# Patient Record
Sex: MALE | Race: WHITE | Employment: OTHER | URBAN - METROPOLITAN AREA
[De-identification: names, ages, dates, MRNs, and addresses within clinical notes are randomized per-mention and may not be internally consistent; named-entity substitution may affect disease eponyms.]

---

## 2021-07-07 ENCOUNTER — HOSPITAL ENCOUNTER (OUTPATIENT)
Dept: PHYSICAL THERAPY | Age: 86
Setting detail: THERAPIES SERIES
Discharge: HOME | End: 2021-07-07
Payer: MEDICARE

## 2021-07-07 PROCEDURE — 97162 PT EVAL MOD COMPLEX 30 MIN: CPT

## 2021-07-07 PROCEDURE — 97110 THERAPEUTIC EXERCISES: CPT

## 2021-07-07 NOTE — PLAN OF CARE
[address] [address]  Phone: [phone]   Fax: [phone]     Physical Therapy Certification    Dear Referring Practitioner: Abigail Botello MD,    We had the pleasure of evaluating the following patient for physical therapy services at [address] [place] [address]. A summary of our findings can be found in the initial assessment below. This includes our plan of care. If you have any questions or concerns regarding these findings, please do not hesitate to contact me at the office phone number checked above. Thank you for the referral.       Physician Signature:_______________________________Date:__________________  By signing above (or electronic signature), therapists plan is approved by physician      Patient: Lilliana Hinojosa   : 1935   MRN: [id number]  Referring Physician: Referring Practitioner: Abigail Botello MD      Evaluation Date: 2021      Medical Diagnosis Information:  Diagnosis: Other intervertebral disc degeneration, lumbar region   Treatment Diagnosis: imbalance, abnormal gait, B hip  & core muscle weakness , decrease B hip flexibility. Insurance information: PT Insurance Information: [employer]     Precautions/ Contra-indications:  Neuropathy  Latex Allergy:  [x]NO      []YES  Preferred Language for Healthcare:   [x]English       []Other:    C-SSRS Triggered by Intake questionnaire (Past 2 wk assessment ):   [x] No, Questionnaire did not trigger screening.   [] Yes, Patient intake triggered C-SSRS Screening     [] Completed, no further action required. [] Completed, PCP notified via Epic    SUBJECTIVE: Patient stated complaint:  Patient has a 2 year history of chronic back pain from DDD. He has had 2 ablation procedures in the last two years.  The patient reports that he has had multiple falls over the past two years as well as  increase back pain. He had BPPV several months ago which caused falls and later hospitalized for several days . Says he would like to improve his mobility , strength in R knee, and reduce the back pain overall. Fear avoidance: I should not do physical activities that (might) make my pain worse   [] True   [x] False     Relevant Medical History: Neuropathy , Myalgia,   Functional Outcome: Oswestry: raw score = 9; dysfunction = 60%    Pain Scale: 7-9/10  Easing factors: Meds  Provocative factors: Sleeping, walking, standing, & all functional positioning     Type: [x]Constant   []Intermittent  []Radiating []Localized []other:     Numbness/Tingling:  B feet     Occupation/School:Retired     Living Status/Prior Level of Function: Prior to this injury / incident, pt was independent with ADLs and IADLs, lives in a one story house with spouse . Has a basement . Prior to 2 years ago patient was getting around without limitations . Patient owns a cane, rollator , tub bench, and recliner which he uses for sit to stand transitions. OBJECTIVE:   Palpation: diminished sensation R lower leg > left LE    Functional Mobility/Transfers:  Mod I     Posture: forward trunk     Inspection: increase hip flexon and BLE stiffness guarding    Gait: (include devices/WB status) forward trunk , decrease mario, decrease step length & mild shuffling with straight cane,    Bandages/Dressings/Incisions: NA    Dermatomes Normal Abnormal Comments   inguinal area (L1)       anterior mid-thigh (L2)      distal ant thigh/med knee (L3)  x right   medial lower leg and foot (L4)      lateral lower leg and foot (L5)  x right   posterior calf (S1)      medial calcaneus (S2)  x right       Reflexes Normal Abnormal Comments   S1-2 Seated achilles x     S1-2 Prone knee bend      L3-4 Patellar tendon x     Clonus x     Babinski          ROM  Comments   Lumbar Flex Limited by 50%    Lumbar Ext Limited by 75%      ROM LEFT RIGHT Comments   Lumbar Side Bend Limited by 50% Limited by 50%    Lumbar Rotation      Hip Flexion   WFL all below   Hip Abd      Hip ER      Hip IR      Hip Extension      Knee Ext      Knee Flex      Hamstring Flex      Piriformis                      Joint mobility:    []Normal    [x]Hypo   []Hyper      Strength / Myotomes LEFT RIGHT Comments   Multifidus      Transverse Ab      Hip Flexors (L1-2) 4 4    Hip Abductors      Hip Extensors      Hip Internal Rotators 4 4+    Knee Ext 5 4    Knee Flex 4+ 4    Hip External Rotators 4 4+    Quads (L2-4)      Hamstrings       Ankle Plantarflexion (S1-2) 3+ 4+    Ankle Dorsiflexion (L4-5) 3+ 4+    Ankle Inversion 3+ 4+    Ankle Eversion (S1-2) 3+ 4+    Great Toe Extension (L5)            Neural dynamic tension testing Normal Abnormal Comments   Slump Test  - Degree of knee flexion:  x     SLR  x     0-30      30-70      Femoral nerve (L2-4)          Orthopedic Special Tests:    Normal Abnormal N/A Comments   Toe walk         Heel Walk       Fwd Bend-aberrant or innominate mvmt)  x     Trendelenburg       Kemps/Quadrant       Stork       JEREL/Chadd       Hip scour       SLR x      Crossed SLR x      Supine to sit       Hip thrust       SI distraction/compression       PA/Spring       Prone Instability test       Prone knee bend       Sacral Spring/thrust                  [x] Patient history, allergies, meds reviewed. Medical chart reviewed. See intake form. Review Of Systems (ROS):  [x]Performed Review of systems (Integumentary, CardioPulmonary, Neurological) by intake and observation. Intake form has been scanned into medical record. Patient has been instructed to contact their primary care physician regarding ROS issues if not already being addressed at this time.       Co-morbidities/Complexities (which will affect course of rehabilitation):   []None        []Hx of COVID   Arthritic conditions   []Rheumatoid arthritis (M05.9)  [x]Osteoarthritis (M19.91)  []Gout   Cardiovascular conditions []Hypertension (I10)  []Hyperlipidemia (E78.5)  []Angina pectoris (I20)  []Atherosclerosis (I70)  []Pacemaker  []Hx of CABG/stent/  cardiac surgeries   Musculoskeletal conditions   []Disc pathology   []Congenital spine pathologies   []Osteoporosis (M81.8)  []Osteopenia (M85.8)  []Scoliosis       Endocrine conditions   []Hypothyroid (E03.9)  []Hyperthyroid Gastrointestinal conditions   []Constipation (L05.94)   Metabolic conditions   []Morbid obesity (E66.01)  []Diabetes type 1(E10.65) or 2 (E11.65)   [x]Neuropathy (G60.9)     Cardio/Pulmonary conditions   []Asthma (J45)  []Coughing   []COPD (J44.9)  []CHF  []A-fib   Psychological Disorders  []Anxiety (F41.9)  []Depression (F32.9)   []Other:   Developmental Disorders  []Autism (F84.0)  []CP (G80)  []Down Syndrome (Q90.9)  []Developmental delay     Neurological conditions  []Prior Stroke (I69.30)  []Parkinson's (G20)  []Encephalopathy (G93.40)  []MS (G35)  []Post-polio (G14)  []SCI  []TBI  []ALS Other conditions  []Fibromyalgia (M79.7)  []Vertigo  []Syncope  []Kidney Failure  []Cancer      []currently undergoing                treatment  []Pregnancy  []Incontinence   Prior surgeries  []involved limb  []previous spinal surgery  [] section birth  []hysterectomy  []bowel / bladder surgery  []other relevant surgeries   [x]Other:   Neuropathy, Ablation sx,            Barriers to/and or personal factors that will affect rehab potential:              []Age  []Sex    []Smoker              []Motivation/Lack of Motivation                        [x]Co-Morbidities              []Cognitive Function, education/learning barriers              []Environmental, home barriers              []profession/work barriers  []past PT/medical experience  []other:  Justification: Neuropathy, OA, Hx of vertigo     Falls Risk Assessment (30 days): To be determined next session   [x] Falls Risk assessed and no intervention required.   [] Falls Risk assessed and Patient requires intervention due to being higher risk   TUG score (>12s at risk):     [] Falls education provided, including         ASSESSMENT: The patient is 80 yo male who presents with hip and core muscle weakness , decrease B hip flexibility, poor posture,  Impaired RLE sensation  ( diminished light tough)and imbalance with transitional mobility . Functional Impairments:     [x]Noted lumbar/proximal hip hypomobility   []Noted lumbosacral and/or generalized hypermobility   [x]Decreased Lumbosacral/hip/LE functional ROM   [x]Decreased core/proximal hip strength and neuromuscular control    [x]Decreased LE functional strength    []Abnormal reflexes/sensation/myotomal/dermatomal deficits  [x]Reduced balance/proprioceptive control    []other:      Functional Activity Limitations (from functional questionnaire and intake)   [x]Reduced ability to tolerate prolonged functional positions   [x]Reduced ability or difficulty with changes of positions or transfers between positions   [x]Reduced ability to maintain good posture and demonstrate good body mechanics with sitting, bending, and lifting   [x]Reduced ability to sleep   [x] Reduced ability or tolerance with driving and/or computer work   [x]Reduced ability to perform lifting, reaching, carrying tasks   [x]Reduced ability to squat   [x]Reduced ability to forward bend   [x]Reduced ability to ambulate prolonged functional periods/distances/surfaces   [x]Reduced ability to ascend/descend stairs   []other:       Participation Restrictions   [x]Reduced participation in self care activities   [x]Reduced participation in home management activities   []Reduced participation in work activities   [x]Reduced participation in social activities. []Reduced participation in sport/recreational activities. Classification:   []Signs/symptoms consistent with Lumbar instability/stabilization subgroup.       []Signs/symptoms consistent with Lumbar mobilization/manipulation subgroup, myotomes and dermatomes intact. Meets manipulation criteria. []Signs/symptoms consistent with Lumbar direction specific/centralization subgroup   []Signs/symptoms consistent with Lumbar traction subgroup     []Signs/symptoms consistent with lumbar facet dysfunction   [x]Signs/symptoms consistent with lumbar stenosis type dysfunction   []Signs/symptoms consistent with nerve root involvement including myotome & dermatome dysfunction   []Signs/symptoms consistent with post-surgical status including: decreased ROM, strength and function. []signs/symptoms consistent with pathology which may benefit from Dry needling     []other:      Prognosis/Rehab Potential:      []Excellent   [x]Good    []Fair   []Poor    Tolerance of evaluation/treatment:    []Excellent   [x]Good    []Fair   []Poor     Physical Therapy Evaluation Complexity Justification  [x] A history of present problem with:  [] no personal factors and/or comorbidities that impact the plan of care;  [x]1-2 personal factors and/or comorbidities that impact the plan of care  []3 personal factors and/or comorbidities that impact the plan of care  [x] An examination of body systems using standardized tests and measures addressing any of the following: body structures and functions (impairments), activity limitations, and/or participation restrictions;:  [x] a total of 1-2 or more elements   [] a total of 3 or more elements   [] a total of 4 or more elements   [x] A clinical presentation with:  [x] stable and/or uncomplicated characteristics   [] evolving clinical presentation with changing characteristics  [] unstable and unpredictable characteristics;   [x] Clinical decision making of [] low, [x] moderate, [] high complexity using standardized patient assessment instrument and/or measurable assessment of functional outcome.     [] EVAL (LOW) 96416 (typically 15 minutes face-to-face)  [x] EVAL (MOD) 41787 (typically 30 minutes face-to-face)  [] EVAL (HIGH) 22299 (typically 45 minutes face-to-face)  [] RE-EVAL     PLAN: Begin PT focusing on: proximal hip mobilizations, LB mobs, LB core activation, proximal hip activation, and HEP    Frequency/Duration: 2 days per week for 6 Weeks:  Interventions:  [x]  Therapeutic exercise including: strength training, ROM, for LE, Glutes and core   [x]  NMR activation and proprioception for glutes , LE and Core   [x]  Manual therapy as indicated for Hip complex, LE and spine to include: Dry Needling/IASTM, STM, PROM, Gr I-IV mobilizations, manipulation. [x]  Modalities as needed that may include: thermal agents, E-stim, Biofeedback, US, iontophoresis as indicated  [x]  Patient education on joint protection, postural re-education, activity modification, progression of HEP. HEP instruction: Written HEP instructions provided and reviewed   Access Code: [email or web address]  URL: [email or web address]  Date: 07/07/2021  Prepared by: Mamie Swan    Exercises  Supine Lower Trunk Rotation - 1 x daily - 7 x weekly - 1 sets - 10 reps - 2-3 hold  Seated Hamstring Stretch - 1 x daily - 7 x weekly - 1 sets - 3 reps - 15-20 hold      GOALS:  Patient stated goal: To improve mobility   [] Progressing: [] Met: [] Not Met: [] Adjusted    Therapist goals for Patient:   Short Term Goals: To be achieved in: 2 weeks  1. Independent in HEP and progression per patient tolerance, in order to prevent re-injury. [] Progressing: [] Met: [] Not Met: [] Adjusted  2. Patient will have a decrease in pain to facilitate improvement in movement, function, and ADLs as indicated by Functional Deficits. [] Progressing: [] Met: [] Not Met: [] Adjusted    Long Term Goals: To be achieved in: 6 weeks/ DC   1. Disability index score of 35% or less for the JAIRO to assist with reaching prior level of function. [] Progressing: [] Met: [] Not Met: [] Adjusted    2.  Patient will demonstrate an increase in Strength to+4/5 proximal hip and core activation to allow for proper functional mobility as indicated by patients Functional Deficits. [] Progressing: [] Met: [] Not Met: [] Adjusted  3. Patient will return to functional activities including sit to stand without increased symptoms or LOB. [] Progressing: [] Met: [] Not Met: [] Adjusted  4. Patient to tolerate standing > 10 min without reduce symptoms of back pain and RLE giving way. [] Progressing: [] Met: [] Not Met: [] Adjusted   5. Patient will ambulate with/without AD on level and unlevel surfaces without LOB and normalized gait pattern by 50%.     [] Progressing: [] Met: [] Not Met: [] Adjusted   Electronically signed by:  Junaid Fitzgerald, PT

## 2021-07-07 NOTE — FLOWSHEET NOTE
[facility]  Phone: [phone]   Fax: [phone]    Physical Therapy Daily Treatment Note  Date:  2021    Patient Name:  Adeline Brewer    :  1935  MRN: [id number]  Medical/Treatment Diagnosis Information:  · Diagnosis: Other intervertebral disc degeneration, lumbar region  · Treatment Diagnosis: imbalance, abnormal gait, B hip  & core muscle weakness , decrease B hip flexibility. Insurance/Certification information:  PT Insurance Information: [facility] Medicare  Physician Information:  Referring Practitioner: Natacha Meyer MD  Plan of care signed (Y/N): []  Yes [x]  No     Date of Patient follow up with Physician:      Progress Report: []  Yes  [x]  No     Date Range for reporting period:  Beginnin2021  Ending:     Progress report due (10 Rx/or 30 days whichever is less): visit #02    Recertification due (POC duration/ or 90 days whichever is less): visit # 2021    Visit # Insurance Allowable Auth required?  Date Range     []  Yes  [x]  No        Latex Allergy:  [x]NO      []YES  Preferred Language for Healthcare:   [x]English       []other:    Functional Scale:        Date assessed:  JAIRO: raw score = 9; dysfunction = 60%  21    Pain level:  6-8/10     SUBJECTIVE:  See eval    OBJECTIVE: See eval      RESTRICTIONS/PRECAUTIONS:  Neuropathy     Exercises/Interventions:     Therapeutic Exercises (07557) Resistance / level Sets/sec Reps Notes   Nustep        Supine  LTR  DKTC                                                        Therapeutic Activities (95239)       Perform  TUG  Or Sit to stand  NPV                                   Neuromuscular Re-ed (09832)       Balance:  Tandem  Airex                                           Manual Intervention (46975)                                                     Modalities:     Pt. Education:  -patient educated on diagnosis, prognosis and expectations for rehab  -all patient questions were answered    Home Exercise Program:  Access Code: [email or web address]  URL: [email or web address]  Date: 07/07/2021  Prepared by: Ana Maria Denise    Exercises  Supine Lower Trunk Rotation - 1 x daily - 7 x weekly - 1 sets - 10 reps - 2-3 hold  Seated Hamstring Stretch - 1 x daily - 7 x weekly - 1 sets - 3 reps - 15-20 hold          Therapeutic Exercise and NMR EXR  [x] (38997) Provided verbal/tactile cueing for activities related to strengthening, flexibility, endurance, ROM for improvements in  [] LE / Lumbar: LE, proximal hip, and core control with self care, mobility, lifting, ambulation. [] UE / Cervical: cervical, postural, scapular, scapulothoracic and UE control with self care, reaching, carrying, lifting, house/yardwork, driving, computer work.  [] (38169) Provided verbal/tactile cueing for activities related to improving balance, coordination, kinesthetic sense, posture, motor skill, proprioception to assist with   [] LE / lumbar: LE, proximal hip, and core control in self care, mobility, lifting, ambulation and eccentric single leg control. [] UE / cervical: cervical, scapular, scapulothoracic and UE control with self care, reaching, carrying, lifting, house/yardwork, driving, computer work.   [] (34063) Therapist is in constant attendance of 2 or more patients providing skilled therapy interventions, but not providing any significant amount of measurable one-on-one time to either patient, for improvements in  [] LE / lumbar: LE, proximal hip, and core control in self care, mobility, lifting, ambulation and eccentric single leg control. [] UE / cervical: cervical, scapular, scapulothoracic and UE control with self care, reaching, carrying, lifting, house/yardwork, driving, computer work.      NMR and Therapeutic Activities:    [] (50045 or 47591) Provided verbal/tactile cueing for activities related to improving balance, coordination, kinesthetic sense, posture, motor skill, proprioception and motor activation to allow for proper function of   [] LE: / Lumbar core, proximal hip and LE with self care and ADLs  [] UE / Cervical: cervical, postural, scapular, scapulothoracic and UE control with self care, carrying, lifting, driving, computer work.   [] (21414) Gait Re-education- Provided training and instruction to the patient for proper LE, core and proximal hip recruitment and positioning and eccentric body weight control with ambulation re-education including up and down stairs     Home Management Training / Self Care:  [] (47243) Provided self-care/home management training related to activities of daily living and compensatory training, and/or use of adaptive equipment for improvement with: ADLs and compensatory training, meal preparation, safety procedures and instruction in use of adaptive equipment, including bathing, grooming, dressing, personal hygiene, basic household cleaning and chores.      Home Exercise Program:    [x] (44141) Reviewed/Progressed HEP activities related to strengthening, flexibility, endurance, ROM of   [] LE / Lumbar: core, proximal hip and LE for functional self-care, mobility, lifting and ambulation/stair navigation   [] UE / Cervical: cervical, postural, scapular, scapulothoracic and UE control with self care, reaching, carrying, lifting, house/yardwork, driving, computer work  [] (79233)Reviewed/Progressed HEP activities related to improving balance, coordination, kinesthetic sense, posture, motor skill, proprioception of   [] LE: core, proximal hip and LE for self care, mobility, lifting, and ambulation/stair navigation    [] UE / Cervical: cervical, postural,  scapular, scapulothoracic and UE control with self care, reaching, carrying, lifting, house/yardwork, driving, computer work    Manual Treatments:  PROM / STM / Oscillations-Mobs:  G-I, II, III, IV (PA's, Inf., Post.)  [] (48375) Provided manual therapy to mobilize LE, proximal hip and/or LS spine soft tissue/joints for the purpose of modulating pain, promoting relaxation,  increasing ROM, reducing/eliminating soft tissue swelling/inflammation/restriction, improving soft tissue extensibility and allowing for proper ROM for normal function with   [] LE / lumbar: self care, mobility, lifting and ambulation. [] UE / Cervical: self care, reaching, carrying, lifting, house/yardwork, driving, computer work. Modalities:  [] (65587) Vasopneumatic compression: Utilized vasopneumatic compression to decrease edema / swelling for the purpose of improving mobility and quad tone / recruitment which will allow for increased overall function including but not limited to self-care, transfers, ambulation, and ascending / descending stairs. Charges:  Timed Code Treatment Minutes: 24   Total Treatment Minutes: 39     [x] EVAL - LOW (76568)   [] EVAL - MOD (11640)  [] EVAL - HIGH (43260)  [] RE-EVAL (90232)  [x] RQ(72666) x  2     [] Ionto  [] NMR (27378) x       [] Vaso  [] Manual (03164) x       [] Ultrasound  [] TA x        [] Mech Traction (49213)  [] Aquatic Therapy x     [] ES (un) (15154):   [] Home Management Training x  [] ES(attended) (27999)   [] Dry Needling 1-2 muscles (99030):  [] Dry Needling 3+ muscles (845112)  [] Group:      [] Other:     GOALS:  Patient stated goal: To improve mobility   []? Progressing: []? Met: []? Not Met: []? Adjusted     Therapist goals for Patient:   Short Term Goals: To be achieved in: 2 weeks  1. Independent in HEP and progression per patient tolerance, in order to prevent re-injury. []? Progressing: []? Met: []? Not Met: []? Adjusted  2. Patient will have a decrease in pain to facilitate improvement in movement, function, and ADLs as indicated by Functional Deficits. []? Progressing: []? Met: []? Not Met: []? Adjusted     Long Term Goals: To be achieved in: 6 weeks/ DC   1. Disability index score of 35% or less for the JAIRO to assist with reaching prior level of function. []?  Progressing: []? Met: []? Not Met: []? Adjusted     2. Patient will demonstrate an increase in Strength to+4/5 proximal hip and core activation to allow for proper functional mobility as indicated by patients Functional Deficits. []? Progressing: []? Met: []? Not Met: []? Adjusted  3. Patient will return to functional activities including sit to stand without increased symptoms or LOB. []? Progressing: []? Met: []? Not Met: []? Adjusted  4. Patient to tolerate standing > 10 min without reduce symptoms of back pain and RLE giving way. []? Progressing: []? Met: []? Not Met: []? Adjusted       5. Patient will ambulate with/without AD on level and unlevel surfaces without LOB and normalized gait pattern by 50%. []? Progressing: []? Met: []? Not Met: []? Adjusted  Overall Progression Towards Functional goals/ Treatment Progress Update:  [] Patient is progressing as expected towards functional goals listed. [] Progression is slowed due to complexities/Impairments listed. [] Progression has been slowed due to co-morbidities.   [x] Plan just implemented, too soon to assess goals progression <30days   [] Goals require adjustment due to lack of progress  [] Patient is not progressing as expected and requires additional follow up with physician  [] Other    Persisting Functional Limitations/Impairments:  [x]Sleeping []Sitting               [x]Standing [x]Transfers        [x]Walking []Kneeling               [x]Stairs [x]Squatting / bending   [x]ADLs [x]Reaching  [x]Lifting  []Housework  []Driving []Job related tasks  []Sports/Recreation []Other:        ASSESSMENT:  See eval  Treatment/Activity Tolerance:  [] Patient able to complete tx [] Patient limited by fatigue  [] Patient limited by pain  [] Patient limited by other medical complications  [] Other:     Prognosis: [] Good [] Fair  [] Poor    Patient Requires Follow-up: [x] Yes  [] No    Plan for next treatment session:  Begin flexion preference core exercises and balance activities     PLAN: See eval. PT 2x / week for 6 weeks. [] Continue per plan of care [] Alter current plan (see comments)  [x] Plan of care initiated [] Hold pending MD visit [] Discharge    Electronically signed by: Leslie Barbosa, PT PT, DPT    Note: If patient does not return for scheduled/ recommended follow up visits, this note will serve as a discharge from care along with most recent update on progress.

## 2021-07-14 ENCOUNTER — HOSPITAL ENCOUNTER (OUTPATIENT)
Dept: PHYSICAL THERAPY | Age: 86
Setting detail: THERAPIES SERIES
Discharge: HOME | End: 2021-07-14
Payer: MEDICARE

## 2021-07-14 PROCEDURE — 97112 NEUROMUSCULAR REEDUCATION: CPT

## 2021-07-14 PROCEDURE — 97110 THERAPEUTIC EXERCISES: CPT

## 2021-07-14 NOTE — FLOWSHEET NOTE
[address] [facility]  Phone: [phone]   Fax: [phone]    Physical Therapy Daily Treatment Note  Date:  2021    Patient Name:  Latasha Sutton    :  1935  MRN: [id number]  Medical/Treatment Diagnosis Information:  · Diagnosis: Other intervertebral disc degeneration, lumbar region  · Treatment Diagnosis: imbalance, abnormal gait, B hip  & core muscle weakness , decrease B hip flexibility. Insurance/Certification information:  PT Insurance Information: [facility] Medicare  Physician Information:  Referring Practitioner: Patrick Castro MD  Plan of care signed (Y/N): []  Yes [x]  No     Date of Patient follow up with Physician:      Progress Report: []  Yes  [x]  No     Date Range for reporting period:  Beginnin2021  Ending:     Progress report due (10 Rx/or 30 days whichever is less): visit #79    Recertification due (POC duration/ or 90 days whichever is less): visit # 2021    Visit # Insurance Allowable Auth required?  Date Range     []  Yes  [x]  No        Latex Allergy:  [x]NO      []YES  Preferred Language for Healthcare:   [x]English       []other:    Functional Scale:        Date assessed:  JAIRO: raw score = 9; dysfunction = 60%  21  TU sec        21     Pain level:  6-8/10     SUBJECTIVE:  Patient and son mentioned that they are considering an epidural. The family is unsure about getting the pt an epidural .     OBJECTIVE: : ambulates with straight cane forward flexed posture , shuffling gait pattern , decrease step length       RESTRICTIONS/PRECAUTIONS:  Neuropathy , (Parkinsons 2 years ago) revealed 22    Exercises/Interventions:     Therapeutic Exercises (39334) Resistance / level Sets/sec Reps Notes   Nustep  1.0 4 min                                                              Therapeutic Activities (20594)         TUG 22sec   1 10                         Neuromuscular Re-ed (65381)       Balance:  Tandem            Toe taps  6 in' step  1 10     Stepping with weight shift over single adrienne   1 10    AIREX:   NBOS with CGA     30\"   2   7/14: difficulty finding center, leaning backwards   Trunk rotation no UE support   1 5    Side stepping, Fwd/back stepping in the parallel bars    1  Lap each    Manual Intervention (11497)                                                     Modalities:     Pt. Education:  -patient educated on diagnosis, prognosis and expectations for rehab  -all patient questions were answered    Home Exercise Program:  Access Code: [email or web address]  URL: [email or web address]  Date: 07/07/2021  Prepared by: Akilah Hals    Exercises  Supine Lower Trunk Rotation - 1 x daily - 7 x weekly - 1 sets - 10 reps - 2-3 hold  Seated Hamstring Stretch - 1 x daily - 7 x weekly - 1 sets - 3 reps - 15-20 hold          Therapeutic Exercise and NMR EXR  [x] (37660) Provided verbal/tactile cueing for activities related to strengthening, flexibility, endurance, ROM for improvements in  [] LE / Lumbar: LE, proximal hip, and core control with self care, mobility, lifting, ambulation. [] UE / Cervical: cervical, postural, scapular, scapulothoracic and UE control with self care, reaching, carrying, lifting, house/yardwork, driving, computer work.  [] (16302) Provided verbal/tactile cueing for activities related to improving balance, coordination, kinesthetic sense, posture, motor skill, proprioception to assist with   [] LE / lumbar: LE, proximal hip, and core control in self care, mobility, lifting, ambulation and eccentric single leg control.    [] UE / cervical: cervical, scapular, scapulothoracic and UE control with self care, reaching, carrying, lifting, house/yardwork, driving, computer work.   [] (57809) Therapist is in constant attendance of 2 or more patients providing skilled therapy interventions, but not providing any significant amount of measurable one-on-one time to either patient, for improvements in  [] LE / lumbar: LE, proximal hip, and core control in self care, mobility, lifting, ambulation and eccentric single leg control. [] UE / cervical: cervical, scapular, scapulothoracic and UE control with self care, reaching, carrying, lifting, house/yardwork, driving, computer work. NMR and Therapeutic Activities:    [] (48940 or 65453) Provided verbal/tactile cueing for activities related to improving balance, coordination, kinesthetic sense, posture, motor skill, proprioception and motor activation to allow for proper function of   [] LE: / Lumbar core, proximal hip and LE with self care and ADLs  [] UE / Cervical: cervical, postural, scapular, scapulothoracic and UE control with self care, carrying, lifting, driving, computer work.   [] (11303) Gait Re-education- Provided training and instruction to the patient for proper LE, core and proximal hip recruitment and positioning and eccentric body weight control with ambulation re-education including up and down stairs     Home Management Training / Self Care:  [] (24961) Provided self-care/home management training related to activities of daily living and compensatory training, and/or use of adaptive equipment for improvement with: ADLs and compensatory training, meal preparation, safety procedures and instruction in use of adaptive equipment, including bathing, grooming, dressing, personal hygiene, basic household cleaning and chores.      Home Exercise Program:    [x] (96869) Reviewed/Progressed HEP activities related to strengthening, flexibility, endurance, ROM of   [] LE / Lumbar: core, proximal hip and LE for functional self-care, mobility, lifting and ambulation/stair navigation   [] UE / Cervical: cervical, postural, scapular, scapulothoracic and UE control with self care, reaching, carrying, lifting, house/yardwork, driving, computer work  [] (46553)Reviewed/Progressed HEP activities related to improving balance, coordination, kinesthetic sense, posture, motor skill, proprioception of   [] LE: core, proximal hip and LE for self care, mobility, lifting, and ambulation/stair navigation    [] UE / Cervical: cervical, postural,  scapular, scapulothoracic and UE control with self care, reaching, carrying, lifting, house/yardwork, driving, computer work    Manual Treatments:  PROM / STM / Oscillations-Mobs:  G-I, II, III, IV (PA's, Inf., Post.)  [] (84071) Provided manual therapy to mobilize LE, proximal hip and/or LS spine soft tissue/joints for the purpose of modulating pain, promoting relaxation,  increasing ROM, reducing/eliminating soft tissue swelling/inflammation/restriction, improving soft tissue extensibility and allowing for proper ROM for normal function with   [] LE / lumbar: self care, mobility, lifting and ambulation. [] UE / Cervical: self care, reaching, carrying, lifting, house/yardwork, driving, computer work. Modalities:  [] (33462) Vasopneumatic compression: Utilized vasopneumatic compression to decrease edema / swelling for the purpose of improving mobility and quad tone / recruitment which will allow for increased overall function including but not limited to self-care, transfers, ambulation, and ascending / descending stairs. Charges:  Timed Code Treatment Minutes: Total Treatment Minutes:      [] EVAL - LOW (32667)   [] EVAL - MOD (25301)  [] EVAL - HIGH (06743)  [] RE-EVAL (18499)  [x] OM(04549) x  1     [] Ionto  [x] NMR (75396) x  2     [] Vaso  [] Manual (68463) x       [] Ultrasound  [] TA x        [] Mech Traction (98960)  [] Aquatic Therapy x     [] ES (un) (88073):   [] Home Management Training x  [] ES(attended) (21563)   [] Dry Needling 1-2 muscles (82412):  [] Dry Needling 3+ muscles (022307)  [] Group:      [] Other:     GOALS:  Patient stated goal: To improve mobility   []? Progressing: []? Met: []? Not Met: []? Adjusted     Therapist goals for Patient:   Short Term Goals:  To be achieved in: 2 weeks  1. Independent in HEP and progression per patient tolerance, in order to prevent re-injury. []? Progressing: []? Met: []? Not Met: []? Adjusted  2. Patient will have a decrease in pain to facilitate improvement in movement, function, and ADLs as indicated by Functional Deficits. []? Progressing: []? Met: []? Not Met: []? Adjusted     Long Term Goals: To be achieved in: 6 weeks/ DC   1. Disability index score of 35% or less for the JAIRO to assist with reaching prior level of function. []? Progressing: []? Met: []? Not Met: []? Adjusted     2. Patient will demonstrate an increase in Strength to+4/5 proximal hip and core activation to allow for proper functional mobility as indicated by patients Functional Deficits. []? Progressing: []? Met: []? Not Met: []? Adjusted  3. Patient will return to functional activities including sit to stand without increased symptoms or LOB. []? Progressing: []? Met: []? Not Met: []? Adjusted  4. Patient to tolerate standing > 10 min without reduce symptoms of back pain and RLE giving way. []? Progressing: []? Met: []? Not Met: []? Adjusted       5. Patient will ambulate with/without AD on level and unlevel surfaces without LOB and normalized gait pattern by 50%. []? Progressing: []? Met: []? Not Met: []? Adjusted  Overall Progression Towards Functional goals/ Treatment Progress Update:  [] Patient is progressing as expected towards functional goals listed. [] Progression is slowed due to complexities/Impairments listed. [] Progression has been slowed due to co-morbidities.   [x] Plan just implemented, too soon to assess goals progression <30days   [] Goals require adjustment due to lack of progress  [] Patient is not progressing as expected and requires additional follow up with physician  [] Other    Persisting Functional Limitations/Impairments:  [x]Sleeping []Sitting               [x]Standing [x]Transfers        [x]Walking []Kneeling               [x]Stairs [x]Squatting / bending   [x]ADLs [x]Reaching  [x]Lifting  []Housework  []Driving []Job related tasks  []Sports/Recreation []Other:        ASSESSMENT:  Patient began balance exercises this date with UE support in // bars. The patient demonstrated increase forward trunk posture , decrease step length, and LE fatigue. The patient presents with Parkinons-like symptoms with shuffling gait pattern. He has limited trunk mobility and decrease stability with narrow base activities. The patient will benefit from LSVT Big movements and balance training to further improve functional mobility as tolerated. Treatment/Activity Tolerance:  [] Patient able to complete tx [] Patient limited by fatigue  [] Patient limited by pain  [] Patient limited by other medical complications  [] Other:     Prognosis: [] Good [] Fair  [] Poor    Patient Requires Follow-up: [x] Yes  [] No    Plan for next treatment session:  Begin flexion preference core exercises and balance activities     PLAN: See frances. PT 2x / week for 6 weeks. [] Continue per plan of care [] Alter current plan (see comments)  [x] Plan of care initiated [] Hold pending MD visit [] Discharge    Electronically signed by: Epifanio Rodriges, PT PT, DPT    Note: If patient does not return for scheduled/ recommended follow up visits, this note will serve as a discharge from care along with most recent update on progress.

## 2021-07-16 ENCOUNTER — HOSPITAL ENCOUNTER (OUTPATIENT)
Dept: PHYSICAL THERAPY | Age: 86
Setting detail: THERAPIES SERIES
Discharge: HOME | End: 2021-07-16
Payer: MEDICARE

## 2021-07-16 PROCEDURE — 97110 THERAPEUTIC EXERCISES: CPT

## 2021-07-16 PROCEDURE — 97112 NEUROMUSCULAR REEDUCATION: CPT

## 2021-07-16 NOTE — FLOWSHEET NOTE
[address] [facility] Physical Therapy  Phone: [phone]   Fax: [phone]    Physical Therapy Daily Treatment Note  Date:  2021    Patient Name:  Marquis Daley    :  1935  MRN: [id number]  Medical/Treatment Diagnosis Information:  · Diagnosis: Other intervertebral disc degeneration, lumbar region  · Treatment Diagnosis: imbalance, abnormal gait, B hip  & core muscle weakness , decrease B hip flexibility. Insurance/Certification information:  PT Insurance Information: [facility]  Physician Information:  Referring Practitioner: Juanpablo Orr MD  Plan of care signed (Y/N): []  Yes [x]  No     Date of Patient follow up with Physician:      Progress Report: []  Yes  [x]  No     Date Range for reporting period:  Beginnin2021  Ending:     Progress report due (10 Rx/or 30 days whichever is less): visit #54    Recertification due (POC duration/ or 90 days whichever is less): visit # 2021    Visit # Insurance Allowable Auth required? Date Range   3/12  []  Yes  [x]  No        Latex Allergy:  [x]NO      []YES  Preferred Language for Healthcare:   [x]English       []other:    Functional Scale:        Date assessed:  JAIRO: raw score = 9; dysfunction = 60%  21  TU sec        21     Pain level:  0/10     SUBJECTIVE:  Doing pretty good today, felt okay after last session but did have some soreness in R thigh yesterday, better now.         OBJECTIVE: : ambulates with straight cane forward flexed posture , shuffling gait pattern , decrease step length       RESTRICTIONS/PRECAUTIONS:  Neuropathy , (Parkinsons 2 years ago) revealed 21    Exercises/Interventions:     Therapeutic Exercises (06176) Resistance / level Sets/sec Reps Notes   Nustep   Bike  1.0  1.0   5 mins            IB / HR  2x30\" / 2x10                                               Therapeutic Activities (36235)         TUG 22sec        Marching     Sit to stand w/LSVT Big technique    12                  Neuromuscular Re-ed (14217)       Gait training:  LSVT Big technique       LSVT Big steps -fwd     215', 125'      10 R, 3 L   -frequent cues to improve step length, posture and arm swing.    -required multiple attempts to perform correctly on R, too fatigue to continue w/LLE   Toe taps  6 in' step     Stepping with weight shift over single adrienne      AIREX:   NBOS with CGA     7/14: difficulty finding center, leaning backwards   Trunk rotation no UE support      Side stepping, Fwd/back stepping in the parallel bars                          TM -bwd 0.3 mph 10\" x2 -pt unsafe so stopped   Manual Intervention (57325)                                                     Modalities:     Pt. Education:  -patient educated on diagnosis, prognosis and expectations for rehab  -all patient questions were answered    Home Exercise Program:  Access Code: [email or web address]  URL: [email or web address]  Date: 07/07/2021  Prepared by: Jeanne Sat    Exercises  Supine Lower Trunk Rotation - 1 x daily - 7 x weekly - 1 sets - 10 reps - 2-3 hold  Seated Hamstring Stretch - 1 x daily - 7 x weekly - 1 sets - 3 reps - 15-20 hold          Therapeutic Exercise and NMR EXR  [x] (94694) Provided verbal/tactile cueing for activities related to strengthening, flexibility, endurance, ROM for improvements in  [] LE / Lumbar: LE, proximal hip, and core control with self care, mobility, lifting, ambulation. [] UE / Cervical: cervical, postural, scapular, scapulothoracic and UE control with self care, reaching, carrying, lifting, house/yardwork, driving, computer work.  [] (77174) Provided verbal/tactile cueing for activities related to improving balance, coordination, kinesthetic sense, posture, motor skill, proprioception to assist with   [] LE / lumbar: LE, proximal hip, and core control in self care, mobility, lifting, ambulation and eccentric single leg control.    [] UE / cervical: cervical, scapular, scapulothoracic and UE control with self care, reaching, carrying, lifting, house/yardwork, driving, computer work.   [] (29290) Therapist is in constant attendance of 2 or more patients providing skilled therapy interventions, but not providing any significant amount of measurable one-on-one time to either patient, for improvements in  [] LE / lumbar: LE, proximal hip, and core control in self care, mobility, lifting, ambulation and eccentric single leg control. [] UE / cervical: cervical, scapular, scapulothoracic and UE control with self care, reaching, carrying, lifting, house/yardwork, driving, computer work. NMR and Therapeutic Activities:    [] (28508 or 75144) Provided verbal/tactile cueing for activities related to improving balance, coordination, kinesthetic sense, posture, motor skill, proprioception and motor activation to allow for proper function of   [] LE: / Lumbar core, proximal hip and LE with self care and ADLs  [] UE / Cervical: cervical, postural, scapular, scapulothoracic and UE control with self care, carrying, lifting, driving, computer work.   [] (60203) Gait Re-education- Provided training and instruction to the patient for proper LE, core and proximal hip recruitment and positioning and eccentric body weight control with ambulation re-education including up and down stairs     Home Management Training / Self Care:  [] (58924) Provided self-care/home management training related to activities of daily living and compensatory training, and/or use of adaptive equipment for improvement with: ADLs and compensatory training, meal preparation, safety procedures and instruction in use of adaptive equipment, including bathing, grooming, dressing, personal hygiene, basic household cleaning and chores.      Home Exercise Program:    [x] (21171) Reviewed/Progressed HEP activities related to strengthening, flexibility, endurance, ROM of   [] LE / Lumbar: core, proximal hip and LE for functional self-care, mobility, lifting and ambulation/stair navigation   [] UE / Cervical: cervical, postural, scapular, scapulothoracic and UE control with self care, reaching, carrying, lifting, house/yardwork, driving, computer work  [] (50749)Reviewed/Progressed HEP activities related to improving balance, coordination, kinesthetic sense, posture, motor skill, proprioception of   [] LE: core, proximal hip and LE for self care, mobility, lifting, and ambulation/stair navigation    [] UE / Cervical: cervical, postural,  scapular, scapulothoracic and UE control with self care, reaching, carrying, lifting, house/yardwork, driving, computer work    Manual Treatments:  PROM / STM / Oscillations-Mobs:  G-I, II, III, IV (PA's, Inf., Post.)  [] (37891) Provided manual therapy to mobilize LE, proximal hip and/or LS spine soft tissue/joints for the purpose of modulating pain, promoting relaxation,  increasing ROM, reducing/eliminating soft tissue swelling/inflammation/restriction, improving soft tissue extensibility and allowing for proper ROM for normal function with   [] LE / lumbar: self care, mobility, lifting and ambulation. [] UE / Cervical: self care, reaching, carrying, lifting, house/yardwork, driving, computer work. Modalities:  [] (69453) Vasopneumatic compression: Utilized vasopneumatic compression to decrease edema / swelling for the purpose of improving mobility and quad tone / recruitment which will allow for increased overall function including but not limited to self-care, transfers, ambulation, and ascending / descending stairs.      Charges:  Timed Code Treatment Minutes: 44   Total Treatment Minutes: 44     [] EVAL - LOW (36708)   [] EVAL - MOD (42822)  [] EVAL - HIGH (86702)  [] RE-EVAL (06062)  [x] ND(09415) x  1     [] Ionto  [x] NMR (42121) x  2     [] Vaso  [] Manual (07262) x       [] Ultrasound  [] TA x        [] Mech Traction (87333)  [] Aquatic Therapy x     [] ES (un) (56266):   [] Home Management Training x  [] ES(attended) (09586)   [] Dry Needling 1-2 muscles (81138):  [] Dry Needling 3+ muscles (881038)  [] Group:      [] Other:     GOALS:  Patient stated goal: To improve mobility   []? Progressing: []? Met: []? Not Met: []? Adjusted     Therapist goals for Patient:   Short Term Goals: To be achieved in: 2 weeks  1. Independent in HEP and progression per patient tolerance, in order to prevent re-injury. []? Progressing: []? Met: []? Not Met: []? Adjusted  2. Patient will have a decrease in pain to facilitate improvement in movement, function, and ADLs as indicated by Functional Deficits. []? Progressing: []? Met: []? Not Met: []? Adjusted     Long Term Goals: To be achieved in: 6 weeks/ DC   1. Disability index score of 35% or less for the JAIRO to assist with reaching prior level of function. []? Progressing: []? Met: []? Not Met: []? Adjusted     2. Patient will demonstrate an increase in Strength to+4/5 proximal hip and core activation to allow for proper functional mobility as indicated by patients Functional Deficits. []? Progressing: []? Met: []? Not Met: []? Adjusted  3. Patient will return to functional activities including sit to stand without increased symptoms or LOB. []? Progressing: []? Met: []? Not Met: []? Adjusted  4. Patient to tolerate standing > 10 min without reduce symptoms of back pain and RLE giving way. []? Progressing: []? Met: []? Not Met: []? Adjusted       5. Patient will ambulate with/without AD on level and unlevel surfaces without LOB and normalized gait pattern by 50%. []? Progressing: []? Met: []? Not Met: []? Adjusted    Overall Progression Towards Functional goals/ Treatment Progress Update:  [] Patient is progressing as expected towards functional goals listed. [] Progression is slowed due to complexities/Impairments listed. [] Progression has been slowed due to co-morbidities.   [x] Plan just implemented, too soon to assess goals progression <30days [] Goals require adjustment due to lack of progress  [] Patient is not progressing as expected and requires additional follow up with physician  [] Other    Persisting Functional Limitations/Impairments:  [x]Sleeping []Sitting               [x]Standing [x]Transfers        [x]Walking []Kneeling               [x]Stairs [x]Squatting / bending   [x]ADLs [x]Reaching  [x]Lifting  []Housework  []Driving []Job related tasks  []Sports/Recreation []Other:        ASSESSMENT:      Patient responded well to 'Big' cues to improve posture and steps, had difficulty improving B arm swing with cues and maintaining Big techniques without constant cues. Pt did have significant difficulty w/Big fwd steps, unable to correctly coordinate movements. Patient leaning backwards while on TM instead of taking steps backwards. Continue to facilitate Big movement patterns to improve functionality and allow for improved spinal movements to reduce pain levels. Treatment/Activity Tolerance:  [x] Patient able to complete tx  [x] Patient limited by fatigue  [] Patient limited by pain  [] Patient limited by other medical complications  [] Other:     Prognosis: [] Good [] Fair  [] Poor    Patient Requires Follow-up: [x] Yes  [] No    Plan for next treatment session:  Begin flexion preference core exercises and balance activities     PLAN: See frances. PT 2x / week for 6 weeks. [x] Continue per plan of care [] Alter current plan (see comments)  [] Plan of care initiated [] Hold pending MD visit [] Discharge    Electronically signed by: Luis Altamirano, PT PT, DPT    Note: If patient does not return for scheduled/ recommended follow up visits, this note will serve as a discharge from care along with most recent update on progress.

## 2021-07-19 ENCOUNTER — HOSPITAL ENCOUNTER (OUTPATIENT)
Dept: PHYSICAL THERAPY | Age: 86
Setting detail: THERAPIES SERIES
Discharge: HOME | End: 2021-07-19
Payer: MEDICARE

## 2021-07-19 PROCEDURE — 97112 NEUROMUSCULAR REEDUCATION: CPT

## 2021-07-19 PROCEDURE — 97116 GAIT TRAINING THERAPY: CPT

## 2021-07-19 PROCEDURE — 97530 THERAPEUTIC ACTIVITIES: CPT

## 2021-07-19 NOTE — FLOWSHEET NOTE
[address] [facility] Physical Therapy  Phone: [phone]   Fax: [phone]    Physical Therapy Daily Treatment Note  Date:  2021    Patient Name:  Cornelius Kurtz    :  1935  MRN: [id number]  Medical/Treatment Diagnosis Information:  · Diagnosis: Other intervertebral disc degeneration, lumbar region  · Treatment Diagnosis: imbalance, abnormal gait, B hip  & core muscle weakness , decrease B hip flexibility. Insurance/Certification information:  PT Insurance Information: [facility] Medicare  Physician Information:  Referring Practitioner: Ofe Baca MD  Plan of care signed (Y/N): []  Yes [x]  No     Date of Patient follow up with Physician:      Progress Report: []  Yes  [x]  No     Date Range for reporting period:  Beginnin2021  Ending:     Progress report due (10 Rx/or 30 days whichever is less): visit #16    Recertification due (POC duration/ or 90 days whichever is less): visit # 2021    Visit # Insurance Allowable Auth required? Date Range     []  Yes  [x]  No        Latex Allergy:  [x]NO      []YES  Preferred Language for Healthcare:   [x]English       []other:    Functional Scale:        Date assessed:  JAIRO: raw score = 9; dysfunction = 60%  21  TU sec        21     Pain level:  0/10     SUBJECTIVE:  Said that he had some soreness and pain in his right leg after his last visit the next day.            OBJECTIVE: : ambulates with straight cane forward flexed posture , shuffling gait pattern , decrease step length       RESTRICTIONS/PRECAUTIONS:  Neuropathy , (Parkinsons 2 years ago) revealed 21    Exercises/Interventions:     Therapeutic Exercises (93804) Resistance / level Sets/sec Reps Notes   Nustep   Bike  1.0  1.0 4 min               IB / HR  2x30\" / 2x10                                               Therapeutic Activities (82742)         TUG 22sec        Marching     Sit to stand w/LSVT Big technique, Airex on chair    12                  Neuromuscular Re-ed (27623)       Gait training:  LSVT Big technique       LSVT Big steps -fwd     240', '      10 R, 7 L   -frequent cues to improve step length, posture and arm swing.    -required multiple attempts to perform correctly on R, too fatigue to continue w/LLE   Toe taps  6 in' step  1 10     Stepping with weight shift over single adrienne      AIREX:   NBOS with CGA  With reciprocal arm swings, CGA     30\"   2  20   7/14: difficulty finding center, leaning backwards   Trunk rotation no UE support      Side stepping, Fwd/back stepping in the parallel bars                          TM -bwd 0.3 mph 10\" x2 -pt unsafe so stopped   Manual Intervention (23385)                                                     Modalities:     Pt. Education:  -patient educated on diagnosis, prognosis and expectations for rehab  -all patient questions were answered    Home Exercise Program:  Access Code: [email or web address]  URL: [email or web address]  Date: 07/07/2021  Prepared by: Ariel Silva    Exercises  Supine Lower Trunk Rotation - 1 x daily - 7 x weekly - 1 sets - 10 reps - 2-3 hold  Seated Hamstring Stretch - 1 x daily - 7 x weekly - 1 sets - 3 reps - 15-20 hold          Therapeutic Exercise and NMR EXR  [x] (97023) Provided verbal/tactile cueing for activities related to strengthening, flexibility, endurance, ROM for improvements in  [] LE / Lumbar: LE, proximal hip, and core control with self care, mobility, lifting, ambulation. [] UE / Cervical: cervical, postural, scapular, scapulothoracic and UE control with self care, reaching, carrying, lifting, house/yardwork, driving, computer work.  [] (20031) Provided verbal/tactile cueing for activities related to improving balance, coordination, kinesthetic sense, posture, motor skill, proprioception to assist with   [] LE / lumbar: LE, proximal hip, and core control in self care, mobility, lifting, ambulation and eccentric single leg control. [] UE / cervical: cervical, scapular, scapulothoracic and UE control with self care, reaching, carrying, lifting, house/yardwork, driving, computer work.   [] (38151) Therapist is in constant attendance of 2 or more patients providing skilled therapy interventions, but not providing any significant amount of measurable one-on-one time to either patient, for improvements in  [] LE / lumbar: LE, proximal hip, and core control in self care, mobility, lifting, ambulation and eccentric single leg control. [] UE / cervical: cervical, scapular, scapulothoracic and UE control with self care, reaching, carrying, lifting, house/yardwork, driving, computer work. NMR and Therapeutic Activities:    [] (72473 or 10704) Provided verbal/tactile cueing for activities related to improving balance, coordination, kinesthetic sense, posture, motor skill, proprioception and motor activation to allow for proper function of   [] LE: / Lumbar core, proximal hip and LE with self care and ADLs  [] UE / Cervical: cervical, postural, scapular, scapulothoracic and UE control with self care, carrying, lifting, driving, computer work.   [] (80927) Gait Re-education- Provided training and instruction to the patient for proper LE, core and proximal hip recruitment and positioning and eccentric body weight control with ambulation re-education including up and down stairs     Home Management Training / Self Care:  [] (68984) Provided self-care/home management training related to activities of daily living and compensatory training, and/or use of adaptive equipment for improvement with: ADLs and compensatory training, meal preparation, safety procedures and instruction in use of adaptive equipment, including bathing, grooming, dressing, personal hygiene, basic household cleaning and chores.      Home Exercise Program:    [x] (90906) Reviewed/Progressed HEP activities related to strengthening, flexibility, endurance, ROM of   [] LE / Lumbar: core, proximal hip and LE for functional self-care, mobility, lifting and ambulation/stair navigation   [] UE / Cervical: cervical, postural, scapular, scapulothoracic and UE control with self care, reaching, carrying, lifting, house/yardwork, driving, computer work  [] (30743)Reviewed/Progressed HEP activities related to improving balance, coordination, kinesthetic sense, posture, motor skill, proprioception of   [] LE: core, proximal hip and LE for self care, mobility, lifting, and ambulation/stair navigation    [] UE / Cervical: cervical, postural,  scapular, scapulothoracic and UE control with self care, reaching, carrying, lifting, house/yardwork, driving, computer work    Manual Treatments:  PROM / STM / Oscillations-Mobs:  G-I, II, III, IV (PA's, Inf., Post.)  [] (94672) Provided manual therapy to mobilize LE, proximal hip and/or LS spine soft tissue/joints for the purpose of modulating pain, promoting relaxation,  increasing ROM, reducing/eliminating soft tissue swelling/inflammation/restriction, improving soft tissue extensibility and allowing for proper ROM for normal function with   [] LE / lumbar: self care, mobility, lifting and ambulation. [] UE / Cervical: self care, reaching, carrying, lifting, house/yardwork, driving, computer work. Modalities:  [] (11769) Vasopneumatic compression: Utilized vasopneumatic compression to decrease edema / swelling for the purpose of improving mobility and quad tone / recruitment which will allow for increased overall function including but not limited to self-care, transfers, ambulation, and ascending / descending stairs.      Charges:  Timed Code Treatment Minutes: 44   Total Treatment Minutes: 44     [] EVAL - LOW (91336)   [] EVAL - MOD (29029)  [] EVAL - HIGH (93603)  [] RE-EVAL (08281)  [] LY(07277) x  1     [] Ionto  [x] NMR (58320) x  1     [] Vaso  [] Manual (46331) x       [] Ultrasound  [x] TA x 1        [] Mech Traction (41737)  [] Aquatic Therapy x [] ES (un) (41108):   [] Home Management Training x  [] ES(attended) (55378)   [] Dry Needling 1-2 muscles (00795):  [] Dry Needling 3+ muscles (404047)  [] Group:      [x] Other:  Gait     GOALS:  Patient stated goal: To improve mobility   []? Progressing: []? Met: []? Not Met: []? Adjusted     Therapist goals for Patient:   Short Term Goals: To be achieved in: 2 weeks  1. Independent in HEP and progression per patient tolerance, in order to prevent re-injury. []? Progressing: []? Met: []? Not Met: []? Adjusted  2. Patient will have a decrease in pain to facilitate improvement in movement, function, and ADLs as indicated by Functional Deficits. []? Progressing: []? Met: []? Not Met: []? Adjusted     Long Term Goals: To be achieved in: 6 weeks/ DC   1. Disability index score of 35% or less for the JAIRO to assist with reaching prior level of function. []? Progressing: []? Met: []? Not Met: []? Adjusted     2. Patient will demonstrate an increase in Strength to+4/5 proximal hip and core activation to allow for proper functional mobility as indicated by patients Functional Deficits. []? Progressing: []? Met: []? Not Met: []? Adjusted  3. Patient will return to functional activities including sit to stand without increased symptoms or LOB. []? Progressing: []? Met: []? Not Met: []? Adjusted  4. Patient to tolerate standing > 10 min without reduce symptoms of back pain and RLE giving way. []? Progressing: []? Met: []? Not Met: []? Adjusted       5. Patient will ambulate with/without AD on level and unlevel surfaces without LOB and normalized gait pattern by 50%. []? Progressing: []? Met: []? Not Met: []? Adjusted    Overall Progression Towards Functional goals/ Treatment Progress Update:  [] Patient is progressing as expected towards functional goals listed. [] Progression is slowed due to complexities/Impairments listed. [] Progression has been slowed due to co-morbidities.   [x] Plan just implemented, too soon to assess goals progression <30days   [] Goals require adjustment due to lack of progress  [] Patient is not progressing as expected and requires additional follow up with physician  [] Other    Persisting Functional Limitations/Impairments:  [x]Sleeping []Sitting               [x]Standing [x]Transfers        [x]Walking []Kneeling               [x]Stairs [x]Squatting / bending   [x]ADLs [x]Reaching  [x]Lifting  []Housework  []Driving []Job related tasks  []Sports/Recreation []Other:        ASSESSMENT:      Patient responded well to 'Big' cues to improve posture and steps, had difficulty improving B arm swing with cues and maintaining Big techniques without constant cues. Pt did have significant difficulty w/Big fwd steps, unable to correctly coordinate movements. Pt has deficits with multi step commands and trouble with coordinating movements and facilitating forward weight shifting. Continue to facilitate Big movement patterns to improve functionality and allow for improved spinal movements to reduce pain levels. Treatment/Activity Tolerance:  [x] Patient able to complete tx  [x] Patient limited by fatigue  [] Patient limited by pain  [] Patient limited by other medical complications  [] Other:     Prognosis: [] Good [] Fair  [] Poor    Patient Requires Follow-up: [x] Yes  [] No    Plan for next treatment session:  Begin flexion preference core exercises and balance activities     PLAN: See frances. PT 2x / week for 6 weeks. [x] Continue per plan of care [] Alter current plan (see comments)  [] Plan of care initiated [] Hold pending MD visit [] Discharge    Electronically signed by: Dana Gar PT     Note: If patient does not return for scheduled/ recommended follow up visits, this note will serve as a discharge from care along with most recent update on progress.

## 2021-07-21 ENCOUNTER — HOSPITAL ENCOUNTER (OUTPATIENT)
Dept: PHYSICAL THERAPY | Age: 86
Setting detail: THERAPIES SERIES
Discharge: HOME | End: 2021-07-21
Payer: MEDICARE

## 2021-07-21 PROCEDURE — 97112 NEUROMUSCULAR REEDUCATION: CPT

## 2021-07-21 PROCEDURE — 97110 THERAPEUTIC EXERCISES: CPT

## 2021-07-21 NOTE — FLOWSHEET NOTE
[address] [facility] Physical Therapy  Phone: [phone]   Fax: [phone]    Physical Therapy Daily Treatment Note  Date:  2021    Patient Name:  Nancy Mckeon    :  1935  MRN: [id number]  Medical/Treatment Diagnosis Information:  · Diagnosis: Other intervertebral disc degeneration, lumbar region  · Treatment Diagnosis: imbalance, abnormal gait, B hip  & core muscle weakness , decrease B hip flexibility. Insurance/Certification information:  PT Insurance Information: [employer]  Physician Information:  Referring Practitioner: Cookie Emmanuel MD  Plan of care signed (Y/N): []  Yes [x]  No     Date of Patient follow up with Physician:      Progress Report: []  Yes  [x]  No     Date Range for reporting period:  Beginnin2021  Ending:     Progress report due (10 Rx/or 30 days whichever is less): visit #85    Recertification due (POC duration/ or 90 days whichever is less): visit # 2021    Visit # Insurance Allowable Auth required? Date Range     []  Yes  [x]  No        Latex Allergy:  [x]NO      []YES  Preferred Language for Healthcare:   [x]English       []other:    Functional Scale:        Date assessed:  JAIRO: raw score = 9; dysfunction = 60%  21  TU sec        21     Pain level:  0/10     SUBJECTIVE:     Had some soreness following last session, pain is doing better today. States that he's unable to continue walking with bigger steps and good posture and that family frequently lets him know to walk taller.         OBJECTIVE: : ambulates with straight cane forward flexed posture , shuffling gait pattern , decrease step length       RESTRICTIONS/PRECAUTIONS:  Neuropathy , (Parkinsons 2 years ago) revealed 21    Exercises/Interventions:     Therapeutic Exercises (68411) Resistance / level Sets/sec Reps Notes   Nustep   Bike  1.0  2.0   5 mins            IB / HR  2x30\" / 2x10 Therapeutic Activities (49830)         TUG 22sec 7/14       Marching     Sit to stand w/LSVT Big technique,   Mat table  10 -Difficulty w/eccentric control                 Neuromuscular Re-ed (34837)       Gait training:  LSVT Big technique       LSVT Big steps -sit to stand to fwd  -7/21: no stomps, instructed pt to take 2 steps across room          2   160', 140'      5   -frequent cues to improve step length, posture and arm swing. -VC for Big step, posture & continuous pattern   Toe taps  6 in' step     Stepping with weight shift over single adrienne      AIREX:   NBOS with CGA  With reciprocal arm swings, CGA     7/14: difficulty finding center, leaning backwards   Trunk rotation no UE support      Side stepping, Fwd/back stepping in the parallel bars                          TM -bwd 0.3 mph -pt unsafe so stopped   Manual Intervention (79581)       Prone quad stretch  3x30\" B                                            Modalities:     Pt. Education:  -patient educated on diagnosis, prognosis and expectations for rehab  -all patient questions were answered    Home Exercise Program:  Access Code: [email or web address]  URL: [email or web address]  Date: 07/07/2021  Prepared by: Flavio Briggs    Exercises  Supine Lower Trunk Rotation - 1 x daily - 7 x weekly - 1 sets - 10 reps - 2-3 hold  Seated Hamstring Stretch - 1 x daily - 7 x weekly - 1 sets - 3 reps - 15-20 hold          Therapeutic Exercise and NMR EXR  [x] (60135) Provided verbal/tactile cueing for activities related to strengthening, flexibility, endurance, ROM for improvements in  [] LE / Lumbar: LE, proximal hip, and core control with self care, mobility, lifting, ambulation.   [] UE / Cervical: cervical, postural, scapular, scapulothoracic and UE control with self care, reaching, carrying, lifting, house/yardwork, driving, computer work.  [] (22181) Provided verbal/tactile cueing for activities related to improving balance, coordination, kinesthetic sense, posture, motor skill, proprioception to assist with   [] LE / lumbar: LE, proximal hip, and core control in self care, mobility, lifting, ambulation and eccentric single leg control. [] UE / cervical: cervical, scapular, scapulothoracic and UE control with self care, reaching, carrying, lifting, house/yardwork, driving, computer work.   [] (18753) Therapist is in constant attendance of 2 or more patients providing skilled therapy interventions, but not providing any significant amount of measurable one-on-one time to either patient, for improvements in  [] LE / lumbar: LE, proximal hip, and core control in self care, mobility, lifting, ambulation and eccentric single leg control. [] UE / cervical: cervical, scapular, scapulothoracic and UE control with self care, reaching, carrying, lifting, house/yardwork, driving, computer work.      NMR and Therapeutic Activities:    [] (28856 or 31045) Provided verbal/tactile cueing for activities related to improving balance, coordination, kinesthetic sense, posture, motor skill, proprioception and motor activation to allow for proper function of   [] LE: / Lumbar core, proximal hip and LE with self care and ADLs  [] UE / Cervical: cervical, postural, scapular, scapulothoracic and UE control with self care, carrying, lifting, driving, computer work.   [] (57440) Gait Re-education- Provided training and instruction to the patient for proper LE, core and proximal hip recruitment and positioning and eccentric body weight control with ambulation re-education including up and down stairs     Home Management Training / Self Care:  [] (84734) Provided self-care/home management training related to activities of daily living and compensatory training, and/or use of adaptive equipment for improvement with: ADLs and compensatory training, meal preparation, safety procedures and instruction in use of adaptive equipment, including bathing, grooming, dressing, personal hygiene, basic household cleaning and chores. Home Exercise Program:    [x] (89840) Reviewed/Progressed HEP activities related to strengthening, flexibility, endurance, ROM of   [] LE / Lumbar: core, proximal hip and LE for functional self-care, mobility, lifting and ambulation/stair navigation   [] UE / Cervical: cervical, postural, scapular, scapulothoracic and UE control with self care, reaching, carrying, lifting, house/yardwork, driving, computer work  [] (91366)Reviewed/Progressed HEP activities related to improving balance, coordination, kinesthetic sense, posture, motor skill, proprioception of   [] LE: core, proximal hip and LE for self care, mobility, lifting, and ambulation/stair navigation    [] UE / Cervical: cervical, postural,  scapular, scapulothoracic and UE control with self care, reaching, carrying, lifting, house/yardwork, driving, computer work    Manual Treatments:  PROM / STM / Oscillations-Mobs:  G-I, II, III, IV (PA's, Inf., Post.)  [] (25128) Provided manual therapy to mobilize LE, proximal hip and/or LS spine soft tissue/joints for the purpose of modulating pain, promoting relaxation,  increasing ROM, reducing/eliminating soft tissue swelling/inflammation/restriction, improving soft tissue extensibility and allowing for proper ROM for normal function with   [] LE / lumbar: self care, mobility, lifting and ambulation. [] UE / Cervical: self care, reaching, carrying, lifting, house/yardwork, driving, computer work. Modalities:  [] (23471) Vasopneumatic compression: Utilized vasopneumatic compression to decrease edema / swelling for the purpose of improving mobility and quad tone / recruitment which will allow for increased overall function including but not limited to self-care, transfers, ambulation, and ascending / descending stairs.      Charges:  Timed Code Treatment Minutes: 45   Total Treatment Minutes: 45     [] EVAL - LOW (45681)   [] EVAL - MOD (80311)  [] EVAL - HIGH (33959)  [] RE-EVAL (37025)  [x] OZ(44155) x  1     [] Ionto  [x] NMR (53319) x  2     [] Vaso  [] Manual (46130) x       [] Ultrasound  [] TA x         [] Mech Traction (20382)  [] Aquatic Therapy x      [] ES (un) (76594):   [] Home Management Training x  [] ES(attended) (86311)   [] Dry Needling 1-2 muscles (57869):  [] Dry Needling 3+ muscles (301440)  [] Group:      [x] Other:  Gait     GOALS:  Patient stated goal: To improve mobility   []? Progressing: []? Met: []? Not Met: []? Adjusted     Therapist goals for Patient:   Short Term Goals: To be achieved in: 2 weeks  1. Independent in HEP and progression per patient tolerance, in order to prevent re-injury. []? Progressing: []? Met: []? Not Met: []? Adjusted  2. Patient will have a decrease in pain to facilitate improvement in movement, function, and ADLs as indicated by Functional Deficits. []? Progressing: []? Met: []? Not Met: []? Adjusted     Long Term Goals: To be achieved in: 6 weeks/ DC   1. Disability index score of 35% or less for the JAIRO to assist with reaching prior level of function. []? Progressing: []? Met: []? Not Met: []? Adjusted     2. Patient will demonstrate an increase in Strength to+4/5 proximal hip and core activation to allow for proper functional mobility as indicated by patients Functional Deficits. []? Progressing: []? Met: []? Not Met: []? Adjusted  3. Patient will return to functional activities including sit to stand without increased symptoms or LOB. []? Progressing: []? Met: []? Not Met: []? Adjusted  4. Patient to tolerate standing > 10 min without reduce symptoms of back pain and RLE giving way. []? Progressing: []? Met: []? Not Met: []? Adjusted       5. Patient will ambulate with/without AD on level and unlevel surfaces without LOB and normalized gait pattern by 50%. []? Progressing: []? Met: []? Not Met: []?  Adjusted    Overall Progression Towards Functional goals/ Treatment Progress Update:  [] Patient is progressing as expected towards functional goals listed. [] Progression is slowed due to complexities/Impairments listed. [] Progression has been slowed due to co-morbidities. [x] Plan just implemented, too soon to assess goals progression <30days   [] Goals require adjustment due to lack of progress  [] Patient is not progressing as expected and requires additional follow up with physician  [] Other    Persisting Functional Limitations/Impairments:  [x]Sleeping []Sitting               [x]Standing [x]Transfers        [x]Walking []Kneeling               [x]Stairs [x]Squatting / bending   [x]ADLs [x]Reaching  [x]Lifting  []Housework  []Driving []Job related tasks  []Sports/Recreation []Other:        ASSESSMENT:      Patient initially demo'd increased 'C' shaped spinal curvature during gait & standing. With cues for Big walking, gait pattern and energy levels did improve, pt continued to have difficulty increasing stride length, however. Pt was able to increase step length immediately following sit to stand transition, taking about 3 steps from mat table 4 to mat table 5. Balance w/bwds walking improved as well, however pt continued to demo increased apprehension towards activity. Patient has decreased length of B hip flexors and quads, responded well to prone position and knee flex stretch. Continue to progress core strengthening and functional mobility. Treatment/Activity Tolerance:  [x] Patient able to complete tx  [x] Patient limited by fatigue  [] Patient limited by pain  [] Patient limited by other medical complications  [] Other:     Prognosis: [] Good [] Fair  [] Poor    Patient Requires Follow-up: [x] Yes  [] No    Plan for next treatment session:  Begin flexion preference core exercises and balance activities     PLAN: See frances. PT 2x / week for 6 weeks.    [x] Continue per plan of care [] Alter current plan (see comments)  [] Plan of care initiated [] Hold pending MD visit [] Discharge    Electronically signed by: Elise Hall, PT     Note: If patient does not return for scheduled/ recommended follow up visits, this note will serve as a discharge from care along with most recent update on progress.

## 2021-07-26 ENCOUNTER — HOSPITAL ENCOUNTER (OUTPATIENT)
Dept: PHYSICAL THERAPY | Age: 86
Setting detail: THERAPIES SERIES
Discharge: HOME | End: 2021-07-26
Payer: MEDICARE

## 2021-07-26 PROCEDURE — 97112 NEUROMUSCULAR REEDUCATION: CPT

## 2021-07-26 PROCEDURE — 97110 THERAPEUTIC EXERCISES: CPT

## 2021-07-26 NOTE — FLOWSHEET NOTE
[address] [facility] Physical Therapy  Phone: [phone]   Fax: [phone]    Physical Therapy Daily Treatment Note  Date:  2021    Patient Name:  Patricio Dubois    :  1935  MRN: [id number]  Medical/Treatment Diagnosis Information:  · Diagnosis: Other intervertebral disc degeneration, lumbar region  · Treatment Diagnosis: imbalance, abnormal gait, B hip  & core muscle weakness , decrease B hip flexibility. Insurance/Certification information:  PT Insurance Information: [facility] Medicare  Physician Information:  Referring Practitioner: Norma Martinez MD  Plan of care signed (Y/N): []  Yes [x]  No     Date of Patient follow up with Physician:      Progress Report: []  Yes  [x]  No     Date Range for reporting period:  Beginnin2021  Ending:     Progress report due (10 Rx/or 30 days whichever is less): visit #57    Recertification due (POC duration/ or 90 days whichever is less): visit # 2021    Visit # Insurance Allowable Auth required? Date Range     []  Yes  [x]  No        Latex Allergy:  [x]NO      []YES  Preferred Language for Healthcare:   [x]English       []other:    Functional Scale:        Date assessed:  JAIRO: raw score = 9; dysfunction = 60%  21  TU sec        21     Pain level:  0/10     SUBJECTIVE:   Patient reports that B hips were sore yesterday ,but this morning was fine.               OBJECTIVE: : ambulates with straight cane forward flexed posture , shuffling gait pattern , decrease step length       RESTRICTIONS/PRECAUTIONS:  Neuropathy , (Parkinsons 2 years ago) revealed 21    Exercises/Interventions:     Therapeutic Exercises (37317) Resistance / level Sets/sec Reps Notes   Nustep   Bike  1.0  2.0 5 min               IB / HR  2x30\" / 2x10                                               Therapeutic Activities (33532)         TUG 22sec        Marching     Sit to stand w/LSVT Big technique,   Mat table  10 -Difficulty w/eccentric control   LSVT Big Reaching technique   5x 7/26 Added           Neuromuscular Re-ed (67102)       Gait training:  LSVT Big technique       LSVT Big steps -sit to stand to fwd  -7/21: no stomps, instructed pt to take 2 steps across room          2   160', 140'      5   -frequent cues to improve step length, posture and arm swing. -VC for Big step, posture & continuous pattern   Toe taps  6 in' step     Stepping with weight shift over single adrienne      AIREX:   NBOS with CGA  With reciprocal arm swings, CGA     7/14: difficulty finding center, leaning backwards   Trunk rotation no UE support      Side stepping, Fwd/back stepping in the parallel bars                          TM -bwd 0.3 mph -pt unsafe so stopped   Manual Intervention (02203)       Prone quad stretch                                              Modalities:     Pt. Education:  -patient educated on diagnosis, prognosis and expectations for rehab  -all patient questions were answered    Home Exercise Program:  Access Code: [email or web address]  URL: [email or web address]  Date: 07/07/2021  Prepared by: Harpreet Salazar    Exercises  Supine Lower Trunk Rotation - 1 x daily - 7 x weekly - 1 sets - 10 reps - 2-3 hold  Seated Hamstring Stretch - 1 x daily - 7 x weekly - 1 sets - 3 reps - 15-20 hold          Therapeutic Exercise and NMR EXR  [x] (97580) Provided verbal/tactile cueing for activities related to strengthening, flexibility, endurance, ROM for improvements in  [] LE / Lumbar: LE, proximal hip, and core control with self care, mobility, lifting, ambulation.   [] UE / Cervical: cervical, postural, scapular, scapulothoracic and UE control with self care, reaching, carrying, lifting, house/yardwork, driving, computer work.  [] (77191) Provided verbal/tactile cueing for activities related to improving balance, coordination, kinesthetic sense, posture, motor skill, proprioception to assist with   [] LE / lumbar: LE, proximal hip, and core control in self care, mobility, lifting, ambulation and eccentric single leg control. [] UE / cervical: cervical, scapular, scapulothoracic and UE control with self care, reaching, carrying, lifting, house/yardwork, driving, computer work.   [] (46216) Therapist is in constant attendance of 2 or more patients providing skilled therapy interventions, but not providing any significant amount of measurable one-on-one time to either patient, for improvements in  [] LE / lumbar: LE, proximal hip, and core control in self care, mobility, lifting, ambulation and eccentric single leg control. [] UE / cervical: cervical, scapular, scapulothoracic and UE control with self care, reaching, carrying, lifting, house/yardwork, driving, computer work. NMR and Therapeutic Activities:    [] (90921 or 26031) Provided verbal/tactile cueing for activities related to improving balance, coordination, kinesthetic sense, posture, motor skill, proprioception and motor activation to allow for proper function of   [] LE: / Lumbar core, proximal hip and LE with self care and ADLs  [] UE / Cervical: cervical, postural, scapular, scapulothoracic and UE control with self care, carrying, lifting, driving, computer work.   [] (62314) Gait Re-education- Provided training and instruction to the patient for proper LE, core and proximal hip recruitment and positioning and eccentric body weight control with ambulation re-education including up and down stairs     Home Management Training / Self Care:  [] (21597) Provided self-care/home management training related to activities of daily living and compensatory training, and/or use of adaptive equipment for improvement with: ADLs and compensatory training, meal preparation, safety procedures and instruction in use of adaptive equipment, including bathing, grooming, dressing, personal hygiene, basic household cleaning and chores.      Home Exercise Program:    [x] (77686) Reviewed/Progressed HEP activities related to strengthening, flexibility, endurance, ROM of   [] LE / Lumbar: core, proximal hip and LE for functional self-care, mobility, lifting and ambulation/stair navigation   [] UE / Cervical: cervical, postural, scapular, scapulothoracic and UE control with self care, reaching, carrying, lifting, house/yardwork, driving, computer work  [] (41090)Reviewed/Progressed HEP activities related to improving balance, coordination, kinesthetic sense, posture, motor skill, proprioception of   [] LE: core, proximal hip and LE for self care, mobility, lifting, and ambulation/stair navigation    [] UE / Cervical: cervical, postural,  scapular, scapulothoracic and UE control with self care, reaching, carrying, lifting, house/yardwork, driving, computer work    Manual Treatments:  PROM / STM / Oscillations-Mobs:  G-I, II, III, IV (PA's, Inf., Post.)  [] (21531) Provided manual therapy to mobilize LE, proximal hip and/or LS spine soft tissue/joints for the purpose of modulating pain, promoting relaxation,  increasing ROM, reducing/eliminating soft tissue swelling/inflammation/restriction, improving soft tissue extensibility and allowing for proper ROM for normal function with   [] LE / lumbar: self care, mobility, lifting and ambulation. [] UE / Cervical: self care, reaching, carrying, lifting, house/yardwork, driving, computer work. Modalities:  [] (47513) Vasopneumatic compression: Utilized vasopneumatic compression to decrease edema / swelling for the purpose of improving mobility and quad tone / recruitment which will allow for increased overall function including but not limited to self-care, transfers, ambulation, and ascending / descending stairs.      Charges:  Timed Code Treatment Minutes: 39   Total Treatment Minutes: 39     [] EVAL - LOW (82068)   [] EVAL - MOD (45622)  [] EVAL - HIGH (09963)  [] RE-EVAL (69951)  [x] BL(42009) x  1     [] Ionto  [x] NMR (49779) x  2     [] Vaso  [] Manual (80061) x [] Ultrasound  [] TA x         [] Mech Traction (46128)  [] Aquatic Therapy x      [] ES (un) (98308):   [] Home Management Training x  [] ES(attended) (62785)   [] Dry Needling 1-2 muscles (66858):  [] Dry Needling 3+ muscles (728859)  [] Group:      [x] Other:  Gait     GOALS:  Patient stated goal: To improve mobility   []? Progressing: []? Met: []? Not Met: []? Adjusted     Therapist goals for Patient:   Short Term Goals: To be achieved in: 2 weeks  1. Independent in HEP and progression per patient tolerance, in order to prevent re-injury. []? Progressing: []? Met: []? Not Met: []? Adjusted  2. Patient will have a decrease in pain to facilitate improvement in movement, function, and ADLs as indicated by Functional Deficits. []? Progressing: []? Met: []? Not Met: []? Adjusted     Long Term Goals: To be achieved in: 6 weeks/ DC   1. Disability index score of 35% or less for the JAIRO to assist with reaching prior level of function. []? Progressing: []? Met: []? Not Met: []? Adjusted     2. Patient will demonstrate an increase in Strength to+4/5 proximal hip and core activation to allow for proper functional mobility as indicated by patients Functional Deficits. []? Progressing: []? Met: []? Not Met: []? Adjusted  3. Patient will return to functional activities including sit to stand without increased symptoms or LOB. []? Progressing: []? Met: []? Not Met: []? Adjusted  4. Patient to tolerate standing > 10 min without reduce symptoms of back pain and RLE giving way. []? Progressing: []? Met: []? Not Met: []? Adjusted       5. Patient will ambulate with/without AD on level and unlevel surfaces without LOB and normalized gait pattern by 50%. []? Progressing: []? Met: []? Not Met: []? Adjusted    Overall Progression Towards Functional goals/ Treatment Progress Update:  [] Patient is progressing as expected towards functional goals listed.     [] Progression is slowed due to complexities/Impairments listed. [] Progression has been slowed due to co-morbidities. [x] Plan just implemented, too soon to assess goals progression <30days   [] Goals require adjustment due to lack of progress  [] Patient is not progressing as expected and requires additional follow up with physician  [] Other    Persisting Functional Limitations/Impairments:  [x]Sleeping []Sitting               [x]Standing [x]Transfers        [x]Walking []Kneeling               [x]Stairs [x]Squatting / bending   [x]ADLs [x]Reaching  [x]Lifting  []Housework  []Driving []Job related tasks  []Sports/Recreation []Other:        ASSESSMENT:   7/26: The patient performed  BIG LSVT seated stretching  Slight improve motion touching the floor. Patient to today during gait training cued for increase step length and head alignment to reduce forward posture. Patient will further benefit from balance and core training to improve  Functional mobility to reduce falls. Patient initially demo'd increased 'C' shaped spinal curvature during gait & standing. With cues for Big walking, gait pattern and energy levels did improve, pt continued to have difficulty increasing stride length, however. Pt was able to increase step length immediately following sit to stand transition, taking about 3 steps from mat table 4 to mat table 5. Balance w/bwds walking improved as well, however pt continued to demo increased apprehension towards activity. Patient has decreased length of B hip flexors and quads, responded well to prone position and knee flex stretch. Continue to progress core strengthening and functional mobility.       Treatment/Activity Tolerance:  [x] Patient able to complete tx  [x] Patient limited by fatigue  [] Patient limited by pain  [] Patient limited by other medical complications  [] Other:     Prognosis: [] Good [] Fair  [] Poor    Patient Requires Follow-up: [x] Yes  [] No    Plan for next treatment session:  Begin flexion preference core exercises and balance activities     PLAN: See eval. PT 2x / week for 6 weeks. [x] Continue per plan of care [] Alter current plan (see comments)  [] Plan of care initiated [] Hold pending MD visit [] Discharge    Electronically signed by: Harry Jj PT     Note: If patient does not return for scheduled/ recommended follow up visits, this note will serve as a discharge from care along with most recent update on progress.

## 2021-07-28 ENCOUNTER — HOSPITAL ENCOUNTER (OUTPATIENT)
Dept: PHYSICAL THERAPY | Age: 86
Setting detail: THERAPIES SERIES
Discharge: HOME | End: 2021-07-28
Payer: MEDICARE

## 2021-07-28 PROCEDURE — 97110 THERAPEUTIC EXERCISES: CPT

## 2021-07-28 NOTE — FLOWSHEET NOTE
[address] [facility] Physical Therapy  Phone: [phone]   Fax: [phone]    Physical Therapy Daily Treatment Note  Date:  2021    Patient Name:  Abby Benton    :  1935  MRN: [id number]  Medical/Treatment Diagnosis Information:  · Diagnosis: Other intervertebral disc degeneration, lumbar region  · Treatment Diagnosis: imbalance, abnormal gait, B hip  & core muscle weakness , decrease B hip flexibility. Insurance/Certification information:  PT Insurance Information: [employer]  Physician Information:  Referring Practitioner: Dominic Sun MD  Plan of care signed (Y/N): []  Yes [x]  No     Date of Patient follow up with Physician:      Progress Report: []  Yes  [x]  No     Date Range for reporting period:  Beginnin2021  Ending:     Progress report due (10 Rx/or 30 days whichever is less): visit #90    Recertification due (POC duration/ or 90 days whichever is less): visit # 2021    Visit # Insurance Allowable Auth required? Date Range     []  Yes  [x]  No        Latex Allergy:  [x]NO      []YES  Preferred Language for Healthcare:   [x]English       []other:    Functional Scale:        Date assessed:  JAIRO: raw score = 9; dysfunction = 60%  21  TU sec        21     Pain level:  0/10     SUBJECTIVE:   Patient reports that he has been very dizzy this morning. When he woke up he was fine however walking into the clinic he could barely walk.   Stroke signs are negative, after questioning, pt had not hydrated or ate breakfast.           OBJECTIVE: : ambulates with straight cane forward flexed posture , shuffling gait pattern , decrease step length       RESTRICTIONS/PRECAUTIONS:  Neuropathy , (Parkinsons 2 years ago) revealed 21    Exercises/Interventions:     Therapeutic Exercises (96223) Resistance / level Sets/sec Reps Notes   Nustep   Bike  1.0  2.0 5 min               IB / HR  2x30\" / 2x10                   mat strengthening:  SLR  HSS with belt  LTR  BKFO with iso    2  2 x 30\" B  5 x 10\"   10 B      10 B                         Therapeutic Activities (18063)         TUG 22sec 7/14       Marching     Sit to stand w/LSVT Big technique,   Mat table  10 -Difficulty w/eccentric control   LSVT Big Reaching technique   5x 7/26 Added           Neuromuscular Re-ed (03985)       Gait training:  LSVT Big technique       LSVT Big steps -sit to stand to fwd  -7/21: no stomps, instructed pt to take 2 steps across room    -frequent cues to improve step length, posture and arm swing. -VC for Big step, posture & continuous pattern   Toe taps  6 in' step     Stepping with weight shift over single adrienne      AIREX:   NBOS with CGA  With reciprocal arm swings, CGA     7/14: difficulty finding center, leaning backwards   Trunk rotation no UE support      Side stepping, Fwd/back stepping in the parallel bars                          TM -bwd 0.3 mph -pt unsafe so stopped   Manual Intervention (70369)       Prone quad stretch                                              Modalities:     Pt. Education:  -patient educated on diagnosis, prognosis and expectations for rehab  -all patient questions were answered    Home Exercise Program:  Access Code: [email or web address]  URL: [email or web address]  Date: 07/07/2021  Prepared by: Amna Sneed    Exercises  Supine Lower Trunk Rotation - 1 x daily - 7 x weekly - 1 sets - 10 reps - 2-3 hold  Seated Hamstring Stretch - 1 x daily - 7 x weekly - 1 sets - 3 reps - 15-20 hold          Therapeutic Exercise and NMR EXR  [x] (27615) Provided verbal/tactile cueing for activities related to strengthening, flexibility, endurance, ROM for improvements in  [] LE / Lumbar: LE, proximal hip, and core control with self care, mobility, lifting, ambulation.   [] UE / Cervical: cervical, postural, scapular, scapulothoracic and UE control with self care, reaching, carrying, lifting, house/yardwork, driving, computer work.  [] (25849) Provided verbal/tactile cueing for activities related to improving balance, coordination, kinesthetic sense, posture, motor skill, proprioception to assist with   [] LE / lumbar: LE, proximal hip, and core control in self care, mobility, lifting, ambulation and eccentric single leg control. [] UE / cervical: cervical, scapular, scapulothoracic and UE control with self care, reaching, carrying, lifting, house/yardwork, driving, computer work.   [] (52871) Therapist is in constant attendance of 2 or more patients providing skilled therapy interventions, but not providing any significant amount of measurable one-on-one time to either patient, for improvements in  [] LE / lumbar: LE, proximal hip, and core control in self care, mobility, lifting, ambulation and eccentric single leg control. [] UE / cervical: cervical, scapular, scapulothoracic and UE control with self care, reaching, carrying, lifting, house/yardwork, driving, computer work.      NMR and Therapeutic Activities:    [] (07954 or 58764) Provided verbal/tactile cueing for activities related to improving balance, coordination, kinesthetic sense, posture, motor skill, proprioception and motor activation to allow for proper function of   [] LE: / Lumbar core, proximal hip and LE with self care and ADLs  [] UE / Cervical: cervical, postural, scapular, scapulothoracic and UE control with self care, carrying, lifting, driving, computer work.   [] (66282) Gait Re-education- Provided training and instruction to the patient for proper LE, core and proximal hip recruitment and positioning and eccentric body weight control with ambulation re-education including up and down stairs     Home Management Training / Self Care:  [] (13154) Provided self-care/home management training related to activities of daily living and compensatory training, and/or use of adaptive equipment for improvement with: ADLs and compensatory training, meal preparation, safety procedures and instruction in use of adaptive equipment, including bathing, grooming, dressing, personal hygiene, basic household cleaning and chores. Home Exercise Program:    [x] (42469) Reviewed/Progressed HEP activities related to strengthening, flexibility, endurance, ROM of   [] LE / Lumbar: core, proximal hip and LE for functional self-care, mobility, lifting and ambulation/stair navigation   [] UE / Cervical: cervical, postural, scapular, scapulothoracic and UE control with self care, reaching, carrying, lifting, house/yardwork, driving, computer work  [] (26140)Reviewed/Progressed HEP activities related to improving balance, coordination, kinesthetic sense, posture, motor skill, proprioception of   [] LE: core, proximal hip and LE for self care, mobility, lifting, and ambulation/stair navigation    [] UE / Cervical: cervical, postural,  scapular, scapulothoracic and UE control with self care, reaching, carrying, lifting, house/yardwork, driving, computer work    Manual Treatments:  PROM / STM / Oscillations-Mobs:  G-I, II, III, IV (PA's, Inf., Post.)  [] (17483) Provided manual therapy to mobilize LE, proximal hip and/or LS spine soft tissue/joints for the purpose of modulating pain, promoting relaxation,  increasing ROM, reducing/eliminating soft tissue swelling/inflammation/restriction, improving soft tissue extensibility and allowing for proper ROM for normal function with   [] LE / lumbar: self care, mobility, lifting and ambulation. [] UE / Cervical: self care, reaching, carrying, lifting, house/yardwork, driving, computer work. Modalities:  [] (36299) Vasopneumatic compression: Utilized vasopneumatic compression to decrease edema / swelling for the purpose of improving mobility and quad tone / recruitment which will allow for increased overall function including but not limited to self-care, transfers, ambulation, and ascending / descending stairs.      Charges:  Timed Code Treatment Minutes: 40   Total Treatment Minutes: 40     [] EVAL - LOW (88455)   [] EVAL - MOD (49381)  [] EVAL - HIGH (07776)  [] RE-EVAL (06011)  [x] AA(34180) x  3     [] Ionto  [] NMR (81629) x  2     [] Vaso  [] Manual (96959) x       [] Ultrasound  [] TA x         [] Mech Traction (78284)  [] Aquatic Therapy x      [] ES (un) (02507):   [] Home Management Training x  [] ES(attended) (46246)   [] Dry Needling 1-2 muscles (31918):  [] Dry Needling 3+ muscles (024541)  [] Group:      [x] Other:  Gait     GOALS:  Patient stated goal: To improve mobility   []? Progressing: []? Met: []? Not Met: []? Adjusted     Therapist goals for Patient:   Short Term Goals: To be achieved in: 2 weeks  1. Independent in HEP and progression per patient tolerance, in order to prevent re-injury. []? Progressing: []? Met: []? Not Met: []? Adjusted  2. Patient will have a decrease in pain to facilitate improvement in movement, function, and ADLs as indicated by Functional Deficits. []? Progressing: []? Met: []? Not Met: []? Adjusted     Long Term Goals: To be achieved in: 6 weeks/ DC   1. Disability index score of 35% or less for the JAIRO to assist with reaching prior level of function. []? Progressing: []? Met: []? Not Met: []? Adjusted     2. Patient will demonstrate an increase in Strength to+4/5 proximal hip and core activation to allow for proper functional mobility as indicated by patients Functional Deficits. []? Progressing: []? Met: []? Not Met: []? Adjusted  3. Patient will return to functional activities including sit to stand without increased symptoms or LOB. []? Progressing: []? Met: []? Not Met: []? Adjusted  4. Patient to tolerate standing > 10 min without reduce symptoms of back pain and RLE giving way. []? Progressing: []? Met: []? Not Met: []? Adjusted       5. Patient will ambulate with/without AD on level and unlevel surfaces without LOB and normalized gait pattern by 50%. []? Progressing: []? Met: []?  Not Met: []? Adjusted    Overall Progression Towards Functional goals/ Treatment Progress Update:  [] Patient is progressing as expected towards functional goals listed. [] Progression is slowed due to complexities/Impairments listed. [] Progression has been slowed due to co-morbidities. [x] Plan just implemented, too soon to assess goals progression <30days   [] Goals require adjustment due to lack of progress  [] Patient is not progressing as expected and requires additional follow up with physician  [] Other    Persisting Functional Limitations/Impairments:  [x]Sleeping []Sitting               [x]Standing [x]Transfers        [x]Walking []Kneeling               [x]Stairs [x]Squatting / bending   [x]ADLs [x]Reaching  [x]Lifting  []Housework  []Driving []Job related tasks  []Sports/Recreation []Other:        ASSESSMENT:   7/28: Pt with reports of significant dizziness this date. Pt questioned and most likely cause is poor hydration and nutrition as it is hot outside and he has not been drinking regularly and did not eat breakfast yet this morning. Performed strengthening and stretching exercises on the mat this date to accommodate for dizziness, orange juice provided. Pt assisted pt to the car to decrease fall risk. Pt educated on eating and staying hydrated especially on days with high heat. Will follow up NV and if dizziness remains will further assess vestibular function. 7/26: The patient performed  BIG LSVT seated stretching  Slight improve motion touching the floor. Patient to today during gait training cued for increase step length and head alignment to reduce forward posture. Patient will further benefit from balance and core training to improve  Functional mobility to reduce falls. Patient initially demo'd increased 'C' shaped spinal curvature during gait & standing.   With cues for Big walking, gait pattern and energy levels did improve, pt continued to have difficulty increasing stride length, however. Pt was able to increase step length immediately following sit to stand transition, taking about 3 steps from mat table 4 to mat table 5. Balance w/bwds walking improved as well, however pt continued to demo increased apprehension towards activity. Patient has decreased length of B hip flexors and quads, responded well to prone position and knee flex stretch. Continue to progress core strengthening and functional mobility. Treatment/Activity Tolerance:  [x] Patient able to complete tx  [x] Patient limited by fatigue  [] Patient limited by pain  [] Patient limited by other medical complications  [x] Other:  vertigo    Prognosis: [] Good [] Fair  [] Poor    Patient Requires Follow-up: [x] Yes  [] No    Plan for next treatment session:  Begin flexion preference core exercises and balance activities     PLAN: See frances. PT 2x / week for 6 weeks. [x] Continue per plan of care [] Alter current plan (see comments)  [] Plan of care initiated [] Hold pending MD visit [] Discharge    Electronically signed by: Senthil Trivedi PT     Note: If patient does not return for scheduled/ recommended follow up visits, this note will serve as a discharge from care along with most recent update on progress.

## 2021-08-02 ENCOUNTER — HOSPITAL ENCOUNTER (OUTPATIENT)
Dept: PHYSICAL THERAPY | Age: 86
Setting detail: THERAPIES SERIES
Discharge: HOME | End: 2021-08-02
Payer: MEDICARE

## 2021-08-02 PROCEDURE — 97110 THERAPEUTIC EXERCISES: CPT

## 2021-08-02 PROCEDURE — 97112 NEUROMUSCULAR REEDUCATION: CPT

## 2021-08-02 NOTE — FLOWSHEET NOTE
[address] [facility] Physical Therapy  Phone: [phone]   Fax: [phone]    Physical Therapy Daily Treatment Note  Date:  2021    Patient Name:  Adeline Brewer    :  1935  MRN: [id number]  Medical/Treatment Diagnosis Information:  · Diagnosis: Other intervertebral disc degeneration, lumbar region  · Treatment Diagnosis: imbalance, abnormal gait, B hip  & core muscle weakness , decrease B hip flexibility. Insurance/Certification information:  PT Insurance Information: [facility] Medicare  Physician Information:  Referring Practitioner: Natacha Meyer MD  Plan of care signed (Y/N): []  Yes [x]  No     Date of Patient follow up with Physician:      Progress Report: []  Yes  [x]  No     Date Range for reporting period:  Beginnin2021  Ending:     Progress report due (10 Rx/or 30 days whichever is less): visit #53    Recertification due (POC duration/ or 90 days whichever is less): visit # 2021    Visit # Insurance Allowable Auth required? Date Range     []  Yes  [x]  No        Latex Allergy:  [x]NO      []YES  Preferred Language for Healthcare:   [x]English       []other:    Functional Scale:        Date assessed:  JAIRO: raw score = 9; dysfunction = 60%  21  TU sec        21     Pain level:  0/10     SUBJECTIVE:  Patient reports that he has some dizziness, but at times its worse than other days. Patient reports that he has been very dizzy this morning. When he woke up he was fine however walking into the clinic he could barely walk.   Stroke signs are negative, after questioning, pt had not hydrated or ate breakfast.           OBJECTIVE: : ambulates with straight cane forward flexed posture , shuffling gait pattern , decrease step length       RESTRICTIONS/PRECAUTIONS:  Neuropathy , (Parkinsons 2 years ago) revealed 21    Exercises/Interventions:     Therapeutic Exercises (19307) Resistance / level Sets/sec Reps Notes   Nustep Bike  1.0  2.0 5 min               IB / HR  2x30\" / 2x10                   mat strengthening:  SLR  HSS with belt  LTR  BKFO with iso    2  2 x 30\" B  5 x 10\"   10 B      10 B                         Therapeutic Activities (83930)         TUG 22sec 7/14       Marching     Sit to stand w/LSVT Big technique,   Mat table  10 -Difficulty w/eccentric control   LSVT Big Reaching technique   5x 7/26 Added           Neuromuscular Re-ed (64585)       Gait training:  LSVT Big technique       LSVT Big steps -sit to stand to fwd w/ stomps  -7/21: no stomps, instructed pt to take 2 steps across room  25  -frequent cues to improve step length, posture and arm swing. -VC for Big step, posture & continuous pattern   Toe taps  6 in' step     Stepping with weight shift over single adrienne      AIREX:   NBOS with CGA  With reciprocal arm swings, CGA     7/14: difficulty finding center, leaning backwards   Trunk rotation no UE support      Side stepping, Fwd/back stepping in the parallel bars                          TM -bwd 0.3 mph -pt unsafe so stopped   Manual Intervention (84213)       Prone quad stretch                                              Modalities:     Pt. Education:  -patient educated on diagnosis, prognosis and expectations for rehab  -all patient questions were answered    Home Exercise Program:  Access Code: [email or web address]  URL: [email or web address]  Date: 07/07/2021  Prepared by: Aliyah Palma    Exercises  Supine Lower Trunk Rotation - 1 x daily - 7 x weekly - 1 sets - 10 reps - 2-3 hold  Seated Hamstring Stretch - 1 x daily - 7 x weekly - 1 sets - 3 reps - 15-20 hold          Therapeutic Exercise and NMR EXR  [x] (33553) Provided verbal/tactile cueing for activities related to strengthening, flexibility, endurance, ROM for improvements in  [] LE / Lumbar: LE, proximal hip, and core control with self care, mobility, lifting, ambulation.   [] UE / Cervical: cervical, postural, scapular, scapulothoracic and UE control with self care, reaching, carrying, lifting, house/yardwork, driving, computer work.  [] (24067) Provided verbal/tactile cueing for activities related to improving balance, coordination, kinesthetic sense, posture, motor skill, proprioception to assist with   [] LE / lumbar: LE, proximal hip, and core control in self care, mobility, lifting, ambulation and eccentric single leg control. [] UE / cervical: cervical, scapular, scapulothoracic and UE control with self care, reaching, carrying, lifting, house/yardwork, driving, computer work.   [] (94337) Therapist is in constant attendance of 2 or more patients providing skilled therapy interventions, but not providing any significant amount of measurable one-on-one time to either patient, for improvements in  [] LE / lumbar: LE, proximal hip, and core control in self care, mobility, lifting, ambulation and eccentric single leg control. [] UE / cervical: cervical, scapular, scapulothoracic and UE control with self care, reaching, carrying, lifting, house/yardwork, driving, computer work.      NMR and Therapeutic Activities:    [] (24492 or 45273) Provided verbal/tactile cueing for activities related to improving balance, coordination, kinesthetic sense, posture, motor skill, proprioception and motor activation to allow for proper function of   [] LE: / Lumbar core, proximal hip and LE with self care and ADLs  [] UE / Cervical: cervical, postural, scapular, scapulothoracic and UE control with self care, carrying, lifting, driving, computer work.   [] (82856) Gait Re-education- Provided training and instruction to the patient for proper LE, core and proximal hip recruitment and positioning and eccentric body weight control with ambulation re-education including up and down stairs     Home Management Training / Self Care:  [] (52037) Provided self-care/home management training related to activities of daily living and compensatory training, and/or use of adaptive ambulation, and ascending / descending stairs. Charges:  Timed Code Treatment Minutes: 41   Total Treatment Minutes: 41     [] EVAL - LOW (10793)   [] EVAL - MOD (22813)  [] EVAL - HIGH (87767)  [] RE-EVAL (86036)  [x] BY(99563) x  1    [] Ionto  [x] NMR (20308) x  2     [] Vaso  [] Manual (54378) x       [] Ultrasound  [] TA x         [] Mech Traction (56135)  [] Aquatic Therapy x      [] ES (un) (98276):   [] Home Management Training x  [] ES(attended) (74779)   [] Dry Needling 1-2 muscles (90272):  [] Dry Needling 3+ muscles (828622)  [] Group:      [x] Other:  Gait     GOALS:  Patient stated goal: To improve mobility   []? Progressing: []? Met: []? Not Met: []? Adjusted     Therapist goals for Patient:   Short Term Goals: To be achieved in: 2 weeks  1. Independent in HEP and progression per patient tolerance, in order to prevent re-injury. []? Progressing: []? Met: []? Not Met: []? Adjusted  2. Patient will have a decrease in pain to facilitate improvement in movement, function, and ADLs as indicated by Functional Deficits. []? Progressing: []? Met: []? Not Met: []? Adjusted     Long Term Goals: To be achieved in: 6 weeks/ DC   1. Disability index score of 35% or less for the JAIRO to assist with reaching prior level of function. []? Progressing: []? Met: []? Not Met: []? Adjusted     2. Patient will demonstrate an increase in Strength to+4/5 proximal hip and core activation to allow for proper functional mobility as indicated by patients Functional Deficits. []? Progressing: []? Met: []? Not Met: []? Adjusted  3. Patient will return to functional activities including sit to stand without increased symptoms or LOB. []? Progressing: []? Met: []? Not Met: []? Adjusted  4. Patient to tolerate standing > 10 min without reduce symptoms of back pain and RLE giving way. []? Progressing: []? Met: []? Not Met: []? Adjusted       5.  Patient will ambulate with/without AD on level and unlevel surfaces without LOB and normalized gait pattern by 50%. []? Progressing: []? Met: []? Not Met: []? Adjusted    Overall Progression Towards Functional goals/ Treatment Progress Update:  [] Patient is progressing as expected towards functional goals listed. [] Progression is slowed due to complexities/Impairments listed. [] Progression has been slowed due to co-morbidities. [x] Plan just implemented, too soon to assess goals progression <30days   [] Goals require adjustment due to lack of progress  [] Patient is not progressing as expected and requires additional follow up with physician  [] Other    Persisting Functional Limitations/Impairments:  [x]Sleeping []Sitting               [x]Standing [x]Transfers        [x]Walking []Kneeling               [x]Stairs [x]Squatting / bending   [x]ADLs [x]Reaching  [x]Lifting  []Housework  []Driving []Job related tasks  []Sports/Recreation []Other:        ASSESSMENT:  8/2: The patient today ambulated in the clinical without cane with forward trunk posture . He resumed BIG LSVT activities with reaching and fwd steps . Patient required min A and  Tactile cues for stomps and arms out. The patient also continues to required cues for step length advance and arm swing with gait mechanics. He will further benefit from BIG movements and dynamic activities to reduce fall risk as tolerated. Treatment/Activity Tolerance:  [x] Patient able to complete tx  [x] Patient limited by fatigue  [] Patient limited by pain  [] Patient limited by other medical complications  [x] Other:  vertigo    Prognosis: [] Good [] Fair  [] Poor    Patient Requires Follow-up: [x] Yes  [] No    Plan for next treatment session:  Begin flexion preference core exercises and balance activities     PLAN: See yareli PT 2x / week for 6 weeks.    [x] Continue per plan of care [] Alter current plan (see comments)  [] Plan of care initiated [] Hold pending MD visit [] Discharge    Electronically signed by: Nita Mcnair PT Note: If patient does not return for scheduled/ recommended follow up visits, this note will serve as a discharge from care along with most recent update on progress.

## 2021-08-04 ENCOUNTER — HOSPITAL ENCOUNTER (OUTPATIENT)
Dept: PHYSICAL THERAPY | Age: 86
Setting detail: THERAPIES SERIES
Discharge: HOME | End: 2021-08-04
Payer: MEDICARE

## 2021-08-04 PROCEDURE — 97110 THERAPEUTIC EXERCISES: CPT

## 2021-08-04 PROCEDURE — 97530 THERAPEUTIC ACTIVITIES: CPT

## 2021-08-04 NOTE — FLOWSHEET NOTE
[address] [facility] Physical Therapy  Phone: [phone]   Fax: [phone]    Physical Therapy Daily Treatment Note  Date:  2021    Patient Name:  Ana Chapman    :  1935  MRN: [id number]  Medical/Treatment Diagnosis Information:  · Diagnosis: Other intervertebral disc degeneration, lumbar region  · Treatment Diagnosis: imbalance, abnormal gait, B hip  & core muscle weakness , decrease B hip flexibility. Insurance/Certification information:  PT Insurance Information: [employer] Medicare  Physician Information:  Referring Practitioner: Rio Torres MD  Plan of care signed (Y/N): []  Yes [x]  No     Date of Patient follow up with Physician:      Progress Report: []  Yes  [x]  No     Date Range for reporting period:  Beginnin2021  Ending:     Progress report due (10 Rx/or 30 days whichever is less): visit #07    Recertification due (POC duration/ or 90 days whichever is less): visit # 2021    Visit # Insurance Allowable Auth required? Date Range    [place] []  Yes  [x]  No        Latex Allergy:  [x]NO      []YES  Preferred Language for Healthcare:   [x]English       []other:    Functional Scale:        Date assessed:  JAIRO: raw score = 9; dysfunction = 60%  21  TU sec        21     Pain level:  0/10     SUBJECTIVE:  Fatemeh Tomyjoe he has his garage straightened out for some exercises, including some weights and bicycles, and if it works out, may want to continue at home and then come back here later on. Feels pretty good overall, said the vertigo is what gets him the most.  Fatemeh  he will want some more exercises at home and will try to figure out if he wants to continue with more therapy, his son will be busy coming up so wants to make sure he can make more appts.             OBJECTIVE: : ambulates with straight cane forward flexed posture , shuffling gait pattern , decrease step length       RESTRICTIONS/PRECAUTIONS:  Neuropathy , (Parkinsons 2 years ago) revealed 7/14/21    Exercises/Interventions:     Therapeutic Exercises (33187) Resistance / level Sets/sec Reps Notes   Nustep   Bike  1.0  2.0 5 min               IB / HR  2x30\" / 2x10                   mat strengthening:  SLR  HSS with belt  LTR  BKFO with iso    2  2 x 30\" B  5 x 10\"   10 B      10 B    Flexion core exercises                      Therapeutic Activities (95006)         TUG 22sec 7/14       Marching     Sit to stand w/LSVT Big technique,   Mat table  10 -Difficulty w/eccentric control   LSVT Big Reaching technique   5x 7/26 Added    LSVT Floor to Ceiling    5x    LSVT side to side    5x                  Neuromuscular Re-ed (98923)       Gait training:  LSVT Big technique       LSVT Big steps -sit to stand to fwd w/ stomps  -7/21: no stomps, instructed pt to take 2 steps across room  2175'5  -frequent cues to improve step length, posture and arm swing. -VC for Big step, posture & continuous pattern   Toe taps  6 in' step     Stepping with weight shift over single adrienne      AIREX:   NBOS with CGA  With reciprocal arm swings, CGA     7/14: difficulty finding center, leaning backwards   Trunk rotation no UE support      Side stepping, Fwd/back stepping in the parallel bars        Update HEP next few visits   Balance // Shanique Ferrell next visits? TM -bwd 0.3 mph -pt unsafe so stopped   Manual Intervention (58875)       Prone quad stretch                                              Modalities:     Pt. Education:  -patient educated on diagnosis, prognosis and expectations for rehab  -all patient questions were answered    Home Exercise Program:  Access Code: [email or web address]  URL: [email or web address]  Date: 07/07/2021  Prepared by: Jenn Amado    Exercises  Supine Lower Trunk Rotation - 1 x daily - 7 x weekly - 1 sets - 10 reps - 2-3 hold  Seated Hamstring Stretch - 1 x daily - 7 x weekly - 1 sets - 3 reps - 15-20 hold          Therapeutic Exercise and NMR EXR  [x] (10482) Provided verbal/tactile cueing for activities related to strengthening, flexibility, endurance, ROM for improvements in  [] LE / Lumbar: LE, proximal hip, and core control with self care, mobility, lifting, ambulation. [] UE / Cervical: cervical, postural, scapular, scapulothoracic and UE control with self care, reaching, carrying, lifting, house/yardwork, driving, computer work.  [] (59648) Provided verbal/tactile cueing for activities related to improving balance, coordination, kinesthetic sense, posture, motor skill, proprioception to assist with   [] LE / lumbar: LE, proximal hip, and core control in self care, mobility, lifting, ambulation and eccentric single leg control. [] UE / cervical: cervical, scapular, scapulothoracic and UE control with self care, reaching, carrying, lifting, house/yardwork, driving, computer work.   [] (70768) Therapist is in constant attendance of 2 or more patients providing skilled therapy interventions, but not providing any significant amount of measurable one-on-one time to either patient, for improvements in  [] LE / lumbar: LE, proximal hip, and core control in self care, mobility, lifting, ambulation and eccentric single leg control. [] UE / cervical: cervical, scapular, scapulothoracic and UE control with self care, reaching, carrying, lifting, house/yardwork, driving, computer work.      NMR and Therapeutic Activities:    [] (34484 or 27612) Provided verbal/tactile cueing for activities related to improving balance, coordination, kinesthetic sense, posture, motor skill, proprioception and motor activation to allow for proper function of   [] LE: / Lumbar core, proximal hip and LE with self care and ADLs  [] UE / Cervical: cervical, postural, scapular, scapulothoracic and UE control with self care, carrying, lifting, driving, computer work.   [] (53115) Gait Re-education- Provided training and instruction to the patient for proper LE, core and proximal hip recruitment and positioning and eccentric body weight control with ambulation re-education including up and down stairs     Home Management Training / Self Care:  [] (31336) Provided self-care/home management training related to activities of daily living and compensatory training, and/or use of adaptive equipment for improvement with: ADLs and compensatory training, meal preparation, safety procedures and instruction in use of adaptive equipment, including bathing, grooming, dressing, personal hygiene, basic household cleaning and chores. Home Exercise Program:    [x] (92265) Reviewed/Progressed HEP activities related to strengthening, flexibility, endurance, ROM of   [] LE / Lumbar: core, proximal hip and LE for functional self-care, mobility, lifting and ambulation/stair navigation   [] UE / Cervical: cervical, postural, scapular, scapulothoracic and UE control with self care, reaching, carrying, lifting, house/yardwork, driving, computer work  [] (70461)Reviewed/Progressed HEP activities related to improving balance, coordination, kinesthetic sense, posture, motor skill, proprioception of   [] LE: core, proximal hip and LE for self care, mobility, lifting, and ambulation/stair navigation    [] UE / Cervical: cervical, postural,  scapular, scapulothoracic and UE control with self care, reaching, carrying, lifting, house/yardwork, driving, computer work    Manual Treatments:  PROM / STM / Oscillations-Mobs:  G-I, II, III, IV (PA's, Inf., Post.)  [] (53121) Provided manual therapy to mobilize LE, proximal hip and/or LS spine soft tissue/joints for the purpose of modulating pain, promoting relaxation,  increasing ROM, reducing/eliminating soft tissue swelling/inflammation/restriction, improving soft tissue extensibility and allowing for proper ROM for normal function with   [] LE / lumbar: self care, mobility, lifting and ambulation.     [] UE / Cervical: self care, reaching, carrying, lifting, house/yardwork, driving, computer work. Modalities:  [] (20415) Vasopneumatic compression: Utilized vasopneumatic compression to decrease edema / swelling for the purpose of improving mobility and quad tone / recruitment which will allow for increased overall function including but not limited to self-care, transfers, ambulation, and ascending / descending stairs. Charges:  Timed Code Treatment Minutes: 44   Total Treatment Minutes: 44     [] EVAL - LOW (34209)   [] EVAL - MOD (16264)  [] EVAL - HIGH (64273)  [] RE-EVAL (11698)  [x] TV(80154) x  1    [] Ionto  [] NMR (55397) x  2     [] Vaso  [] Manual (91945) x       [] Ultrasound  [x] TA x 2        [] Mech Traction (08456)  [] Aquatic Therapy x      [] ES (un) (11405):   [] Home Management Training x  [] ES(attended) (98506)   [] Dry Needling 1-2 muscles (71471):  [] Dry Needling 3+ muscles (528510)  [] Group:      [x] Other:  Gait     GOALS:  Patient stated goal: To improve mobility   []? Progressing: []? Met: []? Not Met: []? Adjusted     Therapist goals for Patient:   Short Term Goals: To be achieved in: 2 weeks  1. Independent in HEP and progression per patient tolerance, in order to prevent re-injury. []? Progressing: []? Met: []? Not Met: []? Adjusted  2. Patient will have a decrease in pain to facilitate improvement in movement, function, and ADLs as indicated by Functional Deficits. []? Progressing: []? Met: []? Not Met: []? Adjusted     Long Term Goals: To be achieved in: 6 weeks/ DC   1. Disability index score of 35% or less for the JAIRO to assist with reaching prior level of function. []? Progressing: []? Met: []? Not Met: []? Adjusted     2. Patient will demonstrate an increase in Strength to+4/5 proximal hip and core activation to allow for proper functional mobility as indicated by patients Functional Deficits. []? Progressing: []? Met: []? Not Met: []? Adjusted  3.  Patient will return to functional activities including sit to stand without increased symptoms or LOB. []? Progressing: []? Met: []? Not Met: []? Adjusted  4. Patient to tolerate standing > 10 min without reduce symptoms of back pain and RLE giving way. []? Progressing: []? Met: []? Not Met: []? Adjusted       5. Patient will ambulate with/without AD on level and unlevel surfaces without LOB and normalized gait pattern by 50%. []? Progressing: []? Met: []? Not Met: []? Adjusted    Overall Progression Towards Functional goals/ Treatment Progress Update:  [] Patient is progressing as expected towards functional goals listed. [] Progression is slowed due to complexities/Impairments listed. [] Progression has been slowed due to co-morbidities. [x] Plan just implemented, too soon to assess goals progression <30days   [] Goals require adjustment due to lack of progress  [] Patient is not progressing as expected and requires additional follow up with physician  [] Other    Persisting Functional Limitations/Impairments:  [x]Sleeping []Sitting               [x]Standing [x]Transfers        [x]Walking []Kneeling               [x]Stairs [x]Squatting / bending   [x]ADLs [x]Reaching  [x]Lifting  []Housework  []Driving []Job related tasks  []Sports/Recreation []Other:        ASSESSMENT:  8/5, 8/2: The patient today ambulated in the clinical without cane with forward trunk posture . He resumed BIG LSVT activities with reaching and fwd steps. Added LSVT techniques. Continued to require max verbal and tactile cues and had LOB with attempting BIG technique with steps. Improved gait mechanics and arm swing with better swing-through with cues. Patient required min A and  Tactile cues for stomps and arms out. The patient also continues to required cues for step length advance and arm swing with gait mechanics. He will further benefit from BIG movements and dynamic activities to reduce fall risk as tolerated.      Treatment/Activity Tolerance:  [x] Patient able to complete tx  [x] Patient limited by fatigue  [] Patient limited by pain  [] Patient limited by other medical complications  [x] Other:  vertigo    Prognosis: [] Good [] Fair  [] Poor    Patient Requires Follow-up: [x] Yes  [] No    Plan for next treatment session:  Begin flexion preference core exercises and balance activities     PLAN: See eval. PT 2x / week for 6 weeks. [x] Continue per plan of care [] Alter current plan (see comments)  [] Plan of care initiated [] Hold pending MD visit [] Discharge    Electronically signed by: Vipul Smyth PT     Note: If patient does not return for scheduled/ recommended follow up visits, this note will serve as a discharge from care along with most recent update on progress.

## 2021-08-09 ENCOUNTER — HOSPITAL ENCOUNTER (OUTPATIENT)
Dept: PHYSICAL THERAPY | Age: 86
Setting detail: THERAPIES SERIES
Discharge: HOME | End: 2021-08-09
Payer: MEDICARE

## 2021-08-09 PROCEDURE — 97110 THERAPEUTIC EXERCISES: CPT

## 2021-08-09 PROCEDURE — 97530 THERAPEUTIC ACTIVITIES: CPT

## 2021-08-09 NOTE — FLOWSHEET NOTE
[address] [facility] Physical Therapy  Phone: [phone]   Fax: [phone]    Physical Therapy Daily Treatment Note  Date:  2021    Patient Name:  Marycarmen Lockhart    :  1935  MRN: [id number]  Medical/Treatment Diagnosis Information:  · Diagnosis: Other intervertebral disc degeneration, lumbar region  · Treatment Diagnosis: imbalance, abnormal gait, B hip  & core muscle weakness , decrease B hip flexibility. Insurance/Certification information:  PT Insurance Information: t Medicare  Physician Information:  Referring Practitioner: Karina Gonzalez MD  Plan of care signed (Y/N): []  Yes [x]  No     Date of Patient follow up with Physician:      Progress Report: []  Yes  [x]  No     Date Range for reporting period:  Beginnin2021  Ending:     Progress report due (10 Rx/or 30 days whichever is less): visit #95    Recertification due (POC duration/ or 90 days whichever is less): visit # 2021    Visit # Insurance Allowable Auth required? Date Range   10/12 [place] []  Yes  [x]  No        Latex Allergy:  [x]NO      []YES  Preferred Language for Healthcare:   [x]English       []other:    Functional Scale:        Date assessed:  JAIRO: raw score = 9; dysfunction = 60%  21  TU sec        21     Pain level:  0/10     SUBJECTIVE:   Went to a birthday party for great granddaughter and thinks he was on his feet too much, his legs are sore today.                 OBJECTIVE:       TU.12 secs with [facility]       Ambulated with [facility] today     : ambulates with straight cane forward flexed posture , shuffling gait pattern , decrease step length       RESTRICTIONS/PRECAUTIONS:  Neuropathy , (Parkinsons 2 years ago) revealed 21    Exercises/Interventions:     Therapeutic Exercises (12191) Resistance / level Sets/sec Reps Notes   Nustep   Bike  1.0  2.0 5 min               IB / HR  2x30\" / 2x10                   mat strengthening:  SLR  HSS with belt  LTR  BKFO with iso   Bridging with hip abd band          Lime    2  2 x 30\" B  5 x 10\"  2  2   10 B      10 B  10    Flexion core exercises                      Therapeutic Activities (82124)        TUG 22sec 7/14       Marching     Sit to stand w/LSVT Big technique,   Mat table  10 -Difficulty w/eccentric control   LSVT Big Reaching technique   5x 7/26 Added    LSVT Floor to Ceiling    5x    LSVT side to side    5x                  Neuromuscular Re-ed (51965)       Gait training:  LSVT Big technique        LSVT Big steps -sit to stand to fwd w/ stomps  -7/21: no stomps, instructed pt to take 2 steps across room  182'  -frequent cues to improve step length, posture and arm swing    -VC for Big step, posture & continuous pattern   Toe taps  6 in' step     Stepping with weight shift over single adrienne      AIREX:   NBOS with CGA  With reciprocal arm swings, CGA     7/14: difficulty finding center, leaning backwards   Trunk rotation no UE support      Side stepping, Fwd/back stepping in the parallel bars        Update HEP next few visits   Balance // Jerral Bevels next visits? TM -bwd 0.3 mph -pt unsafe so stopped   Manual Intervention (62228)       Prone quad stretch                                              Modalities:     Pt. Education:  -patient educated on diagnosis, prognosis and expectations for rehab  -all patient questions were answered    Home Exercise Program:  Access Code: [email or web address]  URL: [email or web address]  Date: 07/07/2021  Prepared by: Mamie Swan    Exercises  Supine Lower Trunk Rotation - 1 x daily - 7 x weekly - 1 sets - 10 reps - 2-3 hold  Seated Hamstring Stretch - 1 x daily - 7 x weekly - 1 sets - 3 reps - 15-20 hold          Therapeutic Exercise and NMR EXR  [x] (35722) Provided verbal/tactile cueing for activities related to strengthening, flexibility, endurance, ROM for improvements in  [] LE / Lumbar: LE, proximal hip, and core control with self care, mobility, training related to activities of daily living and compensatory training, and/or use of adaptive equipment for improvement with: ADLs and compensatory training, meal preparation, safety procedures and instruction in use of adaptive equipment, including bathing, grooming, dressing, personal hygiene, basic household cleaning and chores. Home Exercise Program:    [x] (90630) Reviewed/Progressed HEP activities related to strengthening, flexibility, endurance, ROM of   [] LE / Lumbar: core, proximal hip and LE for functional self-care, mobility, lifting and ambulation/stair navigation   [] UE / Cervical: cervical, postural, scapular, scapulothoracic and UE control with self care, reaching, carrying, lifting, house/yardwork, driving, computer work  [] (87760)Reviewed/Progressed HEP activities related to improving balance, coordination, kinesthetic sense, posture, motor skill, proprioception of   [] LE: core, proximal hip and LE for self care, mobility, lifting, and ambulation/stair navigation    [] UE / Cervical: cervical, postural,  scapular, scapulothoracic and UE control with self care, reaching, carrying, lifting, house/yardwork, driving, computer work    Manual Treatments:  PROM / STM / Oscillations-Mobs:  G-I, II, III, IV (PA's, Inf., Post.)  [] (89956) Provided manual therapy to mobilize LE, proximal hip and/or LS spine soft tissue/joints for the purpose of modulating pain, promoting relaxation,  increasing ROM, reducing/eliminating soft tissue swelling/inflammation/restriction, improving soft tissue extensibility and allowing for proper ROM for normal function with   [] LE / lumbar: self care, mobility, lifting and ambulation. [] UE / Cervical: self care, reaching, carrying, lifting, house/yardwork, driving, computer work.      Modalities:  [] (53773) Vasopneumatic compression: Utilized vasopneumatic compression to decrease edema / swelling for the purpose of improving mobility and quad tone / recruitment which will allow for increased overall function including but not limited to self-care, transfers, ambulation, and ascending / descending stairs. Charges:  Timed Code Treatment Minutes: 41   Total Treatment Minutes: 41     [] EVAL - LOW (53589)   [] EVAL - MOD (09339)  [] EVAL - HIGH (62639)  [] RE-EVAL (33747)  [x] BE(71047) x  2    [] Ionto  [] NMR (49213) x  2     [] Vaso  [] Manual (25948) x       [] Ultrasound  [x] TA x 1        [] Mech Traction (37347)  [] Aquatic Therapy x      [] ES (un) (55973):   [] Home Management Training x  [] ES(attended) (88674)   [] Dry Needling 1-2 muscles (98473):  [] Dry Needling 3+ muscles (917434)  [] Group:      [x] Other:  Gait     GOALS:  Patient stated goal: To improve mobility   []? Progressing: []? Met: []? Not Met: []? Adjusted     Therapist goals for Patient:   Short Term Goals: To be achieved in: 2 weeks  1. Independent in HEP and progression per patient tolerance, in order to prevent re-injury. []? Progressing: []? Met: []? Not Met: []? Adjusted  2. Patient will have a decrease in pain to facilitate improvement in movement, function, and ADLs as indicated by Functional Deficits. []? Progressing: []? Met: []? Not Met: []? Adjusted     Long Term Goals: To be achieved in: 6 weeks/ DC   1. Disability index score of 35% or less for the JAIRO to assist with reaching prior level of function. []? Progressing: []? Met: []? Not Met: []? Adjusted     2. Patient will demonstrate an increase in Strength to+4/5 proximal hip and core activation to allow for proper functional mobility as indicated by patients Functional Deficits. []? Progressing: []? Met: []? Not Met: []? Adjusted  3. Patient will return to functional activities including sit to stand without increased symptoms or LOB. []? Progressing: []? Met: []? Not Met: []? Adjusted  4. Patient to tolerate standing > 10 min without reduce symptoms of back pain and RLE giving way. []? Progressing: []? Met: []?  Not Met: []? Adjusted       5. Patient will ambulate with/without AD on level and unlevel surfaces without LOB and normalized gait pattern by 50%. []? Progressing: []? Met: []? Not Met: []? Adjusted    Overall Progression Towards Functional goals/ Treatment Progress Update:  [] Patient is progressing as expected towards functional goals listed. [] Progression is slowed due to complexities/Impairments listed. [] Progression has been slowed due to co-morbidities. [x] Plan just implemented, too soon to assess goals progression <30days   [] Goals require adjustment due to lack of progress  [] Patient is not progressing as expected and requires additional follow up with physician  [] Other    Persisting Functional Limitations/Impairments:  [x]Sleeping []Sitting               [x]Standing [x]Transfers        [x]Walking []Kneeling               [x]Stairs [x]Squatting / bending   [x]ADLs [x]Reaching  [x]Lifting  []Housework  []Driving []Job related tasks  []Sports/Recreation []Other:        ASSESSMENT:  Altered activities today based on pt's increased soreness and instability, so did less CKC and focused on core and NWB activities per patient request. Improvements noted by end of session, but needed frequent cues for safety for appropriate gait speed with SPC by end of session. He will further benefit from BIG movements and dynamic activities to reduce fall risk. Will consider adding more static and dynamic challenges at future visits. Treatment/Activity Tolerance:  [x] Patient able to complete tx  [x] Patient limited by fatigue  [] Patient limited by pain  [] Patient limited by other medical complications  [x] Other:  vertigo    Prognosis: [] Good [] Fair  [] Poor    Patient Requires Follow-up: [x] Yes  [] No    Plan for next treatment session:  Begin flexion preference core exercises and balance activities     PLAN: See frances. PT 2x / week for 6 weeks.    [x] Continue per plan of care [] Alter current plan (see comments)  [] Plan of care initiated [] Hold pending MD visit [] Discharge    Electronically signed by: Cornell Barrios PT     Note: If patient does not return for scheduled/ recommended follow up visits, this note will serve as a discharge from care along with most recent update on progress.

## 2021-08-11 ENCOUNTER — HOSPITAL ENCOUNTER (OUTPATIENT)
Dept: PHYSICAL THERAPY | Age: 86
Setting detail: THERAPIES SERIES
Discharge: HOME | End: 2021-08-11
Payer: MEDICARE

## 2021-08-11 PROCEDURE — 97530 THERAPEUTIC ACTIVITIES: CPT

## 2021-08-11 PROCEDURE — 97112 NEUROMUSCULAR REEDUCATION: CPT

## 2021-08-11 PROCEDURE — 97110 THERAPEUTIC EXERCISES: CPT

## 2021-08-11 NOTE — FLOWSHEET NOTE
[address] [facility] Physical Therapy  Phone: [phone]   Fax: [phone]    Physical Therapy Daily Treatment Note  Date:  2021    Patient Name:  Devyn Lino    :  1935  MRN: [id number]  Medical/Treatment Diagnosis Information:  · Diagnosis: Other intervertebral disc degeneration, lumbar region  · Treatment Diagnosis: imbalance, abnormal gait, B hip  & core muscle weakness , decrease B hip flexibility. Insurance/Certification information:  PT Insurance Information: [facility] [employer]  Physician Information:  Referring Practitioner: Sailaja Marie MD  Plan of care signed (Y/N): []  Yes [x]  No     Date of Patient follow up with Physician:      Progress Report: []  Yes  [x]  No     Date Range for reporting period:  Beginnin2021  Ending:     Progress report due (10 Rx/or 30 days whichever is less): visit #46    Recertification due (POC duration/ or 90 days whichever is less): visit # 2021    Visit # [place] Allowable Auth required? Date [place]    [place] []  [place]  [x]  No        Latex Allergy:  [x]NO      []YES  Preferred Language for Healthcare:   [x]English       []other:    Functional Scale:        Date assessed:  JAIRO: raw score = 9; dysfunction = 60%  21  TU sec        21     Pain level:  0/10     SUBJECTIVE:    Doing OK today, no new c/o, feeling better than last visit. He isn't sure if he wants to continue with therapy or just stop and go towards a maintenance program.  States he has his garage fixed up where he can do his exercises in there.                        OBJECTIVE:       TU.12 secs with [facility]       Ambulated with [facility] today     : ambulates with straight cane forward flexed posture , shuffling gait pattern , decrease step length       RESTRICTIONS/PRECAUTIONS:  Neuropathy , (Parkinsons 2 years ago) revealed 21    Exercises/Interventions:     Therapeutic Exercises (40070) Resistance / level Sets/sec Reps Notes   Nustep   Bike  1.0  2.0 5 min               IB / HR  2x30\" / 2x10                   mat strengthening:  SLR  HSS with belt  LTR  BKFO with iso   Bridging with hip abd band          Lime        5 x 10\"  2  2         10 B  10    Flexion core exercises                      Therapeutic Activities (09002)              Marching     Sit to stand w/LSVT Big technique,   Mat table  10 -Difficulty w/eccentric control   LSVT Big Reaching technique   5x 7/26 Added    LSVT Floor to Ceiling    5x    LSVT side to side    5x                  Neuromuscular Re-ed (23984)       Gait training:  LSVT Big technique with SPC       LSVT Big steps -sit to stand to fwd w/ stomps  -7/21: no stomps, instructed pt to take 2 steps across room  196'5  -frequent cues to improve step length, posture and arm swing    -VC for Big step, posture & continuous pattern   Toe taps  6 in' step     Stepping with weight shift over single adrienne      AIREX:   NBOS with CGA  With reciprocal arm swings, CGA     7/14: difficulty finding center, leaning backwards   Trunk rotation no UE support      Side stepping, Fwd/back stepping in the parallel bars        Update HEP next visit? Toe Taps   X 12 B   Semi-tandem   20\" x 2 BLight UE support, CGA   Narrow RACHEL, EO, EC  20\" x 2 ea CGA                    TM -bwd 0.3 mph -pt unsafe so stopped   Manual Intervention (03915)       Prone quad stretch       Manual HS Stretch  30\"x 3 B                                     Modalities:     Pt. Education:  -patient educated on diagnosis, prognosis and expectations for rehab  -all patient questions were answered    Home Exercise Program:  Access Code: [email or web address]  URL: [email or web address]  Date: 07/07/2021  Prepared by: Michaela Tee    Exercises  Supine Lower Trunk Rotation - 1 x daily - 7 x weekly - 1 sets - 10 reps - 2-3 hold  Seated Hamstring Stretch - 1 x daily - 7 x weekly - 1 sets - 3 reps - 15-20 hold          Therapeutic Exercise and NMR EXR  [x] (32839) Provided verbal/tactile cueing for activities related to strengthening, flexibility, endurance, ROM for improvements in  [] LE / Lumbar: LE, proximal hip, and core control with self care, mobility, lifting, ambulation. [] UE / Cervical: cervical, postural, scapular, scapulothoracic and UE control with self care, reaching, carrying, lifting, house/yardwork, driving, computer work.  [] (90600) Provided verbal/tactile cueing for activities related to improving balance, coordination, kinesthetic sense, posture, motor skill, proprioception to assist with   [] LE / lumbar: LE, proximal hip, and core control in self care, mobility, lifting, ambulation and eccentric single leg control. [] UE / cervical: cervical, scapular, scapulothoracic and UE control with self care, reaching, carrying, lifting, house/yardwork, driving, computer work.   [] (28005) Therapist is in constant attendance of 2 or more patients providing skilled therapy interventions, but not providing any significant amount of measurable one-on-one time to either patient, for improvements in  [] LE / lumbar: LE, proximal hip, and core control in self care, mobility, lifting, ambulation and eccentric single leg control. [] UE / cervical: cervical, scapular, scapulothoracic and UE control with self care, reaching, carrying, lifting, house/yardwork, driving, computer work.      NMR and Therapeutic Activities:    [] (89527 or 09712) Provided verbal/tactile cueing for activities related to improving balance, coordination, kinesthetic sense, posture, motor skill, proprioception and motor activation to allow for proper function of   [] LE: / Lumbar core, proximal hip and LE with self care and ADLs  [] UE / Cervical: cervical, postural, scapular, scapulothoracic and UE control with self care, carrying, lifting, driving, computer work.   [] (54623) Gait Re-education- Provided training and instruction to the patient for proper LE, core and proximal hip recruitment and positioning and eccentric body weight control with ambulation re-education including up and down stairs     Home Management Training / Self Care:  [] (07575) Provided self-care/home management training related to activities of daily living and compensatory training, and/or use of adaptive equipment for improvement with: ADLs and compensatory training, meal preparation, safety procedures and instruction in use of adaptive equipment, including bathing, grooming, dressing, personal hygiene, basic household cleaning and chores. Home Exercise Program:    [x] (06193) Reviewed/Progressed HEP activities related to strengthening, flexibility, endurance, ROM of   [] LE / Lumbar: core, proximal hip and LE for functional self-care, mobility, lifting and ambulation/stair navigation   [] UE / Cervical: cervical, postural, scapular, scapulothoracic and UE control with self care, reaching, carrying, lifting, house/yardwork, driving, computer work  [] (22672)Reviewed/Progressed HEP activities related to improving balance, coordination, kinesthetic sense, posture, motor skill, proprioception of   [] LE: core, proximal hip and LE for self care, mobility, lifting, and ambulation/stair navigation    [] UE / Cervical: cervical, postural,  scapular, scapulothoracic and UE control with self care, reaching, carrying, lifting, house/yardwork, driving, computer work    Manual Treatments:  PROM / STM / Oscillations-Mobs:  G-I, II, III, IV (PA's, Inf., Post.)  [] (32867) Provided manual therapy to mobilize LE, proximal hip and/or LS spine soft tissue/joints for the purpose of modulating pain, promoting relaxation,  increasing ROM, reducing/eliminating soft tissue swelling/inflammation/restriction, improving soft tissue extensibility and allowing for proper ROM for normal function with   [] LE / lumbar: self care, mobility, lifting and ambulation.     [] UE / Cervical: self care, reaching, carrying, lifting, house/yardwork, driving, computer work. Modalities:  [] (56737) Vasopneumatic compression: Utilized vasopneumatic compression to decrease edema / swelling for the purpose of improving mobility and quad tone / recruitment which will allow for increased overall function including but not limited to self-care, transfers, ambulation, and ascending / descending stairs. Charges:  Timed Code Treatment Minutes: 43   Total Treatment Minutes: 43     [] EVAL - LOW (27429)   [] EVAL - MOD (89014)  [] EVAL - HIGH (39723)  [] RE-EVAL (47075)  [x] UD(79441) x  1    [] Ionto  [x] NMR (23894) x  1     [] Vaso  [] Manual (88796) x       [] Ultrasound  [x] TA x 1        [] Mech Traction (90539)  [] Aquatic Therapy x      [] ES (un) (05161):   [] Home Management Training x  [] ES(attended) (24653)   [] Dry Needling 1-2 muscles (99576):  [] Dry Needling 3+ muscles (227764)  [] Group:      [x] Other:  Gait     GOALS:  Patient stated goal: To improve mobility   []? Progressing: []? Met: []? Not Met: []? Adjusted     Therapist goals for Patient:   Short Term Goals: To be achieved in: 2 weeks  1. Independent in HEP and progression per patient tolerance, in order to prevent re-injury. []? Progressing: []? Met: []? Not Met: []? Adjusted  2. Patient will have a decrease in pain to facilitate improvement in movement, function, and ADLs as indicated by Functional Deficits. []? Progressing: []? Met: []? Not Met: []? Adjusted     Long Term Goals: To be achieved in: 6 weeks/ DC   1. Disability index score of 35% or less for the JAIRO to assist with reaching prior level of function. []? Progressing: []? Met: []? Not Met: []? Adjusted     2. Patient will demonstrate an increase in Strength to+4/5 proximal hip and core activation to allow for proper functional mobility as indicated by patients Functional Deficits. []? Progressing: []? Met: []? Not Met: []? Adjusted  3.  Patient will return to functional activities including sit to stand without increased symptoms or LOB. []? Progressing: []? Met: []? Not Met: []? Adjusted  4. Patient to tolerate standing > 10 min without reduce symptoms of back pain and RLE giving way. []? Progressing: []? Met: []? Not Met: []? Adjusted       5. Patient will ambulate with/without AD on level and unlevel surfaces without LOB and normalized gait pattern by 50%. []? Progressing: []? Met: []? Not Met: []? Adjusted    Overall Progression Towards Functional goals/ Treatment Progress Update:  [] Patient is progressing as expected towards functional goals listed. [] Progression is slowed due to complexities/Impairments listed. [] Progression has been slowed due to co-morbidities. [x] Plan just implemented, too soon to assess goals progression <30days   [] Goals require adjustment due to lack of progress  [] Patient is not progressing as expected and requires additional follow up with physician  [] Other    Persisting Functional Limitations/Impairments:  [x]Sleeping []Sitting               [x]Standing [x]Transfers        [x]Walking []Kneeling               [x]Stairs [x]Squatting / bending   [x]ADLs [x]Reaching  [x]Lifting  []Housework  []Driving []Job related tasks  []Sports/Recreation []Other:        ASSESSMENT:   Improvements noted by end of session, but needed frequent cues for safety for appropriate gait speed with SPC. Difficulty with multidirectional sustained movements. Pt slow to progress as of late. He will further benefit from BIG movements and dynamic activities to reduce fall risk. Consider updating HEP and will discuss with patient at next visit his desire and need for continued PT services.          Treatment/Activity Tolerance:  [x] Patient able to complete tx  [x] Patient limited by fatigue  [] Patient limited by pain  [] Patient limited by other medical complications  [x] Other:  vertigo    Prognosis: [x] Good [] Fair  [] Poor    Patient Requires Follow-up: [x] Yes  [] No    Plan for next treatment session:

## 2021-08-16 ENCOUNTER — HOSPITAL ENCOUNTER (OUTPATIENT)
Dept: PHYSICAL THERAPY | Age: 86
Setting detail: THERAPIES SERIES
Discharge: HOME | End: 2021-08-16
Payer: MEDICARE

## 2021-08-16 PROCEDURE — 97530 THERAPEUTIC ACTIVITIES: CPT

## 2021-08-16 PROCEDURE — 97110 THERAPEUTIC EXERCISES: CPT

## 2021-08-16 NOTE — FLOWSHEET NOTE
[address] [facility] Physical Therapy  Phone: [phone]   Fax: [phone]  Physical Therapy Re-Certification Plan of Care    Dear Robi Edwards MD    We had the pleasure of treating the following patient for physical therapy services at [facility]. A summary of our findings can be found in the updated assessment below. This includes our plan of care. If you have any questions or concerns regarding these findings, please do not hesitate to contact me at the office phone number checked above. Thank you for the referral.     Physician Signature:________________________________Date:__________________  By signing above (or electronic signature), therapist's plan is approved by physician      Functional Outcome: JAIRO 38%  TUG 13.7       Overall Response to Treatment:   []Patient is responding well to treatment and improvement is noted with regards  to goals   []Patient should continue to improve in reasonable time if they continue HEP   []Patient has plateaued and is no longer responding to skilled PT intervention    []Patient is getting worse and would benefit from return to referring MD   []Patient unable to adhere to initial POC   [x]Other: Patient has made good improvement toward functional rehab goals. , being able to perform sit to stand transition without LOB beginning to take longer steps 50% of the time. However, pt could benefit from advance balance and gait training to further reduce fall risk with community and house hold gait and functional activities. Date range of Visits:   Total Visits:12    Recommendation:    [x]Continue PT 1x / wk for 5 weeks.                []Hold PT, pending MD visit      Physical Therapy Daily Treatment Note  Date:  2021    Patient Name:  Mary Paez    :  1935  MRN: [id number]  Medical/Treatment Diagnosis Information:  · Diagnosis: Other intervertebral disc degeneration, lumbar region  · Treatment Diagnosis: imbalance, abnormal gait, B hip  & core muscle weakness , decrease B hip flexibility. Insurance/Certification information:  PT Insurance Information: [employer]  Physician Information:  Referring Practitioner: Cortez Hodge MD  Plan of care signed (Y/N): []  Yes [x]  No     Date of Patient follow up with Physician:      Progress Report: []  Yes  [x]  No     Date Range for reporting period:  Beginnin2021  Endin21     Progress report due (10 Rx/or 30 days whichever is less): visit #50    Recertification due (POC duration/ or 90 days whichever is less): visit # 2021    Visit # Insurance Allowable Auth required? Date [place]    [place] []  [place]  [x]  No        Latex Allergy:  [x]NO      []YES  Preferred Language for Healthcare:   [x]English       []other:    Functional Scale:   JAIRO : raw score =19; dysfunction=38%            21  TUG 13.7       Date assessed:  JAIRO: raw score = 30; dysfunction = 60%  21  TU sec        21     Pain level:  0/10     SUBJECTIVE:   Patient reports that he doing ok overall . Says he noticed improvement with getting around .                      OBJECTIVE:     See above        RESTRICTIONS/PRECAUTIONS:  Neuropathy , (Parkinsons 2 years ago) revealed 21    Exercises/Interventions:     Therapeutic Exercises (15377) Resistance / level Sets/sec Reps Notes   Nustep   Bike  1.0  2.0 5 min               IB / HR  2x30\" / 2x10                   mat strengthening:  SLR  HSS with belt  LTR  BKFO with iso   Bridging with hip abd band          Lime        5 x 10\"  2  2         10 B  10    Flexion core exercises                      Therapeutic Activities (61622)              Marching     Sit to stand w/LSVT Big technique,   Mat table  10 -Difficulty w/eccentric control   LSVT Big Reaching technique   5x 7/26 Added    LSVT Floor to Ceiling    5x    LSVT side to side    5x                  Neuromuscular Re-ed (07338)       Gait training:  LSVT Big technique with SPC       LSVT Big steps -sit to stand to fwd w/ stomps  -7/21: no stomps, instructed pt to take 2 steps across room  196'5  -frequent cues to improve step length, posture and arm swing    -VC for Big step, posture & continuous pattern   Toe taps  6 in' step     Stepping with weight shift over single adrienne      AIREX:   NBOS with CGA  With reciprocal arm swings, CGA     7/14: difficulty finding center, leaning backwards   Trunk rotation no UE support      Side stepping, Fwd/back stepping in the parallel bars        Update HEP next visit? Toe Taps      Semi-tandem   Light UE support, CGA   Narrow RACHEL, EO, EC  CGA                    TM -bwd 0.3 mph -pt unsafe so stopped   Manual Intervention (64988)       Prone quad stretch       Manual HS Stretch  30\"x 3 B                                     Modalities:     Pt. Education:  -patient educated on diagnosis, prognosis and expectations for rehab  -all patient questions were answered    Home Exercise Program:  Access Code: [email or web address]  URL: [email or web address]  Date: 07/07/2021  Prepared by: Andrew Graham    Exercises  Supine Lower Trunk Rotation - 1 x daily - 7 x weekly - 1 sets - 10 reps - 2-3 hold  Seated Hamstring Stretch - 1 x daily - 7 x weekly - 1 sets - 3 reps - 15-20 hold          Therapeutic Exercise and NMR EXR  [x] (10045) Provided verbal/tactile cueing for activities related to strengthening, flexibility, endurance, ROM for improvements in  [] LE / Lumbar: LE, proximal hip, and core control with self care, mobility, lifting, ambulation.   [] UE / Cervical: cervical, postural, scapular, scapulothoracic and UE control with self care, reaching, carrying, lifting, house/yardwork, driving, computer work.  [] (85051) Provided verbal/tactile cueing for activities related to improving balance, coordination, kinesthetic sense, posture, motor skill, proprioception to assist with   [] LE / lumbar: LE, proximal hip, and core control in self care, mobility, lifting, ambulation and eccentric single leg control. [] UE / cervical: cervical, scapular, scapulothoracic and UE control with self care, reaching, carrying, lifting, house/yardwork, driving, computer work.   [] (89912) Therapist is in constant attendance of 2 or more patients providing skilled therapy interventions, but not providing any significant amount of measurable one-on-one time to either patient, for improvements in  [] LE / lumbar: LE, proximal hip, and core control in self care, mobility, lifting, ambulation and eccentric single leg control. [] UE / cervical: cervical, scapular, scapulothoracic and UE control with self care, reaching, carrying, lifting, house/yardwork, driving, computer work. NMR and Therapeutic Activities:    [] (46869 or 72306) Provided verbal/tactile cueing for activities related to improving balance, coordination, kinesthetic sense, posture, motor skill, proprioception and motor activation to allow for proper function of   [] LE: / Lumbar core, proximal hip and LE with self care and ADLs  [] UE / Cervical: cervical, postural, scapular, scapulothoracic and UE control with self care, carrying, lifting, driving, computer work.   [] (71764) Gait Re-education- Provided training and instruction to the patient for proper LE, core and proximal hip recruitment and positioning and eccentric body weight control with ambulation re-education including up and down stairs     Home Management Training / Self Care:  [] (48806) Provided self-care/home management training related to activities of daily living and compensatory training, and/or use of adaptive equipment for improvement with: ADLs and compensatory training, meal preparation, safety procedures and instruction in use of adaptive equipment, including bathing, grooming, dressing, personal hygiene, basic household cleaning and chores.      Home Exercise Program:    [x] (78830) Vaso  [] Manual (02961) x       [] Ultrasound  [x] TA x 2        [] Mech Traction (21172)  [] Aquatic Therapy x      [] ES (un) (41440):   [] Home Management Training x  [] ES(attended) (17328)   [] Dry Needling 1-2 muscles (01304):  [] Dry Needling 3+ muscles (158094)  [] Group:      [x] Other:  Gait     GOALS:  Patient stated goal: To improve mobility   []? Progressing: []? Met: []? Not Met: []? Adjusted     Therapist goals for Patient:   Short Term Goals: To be achieved in: 2 weeks  1. Independent in HEP and progression per patient tolerance, in order to prevent re-injury. [x]? Progressing: []? Met: []? Not Met: []? Adjusted  2. Patient will have a decrease in pain to facilitate improvement in movement, function, and ADLs as indicated by Functional Deficits. [x]? Progressing: []? Met: []? Not Met: []? Adjusted     Long Term Goals: To be achieved in: 6 weeks/ DC   1. Disability index score of 35% or less for the JAIRO to assist with reaching prior level of function. [x]? Progressing: []? Met: []? Not Met: []? Adjusted     2. Patient will demonstrate an increase in Strength to+4/5 proximal hip and core activation to allow for proper functional mobility as indicated by patients Functional Deficits. [x]? Progressing: []? Met: []? Not Met: []? Adjusted  3. Patient will return to functional activities including sit to stand without increased symptoms or LOB. [x]? Progressing: []? Met: []? Not Met: []? Adjusted  4. Patient to tolerate standing > 10 min without reduce symptoms of back pain and RLE giving way. [x]? Progressing: [x]? Met: []? Not Met: []? Adjusted       5. Patient will ambulate with/without AD on level and unlevel surfaces without LOB and normalized gait pattern by 50%. []? Progressing: [x]? Met: []? Not Met: []? Adjusted    Overall Progression Towards Functional goals/ Treatment Progress Update:  [] Patient is progressing as expected towards functional goals listed.     [] Progression is slowed due to complexities/Impairments listed. [] Progression has been slowed due to co-morbidities. [x] Plan just implemented, too soon to assess goals progression <30days   [] Goals require adjustment due to lack of progress  [] Patient is not progressing as expected and requires additional follow up with physician  [] Other    Persisting Functional Limitations/Impairments:  [x]Sleeping []Sitting               [x]Standing [x]Transfers        [x]Walking []Kneeling               [x]Stairs [x]Squatting / bending   [x]ADLs [x]Reaching  [x]Lifting  []Housework  []Driving []Job related tasks  []Sports/Recreation []Other:        ASSESSMENT:   SEE SUMMARY ABOVE  Treatment/Activity Tolerance:  [x] Patient able to complete tx  [x] Patient limited by fatigue  [] Patient limited by pain  [] Patient limited by other medical complications  [x] Other:  vertigo    Prognosis: [x] Good [] Fair  [] Poor    Patient Requires Follow-up: [x] Yes  [] No    Plan for next treatment session:  Begin flexion preference core exercises and balance activities     PLAN: See frances. PT 2x / week for 6 weeks. [x] Continue per plan of care [] Alter current plan (see comments)  [] Plan of care initiated [] Hold pending MD visit [] Discharge    Electronically signed by: Junaid Fitzgerald PT     Note: If patient does not return for scheduled/ recommended follow up visits, this note will serve as a discharge from care along with most recent update on progress.

## 2021-08-18 ENCOUNTER — HOSPITAL ENCOUNTER (OUTPATIENT)
Dept: PHYSICAL THERAPY | Age: 86
Setting detail: THERAPIES SERIES
Discharge: HOME | End: 2021-08-18
Payer: MEDICARE

## 2021-08-18 PROCEDURE — 97112 NEUROMUSCULAR REEDUCATION: CPT

## 2021-08-18 PROCEDURE — 97530 THERAPEUTIC ACTIVITIES: CPT

## 2021-08-18 NOTE — FLOWSHEET NOTE
[facility] Therapy  Phone: [phone]   Fax: [phone]    Recommendation:    [x]Continue PT 1x / wk for 5 weeks. []Hold PT, pending MD visit      Physical Therapy Daily Treatment Note  Date:  2021    Patient Name:  Ana Chapman    :  1935  MRN: [id number]  Medical/Treatment Diagnosis Information:  · Diagnosis: Other intervertebral disc degeneration, lumbar region  · Treatment Diagnosis: imbalance, abnormal gait, B hip  & core muscle weakness , decrease B hip flexibility. Insurance/Certification information:  PT Insurance Information: Aetna Medicare  Physician Information:  Referring Practitioner: Constance Randle MD  Plan of care signed (Y/N): []  Yes [x]  No     Date of Patient follow up with Physician:      Progress Report: []  Yes  [x]  No     Date Range for reporting period:  Beginnin2021  Endin21     Progress report due (10 Rx/or 30 days whichever is less): visit #42    Recertification due (POC duration/ or 90 days whichever is less): visit # 2021    Visit # Insurance Allowable Auth required? Date Range    [place] []  Yes  [x]  No        Latex Allergy:  [x]NO      []YES  Preferred Language for Healthcare:   [x]English       []other:    Functional Scale:   JAIRO : raw score =19; dysfunction=38%            21  TUG 13.7       Date assessed:  JAIRO: raw score = 30; dysfunction = 60%  21  TU sec        21     Pain level:  0/10     SUBJECTIVE:   Patient reports that no changes  .         OBJECTIVE:     See above        RESTRICTIONS/PRECAUTIONS:  Neuropathy , (Parkinsons 2 years ago) revealed 21    Exercises/Interventions:     Therapeutic Exercises (65301) Resistance / level Sets/sec Reps Notes   Nustep   Bike  1.0  2.0 5 min               IB / HR  2x30\" / 2x10                   mat strengthening:  SLR  HSS with belt  LTR  BKFO with iso   Bridging with hip abd band          Lime 5 x 10\"  2  2         10 B  10    Flexion core exercises                      Therapeutic Activities (70034)              Marching     Sit to stand w/LSVT Big technique,   Mat table  10 -Difficulty w/eccentric control   LSVT Big Reaching technique   5x 7/26 Added    LSVT Floor to Ceiling    5x    LSVT side to side                      Neuromuscular Re-ed (38568)       Gait training:  LSVT Big technique with SPC       LSVT Big steps -sit to stand to fwd w/ stomps  -7/21: no stomps, instructed pt to take 2 steps across room  175'  -frequent cues to improve step length, posture and arm swing    -VC for Big step, posture & continuous pattern   Toe taps  6 in' step     Stepping with weight shift over single adrienne      AIREX:   NBOS with CGA  With reciprocal arm swings, CGA     7/14: difficulty finding center, leaning backwards   Trunk rotation no UE support      Side stepping, Fwd/back stepping in the parallel bars  over cones  108/18 Added      Update HEP next visit? Toe Taps   X 12 B   Semi-tandem   Light UE support, CGA   Narrow RACHEL, EO, EC  CGA                    TM -bwd 0.3 mph -pt unsafe so stopped   Manual Intervention (79395)       Prone quad stretch       Manual HS Stretch  30\"x 3 B                                     Modalities:     Pt. Education:  -patient educated on diagnosis, prognosis and expectations for rehab  -all patient questions were answered    Home Exercise Program:  Access Code: [email or web address]  URL: [email or web address]  Date: 07/07/2021  Prepared by: Harpreet Salazar    Exercises  Supine Lower Trunk Rotation - 1 x daily - 7 x weekly - 1 sets - 10 reps - 2-3 hold  Seated Hamstring Stretch - 1 x daily - 7 x weekly - 1 sets - 3 reps - 15-20 hold          Therapeutic Exercise and NMR EXR  [x] (47520) Provided verbal/tactile cueing for activities related to strengthening, flexibility, endurance, ROM for improvements in  [] LE / Lumbar: LE, proximal hip, and core control with self care, mobility, lifting, ambulation. [] UE / Cervical: cervical, postural, scapular, scapulothoracic and UE control with self care, reaching, carrying, lifting, house/yardwork, driving, computer work.  [] (23102) Provided verbal/tactile cueing for activities related to improving balance, coordination, kinesthetic sense, posture, motor skill, proprioception to assist with   [] LE / lumbar: LE, proximal hip, and core control in self care, mobility, lifting, ambulation and eccentric single leg control. [] UE / cervical: cervical, scapular, scapulothoracic and UE control with self care, reaching, carrying, lifting, house/yardwork, driving, computer work.   [] (65074) Therapist is in constant attendance of 2 or more patients providing skilled therapy interventions, but not providing any significant amount of measurable one-on-one time to either patient, for improvements in  [] LE / lumbar: LE, proximal hip, and core control in self care, mobility, lifting, ambulation and eccentric single leg control. [] UE / cervical: cervical, scapular, scapulothoracic and UE control with self care, reaching, carrying, lifting, house/yardwork, driving, computer work.      NMR and Therapeutic Activities:    [] (49199 or 17323) Provided verbal/tactile cueing for activities related to improving balance, coordination, kinesthetic sense, posture, motor skill, proprioception and motor activation to allow for proper function of   [] LE: / Lumbar core, proximal hip and LE with self care and ADLs  [] UE / Cervical: cervical, postural, scapular, scapulothoracic and UE control with self care, carrying, lifting, driving, computer work.   [] (85406) Gait Re-education- Provided training and instruction to the patient for proper LE, core and proximal hip recruitment and positioning and eccentric body weight control with ambulation re-education including up and down stairs     Home Management Training / Self Care:  [] (34296) Provided self-care/home management training related to activities of daily living and compensatory training, and/or use of adaptive equipment for improvement with: ADLs and compensatory training, meal preparation, safety procedures and instruction in use of adaptive equipment, including bathing, grooming, dressing, personal hygiene, basic household cleaning and chores. Home Exercise Program:    [x] (99156) Reviewed/Progressed HEP activities related to strengthening, flexibility, endurance, ROM of   [] LE / Lumbar: core, proximal hip and LE for functional self-care, mobility, lifting and ambulation/stair navigation   [] UE / Cervical: cervical, postural, scapular, scapulothoracic and UE control with self care, reaching, carrying, lifting, house/yardwork, driving, computer work  [] (77247)Reviewed/Progressed HEP activities related to improving balance, coordination, kinesthetic sense, posture, motor skill, proprioception of   [] LE: core, proximal hip and LE for self care, mobility, lifting, and ambulation/stair navigation    [] UE / Cervical: cervical, postural,  scapular, scapulothoracic and UE control with self care, reaching, carrying, lifting, house/yardwork, driving, computer work    Manual Treatments:  PROM / STM / Oscillations-Mobs:  G-I, II, III, IV (PA's, Inf., Post.)  [] (03521) Provided manual therapy to mobilize LE, proximal hip and/or LS spine soft tissue/joints for the purpose of modulating pain, promoting relaxation,  increasing ROM, reducing/eliminating soft tissue swelling/inflammation/restriction, improving soft tissue extensibility and allowing for proper ROM for normal function with   [] LE / lumbar: self care, mobility, lifting and ambulation. [] UE / Cervical: self care, reaching, carrying, lifting, house/yardwork, driving, computer work.      Modalities:  [] (93770) Vasopneumatic compression: Utilized vasopneumatic compression to decrease edema / swelling for the purpose of improving mobility and quad tone / recruitment which will allow for increased overall function including but not limited to self-care, transfers, ambulation, and ascending / descending stairs. Charges:  Timed Code Treatment Minutes: 39   Total Treatment Minutes: 39     [] EVAL - LOW (61716)   [] EVAL - MOD (84697)  [] EVAL - HIGH (48133)  [] RE-EVAL (56109)  [] QM(77246) x     [] Ionto  [x] NMR (95013) x2       [] Vaso  [] Manual (41617) x       [] Ultrasound  [x] TA x 1        [] Mech Traction (91258)  [] Aquatic Therapy x      [] ES (un) (03088):   [] Home Management Training x  [] ES(attended) (27627)   [] Dry Needling 1-2 muscles (84455):  [] Dry Needling 3+ muscles (811337)  [] Group:      [x] Other:  Gait     GOALS:  Patient stated goal: To improve mobility   []? Progressing: []? Met: []? Not Met: []? Adjusted     Therapist goals for Patient:   Short Term Goals: To be achieved in: 2 weeks  1. Independent in HEP and progression per patient tolerance, in order to prevent re-injury. [x]? Progressing: []? Met: []? Not Met: []? Adjusted  2. Patient will have a decrease in pain to facilitate improvement in movement, function, and ADLs as indicated by Functional Deficits. [x]? Progressing: []? Met: []? Not Met: []? Adjusted     Long Term Goals: To be achieved in: 6 weeks/ DC   1. Disability index score of 35% or less for the JAIRO to assist with reaching prior level of function. [x]? Progressing: []? Met: []? Not Met: []? Adjusted     2. Patient will demonstrate an increase in Strength to+4/5 proximal hip and core activation to allow for proper functional mobility as indicated by patients Functional Deficits. [x]? Progressing: []? Met: []? Not Met: []? Adjusted  3. Patient will return to functional activities including sit to stand without increased symptoms or LOB. [x]? Progressing: []? Met: []? Not Met: []? Adjusted  4. Patient to tolerate standing > 10 min without reduce symptoms of back pain and RLE giving way. [x]? Progressing: [x]? Met: []? Not Met: []? Adjusted       5. Patient will ambulate with/without AD on level and unlevel surfaces without LOB and normalized gait pattern by 50%. []? Progressing: [x]? Met: []? Not Met: []? Adjusted    Overall Progression Towards Functional goals/ Treatment Progress Update:  [] Patient is progressing as expected towards functional goals listed. [] Progression is slowed due to complexities/Impairments listed. [] Progression has been slowed due to co-morbidities. [x] Plan just implemented, too soon to assess goals progression <30days   [] Goals require adjustment due to lack of progress  [] Patient is not progressing as expected and requires additional follow up with physician  [] Other    Persisting Functional Limitations/Impairments:  [x]Sleeping []Sitting               [x]Standing [x]Transfers        [x]Walking []Kneeling               [x]Stairs [x]Squatting / bending   [x]ADLs [x]Reaching  [x]Lifting  []Housework  []Driving []Job related tasks  []Sports/Recreation []Other:        ASSESSMENT:   Patient able to resume BIG activities and stepping and gait activities without difficulty. He was able to demonstrate increase in step  Length with cues. The patient to further benefit from BIG and balance training as necessary to increase stability. Treatment/Activity Tolerance:  [x] Patient able to complete tx  [x] Patient limited by fatigue  [] Patient limited by pain  [] Patient limited by other medical complications  [x] Other:  vertigo    Prognosis: [x] Good [] Fair  [] Poor    Patient Requires Follow-up: [x] Yes  [] No    Plan for next treatment session:  Begin flexion preference core exercises and balance activities     PLAN: See yareli PT 2x / week for 6 weeks.    [x] Continue per plan of care [] Alter current plan (see comments)  [] Plan of care initiated [] Hold pending MD visit [] Discharge    Electronically signed by: Harry Jj PT     Note: If patient does not return for scheduled/ recommended follow up visits, this note will serve as a discharge from care along with most recent update on progress.

## 2021-08-23 ENCOUNTER — HOSPITAL ENCOUNTER (OUTPATIENT)
Dept: PHYSICAL THERAPY | Age: 86
Setting detail: THERAPIES SERIES
Discharge: HOME | End: 2021-08-23
Payer: MEDICARE

## 2021-08-23 PROCEDURE — 97112 NEUROMUSCULAR REEDUCATION: CPT

## 2021-08-23 PROCEDURE — 97530 THERAPEUTIC ACTIVITIES: CPT

## 2021-08-23 NOTE — FLOWSHEET NOTE
[facility] Physical Therapy  Phone: [phone]   Fax: [phone]    Recommendation:    [x]Continue PT 1x / wk for 5 weeks. []Hold PT, pending MD visit      Physical Therapy Daily Treatment Note  Date:  2021    Patient Name:  Rachel Olson    :  1935  MRN: [id number]  Medical/Treatment Diagnosis Information:  · Diagnosis: Other intervertebral disc degeneration, lumbar region  · Treatment Diagnosis: imbalance, abnormal gait, B hip  & core muscle weakness , decrease B hip flexibility. Insurance/Certification information:  PT Insurance Information: Aetna Medicare  Physician Information:  Referring Practitioner: Kavon Tyler MD  Plan of care signed (Y/N): []  Yes [x]  No     Date of Patient follow up with Physician:      Progress Report: []  Yes  [x]  No     Date Range for reporting period:  Beginnin2021  Endin21     Progress report due (10 Rx/or 30 days whichever is less): visit #95    Recertification due (POC duration/ or 90 days whichever is less): visit # 2021    Visit # Insurance Allowable Auth required?  Date Range     2/5 [place] []  Yes  [x]  No        Latex Allergy:  [x]NO      []YES  Preferred Language for Healthcare:   [x]English       []other:    Functional Scale:   JAIRO : raw score =19; dysfunction=38%            21  TUG 13.7       Date assessed:  JAIRO: raw score = 30; dysfunction = 60%  21  TU sec        21     Pain level:  0/10     SUBJECTIVE:   Patient reports that he        OBJECTIVE:     See above        RESTRICTIONS/PRECAUTIONS:  Neuropathy , (Parkinsons 2 years ago) revealed 21    Exercises/Interventions:     Therapeutic Exercises (39118) Resistance / level Sets/sec Reps Notes   Nustep   Bike  5.0   5 min               IB / HR  2x30\" / 2x10                   mat strengthening:  SLR  HSS with belt  LTR  BKFO with iso   Bridging with hip abd band          Lime        5 x 10\"  2  2         10 B  10    Flexion core exercises                      Therapeutic Activities (37385)              Marching     Sit to stand w/LSVT Big technique,   Mat table  10 -Difficulty w/eccentric control   LSVT Big Reaching technique   5x 7/26 Added    LSVT Floor to Ceiling        LSVT side to side                      Neuromuscular Re-ed (28279)       Gait training:  LSVT Big technique with SPC       LSVT Big steps -sit to stand to fwd w/ stomps  -7/21: no stomps, instructed pt to take 2 steps across room  175'  -frequent cues to improve step length, posture and arm swing    -VC for Big step, posture & continuous pattern   Toe taps  6 in' step     Stepping with weight shift over single adrienne      AIREX:   NBOS with CGA  With reciprocal arm swings, CGA     7/14: difficulty finding center, leaning backwards   Trunk rotation no UE support      Side stepping, Fwd/back stepping in the parallel bars  over cones  108/18 Added      Update HEP next visit? Toe Taps   X 12 B   Semi-tandem   20\" x 2 BLight UE support, CGA   Narrow RACHEL, EO, EC   20\" x 2 ea CGA                    TM -bwd 0.3 mph -pt unsafe so stopped   Manual Intervention (41359)       Prone quad stretch       Manual HS Stretch                                       Modalities:     Pt. Education:  -patient educated on diagnosis, prognosis and expectations for rehab  -all patient questions were answered    Home Exercise Program:  Access Code: [email or web address]  URL: [email or web address]  Date: 07/07/2021  Prepared by: Nita Mcnair    Exercises  Supine Lower Trunk Rotation - 1 x daily - 7 x weekly - 1 sets - 10 reps - 2-3 hold  Seated Hamstring Stretch - 1 x daily - 7 x weekly - 1 sets - 3 reps - 15-20 hold          Therapeutic Exercise and NMR EXR  [x] (68665) Provided verbal/tactile cueing for activities related to strengthening, flexibility, endurance, ROM for improvements in  [] LE / Lumbar: LE, proximal hip, and core control with self care, mobility, lifting, ambulation. [] UE / Cervical: cervical, postural, scapular, scapulothoracic and UE control with self care, reaching, carrying, lifting, house/yardwork, driving, computer work.  [] (74160) Provided verbal/tactile cueing for activities related to improving balance, coordination, kinesthetic sense, posture, motor skill, proprioception to assist with   [] LE / lumbar: LE, proximal hip, and core control in self care, mobility, lifting, ambulation and eccentric single leg control. [] UE / cervical: cervical, scapular, scapulothoracic and UE control with self care, reaching, carrying, lifting, house/yardwork, driving, computer work.   [] (05836) Therapist is in constant attendance of 2 or more patients providing skilled therapy interventions, but not providing any significant amount of measurable one-on-one time to either patient, for improvements in  [] LE / lumbar: LE, proximal hip, and core control in self care, mobility, lifting, ambulation and eccentric single leg control. [] UE / cervical: cervical, scapular, scapulothoracic and UE control with self care, reaching, carrying, lifting, house/yardwork, driving, computer work.      NMR and Therapeutic Activities:    [] (92047 or 49564) Provided verbal/tactile cueing for activities related to improving balance, coordination, kinesthetic sense, posture, motor skill, proprioception and motor activation to allow for proper function of   [] LE: / Lumbar core, proximal hip and LE with self care and ADLs  [] UE / Cervical: cervical, postural, scapular, scapulothoracic and UE control with self care, carrying, lifting, driving, computer work.   [] (16127) Gait Re-education- Provided training and instruction to the patient for proper LE, core and proximal hip recruitment and positioning and eccentric body weight control with ambulation re-education including up and down stairs     Home Management Training / Self Care:  [] (85740) Provided self-care/home management training related to activities of daily living and compensatory training, and/or use of adaptive equipment for improvement with: ADLs and compensatory training, meal preparation, safety procedures and instruction in use of adaptive equipment, including bathing, grooming, dressing, personal hygiene, basic household cleaning and chores. Home Exercise Program:    [x] (05749) Reviewed/Progressed HEP activities related to strengthening, flexibility, endurance, ROM of   [] LE / Lumbar: core, proximal hip and LE for functional self-care, mobility, lifting and ambulation/stair navigation   [] UE / Cervical: cervical, postural, scapular, scapulothoracic and UE control with self care, reaching, carrying, lifting, house/yardwork, driving, computer work  [] (10170)Reviewed/Progressed HEP activities related to improving balance, coordination, kinesthetic sense, posture, motor skill, proprioception of   [] LE: core, proximal hip and LE for self care, mobility, lifting, and ambulation/stair navigation    [] UE / Cervical: cervical, postural,  scapular, scapulothoracic and UE control with self care, reaching, carrying, lifting, house/yardwork, driving, computer work    Manual Treatments:  PROM / STM / Oscillations-Mobs:  G-I, II, III, IV (PA's, Inf., Post.)  [] (53145) Provided manual therapy to mobilize LE, proximal hip and/or LS spine soft tissue/joints for the purpose of modulating pain, promoting relaxation,  increasing ROM, reducing/eliminating soft tissue swelling/inflammation/restriction, improving soft tissue extensibility and allowing for proper ROM for normal function with   [] LE / lumbar: self care, mobility, lifting and ambulation. [] UE / Cervical: self care, reaching, carrying, lifting, house/yardwork, driving, computer work.      Modalities:  [] (00994) Vasopneumatic compression: Utilized vasopneumatic compression to decrease edema / swelling for the purpose of improving mobility and quad tone / recruitment which will allow for increased overall function including but not limited to self-care, transfers, ambulation, and ascending / descending stairs. Charges:  Timed Code Treatment Minutes: 38   Total Treatment Minutes: 38     [] EVAL - LOW (59818)   [] EVAL - MOD (48502)  [] EVAL - HIGH (66404)  [] RE-EVAL (44065)  [] XH(21222) x     [] Ionto  [x] NMR (09597) x2       [] Vaso  [] Manual (78416) x       [] Ultrasound  [x] TA x 1        [] Mech Traction (61709)  [] Aquatic Therapy x      [] ES (un) (25134):   [] Home Management Training x  [] ES(attended) (89233)   [] Dry Needling 1-2 muscles (85055):  [] Dry Needling 3+ muscles (147824)  [] Group:      [x] Other:  Gait     GOALS:  Patient stated goal: To improve mobility   []? Progressing: []? Met: []? Not Met: []? Adjusted     Therapist goals for Patient:   Short Term Goals: To be achieved in: 2 weeks  1. Independent in HEP and progression per patient tolerance, in order to prevent re-injury. [x]? Progressing: []? Met: []? Not Met: []? Adjusted  2. Patient will have a decrease in pain to facilitate improvement in movement, function, and ADLs as indicated by Functional Deficits. [x]? Progressing: []? Met: []? Not Met: []? Adjusted     Long Term Goals: To be achieved in: 6 weeks/ DC   1. Disability index score of 35% or less for the JAIRO to assist with reaching prior level of function. [x]? Progressing: []? Met: []? Not Met: []? Adjusted     2. Patient will demonstrate an increase in Strength to+4/5 proximal hip and core activation to allow for proper functional mobility as indicated by patients Functional Deficits. [x]? Progressing: []? Met: []? Not Met: []? Adjusted  3. Patient will return to functional activities including sit to stand without increased symptoms or LOB. [x]? Progressing: []? Met: []? Not Met: []? Adjusted  4. Patient to tolerate standing > 10 min without reduce symptoms of back pain and RLE giving way. [x]? Progressing: [x]? Met: []? Not Met: []? Adjusted       5. Patient will ambulate with/without AD on level and unlevel surfaces without LOB and normalized gait pattern by 50%. []? Progressing: [x]? Met: []? Not Met: []? Adjusted    Overall Progression Towards Functional goals/ Treatment Progress Update:  [] Patient is progressing as expected towards functional goals listed. [] Progression is slowed due to complexities/Impairments listed. [] Progression has been slowed due to co-morbidities. [x] Plan just implemented, too soon to assess goals progression <30days   [] Goals require adjustment due to lack of progress  [] Patient is not progressing as expected and requires additional follow up with physician  [] Other    Persisting Functional Limitations/Impairments:  [x]Sleeping []Sitting               [x]Standing [x]Transfers        [x]Walking []Kneeling               [x]Stairs [x]Squatting / bending   [x]ADLs [x]Reaching  [x]Lifting  []Housework  []Driving []Job related tasks  []Sports/Recreation []Other:        ASSESSMENT:  8/23 The patient today performed balance activities involving narrowed base stance Eyes open/ Eyes closed. He demonstrated increase step length and increase arm swing 50% of the time. The patient did require one rest break due to having had walked a lot the previous day. Will continue to focus on BIG movements and dynamic balance to increase quality of movement . Patient able to resume BIG activities and stepping and gait activities without difficulty. He was able to demonstrate increase in step  Length with cues. The patient to further benefit from BIG and balance training as necessary to increase stability.        Treatment/Activity Tolerance:  [x] Patient able to complete tx  [x] Patient limited by fatigue  [] Patient limited by pain  [] Patient limited by other medical complications  [x] Other:  vertigo    Prognosis: [x] Good [] Fair  [] Poor    Patient Requires Follow-up: [x] Yes  [] No    Plan for next treatment session:  Begin flexion preference core exercises and balance activities     PLAN: See eval. PT 2x / week for 6 weeks. [x] Continue per plan of care [] Alter current plan (see comments)  [] Plan of care initiated [] Hold pending MD visit [] Discharge    Electronically signed by: Aliyah Palma PT     Note: If patient does not return for scheduled/ recommended follow up visits, this note will serve as a discharge from care along with most recent update on progress.

## 2021-08-30 ENCOUNTER — HOSPITAL ENCOUNTER (OUTPATIENT)
Dept: PHYSICAL THERAPY | Age: 86
Setting detail: THERAPIES SERIES
Discharge: HOME | End: 2021-08-30
Payer: MEDICARE

## 2021-08-30 PROCEDURE — 97116 GAIT TRAINING THERAPY: CPT

## 2021-08-30 PROCEDURE — 97112 NEUROMUSCULAR REEDUCATION: CPT

## 2021-08-30 NOTE — FLOWSHEET NOTE
[facility]  Phone: [phone]   Fax: [phone]        Physical Therapy Daily Treatment Note  Date:  2021    Patient Name:  Valentino Zarate    :  1935  MRN: [id number]  Medical/Treatment Diagnosis Information:  · Diagnosis: Other intervertebral disc degeneration, lumbar region  · Treatment Diagnosis: imbalance, abnormal gait, B hip  & core muscle weakness , decrease B hip flexibility. Insurance/Certification information:  PT Insurance Information: [facility] Medicare  Physician Information:  Referring Practitioner: Gloria Jacobson MD  Plan of care signed (Y/N): []  Yes [x]  No     Date of Patient follow up with Physician:      Progress Report: []  Yes  [x]  No     Date Range for reporting period:  Beginnin21   Ending:     Progress report due (10 Rx/or 30 days whichever is less): visit #40    Recertification due (POC duration/ or 90 days whichever is less): visit # 2021    Visit # Insurance Allowable Auth required? Date Range   12/12  3/5 [place] []  Yes  [x]  No        Latex Allergy:  [x]NO      []YES  Preferred Language for Healthcare:   [x]English       []other:    Functional Scale:   JAIRO : raw score =19; dysfunction=38%            21  TUG 13.7       Date assessed:  JAIRO: raw score = 30; dysfunction = 60%  21  TU sec        21     Pain level:  0/10     SUBJECTIVE:    Doing okay today, son feels this is the best he's seen pt in awhile. Every once in awhile, his lower back pain increases, but can sit down and gets better right away.          OBJECTIVE:         RESTRICTIONS/PRECAUTIONS:  Neuropathy , (Parkinsons 2 years ago) revealed 21    Exercises/Interventions:   Therapeutic Exercises (61087) Resistance / level Sets/sec Reps Notes   Nustep   Bike  5.0  3.0   5 mins            IB / HR  2x30\" / 2x15                   mat strengthening:  SLR  HSS with belt  LTR  BKFO with iso   Bridging with hip abd band Lime     Flexion core exercises                      Therapeutic Activities (09377)              Marching     Sit to stand w/LSVT Big technique from bariatric chair No arms  10 8/30: frequent cues for nose over toes   LSVT Big Reaching technique    7/26 Added    LSVT Floor to Ceiling        LSVT side to side                      Neuromuscular Re-ed (50154)       Gait training:  LSVT Big technique with SPC       LSVT Big steps -sit to stand to fwd w/ stomps  -7/21: no stomps, instructed pt to take 2 steps across room    Amb w/cues for Big technique            15', 40'x2, 75'  -frequent cues to improve step length, posture and arm swing    -VC for Big step, posture & continuous pattern   Toe taps  6 in' step     Stepping with weight shift over single adrienne   1 20 B 8/30: at ballet barre   AIREX:   NBOS with CGA  With reciprocal arm swings, CGA     7/14: difficulty finding center, leaning backwards   Trunk rotation no UE support      Side stepping, Fwd/back stepping in the parallel bars  over cones    Side stepping in //bars        3 laps8/18 Added       8/30: pt unable to perform without UE assist             Toe Taps      Semi-tandem   Light UE support, CGA   Narrow RACHEL, EO, EC   CGA                    TM -bwd 0.3 mph -pt unsafe so stopped   Manual Intervention (01633)       Prone quad stretch       Manual HS Stretch                                       Modalities:     Pt. Education:  -patient educated on diagnosis, prognosis and expectations for rehab  -all patient questions were answered    Home Exercise Program:  Access Code: [email or web address]  URL: [email or web address]  Date: 07/07/2021  Prepared by: Brionna Plaza    Exercises  Supine Lower Trunk Rotation - 1 x daily - 7 x weekly - 1 sets - 10 reps - 2-3 hold  Seated Hamstring Stretch - 1 x daily - 7 x weekly - 1 sets - 3 reps - 15-20 hold        Therapeutic Exercise and NMR EXR  [x] (65146) Provided verbal/tactile cueing for activities related to strengthening, flexibility, endurance, ROM for improvements in  [] LE / Lumbar: LE, proximal hip, and core control with self care, mobility, lifting, ambulation. [] UE / Cervical: cervical, postural, scapular, scapulothoracic and UE control with self care, reaching, carrying, lifting, house/yardwork, driving, computer work.  [] (20437) Provided verbal/tactile cueing for activities related to improving balance, coordination, kinesthetic sense, posture, motor skill, proprioception to assist with   [] LE / lumbar: LE, proximal hip, and core control in self care, mobility, lifting, ambulation and eccentric single leg control. [] UE / cervical: cervical, scapular, scapulothoracic and UE control with self care, reaching, carrying, lifting, house/yardwork, driving, computer work.   [] (39625) Therapist is in constant attendance of 2 or more patients providing skilled therapy interventions, but not providing any significant amount of measurable one-on-one time to either patient, for improvements in  [] LE / lumbar: LE, proximal hip, and core control in self care, mobility, lifting, ambulation and eccentric single leg control. [] UE / cervical: cervical, scapular, scapulothoracic and UE control with self care, reaching, carrying, lifting, house/yardwork, driving, computer work.      NMR and Therapeutic Activities:    [] (97674 or 94361) Provided verbal/tactile cueing for activities related to improving balance, coordination, kinesthetic sense, posture, motor skill, proprioception and motor activation to allow for proper function of   [] LE: / Lumbar core, proximal hip and LE with self care and ADLs  [] UE / Cervical: cervical, postural, scapular, scapulothoracic and UE control with self care, carrying, lifting, driving, computer work.   [] (48651) Gait Re-education- Provided training and instruction to the patient for proper LE, core and proximal hip recruitment and positioning and eccentric body weight control with ambulation re-education including up and down stairs     Home Management Training / Self Care:  [] (20195) Provided self-care/home management training related to activities of daily living and compensatory training, and/or use of adaptive equipment for improvement with: ADLs and compensatory training, meal preparation, safety procedures and instruction in use of adaptive equipment, including bathing, grooming, dressing, personal hygiene, basic household cleaning and chores. Home Exercise Program:    [x] (05991) Reviewed/Progressed HEP activities related to strengthening, flexibility, endurance, ROM of   [] LE / Lumbar: core, proximal hip and LE for functional self-care, mobility, lifting and ambulation/stair navigation   [] UE / Cervical: cervical, postural, scapular, scapulothoracic and UE control with self care, reaching, carrying, lifting, house/yardwork, driving, computer work  [] (76610)Reviewed/Progressed HEP activities related to improving balance, coordination, kinesthetic sense, posture, motor skill, proprioception of   [] LE: core, proximal hip and LE for self care, mobility, lifting, and ambulation/stair navigation    [] UE / Cervical: cervical, postural,  scapular, scapulothoracic and UE control with self care, reaching, carrying, lifting, house/yardwork, driving, computer work    Manual Treatments:  PROM / STM / Oscillations-Mobs:  G-I, II, III, IV (PA's, Inf., Post.)  [] (13785) Provided manual therapy to mobilize LE, proximal hip and/or LS spine soft tissue/joints for the purpose of modulating pain, promoting relaxation,  increasing ROM, reducing/eliminating soft tissue swelling/inflammation/restriction, improving soft tissue extensibility and allowing for proper ROM for normal function with   [] LE / lumbar: self care, mobility, lifting and ambulation. [] UE / Cervical: self care, reaching, carrying, lifting, house/yardwork, driving, computer work.      Modalities:  [] (71446) Vasopneumatic compression: Utilized vasopneumatic compression to decrease edema / swelling for the purpose of improving mobility and quad tone / recruitment which will allow for increased overall function including but not limited to self-care, transfers, ambulation, and ascending / descending stairs. Charges:  Timed Code Treatment Minutes: 40   Total Treatment Minutes: 40     [] EVAL - LOW (46186)   [] EVAL - MOD (60122)  [] EVAL - HIGH (75639)  [] RE-EVAL (08141)  [] MT(77221) x     [] Ionto  [x] NMR (87093) x2       [] Vaso  [] Manual (59999) x       [] Ultrasound  [] TA x         [] Mech Traction (51307)  [] Aquatic Therapy x      [] ES (un) (25453):   [] Home Management Training x  [] ES(attended) (18637)   [] Dry Needling 1-2 muscles (45548):  [] Dry Needling 3+ muscles (483535)  [] Group:      [x] Other:  Gait 1    GOALS:  Patient stated goal: To improve mobility   []? Progressing: []? Met: []? Not Met: []? Adjusted     Therapist goals for Patient:   Short Term Goals: To be achieved in: 2 weeks  1. Independent in HEP and progression per patient tolerance, in order to prevent re-injury. [x]? Progressing: []? Met: []? Not Met: []? Adjusted  2. Patient will have a decrease in pain to facilitate improvement in movement, function, and ADLs as indicated by Functional Deficits. [x]? Progressing: []? Met: []? Not Met: []? Adjusted     Long Term Goals: To be achieved in: 6 weeks/ DC   1. Disability index score of 35% or less for the JAIRO to assist with reaching prior level of function. [x]? Progressing: []? Met: []? Not Met: []? Adjusted     2. Patient will demonstrate an increase in Strength to+4/5 proximal hip and core activation to allow for proper functional mobility as indicated by patients Functional Deficits. [x]? Progressing: []? Met: []? Not Met: []? Adjusted  3. Patient will return to functional activities including sit to stand without increased symptoms or LOB. [x]? Progressing: []? Met: []?  Not Met: []? Adjusted  4. Patient to tolerate standing > 10 min without reduce symptoms of back pain and RLE giving way. [x]? Progressing: [x]? Met: []? Not Met: []? Adjusted       5. Patient will ambulate with/without AD on level and unlevel surfaces without LOB and normalized gait pattern by 50%. []? Progressing: [x]? Met: []? Not Met: []? Adjusted    Overall Progression Towards Functional goals/ Treatment Progress Update:  [] Patient is progressing as expected towards functional goals listed. [] Progression is slowed due to complexities/Impairments listed. [] Progression has been slowed due to co-morbidities. [x] Plan just implemented, too soon to assess goals progression <30days   [] Goals require adjustment due to lack of progress  [] Patient is not progressing as expected and requires additional follow up with physician  [] Other    Persisting Functional Limitations/Impairments:  [x]Sleeping []Sitting               [x]Standing [x]Transfers        [x]Walking []Kneeling               [x]Stairs [x]Squatting / bending   [x]ADLs [x]Reaching  [x]Lifting  []Housework  []Driving []Job related tasks  []Sports/Recreation []Other:        ASSESSMENT:   As session progressed, pt became slowed in all movements and was apparent that pt had increased difficulty w/motor planning from cues from PT, demo'd by pt performing first 8 reps of sit to stand well, however had difficulty with remaining 2, unable to shift weight forward to complete transition. Pt also became more dependent on hand placement and unable to dissociate mobility without UE's. Continue to progress mobility, core & hip stability. 8/23 The patient today performed balance activities involving narrowed base stance Eyes open/ Eyes closed. He demonstrated increase step length and increase arm swing 50% of the time. The patient did require one rest break due to having had walked a lot the previous day.  Will continue to focus on BIG movements and dynamic balance to increase quality of movement . Patient able to resume BIG activities and stepping and gait activities without difficulty. He was able to demonstrate increase in step  Length with cues. The patient to further benefit from BIG and balance training as necessary to increase stability. Treatment/Activity Tolerance:  [x] Patient able to complete tx  [x] Patient limited by fatigue  [] Patient limited by pain  [] Patient limited by other medical complications  [x] Other:  vertigo    Prognosis: [x] Good [] Fair  [] Poor    Patient Requires Follow-up: [x] Yes  [] No    Plan for next treatment session:  Begin flexion preference core exercises and balance activities     PLAN: See eval. PT 2x / week for 6 weeks. [x] Continue per plan of care [] Alter current plan (see comments)  [] Plan of care initiated [] Hold pending MD visit [] Discharge    Electronically signed by: Radha Haines PT     Note: If patient does not return for scheduled/ recommended follow up visits, this note will serve as a discharge from care along with most recent update on progress.

## 2021-09-08 ENCOUNTER — HOSPITAL ENCOUNTER (OUTPATIENT)
Dept: PHYSICAL THERAPY | Age: 86
Setting detail: THERAPIES SERIES
Discharge: HOME | End: 2021-09-08
Payer: MEDICARE

## 2021-09-08 PROCEDURE — 97116 GAIT TRAINING THERAPY: CPT

## 2021-09-08 PROCEDURE — 97112 NEUROMUSCULAR REEDUCATION: CPT

## 2021-09-08 NOTE — FLOWSHEET NOTE
[facility]  Phone: [phone]   Fax: [phone]        Physical Therapy Daily Treatment Note  Date:  2021    Patient Name:  Nancy Mckeon    :  1935  MRN: [id number]  Medical/Treatment Diagnosis Information:  · Diagnosis: Other intervertebral disc degeneration, lumbar region  · Treatment Diagnosis: imbalance, abnormal gait, B hip  & core muscle weakness , decrease B hip flexibility. Insurance/Certification information:  PT Insurance Information: [facility] Medicare  Physician Information:  Referring Practitioner: Judie Arreguin MD  Plan of care signed (Y/N): []  Yes [x]  No     Date of Patient follow up with Physician:      Progress Report: []  Yes  [x]  No     Date Range for reporting period:  Beginnin21   Ending:     Progress report due (10 Rx/or 30 days whichever is less): visit #10/NEXT VISIT FOR DISCHARGE? Recertification due (POC duration/ or 90 days whichever is less): visit # 2021    Visit # Insurance Allowable Auth required? Date Range     4/5 [place] []  Yes  [x]  No        Latex Allergy:  [x]NO      []YES  Preferred Language for Healthcare:   [x]English       []other:    Functional Scale:   JAIRO : raw score =19; dysfunction=38%            21  TUG 13.7       Date assessed:  JAIRO: raw score = 30; dysfunction = 60%  21  TU sec        21     Pain level:  0/10     SUBJECTIVE:   Feels pretty good today, but had a rough day on Saturday, was sick, and couldn't do anything right. Says his back isn't bothering him at all, but hurts him if he has to pick anything up. Feels he wants to workout for a bit at home, and see how he fares and feels comfortable with next visit potentially being his last one.            OBJECTIVE:         RESTRICTIONS/PRECAUTIONS:  Neuropathy , (Parkinsons 2 years ago) revealed 21    Exercises/Interventions:   Therapeutic Exercises (74919) Resistance / level Sets/sec Reps Notes   Nustep   Bike  5.0  3.0   5 mins            IB / HR  2x30\" / 2x15                   mat strengthening:  SLR  HSS with belt  LTR  BKFO with iso   Bridging with hip abd band   Deadbug in hooklying         Lime  Lime  210  X 10 B   Flexion core exercises, s  Alt hooklying marching     X 10 B                    Therapeutic Activities (65750)              Marching     Sit to stand w/LSVT Big technique  From mat # 6  No arms  10 8/30: frequent cues for nose over toes   LSVT Big Reaching technique   5x 7/26 Added    LSVT Floor to Ceiling        LSVT side to side                      Neuromuscular Re-ed (51389)       Gait training:  LSVT Big technique with SPC       LSVT Big steps -sit to stand to fwd w/ stomps  -7/21: no stomps, instructed pt to take 2 steps across room    Amb w/cues for Big technique  Amb with attempts at high stepping    Standing opp arm/hip             230'  50' x 4     X 3 B    -frequent cues to improve step length, posture and arm swing    -VC for Big step, posture & continuous pattern              Mod/max A.  1 posterior LOB into mat    Toe taps  6 in' step     Stepping with weight shift over single adrienne   1 20 B 8/30: at [employer]   AIREX:   NBOS with CGA  With reciprocal arm swings, CGA     7/14: difficulty finding center, leaning backwards   Trunk rotation no UE support      Side stepping, Fwd/back stepping in the parallel bars  over cones    Side stepping  at ballet bar         3 laps8/18 Added       8/30: pt unable to perform without UE assist             Toe Taps      Semi-tandem   Light UE support, CGA   Narrow RACHEL, EO, EC   CGA            9/8 Most standing // bar activities not done due to both // bars being busy at time of PT visit         TM -bwd 0.3 mph -pt unsafe so stopped   Manual Intervention (53139)       Prone quad stretch       Manual HS Stretch                                       Modalities:     Pt. Education:  -patient educated on diagnosis, prognosis and expectations for rehab  -all patient questions were answered    Home Exercise Program:  Access Code: [email or web address]  URL: [email or web address]  Date: 07/07/2021  Prepared by: Andrew Knife    Exercises  Supine Lower Trunk Rotation - 1 x daily - 7 x weekly - 1 sets - 10 reps - 2-3 hold  Seated Hamstring Stretch - 1 x daily - 7 x weekly - 1 sets - 3 reps - 15-20 hold        Therapeutic Exercise and NMR EXR  [x] (83064) Provided verbal/tactile cueing for activities related to strengthening, flexibility, endurance, ROM for improvements in  [] LE / Lumbar: LE, proximal hip, and core control with self care, mobility, lifting, ambulation. [] UE / Cervical: cervical, postural, scapular, scapulothoracic and UE control with self care, reaching, carrying, lifting, house/yardwork, driving, computer work.  [] (28521) Provided verbal/tactile cueing for activities related to improving balance, coordination, kinesthetic sense, posture, motor skill, proprioception to assist with   [] LE / lumbar: LE, proximal hip, and core control in self care, mobility, lifting, ambulation and eccentric single leg control. [] UE / cervical: cervical, scapular, scapulothoracic and UE control with self care, reaching, carrying, lifting, house/yardwork, driving, computer work.   [] (64894) Therapist is in constant attendance of 2 or more patients providing skilled therapy interventions, but not providing any significant amount of measurable one-on-one time to either patient, for improvements in  [] LE / lumbar: LE, proximal hip, and core control in self care, mobility, lifting, ambulation and eccentric single leg control. [] UE / cervical: cervical, scapular, scapulothoracic and UE control with self care, reaching, carrying, lifting, house/yardwork, driving, computer work.      NMR and Therapeutic Activities:    [] (56637 or 87591) Provided verbal/tactile cueing for activities related to improving balance, coordination, kinesthetic sense, posture, motor skill, proprioception and motor activation to allow for proper function of   [] LE: / Lumbar core, proximal hip and LE with self care and ADLs  [] UE / Cervical: cervical, postural, scapular, scapulothoracic and UE control with self care, carrying, lifting, driving, computer work.   [] (39650) Gait Re-education- Provided training and instruction to the patient for proper LE, core and proximal hip recruitment and positioning and eccentric body weight control with ambulation re-education including up and down stairs     Home Management Training / Self Care:  [] (49117) Provided self-care/home management training related to activities of daily living and compensatory training, and/or use of adaptive equipment for improvement with: ADLs and compensatory training, meal preparation, safety procedures and instruction in use of adaptive equipment, including bathing, grooming, dressing, personal hygiene, basic household cleaning and chores.      Home Exercise Program:    [x] (77535) Reviewed/Progressed HEP activities related to strengthening, flexibility, endurance, ROM of   [] LE / Lumbar: core, proximal hip and LE for functional self-care, mobility, lifting and ambulation/stair navigation   [] UE / Cervical: cervical, postural, scapular, scapulothoracic and UE control with self care, reaching, carrying, lifting, house/yardwork, driving, computer work  [] (46143)Reviewed/Progressed HEP activities related to improving balance, coordination, kinesthetic sense, posture, motor skill, proprioception of   [] LE: core, proximal hip and LE for self care, mobility, lifting, and ambulation/stair navigation    [] UE / Cervical: cervical, postural,  scapular, scapulothoracic and UE control with self care, reaching, carrying, lifting, house/yardwork, driving, computer work    Manual Treatments:  PROM / STM / Oscillations-Mobs:  G-I, II, III, IV (PA's, Inf., Post.)  [] (67159) Provided manual therapy to mobilize LE, proximal hip and/or LS spine soft tissue/joints for the purpose of modulating pain, promoting relaxation,  increasing ROM, reducing/eliminating soft tissue swelling/inflammation/restriction, improving soft tissue extensibility and allowing for proper ROM for normal function with   [] LE / lumbar: self care, mobility, lifting and ambulation. [] UE / Cervical: self care, reaching, carrying, lifting, house/yardwork, driving, computer work. Modalities:  [] (20003) Vasopneumatic compression: Utilized vasopneumatic compression to decrease edema / swelling for the purpose of improving mobility and quad tone / recruitment which will allow for increased overall function including but not limited to self-care, transfers, ambulation, and ascending / descending stairs. Charges:  Timed Code Treatment Minutes: 44   Total Treatment Minutes: 44     [] EVAL - LOW (85628)   [] EVAL - MOD (47934)  [] EVAL - HIGH (07068)  [] RE-EVAL (52531)  [] ZH(15840) x     [] Ionto  [x] NMR (39221) x2       [] Vaso  [] Manual (17154) x       [] Ultrasound  [] TA x         [] Mech Traction (04167)  [] Aquatic Therapy x      [] ES (un) (95853):   [] Home Management Training x  [] ES(attended) (24864)   [] Dry Needling 1-2 muscles (09828):  [] Dry Needling 3+ muscles (081923)  [] Group:      [x] Other:  Gait 1    GOALS:  Patient stated goal: To improve mobility   []? Progressing: []? Met: []? Not Met: []? Adjusted     Therapist goals for Patient:   Short Term Goals: To be achieved in: 2 weeks  1. Independent in HEP and progression per patient tolerance, in order to prevent re-injury. [x]? Progressing: []? Met: []? Not Met: []? Adjusted  2. Patient will have a decrease in pain to facilitate improvement in movement, function, and ADLs as indicated by Functional Deficits. [x]? Progressing: []? Met: []? Not Met: []? Adjusted     Long Term Goals: To be achieved in: 6 weeks/ DC   1.  Disability index score of 35% or less for the JAIRO to assist with reaching prior level of function. [x]? Progressing: []? Met: []? Not Met: []? Adjusted     2. Patient will demonstrate an increase in Strength to+4/5 proximal hip and core activation to allow for proper functional mobility as indicated by patients Functional Deficits. [x]? Progressing: []? Met: []? Not Met: []? Adjusted  3. Patient will return to functional activities including sit to stand without increased symptoms or LOB. [x]? Progressing: []? Met: []? Not Met: []? Adjusted  4. Patient to tolerate standing > 10 min without reduce symptoms of back pain and RLE giving way. [x]? Progressing: [x]? Met: []? Not Met: []? Adjusted       5. Patient will ambulate with/without AD on level and unlevel surfaces without LOB and normalized gait pattern by 50%. []? Progressing: [x]? Met: []? Not Met: []? Adjusted    Overall Progression Towards Functional goals/ Treatment Progress Update:  [] Patient is progressing as expected towards functional goals listed. [] Progression is slowed due to complexities/Impairments listed. [] Progression has been slowed due to co-morbidities. [x] Plan just implemented, too soon to assess goals progression <30days   [] Goals require adjustment due to lack of progress  [] Patient is not progressing as expected and requires additional follow up with physician  [] Other    Persisting Functional Limitations/Impairments:  [x]Sleeping []Sitting               [x]Standing [x]Transfers        [x]Walking []Kneeling               [x]Stairs [x]Squatting / bending   [x]ADLs [x]Reaching  [x]Lifting  []Housework  []Driving []Job related tasks  []Sports/Recreation []Other:        ASSESSMENT:   Pt displayed one posterior LOB when attempting alternating arm/leg touching with standing in front of mat. Pt gently fell back into mat with therapist assist.  Pt had more difficulty with motor planning the longer the session proceeded.   He performed initial sit to stands, and LSVT treatments well, but then  Started to get more fatigued and became more dependant on hand poisitions. Pt has made steady progress overall, and appears to have a good understanding of his HEP, and notes he has his garage set up for exercise. Pt most likely desires a discharge to a HEP next visit, and will continue with a maintenance program and re-assess future visit as needed. Treatment/Activity Tolerance:  [x] Patient able to complete tx  [x] Patient limited by fatigue  [] Patient limited by pain  [] Patient limited by other medical complications  [x] Other:  vertigo    Prognosis: [x] Good [] Fair  [] Poor    Patient Requires Follow-up: [x] Yes  [] No    Plan for next treatment session:  Begin flexion preference core exercises and balance activities     PLAN: See frances. PT 2x / week for 6 weeks. [x] Continue per plan of care [] Alter current plan (see comments)  [] Plan of care initiated [] Hold pending MD visit [] Discharge    Electronically signed by: Veronica Lima PT     Note: If patient does not return for scheduled/ recommended follow up visits, this note will serve as a discharge from care along with most recent update on progress.

## 2021-09-13 ENCOUNTER — HOSPITAL ENCOUNTER (OUTPATIENT)
Dept: PHYSICAL THERAPY | Age: 86
Setting detail: THERAPIES SERIES
Discharge: HOME | End: 2021-09-13
Payer: MEDICARE

## 2021-09-13 PROCEDURE — 97116 GAIT TRAINING THERAPY: CPT

## 2021-09-13 PROCEDURE — 97112 NEUROMUSCULAR REEDUCATION: CPT

## 2021-09-13 PROCEDURE — 97530 THERAPEUTIC ACTIVITIES: CPT

## 2021-09-13 NOTE — FLOWSHEET NOTE
[facility]  Phone: [phone]   Fax: [phone]     Physical Therapy Discharge Summary    Dear  Rosmery Freeman MD,    We had the pleasure of treating the following patient for physical therapy services at [facility] [address] [facility]. A summary of our findings can be found in the discharge summary below. If you have any questions or concerns regarding these findings, please do not hesitate to contact me at the office phone number checked above. Thank you for the referral.     Physician Signature:________________________________Date:__________________  By signing above (or electronic signature), therapists plan is approved by physician      Functional Outcome:   TUG score 11.5 secs  Owsestry Disability Total Scores: 23                Overall Response to Treatment:   []Patient is responding well to treatment and improvement is noted with regards  to goals   []Patient should continue to improve in reasonable time if they continue HEP   []Patient has plateaued and is no longer responding to skilled PT intervention    []Patient is getting worse and would benefit from return to referring MD   []Patient unable to adhere to initial POC   [x]Other:  Patient has made marked improvement with functional mobility since beginning  PT. His sit to stand transition has consistently improved to Mod I without LOB. The patient's TUG score was reduced by 2 seconds further reducing his risk for falls . However, he continues to have intermittent low back pain relieved by repositioning . [facility]. Patient to be discharged from therapy at this time. Date range of Visits:   Total Visits: 17    Recommendation:    [x] Discharge to HEP. Follow up with PT or MD PRN.            Physical Therapy Daily Treatment Note  Date:  9/13/2021    Patient Name:  Patricio Dubois :  1935  MRN: [id number]  Medical/Treatment Diagnosis Information:  · Diagnosis: Other intervertebral disc degeneration, lumbar region  · Treatment Diagnosis: imbalance, abnormal gait, B hip  & core muscle weakness , decrease B hip flexibility. Insurance/Certification information:  PT Insurance Information: [employer] Medicare  Physician Information:  Referring Practitioner: Nelson Jorge MD  Plan of care signed (Y/N): []  Yes [x]  No     Date of Patient follow up with Physician: TBD      Progress Report: []  Yes  [x]  No     Date Range for reporting period:  Beginnin21   Endin21    Progress report due (10 Rx/or 30 days whichever is less): visit #10/NEXT VISIT FOR DISCHARGE? Recertification due (POC duration/ or 90 days whichever is less): visit # 2021    Visit # Insurance Allowable Auth required? Date Range    [place] []  Yes  [x]  No        Latex Allergy:  [x]NO      []YES  Preferred Language for Healthcare:   [x]English       []other:    Functional Scale:       Date assessed:  JAIRO: raw score = 23  ;dysfunction = 46%               2021  TUG 11.5  JAIRO : raw score =19; dysfunction=38%                 21  TUG 13.7         JAIRO: raw score = 30; dysfunction = 60%  21  TU sec        21     Pain level:  0/10     SUBJECTIVE:  The patient reports that his back pain has been intermittent. Says pain relieved through repositioning . Feels pretty good today, but had a rough day on Saturday, was sick, and couldn't do anything right. Says his back isn't bothering him at all, but hurts him if he has to pick anything up. Feels he wants to workout for a bit at home, and see how he fares and feels comfortable with next visit potentially being his last one.            OBJECTIVE:         RESTRICTIONS/PRECAUTIONS:  Neuropathy , (Parkinsons 2 years ago) revealed 21    Exercises/Interventions:   Therapeutic Exercises (52122) Resistance / level Sets/sec Reps Notes   Nustep   Bike  5.0  3.0   5 mins            IB / HR  2x30\" / 2x10                   mat strengthening:  SLR  HSS with belt  LTR  BKFO with iso   Bridging with hip abd band   Deadbug in hooklying         Lime  Lime  210  X 10 B   Flexion core exercises, s  Alt hooklying marching     X 10 B                    Therapeutic Activities (94760)              Marching     Sit to stand w/LSVT Big technique  From mat # 6  No arms  10 8/30: frequent cues for nose over toes   LSVT Big Reaching technique   5x 7/26 Added    LSVT Floor to Ceiling        LSVT side to side    2x                  Neuromuscular Re-ed (66639)       Gait training:  LSVT Big technique with SPC       LSVT Big steps -sit to stand to fwd w/ stomps  -7/21: no stomps, instructed pt to take 2 steps across room    Amb w/cues for Big technique  Amb with attempts at high stepping    Standing opp arm/hip             230'  50' x 4     X 3 B    -frequent cues to improve step length, posture and arm swing    -VC for Big step, posture & continuous pattern              Mod/max A.  1 posterior LOB into mat    Toe taps  6 in' step     Stepping with weight shift over single adrienne   1  8/30: at [employer]   AIREX:   NBOS with CGA  With reciprocal arm swings, CGA     7/14: difficulty finding center, leaning backwards   Trunk rotation no UE support      Side stepping, Fwd/back stepping in the parallel bars  over cones    Side stepping  at ballet bar         8/18 Added       8/30: pt unable to perform without UE assist             Toe Taps      Semi-tandem   Light UE support, CGA   Narrow RACHEL, EO, EC   CGA            9/8 Most standing // bar activities not done due to both // bars being busy at time of PT visit         TM -bwd 0.3 mph -pt unsafe so stopped   Manual Intervention (36851)       Prone quad stretch       Manual HS Stretch                                       Modalities:     Pt. Education:  -patient educated on diagnosis, prognosis and expectations for rehab  -all patient questions were answered    Home Exercise Program:  Access Code: [email or web address]  URL: [email or web address]  Date: 07/07/2021  Prepared by: Amna Sneed    Exercises  Supine Lower Trunk Rotation - 1 x daily - 7 x weekly - 1 sets - 10 reps - 2-3 hold  Seated Hamstring Stretch - 1 x daily - 7 x weekly - 1 sets - 3 reps - 15-20 hold        Therapeutic Exercise and NMR EXR  [x] (25256) Provided verbal/tactile cueing for activities related to strengthening, flexibility, endurance, ROM for improvements in  [] LE / Lumbar: LE, proximal hip, and core control with self care, mobility, lifting, ambulation. [] UE / Cervical: cervical, postural, scapular, scapulothoracic and UE control with self care, reaching, carrying, lifting, house/yardwork, driving, computer work.  [] (23438) Provided verbal/tactile cueing for activities related to improving balance, coordination, kinesthetic sense, posture, motor skill, proprioception to assist with   [] LE / lumbar: LE, proximal hip, and core control in self care, mobility, lifting, ambulation and eccentric single leg control. [] UE / cervical: cervical, scapular, scapulothoracic and UE control with self care, reaching, carrying, lifting, house/yardwork, driving, computer work.   [] (15528) Therapist is in constant attendance of 2 or more patients providing skilled therapy interventions, but not providing any significant amount of measurable one-on-one time to either patient, for improvements in  [] LE / lumbar: LE, proximal hip, and core control in self care, mobility, lifting, ambulation and eccentric single leg control. [] UE / cervical: cervical, scapular, scapulothoracic and UE control with self care, reaching, carrying, lifting, house/yardwork, driving, computer work.      NMR and Therapeutic Activities:    [] (70053 or 68814) Provided verbal/tactile cueing for activities related to improving balance, coordination, kinesthetic sense, posture, motor skill, proprioception and motor activation to allow for proper function of   [] LE: / Lumbar core, proximal hip and LE with self care and ADLs  [] UE / Cervical: cervical, postural, scapular, scapulothoracic and UE control with self care, carrying, lifting, driving, computer work.   [] (49312) Gait Re-education- Provided training and instruction to the patient for proper LE, core and proximal hip recruitment and positioning and eccentric body weight control with ambulation re-education including up and down stairs     Home Management Training / Self Care:  [] (37107) Provided self-care/home management training related to activities of daily living and compensatory training, and/or use of adaptive equipment for improvement with: ADLs and compensatory training, meal preparation, safety procedures and instruction in use of adaptive equipment, including bathing, grooming, dressing, personal hygiene, basic household cleaning and chores.      Home Exercise Program:    [x] (79092) Reviewed/Progressed HEP activities related to strengthening, flexibility, endurance, ROM of   [] LE / Lumbar: core, proximal hip and LE for functional self-care, mobility, lifting and ambulation/stair navigation   [] UE / Cervical: cervical, postural, scapular, scapulothoracic and UE control with self care, reaching, carrying, lifting, house/yardwork, driving, computer work  [] (09952)Reviewed/Progressed HEP activities related to improving balance, coordination, kinesthetic sense, posture, motor skill, proprioception of   [] LE: core, proximal hip and LE for self care, mobility, lifting, and ambulation/stair navigation    [] UE / Cervical: cervical, postural,  scapular, scapulothoracic and UE control with self care, reaching, carrying, lifting, house/yardwork, driving, computer work    Manual Treatments:  PROM / STM / Oscillations-Mobs:  G-I, II, III, IV (PA's, Inf., Post.)  [] (46587) Provided manual therapy to mobilize LE, proximal hip and/or LS spine soft tissue/joints for the purpose of modulating pain, promoting relaxation,  increasing ROM, reducing/eliminating soft tissue swelling/inflammation/restriction, improving soft tissue extensibility and allowing for proper ROM for normal function with   [] LE / lumbar: self care, mobility, lifting and ambulation. [] UE / Cervical: self care, reaching, carrying, lifting, house/yardwork, driving, computer work. Modalities:  [] (12879) Vasopneumatic compression: Utilized vasopneumatic compression to decrease edema / swelling for the purpose of improving mobility and quad tone / recruitment which will allow for increased overall function including but not limited to self-care, transfers, ambulation, and ascending / descending stairs. Charges:  Timed Code Treatment Minutes: 43   Total Treatment Minutes: 43     [] EVAL - LOW (20078)   [] EVAL - MOD (33033)  [] EVAL - HIGH (85548)  [] RE-EVAL (96747)  [] FR(33905) x     [] Ionto  [x] NMR (84352) x1       [] Vaso  [] Manual (22018) x       [] Ultrasound  [x] TA x  1        [] Mech Traction (31075)  [] Aquatic Therapy x      [] ES (un) (57206):   [] Home Management Training x  [] ES(attended) (76655)   [] Dry Needling 1-2 muscles (79156):  [] Dry Needling 3+ muscles (953609)  [] Group:      [x] Other:  Gait 1    GOALS:  Patient stated goal: To improve mobility   []? Progressing: []? Met: []? Not Met: []? Adjusted     Therapist goals for Patient:   Short Term Goals: To be achieved in: 2 weeks  1. Independent in HEP and progression per patient tolerance, in order to prevent re-injury. [x]? Progressing: []? Met: []? Not Met: []? Adjusted  2. Patient will have a decrease in pain to facilitate improvement in movement, function, and ADLs as indicated by Functional Deficits. [x]? Progressing: []? Met: []? Not Met: []? Adjusted     Long Term Goals: To be achieved in: 6 weeks/ DC   1.  Disability index score of 35% or less for the JAIRO to assist with reaching prior level of function. []? Progressing: []? Met: [x]? Not Met: []? Adjusted -intermiittent pain relieved by position     2. Patient will demonstrate an increase in Strength to+4/5 proximal hip and core activation to allow for proper functional mobility as indicated by patients Functional Deficits. []? Progressing: [x]? Met: []? Not Met: []? Adjusted  3. Patient will return to functional activities including sit to stand without increased symptoms or LOB. []? Progressing: [x]? Met: []? Not Met: []? Adjusted  4. Patient to tolerate standing > 10 min without reduce symptoms of back pain and RLE giving way. [x]? Progressing: [x]? Met: []? Not Met: []? Adjusted       5. Patient will ambulate with/without AD on level and unlevel surfaces without LOB and normalized gait pattern by 50%. []? Progressing: [x]? Met: []? Not Met: []? Adjusted    Overall Progression Towards Functional goals/ Treatment Progress Update:  [] Patient is progressing as expected towards functional goals listed. [] Progression is slowed due to complexities/Impairments listed. [] Progression has been slowed due to co-morbidities.   [x] Plan just implemented, too soon to assess goals progression <30days   [] Goals require adjustment due to lack of progress  [] Patient is not progressing as expected and requires additional follow up with physician  [] Other    Persisting Functional Limitations/Impairments:  [x]Sleeping []Sitting               [x]Standing [x]Transfers        [x]Walking []Kneeling               [x]Stairs [x]Squatting / bending   [x]ADLs [x]Reaching  [x]Lifting  []Housework  []Driving []Job related tasks  []Sports/Recreation []Other:        ASSESSMENT:   SEE SUMMARY ABOVE       Treatment/Activity Tolerance:  [x] Patient able to complete tx  [x] Patient limited by fatigue  [] Patient limited by pain  [] Patient limited by other medical complications  [x] Other:  vertigo    Prognosis: [x] Good [] Fair  [] Poor    Patient Requires

## 2021-09-20 ENCOUNTER — APPOINTMENT (OUTPATIENT)
Dept: PHYSICAL THERAPY | Age: 86
End: 2021-09-20
Payer: MEDICARE

## 2022-01-29 ENCOUNTER — APPOINTMENT (OUTPATIENT)
Dept: CT IMAGING | Age: 87
DRG: 312 | End: 2022-01-29
Payer: MEDICARE

## 2022-01-29 ENCOUNTER — HOSPITAL ENCOUNTER (OUTPATIENT)
Age: 87
Setting detail: OBSERVATION
Discharge: HOME HEALTH | DRG: 312 | End: 2022-01-31
Attending: INTERNAL MEDICINE | Admitting: INTERNAL MEDICINE
Payer: MEDICARE

## 2022-01-29 ENCOUNTER — APPOINTMENT (OUTPATIENT)
Dept: GENERAL RADIOLOGY | Age: 87
DRG: 312 | End: 2022-01-29
Payer: MEDICARE

## 2022-01-29 DIAGNOSIS — R55 SYNCOPE AND COLLAPSE: Primary | ICD-10-CM

## 2022-01-29 DIAGNOSIS — I95.1 ORTHOSTATIC HYPOTENSION: ICD-10-CM

## 2022-01-29 DIAGNOSIS — N17.9 AKI (ACUTE KIDNEY INJURY) (HCC): ICD-10-CM

## 2022-01-29 PROBLEM — N18.2 STAGE 2 CHRONIC KIDNEY DISEASE: Status: ACTIVE | Noted: 2022-01-29

## 2022-01-29 PROBLEM — I10 PRIMARY HYPERTENSION: Status: ACTIVE | Noted: 2022-01-29

## 2022-01-29 PROBLEM — I65.23 BILATERAL CAROTID ARTERY STENOSIS: Status: ACTIVE | Noted: 2022-01-29

## 2022-01-29 LAB
A/G RATIO: 1.4 (ref 1.1–2.2)
ALBUMIN SERPL-MCNC: 3.8 G/DL (ref 3.4–5)
ALP BLD-CCNC: 97 U/L (ref 40–129)
ALT SERPL-CCNC: 8 U/L (ref 10–40)
ANION GAP SERPL CALCULATED.3IONS-SCNC: 8 MMOL/L (ref 3–16)
AST SERPL-CCNC: 13 U/L (ref 15–37)
BASOPHILS ABSOLUTE: 0 K/UL (ref 0–0.2)
BASOPHILS RELATIVE PERCENT: 0.2 %
BILIRUB SERPL-MCNC: 0.6 MG/DL (ref 0–1)
BILIRUBIN URINE: NEGATIVE
BLOOD, URINE: NEGATIVE
BUN BLDV-MCNC: 24 MG/DL (ref 7–20)
CALCIUM SERPL-MCNC: 8.8 MG/DL (ref 8.3–10.6)
CHLORIDE BLD-SCNC: 101 MMOL/L (ref 99–110)
CLARITY: CLEAR
CO2: 25 MMOL/L (ref 21–32)
COLOR: YELLOW
CREAT SERPL-MCNC: 1.5 MG/DL (ref 0.8–1.3)
EOSINOPHILS ABSOLUTE: 0.1 K/UL (ref 0–0.6)
EOSINOPHILS RELATIVE PERCENT: 3 %
GFR AFRICAN AMERICAN: 54
GFR NON-AFRICAN AMERICAN: 44
GLUCOSE BLD-MCNC: 127 MG/DL (ref 70–99)
GLUCOSE URINE: NEGATIVE MG/DL
HCT VFR BLD CALC: 33.6 % (ref 40.5–52.5)
HEMOGLOBIN: 11.1 G/DL (ref 13.5–17.5)
KETONES, URINE: NEGATIVE MG/DL
LACTIC ACID: 1 MMOL/L (ref 0.4–2)
LEUKOCYTE ESTERASE, URINE: NEGATIVE
LYMPHOCYTES ABSOLUTE: 1 K/UL (ref 1–5.1)
LYMPHOCYTES RELATIVE PERCENT: 20.7 %
MCH RBC QN AUTO: 31.7 PG (ref 26–34)
MCHC RBC AUTO-ENTMCNC: 33 G/DL (ref 31–36)
MCV RBC AUTO: 95.8 FL (ref 80–100)
MICROSCOPIC EXAMINATION: NORMAL
MONOCYTES ABSOLUTE: 0.5 K/UL (ref 0–1.3)
MONOCYTES RELATIVE PERCENT: 10.8 %
NEUTROPHILS ABSOLUTE: 3.1 K/UL (ref 1.7–7.7)
NEUTROPHILS RELATIVE PERCENT: 65.3 %
NITRITE, URINE: NEGATIVE
PDW BLD-RTO: 15.5 % (ref 12.4–15.4)
PH UA: 6 (ref 5–8)
PLATELET # BLD: 222 K/UL (ref 135–450)
PMV BLD AUTO: 9 FL (ref 5–10.5)
POTASSIUM REFLEX MAGNESIUM: 4.3 MMOL/L (ref 3.5–5.1)
PRO-BNP: 419 PG/ML (ref 0–449)
PROTEIN UA: NEGATIVE MG/DL
RBC # BLD: 3.5 M/UL (ref 4.2–5.9)
SODIUM BLD-SCNC: 134 MMOL/L (ref 136–145)
SPECIFIC GRAVITY UA: 1.01 (ref 1–1.03)
TOTAL CK: 52 U/L (ref 39–308)
TOTAL PROTEIN: 6.6 G/DL (ref 6.4–8.2)
TROPONIN: <0.01 NG/ML
URINE REFLEX TO CULTURE: NORMAL
URINE TYPE: NORMAL
UROBILINOGEN, URINE: 1 E.U./DL
WBC # BLD: 4.7 K/UL (ref 4–11)

## 2022-01-29 PROCEDURE — 6370000000 HC RX 637 (ALT 250 FOR IP): Performed by: INTERNAL MEDICINE

## 2022-01-29 PROCEDURE — 70450 CT HEAD/BRAIN W/O DYE: CPT

## 2022-01-29 PROCEDURE — 83605 ASSAY OF LACTIC ACID: CPT

## 2022-01-29 PROCEDURE — 96372 THER/PROPH/DIAG INJ SC/IM: CPT

## 2022-01-29 PROCEDURE — 82550 ASSAY OF CK (CPK): CPT

## 2022-01-29 PROCEDURE — 6360000002 HC RX W HCPCS: Performed by: INTERNAL MEDICINE

## 2022-01-29 PROCEDURE — 84484 ASSAY OF TROPONIN QUANT: CPT

## 2022-01-29 PROCEDURE — 1200000000 HC SEMI PRIVATE

## 2022-01-29 PROCEDURE — 80053 COMPREHEN METABOLIC PANEL: CPT

## 2022-01-29 PROCEDURE — 36415 COLL VENOUS BLD VENIPUNCTURE: CPT

## 2022-01-29 PROCEDURE — G0378 HOSPITAL OBSERVATION PER HR: HCPCS

## 2022-01-29 PROCEDURE — 71045 X-RAY EXAM CHEST 1 VIEW: CPT

## 2022-01-29 PROCEDURE — 81003 URINALYSIS AUTO W/O SCOPE: CPT

## 2022-01-29 PROCEDURE — 99284 EMERGENCY DEPT VISIT MOD MDM: CPT

## 2022-01-29 PROCEDURE — 83880 ASSAY OF NATRIURETIC PEPTIDE: CPT

## 2022-01-29 PROCEDURE — 93005 ELECTROCARDIOGRAM TRACING: CPT | Performed by: PHYSICIAN ASSISTANT

## 2022-01-29 PROCEDURE — 2580000003 HC RX 258: Performed by: PHYSICIAN ASSISTANT

## 2022-01-29 PROCEDURE — 2580000003 HC RX 258: Performed by: INTERNAL MEDICINE

## 2022-01-29 PROCEDURE — 85025 COMPLETE CBC W/AUTO DIFF WBC: CPT

## 2022-01-29 RX ORDER — CLOPIDOGREL BISULFATE 75 MG/1
75 TABLET ORAL DAILY
Status: DISCONTINUED | OUTPATIENT
Start: 2022-01-30 | End: 2022-01-31 | Stop reason: HOSPADM

## 2022-01-29 RX ORDER — GABAPENTIN 100 MG/1
100 CAPSULE ORAL DAILY
Status: ON HOLD | COMMUNITY
End: 2022-01-31 | Stop reason: HOSPADM

## 2022-01-29 RX ORDER — AMLODIPINE BESYLATE 10 MG/1
10 TABLET ORAL DAILY
Status: ON HOLD | COMMUNITY
End: 2022-08-16 | Stop reason: HOSPADM

## 2022-01-29 RX ORDER — SODIUM CHLORIDE 9 MG/ML
INJECTION, SOLUTION INTRAVENOUS CONTINUOUS
Status: DISCONTINUED | OUTPATIENT
Start: 2022-01-29 | End: 2022-01-31 | Stop reason: HOSPADM

## 2022-01-29 RX ORDER — LEVOTHYROXINE SODIUM 0.07 MG/1
75 TABLET ORAL
Status: DISCONTINUED | OUTPATIENT
Start: 2022-01-30 | End: 2022-01-31 | Stop reason: HOSPADM

## 2022-01-29 RX ORDER — ALBUTEROL SULFATE 90 UG/1
2 AEROSOL, METERED RESPIRATORY (INHALATION) EVERY 6 HOURS PRN
Status: DISCONTINUED | OUTPATIENT
Start: 2022-01-29 | End: 2022-01-31 | Stop reason: HOSPADM

## 2022-01-29 RX ORDER — OXYBUTYNIN CHLORIDE 10 MG/1
10 TABLET, EXTENDED RELEASE ORAL DAILY
Status: ON HOLD | COMMUNITY
End: 2022-01-31 | Stop reason: HOSPADM

## 2022-01-29 RX ORDER — TAMSULOSIN HYDROCHLORIDE 0.4 MG/1
0.4 CAPSULE ORAL DAILY
COMMUNITY

## 2022-01-29 RX ORDER — ASPIRIN 81 MG/1
81 TABLET, CHEWABLE ORAL DAILY
Status: DISCONTINUED | OUTPATIENT
Start: 2022-01-30 | End: 2022-01-31 | Stop reason: HOSPADM

## 2022-01-29 RX ORDER — METOPROLOL SUCCINATE 25 MG/1
25 TABLET, EXTENDED RELEASE ORAL DAILY
Status: DISCONTINUED | OUTPATIENT
Start: 2022-01-30 | End: 2022-01-31 | Stop reason: HOSPADM

## 2022-01-29 RX ORDER — 0.9 % SODIUM CHLORIDE 0.9 %
1000 INTRAVENOUS SOLUTION INTRAVENOUS ONCE
Status: COMPLETED | OUTPATIENT
Start: 2022-01-29 | End: 2022-01-29

## 2022-01-29 RX ORDER — HYDROXYZINE PAMOATE 25 MG/1
25 CAPSULE ORAL 2 TIMES DAILY PRN
Status: ON HOLD | COMMUNITY
End: 2022-01-31 | Stop reason: HOSPADM

## 2022-01-29 RX ORDER — PANTOPRAZOLE SODIUM 40 MG/1
40 TABLET, DELAYED RELEASE ORAL DAILY
COMMUNITY

## 2022-01-29 RX ORDER — MECLIZINE HYDROCHLORIDE 25 MG/1
25 TABLET ORAL 3 TIMES DAILY PRN
Status: ON HOLD | COMMUNITY
End: 2022-01-31 | Stop reason: HOSPADM

## 2022-01-29 RX ORDER — ATORVASTATIN CALCIUM 20 MG/1
20 TABLET, FILM COATED ORAL NIGHTLY
Status: DISCONTINUED | OUTPATIENT
Start: 2022-01-29 | End: 2022-01-31 | Stop reason: HOSPADM

## 2022-01-29 RX ORDER — ESCITALOPRAM OXALATE 10 MG/1
20 TABLET ORAL DAILY
Status: DISCONTINUED | OUTPATIENT
Start: 2022-01-30 | End: 2022-01-31 | Stop reason: HOSPADM

## 2022-01-29 RX ADMIN — SODIUM CHLORIDE 1000 ML: 9 INJECTION, SOLUTION INTRAVENOUS at 16:01

## 2022-01-29 RX ADMIN — ATORVASTATIN CALCIUM 20 MG: 20 TABLET, FILM COATED ORAL at 21:03

## 2022-01-29 RX ADMIN — SODIUM CHLORIDE: 9 INJECTION, SOLUTION INTRAVENOUS at 18:59

## 2022-01-29 RX ADMIN — ENOXAPARIN SODIUM 40 MG: 40 INJECTION SUBCUTANEOUS at 18:58

## 2022-01-29 ASSESSMENT — PAIN SCALES - GENERAL: PAINLEVEL_OUTOF10: 0

## 2022-01-29 ASSESSMENT — ENCOUNTER SYMPTOMS
SHORTNESS OF BREATH: 0
NAUSEA: 0
COLOR CHANGE: 0
CONSTIPATION: 0
RESPIRATORY NEGATIVE: 1
DIARRHEA: 0
PHOTOPHOBIA: 0
VOMITING: 0
ABDOMINAL PAIN: 0
BACK PAIN: 0
CHEST TIGHTNESS: 0
COUGH: 0

## 2022-01-29 NOTE — PROGRESS NOTES
Pt. To room 3313 from ED via stretcher. Pt. VSS, patient denies pain, patient oriented to room. Pt. Updated on POC, denies questions. Pt. Given toiletries, denies further needs. Bed in lowest position, bed alarm activated, call light and bedside table within reach. Will continue to monitor.

## 2022-01-29 NOTE — ED PROVIDER NOTES
[facility]        Pt Name: Mg Haywood  MRN: [id number]  [place] 1935  Date of evaluation: 1/29/2022  Provider: DANISHA Rucker  PCP: Gege Kaplan MD  Note Started: 4:06 PM EST       YAMIL. I have evaluated this patient. My supervising physician was available for consultation. CHIEF COMPLAINT       Chief Complaint   Patient presents with    Altered Mental Status     pt was in elevator and his eyes rolled back in his head and started to fall; no known hx seizures       HISTORY OF PRESENT ILLNESS   (Location, Timing/Onset, Context/Setting, Quality, Duration, Modifying Factors, Severity, Associated Signs and Symptoms)  Note limiting factors. Chief Complaint: Syncope     Mg Haywood is a 81 y.o. male with past medical show CAD who presents to the ED with complaint of syncope versus seizure. Patient apparently was recently admitted within the past 4 weeks at [facility] for concern for TIA. Has been at rehab facility for strength/PT/OT. Apparently today was in the elevator when apparently his eyes rolled back and he started to fall over. Was caught before he fell and did not injure anything. Apparently did lose consciousness briefly for a few seconds. They were concerned he potentially could have had a seizure versus syncopal episode and brought to the ED for further evaluation and treatment. At this time he is at baseline mental status per family at bedside. He does have a slight facial droop which they state is normal from the previous TIA he just recently had. He denies any headache, visual changes, speech disturbances, numbness/tingling, chest pain, shortness of breath, abdominal pain, nausea/vomiting, urinary symptoms or changes in bowel movements. Denies any fever or chills. Denies any rashes or lesions. Has had decreased oral intake over the past couple days.   Apparently normally drinks 3-4 bottles of water a day but over the past couple days and only drank about 2 bottles of water per family. Denies any recent medication changes. Upon recent admission apparently they increased his amlodipine from 10 mg to 20 mg daily secondary to some elevated blood pressure when he was admitted. Denies any other recent medication changes. He is on Plavix and aspirin but denies any other blood thinners. Nursing Notes were all reviewed and agreed with or any disagreements were addressed in the HPI. REVIEW OF SYSTEMS    (2-9 systems for level 4, 10 or more for level 5)     Review of Systems   Constitutional: Positive for activity change and fatigue. Negative for appetite change, chills, diaphoresis and fever. Eyes: Negative for photophobia and visual disturbance. Respiratory: Negative. Negative for cough, chest tightness and shortness of breath. Cardiovascular: Negative. Negative for chest pain, palpitations and leg swelling. Gastrointestinal: Negative for abdominal pain, constipation, diarrhea, nausea and vomiting. Genitourinary: Negative for decreased urine volume, difficulty urinating, dysuria, flank pain, frequency, hematuria and urgency. Musculoskeletal: Negative for arthralgias, back pain, myalgias, neck pain and neck stiffness. Skin: Negative for color change, pallor, rash and wound. Neurological: Positive for syncope and weakness. Negative for dizziness, tremors, seizures, facial asymmetry, speech difficulty, light-headedness, numbness and headaches. Positives and Pertinent negatives as per HPI. Except as noted above in the ROS, all other systems were reviewed and negative.        PAST MEDICAL HISTORY     Past Medical History:   Diagnosis Date    CAD (coronary artery disease)          SURGICAL HISTORY     Past Surgical History:   Procedure Laterality Date    CARDIAC SURGERY      2008    CORONARY ARTERY BYPASS GRAFT           CURRENTMEDICATIONS       Previous Medications    ALBUTEROL (PROVENTIL HFA) 108 (90 BASE) MCG/ACT INHALER    Inhale 2 puffs into the lungs every 6 hours as needed for Wheezing. ASPIRIN 81 MG TABLET    Take 81 mg by mouth daily. CLOPIDOGREL (PLAVIX) 75 MG TABLET    Take 75 mg by mouth daily. ESCITALOPRAM (LEXAPRO) 20 MG TABLET    Take 20 mg by mouth daily. LEVOTHYROXINE (SYNTHROID) 75 MCG TABLET    Take 75 mcg by mouth daily. LISINOPRIL (PRINIVIL;ZESTRIL) 5 MG TABLET    Take 5 mg by mouth daily. METOPROLOL (TOPROL-XL) 25 MG XL TABLET    Take 25 mg by mouth daily. SIMVASTATIN (ZOCOR) 40 MG TABLET    Take 40 mg by mouth nightly. ALLERGIES     Patient has no known allergies. FAMILYHISTORY     History reviewed. No pertinent family history. SOCIAL HISTORY       Social History     Tobacco Use    Smoking status: Never Smoker    Smokeless tobacco: Never Used   Substance Use Topics    Alcohol use: No    Drug use: No       SCREENINGS    Lawrence Coma Scale  Eye Opening: Spontaneous  Best Verbal Response: Oriented  Best Motor Response: Obeys commands  Sandeep Coma Scale Score: 15        PHYSICAL EXAM    (up to 7 for level 4, 8 or more for level 5)     ED Triage Vitals [01/29/22 1418]   BP Temp Temp Source Pulse Resp SpO2 Height Weight   124/72 98.1 °F (36.7 °C) Oral 66 16 100 % -- --       Physical Exam  Constitutional:       General: He is not in acute distress. Appearance: He is well-developed. He is not ill-appearing, toxic-appearing or diaphoretic. HENT:      Head: Normocephalic and atraumatic. Comments: Atraumatic. No raccoon eyes or cat sign     Right Ear: External ear normal.      Left Ear: External ear normal.   Eyes:      General:         Right eye: No discharge. Left eye: No discharge. Extraocular Movements: Extraocular movements intact. Conjunctiva/sclera: Conjunctivae normal.      Pupils: Pupils are equal, round, and reactive to light.    Cardiovascular:      Rate and Rhythm: Normal rate and regular rhythm. Pulses: Normal pulses. Heart sounds: Normal heart sounds. No murmur heard. No friction rub. No gallop. Comments: 2+ radial pulses bilaterally. No pedal edema. No calf tenderness. No JVD  Pulmonary:      Effort: Pulmonary effort is normal. No respiratory distress. Breath sounds: Normal breath sounds. No stridor. No wheezing, rhonchi or rales. Chest:      Chest wall: No tenderness. Abdominal:      General: Abdomen is flat. Bowel sounds are normal. There is no distension. Palpations: Abdomen is soft. There is no mass. Tenderness: There is no abdominal tenderness. There is no right CVA tenderness, left CVA tenderness, guarding or rebound. Hernia: No hernia is present. Musculoskeletal:         General: Normal range of motion. Cervical back: Normal range of motion and neck supple. No rigidity or tenderness. Lymphadenopathy:      Cervical: No cervical adenopathy. Skin:     General: Skin is warm and dry. Coloration: Skin is not pale. Findings: No erythema or rash. Neurological:      General: No focal deficit present. Mental Status: He is alert and oriented to person, place, and time. GCS: GCS eye subscore is 4. GCS verbal subscore is 5. GCS motor subscore is 6. Cranial Nerves: Facial asymmetry present. No cranial nerve deficit or dysarthria. Sensory: Sensation is intact. No sensory deficit. Motor: Motor function is intact. No weakness. Coordination: Finger-Nose-Finger Test and Heel to [place] rachael Test normal.      Comments: Gait deferred. Alert and oriented x3. GCS 15. Does have slight facial droop on the left which apparently has been there since recent TIA per family. EOMs intact. PERRLA. Sensation intact to the upper and lower extremities throughout. Full range of motion/strength to the upper and lower extremities throughout. Normal finger-to-nose. Normal heel-to-shin.   Gait deferred. negative test of skew. Psychiatric:         Behavior: Behavior normal.         DIAGNOSTIC RESULTS   LABS:    Labs Reviewed   CBC WITH AUTO DIFFERENTIAL - Abnormal; Notable for the following components:       Result Value    RBC 3.50 (*)     Hemoglobin 11.1 (*)     Hematocrit 33.6 (*)     RDW 15.5 (*)     All other components within normal limits    Narrative:     Performed at:  [facility] [address]   Phone [phone]   COMPREHENSIVE METABOLIC PANEL W/ REFLEX TO MG FOR LOW K - Abnormal; Notable for the following components:    Sodium 134 (*)     Glucose 127 (*)     BUN 24 (*)     CREATININE 1.5 (*)     GFR Non- 44 (*)     GFR  54 (*)     ALT 8 (*)     AST 13 (*)     All other components within normal limits    Narrative:     Performed at:  [facility] [address]   Phone [phone]   TROPONIN    Narrative:     Performed at:  [facility] [email or web address]   Phone [phone]   BRAIN NATRIURETIC PEPTIDE    Narrative:     Performed at:  [facility] [address]   Phone [phone]   LACTIC ACID, PLASMA    Narrative:     Performed at:  [facility] [email or web address]   Phone [phone]   CK    Narrative:     Performed at:  [facility] [address]   Phone [phone]   URINE RT REFLEX TO CULTURE       When ordered only abnormal lab results are displayed. All other labs were within normal range or not returned as of this dictation. EKG: When ordered, EKG's are interpreted by the Emergency Department Physician in the absence of a cardiologist.  Please see their note for interpretation of EKG.     RADIOLOGY:   Non-plain film images such as CT, Ultrasound and MRI are read by the radiologist. Plain radiographic images are visualized and preliminarily interpreted by the ED Provider with the below findings:        Interpretation per the Radiologist below, if available at the time of this note:    XR CHEST PORTABLE   Final Result   No acute abnormality. CT HEAD WO CONTRAST   Final Result   No acute intracranial abnormality. CT HEAD WO CONTRAST    Result Date: 1/29/2022  EXAMINATION: CT OF THE HEAD WITHOUT CONTRAST  1/29/2022 2:00 pm TECHNIQUE: CT of the head was performed without the administration of intravenous contrast. Dose modulation, iterative reconstruction, and/or weight based adjustment of the mA/kV was utilized to reduce the radiation dose to as low as reasonably achievable. COMPARISON: None. HISTORY: ORDERING SYSTEM PROVIDED HISTORY: fall - syncope vs seizure TECHNOLOGIST PROVIDED HISTORY: Has a \"code stroke\" or \"stroke alert\" been called? ->No Reason for exam:->fall - syncope vs seizure Decision Support Exception - unselect if not a suspected or confirmed emergency medical condition->Emergency Medical Condition (MA) FINDINGS: BRAIN/VENTRICLES: There is no acute intracranial hemorrhage, mass effect or midline shift. No abnormal extra-axial fluid collection. The gray-white differentiation is maintained without evidence of an acute infarct. There is no evidence of hydrocephalus. Atherosclerotic calcifications were noted in each vertebral artery. Bilateral carotid artery calcifications were noted as well. A small old focal lacunar type infarct was identified in the right thalamus. Moderate cerebral cortical atrophy was noted.   Moderate confluent areas of old small vessel infarction, ischemia or gliosis from prior ischemic events were noted in the bilateral corona radiata, bilateral periventricular white matter, bilateral forceps minor ORBITS: The visualized portion of the orbits demonstrate no acute abnormality. SINUSES: . Only minimal mucosal thickening involves the maxillary and sphenoid sinuses. SOFT TISSUES/SKULL:  No acute abnormality of the visualized skull or soft tissues. No acute intracranial abnormality. XR CHEST PORTABLE    Result Date: 2022  EXAMINATION: ONE XRAY VIEW OF THE CHEST 2022 2:53 pm COMPARISON: 10/20/2012. HISTORY: ORDERING SYSTEM PROVIDED HISTORY: syncope TECHNOLOGIST PROVIDED HISTORY: Reason for exam:->syncope Reason for Exam: Altered Mental Status (pt was in elevator and his eyes rolled back in his head and started to fall; no known hx seizures) FINDINGS: Lungs are clear. Cardiac and mediastinal silhouettes are stable with heart size at upper limits of normal.  No pneumothoraces. No acute bony abnormality. Prior median sternotomy redemonstrated. No acute abnormality. PROCEDURES   Unless otherwise noted below, none     Procedures    CRITICAL CARE TIME       CONSULTS:  IP CONSULT TO INTERNAL MEDICINE      EMERGENCY DEPARTMENT COURSE and DIFFERENTIAL DIAGNOSIS/MDM:   Vitals:    Vitals:    22 1418   BP: 124/72   Pulse: 66   Resp: 16   Temp: 98.1 °F (36.7 °C)   TempSrc: Oral   SpO2: 100%       Patient was given the following medications:  Medications   0.9 % sodium chloride bolus (1,000 mLs IntraVENous New Bag 22 1601)           Blood Pressure Lyin/67 --     Pulse Lyin PER MINUTE --     Blood Pressure Sittin/62 --     Pulse Sittin PER MINUTE --     Blood Pressure Standin/51 --     Pulse Standin PER MINUTE            Patient is an 61-year-old male who presents to the ED with complaint of what appears to be syncopal episode based on history. He has reassuring neurologic examination here in the ED with faint left-sided facial droop which apparently is chronic from previous TIA which he was recently admitted for per family. Here in the ED patient is afebrile with stable vital signs. Nontoxic appearance. Reassuring neurologic examination. IV established and blood work obtained. CBC showed normal white count and platelets. Hemoglobin 11.1. CMP showed creatinine of 1.5 with GFR of 44. There is faint DARIAN at this time patient was ordered fluids here in the emergency department. Troponin BMP unremarkable. Lactic acid normal.  CK normal.  EKG interpreted by attending. CT of the head unremarkable. Chest x-ray showed no acute abnormality. The static vitals obtained here in the emergency department and blood pressure went from 142/67 with a pulse of 97 lying to 83/51 with pulse of 63 when standing. He appears to be orthostatic. He was already ordered fluids here in the emergency department. Patient most likely had syncopal episode which I believe secondary to decreased oral intake with DARIAN and orthostatic hypotension. Believe patient would benefit from admission for rehydration and further evaluation/treatment. Case we discussed with Dr. Calixto Ball who admits for PCP for admission. FINAL IMPRESSION      1. Syncope and collapse    2. DARIAN (acute kidney injury) ([facility] [address]    3. Orthostatic hypotension          DISPOSITION/PLAN   DISPOSITION Decision To Admit 01/29/2022 04:01:32 PM      PATIENT REFERRED TO:  No follow-up provider specified.     DISCHARGE MEDICATIONS:  New Prescriptions    No medications on file       DISCONTINUED MEDICATIONS:  Discontinued Medications    No medications on file              (Please note that portions of this note were completed with a voice recognition program.  Efforts were made to edit the dictations but occasionally words are mis-transcribed.)    DANISHA Qureshi (electronically signed)          DANISHA Jordan  01/29/22 6433

## 2022-01-29 NOTE — ED PROVIDER NOTES
EKG is reviewed myself. Dated today at 80. Rate 63 sinus rhythm normal EKG.   No change compared to 2012     Vamsi Barnes MD  01/29/22 9105

## 2022-01-29 NOTE — ED NOTES
Bed: 09  Expected date:   Expected time:   Means of arrival:   Comments:  Pooja Canseco RN  01/29/22 9125

## 2022-01-30 LAB
EKG ATRIAL RATE: 63 BPM
EKG DIAGNOSIS: NORMAL
EKG P AXIS: 16 DEGREES
EKG P-R INTERVAL: 164 MS
EKG Q-T INTERVAL: 428 MS
EKG QRS DURATION: 96 MS
EKG QTC CALCULATION (BAZETT): 437 MS
EKG R AXIS: -26 DEGREES
EKG T AXIS: -18 DEGREES
EKG VENTRICULAR RATE: 63 BPM

## 2022-01-30 PROCEDURE — G0378 HOSPITAL OBSERVATION PER HR: HCPCS

## 2022-01-30 PROCEDURE — 97166 OT EVAL MOD COMPLEX 45 MIN: CPT

## 2022-01-30 PROCEDURE — 92526 ORAL FUNCTION THERAPY: CPT

## 2022-01-30 PROCEDURE — 96372 THER/PROPH/DIAG INJ SC/IM: CPT

## 2022-01-30 PROCEDURE — 6370000000 HC RX 637 (ALT 250 FOR IP): Performed by: INTERNAL MEDICINE

## 2022-01-30 PROCEDURE — 97535 SELF CARE MNGMENT TRAINING: CPT

## 2022-01-30 PROCEDURE — 2580000003 HC RX 258: Performed by: INTERNAL MEDICINE

## 2022-01-30 PROCEDURE — 92610 EVALUATE SWALLOWING FUNCTION: CPT

## 2022-01-30 PROCEDURE — 97530 THERAPEUTIC ACTIVITIES: CPT

## 2022-01-30 PROCEDURE — 97162 PT EVAL MOD COMPLEX 30 MIN: CPT

## 2022-01-30 PROCEDURE — 1200000000 HC SEMI PRIVATE

## 2022-01-30 PROCEDURE — 6360000002 HC RX W HCPCS: Performed by: INTERNAL MEDICINE

## 2022-01-30 PROCEDURE — 93010 ELECTROCARDIOGRAM REPORT: CPT | Performed by: INTERNAL MEDICINE

## 2022-01-30 RX ORDER — SENNA AND DOCUSATE SODIUM 50; 8.6 MG/1; MG/1
2 TABLET, FILM COATED ORAL DAILY
Status: DISCONTINUED | OUTPATIENT
Start: 2022-01-30 | End: 2022-01-31 | Stop reason: HOSPADM

## 2022-01-30 RX ADMIN — ATORVASTATIN CALCIUM 20 MG: 20 TABLET, FILM COATED ORAL at 19:37

## 2022-01-30 RX ADMIN — ENOXAPARIN SODIUM 40 MG: 40 INJECTION SUBCUTANEOUS at 08:52

## 2022-01-30 RX ADMIN — SENNOSIDES AND DOCUSATE SODIUM 2 TABLET: 50; 8.6 TABLET ORAL at 19:37

## 2022-01-30 RX ADMIN — METOPROLOL SUCCINATE 25 MG: 25 TABLET, EXTENDED RELEASE ORAL at 08:52

## 2022-01-30 RX ADMIN — ASPIRIN 81 MG 81 MG: 81 TABLET ORAL at 08:52

## 2022-01-30 RX ADMIN — SODIUM CHLORIDE: 9 INJECTION, SOLUTION INTRAVENOUS at 19:42

## 2022-01-30 RX ADMIN — ESCITALOPRAM OXALATE 20 MG: 10 TABLET ORAL at 08:52

## 2022-01-30 RX ADMIN — CLOPIDOGREL BISULFATE 75 MG: 75 TABLET ORAL at 08:52

## 2022-01-30 RX ADMIN — SODIUM CHLORIDE: 9 INJECTION, SOLUTION INTRAVENOUS at 06:39

## 2022-01-30 RX ADMIN — LEVOTHYROXINE SODIUM 75 MCG: 0.07 TABLET ORAL at 06:37

## 2022-01-30 ASSESSMENT — PAIN SCALES - GENERAL
PAINLEVEL_OUTOF10: 0

## 2022-01-30 NOTE — H&P
[place] [address]                     [facility], [address]                              HISTORY AND PHYSICAL    PATIENT NAME: Terrence Medina                  :        1935  MED REC NO:   [id number]                          ROOM:       2529  ACCOUNT NO:   [de-identified]                           ADMIT DATE: 2022  PROVIDER:     Charly Pinon MD    HISTORY OF PRESENT ILLNESS:  The patient is an 42-year-old white  gentleman who came to the emergency room. The patient states that his  eyes rolled back in his head and then he started to fall. There was no  convulsions. No incontinence. No head and neck trauma. The patient  denied any chest pain or trouble breathing. He fairly promptly  recovered, but he is a known cardiac case. It was a syncope versus  seizure to the best of the description available in the squad notes. He  was recently admitted within last 4 weeks at [facility] for  concern for TIA. Has been at [address] [place] [address] for strength, PT, OT. Apparently today was in elevator when apparently his eyes rolled back  and he started to fall over, was caught before he fell, did not injure  anything. He apparently transiently lost consciousness. Does have a  slight facial droop which they state is normal for him from previous TIA  recently. There was no fever, no chills, no rash. He does have  decreased oral intake for last couple of days. He usually drinks three  to four bottles of water a day, but he only drinks two bottles per  family. No recent medication change. His blood pressure medication was  recently increased from 10 to 20 mg. The patient is on Plavix and  aspirin, but denies any other blood thinners. PAST MEDICAL HISTORY:  Pertinent for TIA, atherosclerotic heart disease. PAST SURGICAL HISTORY:  Pertinent for coronary artery bypass graft.     FAMILY HISTORY:  Both the parents are  because of natural  causes. MEDICATIONS:  The patient is on aspirin, Plavix, Lipitor, Lexapro,  levothyroxine, and Toprol. ALLERGIES:  No known allergies. SOCIAL HISTORY:  The patient is . He has five children. He  lives with his wife. He is a nonsmoker. He would have an occasional  alcoholic beverage. He used to work for [employer]. He has  never done any substance abuse. REVIEW OF SYSTEMS:  Pertinent for loss of consciousness which was  transient. There was no generalized tonic-clonic seizure but the eyes  did rollback. There was no myoclonus, no dysphagia. No speech  disturbance. No incontinence. No tongue biting. No angina pectoris. Does have exertional shortness of breath. No abdominal pain. No  hematemesis. No melena. No genitourinary complaint. The patient does  have chronic musculoskeletal pain. PHYSICAL EXAMINATION:  GENERAL:  Alert, awake, oriented x1 81year-old fairly competent white  man looking in no acute distress. VITAL SIGNS:  His temperature is 98.1, blood pressure 124/72,  respirations 16, heart rate is 66. O2 sat 100% on room air. HEENT:  Oral mucosa dry. SKIN:  Warm and dry. NECK:  Supple. Faint carotid bruit. No jugular venous distention. No  lymphadenopathy. No thyromegaly. LUNGS:  Vesicular breath sounds. Fairly clear to auscultation. HEART:  Regular rate and rhythm. S1 and S2 without any S3 or S4 gallop. ABDOMEN:  Soft, nontender. Bowel sounds present. EXTREMITIES:  Show trace edema. NEUROLOGIC:  The patient appears grossly intact. LABORATORY DATA:  Lab evaluation shows sodium 134, potassium 4.3,  chloride 101, CO2 of 25, BUN 24, creatinine 1.5. Anion gap is 8, GFR is  44, lactic acid level is 1.0, blood glucose is 127, total protein 6.6,  total CK 52. ProBNP level 419. Troponin was less than 0.01, alkaline  phosphatase 97, blood glucose is 127.   White blood cell count is 4.7,  hemoglobin/hematocrit is 11.1 and 33.6, MCV is 95.8, platelet count 283. Urinalysis is negative for urinary tract infection. Head CT without  contrast shows no evidence of acute intracranial abnormality. EKG is  normal sinus rhythm. The patient just a month ago had comprehensive chemistry and creatinine  was 1.4, today it is 1.5. So, there is no acute kidney injury here. The patient also had quite a bit of neurologic workup at [facility] less than a month ago. The patient had a CT angiogram that  shows left internal carotid artery bulbar _____ that causing 50%  diameter stenosis, right internal carotid had 30% diameter stenosis. No  intracranial proximal large vessel occlusion. Mild to moderate stenosis  of the bilateral internal carotid artery cavernous segment. No  posterior circulation, left proximal vessel stenosis. The patient also  had an MRI of the head. The patient had age-related atrophy without any  evidence of acute hemorrhage or extra-axial fluid collection. No  evidence of acute infarction with his periventricular white matter  sagittal changes, seen along chronic small vessel ischemia. No abnormal  masses are identified. Midline structure appeared normal.  Vascular  structure appeared grossly unremarkable. Extensive bilateral ethmoid  sinus opacification with mucosal thickening in the bilateral maxillary  sinuses. No evidence of acute abnormality. Sinus disease as above. ASSESSMENT:  Syncope, chronic kidney disease, hypertension,  atherosclerotic heart disease. PLAN:  Get him admitted, undergo neuro checks. Maintain hydration. PT,  OT, and speech eval.  It does not look like the patient is any far from  his baseline at this time, and he has had fairly extensive neurologic  workup done recently. So, I probably will just do an observation stay  and hopefully discharge him very soon.   A transthoracic echocardiogram  was also done and that shows LV ejection fraction of 55 to 60%, aortic  calcification, mild regurgitation. Left atrium was moderately dilated. Cavity size normal.  Atrial septum, no pericardial effusion. No  intracardiac thrombus.       Eliceo Dumont MD    D: 01/29/2022 23:31:09       T: 01/29/2022 23:35:44     [email or web address]  Job#: [id number]     Doc#: [id number]  CC:

## 2022-01-30 NOTE — PROGRESS NOTES
Patient Active Problem List   Diagnosis    CAD (coronary artery disease)    Syncope and collapse    Bilateral carotid artery stenosis    Stage 2 chronic kidney disease    Primary hypertension   H&P dictated

## 2022-01-30 NOTE — CARE COORDINATION
Discharge Planning Assessment    RN discharge planner met with patient/ (and family member) to discuss reason for admission, current living situation, and potential needs at the time of discharge    Demographics/Insurance verified Yes    Current type of dwelling: single level with a basement, does not navigate stairs -no entry stairs    Patient from ECF/SW confirmed with:n/a    Living arrangements: with wife    Level of function/Support: reports independent, had a fall in the past 2 weeks which he attributes to loss of balance    PCP: Rohith Villalba      Last Visit to PCP: in past month    DME: uses walker, also has rollator, motorized scooter, grab bars in shower, emergency fall button    Active with any community resources/agencies/skilled home care: states has home health not able to state agency    Medication compliance issues: [address] [place] [address] issues that could impact healthcare: not noted        Tentative discharge plan home with home health  :  Discussed and provided facilities of choice if transition to a skilled nursing facility is required at the time of discharge      Discussed with patient and/or family that on the day of discharge home tentative time of discharge will be between 10 AM and noon.     Transportation at the time of discharge: DIDIER RobertN, CCM, RN  [facility]  [phone]

## 2022-01-30 NOTE — PROGRESS NOTES
Occupational Therapy   Occupational Therapy Initial Assessment  Date: 2022   Patient Name: Adrianna Herndon  MRN: [id number]     : 1935    Date of Service: 2022    Discharge Recommendations:    Adrianna Herndon scored a 15/24 on the AM-PAC ADL Inpatient form. Current research shows that an AM-PAC score of 17 or less is typically not associated with a discharge to the patient's home setting. Based on the patient's AM-PAC score and their current ADL deficits, it is recommended that the patient have 5-7 sessions per week of Occupational Therapy at d/c to increase the patient's independence. At this time, this patient demonstrates the endurance, and/or tolerance for 3 hours of therapy each day, with a treatment frequency of 5-7x/wk. Please see assessment section for further patient specific details. If patient discharges prior to next session this note will serve as a discharge summary. Please see below for the latest assessment towards goals. OT Equipment Recommendations  Equipment Needed: No  Other: defer to next level of care    Assessment   Performance deficits / Impairments: Decreased functional mobility ; Decreased ADL status; Decreased strength;Decreased safe awareness;Decreased cognition;Decreased endurance;Decreased balance  Assessment: Pt is currently functioning below occupational baseline and demo the deficits listed above, pt would benefit from continued skilled OT services to address these deficits and increase IND, safety, and ease with all occupational pursuits  Treatment Diagnosis: Decreased ADL status, functional mobility, and functional transfers d/t Syncope and collapse  Prognosis: Good  Decision Making: Medium Complexity  OT Education: OT Role;Plan of Care;Transfer Training  Patient Education: frances d/c recommendations- pt verbalized understanding but would benefit from reinforcement  Barriers to Learning: cognition  REQUIRES OT FOLLOW UP: Yes  Activity Tolerance  Activity Tolerance: Patient Tolerated treatment well  Activity Tolerance: BP after ambulation to bathroom 129/65 HR 53, BP after sitting for x3 minutes 157/79, BP after standing for ADLs 122/64 HR 61, BP after seated for x4 minutes 157/75 HR 58  Safety Devices  Safety Devices in place: Yes  Type of devices: All fall risk precautions in place; Patient at risk for falls; Chair alarm in place; Left in chair;Call light within reach;Nurse notified           Patient Diagnosis(es): The primary encounter diagnosis was Syncope and collapse. Diagnoses of DARIAN (acute kidney injury) ([facility] [address] and Orthostatic hypotension were also pertinent to this visit. has a past medical history of CAD (coronary artery disease). has a past surgical history that includes Coronary artery bypass graft and Cardiac surgery. Treatment Diagnosis: Decreased ADL status, functional mobility, and functional transfers d/t Syncope and collapse      Restrictions  Restrictions/Precautions  Restrictions/Precautions: Fall Risk (HIGH FALL RISK)  Required Braces or Orthoses?: No  Position Activity Restriction  Other position/activity restrictions: per H&P \"81 y.o. male with past medical show CAD who presents to the ED with complaint of syncope versus seizure. Patient apparently was recently admitted within the past 4 weeks at [facility] for concern for TIA. Has been at rehab facility for strength/PT/OT. Apparently today was in the elevator when apparently his eyes rolled back and he started to fall over. Was caught before he fell and did not injure anything. Apparently did lose consciousness briefly for a few seconds. They were concerned he potentially could have had a seizure versus syncopal episode and brought to the ED for further evaluation and treatment. At this time he is at baseline mental status per family at bedside. He does have a slight facial droop which they state is normal from the previous TIA he just recently had.   He denies any headache, visual changes, speech disturbances, numbness/tingling, chest pain, shortness of breath, abdominal pain, nausea/vomiting, urinary symptoms or changes in bowel movements. Denies any fever or chills. Denies any rashes or lesions. Has had decreased oral intake over the past couple days. Apparently normally drinks 3-4 bottles of water a day but over the past couple days and only drank about 2 bottles of water per family. Denies any recent medication changes. Upon recent admission apparently they increased his amlodipine from 10 mg to 20 mg daily secondary to some elevated blood pressure when he was admitted. Denies any other recent medication changes. He is on Plavix and aspirin but denies any other blood thinners. \"    Subjective   General  Chart Reviewed: Yes  Patient assessed for rehabilitation services?: Yes  Additional Pertinent Hx: PMH: CAD (coronary artery disease).   Family / Caregiver Present: No  Referring Practitioner: Pola Serrano MD  Diagnosis: Syncope and collapse  Subjective  Subjective: Pt supine in bed upon arrival, pleasant and agreeable to OT evaluation and treat and requesting to complete ADLs  Patient Currently in Pain: Denies  Vital Signs  Pulse: 53  BP: 129/65  MAP (mmHg): 86  Patient Currently in Pain: Denies    Social/Functional History  Social/Functional History  Lives With: Spouse  Type of Home: House  Home Layout: One level  Home Access: Stairs to enter with rails  Entrance Stairs - Number of Steps: 1 STEP  Bathroom Shower/Tub: Walk-in shower  Bathroom Toilet: Handicap height  Bathroom Equipment: Grab bars in shower  Home Equipment: Rolling walker,Electric scooter  ADL Assistance: Independent  Homemaking Responsibilities: No (spouse completes, children assist)  Ambulation Assistance: Independent (use of RW)  Transfer Assistance: Independent  Active : No  Patient's  Info: kids drive pt to appointments  Occupation: Retired  Additional Comments: Pt reports 2 falls repetition; Follows one step commands with increased time  Attention Span: Difficulty dividing attention  Memory: Decreased recall of recent events;Decreased short term memory  Safety Judgement: Decreased awareness of need for assistance;Decreased awareness of need for safety  Problem Solving: Decreased awareness of errors;Assistance required to identify errors made;Assistance required to generate solutions  Insights: Decreased awareness of deficits  Initiation: Requires cues for some  Sequencing: Requires cues for some  Cognition Comment: Pt slow to process, word finding difficulties, slow and slightly slurred speech.   Perception  Overall Perceptual Status: Impaired  Unilateral Attention: Cues to maintain midline in standing (increased posterior lean; 3 posterior LOB)  Sensation  Overall Sensation Status: WNL  LUE AROM (degrees)  LUE AROM : WFL  Left Hand AROM (degrees)  Left Hand AROM: WFL  RUE AROM (degrees)  RUE AROM : WFL  Right Hand AROM (degrees)  Right Hand AROM: WFL           Plan   Plan  Times per week: 3-5x/wk  Times per day: Daily  Current Treatment Recommendations: Strengthening,Balance Training,Functional Mobility Training,Endurance Training,Equipment Evaluation, Education, & procurement,Patient/Caregiver Education & Training,Safety Education & Training,Self-Care / ADL    G-Code     OutComes Score                                                  AM-PAC Score        AM-[facility] Inpatient Daily Activity Raw Score: 15 (01/30/22 1401)  -PAC Inpatient ADL T-Scale Score : 34.69 (01/30/22 1401)  ADL Inpatient CMS 0-100% Score: 56.46 (01/30/22 1401)  ADL Inpatient CMS G-Code Modifier : CK (01/30/22 1401)    Goals  Short term goals  Time Frame for Short term goals: d/c  Short term goal 1: Pt will complete functional transfer to ADL surface with CGA  Short term goal 2: Pt will complete UB ADLs with setup  Short term goal 3: Pt will complete LB ADLs with min(A)  Short term goal 4: Pt will complete toileting with min(A)  Short term goal 5: Pt will complete functional mobility to<>from bathroom with use of RW and CGA  Long term goals  Time Frame for Long term goals : LTG=STG  Patient Goals   Patient goals : pt did not state       Therapy Time   Individual Concurrent Group Co-treatment   Time In 1231         Time Out 1309         Minutes 38         Timed Code Treatment Minutes: 12 [facility], [address] [place] [id number]

## 2022-01-30 NOTE — PROGRESS NOTES
01/29/22 1943   RT Protocol   History Pulmonary Disease 0   Respiratory pattern 0   Breath sounds 2   Cough 0   Bronchodilator Assessment Score 2

## 2022-01-30 NOTE — PROGRESS NOTES
Physical Therapy    Facility/Department: [address] NURSING  Initial Assessment    NAME: Marietta Rodriguez  : 1935  MRN: [id number]    Date of Service: 2022    Discharge Recommendations: Marietta Rodriguez scored a 14/24 on the AM-PAC short mobility form. Current research shows that an AM-PAC score of 17 or less is typically not associated with a discharge to the patient's home setting. Based on the patient's AM-PAC score and their current functional mobility deficits, it is recommended that the patient have 5-7 sessions per week of Physical Therapy at d/c to increase the patient's independence. At this time, this patient demonstrates the endurance, and/or tolerance for 3 hours of therapy each day, with a treatment frequency of 5-7x/wk. Please see assessment section for further patient specific details. If patient discharges prior to next session this note will serve as a discharge summary. Please see below for the latest assessment towards goals. PT Equipment Recommendations  Equipment Needed: No (to be determined at next level of care)    Assessment   Body structures, Functions, Activity limitations: Decreased functional mobility ; Decreased balance;Decreased posture;Decreased strength;Decreased safe awareness;Decreased cognition  Assessment: 82 yo male admitted 2022 with fall and (+) orthostatic hypotension. Pt presents with festinating gait pattern, multiple posterior LOB, and poor body awareness in space. Pt will benefit from further PT to facilitate increase in balance, gait, strength, and functional mobility. Treatment Diagnosis: decreased functional mobility  Specific instructions for Next Treatment: gait training, balance training  Prognosis: Good  Decision Making: Medium Complexity  History: as above  Exam: as above  Clinical Presentation: evolving  PT Education: Goals; General Safety;Gait Training;PT Role;Plan of Care; Functional Mobility Training;Transfer Training  Patient Education: pt verbalized understanding, however, did not appear to have a thorough understanding of deficits and discharge planning; pt will require further reinforcement of educational material  Barriers to Learning: cognition, poor safety awareness  REQUIRES PT FOLLOW UP: Yes  Activity Tolerance  Activity Tolerance: Patient Tolerated treatment well  Activity Tolerance: Pt was able to tolerate session and is motivated to increase activity and return home with family       Patient Diagnosis(es): The primary encounter diagnosis was Syncope and collapse. Diagnoses of DARIAN (acute kidney injury) ([facility] [address] and Orthostatic hypotension were also pertinent to this visit. has a past medical history of CAD (coronary artery disease). has a past surgical history that includes Coronary artery bypass graft and Cardiac surgery. Restrictions  Restrictions/Precautions  Restrictions/Precautions: Fall Risk (HIGH FALL RISK)  Required Braces or Orthoses?: No  Position Activity Restriction  Other position/activity restrictions: per H&P \"81 y.o. male with past medical show CAD who presents to the ED with complaint of syncope versus seizure. Patient apparently was recently admitted within the past 4 weeks at [facility] for concern for TIA. Has been at rehab facility for strength/PT/OT. Apparently today was in the elevator when apparently his eyes rolled back and he started to fall over. Was caught before he fell and did not injure anything. Apparently did lose consciousness briefly for a few seconds. They were concerned he potentially could have had a seizure versus syncopal episode and brought to the ED for further evaluation and treatment. At this time he is at baseline mental status per family at bedside. He does have a slight facial droop which they state is normal from the previous TIA he just recently had.   He denies any headache, visual changes, speech disturbances, numbness/tingling, chest pain, shortness of breath, abdominal pain, nausea/vomiting, urinary symptoms or changes in bowel movements. Denies any fever or chills. Denies any rashes or lesions. Has had decreased oral intake over the past couple days. Apparently normally drinks 3-4 bottles of water a day but over the past couple days and only drank about 2 bottles of water per family. Denies any recent medication changes. Upon recent admission apparently they increased his amlodipine from 10 mg to 20 mg daily secondary to some elevated blood pressure when he was admitted. Denies any other recent medication changes. He is on Plavix and aspirin but denies any other blood thinners. \"     Vision/Hearing  Vision: Within Functional Limits  Hearing: Exceptions to [facility]  Hearing Exceptions: Hard of hearing/hearing concerns       Subjective  General  Chart Reviewed: Yes  Patient assessed for rehabilitation services?: Yes  Response To Previous Treatment: Not applicable  Family / Caregiver Present: No  Follows Commands: Within Functional Limits  General Comment  Comments: Pt is supine in bed, states immediately that he has to use the bathroom  Subjective  Subjective: \"I was walking around my daughter's house and then I passed out and am here. \" Pt understands that his BP is dropping.   Pain Screening  Patient Currently in Pain: Denies  Vital Signs  Patient Currently in Pain: Denies       Orientation  Orientation  Overall Orientation Status: Within Functional Limits (pt was A & O x 4, but had multiple instances of confusion and difficulty maintaining conversation following this line of questioning)     Social/Functional History  Social/Functional History  Lives With: Spouse  Type of Home: House  Home Layout: One level  Home Access: Stairs to enter with rails  Entrance Stairs - Number of Steps: 1 STEP  Bathroom Shower/Tub: Walk-in shower  Bathroom Toilet: Handicap height  Bathroom Equipment: Grab bars in shower  Home Equipment: Rolling walker,Electric scooter  ADL Assistance: Independent  Homemaking Responsibilities: No (spouse completes, children assist)  Ambulation Assistance: Independent (use of RW)  Transfer Assistance: Independent  Active : No  Patient's  Info: kids drive pt to appointments  Occupation: Retired  Additional Comments: Pt reports 2 falls in the last 6 months; pt has been staying at [facility] since prior hospital admission at [facility]. [place] and receiving home health services     Cognition   Cognition  Overall Cognitive Status: Exceptions  Arousal/Alertness: Delayed responses to stimuli  Following Commands: Follows one step commands with repetition; Follows one step commands with increased time  Attention Span: Difficulty dividing attention  Memory: Decreased recall of recent events;Decreased short term memory  Safety Judgement: Decreased awareness of need for assistance;Decreased awareness of need for safety  Problem Solving: Decreased awareness of errors;Assistance required to identify errors made;Assistance required to generate solutions  Insights: Decreased awareness of deficits  Initiation: Requires cues for some  Sequencing: Requires cues for some  Cognition Comment: Pt slow to process, word finding difficulties, slow and slightly slurred speech.     Objective     Observation/Palpation  Posture: Fair (rounded shoulders, forward head posture)  Observation: BP after ambulation to bathroom 129/65 HR 53, BP after sitting for x3 minutes 157/79, BP after standing for ADLs 122/64 HR 61, BP after seated for x4 minutes 157/75 HR 58    AROM RLE (degrees)  RLE AROM: WFL  AROM LLE (degrees)  LLE AROM : WFL  Strength RLE  Comment: hip 4/5, knee 4/5, ankle DF 4/5  Strength LLE  Comment: hip 4/5, knee 4/5, ankle DF 4/5  Tone RLE  RLE Tone: Normotonic  Tone LLE  LLE Tone: Normotonic  Motor Control  Gross Motor?: WFL  Coordination  Rapid Alternating Movements: Normal  Heel to Shin: Abnormal (mildly delayed)  Sensation  Overall Sensation Status: WNL  Bed mobility  Supine to Sit: Contact guard assistance (slow movements)  Scooting: Contact guard assistance   Transfers  Sit to Stand: 2 Person Assistance (min A x 2 for transfer from EOB and recliner; mod A x 2 from commode with heavy use of UEs on rail in bathroom)  Stand to sit: 2 Person Assistance (max A for stand < sit - had 1 major LOB back into chair and unable to control posterior lean; verbal/tactile cueing for UE positioning)  Comment: pt had poor awareness of body in space and of posterior lean; had multiple LOB posteriorly in standing  Ambulation  Ambulation?: Yes  Ambulation 1  Surface: level tile  Device: Rolling Walker  Assistance: 2 Person assistance (mod A x 2 due to multiple LOB, difficulty navigating with RW)  Quality of Gait: festinating gait, poor B foot clearance, slow and shuffling; characteristics of Parkinsonian gait pattern; significant forward trunk lean with multiple posterior LOB, narrow RACHEL, rigidity in posture  Gait Deviations: Shuffles;Decreased step length;Decreased step height;Decreased head and trunk rotation  Distance: 10' x 2 to and from bathroom  Stairs/Curb  Stairs?: No     Balance  Posture: Fair (rounded shoulders, forward head)  Sitting - Static: Fair (CGA seated on commode and EOB - pt reaches for bedrail and grab bar next to commode)  Sitting - Dynamic: Fair;- (poor awareness of body in space)  Standing - Static: Poor (varied from min-max A due to multiple LOBs in standing)  Standing - Dynamic: Poor;- (varied from min-max A due to multiple LOBs in standing)        Plan   Plan  Times per week: 3-5  Times per day: Daily  Plan weeks: may benefit from increase in frequency depending on course of treatment while hospitalized  Specific instructions for Next Treatment: gait training, balance training  Current Treatment Recommendations: Strengthening,Neuromuscular Re-education,Balance Training,Patient/Caregiver Education & Training,Functional Mobility Training,Transfer Training,Gait Training  Safety Devices  Type of devices: All fall risk precautions in place,Call light within reach,Chair alarm in place,Gait belt,Patient at risk for falls,Left in chair,Nurse notified  Restraints  Initially in place: No      AM-PAC Score  AM-PAC Inpatient Mobility Raw Score : 14 (01/30/22 1406)  AM-PAC Inpatient T-Scale Score : 38.1 (01/30/22 1406)  Mobility Inpatient CMS 0-100% Score: 61.29 (01/30/22 1406)  Mobility Inpatient CMS G-Code Modifier : CL (01/30/22 1406)          Goals  Short term goals  Time Frame for Short term goals: Discharge  Short term goal 1: Pt will perform bed mobility with SBA  Short term goal 2: Pt will perform transfers with min A and RW  Short term goal 3: Pt will perform ambulation x 150' with min A and RW  Patient Goals   Patient goals :  To return home       Therapy Time   Individual Concurrent Group Co-treatment   Time In 1231         Time Out 1309         Minutes 38         Timed Code Treatment Minutes: [address] [place], [place] [id number]

## 2022-01-30 NOTE — PROGRESS NOTES
Facility/Department: [facility] 3A NURSING  Initial Assessment  DYSPHAGIA BEDSIDE SWALLOW EVALUATION     Patient: Swati Wang   : 1935   MRN: [id number]      Evaluation Date: 2022   Admitting Diagnosis: Orthostatic hypotension [I95.1]  Syncope and collapse [R55]  DARIAN (acute kidney injury) ([facility] [address] [N17.9]  Pain: Pt denied                        H&P:   The patient is an 27-year-old white  gentleman who came to the emergency room. The patient states that his  eyes rolled back in his head and then he started to fall. There was no  convulsions. No incontinence. No head and neck trauma. The patient  denied any chest pain or trouble breathing. He fairly promptly  recovered, but he is a known cardiac case. It was a syncope versus  seizure to the best of the description available in the squad notes. He  was recently admitted within last 4 weeks at [facility] for  concern for TIA. Has been at [address] [place] [address] for strength, PT, OT. Apparently today was in elevator when apparently his eyes rolled back  and he started to fall over, was caught before he fell, did not injure  anything. He apparently transiently lost consciousness. Does have a  slight facial droop which they state is normal for him from previous TIA  recently. There was no fever, no chills, no rash. He does have  decreased oral intake for last couple of days. He usually drinks three  to four bottles of water a day, but he only drinks two bottles per  family. No recent medication change. His blood pressure medication was  recently increased from 10 to 20 mg. The patient is on Plavix and  aspirin, but denies any other blood thinners. Chest xray: (2022)  Impression   No acute abnormality. Head CT: (2022)  Impression   No acute intracranial abnormality. Modified Barium Swallow Study: N/A    History/Prior Level of Function:   Living Status: Pt currently temporarily lives with daughter following recent TIA. liquids Recommended form of Meds: Meds in puree     Compensatory Swallowing Strategies:  Upright as possible with all PO intake , Small bites/sips , Eat/feed slowly, Remain upright 30-45 min     SHORT TERM DYSPHAGIA GOALS/PLAN OF CARE: Speech therapy for dysphagia tx 1-2 times to ensure diet tolerance.   Pt will functionally tolerate recommended diet with no overt clinical s/s of aspiration  Pt will demonstrate understanding of aspiration risk and precautions via education/demonstration with occasional prompting    Dysphagia Therapeutic Intervention:  Diet Tolerance Monitoring , Patient/Family Education     Discharge Recommendations: Discharge recommendations to be determined pending ongoing follow-up during acute care stay    Patient Positioning: Upright in bed    Current Diet Level (prior to evaluation): Regular texture diet  Thin liquids      Respiratory Status:   [x]Room Air   []O2 via nasal cannula   []Other:    Dentition:  []Adequate  [x]Dentures - upper/lower  []Missing Many Teeth  []Edentulous  []Other:    Baseline Vocal Quality:  [x]Normal  []Dysphonic   []Aphonic   []Hoarse  []Wet  []Weak  []Other:    Volitional Cough:  [x]Strong  []Weak  []Wet  []Absent  []Congested  []Other:    Volitional Swallow:   []Absent   []Delayed     [x]Adequate     []Required use of drink     Oral Mechanism Exam:  []WFL [x]Mild   [x] Moderate  []Severe  []To be assessed  Impaired:   [x]Left side      [x]Right side    [x]Labial ROM/Coordination- moderately decreased labial protrusion and retraction    [x]Labial Symmetry-slight L facial droop   [x]Lingual ROM/Coordination  []Lingual Symmetry  []Gag  []Other:     Oral Phase: []WFL [x]Mild   [] Moderate  []Severe  []To be assessed   [x]Impaired/Prolonged Mastication: regular textures   []Spillage Left:   []Spillage Right:  []Pocketing Left:   []Pocketing Right:   []Decreased Anterior to Posterior Transit:   []Suspected Premature Bolus Loss:   []Lingual/Palatal Residue:   []Other: Pharyngeal Phase: []WFL [x]Mild   [] Moderate  []Severe  []To be assessed   []Delayed Swallow:   []Suspected Pharyngeal Pooling:   []Decreased Laryngeal Elevation:   []Absent Swallow:  []Wet Vocal Quality:   []Throat Clearing-Immediate:   []Throat Clearing-Delayed:   []Cough-Immediate:   []Cough-Delayed:  []Change in Vital Signs:  [x]Suspected Delayed Pharyngeal Clearing: use of second dry swallow with textures >thin liquids  []Other:       Eating Assistance:  [x]Independent  []Setup or clean-up assistance   [] Supervision or touching assistance   [] Partial or moderate assistance   [] Substantial or maximal assistance  [] Dependent       EDUCATION:   Provided education regarding role of SLP, results of assessment, recommendations and general speech pathology plan of care. [x] Pt verbalized understanding and agreement   [x] Pt requires ongoing learning   [] No evidence of comprehension     If patient discharges prior to next visit, this note will serve as discharge. Timed Code Minutes: 0 minutes  Total Treatment Minutes: 23 minutes    Electronically signed by:      Domi KC CCC-SLP [id number] [id number]  Speech-Language Pathologist

## 2022-01-30 NOTE — RT PROTOCOL NOTE
RT Inhaler-Nebulizer Bronchodilator Protocol Note    There is a bronchodilator order in the chart from a provider indicating to follow the RT Bronchodilator Protocol and there is an Initiate RT Inhaler-Nebulizer Bronchodilator Protocol order as well (see protocol at bottom of note). CXR Findings:  XR CHEST PORTABLE    Result Date: 1/29/2022  No acute abnormality. The findings from the last RT Protocol Assessment were as follows:   History Pulmonary Disease: (P) None or smoker <15 pack years  Respiratory Pattern: (P) Regular pattern and RR 12-20 bpm  Breath Sounds: (P) Slightly diminished and/or crackles  Cough: (P) Strong, spontaneous, non-productive  Indication for Bronchodilator Therapy:    Bronchodilator Assessment Score: (P) 2    Aerosolized bronchodilator medication orders have been revised according to the RT Inhaler-Nebulizer Bronchodilator Protocol below. Respiratory Therapist to perform RT Therapy Protocol Assessment initially then follow the protocol. Repeat RT Therapy Protocol Assessment PRN for score 0-3 or on second treatment, BID, and PRN for scores above 3. No Indications - adjust the frequency to every 6 hours PRN wheezing or bronchospasm, if no treatments needed after 48 hours then discontinue using Per Protocol order mode. If indication present, adjust the RT bronchodilator orders based on the Bronchodilator Assessment Score as indicated below. Use Inhaler orders unless patient has one or more of the following: on home nebulizer, not able to hold breath for 10 seconds, is not alert and oriented, cannot activate and use MDI correctly, or respiratory rate 25 breaths per minute or more, then use the equivalent nebulizer order(s) with same Frequency and PRN reasons based on the score. If a patient is on this medication at home then do not decrease Frequency below that used at home.     0-3 - enter or revise RT bronchodilator order(s) to equivalent RT Bronchodilator order with Frequency of every 4 hours PRN for wheezing or increased work of breathing using Per Protocol order mode. 4-6 - enter or revise RT Bronchodilator order(s) to two equivalent RT bronchodilator orders with one order with BID Frequency and one order with Frequency of every 4 hours PRN wheezing or increased work of breathing using Per Protocol order mode. 7-10 - enter or revise RT Bronchodilator order(s) to two equivalent RT bronchodilator orders with one order with TID Frequency and one order with Frequency of every 4 hours PRN wheezing or increased work of breathing using Per Protocol order mode. 11-13 - enter or revise RT Bronchodilator order(s) to one equivalent RT bronchodilator order with QID Frequency and an Albuterol order with Frequency of every 4 hours PRN wheezing or increased work of breathing using Per Protocol order mode. Greater than 13 - enter or revise RT Bronchodilator order(s) to one equivalent RT bronchodilator order with every 4 hours Frequency and an Albuterol order with Frequency of every 2 hours PRN wheezing or increased work of breathing using Per Protocol order mode. RT to enter RT Home Evaluation for COPD & MDI Assessment order using Per Protocol order mode.     Electronically signed by Christal Thompson RCP on 1/29/2022 at 7:43 PM

## 2022-01-31 VITALS
HEART RATE: 54 BPM | WEIGHT: 200 LBS | SYSTOLIC BLOOD PRESSURE: 173 MMHG | BODY MASS INDEX: 27.09 KG/M2 | OXYGEN SATURATION: 97 % | DIASTOLIC BLOOD PRESSURE: 71 MMHG | HEIGHT: 72 IN | RESPIRATION RATE: 16 BRPM | TEMPERATURE: 97.5 F

## 2022-01-31 LAB
ANION GAP SERPL CALCULATED.3IONS-SCNC: 10 MMOL/L (ref 3–16)
BUN BLDV-MCNC: 20 MG/DL (ref 7–20)
CALCIUM SERPL-MCNC: 8.7 MG/DL (ref 8.3–10.6)
CHLORIDE BLD-SCNC: 105 MMOL/L (ref 99–110)
CO2: 24 MMOL/L (ref 21–32)
CREAT SERPL-MCNC: 1.2 MG/DL (ref 0.8–1.3)
GFR AFRICAN AMERICAN: >60
GFR NON-AFRICAN AMERICAN: 57
GLUCOSE BLD-MCNC: 102 MG/DL (ref 70–99)
POTASSIUM SERPL-SCNC: 4.5 MMOL/L (ref 3.5–5.1)
SODIUM BLD-SCNC: 139 MMOL/L (ref 136–145)

## 2022-01-31 PROCEDURE — 80048 BASIC METABOLIC PNL TOTAL CA: CPT

## 2022-01-31 PROCEDURE — 92526 ORAL FUNCTION THERAPY: CPT

## 2022-01-31 PROCEDURE — 6360000002 HC RX W HCPCS: Performed by: INTERNAL MEDICINE

## 2022-01-31 PROCEDURE — 6370000000 HC RX 637 (ALT 250 FOR IP): Performed by: INTERNAL MEDICINE

## 2022-01-31 PROCEDURE — 97116 GAIT TRAINING THERAPY: CPT

## 2022-01-31 PROCEDURE — G0378 HOSPITAL OBSERVATION PER HR: HCPCS

## 2022-01-31 PROCEDURE — 97535 SELF CARE MNGMENT TRAINING: CPT

## 2022-01-31 PROCEDURE — 96372 THER/PROPH/DIAG INJ SC/IM: CPT

## 2022-01-31 RX ORDER — SENNA AND DOCUSATE SODIUM 50; 8.6 MG/1; MG/1
2 TABLET, FILM COATED ORAL DAILY
Status: ON HOLD | COMMUNITY
Start: 2022-01-31 | End: 2022-08-16

## 2022-01-31 RX ADMIN — CLOPIDOGREL BISULFATE 75 MG: 75 TABLET ORAL at 09:24

## 2022-01-31 RX ADMIN — ENOXAPARIN SODIUM 40 MG: 40 INJECTION SUBCUTANEOUS at 09:24

## 2022-01-31 RX ADMIN — METOPROLOL SUCCINATE 25 MG: 25 TABLET, EXTENDED RELEASE ORAL at 09:24

## 2022-01-31 RX ADMIN — LEVOTHYROXINE SODIUM 75 MCG: 0.07 TABLET ORAL at 04:50

## 2022-01-31 RX ADMIN — ASPIRIN 81 MG 81 MG: 81 TABLET ORAL at 09:24

## 2022-01-31 RX ADMIN — ESCITALOPRAM OXALATE 20 MG: 10 TABLET ORAL at 09:24

## 2022-01-31 ASSESSMENT — PAIN SCALES - GENERAL: PAINLEVEL_OUTOF10: 0

## 2022-01-31 NOTE — DISCHARGE INSTR - COC
Continuity of Care Form    Patient Name: Alexandre Tavera   :  1935  MRN:  [id number]    Admit date:  2022  Discharge date:  2022    Code Status Order: Full Code   Advance Directives:      Admitting Physician:  Abby Dee MD  PCP: Roberta Duque MD    Discharging Nurse: [facility] Unit/Room#: 3AN-3313/3313-01  Discharging Unit Phone Number: [phone]    Emergency Contact:   Extended Emergency Contact Information  Primary Emergency Contact: Sajan Potts [facility] [address] JUDITH QUIROGA)  Home Phone: [phone]  Relation: Child  Secondary Emergency Contact: [facility] Phone: [phone]  Relation: Child    Past Surgical History:  Past Surgical History:   Procedure Laterality Date    CARDIAC SURGERY          CORONARY ARTERY BYPASS GRAFT         Immunization History:   Immunization History   Administered Date(s) Administered    COVID-19, Pfizer Purple top, DILUTE for use, 12+ yrs, 30mcg/0.3mL dose 2021, 2021, 2021, 2022    Influenza Virus Vaccine 10/18/2012    Pneumococcal Conjugate 7-valent (Fariha Hails) 2002       Active Problems:  Patient Active Problem List   Diagnosis Code    CAD (coronary artery disease) I25.10    Syncope and collapse R55    Bilateral carotid artery stenosis I65.23    Stage 2 chronic kidney disease N18.2    Primary hypertension I10       Isolation/Infection:   Isolation            No Isolation          Patient Infection Status       None to display            Nurse Assessment:  Last Vital Signs: BP (!) 173/71   Pulse 54   Temp 97.5 °F (36.4 °C) (Oral)   Resp 16   Ht 6' (1.829 m)   Wt 200 lb (90.7 kg)   SpO2 97%   BMI 27.12 kg/m²     Last documented pain score (0-10 scale): Pain Level: 0  Last Weight:   Wt Readings from Last 1 Encounters:   22 200 lb (90.7 kg)     Mental Status:  oriented, alert, coherent, logical, thought processes intact, and able to concentrate and follow conversation    IV Access:  - None    Nursing Mobility/ADLs:  Walking   Assisted  Transfer  Assisted  Bathing  Assisted  Dressing  Assisted  Toileting  Assisted  Feeding  Independent  Med Admin  Assisted  Med Delivery   whole in [facility]    Wound Care Documentation and Therapy:        Elimination:  Continence: Bowel: Yes  Bladder: Yes  Urinary Catheter: None   Colostomy/Ileostomy/Ileal Conduit: No       Date of Last BM: 1/30/2022    Intake/Output Summary (Last 24 hours) at 1/31/2022 0854  Last data filed at 1/31/2022 0736  Gross per 24 hour   Intake 1769.16 ml   Output 2775 ml   Net -1005.84 ml     I/O last 3 completed shifts: In: 2643.8 [P.O.:120; I.V.:2523.8]  Out: 4100 [Urine:4100]    Safety Concerns: At Risk for Falls    Impairments/Disabilities:      None    Nutrition Therapy:  Current Nutrition Therapy:   - Oral Diet:  General    Routes of Feeding: Oral  Liquids: Thin Liquids  Daily Fluid Restriction: no  Last Modified Barium Swallow with Video (Video Swallowing Test): not done    Treatments at the Time of Hospital Discharge:   Respiratory Treatments: N/A  Oxygen Therapy:  is not on home oxygen therapy.   Ventilator:    - No ventilator support    Rehab Therapies: Physical Therapy and Occupational Therapy  Weight Bearing Status/Restrictions: No weight bearing restirctions  Other Medical Equipment (for information only, NOT a DME order):  walker  Other Treatments: N/A    Patient's personal belongings (please select all that are sent with patient):  None    RN SIGNATURE:  Electronically signed by Jabari Llamas RN on 1/31/22 at 10:07 AM EST    CASE MANAGEMENT/SOCIAL WORK SECTION    Inpatient Status Date: 1/29/2022 - 1/31/2022    Readmission Risk Assessment Score:  Readmission Risk              Risk of Unplanned Readmission:  13           Discharging to Facility/ Agency   Name:   [facility]  Phone: [phone]  Fax: [phone]   Address:  Phone:  Fax:    Dialysis Facility (if applicable)   Name:  Address:  Dialysis Schedule:  Phone:  Fax:    / signature: Electronically signed by Vito Rios RN on 1/31/22 at 11:56 AM EST    PHYSICIAN SECTION    Prognosis: Guarded    Condition at Discharge: Stable    Rehab Potential (if transferring to Rehab): Fair    Recommended Labs or Other Treatments After Discharge: Home with home care       Physician Certification: I certify the above information and transfer of Swati Wang  is necessary for the continuing treatment of the diagnosis listed and that he requires Acute Rehab for less 30 days.      Update Admission H&P: No change in H&P    PHYSICIAN SIGNATURE:  Electronically signed by Mp Gray MD on 1/31/22 at 8:55 AM EST

## 2022-01-31 NOTE — PROGRESS NOTES
Yes  Activity Tolerance  Activity Tolerance: Patient Tolerated treatment well     Patient Diagnosis(es): The primary encounter diagnosis was Syncope and collapse. Diagnoses of DARIAN (acute kidney injury) ([address] and Orthostatic hypotension were also pertinent to this visit. has a past medical history of CAD (coronary artery disease). has a past surgical history that includes Coronary artery bypass graft and Cardiac surgery. Restrictions  Restrictions/Precautions  Restrictions/Precautions: Fall Risk (High fall risk  Simultaneous filing. User may not have seen previous data.)  Required Braces or Orthoses?: No (Simultaneous filing. User may not have seen previous data.)  Position Activity Restriction  Other position/activity restrictions: per H&P \"81 y.o. male with past medical show CAD who presents to the ED with complaint of syncope versus seizure. Patient apparently was recently admitted within the past 4 weeks at [facility] for concern for TIA. Has been at rehab facility for strength/PT/OT. Apparently today was in the elevator when apparently his eyes rolled back and he started to fall over. Was caught before he fell and did not injure anything. Apparently did lose consciousness briefly for a few seconds. They were concerned he potentially could have had a seizure versus syncopal episode and brought to the ED for further evaluation and treatment. At this time he is at baseline mental status per family at bedside. He does have a slight facial droop which they state is normal from the previous TIA he just recently had. He denies any headache, visual changes, speech disturbances, numbness/tingling, chest pain, shortness of breath, abdominal pain, nausea/vomiting, urinary symptoms or changes in bowel movements. Denies any fever or chills. Denies any rashes or lesions. Has had decreased oral intake over the past couple days.   Apparently normally drinks 3-4 bottles of water a day but over the past couple days and only drank about 2 bottles of water per family. Denies any recent medication changes. Upon recent admission apparently they increased his amlodipine from 10 mg to 20 mg daily secondary to some elevated blood pressure when he was admitted. Denies any other recent medication changes. He is on Plavix and aspirin but denies any other blood thinners. \" (Simultaneous filing. User may not have seen previous data.)     Subjective   General  Chart Reviewed: Yes  Response To Previous Treatment: Patient with no complaints from previous session. Family / Caregiver Present: Yes (son present at start of session; not present during session)  Subjective  Subjective: Patient states that he is excited to go home. He is agreeable to therapy. General Comment  Comments: Patient in semi-fowlers in bed. Pain Screening  Patient Currently in Pain: Denies  Vital Signs  Patient Currently in Pain: Denies       Orientation  Orientation  Overall Orientation Status: Within Functional Limits     Cognition   Cognition  Overall Cognitive Status: Exceptions  Arousal/Alertness: Delayed responses to stimuli  Following Commands: Follows one step commands with repetition; Follows one step commands with increased time  Attention Span: Difficulty dividing attention  Memory: Decreased recall of recent events;Decreased short term memory  Safety Judgement: Decreased awareness of need for assistance;Decreased awareness of need for safety  Problem Solving: Decreased awareness of errors;Assistance required to identify errors made;Assistance required to generate solutions  Insights: Decreased awareness of deficits  Initiation: Requires cues for some  Sequencing: Requires cues for some  Cognition Comment: Patient slow to process, difficulty understanding words, mask like facial appearance/flat affect.      Objective   Bed mobility  Supine to Sit: Contact guard assistance  Scooting: Contact guard assistance  Comment: Extra time required for bed mobility     Transfers (with RW)  Sit to Stand: Minimal Assistance (from EOB and commode; assist need to push pelvis anteriorly; heavy UE use to stand)  Stand to sit: Minimal Assistance    Ambulation  Ambulation?: Yes  Ambulation 1  Surface: level tile  Device: Rolling Walker  Assistance: Moderate assistance;Minimal assistance (Richard when walking in straight path; modA required when turning)  Quality of Gait: festinating gait, poor B foot clearance, slow and shuffling; characteristics of Parkinsonian gait pattern; significant forward trunk lean with multiple posterior LOB, narrow RACHEL, anterior lean over RW  Gait Deviations: Shuffles;Decreased step length;Decreased step height;Decreased head and trunk rotation  Distance: 120'  Comments: Patient required multiple cues for RW placement. When turning, patient was unable to correctly center RW without cues and his foward lean result in the RW being too far anterior, able to correct with cues. Stairs/Curb  Stairs?: No     Balance  Posture: Fair  Sitting - Static: Fair  Sitting - Dynamic: Fair;-  Standing - Static: Poor  Standing - Dynamic: Poor  Comments: Patient with one major posterior LOB while attempting to sit on commode that required maxA to avoid fall. 3 other, less sever LOB occured when he attempted to turn, needing modA to assist.    AM-PAC Score  AM-PAC Inpatient Mobility Raw Score : 14 (01/31/22 1110)  AM-PAC Inpatient T-Scale Score : 38.1 (01/31/22 1110)  Mobility Inpatient CMS 0-100% Score: 61.29 (01/31/22 1110)  Mobility Inpatient CMS G-Code Modifier : CL (01/31/22 1110)    Goals  Short term goals  Time Frame for Short term goals: Discharge  Short term goal 1: Pt will perform bed mobility with SBA  Short term goal 2: Pt will perform transfers with min A and RW  Short term goal 3: Pt will perform ambulation x 150' with min A and RW  Patient Goals   Patient goals : To return home   No goals met during session due to inconsistent presentation.     Plan Plan  Times per week: 3-5  Times per day: Daily  Plan weeks: may benefit from increase in frequency depending on course of treatment while hospitalized  Specific instructions for Next Treatment: gait training, balance training  Current Treatment Recommendations: Strengthening,Neuromuscular Re-education,Balance Training,Patient/Caregiver Education & Training,Functional Mobility Training,Transfer Training,Gait Training  Safety Devices  Type of devices:  All fall risk precautions in place,Call light within reach,Chair alarm in place,Gait belt,Patient at risk for falls,Left in chair,Nurse notified  Restraints  Initially in place: No     Therapy Time   Individual Concurrent Group Co-treatment   Time In       1038   Time Out       1107   Minutes       29   Timed Code Treatment Minutes: 14 Minutes   Minutes shared with OT due to observation status     Karlie Carballo, SPT    David Orr, PT    This evaluation/treatment was observed and supervised by David Orr, [address] [place] [address]

## 2022-01-31 NOTE — PROGRESS NOTES
Department of Internal Medicine  General Internal Medicine   Progress Note    Denies dizziness or LOC   SUBJECTIVE: no chest pain     History obtained from chart review and the patient  General ROS: positive for  - fatigue, malaise and weight loss  negative for - chills, fever or night sweats  Psychological ROS: negative  Ophthalmic ROS: negative  Respiratory ROS: no cough, shortness of breath, or wheezing  Cardiovascular ROS: no chest pain or dyspnea on exertion  Gastrointestinal ROS: no abdominal pain, change in bowel habits, or black or bloody stools  Genito-Urinary ROS: no dysuria, trouble voiding, or hematuria  Musculoskeletal ROS: chronic pain   Neurological ROS: no TIA or stroke symptoms    OBJECTIVE      Medications      Current Facility-Administered Medications: sennosides-docusate sodium (SENOKOT-S) 8.6-50 MG tablet 2 tablet, 2 tablet, Oral, Daily  albuterol sulfate  (90 Base) MCG/ACT inhaler 2 puff, 2 puff, Inhalation, Q6H PRN  aspirin chewable tablet 81 mg, 81 mg, Oral, Daily  clopidogrel (PLAVIX) tablet 75 mg, 75 mg, Oral, Daily  escitalopram (LEXAPRO) tablet 20 mg, 20 mg, Oral, Daily  levothyroxine (SYNTHROID) tablet 75 mcg, 75 mcg, Oral, QAM AC  metoprolol succinate (TOPROL XL) extended release tablet 25 mg, 25 mg, Oral, Daily  atorvastatin (LIPITOR) tablet 20 mg, 20 mg, Oral, Nightly  0.9 % sodium chloride infusion, , IntraVENous, Continuous  enoxaparin (LOVENOX) injection 40 mg, 40 mg, SubCUTAneous, Daily    Physical      Vitals: BP (!) 166/79   Pulse 57   Temp 97.4 °F (36.3 °C) (Oral)   Resp 20   Ht 6' (1.829 m)   Wt 209 lb 8 oz (95 kg)   SpO2 97%   BMI 28.41 kg/m²   Temp: Temp: 97.4 °F (36.3 °C)  Max: Temp  Av.4 °F (36.3 °C)  Min: 97.1 °F (36.2 °C)  Max: 98.2 °F (36.8 °C)  Respiration range:  Resp  Av.5  Min: 17  Max: 20  Pulse Range:  Pulse  Av  Min: 51  Max: 69  Blood pressure range:  Systolic (42HNB), NKM:134 , Min:129 , XFJ:915   , Diastolic (61JHW), MZZ:53, Min:64, Max:79    SpO2  Av.7 %  Min: 95 %  Max: 97 %    Intake/Output Summary (Last 24 hours) at 2022 0003  Last data filed at 2022 2357  Gross per 24 hour   Intake 1971.7 ml   Output 2200 ml   Net -228.3 ml       Vent settings:  Pulse  Av.8  Min: 51  Max: 69  Resp  Av.1  Min: 11  Max: 20  SpO2  Av.7 %  Min: 95 %  Max: 100 %    CONSTITUTIONAL:  awake, alert, cooperative, no apparent distress, and appears stated age  EYES:  Unremarkable   NECK:  Mild JVD  and supple, symmetrical, trachea midline  BACK:  symmetric  LUNGS:  No increased work of breathing, good air exchange, clear to auscultation bilaterally, no crackles or wheezing  CARDIOVASCULAR:  normal apical pulses, regular rate and rhythm, normal S1 and S2 and no S3  ABDOMEN:  Soft BS + non tender   MUSCULOSKELETAL:  No distal edema   NEUROLOGIC:  groslly intact   SKIN:  Warm and dry  and no bruising or bleeding    Data      Recent Results (from the past 96 hour(s))   CBC Auto Differential    Collection Time: 22  2:45 PM   Result Value Ref Range    WBC 4.7 4.0 - 11.0 K/uL    RBC 3.50 (L) 4.20 - 5.90 M/uL    Hemoglobin 11.1 (L) 13.5 - 17.5 g/dL    Hematocrit 33.6 (L) 40.5 - 52.5 %    MCV 95.8 80.0 - 100.0 fL    MCH 31.7 26.0 - 34.0 pg    MCHC 33.0 31.0 - 36.0 g/dL    RDW 15.5 (H) 12.4 - 15.4 %    Platelets 675 871 - 756 K/uL    MPV 9.0 5.0 - 10.5 fL    Neutrophils % 65.3 %    Lymphocytes % 20.7 %    Monocytes % 10.8 %    Eosinophils % 3.0 %    Basophils % 0.2 %    Neutrophils Absolute 3.1 1.7 - 7.7 K/uL    Lymphocytes Absolute 1.0 1.0 - 5.1 K/uL    Monocytes Absolute 0.5 0.0 - 1.3 K/uL    Eosinophils Absolute 0.1 0.0 - 0.6 K/uL    Basophils Absolute 0.0 0.0 - 0.2 K/uL   Comprehensive Metabolic Panel w/ Reflex to MG    Collection Time: 22  2:45 PM   Result Value Ref Range    Sodium 134 (L) 136 - 145 mmol/L    Potassium reflex Magnesium 4.3 3.5 - 5.1 mmol/L    Chloride 101 99 - 110 mmol/L    CO2 25 21 - 32 mmol/L    Anion Gap 8 3 - 16    Glucose 127 (H) 70 - 99 mg/dL    BUN 24 (H) 7 - 20 mg/dL    CREATININE 1.5 (H) 0.8 - 1.3 mg/dL    GFR Non- 44 (A) >60    GFR  54 (A) >60    Calcium 8.8 8.3 - 10.6 mg/dL    Total Protein 6.6 6.4 - 8.2 g/dL    Albumin 3.8 3.4 - 5.0 g/dL    Albumin/Globulin Ratio 1.4 1.1 - 2.2    Total Bilirubin 0.6 0.0 - 1.0 mg/dL    Alkaline Phosphatase 97 40 - 129 U/L    ALT 8 (L) 10 - 40 U/L    AST 13 (L) 15 - 37 U/L   Troponin    Collection Time: 01/29/22  2:45 PM   Result Value Ref Range    Troponin <0.01 <0.01 ng/mL   Brain Natriuretic Peptide    Collection Time: 01/29/22  2:45 PM   Result Value Ref Range    Pro- 0 - 449 pg/mL   Lactic acid, plasma    Collection Time: 01/29/22  2:45 PM   Result Value Ref Range    Lactic Acid 1.0 0.4 - 2.0 mmol/L   CK    Collection Time: 01/29/22  2:45 PM   Result Value Ref Range    Total CK 52 39 - 308 U/L   EKG 12 Lead    Collection Time: 01/29/22  2:46 PM   Result Value Ref Range    Ventricular Rate 63 BPM    Atrial Rate 63 BPM    P-R Interval 164 ms    QRS Duration 96 ms    Q-T Interval 428 ms    QTc Calculation (Bazett) 437 ms    P Axis 16 degrees    R Axis -26 degrees    T Axis -18 degrees    Diagnosis       Normal sinus rhythmModerate voltage criteria for LVH, may be normal variantBorderline ECGConfirmed by GONZALO Ayers MD ([id number]) on 1/30/2022 9:12:58 AM   Urinalysis Reflex to Culture    Collection Time: 01/29/22  5:30 PM    Specimen: Urine, clean catch   Result Value Ref Range    Color, UA YELLOW Straw/Yellow    Clarity, UA Clear Clear    Glucose, Ur Negative Negative mg/dL    Bilirubin Urine Negative Negative    Ketones, Urine Negative Negative mg/dL    Specific Gravity, UA 1.015 1.005 - 1.030    Blood, Urine Negative Negative    pH, UA 6.0 5.0 - 8.0    Protein, UA Negative Negative mg/dL    Urobilinogen, Urine 1.0 <2.0 E.U./dL    Nitrite, Urine Negative Negative    Leukocyte Esterase, Urine Negative Negative    Microscopic Examination Not Indicated     Urine Type NotGiven     Urine Reflex to Culture Not Indicated        ASSESSMENT AND PLAN   Patient Active Problem List   Diagnosis    CAD (coronary artery disease)    Syncope and collapse    Bilateral carotid artery stenosis    Stage 2 chronic kidney disease    Primary hypertension   ct hydration check renal function   PT  OT acute rehab placement

## 2022-01-31 NOTE — PLAN OF CARE
Problem: Falls - Risk of:  Goal: Will remain free from falls  Description: Will remain free from falls  Outcome: Met This Shift   No evidence of falls so far this shift.

## 2022-01-31 NOTE — CARE COORDINATION
Therapy recommending ARU, but patient and son do not want patient  to go to a facility but  wants him  to return to his daughter's house  to continue with skilled  [place] [id number] services. He is active  via [employer] .   CM called the agency to verify, left a VM with a call back number.

## 2022-01-31 NOTE — CARE COORDINATION
[facility] called back stating that patient was active with the agency and are able to resume the service. Will start care in 1-2 days. She will pull up the information in Epic.

## 2022-01-31 NOTE — PROGRESS NOTES
Occupational Therapy  Facility/Department: [facility] 3A NURSING  Daily Treatment Note  NAME: Miguel A Riley  : 1935  MRN: [id number]    Date of Service: 2022    Discharge Recommendations: Miguel A Riley scored a 15/24 on the AM-PAC ADL Inpatient form. Current research shows that an AM-PAC score of 17 or less is typically not associated with a discharge to the patient's home setting. Based on the patient's AM-PAC score and their current ADL deficits, it is recommended that the patient have 5-7 sessions per week of Occupational Therapy at d/c to increase the patient's independence. At this time, this patient demonstrates the endurance, and/or tolerance for 3 hours of therapy each day, with a treatment frequency of 5-7x/wk. Please see assessment section for further patient specific details. Chart reports patient and family declining ARU and plans to go home. If patient goes home then recommend 24/7 care and assist and assistance with every transfer and ambulation. Recommend gait belt and shower chair. Home OT recommended. Patient is at a very high risk for falls and needs assist with all transfers and ambulation. HOME HEALTH CARE: LEVEL 3 SAFETY     - Initial home health evaluation to occur within 24-48 hours, in patient home   - Therapy evaluations in home within 24-48 hours of discharge; including DME and home safety   - Frontload therapy 5 days, then 3x a week   - Therapy to evaluate if patient has [address] needs for personal care   -  evaluation within 24-48 hours, includes evaluation of resources and insurance to determine [place], [place], LTC, and Medicaid options       If patient discharges prior to next session this note will serve as a discharge summary. Please see below for the latest assessment towards goals. OT Equipment Recommendations  Equipment Needed: Yes  Mobility Devices: ADL Assistive Devices  ADL Assistive Devices: Gait Belt; Shower Chair with back    Assessment   Performance deficits / Impairments: Decreased functional mobility ; Decreased ADL status; Decreased strength;Decreased safe awareness;Decreased cognition;Decreased endurance;Decreased balance  Assessment: Pt is currently functioning below occupational baseline and demo the deficits listed above, pt would benefit from continued skilled OT services to address these deficits and increase independence, safety, and ease with all occupational pursuits  Treatment Diagnosis: Decreased ADL status, functional mobility, and functional transfers d/t Syncope and collapse  OT Education: OT Role;Plan of Care;Transfer Training  Patient Education: frances, d/c recommendations- pt verbalized understanding but would benefit from reinforcement  Barriers to Learning: cognition  REQUIRES OT FOLLOW UP: Yes  Activity Tolerance  Activity Tolerance: Patient Tolerated treatment well         Patient Diagnosis(es): The primary encounter diagnosis was Syncope and collapse. Diagnoses of DARIAN (acute kidney injury) ([facility] [address] and Orthostatic hypotension were also pertinent to this visit. has a past medical history of CAD (coronary artery disease). has a past surgical history that includes Coronary artery bypass graft and Cardiac surgery. Restrictions  Restrictions/Precautions  Restrictions/Precautions: Fall Risk (High fall risk  Simultaneous filing. User may not have seen previous data.)  Required Braces or Orthoses?: No (Simultaneous filing. User may not have seen previous data.)  Position Activity Restriction  Other position/activity restrictions: per H&P \"81 y.o. male with past medical show CAD who presents to the ED with complaint of syncope versus seizure. Patient apparently was recently admitted within the past 4 weeks at [facility] for concern for TIA. Has been at rehab facility for strength/PT/OT. Apparently today was in the elevator when apparently his eyes rolled back and he started to fall over.   Was caught before he fell and did not injure anything. Apparently did lose consciousness briefly for a few seconds. They were concerned he potentially could have had a seizure versus syncopal episode and brought to the ED for further evaluation and treatment. At this time he is at baseline mental status per family at bedside. He does have a slight facial droop which they state is normal from the previous TIA he just recently had. He denies any headache, visual changes, speech disturbances, numbness/tingling, chest pain, shortness of breath, abdominal pain, nausea/vomiting, urinary symptoms or changes in bowel movements. Denies any fever or chills. Denies any rashes or lesions. Has had decreased oral intake over the past couple days. Apparently normally drinks 3-4 bottles of water a day but over the past couple days and only drank about 2 bottles of water per family. Denies any recent medication changes. Upon recent admission apparently they increased his amlodipine from 10 mg to 20 mg daily secondary to some elevated blood pressure when he was admitted. Denies any other recent medication changes. He is on Plavix and aspirin but denies any other blood thinners. \" (Simultaneous filing. User may not have seen previous data.)  Subjective   General  Chart Reviewed: Yes  Patient assessed for rehabilitation services?: Yes  Additional Pertinent Hx: PMH: CAD (coronary artery disease). Family / Caregiver Present: Yes (Son left at beginning of session)  Referring Practitioner: Shanel Tripp MD  Diagnosis: Syncope and collapse  Subjective  Subjective: Patient supine in bed upon arrival. Pleasant and cooperative. Son left at beginning of session  Vital Signs  Patient Currently in Pain: Denies   Orientation  Orientation  Overall Orientation Status: Within Functional Limits  Objective    ADL  Feeding: Setup  Grooming: Setup  LE Dressing:  Moderate assistance;Maximum assistance (Max assist to don socks, mod assist slip on shoes, max assist pants and pullup attends.)  Toileting: Maximum assistance  Additional Comments: Ambulated with RW and min assist into bathroom. Performed sponge bathing sitting on BSC over commode. Patient stated his daughter helps with LE ADLs. Balance  Sitting Balance: Contact guard assistance  Standing Balance: Minimal assistance (MIn assist but had 1 large posterior loss of balance and increased assist with turns)  Standing Balance  Time: 5-7 minutes total  Activity: 120 feet ambulation. Min assist straight hallway, mod assist during turns, multiple cues to keep RW close to body (tends to move too far in front)  Comment: Max assist posterior loss of balance when sitting onto BSC over commode in bathroom. Functional Mobility  Functional - Mobility Device: Rolling Walker  Activity: To/from bathroom  Assist Level: Maximum assistance  Functional Mobility Comments: Decreased step length, narrow RACHEL, festinating gait, verbal cues for RW placement. Toilet Transfers  Toilet - Technique: Ambulating  Equipment Used: Standard bedside commode  Toilet Transfer: Maximum assistance  Toilet Transfers Comments: Max assist to sit on BSC over commode. Patient had posterior loss of balance with no reaction. Bed mobility  Supine to Sit: Contact guard assistance  Scooting: Contact guard assistance  Comment: Extra time required for bed mobility  Transfers  Sit to stand: Minimal assistance  Stand to sit: Moderate assistance  Transfer Comments: Mod assist stand to sit to chair in room at end of session                       Cognition  Arousal/Alertness: Delayed responses to stimuli  Following Commands: Follows one step commands with repetition; Follows one step commands with increased time  Attention Span: Difficulty dividing attention  Memory: Decreased recall of recent events;Decreased short term memory  Safety Judgement: Decreased awareness of need for assistance;Decreased awareness of need for safety  Problem Solving: Decreased awareness of errors;Assistance required to identify errors made;Assistance required to generate solutions  Insights: Decreased awareness of deficits  Initiation: Requires cues for some  Sequencing: Requires cues for some  Cognition Comment: Patient slow to process, difficulty understanding words, mask like facial appearance/flat affect.      Perception  Overall Perceptual Status: Impaired  Unilateral Attention: Cues to maintain midline in standing  Initiation: Cues to initiate tasks                 LUE AROM (degrees)  LUE AROM : WFL  Left Hand AROM (degrees)  Left Hand AROM: WFL  RUE AROM (degrees)  RUE AROM : WFL  Right Hand AROM (degrees)  Right Hand AROM: WFL                 Plan   Plan  Times per week: 3-5x/wk  Times per day: Daily  Current Treatment Recommendations: Strengthening,Balance Training,Functional Mobility Training,Endurance Training,Equipment Evaluation, Education, & procurement,Patient/Caregiver Education & Training,Safety Education & Training,Self-Care / ADL  G-Code     OutComes Score                                                  AM-PAC Score        AM-[facility] Inpatient Daily Activity Raw Score: 15 (01/31/22 1110)  AM-PAC Inpatient ADL T-Scale Score : 34.69 (01/31/22 1110)  ADL Inpatient CMS 0-100% Score: 56.46 (01/31/22 1110)  ADL Inpatient CMS G-Code Modifier : CK (01/31/22 1110)    Goals  Short term goals  Time Frame for Short term goals: d/c  Short term goal 1: Pt will complete functional transfer to ADL surface with CGA-- not met 1/31/22  Short term goal 2: Pt will complete UB ADLs with setup-- not met 1/31/22  Short term goal 3: Pt will complete LB ADLs with min(A)-- not met 1/31/22  Short term goal 4: Pt will complete toileting with min(A)-- not met 1/31/22  Short term goal 5: Pt will complete functional mobility to<>from bathroom with use of RW and CGA-- not met 1/31/22  Long term goals  Time Frame for Long term goals : LTG=STG  Patient Goals   Patient goals : Go home with family Therapy Time   Individual Concurrent Group Co-treatment   Time In       1038   Time Out       1107   Minutes       29        Timed Code Treatment Minutes:  29 Minutes    Total Treatment Minutes:  29  Patient in observation status.  Charge split with [address] [facility], OTR/L [phone]

## 2022-01-31 NOTE — CARE COORDINATION
Discharge Plan:   Patient discharged on 1/31/22 to home with skilled hhc services via  [address]  Phone: [phone]  Fax: [phone]    Therapy recommended  ARU  but patient declined .     All discharge needs met per case management

## 2022-01-31 NOTE — PROGRESS NOTES
Shift assessment complete. VSS. Pt is resting comfortably in bed. Pt has no major complaints this am. POC discussed with patient and patient states understanding and is in agreement with plan. D/C order written. Pt excited to get discharged. PT/OT worked with pt this admission and recommending ARU. Pt wants to return home with family at this time. Son at bedside to drive pt home. Call light and bedside table within reach. No further needs expressed at this time.

## 2022-01-31 NOTE — PROGRESS NOTES
Data- discharge order received, pt verbalized agreement to discharge, needs for [address] [employer] [facility] with [address] for PT/OT and/or new Durable Medical Equipment, PAUL reviewed and signed by MD, to be completed by RN. Action- AVS prepared, discharge instructions prepared and given to pt, medication information packet given r/t NEW or CHANGED prescriptions, pt verbalized understanding further self-review. D/C instruction summary: Diet- Regular, Activity- up as tolerated with assistance, follow up with Primary Care Physician Chemo Roberto [phone] appointment to be scheduled by pt, immunizations reviewed, medications prescriptions to be filled at pharmacy of choice. Inpatient surgical procedural reviewed: N/A. Contact information provided to above agencies used. Response- Case Management/ reported faxing completed PAUL and AVS to needed HHC/DME services stated above. Pt belongings gathered, IV removed, pt dressed. Disposition is home with HHC/DME as stated above, transported with son, taken to lobby via w/c with belongings, no complications. 1. WEIGHT: Admit Weight: 204 lb (92.5 kg) (01/29/22 1824)        Today  Weight: 200 lb (90.7 kg) (01/31/22 0113)       2.  O2 SAT.: SpO2: 97 % (01/31/22 0806)

## 2022-01-31 NOTE — PROGRESS NOTES
Speech Language Pathology  Dysphagia Treatment Note    Name:  Mallika Cantor  :   1935  Medical Diagnosis:  Orthostatic hypotension [I95.1]  Syncope and collapse [R55]  DARIAN (acute kidney injury) ([facility] [address] [N17.9]  Treatment Diagnosis: Oropharyngeal Dysphagia  Pain level: None per pt    Diet level prior to treatment: Regular texture with Thin liquids, meds in puree  Tolerance of Current Diet Level: No reported concerns per RN or chart review    Assessment of Texture Tolerance:  -Impressions: Pt alert and receptive, positioned in bed with HOB elevated. On RA with RR <20. Pt agreeable to PO presentations of thin liquids via cup and straw, puree, and solids, fed independently. Oral phase characterized by adequate oral acceptance and labial seal, mildly prolonged oral manipulation. Prolonged mastication with solids and minimal oral residue present across consistencies, swallow initiation appeared mildly delayed. Two swallows noted across consistencies. Pt with no immediate or delayed overt clinical s/s aspiration across consistencies. Reinforced safe swallowing strategies, including upright with all PO intake, small bites/sips, alternate solids/liquids, remain upright after eating. Given good tolerance of regular textured consistencies this date, recommend continuation of regular textured diet with thin liquids and aspiration precautions, will continue to follow for diet tolerance and safe swallowing education. Diet and Treatment Recommendations 2022:  Continue Regular texture with Thin liquids, meds in puree    Compensatory strategies: Upright as possible with all PO intake , Small bites/sips , Eat/feed slowly, Remain upright 30-45 min     Dysphagia Goals: Speech therapy for dysphagia tx 1-2 times to ensure diet tolerance. 1. Pt will functionally tolerate recommended diet with no overt clinical s/s of aspiration. (ongoing 2022)  2.  Pt will demonstrate understanding of aspiration risk and precautions via education/demonstration with occasional prompting. (ongoing 1/31/2022)    Plan: Continued daily Dysphagia treatment with goals per plan of care. Patient/Family Education: Education given to the Pt and nurse, who verbalized understanding    Discharge Recommendations: Pt will benefit from continued skilled Speech Therapy for Dysphagia services, prior to returning home. Timed Code Treatment: 0    Total Treatment Time: 11    If patient discharges prior to next session this note will serve as a discharge summary. Signature:     Nikko Whittington [facility]  Speech-Language Pathology Graduate Clinician    The speech-language pathologist was present, directed the patient's care, made skilled judgment and was responsible for assessment and treatment.     Helga Lopez, [address] [facility]  Speech-Language Pathologist  [phone]

## 2022-01-31 NOTE — DISCHARGE INSTR - DIET

## 2022-03-09 NOTE — DISCHARGE SUMMARY
[place] [address]                     [facility], [address]                               DISCHARGE SUMMARY    PATIENT NAME: Ashley Walker                  :        1935  MED REC NO:   [id number]                          ROOM:       5386  ACCOUNT NO:   [de-identified]                           ADMIT DATE: 2022  PROVIDER:     Renu Machado MD                  DISCHARGE DATE:  2022    FINAL DIAGNOSES:  1. Syncope. 2.  Chronic kidney disease. 3.  Hypertension. 4.  Atherosclerotic disease. DISCHARGE MEDICATIONS:  1. Albuterol two puffs every four hours p.r.n.  2.  Senokot-S two tablets daily. 3.  Amlodipine 10 mg once a day. 4.  Protonix 40 mg once a day. 5.  Flomax 0.4 mg nightly. 6.  Zocor 40 mg once a day. 7.  Lexapro 20 mg once a day. 8.  Plavix 75 mg once a day. 9.  Toprol-XL 25 mg once a day. 10.  Levothyroxine 75 mcg once a day. 11.  Aspirin 81 mg once a day. HOSPITAL COURSE:  This elderly 71-year-old white gentleman came to the  emergency room with history of his eyes being rolled back in the head  and had a fall. There was no generalized tonic-clonic convulsions. The  patient has no incontinence. The patient is  with five children,  lives with his wife. Vital signs are stable. The patient underwent  neuro checks, treated with IV hydration, observational stay. A  transthoracic echocardiogram was done that shows LVEF of 55% to 60%,  aortic calcification and regurgitation. The left atrium was moderately  dilated, cavity size was normal, atrial septum, no pericardial effusion,  no intracardiac thrombus. The patient was kept on Telemetry. Blood  cultures were obtained. There were normal urinalysis that was negative  for urinary tract infection. Chest x-ray shows no evidence of acute  intrathoracic disease. The patient was given PT, OT, and speech  evaluation.   The patient was finally discharged to a skilled nursing  facility in stable condition.         Hermann Simon MD    D: 03/08/2022 22:05:56       T: 03/09/2022 6:42:31     [email or web address]  Job#: [id number]     Doc#: [id number]    CC:

## 2022-03-09 NOTE — PROGRESS NOTES
Patient seen , discharge dictated scripts given , arrangements made , PAUL completed .  Discussed with nursing staff  And   If applicable ,  Discussed with  Patient's family , all questions answered and concerns addressed  When applicable

## 2022-04-22 ENCOUNTER — APPOINTMENT (OUTPATIENT)
Dept: CT IMAGING | Age: 87
DRG: 064 | End: 2022-04-22
Payer: MEDICARE

## 2022-04-22 ENCOUNTER — APPOINTMENT (OUTPATIENT)
Dept: GENERAL RADIOLOGY | Age: 87
DRG: 064 | End: 2022-04-22
Payer: MEDICARE

## 2022-04-22 ENCOUNTER — HOSPITAL ENCOUNTER (INPATIENT)
Age: 87
LOS: 2 days | Discharge: HOME | DRG: 064 | End: 2022-04-24
Attending: STUDENT IN AN ORGANIZED HEALTH CARE EDUCATION/TRAINING PROGRAM | Admitting: INTERNAL MEDICINE
Payer: MEDICARE

## 2022-04-22 DIAGNOSIS — I63.9 FACIAL DROOP DUE TO ACUTE STROKE (HCC): Primary | ICD-10-CM

## 2022-04-22 DIAGNOSIS — F02.80 DEMENTIA DUE TO ALZHEIMER'S DISEASE (HCC): ICD-10-CM

## 2022-04-22 DIAGNOSIS — R29.810 FACIAL DROOP DUE TO ACUTE STROKE (HCC): Primary | ICD-10-CM

## 2022-04-22 DIAGNOSIS — G30.9 DEMENTIA DUE TO ALZHEIMER'S DISEASE (HCC): ICD-10-CM

## 2022-04-22 PROBLEM — R47.1 DYSARTHRIA: Status: ACTIVE | Noted: 2022-04-22

## 2022-04-22 PROBLEM — D64.9 ANEMIA: Status: ACTIVE | Noted: 2022-04-22

## 2022-04-22 LAB
A/G RATIO: 1.1 (ref 1.1–2.2)
ALBUMIN SERPL-MCNC: 2.8 G/DL (ref 3.4–5)
ALP BLD-CCNC: 100 U/L (ref 40–129)
ALT SERPL-CCNC: 8 U/L (ref 10–40)
AMMONIA: 15 UMOL/L (ref 16–60)
ANION GAP SERPL CALCULATED.3IONS-SCNC: 11 MMOL/L (ref 3–16)
AST SERPL-CCNC: 12 U/L (ref 15–37)
BASOPHILS ABSOLUTE: 0 K/UL (ref 0–0.2)
BASOPHILS RELATIVE PERCENT: 0.2 %
BILIRUB SERPL-MCNC: 0.6 MG/DL (ref 0–1)
BILIRUBIN URINE: NEGATIVE
BLOOD, URINE: NEGATIVE
BUN BLDV-MCNC: 19 MG/DL (ref 7–20)
CALCIUM SERPL-MCNC: 8.5 MG/DL (ref 8.3–10.6)
CHLORIDE BLD-SCNC: 98 MMOL/L (ref 99–110)
CLARITY: CLEAR
CO2: 23 MMOL/L (ref 21–32)
COLOR: ABNORMAL
CREAT SERPL-MCNC: 1.1 MG/DL (ref 0.8–1.3)
EKG ATRIAL RATE: 68 BPM
EKG DIAGNOSIS: NORMAL
EKG P AXIS: -2 DEGREES
EKG P-R INTERVAL: 174 MS
EKG Q-T INTERVAL: 426 MS
EKG QRS DURATION: 98 MS
EKG QTC CALCULATION (BAZETT): 452 MS
EKG R AXIS: -23 DEGREES
EKG T AXIS: -16 DEGREES
EKG VENTRICULAR RATE: 68 BPM
EOSINOPHILS ABSOLUTE: 0 K/UL (ref 0–0.6)
EOSINOPHILS RELATIVE PERCENT: 0.1 %
EPITHELIAL CELLS, UA: 2 /HPF (ref 0–5)
FOLATE: 7.04 NG/ML (ref 4.78–24.2)
GFR AFRICAN AMERICAN: >60
GFR NON-AFRICAN AMERICAN: >60
GLUCOSE BLD-MCNC: 131 MG/DL (ref 70–99)
GLUCOSE URINE: NEGATIVE MG/DL
HCT VFR BLD CALC: 27.8 % (ref 40.5–52.5)
HEMOGLOBIN: 9.2 G/DL (ref 13.5–17.5)
HYALINE CASTS: 3 /LPF (ref 0–8)
INR BLD: 1.26 (ref 0.88–1.12)
KETONES, URINE: NEGATIVE MG/DL
LEUKOCYTE ESTERASE, URINE: NEGATIVE
LV EF: 58 %
LVEF MODALITY: NORMAL
LYMPHOCYTES ABSOLUTE: 0.7 K/UL (ref 1–5.1)
LYMPHOCYTES RELATIVE PERCENT: 8.2 %
MCH RBC QN AUTO: 30.9 PG (ref 26–34)
MCHC RBC AUTO-ENTMCNC: 33 G/DL (ref 31–36)
MCV RBC AUTO: 93.7 FL (ref 80–100)
MICROSCOPIC EXAMINATION: YES
MONOCYTES ABSOLUTE: 0.8 K/UL (ref 0–1.3)
MONOCYTES RELATIVE PERCENT: 9.8 %
NEUTROPHILS ABSOLUTE: 6.8 K/UL (ref 1.7–7.7)
NEUTROPHILS RELATIVE PERCENT: 81.7 %
NITRITE, URINE: NEGATIVE
PDW BLD-RTO: 18.1 % (ref 12.4–15.4)
PH UA: 7.5 (ref 5–8)
PLATELET # BLD: 185 K/UL (ref 135–450)
PMV BLD AUTO: 8.8 FL (ref 5–10.5)
POTASSIUM REFLEX MAGNESIUM: 4.4 MMOL/L (ref 3.5–5.1)
PROTEIN UA: 30 MG/DL
PROTHROMBIN TIME: 14.4 SEC (ref 9.9–12.7)
RBC # BLD: 2.96 M/UL (ref 4.2–5.9)
RBC UA: 3 /HPF (ref 0–4)
SODIUM BLD-SCNC: 132 MMOL/L (ref 136–145)
SPECIFIC GRAVITY UA: 1.01 (ref 1–1.03)
TOTAL PROTEIN: 5.3 G/DL (ref 6.4–8.2)
TROPONIN: <0.01 NG/ML
TSH REFLEX: 0.71 UIU/ML (ref 0.27–4.2)
URINE REFLEX TO CULTURE: ABNORMAL
URINE TYPE: ABNORMAL
UROBILINOGEN, URINE: 1 E.U./DL
VITAMIN B-12: 1559 PG/ML (ref 211–911)
WBC # BLD: 8.3 K/UL (ref 4–11)
WBC UA: 2 /HPF (ref 0–5)

## 2022-04-22 PROCEDURE — 94760 N-INVAS EAR/PLS OXIMETRY 1: CPT

## 2022-04-22 PROCEDURE — 93306 TTE W/DOPPLER COMPLETE: CPT

## 2022-04-22 PROCEDURE — 80053 COMPREHEN METABOLIC PANEL: CPT

## 2022-04-22 PROCEDURE — 94640 AIRWAY INHALATION TREATMENT: CPT

## 2022-04-22 PROCEDURE — 82746 ASSAY OF FOLIC ACID SERUM: CPT

## 2022-04-22 PROCEDURE — 74018 RADEX ABDOMEN 1 VIEW: CPT

## 2022-04-22 PROCEDURE — 6360000002 HC RX W HCPCS: Performed by: INTERNAL MEDICINE

## 2022-04-22 PROCEDURE — 93010 ELECTROCARDIOGRAM REPORT: CPT | Performed by: INTERNAL MEDICINE

## 2022-04-22 PROCEDURE — 2580000003 HC RX 258: Performed by: INTERNAL MEDICINE

## 2022-04-22 PROCEDURE — 36415 COLL VENOUS BLD VENIPUNCTURE: CPT

## 2022-04-22 PROCEDURE — 6370000000 HC RX 637 (ALT 250 FOR IP): Performed by: INTERNAL MEDICINE

## 2022-04-22 PROCEDURE — 82607 VITAMIN B-12: CPT

## 2022-04-22 PROCEDURE — 6360000004 HC RX CONTRAST MEDICATION: Performed by: STUDENT IN AN ORGANIZED HEALTH CARE EDUCATION/TRAINING PROGRAM

## 2022-04-22 PROCEDURE — 84484 ASSAY OF TROPONIN QUANT: CPT

## 2022-04-22 PROCEDURE — 99223 1ST HOSP IP/OBS HIGH 75: CPT | Performed by: PSYCHIATRY & NEUROLOGY

## 2022-04-22 PROCEDURE — 85610 PROTHROMBIN TIME: CPT

## 2022-04-22 PROCEDURE — 84443 ASSAY THYROID STIM HORMONE: CPT

## 2022-04-22 PROCEDURE — 99285 EMERGENCY DEPT VISIT HI MDM: CPT

## 2022-04-22 PROCEDURE — 81001 URINALYSIS AUTO W/SCOPE: CPT

## 2022-04-22 PROCEDURE — 2060000000 HC ICU INTERMEDIATE R&B

## 2022-04-22 PROCEDURE — 70450 CT HEAD/BRAIN W/O DYE: CPT

## 2022-04-22 PROCEDURE — 85025 COMPLETE CBC W/AUTO DIFF WBC: CPT

## 2022-04-22 PROCEDURE — 71045 X-RAY EXAM CHEST 1 VIEW: CPT

## 2022-04-22 PROCEDURE — 70496 CT ANGIOGRAPHY HEAD: CPT

## 2022-04-22 PROCEDURE — 82140 ASSAY OF AMMONIA: CPT

## 2022-04-22 PROCEDURE — 93005 ELECTROCARDIOGRAM TRACING: CPT | Performed by: STUDENT IN AN ORGANIZED HEALTH CARE EDUCATION/TRAINING PROGRAM

## 2022-04-22 RX ORDER — PANTOPRAZOLE SODIUM 40 MG/1
40 TABLET, DELAYED RELEASE ORAL DAILY
Status: DISCONTINUED | OUTPATIENT
Start: 2022-04-22 | End: 2022-04-24 | Stop reason: HOSPADM

## 2022-04-22 RX ORDER — SODIUM CHLORIDE 0.9 % (FLUSH) 0.9 %
5-40 SYRINGE (ML) INJECTION PRN
Status: DISCONTINUED | OUTPATIENT
Start: 2022-04-22 | End: 2022-04-24 | Stop reason: HOSPADM

## 2022-04-22 RX ORDER — ALBUTEROL SULFATE 90 UG/1
2 AEROSOL, METERED RESPIRATORY (INHALATION) EVERY 6 HOURS PRN
Status: DISCONTINUED | OUTPATIENT
Start: 2022-04-22 | End: 2022-04-23

## 2022-04-22 RX ORDER — SODIUM CHLORIDE 9 MG/ML
INJECTION, SOLUTION INTRAVENOUS PRN
Status: DISCONTINUED | OUTPATIENT
Start: 2022-04-22 | End: 2022-04-24 | Stop reason: HOSPADM

## 2022-04-22 RX ORDER — MONTELUKAST SODIUM 10 MG/1
10 TABLET ORAL NIGHTLY
COMMUNITY

## 2022-04-22 RX ORDER — ONDANSETRON 2 MG/ML
4 INJECTION INTRAMUSCULAR; INTRAVENOUS EVERY 6 HOURS PRN
Status: DISCONTINUED | OUTPATIENT
Start: 2022-04-22 | End: 2022-04-22 | Stop reason: SDUPTHER

## 2022-04-22 RX ORDER — POLYETHYLENE GLYCOL 3350 17 G/17G
17 POWDER, FOR SOLUTION ORAL DAILY PRN
Status: DISCONTINUED | OUTPATIENT
Start: 2022-04-22 | End: 2022-04-24 | Stop reason: HOSPADM

## 2022-04-22 RX ORDER — CLOPIDOGREL BISULFATE 75 MG/1
75 TABLET ORAL DAILY
Status: DISCONTINUED | OUTPATIENT
Start: 2022-04-22 | End: 2022-04-24 | Stop reason: HOSPADM

## 2022-04-22 RX ORDER — ACETAMINOPHEN 650 MG/1
650 SUPPOSITORY RECTAL EVERY 6 HOURS PRN
Status: DISCONTINUED | OUTPATIENT
Start: 2022-04-22 | End: 2022-04-24 | Stop reason: HOSPADM

## 2022-04-22 RX ORDER — ONDANSETRON 4 MG/1
4 TABLET, ORALLY DISINTEGRATING ORAL EVERY 8 HOURS PRN
Status: DISCONTINUED | OUTPATIENT
Start: 2022-04-22 | End: 2022-04-24 | Stop reason: HOSPADM

## 2022-04-22 RX ORDER — ACETAMINOPHEN 325 MG/1
650 TABLET ORAL EVERY 6 HOURS PRN
Status: DISCONTINUED | OUTPATIENT
Start: 2022-04-22 | End: 2022-04-24 | Stop reason: HOSPADM

## 2022-04-22 RX ORDER — TAMSULOSIN HYDROCHLORIDE 0.4 MG/1
0.4 CAPSULE ORAL DAILY
Status: DISCONTINUED | OUTPATIENT
Start: 2022-04-22 | End: 2022-04-24 | Stop reason: HOSPADM

## 2022-04-22 RX ORDER — SENNA AND DOCUSATE SODIUM 50; 8.6 MG/1; MG/1
2 TABLET, FILM COATED ORAL DAILY
Status: DISCONTINUED | OUTPATIENT
Start: 2022-04-22 | End: 2022-04-24 | Stop reason: HOSPADM

## 2022-04-22 RX ORDER — AMLODIPINE BESYLATE 5 MG/1
10 TABLET ORAL DAILY
Status: DISCONTINUED | OUTPATIENT
Start: 2022-04-22 | End: 2022-04-23

## 2022-04-22 RX ORDER — ASPIRIN 300 MG/1
300 SUPPOSITORY RECTAL DAILY
Status: DISCONTINUED | OUTPATIENT
Start: 2022-04-22 | End: 2022-04-24 | Stop reason: HOSPADM

## 2022-04-22 RX ORDER — ONDANSETRON 2 MG/ML
4 INJECTION INTRAMUSCULAR; INTRAVENOUS EVERY 6 HOURS PRN
Status: DISCONTINUED | OUTPATIENT
Start: 2022-04-22 | End: 2022-04-24 | Stop reason: HOSPADM

## 2022-04-22 RX ORDER — SODIUM CHLORIDE 0.9 % (FLUSH) 0.9 %
5-40 SYRINGE (ML) INJECTION EVERY 12 HOURS SCHEDULED
Status: DISCONTINUED | OUTPATIENT
Start: 2022-04-22 | End: 2022-04-24 | Stop reason: HOSPADM

## 2022-04-22 RX ORDER — LEVOTHYROXINE SODIUM 0.07 MG/1
75 TABLET ORAL DAILY
Status: DISCONTINUED | OUTPATIENT
Start: 2022-04-22 | End: 2022-04-24 | Stop reason: HOSPADM

## 2022-04-22 RX ORDER — ATORVASTATIN CALCIUM 40 MG/1
40 TABLET, FILM COATED ORAL NIGHTLY
Status: DISCONTINUED | OUTPATIENT
Start: 2022-04-22 | End: 2022-04-24 | Stop reason: HOSPADM

## 2022-04-22 RX ORDER — ONDANSETRON 4 MG/1
4 TABLET, ORALLY DISINTEGRATING ORAL EVERY 8 HOURS PRN
Status: DISCONTINUED | OUTPATIENT
Start: 2022-04-22 | End: 2022-04-22 | Stop reason: SDUPTHER

## 2022-04-22 RX ORDER — ASPIRIN 81 MG/1
81 TABLET ORAL DAILY
Status: DISCONTINUED | OUTPATIENT
Start: 2022-04-22 | End: 2022-04-24 | Stop reason: HOSPADM

## 2022-04-22 RX ORDER — ESCITALOPRAM OXALATE 10 MG/1
20 TABLET ORAL DAILY
Status: DISCONTINUED | OUTPATIENT
Start: 2022-04-22 | End: 2022-04-24 | Stop reason: HOSPADM

## 2022-04-22 RX ORDER — METOPROLOL SUCCINATE 25 MG/1
25 TABLET, EXTENDED RELEASE ORAL DAILY
Status: DISCONTINUED | OUTPATIENT
Start: 2022-04-22 | End: 2022-04-24 | Stop reason: HOSPADM

## 2022-04-22 RX ORDER — ENOXAPARIN SODIUM 100 MG/ML
40 INJECTION SUBCUTANEOUS EVERY 24 HOURS
Status: DISCONTINUED | OUTPATIENT
Start: 2022-04-22 | End: 2022-04-24 | Stop reason: HOSPADM

## 2022-04-22 RX ORDER — IPRATROPIUM BROMIDE AND ALBUTEROL SULFATE 2.5; .5 MG/3ML; MG/3ML
1 SOLUTION RESPIRATORY (INHALATION) EVERY 4 HOURS PRN
Status: DISCONTINUED | OUTPATIENT
Start: 2022-04-22 | End: 2022-04-23

## 2022-04-22 RX ADMIN — SENNOSIDES AND DOCUSATE SODIUM 2 TABLET: 50; 8.6 TABLET ORAL at 17:28

## 2022-04-22 RX ADMIN — TAMSULOSIN HYDROCHLORIDE 0.4 MG: 0.4 CAPSULE ORAL at 17:28

## 2022-04-22 RX ADMIN — ASPIRIN 81 MG: 81 TABLET, COATED ORAL at 17:28

## 2022-04-22 RX ADMIN — AMLODIPINE BESYLATE 10 MG: 5 TABLET ORAL at 17:27

## 2022-04-22 RX ADMIN — ENOXAPARIN SODIUM 40 MG: 100 INJECTION SUBCUTANEOUS at 17:29

## 2022-04-22 RX ADMIN — ATORVASTATIN CALCIUM 40 MG: 40 TABLET, FILM COATED ORAL at 20:41

## 2022-04-22 RX ADMIN — Medication 240 ML: at 16:30

## 2022-04-22 RX ADMIN — CLOPIDOGREL BISULFATE 75 MG: 75 TABLET ORAL at 17:28

## 2022-04-22 RX ADMIN — ESCITALOPRAM OXALATE 20 MG: 10 TABLET ORAL at 17:28

## 2022-04-22 RX ADMIN — Medication 2 PUFF: at 22:22

## 2022-04-22 RX ADMIN — Medication 10 ML: at 20:41

## 2022-04-22 RX ADMIN — IOPAMIDOL 75 ML: 755 INJECTION, SOLUTION INTRAVENOUS at 09:44

## 2022-04-22 RX ADMIN — PANTOPRAZOLE SODIUM 40 MG: 40 TABLET, DELAYED RELEASE ORAL at 17:28

## 2022-04-22 RX ADMIN — LEVOTHYROXINE SODIUM 75 MCG: 0.07 TABLET ORAL at 17:28

## 2022-04-22 RX ADMIN — METOPROLOL SUCCINATE 25 MG: 25 TABLET, EXTENDED RELEASE ORAL at 17:28

## 2022-04-22 ASSESSMENT — PAIN SCALES - GENERAL
PAINLEVEL_OUTOF10: 0

## 2022-04-22 NOTE — PROGRESS NOTES
Speech Language Pathology  Attempt    Marycarmen Lockhart  1935    SLP eval and treat orders received. Attempted to see pt for dysphagia evaluation this date. Pt is currently being transported to [place] at this time. Will attempt to follow-up as schedule allows.      Thank you,     Cem Kam M.A., [address] [employer]  Speech-Language Pathologist  4/22/2022 2:18 PM

## 2022-04-22 NOTE — CONSULTS
In patient Neurology consult        [facility] Neurology      Evon Harrison MD      Miguel A Love  1935    Date of Service: 4/22/2022    Referring Physician: Keiry Beard MD    Most of the history was obtained from detailed chart reviewing and discussion with the patient's family and his nurse. The patient is not a good historian  and unable to provide me with accurate history. Reason for the consult and CC: Acute confusion left facial droop. HPI:   The patient is a 81y.o.  years old male with history of hypertension, dementia and other medical issues who was admitted this morning from the ED with acute confusion and facial droop. He went yesterday to [facility] emergency room with hypertensive urgency with a blood pressure above 200. He was discharged home after adjusting his blood pressure medications. He came back this time to [facility] this morning with acute confusion and left facial droop with drooling. Onset hours prior to admission. Degree was severe. Duration was persistent. At baseline, he lives in a senior apartment with his wife. He does have baseline dementia and walks with assistant. He fell few months ago and sustained some rib fracture. He has occasional sleep deprivation and hallucination according to his daughter. Family said to bring him to the emergency room where he was admitted. Initial blood pressure was on the low side. CT head showed no acute findings. Today he denies any other new symptoms. No other relieving or aggravating factors. Other review of system was unremarkable. Family history: Noncontributory    History reviewed. No pertinent family history.     Past medical history  Hypertension  Dementia  CAD  Hyperlipidemia      Past Medical History:   Diagnosis Date    CAD (coronary artery disease)      Past Surgical History:   Procedure Laterality Date    CARDIAC SURGERY      2008    CORONARY ARTERY BYPASS GRAFT       Social History     Tobacco Use    Smoking status: Never Smoker    Smokeless tobacco: Never Used   Substance Use Topics    Alcohol use: No    Drug use: No     No Known Allergies  Current Facility-Administered Medications   Medication Dose Route Frequency Provider Last Rate Last Admin    escitalopram (LEXAPRO) tablet 20 mg  20 mg Oral Daily Neelam Blanco MD        clopidogrel (PLAVIX) tablet 75 mg  75 mg Oral Daily Neelam Blanco MD        metoprolol succinate (TOPROL XL) extended release tablet 25 mg  25 mg Oral Daily Neelam Blanco MD        levothyroxine (SYNTHROID) tablet 75 mcg  75 mcg Oral Daily Neelam Blanco MD        albuterol sulfate  (90 Base) MCG/ACT inhaler 2 puff  2 puff Inhalation Q6H PRN Neelam Blanco MD        amLODIPine (NORVASC) tablet 10 mg  10 mg Oral Daily Neelam Blanco MD        pantoprazole (PROTONIX) tablet 40 mg  40 mg Oral Daily Neelam Blanco MD        tamsulosin (FLOMAX) capsule 0.4 mg  0.4 mg Oral Daily Neelam Blanco MD        sennosides-docusate sodium (SENOKOT-S) 8.6-50 MG tablet 2 tablet  2 tablet Oral Daily Neelam Blanco MD        atorvastatin (LIPITOR) tablet 40 mg  40 mg Oral Daily Neelam Blanco MD        sodium chloride flush 0.9 % injection 5-40 mL  5-40 mL IntraVENous 2 times per day Neelam Blanco MD        sodium chloride flush 0.9 % injection 5-40 mL  5-40 mL IntraVENous PRN Neelam Blanco MD        0.9 % sodium chloride infusion   IntraVENous PRN Neelam Blanco MD        enoxaparin (LOVENOX) injection 40 mg  40 mg SubCUTAneous Q24H Neelam Blanco MD        polyethylene glycol (GLYCOLAX) packet 17 g  17 g Oral Daily PRN Neelam Blanco MD        acetaminophen (TYLENOL) tablet 650 mg  650 mg Oral Q6H PRN Neelam Blanco MD        Or   [facility] acetaminophen (TYLENOL) suppository 650 mg  650 mg Rectal Q6H PRN Neelam Blanco MD        ondansetron (ZOFRAN-ODT) disintegrating tablet 4 mg  4 mg Oral Q8H PRN Neelam Blanco MD        Or    ondansetron (ZOFRAN) injection 4 mg  4 mg IntraVENous Q6H PRN Neelam Blanco MD  aspirin EC tablet 81 mg  81 mg Oral Daily Eriberto Causey MD        Or   [facility] aspirin suppository 300 mg  300 mg Rectal Daily Eriberto Causey MD        perflutren lipid microspheres (DEFINITY) injection 1.65 mg  1.5 mL IntraVENous ONCE PRN Eriberto Causey MD        milk and molasses enema 240 mL  240 mL Rectal Once Eriberto Causey MD           ROS: 10-14 system review was limited due to MS changes or as per HPI. Constitutional:   Vitals:    04/22/22 1200 04/22/22 1215 04/22/22 1230 04/22/22 1315   BP: (!) 112/59 114/61 108/60 (!) 136/58   Pulse: 63 66 62 63   Resp: 22 18 21 18   Temp:    97.8 °F (36.6 °C)   TempSrc:    Oral   SpO2: 96% 95% 95% 97%   Weight:       Height:           General appearance: NAD   Eye: Fundus of the eye: Optic disc is difficult to obtain due to poor cooperation from the patient  Neck: supple  Cardiovascular: No lower leg edema with good pulsation. Mental Status:   AAO times one, himself but not to place or problem  Poor attention and concentration. Waxing and waning. Poor recent or remote memory   Language: Fluent but with poor comprehension and not following directions  Poor  fund of knowledge . Cranial Nerves:   II:  Pupils: equal, round, reactive to light  III,IV,VI: Extra Ocular Movements are intact. No nystagmus  V: Facial sensation : not tested due to confusion  VII: Facial strength and movements: intact and symmetric  VIII: Hearing: can track my voice  IX: Palate elevation: Symmetric  XI: Shoulder shrug: Symmetric  XII: Tongue movements: midline  Musculoskeletal:  The patient can move all 4 extremities. No apparent focal weakness. Increased paratonia and mild rigidity. No resting tremors  Tone: Paratonia  Reflexes: Symmetric 2+ in both arms and diminished legs. Planters: flexor bilaterally. Coordination: No abnormal movement  Sensation: No sensory deficit. Poor REM  Gait/Posture: Cannot be tested due to poor cooperation from the patient.     Data:  LABS:   Lab Results Component Value Date     04/22/2022    K 4.4 04/22/2022    CL 98 04/22/2022    CO2 23 04/22/2022    BUN 19 04/22/2022    CREATININE 1.1 04/22/2022    GFRAA >60 04/22/2022    GFRAA >60 10/21/2012    LABGLOM >60 04/22/2022    GLUCOSE 131 04/22/2022    CALCIUM 8.5 04/22/2022     Lab Results   Component Value Date    WBC 8.3 04/22/2022    RBC 2.96 04/22/2022    HGB 9.2 04/22/2022    HCT 27.8 04/22/2022    MCV 93.7 04/22/2022    RDW 18.1 04/22/2022     04/22/2022     Lab Results   Component Value Date    INR 1.26 (H) 04/22/2022    PROTIME 14.4 (H) 04/22/2022       Neuroimaging were independently reviewed by me. Reviewed notes from different physicians  Reviewed lab and blood testing  Reviewed outside records from [facility] from yesterday. Impression:  Acute aphasia, facial droop and confusion in a patient with baseline dementia. Possible TIA/CVA. Hypertension, not controlled  Hyperlipidemia  Hyponatremia  Dementia with behavioral changes      Recommendation:  MRI brain  Speech  Carotid Doppler  PT and OT  Telemetry  Aspirin and Plavix  Statin  Blood pressure control  Continue current blood pressure medications  Avoid blood pressure below 120/80  ID work-up to exclude possibility of underlying infection  DVT and GI prophylaxis  Follow CMP  Lengthy discussion with patient's family and POA regarding risk of hospital-acquired delirium  DC planning after the above work-up    MDM: High        Thank you for referring such patient. If you have any questions regarding my consult note, please don't hesitate to call me. Glenn Dyson MD  [phone]    This dictation was generated by voice recognition computer software.  Although all attempts are made to edit the dictation for accuracy, there may be errors in the  transcription that are not intended

## 2022-04-22 NOTE — ED NOTES
Pt taken off bedpan. Annel care done/cleansed. Replaced chux and new depends placed on pt. Pt had small brown soft formed BM. NIHSS done by this nurse and receiving nurse, Tono Adams for handoff care. Pt NIHSS improved slightly. Pt taken to floor by Lashell JACOBS and Shelby mendes. Family present and aware. Belongings sent with patient. Pt stable at time of transfer.       Clara Motley RN  04/22/22 0219

## 2022-04-22 NOTE — ED NOTES
Pt remains stable. Family at bedside. Call light within reach. Bed in lowest position with appropriate side rails up.        Brandy Humphreys RN  04/22/22 8044

## 2022-04-22 NOTE — PROGRESS NOTES
04/22/22 1937   RT Protocol   History Pulmonary Disease 0   Respiratory pattern 0   Breath sounds 0   Cough 3   Bronchodilator Assessment Score 3

## 2022-04-22 NOTE — ED NOTES
Called 3T to give report and inquire on getting pt to room. Orquidea Marcano RN to come down to get pt and get bedside report.       Jessee Nielson, RN  04/22/22 5857

## 2022-04-22 NOTE — H&P
HOSPITALISTS HISTORY AND PHYSICAL    4/22/2022 11:52 AM    Patient Information:  Geetha Barrios is a 81 y.o. male [id number]  PCP:  Shazia Theodore MD (Tel: [phone] )    Chief complaint:    Chief Complaint   Patient presents with    Cerebrovascular Accident     patient arrived via  EMS from home, symptoms x30, hx of TIA, L sided facial droop and L sided weakness. immediately to CT       History of Present Illness:  Abby Benton is a 81 y.o. male with history of CAD, BL carotid artery stenosis who came to ER with L facial droop, drooling and slurred speech. Apparently had L sided weakness at home. Went to [facility] last night and was sent home. Came to ER for evaluation. Still with LUE weakness. Notices dysarthria. No CP, SOB, HA or abdominal pain. Has loose stool and feels constipated. Uses a wheelchair and walker at home. Otherwise complete ROS is negative unless listed above. REVIEW OF SYSTEMS:   Pertinent positives as noted in HPI. All other systems were reviewed and are negative. Past Medical History:   has a past medical history of CAD (coronary artery disease). Past Surgical History:   has a past surgical history that includes Coronary artery bypass graft and Cardiac surgery. Medications:  No current facility-administered medications on file prior to encounter.      Current Outpatient Medications on File Prior to Encounter   Medication Sig Dispense Refill    sennosides-docusate sodium (SENOKOT-S) 8.6-50 MG tablet Take 2 tablets by mouth daily      amLODIPine (NORVASC) 10 MG tablet Take 10 mg by mouth daily      pantoprazole (PROTONIX) 40 MG tablet Take 40 mg by mouth daily      tamsulosin (FLOMAX) 0.4 MG capsule Take 0.4 mg by mouth daily      albuterol (PROVENTIL HFA) 108 (90 BASE) MCG/ACT inhaler Inhale 2 puffs into the lungs every 6 hours as needed for Wheezing. 1 Inhaler 0    simvastatin (ZOCOR) 40 MG tablet Take 40 mg by mouth nightly.  escitalopram (LEXAPRO) 20 MG tablet Take 20 mg by mouth daily.  clopidogrel (PLAVIX) 75 MG tablet Take 75 mg by mouth daily.  metoprolol (TOPROL-XL) 25 MG XL tablet Take 25 mg by mouth daily.  levothyroxine (SYNTHROID) 75 MCG tablet Take 75 mcg by mouth daily.  aspirin 81 MG tablet Take 81 mg by mouth daily. Allergies:  No Known Allergies     Social History:  Patient Lives with family   reports that he has never smoked. He has never used smokeless tobacco. He reports that he does not drink alcohol and does not use drugs. Family History:  family history is not on file. Physical Exam:  /60   Pulse 64   Resp 21   Ht 6' (1.829 m)   Wt 195 lb (88.5 kg)   SpO2 96%   BMI 26.45 kg/m²     General appearance:  Appears comfortable. Well nourished  Eyes: Sclera clear, pupils equal  ENT: Moist mucus membranes, no thrush. Trachea midline. Cardiovascular: Regular rhythm, normal S1, S2. No murmur, gallop, rub. No edema in lower extremities  Respiratory: Clear to auscultation bilaterally, no wheeze, good inspiratory effort  Gastrointestinal: Abdomen soft, non-tender, not distended, normal bowel sounds  Musculoskeletal: No cyanosis in digits, neck supple  Neurology: Dysarthria. LUE 3/5, RUE/RLE/LLE 4/5. Psychiatry: Appropriate affect.  Not agitated  Skin: Warm, dry, normal turgor, no rash  Brisk capillary refill, peripheral pulses palpable   Labs:  CBC:   Lab Results   Component Value Date    WBC 8.3 04/22/2022    RBC 2.96 04/22/2022    HGB 9.2 04/22/2022    HCT 27.8 04/22/2022    MCV 93.7 04/22/2022    MCH 30.9 04/22/2022    MCHC 33.0 04/22/2022    RDW 18.1 04/22/2022     04/22/2022    MPV 8.8 04/22/2022     BMP:    Lab Results   Component Value Date     04/22/2022    K 4.4 04/22/2022    CL 98 04/22/2022    CO2 23 04/22/2022    BUN 19 04/22/2022    CREATININE 1.1 04/22/2022    CALCIUM 8.5 04/22/2022    GFRAA >60 04/22/2022    GFRAA >60 10/21/2012    LABGLOM >60 04/22/2022    GLUCOSE 131 04/22/2022     XR CHEST PORTABLE   Final Result   Left basilar atelectasis versus pneumonia with small parapneumonic effusion. CTA HEAD NECK W CONTRAST   Final Result   1. Atherosclerotic calcification noted throughout the bilateral carotid   bifurcations. No hemodynamic significant stenosis within the cervical ICAs   per NASCET criteria. 2. The cervical vertebral arteries are grossly unremarkable within   constraints of study given extensive paraspinal collateralization. The   latter of which may be related to the tortuous, tubular enhancing structures,   likely venous within region of the right subclavian vein. Correlate with   concerns for venous obstruction. 3. High-grade stenosis within the distal left V4 segment, as well as within   the mid to distal right P2 segment. 4. Incidental 7 mm ground-glass opacity within the right lung apex   superimposed on emphysema. See below regarding recommendations. RECOMMENDATIONS:   7 mm ground-glass opacity within the right lung apex superimposed on   emphysematous changes. Recommendation for noncontrast chest CT at 6-12   months, followed by additional noncontrast chest CTs every 2 years until 5   years. CT HEAD WO CONTRAST   Final Result   Atrophy and small-vessel ischemic change, similar to prior. No hemorrhage   noted. No obvious change. No obvious acute findings      Paranasal sinus disease, increased in the right maxillary sinus         XR ABDOMEN (KUB) (SINGLE AP VIEW)    (Results Pending)         Problem List  Principal Problem:    Acute CVA (cerebrovascular accident) ([address]  Active Problems:    Facial droop    Dysarthria    Anemia    CAD (coronary artery disease)    Bilateral carotid artery stenosis    Primary hypertension  Resolved Problems:    * No resolved hospital problems.  *        Assessment/Plan: 1. Continue ASA and Plavix   2. Continue Lipitor  3. Check MRI Brain  4. PT/OT/SLP eval  5. Neuro consult for acute CVA  6. Check KUB  7. Molasses enema X 1  8. Continue Norvasc and Toprol XL  9. Check B12, TSH, ammonia      DVT prophylaxis Lovenox  Code status Full code  Diet NPO  IV access Peripheral  Miller Catheter No    Admit as inpatient. I anticipate hospitalization spanning more than two midnights for investigation and treatment of the above medically necessary diagnoses. Discussed with patient and family. Home vs ARU when ok with all.       Neelam Blanco MD    4/22/2022 11:52 AM

## 2022-04-22 NOTE — ACP (ADVANCE CARE PLANNING)
Advanced Care Planning Note. Purpose of Encounter: Advanced care planning in light of acute CVA  Parties In Attendance: Patient, daughter, wife  Decisional Capacity: Yes  Subjective: Patient with dysarthria  Objective: Cr 1.1  Goals of Care Determination: Patient wants full support (CPR, vent, surgery, HD, trach, PEG)  Plan:  MRI Brain. Neuro consult. PT/OT/SLP. ASA/Plavix/Lipitor  Code Status: Full code   Time spent on Advanced care Plannin minutes  Advanced Care Planning Documents: Completed advanced directives on chart, son is the POA.     Darryl Dyson MD  2022 11:52 AM

## 2022-04-22 NOTE — ED NOTES
Pt daughter at bedside with pt. States pt was disoriented for a long time last night and \"went back to his childhood. \" This morning pt had urinated and defecated on self. Was not able to help assist in standing. Pt could talk clearly, but was unable to follow tasks such as he understood to lift his arm, but was unable to perform the action. Hx of TIA 4 months ago. BP was high yesterday and hands to elbows bilat were numb so family took pt to [facility] ER and was d/c home.      Chay Lora, RN  04/22/22 9845

## 2022-04-22 NOTE — ED PROVIDER NOTES
Primary Care Physician: Emery Zimmerman MD   Attending Physician: Shirin Aguilar MD     History   Chief Complaint   Patient presents with    Cerebrovascular Accident     patient arrived via  EMS from home, symptoms x30, hx of TIA, L sided facial droop and L sided weakness. immediately to CT        HPI   Crissy Cardenas  is a 81 y.o. male history of coronary artery disease, TIAs last episode 3 months ago. Patient was seen at [facility] yesterday in the emergency room for elevated blood pressures. He presents this morning brought by EMS complaining of left-sided rib which started 40 minutes before he presented. Patient is alert oriented following commands. Information was also obtained from family members who stated that this morning he was unable to perform any activities mostly on the left side. By the time patient got to the emergency room his symptoms have actually improved. He does have some facial droop and slurred speech which is chronic. Past Medical History:   Diagnosis Date    CAD (coronary artery disease)         Past Surgical History:   Procedure Laterality Date    CARDIAC SURGERY      2008    CORONARY ARTERY BYPASS GRAFT          History reviewed. No pertinent family history.      Social History     Socioeconomic History    Marital status:      Spouse name: None    Number of children: None    Years of education: None    Highest education level: None   Occupational History    None   Tobacco Use    Smoking status: Never Smoker    Smokeless tobacco: Never Used   Substance and Sexual Activity    Alcohol use: No    Drug use: No    Sexual activity: None   Other Topics Concern    None   Social History Narrative    None     Social Determinants of Health     Financial Resource Strain:     Difficulty of Paying Living Expenses: Not on file   Food Insecurity:     Worried About Running Out of Food in the Last Year: Not on file    Leticia of Food in the Last Year: Not on file Transportation Needs:     Lack of Transportation (Medical): Not on file    Lack of Transportation (Non-Medical): Not on file   Physical Activity:     Days of Exercise per Week: Not on file    Minutes of Exercise per Session: Not on file   Stress:     Feeling of Stress : Not on file   Social Connections:     Frequency of Communication with Friends and Family: Not on file    Frequency of Social Gatherings with Friends and Family: Not on file    Attends Cheondo[employer] Services: Not on file    Active Member of [address] [place] [address] or Organizations: Not on file    Attends Club or Organization Meetings: Not on file    Marital Status: Not on file   Intimate Partner Violence:     Fear of Current or Ex-Partner: Not on file    Emotionally Abused: Not on file    Physically Abused: Not on file    Sexually Abused: Not on file   Housing Stability:     Unable to Pay for Housing in the Last Year: Not on file    Number of [place] in the Last Year: Not on file    Unstable Housing in the Last Year: Not on file        Review of Systems   10 total systems reviewed and found to be negative unless otherwise noted in HPI     Physical Exam   BP (!) 115/50   Pulse 57   Temp 98.2 °F (36.8 °C) (Oral)   Resp 18   Ht 6' (1.829 m)   Wt 195 lb (88.5 kg)   SpO2 94%   BMI 26.45 kg/m²      CONSTITUTIONAL: Well appearing, in no acute distress   HEAD: atraumatic, normocephalic   EYES: PERRL, No injection, discharge or scleral icterus. ENT: Moist mucous membranes. NECK: Normal ROM, NO LAD   CARDIOVASCULAR: Regular rate and rhythm. No murmurs or gallop. PULMONARY/CHEST: Airway patent. No retractions. Breath sounds clear with good air entry bilaterally. ABDOMEN: Soft, Non-distended and non-tender, without guarding or rebound. SKIN: Acyanotic, warm, dry   MUSCULOSKELETAL: No swelling, tenderness or deformity   NEUROLOGICAL: Awake and oriented x 3. Pulses intact. Grossly nonfocal   Nursing note and vitals reviewed. NIH Stroke Scale     Time: 7:39 AM   Person Administering Scale: Nsehniitooh JAIDEN Johnson MD     Level of consciousness: [0]   0 = Alert;   1 = Not alert;   2 = Not alert;   3 = Responds only with reflex motor or autonomic effects or totally unresponsive, flaccid, and flexic. LOC questions: [0]   0 = Answers both questions correctly. 1 = Answers one question correctly. 2 = Answers neither question correctly. LOC commands: [0]   0 = Performs both tasks correctly. 1 = Performs one task correctly. 2 = Performs neither task correctly. Best Gaze: [ 0 ]   0 = Normal   1 = Partial gaze palsy   2 = Forced deviation     Visual: [0]   0 = No visual loss   1 = Partial hemianopia   2 = Complete hemianopia   3 = Bilateral hemianopia     Facial Palsy: [1]   0 = Normal   1 = Minor paralysis   2 = Partial paralysis   3 = Complete paralysis     Motor left arm: [0]   0 = No drift;   1 = Drift   2 = Some effort against gravity;   3 = No effort against gravity;   4 = No movement. UN = Amputation     Motor right arm: [0]   0 = No drift   1 = Drift   2 = Some effort against gravity   3 = No effort against gravity   4 = No movement   UN = Amputation     Motor left leg: [0]   0 = No drift   1.= Drift   2 = Some effort against gravity   3 = No effort against gravity   4 = No movement. UN = Amputation     Motor right leg: [0]   0 = No drift   1 = Drift   2 = Some effort against gravity   3 = No effort against gravity   4 = No movement   UN = Amputation     Limb Ataxia: [0]   0 = Absent. 1 = Present in one limb.    2 = Present in two limbs   UN = Amputation     Sensory: [0]   0 = Absent   1.= Present in one limb   2 = Present in two limbs   UN = Amputation     Best Language: [0]   0 = No aphasia   1 = Mild-to-moderate aphasia   2 = Severe aphasia   3 = Mute, global aphasia     Dysarthria: [0]   1 = Mild-to-moderate dysarthria   2 = Severe dysarthria   UN = Intubated     Extinction and Inattention: [0]   0 = No abnormality.    1 = Visual, tactile, auditory, spatial, or personal inattention   2 = Profound fadumo-inattention or extinction to more than one modality     TOTAL: Alirio ]       ED Course & Medical Decision Making   Medications   escitalopram (LEXAPRO) tablet 20 mg (20 mg Oral Given 4/22/22 1728)   clopidogrel (PLAVIX) tablet 75 mg (75 mg Oral Given 4/22/22 1728)   metoprolol succinate (TOPROL XL) extended release tablet 25 mg (25 mg Oral Given 4/22/22 1728)   levothyroxine (SYNTHROID) tablet 75 mcg (75 mcg Oral Given 4/22/22 1728)   albuterol sulfate  (90 Base) MCG/ACT inhaler 2 puff (2 puffs Inhalation Given 4/22/22 2222)   amLODIPine (NORVASC) tablet 10 mg (10 mg Oral Given 4/22/22 1727)   pantoprazole (PROTONIX) tablet 40 mg (40 mg Oral Given 4/22/22 1728)   tamsulosin (FLOMAX) capsule 0.4 mg (0.4 mg Oral Given 4/22/22 1728)   sennosides-docusate sodium (SENOKOT-S) 8.6-50 MG tablet 2 tablet (2 tablets Oral Given 4/22/22 1728)   atorvastatin (LIPITOR) tablet 40 mg (40 mg Oral Given 4/22/22 2041)   sodium chloride flush 0.9 % injection 5-40 mL (10 mLs IntraVENous Given 4/22/22 2041)   sodium chloride flush 0.9 % injection 5-40 mL (has no administration in time range)   0.9 % sodium chloride infusion (has no administration in time range)   enoxaparin (LOVENOX) injection 40 mg (40 mg SubCUTAneous Given 4/22/22 1729)   polyethylene glycol (GLYCOLAX) packet 17 g (has no administration in time range)   acetaminophen (TYLENOL) tablet 650 mg (has no administration in time range)     Or   acetaminophen (TYLENOL) suppository 650 mg (has no administration in time range)   ondansetron (ZOFRAN-ODT) disintegrating tablet 4 mg (has no administration in time range)     Or   ondansetron (ZOFRAN) injection 4 mg (has no administration in time range)   aspirin EC tablet 81 mg (81 mg Oral Given 4/22/22 5557)     Or   aspirin suppository 300 mg ( Rectal See Alternative 4/22/22 1728)   perflutren lipid microspheres (DEFINITY) injection 1.65 mg (has no administration in time range)   ipratropium-albuterol (DUONEB) nebulizer solution 1 ampule (1 ampule Inhalation Given 4/23/22 4624)   iopamidol (ISOVUE-370) 76 % injection 75 mL (75 mLs IntraVENous Given 4/22/22 0935)   milk and molasses enema 240 mL (240 mLs Rectal Given 4/22/22 1630)      Labs Reviewed   CBC WITH AUTO DIFFERENTIAL - Abnormal; Notable for the following components:       Result Value    RBC 2.96 (*)     Hemoglobin 9.2 (*)     Hematocrit 27.8 (*)     RDW 18.1 (*)     Lymphocytes Absolute 0.7 (*)     All other components within normal limits   COMPREHENSIVE METABOLIC PANEL W/ REFLEX TO MG FOR LOW K - Abnormal; Notable for the following components:    Sodium 132 (*)     Chloride 98 (*)     Glucose 131 (*)     Total Protein 5.3 (*)     Albumin 2.8 (*)     ALT 8 (*)     AST 12 (*)     All other components within normal limits   PROTIME-INR - Abnormal; Notable for the following components:    Protime 14.4 (*)     INR 1.26 (*)     All other components within normal limits   VITAMIN B12 & FOLATE - Abnormal; Notable for the following components:    Vitamin B-12 1559 (*)     All other components within normal limits   AMMONIA - Abnormal; Notable for the following components:    Ammonia 15 (*)     All other components within normal limits   URINALYSIS WITH REFLEX TO CULTURE - Abnormal; Notable for the following components:    Protein, UA 30 (*)     All other components within normal limits   CBC - Abnormal; Notable for the following components:    RBC 2.77 (*)     Hemoglobin 8.6 (*)     Hematocrit 25.3 (*)     RDW 17.3 (*)     All other components within normal limits   BASIC METABOLIC PANEL W/ REFLEX TO MG FOR LOW K - Abnormal; Notable for the following components:    Sodium 132 (*)     Chloride 98 (*)     Glucose 116 (*)     BUN 23 (*)     Calcium 8.1 (*)     All other components within normal limits TROPONIN   TSH WITH REFLEX   MICROSCOPIC URINALYSIS   HEMOGLOBIN A1C   LIPID PANEL   POCT GLUCOSE      XR ABDOMEN (KUB) (SINGLE AP VIEW)   Final Result   Gas in small bowel and colon could represent an ileus      Nondilated collecting systems bilaterally         XR CHEST PORTABLE   Final Result   Left basilar atelectasis versus pneumonia with small parapneumonic effusion. CTA HEAD NECK W CONTRAST   Final Result   1. Atherosclerotic calcification noted throughout the bilateral carotid   bifurcations. No hemodynamic significant stenosis within the cervical ICAs   per NASCET criteria. 2. The cervical vertebral arteries are grossly unremarkable within   constraints of study given extensive paraspinal collateralization. The   latter of which may be related to the tortuous, tubular enhancing structures,   likely venous within region of the right subclavian vein. Correlate with   concerns for venous obstruction. 3. High-grade stenosis within the distal left V4 segment, as well as within   the mid to distal right P2 segment. 4. Incidental 7 mm ground-glass opacity within the right lung apex   superimposed on emphysema. See below regarding recommendations. RECOMMENDATIONS:   7 mm ground-glass opacity within the right lung apex superimposed on   emphysematous changes. Recommendation for noncontrast chest CT at 6-12   months, followed by additional noncontrast chest CTs every 2 years until 5   years. CT HEAD WO CONTRAST   Final Result   Atrophy and small-vessel ischemic change, similar to prior. No hemorrhage   noted. No obvious change. No obvious acute findings      Paranasal sinus disease, increased in the right maxillary sinus         MRI brain without contrast    (Results Pending)      No results found.      EKG INTERPRETATION:  EKG by my preliminary interpretation shows sinus rhythm with rate of 69, normal axis, normal intervals, with no ST changes indicative of ischemia at this time.    PROCEDURES:   Procedures    ASSESSMENT AND PLAN:  [id number] is a 81 y.o. male  history of coronary artery disease, TIAs last episode 3 months ago. Patient was seen at [facility] yesterday in the emergency room for elevated blood pressures. He presents this morning brought by EMS complaining of left-sided rib which started 40 minutes before he presented. Patient is alert oriented following commands. Information was also obtained from family members who stated that this morning he was unable to perform any activities mostly on the left side. By the time patient got to the emergency room his symptoms have actually improved. He does have some facial droop and slurred speech which is chronic. On exam patient has facial droop and NIH stroke scale is 1 from the facial droop. He is moving all extremities and no focal deficits. Nonetheless stroke protocol was activated. A CT of the head and CTA head and neck were obtained and showed no acute findings. I communicated with the stroke team we came to agreement that because of the low stroke scale and the fact that patient is back to his baseline that he is not a tPA candidate. Nonetheless patient will need admission for further evaluation and treatment. ClINICAL IMPRESSION:  1. Facial droop due to acute stroke ([address]        DISPOSITION    Admit   -Findings and recommendations explained to patient, and family. They expressed understanding and agreed with the plan.    ___________________________________________________________________________________  CRITICAL CARE NOTE:  There was a high probability of clinically significant life-threatening deterioration of the patient's condition requiring my urgent intervention.      This includes multiple reevaluations, vital sign monitoring, pulse oximetry monitoring, telemetry monitoring, clinical response to the IV medications, reviewing the nursing notes, consultation time, dictation/documentation time, and interpretation of the labwork. (This time excludes time spent performing procedures). I personally saw the patient and independently provided 35 minutes of non-concurrent critical care out of the total shared critical care time provided. _________________________________________________________________________________________  This record is transcribed utilizing voice recognition technology. There are inherent limitations in this technology. In addition, there may be limitations in editing of this report. If there are any discrepancies, please contact me directly.         Briana Cervantes MD  04/23/22 4290

## 2022-04-22 NOTE — RT PROTOCOL NOTE
RT Inhaler-Nebulizer Bronchodilator Protocol Note    There is a bronchodilator order in the chart from a provider indicating to follow the RT Bronchodilator Protocol and there is an Initiate RT Inhaler-Nebulizer Bronchodilator Protocol order as well (see protocol at bottom of note). CXR Findings:  XR CHEST PORTABLE    Result Date: 4/22/2022  Left basilar atelectasis versus pneumonia with small parapneumonic effusion. The findings from the last RT Protocol Assessment were as follows:   History Pulmonary Disease: None or smoker <15 pack years  Respiratory Pattern: Regular pattern and RR 12-20 bpm  Breath Sounds: Clear breath sounds  Cough: Weak, non-productive  Indication for Bronchodilator Therapy:    Bronchodilator Assessment Score: 3    Aerosolized bronchodilator medication orders have been revised according to the RT Inhaler-Nebulizer Bronchodilator Protocol below. Respiratory Therapist to perform RT Therapy Protocol Assessment initially then follow the protocol. Repeat RT Therapy Protocol Assessment PRN for score 0-3 or on second treatment, BID, and PRN for scores above 3. No Indications - adjust the frequency to every 6 hours PRN wheezing or bronchospasm, if no treatments needed after 48 hours then discontinue using Per Protocol order mode. If indication present, adjust the RT bronchodilator orders based on the Bronchodilator Assessment Score as indicated below. Use Inhaler orders unless patient has one or more of the following: on home nebulizer, not able to hold breath for 10 seconds, is not alert and oriented, cannot activate and use MDI correctly, or respiratory rate 25 breaths per minute or more, then use the equivalent nebulizer order(s) with same Frequency and PRN reasons based on the score. If a patient is on this medication at home then do not decrease Frequency below that used at home.     0-3 - enter or revise RT bronchodilator order(s) to equivalent RT Bronchodilator order with Frequency of every 4 hours PRN for wheezing or increased work of breathing using Per Protocol order mode. 4-6 - enter or revise RT Bronchodilator order(s) to two equivalent RT bronchodilator orders with one order with BID Frequency and one order with Frequency of every 4 hours PRN wheezing or increased work of breathing using Per Protocol order mode. 7-10 - enter or revise RT Bronchodilator order(s) to two equivalent RT bronchodilator orders with one order with TID Frequency and one order with Frequency of every 4 hours PRN wheezing or increased work of breathing using Per Protocol order mode. 11-13 - enter or revise RT Bronchodilator order(s) to one equivalent RT bronchodilator order with QID Frequency and an Albuterol order with Frequency of every 4 hours PRN wheezing or increased work of breathing using Per Protocol order mode. Greater than 13 - enter or revise RT Bronchodilator order(s) to one equivalent RT bronchodilator order with every 4 hours Frequency and an Albuterol order with Frequency of every 2 hours PRN wheezing or increased work of breathing using Per Protocol order mode. RT to enter RT Home Evaluation for COPD & MDI Assessment order using Per Protocol order mode.     Electronically signed by Jennifer Fung RCP on 4/22/2022 at 7:38 PM

## 2022-04-22 NOTE — ED NOTES
Patient immediately to CT    To room  EKG Complete  MD at bedside  Swallow screen complete  Xray at bedside     Patty Carolina, [address] [facility]  04/22/22 1999

## 2022-04-22 NOTE — ED NOTES
Pt family was concerned about his swallowing while drinking with his breakfast. Went in to evaluate pt. Re-performed another swallow screen and pt passed without difficulty. Placed urinal in groin area as pt stated he may need to void. Pt remains A&O. Dr Pio Perkins in to see pt at bedside. Call light within reach. Bed in lowest position with appropriate side rails up.        Marbella Wright RN  04/22/22 1327

## 2022-04-23 ENCOUNTER — APPOINTMENT (OUTPATIENT)
Dept: MRI IMAGING | Age: 87
DRG: 064 | End: 2022-04-23
Payer: MEDICARE

## 2022-04-23 LAB
ANION GAP SERPL CALCULATED.3IONS-SCNC: 9 MMOL/L (ref 3–16)
BUN BLDV-MCNC: 23 MG/DL (ref 7–20)
CALCIUM SERPL-MCNC: 8.1 MG/DL (ref 8.3–10.6)
CHLORIDE BLD-SCNC: 98 MMOL/L (ref 99–110)
CHOLESTEROL, TOTAL: 60 MG/DL (ref 0–199)
CO2: 25 MMOL/L (ref 21–32)
CREAT SERPL-MCNC: 1.1 MG/DL (ref 0.8–1.3)
ESTIMATED AVERAGE GLUCOSE: 111.2 MG/DL
GFR AFRICAN AMERICAN: >60
GFR NON-AFRICAN AMERICAN: >60
GLUCOSE BLD-MCNC: 116 MG/DL (ref 70–99)
HBA1C MFR BLD: 5.5 %
HCT VFR BLD CALC: 25.3 % (ref 40.5–52.5)
HDLC SERPL-MCNC: 26 MG/DL (ref 40–60)
HEMOGLOBIN: 8.6 G/DL (ref 13.5–17.5)
LDL CHOLESTEROL CALCULATED: 19 MG/DL
MAGNESIUM: 2 MG/DL (ref 1.8–2.4)
MCH RBC QN AUTO: 31.1 PG (ref 26–34)
MCHC RBC AUTO-ENTMCNC: 34 G/DL (ref 31–36)
MCV RBC AUTO: 91.6 FL (ref 80–100)
PDW BLD-RTO: 17.3 % (ref 12.4–15.4)
PLATELET # BLD: 175 K/UL (ref 135–450)
PMV BLD AUTO: 8.5 FL (ref 5–10.5)
POTASSIUM REFLEX MAGNESIUM: 3.9 MMOL/L (ref 3.5–5.1)
RBC # BLD: 2.77 M/UL (ref 4.2–5.9)
SODIUM BLD-SCNC: 132 MMOL/L (ref 136–145)
TRIGL SERPL-MCNC: 76 MG/DL (ref 0–150)
VLDLC SERPL CALC-MCNC: 15 MG/DL
WBC # BLD: 4.8 K/UL (ref 4–11)

## 2022-04-23 PROCEDURE — 97530 THERAPEUTIC ACTIVITIES: CPT

## 2022-04-23 PROCEDURE — 2060000000 HC ICU INTERMEDIATE R&B

## 2022-04-23 PROCEDURE — 6370000000 HC RX 637 (ALT 250 FOR IP): Performed by: INTERNAL MEDICINE

## 2022-04-23 PROCEDURE — 6360000002 HC RX W HCPCS: Performed by: INTERNAL MEDICINE

## 2022-04-23 PROCEDURE — 80048 BASIC METABOLIC PNL TOTAL CA: CPT

## 2022-04-23 PROCEDURE — 92610 EVALUATE SWALLOWING FUNCTION: CPT

## 2022-04-23 PROCEDURE — 94761 N-INVAS EAR/PLS OXIMETRY MLT: CPT

## 2022-04-23 PROCEDURE — 97161 PT EVAL LOW COMPLEX 20 MIN: CPT

## 2022-04-23 PROCEDURE — 70551 MRI BRAIN STEM W/O DYE: CPT

## 2022-04-23 PROCEDURE — 83735 ASSAY OF MAGNESIUM: CPT

## 2022-04-23 PROCEDURE — 97535 SELF CARE MNGMENT TRAINING: CPT

## 2022-04-23 PROCEDURE — 80061 LIPID PANEL: CPT

## 2022-04-23 PROCEDURE — 99232 SBSQ HOSP IP/OBS MODERATE 35: CPT | Performed by: PSYCHIATRY & NEUROLOGY

## 2022-04-23 PROCEDURE — 92526 ORAL FUNCTION THERAPY: CPT

## 2022-04-23 PROCEDURE — 94640 AIRWAY INHALATION TREATMENT: CPT

## 2022-04-23 PROCEDURE — 85027 COMPLETE CBC AUTOMATED: CPT

## 2022-04-23 PROCEDURE — 6370000000 HC RX 637 (ALT 250 FOR IP): Performed by: NURSE PRACTITIONER

## 2022-04-23 PROCEDURE — 36415 COLL VENOUS BLD VENIPUNCTURE: CPT

## 2022-04-23 PROCEDURE — 97166 OT EVAL MOD COMPLEX 45 MIN: CPT

## 2022-04-23 PROCEDURE — 83036 HEMOGLOBIN GLYCOSYLATED A1C: CPT

## 2022-04-23 PROCEDURE — 2580000003 HC RX 258: Performed by: INTERNAL MEDICINE

## 2022-04-23 RX ORDER — LEVOFLOXACIN 5 MG/ML
750 INJECTION, SOLUTION INTRAVENOUS EVERY 24 HOURS
Status: DISCONTINUED | OUTPATIENT
Start: 2022-04-23 | End: 2022-04-24

## 2022-04-23 RX ORDER — IPRATROPIUM BROMIDE AND ALBUTEROL SULFATE 2.5; .5 MG/3ML; MG/3ML
1 SOLUTION RESPIRATORY (INHALATION) 2 TIMES DAILY
Status: DISCONTINUED | OUTPATIENT
Start: 2022-04-23 | End: 2022-04-24 | Stop reason: HOSPADM

## 2022-04-23 RX ORDER — ALBUTEROL SULFATE 90 UG/1
2 AEROSOL, METERED RESPIRATORY (INHALATION) EVERY 4 HOURS PRN
Status: DISCONTINUED | OUTPATIENT
Start: 2022-04-23 | End: 2022-04-24 | Stop reason: HOSPADM

## 2022-04-23 RX ADMIN — CLOPIDOGREL BISULFATE 75 MG: 75 TABLET ORAL at 10:02

## 2022-04-23 RX ADMIN — SODIUM CHLORIDE: 9 INJECTION, SOLUTION INTRAVENOUS at 10:42

## 2022-04-23 RX ADMIN — SENNOSIDES AND DOCUSATE SODIUM 2 TABLET: 50; 8.6 TABLET ORAL at 10:02

## 2022-04-23 RX ADMIN — ATORVASTATIN CALCIUM 40 MG: 40 TABLET, FILM COATED ORAL at 20:49

## 2022-04-23 RX ADMIN — ENOXAPARIN SODIUM 40 MG: 100 INJECTION SUBCUTANEOUS at 15:00

## 2022-04-23 RX ADMIN — ESCITALOPRAM OXALATE 20 MG: 10 TABLET ORAL at 10:02

## 2022-04-23 RX ADMIN — TAMSULOSIN HYDROCHLORIDE 0.4 MG: 0.4 CAPSULE ORAL at 10:03

## 2022-04-23 RX ADMIN — Medication 10 ML: at 21:55

## 2022-04-23 RX ADMIN — LEVOFLOXACIN 750 MG: 5 INJECTION, SOLUTION INTRAVENOUS at 10:45

## 2022-04-23 RX ADMIN — IPRATROPIUM BROMIDE AND ALBUTEROL SULFATE 1 AMPULE: .5; 2.5 SOLUTION RESPIRATORY (INHALATION) at 20:01

## 2022-04-23 RX ADMIN — METOPROLOL SUCCINATE 25 MG: 25 TABLET, EXTENDED RELEASE ORAL at 10:03

## 2022-04-23 RX ADMIN — LEVOTHYROXINE SODIUM 75 MCG: 0.07 TABLET ORAL at 10:03

## 2022-04-23 RX ADMIN — IPRATROPIUM BROMIDE AND ALBUTEROL SULFATE 1 AMPULE: .5; 3 SOLUTION RESPIRATORY (INHALATION) at 01:54

## 2022-04-23 RX ADMIN — Medication 10 ML: at 10:04

## 2022-04-23 RX ADMIN — PANTOPRAZOLE SODIUM 40 MG: 40 TABLET, DELAYED RELEASE ORAL at 10:03

## 2022-04-23 RX ADMIN — ASPIRIN 81 MG: 81 TABLET, COATED ORAL at 10:03

## 2022-04-23 ASSESSMENT — PAIN SCALES - GENERAL
PAINLEVEL_OUTOF10: 0

## 2022-04-23 NOTE — PROGRESS NOTES
Incentive Spirometry education and demonstration completed by Respiratory Therapy Yes      Response to education: Very Good     Teaching Time: 5 minutes    Minimum Predicted Vital Capacity - 776 mL. Patient's Actual Vital Capacity - 800 mL. Turning over to Nursing for routine follow-up Yes.         Electronically signed by Derrell Bedolla RCP on 4/23/2022 at 5:32 PM

## 2022-04-23 NOTE — PROGRESS NOTES
Pt family states that he takes breathing treatments BID at home. I do show that he takes nebs PRN at home. Reassessed patient and set at BID to match stated home reg.

## 2022-04-23 NOTE — PROGRESS NOTES
Department of Internal Medicine  General Internal Medicine   Progress Note          Chief Complaint   Patient presents with    Cerebrovascular Accident       patient arrived via  EMS from home, symptoms x30, hx of TIA, L sided facial droop and L sided weakness. immediately to CT     SUBJECTIVE:    Pt is doing better. No new focal deficits. Eating breakfast. Denies chest pain or abdominal pain. Having sob with cough at times per family present at bedside. On room air. He has baseline dementia and walks with assistant.     OBJECTIVE      Medications    Current Facility-Administered Medications: levoFLOXacin (LEVAQUIN) 750 MG/150ML infusion 750 mg, 750 mg, IntraVENous, Q24H  escitalopram (LEXAPRO) tablet 20 mg, 20 mg, Oral, Daily  clopidogrel (PLAVIX) tablet 75 mg, 75 mg, Oral, Daily  metoprolol succinate (TOPROL XL) extended release tablet 25 mg, 25 mg, Oral, Daily  levothyroxine (SYNTHROID) tablet 75 mcg, 75 mcg, Oral, Daily  albuterol sulfate  (90 Base) MCG/ACT inhaler 2 puff, 2 puff, Inhalation, Q6H PRN  pantoprazole (PROTONIX) tablet 40 mg, 40 mg, Oral, Daily  tamsulosin (FLOMAX) capsule 0.4 mg, 0.4 mg, Oral, Daily  sennosides-docusate sodium (SENOKOT-S) 8.6-50 MG tablet 2 tablet, 2 tablet, Oral, Daily  atorvastatin (LIPITOR) tablet 40 mg, 40 mg, Oral, Nightly  sodium chloride flush 0.9 % injection 5-40 mL, 5-40 mL, IntraVENous, 2 times per day  sodium chloride flush 0.9 % injection 5-40 mL, 5-40 mL, IntraVENous, PRN  0.9 % sodium chloride infusion, , IntraVENous, PRN  enoxaparin (LOVENOX) injection 40 mg, 40 mg, SubCUTAneous, Q24H  polyethylene glycol (GLYCOLAX) packet 17 g, 17 g, Oral, Daily PRN  acetaminophen (TYLENOL) tablet 650 mg, 650 mg, Oral, Q6H PRN **OR** acetaminophen (TYLENOL) suppository 650 mg, 650 mg, Rectal, Q6H PRN  ondansetron (ZOFRAN-ODT) disintegrating tablet 4 mg, 4 mg, Oral, Q8H PRN **OR** ondansetron (ZOFRAN) injection 4 mg, 4 mg, IntraVENous, Q6H PRN  aspirin EC tablet 81 mg, 81 mg, Oral, Daily **OR** aspirin suppository 300 mg, 300 mg, Rectal, Daily  perflutren lipid microspheres (DEFINITY) injection 1.65 mg, 1.5 mL, IntraVENous, ONCE PRN  ipratropium-albuterol (DUONEB) nebulizer solution 1 ampule, 1 ampule, Inhalation, Q4H PRN  Physical    VITALS:  /62   Pulse 61   Temp 97.5 °F (36.4 °C) (Oral)   Resp 18   Ht 6' (1.829 m)   Wt 197 lb 11.2 oz (89.7 kg)   SpO2 96%   BMI 26.81 kg/m²     General appearance:  Appears comfortable. Well nourished  Eyes: Sclera clear, pupils equal  ENT: Moist mucus membranes, no thrush. Trachea midline. Cardiovascular: Regular rhythm, normal S1, S2. No murmur, gallop, rub. No edema in lower extremities  Respiratory: Clear to auscultation bilaterally, no wheeze, good inspiratory effort  Gastrointestinal: Abdomen soft, non-tender, not distended, normal bowel sounds  Musculoskeletal: No cyanosis in digits, neck supple  Neurology: Dysarthria. LUE 3/5, RUE/RLE/LLE 4/5. Psychiatry: Appropriate affect. Not agitated  Skin: Warm, dry, normal turgor, no rash  Brisk capillary refill, peripheral pulses palpable     Data    MRI Brain:  1. Subtle focal area of suspected restricted diffusion within the right  middle temporal gyrus within the right MCA distribution concerning for small  acute cortical infarct, potentially artifact given the vicinity to the skull  base. 2. No significant mass effect or acute hemorrhage. 3. Stable senescent changes, as above. 4. Extensive paranasal sinus inflammatory disease again noted within the  right maxillary sinus and scattered ethmoid air cells. Problem List  Principal Problem:    Acute CVA (cerebrovascular accident) [facility])  Active Problems:    Facial droop    Dysarthria    Anemia    CAD (coronary artery disease)    Bilateral carotid artery stenosis    Primary hypertension  Resolved Problems:    * No resolved hospital problems.  *         Acute CVA  -Continue ASA and Plavix   -Continue Lipitor  -MRI Brain concerning for small acute cortical infarct within the right MCA distribution  -CTA Head/neck without hemodynamic significant stenosis within the cervical ICAs per NASCET criteria.   -TSH, ammonia and folate within normal limits     -Neuro consulted for acute CVA  -PT/OT/SLP eval    Possible pneumonia  - CXR suggestive of pneumonia vs. atelectasis   - Start levaquin (recent hospitalization)   - Respiratory cx ordered     Abdominal pain  -Resolved now  -Tolerating po  - KUB yesterday showed ileus  -Monitor electrolytes  -Avoid narcotics    HTN  -D/c Norvasc as no need for tight Bp control in this age group   -ContToprol XL    Right lung opacity  - 7 mm ground-glass opacity within the right lung apex superimposed on  emphysematous changes seen on imaging.    -Recommendation for noncontrast chest CT at 6-12  Months.     DVT prophylaxis Lovenox  Code status Full code  Dysphagia diet   IV access Peripheral

## 2022-04-23 NOTE — PROGRESS NOTES
Physical Therapy  Facility/Department: [address]  Physical Therapy Initial Assessment    Name: Valentino Zarate  : 1935  MRN: [id number]  Date of Service: 2022    Discharge Recommendations:  IP Rehab   PT Equipment Recommendations  Equipment Needed: Yes  Mobility Devices: [place] Bed : Bed Rails - Partial  Other: pt's daughter requests hospital bed to assist with mobility when pt is \"having a bad day\"     Valentino Zarate scored a 13/24 on the AM-PAC short mobility form. Current research shows that an AM-PAC score of 17 or less is typically not associated with a discharge to the patient's home setting. Based on the patient's AM-PAC score and their current functional mobility deficits, it is recommended that the patient have 5-7 sessions per week of Physical Therapy at d/c to increase the patient's independence. At this time, this patient demonstrates the endurance, and/or tolerance for 3 hours of therapy each day, with a treatment frequency of 5-7x/wk. Please see assessment section for further patient specific details. If patient discharges prior to next session this note will serve as a discharge summary. Please see below for the latest assessment towards goals. Discussed discharge plan with pt and daughter. They wish to bring pt back to daughter's apartment. Daughter is with pt all the time and states that he has not done well in inpatient rehab environments in the past due to [address] [facility] [address]. Pt currently attends outpatient PT at [facility] and has been enjoying it. Pt is functioning slightly below his baseline at this time, so may benefit from HHPT assessment, but would be up to pt/daughter to decide. Pt states that he enjoys getting out of the house for therapy and lunch. Patient Diagnosis(es): The primary encounter diagnosis was Facial droop due to acute stroke ([facility] [address] A diagnosis of Dementia due to Alzheimer's disease [facility]) was also pertinent to this visit.   Past Medical History:  has a past medical history of CAD (coronary artery disease). Past Surgical History:  has a past surgical history that includes Coronary artery bypass graft and Cardiac surgery. Assessment   Body Structures, Functions, Activity Limitations Requiring Skilled Therapeutic Intervention: Decreased functional mobility ; Decreased ADL status; Decreased endurance;Decreased strength;Decreased balance;Decreased coordination  Assessment: 82 yo M admitted 4/22/22 with L sided facial droop and L sided weakness. CT head (-). MRI brain: Subtle focal area of suspected restricted diffusion within the right middle temporal gyrus within the right MCA distribution concerning for smallacute cortical infarct, potentially artifact given the vicinity to the skull base. Pt presents with decreased balance, coordination, and functional mobility at this time. Pt will benefit from con't skilled PT to facilitate safe return home with family assist.  Treatment Diagnosis: decreased functional mobility  Therapy Prognosis: Good  Decision Making: Low Complexity  History: as above  Exam: bed mobility, transfers, balance, coordination  Clinical Presentation: stable  Barriers to Learning: cognition  Requires PT Follow-Up: Yes  Activity Tolerance  Activity Tolerance: Patient tolerated evaluation without incident     Plan   Plan  Plan: 5-7 times per week  Current Treatment Recommendations: Strengthening,Balance training,Functional mobility training,Transfer training,Endurance training,Neuromuscular re-education,Gait training,Safety education & training,Patient/Caregiver education & training  Safety Devices  Type of Devices: Chair alarm in place,Gait belt,Call light within reach,Patient at risk for falls,Nurse notified,Left in chair,All fall risk precautions in place     Restrictions  Restrictions/Precautions  Restrictions/Precautions: Fall Risk,Modified Diet (high fall risk; Easy to [employer];  No Drinking Straws)  Required Braces or Orthoses?: No  Position Activity Restriction  Other position/activity restrictions: 81y.o.  years old male with history of hypertension, dementia who was admitted from the ED with acute confusion, L side weakness, dysarthria and L facial droop. MRI revealing \"Subtle focal area of suspected restricted diffusion within the right middle temporal gyrus within the right MCA distribution concerning for small acute cortical infarct, potentially artifact given the vicinity to the skull base. \"     Subjective   General  Chart Reviewed: Yes  Patient assessed for rehabilitation services?: Yes  Additional Pertinent Hx: pt was admitted in Jan 2022 with (+)  orthostatic hypotension  Family / Caregiver Present: Yes (pt's daughter and primary caregiver present at end of session)  Follows Commands: Within Functional Limits  General Comment  Comments: \"I feel good. \"  Subjective  Subjective: Pt is supine in bed, agreeable to PT.          Social/Functional History  Social/Functional History  Lives With: Family (2 daughters, 3 sons are in and out)  Type of Home: Apartment  Home Layout: One level  Home Access: Level entry  Bathroom Shower/Tub: Walk-in shower,Shower chair without back  Bathroom Equipment: Grab bars in shower,Shower chair  Bathroom Accessibility: Accessible  Home Equipment: Walker, rolling,Wheelchair-manual  Has the patient had two or more falls in the past year or any fall with injury in the past year?: No (1 fall in last 2 months - tripped over something at home)  Receives Help From: Family  ADL Assistance: Needs assistance  Homemaking Assistance: Needs assistance  Homemaking Responsibilities: No  Ambulation Assistance: Needs assistance  Transfer Assistance: Needs assistance (pt able to get in/out of bed, but has help getting in/out of car, in/out of shower)  Active : No  Patient's  Info: kids drive pt to appointments  Occupation: Retired  Type of Occupation: [employer] -   Leisure & Hobbies: pt enjoys hunting/fishing, but has not been able to do that for a while  Vision/Hearing  Vision Exceptions: Wears glasses for distance  Hearing: Exceptions to [facility]  Hearing Exceptions: Hard of hearing/hearing concerns      Cognition   Orientation  Orientation Level: Oriented to person;Disoriented to time;Oriented to situation;Oriented to place  Cognition  Overall Cognitive Status: WFL     Objective      Observation/Palpation  Posture: Fair  Gross Assessment  AROM: Within functional limits  Strength:  Within functional limits  Sensation: Intact        Strength RLE  Strength RLE: WFL  Strength LLE  Strength LLE: WFL           Bed mobility  Supine to Sit: Stand by assistance (cueing for UE placement)  Transfers  Sit to Stand: 2 Person Assistance;Minimal Assistance (with UE placement to RW)  Stand to sit: Moderate Assistance;2 Person Assistance (difficulty with eccentric control)  Bed to Chair: Moderate assistance;2 Person Assistance (with RW)  Comment: pt demonstrate rigidity with posture and narrow RACHEL        Balance  Posture: Fair  Sitting - Static: Poor  Sitting - Dynamic: Poor  Standing - Static: Poor  Standing - Dynamic: Poor  Comments: pt requires frequent mod A to maintain sitting balance, frequently leans posteriorly; difficulty with dual tasking and would often lose balance when asked to perform another task; requires mod A x 2 when standing prior to transfer             AM-PAC Score  AM-PAC Inpatient Mobility Raw Score : 13 (04/23/22 1328)  AM-PAC Inpatient T-Scale Score : 36.74 (04/23/22 1328)  Mobility Inpatient CMS 0-100% Score: 64.91 (04/23/22 1328)  Mobility Inpatient CMS G-Code Modifier : CL (04/23/22 1328)          Goals  Short Term Goals  Time Frame for Short term goals: Discharge  Short term goal 1: Pt will perform bed mobility with SPV  Short term goal 2: Pt will perform transfers sit <> stand with SBA and RW  Short term goal 3: Pt will perform ambulation x 15' with mod A and RW  Short term goal 4: Pt will perform static/dynamic sitting balance activities with SPV  Patient Goals   Patient goals :  To return home with daughter       Therapy Time   Individual Concurrent Group Co-treatment   Time In       1113   Time Out       1207   Minutes       54   Timed Code Treatment Minutes: [place] , [place], [place] [id number]

## 2022-04-23 NOTE — PROGRESS NOTES
Tyler Blancas  Neurology Follow-up  [facility] Neurology    Date of Service: 4/23/2022    Subjective:   CC: Follow up today regarding: Acute confusion and possible stroke    Events noted. Chart and lab reviewed. The patient looks better today. MRI showed possible small acute right temporal stroke. CTA showed no large vessel occlusion. Everything that is close to his baseline. He is on aspirin, Plavix and statin. He denies any chest pain, dysphagia, weakness or numbness. Other review of system was unremarkable. ROS : A 10-12 system review obtained and updated today and is unremarkable except as mentioned  in my interval history.      Family history: Noncontributory    Past Medical History:   Diagnosis Date    CAD (coronary artery disease)      Current Facility-Administered Medications   Medication Dose Route Frequency Provider Last Rate Last Admin    levoFLOXacin (LEVAQUIN) 750 MG/150ML infusion 750 mg  750 mg IntraVENous Q24H Lizandro Ring MD   Stopped at 04/23/22 1215    escitalopram (LEXAPRO) tablet 20 mg  20 mg Oral Daily Ashok Sanchez MD   20 mg at 04/23/22 1002    clopidogrel (PLAVIX) tablet 75 mg  75 mg Oral Daily Ashok Sanchez MD   75 mg at 04/23/22 1002    metoprolol succinate (TOPROL XL) extended release tablet 25 mg  25 mg Oral Daily Ashok Sanchez MD   25 mg at 04/23/22 1003    levothyroxine (SYNTHROID) tablet 75 mcg  75 mcg Oral Daily Ashok Sanchez MD   75 mcg at 04/23/22 1003    albuterol sulfate  (90 Base) MCG/ACT inhaler 2 puff  2 puff Inhalation Q6H PRN Ashok Sanchez MD   2 puff at 04/22/22 2222    pantoprazole (PROTONIX) tablet 40 mg  40 mg Oral Daily Ashok Sanchez MD   40 mg at 04/23/22 1003    tamsulosin (FLOMAX) capsule 0.4 mg  0.4 mg Oral Daily Ashok Sanchez MD   0.4 mg at 04/23/22 1003    sennosides-docusate sodium (SENOKOT-S) 8.6-50 MG tablet 2 tablet  2 tablet Oral Daily Ashok Sanchez MD   2 tablet at 04/23/22 1002    atorvastatin (LIPITOR) tablet 40 mg  40 mg Oral Nightly Ashok Sanchez MD   40 mg at 04/22/22 2041    sodium chloride flush 0.9 % injection 5-40 mL  5-40 mL IntraVENous 2 times per day Ashok aSnchez MD   10 mL at 04/23/22 1004    sodium chloride flush 0.9 % injection 5-40 mL  5-40 mL IntraVENous PRN Ashok Sanchez MD        0.9 % sodium chloride infusion   IntraVENous PRN Ashok Sanchez MD 25 mL/hr at 04/23/22 1042 New Bag at 04/23/22 1042    enoxaparin (LOVENOX) injection 40 mg  40 mg SubCUTAneous Q24H Ashok Sanchez MD   40 mg at 04/22/22 1729    polyethylene glycol (GLYCOLAX) packet 17 g  17 g Oral Daily PRN Ashok Sanchez MD        acetaminophen (TYLENOL) tablet 650 mg  650 mg Oral Q6H PRN Ashok Sanchez MD        Or   [facility] acetaminophen (TYLENOL) suppository 650 mg  650 mg Rectal Q6H PRN Ashok Sanchez MD        ondansetron (ZOFRAN-ODT) disintegrating tablet 4 mg  4 mg Oral Q8H PRN Ashok Sanchez MD        Or    ondansetron (ZOFRAN) injection 4 mg  4 mg IntraVENous Q6H PRN Ashok Sanchez MD        aspirin EC tablet 81 mg  81 mg Oral Daily Ashok Sanchez MD   81 mg at 04/23/22 1003    Or    aspirin suppository 300 mg  300 mg Rectal Daily Ashok Sanchez MD        perflutren lipid microspheres (DEFINITY) injection 1.65 mg  1.5 mL IntraVENous ONCE PRN Ashok Sanchez MD        ipratropium-albuterol (DUONEB) nebulizer solution 1 ampule  1 ampule Inhalation Q4H PRN BEST Goldstein - CNP   1 ampule at 04/23/22 0154     No Known Allergies   reports that he has never smoked. He has never used smokeless tobacco. He reports that he does not drink alcohol and does not use drugs.        Objective:  Exam:   Constitutional:   Vitals:    04/23/22 0315 04/23/22 0730 04/23/22 0754 04/23/22 1125   BP: (!) 115/50 (!) 141/65  139/62   Pulse: 57 60  61   Resp: 18 18  18   Temp: 98.2 °F (36.8 °C) 97.9 °F (36.6 °C)  97.5 °F (36.4 °C)   TempSrc: Oral Axillary  Oral   SpO2: 94% 95%  96%   Weight:   197 lb 11.2 oz (89.7 kg)    Height:         General appearance:  Normal development and appear in no acute distress. Mental Status:   Oriented to person, but not to time  For immediate recall and fund of knowledge  Normal attention language is intact  Cranial Nerves:   II: Visual fields: Full. Pupils: equal, round, reactive to light  III,IV,VI: Extra Ocular Movements are intact.  No nystagmus  V: Facial sensation is intact  VII: Facial strength and movements: intact and symmetric  IX: Palate elevation is symmetric  XI: Shoulder shrug is intact  XII: Tongue movements are normal  Musculoskeletal: No focal weakness  The same paratonia  No tremors  Normal sensation      Data:  LABS:   Lab Results   Component Value Date     04/23/2022    K 3.9 04/23/2022    CL 98 04/23/2022    CO2 25 04/23/2022    BUN 23 04/23/2022    CREATININE 1.1 04/23/2022    GFRAA >60 04/23/2022    GFRAA >60 10/21/2012    LABGLOM >60 04/23/2022    GLUCOSE 116 04/23/2022    MG 2.00 04/23/2022    CALCIUM 8.1 04/23/2022     Lab Results   Component Value Date    WBC 4.8 04/23/2022    RBC 2.77 04/23/2022    HGB 8.6 04/23/2022    HCT 25.3 04/23/2022    MCV 91.6 04/23/2022    RDW 17.3 04/23/2022     04/23/2022     Lab Results   Component Value Date    INR 1.26 (H) 04/22/2022    PROTIME 14.4 (H) 04/22/2022       Neuroimaging was independently reviewed by me and discussed results with the patient and his family  I reviewed blood testing and other test results and discussed results with the patient      Impression:  Acute aphasia and left facial droop secondary small new ischemic right temporal CVA  Hypertension, not controlled  Hyperlipidemia  Dementia, AD      Recommendation  Continue aspirin Plavix  Speech, PT and OT  MRI results discussed with the family  Statin  Hydration  Continue antibiotics  DVT and GI prophylaxis  Blood pressure control and continue current medications  Stroke education was provided  Can be discharged from neurology once medically stable  No further recommendation   I will sign off           Lakesha Montgomery MD [phone]      This dictation was generated by voice recognition computer software. Although all attempts are made to edit the dictation for accuracy, there may be errors in the transcription that are not intended.

## 2022-04-23 NOTE — PROGRESS NOTES
Occupational Therapy  Facility/Department: [address]  Occupational Therapy Initial Assessment    Name: Adeline Brewer  : 1935  MRN: [id number]  Date of Service: 2022    Discharge Recommendations: Adeline Brewer scored a 13/24 on the AM-PAC ADL Inpatient form. Current research shows that an AM-PAC score of 17 or less is typically not associated with a discharge to the patient's home setting. Based on the patient's AM-PAC score and their current ADL deficits, it is recommended that the patient have 5-7 sessions per week of Occupational Therapy at d/c to increase the patient's independence. At this time, this patient demonstrates the endurance, and/or tolerance for 3 hours of therapy each day, with a treatment frequency of 5-7x/wk. Please see assessment section for further patient specific details. If patient discharges prior to next session this note will serve as a discharge summary. Please see below for the latest assessment towards goals. Pt would strongly benefit from continued intensive therapy prior to return home d/t presenting significantly below baseline level of function. However, pt's dtr requesting pt to return home with skilled home/OP services. If pt were to discharge home, S3 HHOT indicated with hospital bed recommended:    HOME HEALTH CARE: LEVEL 3 SAFETY     - Initial home health evaluation to occur within 24-48 hours, in patient home   - Therapy evaluations in home within 24-48 hours of discharge; including DME and home safety   - Frontload therapy 5 days, then 3x a week   - Therapy to evaluate if patient has [address] needs for personal care   -  evaluation within 24-48 hours, includes evaluation of resources and insurance to determine AL, [place], LTC, and Medicaid options        OT Equipment Recommendations  Equipment Needed: Yes  Mobility Devices: [place]stad Bed : Electric - Full (Head of Bed); Electric - Full  (Height of Bed); Bed Rails - Full       Patient Diagnosis(es): The primary encounter diagnosis was Facial droop due to acute stroke ([facility] [address] A diagnosis of Dementia due to Alzheimer's disease [facility]) was also pertinent to this visit. Past Medical History:  has a past medical history of CAD (coronary artery disease). Past Surgical History:  has a past surgical history that includes Coronary artery bypass graft and Cardiac surgery. Treatment Diagnosis: Above deficits associated with CVA      Assessment   Performance deficits / Impairments: Decreased functional mobility ; Decreased strength;Decreased endurance;Decreased ADL status; Decreased cognition;Decreased balance;Decreased high-level IADLs  Assessment: Patient presents below baseline d/t above deficits; family declining intensive overnight therapy stay secondary to patient's episodes of sundowning, and reports that pt performs better with therapy in home environment. Pt requiring increased assist of two persons this date secondary to balance and perceptual deficits--continued OT indicated in order to facilitate return to PLOF  Treatment Diagnosis: Above deficits associated with CVA  Prognosis: Good  Decision Making: Medium Complexity  Exam: as above  REQUIRES OT FOLLOW-UP: Yes  Activity Tolerance  Activity Tolerance: Patient limited by fatigue;Treatment limited secondary to decreased cognition  Activity Tolerance: Easily fatigued with functional tasks, benefits from repetiton for command following        Plan   Plan  Times per Week: 5-7  Times per Day: Daily  Current Treatment Recommendations: Strengthening,Functional mobility training,Endurance training,Patient/Caregiver education & training,Equipment evaluation, education, & procurement,Safety education & training,Self-Care / ADL     Restrictions  Restrictions/Precautions  Restrictions/Precautions: Fall Risk,Modified Diet (high fall risk; Easy to [employer];  No Drinking Straws)  Required Braces or Orthoses?: No  Position Activity Restriction  Other position/activity restrictions: 81y.o.  years old male with history of hypertension, dementia who was admitted from the ED with acute confusion, L side weakness, dysarthria and L facial droop. MRI revealing \"Subtle focal area of suspected restricted diffusion within the right middle temporal gyrus within the right MCA distribution concerning for small acute cortical infarct, potentially artifact given the vicinity to the skull base. \"    Subjective   General  Chart Reviewed: Yes  Patient assessed for rehabilitation services?: Yes  Diagnosis: CVA  Subjective  Subjective: Patient lying upright in bed upon arrival, presents with dysarthric speech--pleasant and agreeable to evaluation.  Denies pain  Pre Treatment Pain Screening  Intervention List: Nurse/Physician notified     Social/Functional History  Social/Functional History  Lives With: Family (2 daughters, 3 sons are in and out)  Type of Home: Apartment  Home Layout: One level  Home Access: Level entry  Bathroom Shower/Tub: Walk-in shower,Shower chair without back  Bathroom Equipment: Grab bars in shower,Shower chair  Bathroom Accessibility: Accessible  Home Equipment: Maebelle Cramp, rolling,Wheelchair-manual  Has the patient had two or more falls in the past year or any fall with injury in the past year?: No (1 fall in last 2 months - tripped over something at home)  Receives Help From: Family  ADL Assistance: Needs assistance  Homemaking Assistance: Needs assistance  Homemaking Responsibilities: No  Ambulation Assistance: Needs assistance  Transfer Assistance: Needs assistance (pt able to get in/out of bed, but has help getting in/out of car, in/out of shower)  Active : No  Patient's  Info: kids drive pt to appointments  Occupation: Retired  Type of Occupation: [employer] -   Leisure & Hobbies: pt enjoys hunting/fishing, but has not been able to do that for a while       Objective   Vision Exceptions: Wears glasses for distance  Hearing: Exceptions to [facility]  Hearing Exceptions: Hard of hearing/hearing concerns       Observation/Palpation  Posture: Fair  Safety Devices  Type of Devices: Chair alarm in place;Gait belt;Call light within reach; Patient at risk for falls;Nurse notified; Left in chair  Balance  Sitting Balance: Moderate assistance (occasional posterior/L side lean, especially during dual tasks (ex. impaired balance noted when pt focusing on coordination/MMT/ROM testing))  Standing Balance: Dependent/Total (mod x2)  Standing Balance  Time: ~1-2 minutes total  Activity: stand step transfer (B) hand held assist     ADL  Feeding: Setup  LE Dressing: Dependent/Total (donning B socks)     Activity Tolerance  Activity Tolerance: Patient tolerated evaluation without incident  Bed mobility  Supine to Sit: Stand by assistance  Scooting: Stand by assistance  Comment: with increased time, HOB elevated, cuing for hand placement  Transfers  Stand Step Transfers: 2 Person assistance;Dependent/Total (EOB > recliner (mod x2))  Sit to stand: 2 Person assistance;Dependent/Total (x1, from EOB (min x2))  Stand to sit: 2 Person assistance;Dependent/Total (x1, to recliner (mod x2))     Cognition  Overall Cognitive Status: Exceptions  Arousal/Alertness: Inconsistent responses to stimuli; Appropriate responses to stimuli  Following Commands: Follows one step commands with repetition  Attention Span: Attends with cues to redirect; Difficulty attending to directions  Memory: Decreased short term memory  Safety Judgement: Decreased awareness of need for assistance  Problem Solving: Assistance required to generate solutions;Assistance required to implement solutions  Insights: Decreased awareness of deficits  Initiation: Requires cues for some  Sequencing: Requires cues for some  Perception  Overall Perceptual Status: Impaired  Unilateral Attention: Cues to maintain midline in standing;Cues to maintain midline in sitting               Education Given To: Patient; Family;Caregiver  Education Provided: Plan of Care; Fall Prevention Strategies; Equipment  Barriers to Learning: Cognition (pt's cognition)  Education Outcome: Verbalized understanding;Demonstrated understanding;Continued education needed (Pt requires continued education, pt's dtr v/u)                        G-Code     OutComes Score                                                  AM-PAC Score        AM-PAC Inpatient Daily Activity Raw Score: 13 (04/23/22 1306)  AM-PAC Inpatient ADL T-Scale Score : 32.03 (04/23/22 1306)  ADL Inpatient CMS 0-100% Score: 63.03 (04/23/22 1306)  ADL Inpatient CMS G-Code Modifier : CL (04/23/22 1306)    Goals  Short Term Goals  Time Frame for Short term goals: discharge  Short Term Goal 1: UB ADL min A  Short Term Goal 2: Toileting mod A  Short Term Goal 3: Fxl transfers min A  Short Term Goal 4: Increase sitting balance to supervision  Short Term Goal 5:  Increase standing balance to CGA  Long Term Goals  Time Frame for Long term goals : LTG=STG       Therapy Time   Individual Concurrent Group Co-treatment   Time In       1113   Time Out       1207   Minutes       54       Timed Code Treatment Minutes:   39 minutes    Total Treatment Minutes:  54 minutes    [address], [facility] O[facility]L [id number]    [address], OT

## 2022-04-23 NOTE — RT PROTOCOL NOTE
RT Inhaler-Nebulizer Bronchodilator Protocol Note    There is a bronchodilator order in the chart from a provider indicating to follow the RT Bronchodilator Protocol and there is an Initiate RT Inhaler-Nebulizer Bronchodilator Protocol order as well (see protocol at bottom of note). CXR Findings:  XR CHEST PORTABLE    Result Date: 4/22/2022  Left basilar atelectasis versus pneumonia with small parapneumonic effusion. The findings from the last RT Protocol Assessment were as follows:   History Pulmonary Disease: None or smoker <15 pack years  Respiratory Pattern: Regular pattern and RR 12-20 bpm  Breath Sounds: Slightly diminished and/or crackles  Cough: Weak, non-productive  Indication for Bronchodilator Therapy: On home bronchodilators  Bronchodilator Assessment Score: 5    Aerosolized bronchodilator medication orders have been revised according to the RT Inhaler-Nebulizer Bronchodilator Protocol below. Respiratory Therapist to perform RT Therapy Protocol Assessment initially then follow the protocol. Repeat RT Therapy Protocol Assessment PRN for score 0-3 or on second treatment, BID, and PRN for scores above 3. No Indications - adjust the frequency to every 6 hours PRN wheezing or bronchospasm, if no treatments needed after 48 hours then discontinue using Per Protocol order mode. If indication present, adjust the RT bronchodilator orders based on the Bronchodilator Assessment Score as indicated below. Use Inhaler orders unless patient has one or more of the following: on home nebulizer, not able to hold breath for 10 seconds, is not alert and oriented, cannot activate and use MDI correctly, or respiratory rate 25 breaths per minute or more, then use the equivalent nebulizer order(s) with same Frequency and PRN reasons based on the score. If a patient is on this medication at home then do not decrease Frequency below that used at home.     0-3 - enter or revise RT bronchodilator order(s) to equivalent RT Bronchodilator order with Frequency of every 4 hours PRN for wheezing or increased work of breathing using Per Protocol order mode. 4-6 - enter or revise RT Bronchodilator order(s) to two equivalent RT bronchodilator orders with one order with BID Frequency and one order with Frequency of every 4 hours PRN wheezing or increased work of breathing using Per Protocol order mode. 7-10 - enter or revise RT Bronchodilator order(s) to two equivalent RT bronchodilator orders with one order with TID Frequency and one order with Frequency of every 4 hours PRN wheezing or increased work of breathing using Per Protocol order mode. 11-13 - enter or revise RT Bronchodilator order(s) to one equivalent RT bronchodilator order with QID Frequency and an Albuterol order with Frequency of every 4 hours PRN wheezing or increased work of breathing using Per Protocol order mode. Greater than 13 - enter or revise RT Bronchodilator order(s) to one equivalent RT bronchodilator order with every 4 hours Frequency and an Albuterol order with Frequency of every 2 hours PRN wheezing or increased work of breathing using Per Protocol order mode. RT to enter RT Home Evaluation for COPD & MDI Assessment order using Per Protocol order mode.     Electronically signed by Lizett Martinez RCP on 4/23/2022 at 4:57 PM

## 2022-04-23 NOTE — PROGRESS NOTES
04/23/22 1657   RT Protocol   History Pulmonary Disease 0   Respiratory pattern 0   Breath sounds 2   Cough 3   Indications for Bronchodilator Therapy On home bronchodilators   Bronchodilator Assessment Score 5

## 2022-04-24 VITALS
HEART RATE: 53 BPM | TEMPERATURE: 97.3 F | WEIGHT: 191.8 LBS | SYSTOLIC BLOOD PRESSURE: 161 MMHG | DIASTOLIC BLOOD PRESSURE: 67 MMHG | BODY MASS INDEX: 25.98 KG/M2 | RESPIRATION RATE: 16 BRPM | OXYGEN SATURATION: 96 % | HEIGHT: 72 IN

## 2022-04-24 LAB — PROCALCITONIN: 0.25 NG/ML (ref 0–0.15)

## 2022-04-24 PROCEDURE — 97535 SELF CARE MNGMENT TRAINING: CPT

## 2022-04-24 PROCEDURE — 94640 AIRWAY INHALATION TREATMENT: CPT

## 2022-04-24 PROCEDURE — 97530 THERAPEUTIC ACTIVITIES: CPT

## 2022-04-24 PROCEDURE — 6370000000 HC RX 637 (ALT 250 FOR IP): Performed by: INTERNAL MEDICINE

## 2022-04-24 PROCEDURE — 36415 COLL VENOUS BLD VENIPUNCTURE: CPT

## 2022-04-24 PROCEDURE — 84145 PROCALCITONIN (PCT): CPT

## 2022-04-24 PROCEDURE — 2580000003 HC RX 258: Performed by: INTERNAL MEDICINE

## 2022-04-24 PROCEDURE — 94761 N-INVAS EAR/PLS OXIMETRY MLT: CPT

## 2022-04-24 RX ORDER — LEVOFLOXACIN 750 MG/1
750 TABLET ORAL DAILY
Qty: 4 TABLET | Refills: 0 | Status: SHIPPED | OUTPATIENT
Start: 2022-04-24 | End: 2022-04-28

## 2022-04-24 RX ORDER — LEVOFLOXACIN 500 MG/1
750 TABLET, FILM COATED ORAL DAILY
Status: DISCONTINUED | OUTPATIENT
Start: 2022-04-24 | End: 2022-04-24 | Stop reason: HOSPADM

## 2022-04-24 RX ADMIN — IPRATROPIUM BROMIDE AND ALBUTEROL SULFATE 1 AMPULE: .5; 2.5 SOLUTION RESPIRATORY (INHALATION) at 08:23

## 2022-04-24 RX ADMIN — LEVOTHYROXINE SODIUM 75 MCG: 0.07 TABLET ORAL at 08:38

## 2022-04-24 RX ADMIN — Medication 10 ML: at 08:39

## 2022-04-24 RX ADMIN — PANTOPRAZOLE SODIUM 40 MG: 40 TABLET, DELAYED RELEASE ORAL at 08:38

## 2022-04-24 RX ADMIN — SENNOSIDES AND DOCUSATE SODIUM 2 TABLET: 50; 8.6 TABLET ORAL at 08:37

## 2022-04-24 RX ADMIN — LEVOFLOXACIN 750 MG: 500 TABLET, FILM COATED ORAL at 11:22

## 2022-04-24 RX ADMIN — ASPIRIN 81 MG: 81 TABLET, COATED ORAL at 08:38

## 2022-04-24 RX ADMIN — METOPROLOL SUCCINATE 25 MG: 25 TABLET, EXTENDED RELEASE ORAL at 08:38

## 2022-04-24 RX ADMIN — ESCITALOPRAM OXALATE 20 MG: 10 TABLET ORAL at 08:38

## 2022-04-24 RX ADMIN — TAMSULOSIN HYDROCHLORIDE 0.4 MG: 0.4 CAPSULE ORAL at 08:38

## 2022-04-24 RX ADMIN — CLOPIDOGREL BISULFATE 75 MG: 75 TABLET ORAL at 08:38

## 2022-04-24 ASSESSMENT — PAIN SCALES - GENERAL: PAINLEVEL_OUTOF10: 0

## 2022-04-24 NOTE — PROGRESS NOTES
Occupational Therapy  Facility/Department: [address]  Daily Treatment Note  NAME: Blayne Solitario  : 1935  MRN: [id number]    Date of Service: 2022    Discharge Recommendations:    Blayne Solitario scored a 17 on the AM-PAC ADL Inpatient form. Current research shows that an AM-PAC score of 18 or greater is typically associated with a discharge to the patient's home setting. Based on the patient's AM-PAC score, and their current ADL deficits, it is recommended that the patient have 2-3 sessions per week of Occupational Therapy at d/c to increase the patient's independence. At this time, this patient demonstrates the endurance and safety to discharge home with otupateint and a follow up treatment frequency of 2-3x/wk. Please see assessment section for further patient specific details. If patient discharges prior to next session this note will serve as a discharge summary. Please see below for the latest assessment towards goals. OT Equipment Recommendations  Equipment Needed: Yes  Other: condom catheter for night use      Patient Diagnosis(es): The primary encounter diagnosis was Facial droop due to acute stroke ([facility] [address] A diagnosis of Dementia due to Alzheimer's disease [facility]) was also pertinent to this visit. Assessment    Assessment: pt appears to be approaching baseline function for ADLs and mobility - would benefit from continued reinforcment of safe walker strategies and engagement in ADls to increase endurance and independence  Activity Tolerance: Patient tolerated treatment well (Simultaneous filing. User may not have seen previous data.)  Equipment Needed: Yes  Other: condom catheter for night use      Plan   Plan  Times per Week: 5-7  Times per Day: Daily  Current Treatment Recommendations: Strengthening; Functional mobility training; Endurance training;Patient/Caregiver education & training;Equipment evaluation, education, & procurement; Safety education & training;Self-Care / ADL     Subjective   Subjective  Subjective: pt in bed on arrival with family member at bedside - reports being sore/stiff - family member asking for pt to get dressed for anticipated DC later today  Cognition  Overall Cognitive Status: Exceptions  Arousal/Alertness: Inconsistent responses to stimuli; Appropriate responses to stimuli  Following Commands: Follows one step commands with repetition  Attention Span: Attends with cues to redirect; Difficulty attending to directions  Memory: Decreased short term memory  Safety Judgement: Decreased awareness of need for assistance  Problem Solving: Assistance required to generate solutions;Assistance required to implement solutions  Insights: Decreased awareness of deficits  Initiation: Requires cues for some  Sequencing: Requires cues for some        Objective    Vitals     Bed Mobility Training  Bed Mobility Training: Yes  Rolling: Stand-by assistance  Supine to Sit: Stand-by assistance (HOB elevated)  Scooting: Stand-by assistance  Balance  Sitting: Impaired  Sitting - Static: Fair (occasional) (controlled posterior lean during ADL tasks)  Standing: Impaired (posterior lean intermittently)  Standing - Static: Fair  Transfer Training  Transfer Training: Yes  Sit to Stand:  Moderate assistance (varied min to mod A, cues for anterior lean)  Stand to Sit: Moderate assistance (increased cues, difficulty flexing hips and knees)  Bed to Chair: Moderate assistance (mod progressing to min A)  Gait Training  Gait Training: Yes  Gait  Overall Level of Assistance: Minimum assistance (walker navigation and cues for proximity to walker and bigger steps)  Interventions: Safety awareness training  Gait Abnormalities: Shuffling gait  Distance (ft): 60 Feet     ADL  Feeding: Modified independent ;Setup  UE Bathing: Stand by assistance;Setup  LE Bathing: Stand by assistance  UE Dressing: Stand by assistance;Setup  LE Dressing: Maximum assistance  LE Dressing Skilled Clinical Factors: min A to thread LEs through pants and underwear, assist to pull over hips  Toileting: None (external catheter in place at this time)  Additional Comments: pt demo's posterior lean in unsupported sitting that he is able to correct with cuing from therapist - completed sponge bathing and dressing in personal shirt, brief and sweat pants sitting EOB - ambulated in room with RW and min A - sit to stand from reclining chair x 3 trials with mod progressing to min A           Patient Education  Education Given To: Patient; Family;Caregiver  Education Provided: Plan of Care; Fall Prevention Strategies; Equipment  Education Provided Comments: discussed condom catheter for use at night due to patient's frequent night urination - discussed options for elevated commodes for home use  Barriers to Learning: Cognition  Education Outcome: Verbalized understanding;Demonstrated understanding;Continued education needed    Goals  Short Term Goals  Time Frame for Short term goals: discharge  Short Term Goal 1: UB ADL min A  Short Term Goal 2: Toileting mod A  Short Term Goal 3: Fxl transfers min A  Short Term Goal 4: Increase sitting balance to supervision  Short Term Goal 5: Increase standing balance to CGA  Long Term Goals  Time Frame for Long term goals : LTG=STG       Therapy Time   Individual Concurrent Group Co-treatment   Time In       0854   Time Out       0920   Minutes       26   Timed Code Treatment Minutes: 26 Minutes   Total minutes 26    Pieter Adamson OT   Pieter FAM/AUREA [id number], 4/24/2022, 10:06 AM

## 2022-04-24 NOTE — PROGRESS NOTES
Physical Therapy  Facility/Department: [address]  Daily Treatment Note  NAME: Fausto Pryor  : 1935  MRN: [id number]    Date of Service: 2022    Discharge Recommendations:  Fausto Pryor scored a 16/24 on the AM-PAC short mobility form. Current research shows that an AM-PAC score of 18 or greater is typically associated with a discharge to the patient's home setting. Based on the patient's AM-PAC score and their current functional mobility deficits, it is recommended that the patient have 2-3 sessions per week of Physical Therapy at d/c to increase the patient's independence. At this time, this patient demonstrates the endurance and safety to discharge home with OP services and a follow up treatment frequency of 2-3x/wk. Please see assessment section for further patient specific details. If patient discharges prior to next session this note will serve as a discharge summary. Please see below for the latest assessment towards goals. Outpatient PT   PT Equipment Recommendations  Equipment Needed: Yes  Mobility Devices: Hospital Bed  Hospital Bed : Bed Rails - Partial  Other: pt's daughter requests hospital bed to assist with mobility when pt is \"having a bad day\"    Patient Diagnosis(es): The primary encounter diagnosis was Facial droop due to acute stroke ([facility] [address] A diagnosis of Dementia due to Alzheimer's disease [facility]) was also pertinent to this visit. Assessment   Assessment: Pt demonstrating improved functional mobility requiring min to moderate assistance and increased safety cues during mobility. Family present and feeling comfortable with current mobility and returning home. Continued skilled PT to promote safe discharge home. Activity Tolerance: Patient tolerated treatment well (Simultaneous filing.  User may not have seen previous data.)  Equipment Needed: Yes  Mobility Devices: Hospital Bed  Other: pt's daughter requests hospital bed to assist with mobility when pt is [place] a bad day\"     Plan    Plan  Plan: 5-7 times per week  Current Treatment Recommendations: Strengthening;Balance training;Functional mobility training;Transfer training; Endurance training;Neuromuscular re-education;Gait training; Safety education & training;Patient/Caregiver education & training     Subjective    Subjective  Subjective: Pt upright in bed at arrival, agreed to therapy session. Reporting his legs are feeling better. No pain reported. Orientation  Orientation Level: Oriented to person;Disoriented to time;Oriented to situation;Oriented to place  Cognition  Overall Cognitive Status: Exceptions  Arousal/Alertness: Inconsistent responses to stimuli; Appropriate responses to stimuli  Following Commands: Follows one step commands with repetition  Attention Span: Attends with cues to redirect; Difficulty attending to directions  Memory: Decreased short term memory  Safety Judgement: Decreased awareness of need for assistance  Problem Solving: Assistance required to generate solutions;Assistance required to implement solutions  Insights: Decreased awareness of deficits  Initiation: Requires cues for some  Sequencing: Requires cues for some     Objective   Vitals     Bed Mobility Training  Bed Mobility Training: Yes  Rolling: Stand-by assistance  Supine to Sit: Stand-by assistance (HOB elevated)  Scooting: Stand-by assistance  Balance  Sitting: Impaired  Sitting - Static: Fair (occasional) (controlled posterior lean during ADL tasks)  Standing: Impaired (posterior lean intermittently)  Standing - Static: Fair  Transfer Training  Transfer Training: Yes  Sit to Stand:  Moderate assistance (varied min to mod A, cues for anterior lean)  Stand to Sit: Moderate assistance (increased cues, difficulty flexing hips and knees)  Bed to Chair: Moderate assistance (mod progressing to min A)  Gait Training  Gait Training: Yes  Gait  Overall Level of Assistance: Minimum assistance (walker navigation and cues for proximity to walker and bigger steps)  Interventions: Safety awareness training  Gait Abnormalities: Shuffling gait  Distance (ft): 60 Feet              Patient Education  Education Given To: Patient; Family  Education Provided: Role of Therapy;Plan of Care;Family Education;Transfer Training  Education Method: Demonstration;Verbal  Barriers to Learning: Cognition  Education Outcome: Verbalized understanding    Goals--not met  Short Term Goals  Time Frame for Short term goals: Discharge  Short term goal 1: Pt will perform bed mobility with SPV  Short term goal 2: Pt will perform transfers sit <> stand with SBA and RW  Short term goal 3: Pt will perform ambulation x 15' with mod A and RW  Short term goal 4: Pt will perform static/dynamic sitting balance activities with SPV  Patient Goals   Patient goals :  To return home with daughter      Therapy Time   Individual Concurrent Group Co-treatment   Time In       0854   Time Out       0920   Minutes       26   Timed Code Treatment Minutes: Caroline [id number]

## 2022-04-24 NOTE — PROGRESS NOTES
Patient discharged to home. Patients son received the discharge instructions and signed the discharge instructions. IV was removed from his left wrist without a problem. Male external catheter was removed from him and patient was made ready for discharge. We took the patient down to the [facility] via wheel chair and met the son in the [facility].

## 2022-04-24 NOTE — DISCHARGE SUMMARY
Patient: Latasha Sutton     Gender: male  : 1935   Age: 81 y.o. MRN: [id number]    Admitting Physician: Alma Rosa Potter MD  Discharge Physician: Rohit Coto MD     Code Status: Full Code     Admit Date: 2022   Discharge Date:   22    Disposition:  Home    Discharge Diagnoses:  Acute CVA right MCA  Gram-positive pneumonia treated  Abdominal pain with some ileus resolved  Hypertension  Right lung opacity    Active Hospital Problems    Diagnosis Date Noted    Facial droop [R29.810] 2022     Priority: Medium    Dysarthria [R47.1] 2022     Priority: Medium    Anemia [D64.9] 2022     Priority: Medium    Acute CVA (cerebrovascular accident) ([facility] [address] [I63.9] 2022     Priority: Medium    Dyslipidemia [E78.5]      Priority: Medium    Dementia due to Alzheimer's disease ([facility] [address] [G30.9, F02.80]      Priority: Medium    HTN (hypertension), benign [I10] 2022    Bilateral carotid artery stenosis [I65.23] 2022    CAD (coronary artery disease) [I25.10] 10/20/2012       Follow-up appointments:  one week    Outpatient to do list: 7 mm groundglass opacity in the right lung apex repeat CT scan in 6 to 12 months next  Condition at Discharge:  [address] Course:   Acute CVA  -Continue ASA and Plavix   -Continue Lipitor  -MRI Brain concerning for small acute cortical infarct within the right MCA distribution  -CTA Head/neck without hemodynamic significant stenosis within the cervical ICAs per NASCET criteria.   -TSH, ammonia and folate within normal limits     -Neuro consulted for acute CVA  -PT/OT/SLP eval recommended ARU  I had a lengthy discussion with the daughter and the patient  Daughter understands that he needs intensive physical therapy based on his physical and occupational therapy scores however she requested that she wanted to take him home since he does much better at home  She clearly understands the risks associated with taking him home  Given patient's and daughter's wishes I am discharging him home with home health    Possible gram-positive pneumonia  He was started on IV levofloxacin and have converted him to oral to complete a 6-day course      Abdominal pain  -Resolved now  -Tolerating po  - KUB yesterday showed ileus  -Monitor electrolytes  -Avoid narcotics  On discharge he was eating drinking passing gas and denied any abdominal pain     HTN  Continue all home medication     Right lung opacity  - 7 mm ground-glass opacity within the right lung apex superimposed on  emphysematous changes seen on imaging.    -Recommendation for noncontrast chest CT at 6-12  Months.       Discharge Medications:   Current Discharge Medication List      START taking these medications    Details   levoFLOXacin (LEVAQUIN) 750 MG tablet Take 1 tablet by mouth daily for 4 days  Qty: 4 tablet, Refills: 0           Current Discharge Medication List        Current Discharge Medication List      CONTINUE these medications which have NOT CHANGED    Details   montelukast (SINGULAIR) 10 MG tablet Take 10 mg by mouth nightly      sennosides-docusate sodium (SENOKOT-S) 8.6-50 MG tablet Take 2 tablets by mouth daily      amLODIPine (NORVASC) 10 MG tablet Take 10 mg by mouth daily      pantoprazole (PROTONIX) 40 MG tablet Take 40 mg by mouth daily      tamsulosin (FLOMAX) 0.4 MG capsule Take 0.4 mg by mouth daily      albuterol (PROVENTIL HFA) 108 (90 BASE) MCG/ACT inhaler Inhale 2 puffs into the lungs every 6 hours as needed for Wheezing. Qty: 1 Inhaler, Refills: 0      simvastatin (ZOCOR) 40 MG tablet Take 40 mg by mouth nightly. escitalopram (LEXAPRO) 20 MG tablet Take 20 mg by mouth daily. clopidogrel (PLAVIX) 75 MG tablet Take 75 mg by mouth daily. metoprolol (TOPROL-XL) 25 MG XL tablet Take 25 mg by mouth daily. levothyroxine (SYNTHROID) 75 MCG tablet Take 75 mcg by mouth daily. aspirin 81 MG tablet Take 81 mg by mouth daily.              Current Discharge Medication List          Discharge ROS:  A complete review of systems was asked and negative     Discharge Exam:    BP (!) 161/67   Pulse 53   Temp 97.3 °F (36.3 °C) (Oral)   Resp 16   Ht 6' (1.829 m)   Wt 191 lb 12.8 oz (87 kg)   SpO2 96%   BMI 26.01 kg/m²   General appearance:  NAD  HEENT:   Normal cephalic, atraumatic, moist mucous membranes, no oropharyngeal erythema or exudate  Heart[de-identified] Normal s1/s2, RRR, no murmurs, gallops, or rubs. no leg edema  Lungs:  Normal respiratory effort. Clear to auscultation, bilaterally without Rales/Wheezes/Rhonchi. Abdomen: Soft, non-tender, non-distended, bowel sounds present, no masses  Musculoskeletal:  No clubbing, no cyanosis, *  Neurologic:  Neurovascularly intact without any focal sensory/motor deficits. Cranial nerves: II-XII intact, grossly non-focal.    Labs: For convenience and continuity at follow-up the following most recent labs are provided:    Lab Results   Component Value Date    WBC 4.8 04/23/2022    HGB 8.6 04/23/2022    HCT 25.3 04/23/2022    MCV 91.6 04/23/2022     04/23/2022     04/23/2022    K 3.9 04/23/2022    CL 98 04/23/2022    CO2 25 04/23/2022    BUN 23 04/23/2022    CREATININE 1.1 04/23/2022    CALCIUM 8.1 04/23/2022    ALKPHOS 100 04/22/2022    ALT 8 04/22/2022    AST 12 04/22/2022    BILITOT 0.6 04/22/2022    BILIDIR 0.26 08/05/2011    LABALBU 2.8 04/22/2022    LDLCALC 19 04/23/2022    TRIG 76 04/23/2022     Lab Results   Component Value Date    INR 1.26 (H) 04/22/2022           The patient was seen and examined on day of discharge and this discharge summary is in conjunction with any daily progress note from day of discharge. Time Spent on discharge is 45 minutes  in the examination, evaluation, counseling and review of medications and discharge plan.       Note that greater  than 30 minutes was spent in preparing discharge papers, discussing discharge with patient, medication review, etc.       Signed:    Bambi Serrano, MD   4/24/2022      Thank you Ophelia Martines MD for the opportunity to be involved in this patient's care.  If you have any questions or concerns please feel free to contact me

## 2022-04-24 NOTE — DISCHARGE INSTR - COC
Continuity of Care Form    Patient Name: Latasha Sutton   :  1935  MRN:  [id number]    Admit date:  2022  Discharge date:  2022    Code Status Order: Full Code   Advance Directives:      Admitting Physician:  Alma Rosa Potter MD  PCP: Clara Tineo MD    Discharging Nurse: [address] Unit/Room#: 4ME-0257/4255-44  Discharging Unit Phone Number: [phone]    Emergency Contact:   Extended Emergency Contact Information  Primary Emergency Contact: Jason [facility]ges [facility] [address] JUDITH QUIROGA)  Home Phone: [phone]  Relation: Child  Secondary Emergency Contact: "[employer] [facility] Phone: [phone]  Relation: Child    Past Surgical History:  Past Surgical History:   Procedure Laterality Date    CARDIAC SURGERY          CORONARY ARTERY BYPASS GRAFT         Immunization History:   Immunization History   Administered Date(s) Administered    COVID-19, Pfizer Purple top, DILUTE for use, 12+ yrs, 30mcg/0.3mL dose 2021, 2021, 2022    Influenza Virus Vaccine 10/18/2012    Pneumococcal Conjugate 7-valent (Shoshana Olive) 2002       Active Problems:  Patient Active Problem List   Diagnosis Code    CAD (coronary artery disease) I25.10    Syncope and collapse R55    Bilateral carotid artery stenosis I65.23    Stage 2 chronic kidney disease N18.2    HTN (hypertension), benign I10    Facial droop R29.810    Dysarthria R47.1    Anemia D64.9    Acute CVA (cerebrovascular accident) ([facility] [address] I63.9    Dyslipidemia E78.5    Dementia due to Alzheimer's disease ([facility] [address] G30.9, F02.80       Isolation/Infection:   Isolation            No Isolation          Patient Infection Status       None to display            Nurse Assessment:  Last Vital Signs: BP (!) 161/67   Pulse 53   Temp 97.3 °F (36.3 °C) (Oral)   Resp 16   Ht 6' (1.829 m)   Wt 191 lb 12.8 oz (87 kg)   SpO2 96%   BMI 26.01 kg/m²     Last documented pain score (0-10 scale): Pain Level: 0  Last Weight:   Wt Readings from Last 1 Encounters:   04/24/22 191 lb 12.8 oz (87 kg)     Mental Status:  oriented, coherent, thought processes intact, and able to concentrate and follow conversation    IV Access:  - None    Nursing Mobility/ADLs:  Walking   Assisted  Transfer  Assisted  Bathing  Assisted  Dressing  Assisted  Toileting  Assisted  Feeding  Assisted  Med Admin  Assisted  Med Delivery   whole and prefers mixed with applesauce. Wound Care Documentation and Therapy:        Elimination:  Continence: Bowel: Yes  Bladder: Yes  Urinary Catheter: None   Colostomy/Ileostomy/Ileal Conduit: No       Date of Last BM: 4-    Intake/Output Summary (Last 24 hours) at 4/24/2022 0954  Last data filed at 4/24/2022 0428  Gross per 24 hour   Intake --   Output 1500 ml   Net -1500 ml     I/O last 3 completed shifts:  In: -   Out: 1500 [Urine:1500]    Safety Concerns:     Sundowners [place], History of Falls (last 30 days), and At Risk for Falls    Impairments/Disabilities:      Speech, Vision, and Hearing    Nutrition Therapy:  Current Nutrition Therapy:   - Oral Diet:  Dysphagia 3 advanced easy to chew    Routes of Feeding: Oral  Liquids: Thin Liquids No straws  Daily Fluid Restriction: no  Last Modified Barium Swallow with Video (Video Swallowing Test): not done    Treatments at the Time of Hospital Discharge:   Respiratory Treatments: yes  Oxygen Therapy:  is not on home oxygen therapy.   Ventilator:    - No ventilator support    Rehab Therapies: Physical Therapy, Occupational Therapy, and Speech/Language Therapy  Weight Bearing Status/Restrictions: No weight bearing restrictions  Other Medical Equipment (for information only, NOT a DME order):  walker  Other Treatments:     Patient's personal belongings (please select all that are sent with patient):  Dentures upper and lower    RN SIGNATURE:  Electronically signed by Albert Ruiz RN on 4/24/22 at 11:11 AM EDT    CASE MANAGEMENT/SOCIAL WORK SECTION    Inpatient Status Date: ***    Readmission Risk Assessment Score:  Readmission Risk              Risk of Unplanned Readmission:  19           Discharging to Facility/ Agency   Name:   Address:  Phone:  Fax:    Dialysis Facility (if applicable)   Name:  Address:  Dialysis Schedule:  Phone:  Fax:    / signature: {Esignature:[id number]}    PHYSICIAN SECTION    Prognosis: Good    Condition at Discharge: Stable    Rehab Potential (if transferring to Rehab): Good    Recommended Labs or Other Treatments After Discharge: none     Physician Certification: I certify the above information and transfer of Cornelius Kurtz  is necessary for the continuing treatment of the diagnosis listed and that he requires Home Care for greater 30 days.      Update Admission H&P: No change in H&P    PHYSICIAN SIGNATURE:  Electronically signed by Analia Shipley MD on 4/24/22 at 9:54 AM EDT

## 2022-04-24 NOTE — CARE COORDINATION
Home care order placed on chart: Spoke to patient and daughter Ravinder Mcneil. At this time, family takes patient to outpatient therapy @ [facility] and they plan to continue that. At this time, they do not have a need for home care services as patients daughter in law is his primary caregiver throughout the day when his daughter is at work. If they should need the service, informed them that the patients PCP can assist with getting the service set up in the community.

## 2022-05-24 NOTE — PROGRESS NOTES
Speech Language Pathology  Facility/Department: [address]  Initial Assessment  DYSPHAGIA BEDSIDE SWALLOW EVALUATION     Patient: Patricia Clarke   : 1935   MRN: [id number]      Evaluation Date: 2022   Admitting Diagnosis: Acute CVA (cerebrovascular accident) [facility]) [I63.9]  Pain: Pt denies pain at this time                         H&P: The patient is a 81y.o.  years old male with history of hypertension, dementia and other medical issues who was admitted this morning from the ED with acute confusion and facial droop. He went yesterday to [facility] emergency room with hypertensive urgency with a blood pressure above 200. He was discharged home after adjusting his blood pressure medications. He came back this time to [facility] this morning with acute confusion and left facial droop with drooling. Onset hours prior to admission. Degree was severe. Duration was persistent. At baseline, he lives in a senior apartment with his wife. He does have baseline dementia and walks with assistant. He fell few months ago and sustained some rib fracture. He has occasional sleep deprivation and hallucination according to his daughter. Family said to bring him to the emergency room where he was admitted. Initial blood pressure was on the low side. CT head showed no acute findings. Imaging:  Chest X-ray:    Impression   Left basilar atelectasis versus pneumonia with small parapneumonic effusion. MRI:   Impression   1. Subtle focal area of suspected restricted diffusion within the right   middle temporal gyrus within the right MCA distribution concerning for small   acute cortical infarct, potentially artifact given the vicinity to the skull   base. 2. No significant mass effect or acute hemorrhage. 3. Stable senescent changes, as above.    4. Extensive paranasal sinus inflammatory disease again noted within the   right maxillary sinus and scattered ethmoid air cells.       History/Prior Level of Function:   Living Status: Home  Prior Dysphagia History: Per chart review, recent clinical swallow evaluation completed at this facility on 1/30/22 (see report). A regular diet with thin liquids was recommended at that time. Currently the Pt reports no noted difficulty with swallowing function    Reason for referral: SLP evaluation orders received due to CVA protocol  and new CVA     Dysphagia Impressions/Diagnosis: Oropharyngeal Dysphagia   Pt alert and verbally responsive on RA. Moderate dysarthria noted with verbal speech. Only clinical swallow evaluation completed this date with Speech Language Evaluation to be completed as schedule allows. Minimal L facial/labial weakness noted at rest. Mod reduced oral motor strength, ROM and coordination noted with completion of OME. Mild reduced bolus control and A-P propulsion noted with all textures. Rapid and premature bolus loss to pharynx noted with thin liquids. Prolonged mastication but effective oral clearing noted with solids. Clinical symptoms of mild delayed swallow initiation and intermittent delayed pharyngeal clearing noted with all textures. No overt signs of aspiration noted with any texture in isolation. However, cough was noted with thin liquid rinse given in combination with solids x1. Therefore, precautions should be followed to minimize aspiration potential due to delayed airway protection and delayed pharyngeal clearing with all textures. Recommended Diet and Intervention 4/23/2022:  Diet Solids Recommendation:  Easy to chew  Liquid Consistency Recommendation: Thin liquids  Recommended form of Meds: Meds in puree           Compensatory Swallowing Strategies:  Upright as possible with all PO intake , No straws , Assist Feed , Small bites/sips , Swallow 2 times per bite , Remain upright 30-45 min     SHORT TERM DYSPHAGIA GOALS/PLAN OF CARE: Speech therapy for dysphagia tx 3-5 times per week during acute care stay. Pt will functionally tolerate recommended diet with no overt clinical s/s of aspiration   Pt will demonstrate understanding of aspiration risk and precautions via education/demonstration with occasional prompting  Pt will advance to least restrictive diet as indicated   If clinical s/s of aspiration/penetration continue to be noted, Pt will participate in Modified Barium Swallow Study     Dysphagia Therapeutic Intervention:  Diet Tolerance Monitoring , Patient/Family Education     Discharge Recommendations: Recommend ongoing SLP for speech and dysphagia therapy upon discharge from hospital     Patient Positioning: Upright in bed     Current Diet Level (prior to evaluation): Dysphagia III Soft and bite sized  Thin liquids      Respiratory Status:   [x]Room Air   []O2 via nasal cannula   []Other:    Dentition:  [x]Adequate  []Dentures   []Missing Many Teeth  []Edentulous  []Other:    Baseline Vocal Quality:  []Normal  []Dysphonic   []Aphonic   [x]Hoarse  []Wet  [x]Weak  []Other:    Volitional Cough:  [x]Strong  []Weak  []Wet  []Absent  []Congested  []Other:    Volitional Swallow:   []Absent   [x]Delayed     []Adequate     []Required use of drink     Oral Mechanism Exam:  []WFL []Mild   [x] Moderate  []Severe  []To be assessed  Impaired:   []Left side      []Right side    [x]Labial ROM/Coordination    []Labial Symmetry   [x]Lingual ROM/Coordination   [x]Lingual Symmetry  []Gag  []Other:     Oral Phase: []WFL [x]Mild   [] Moderate  []Severe  []To be assessed   [x]Impaired/Prolonged Mastication:   []Spillage Left:   []Spillage Right:  []Pocketing Left:   []Pocketing Right:   []Decreased Anterior to Posterior Transit:   [x]Suspected Premature Bolus Loss:   []Lingual/Palatal Residue:   []Other:     Pharyngeal Phase: []WFL [x]Mild   [] Moderate  []Severe  []To be assessed   [x]Delayed Swallow:   []Suspected Pharyngeal Pooling:   [x]Decreased Laryngeal Elevation:   []Absent Swallow:  []Wet Vocal Quality:   []Throat Clearing-Immediate:   []Throat Clearing-Delayed:   []Cough-Immediate:   []Cough-Delayed:  []Change in Vital Signs:  [x]Suspected Delayed Pharyngeal Clearing:  []Other:       Eating Assistance:  []Independent  []Setup or clean-up assistance   [x] Supervision or touching assistance   [] Partial or moderate assistance   [] Substantial or maximal assistance  [] Dependent       EDUCATION:   Provided education regarding role of SLP, results of assessment, recommendations and general speech pathology plan of care. [x] Pt verbalized understanding and agreement   [] Pt requires ongoing learning   [] No evidence of comprehension     If patient discharges prior to next visit, this note will serve as discharge.      Timed Code Minutes:0 min   Total Treatment Minutes: 30 min    Glory Repress IAX-VS#[id number] 98.6

## 2022-06-10 ENCOUNTER — HOSPITAL ENCOUNTER (EMERGENCY)
Age: 87
Discharge: HOME | End: 2022-06-10
Payer: MEDICARE

## 2022-06-10 ENCOUNTER — APPOINTMENT (OUTPATIENT)
Dept: CT IMAGING | Age: 87
End: 2022-06-10
Payer: MEDICARE

## 2022-06-10 VITALS
HEART RATE: 68 BPM | HEIGHT: 72 IN | OXYGEN SATURATION: 98 % | SYSTOLIC BLOOD PRESSURE: 136 MMHG | WEIGHT: 210 LBS | DIASTOLIC BLOOD PRESSURE: 66 MMHG | TEMPERATURE: 97.9 F | RESPIRATION RATE: 18 BRPM | BODY MASS INDEX: 28.44 KG/M2

## 2022-06-10 DIAGNOSIS — S09.90XA CLOSED HEAD INJURY, INITIAL ENCOUNTER: Primary | ICD-10-CM
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
## 2022-06-10 PROCEDURE — 99284 EMERGENCY DEPT VISIT MOD MDM: CPT

## 2022-06-10 PROCEDURE — 72125 CT NECK SPINE W/O DYE: CPT

## 2022-06-10 PROCEDURE — 70450 CT HEAD/BRAIN W/O DYE: CPT

## 2022-06-10 ASSESSMENT — ENCOUNTER SYMPTOMS
NAUSEA: 0
VOMITING: 0
ABDOMINAL PAIN: 0
BACK PAIN: 0
COUGH: 0
SHORTNESS OF BREATH: 0
COLOR CHANGE: 0

## 2022-06-10 ASSESSMENT — PAIN SCALES - GENERAL: PAINLEVEL_OUTOF10: 0

## 2022-06-10 ASSESSMENT — PAIN - FUNCTIONAL ASSESSMENT
PAIN_FUNCTIONAL_ASSESSMENT: NONE - DENIES PAIN
PAIN_FUNCTIONAL_ASSESSMENT: NONE - DENIES PAIN

## 2022-06-10 NOTE — ED PROVIDER NOTES
[facility]        Pt Name: Carlos Matthew  MRN: [id number]  [place] 1935  Date of evaluation: 6/10/2022  Provider: Ester Reza PA-C  PCP: Nikole Godwin MD  Note Started: 11:48 AM EDT       YAMLI. I have evaluated this patient. My supervising physician was available for consultation. CHIEF COMPLAINT       Chief Complaint   Patient presents with    Fall     Tripped over w/c foot pedal and fell backwards, hitting head. A&O       HISTORY OF PRESENT ILLNESS   (Location, Timing/Onset, Context/Setting, Quality, Duration, Modifying Factors, Severity, Associated Signs and Symptoms)  Note limiting factors. Chief Complaint: Head trauma    Carlos Matthew is a 66 [facility] y.o. male who presents to the emergency department complaining of occipital scalp pain status post mechanical fall which occurred today. Patient states that he typically ambulates with use of walker at home. He uses wheelchair when he leaves his home. He states that today while at home, he accidentally tripped on the foot pedal of his wheelchair and fell backwards, hitting the back of the head on the ground. Denies loss of consciousness. He denies any acute vision changes, neck pain or stiffness or back pain. He does take Plavix and denies other anticoagulant use. He has history of prior stroke but states that he did make a recovery from the stroke. Nursing Notes were all reviewed and agreed with or any disagreements were addressed in the HPI. REVIEW OF SYSTEMS    (2-9 systems for level 4, 10 or more for level 5)     Review of Systems   Constitutional: Negative for chills and fever. HENT: Negative. Eyes: Negative for visual disturbance. Respiratory: Negative for cough and shortness of breath. Cardiovascular: Negative for chest pain. Gastrointestinal: Negative for abdominal pain, nausea and vomiting. Genitourinary: Negative. Musculoskeletal: Negative for back pain, neck pain and neck stiffness. Skin: Negative for color change, pallor, rash and wound. Neurological: Positive for headaches. Negative for dizziness, tremors, seizures, syncope, facial asymmetry, speech difficulty, weakness, light-headedness and numbness. Psychiatric/Behavioral: Negative for confusion. All other systems reviewed and are negative. Positives and Pertinent negatives as per HPI. Except as noted above in the ROS, all other systems were reviewed and negative. PAST MEDICAL HISTORY     Past Medical History:   Diagnosis Date    CAD (coronary artery disease)          SURGICAL HISTORY     Past Surgical History:   Procedure Laterality Date    CARDIAC SURGERY      2008    CORONARY ARTERY BYPASS GRAFT           CURRENTMEDICATIONS       Previous Medications    ALBUTEROL (PROVENTIL HFA) 108 (90 BASE) MCG/ACT INHALER    Inhale 2 puffs into the lungs every 6 hours as needed for Wheezing. AMLODIPINE (NORVASC) 10 MG TABLET    Take 10 mg by mouth daily    ASPIRIN 81 MG TABLET    Take 81 mg by mouth daily. CLOPIDOGREL (PLAVIX) 75 MG TABLET    Take 75 mg by mouth daily. ESCITALOPRAM (LEXAPRO) 20 MG TABLET    Take 20 mg by mouth daily. LEVOTHYROXINE (SYNTHROID) 75 MCG TABLET    Take 75 mcg by mouth daily. METOPROLOL (TOPROL-XL) 25 MG XL TABLET    Take 25 mg by mouth daily. MONTELUKAST (SINGULAIR) 10 MG TABLET    Take 10 mg by mouth nightly    PANTOPRAZOLE (PROTONIX) 40 MG TABLET    Take 40 mg by mouth daily    SENNOSIDES-DOCUSATE SODIUM (SENOKOT-S) 8.6-50 MG TABLET    Take 2 tablets by mouth daily    SIMVASTATIN (ZOCOR) 40 MG TABLET    Take 40 mg by mouth nightly. TAMSULOSIN (FLOMAX) 0.4 MG CAPSULE    Take 0.4 mg by mouth daily         ALLERGIES     Patient has no known allergies. FAMILYHISTORY     History reviewed. No pertinent family history.        SOCIAL HISTORY       Social History     Tobacco Use    Smoking status: Never Smoker    Smokeless tobacco: Never Used   Substance Use Topics    Alcohol use: No    Drug use: No       SCREENINGS    Sandeep Coma Scale  Eye Opening: Spontaneous  Best Verbal Response: Oriented  Best Motor Response: Obeys commands  Sandeep Coma Scale Score: 15        PHYSICAL EXAM    (up to 7 for level 4, 8 or more for level 5)     ED Triage Vitals [06/10/22 1049]   BP Temp Temp Source Heart Rate Resp SpO2 Height Weight   136/66 97.9 °F (36.6 °C) Oral 68 18 98 % 6' (1.829 m) 210 lb (95.3 kg)       Physical Exam  Vitals and nursing note reviewed. Constitutional:       Appearance: Normal appearance. He is well-developed. He is not toxic-appearing or diaphoretic. HENT:      Head: Normocephalic. Contusion (right occiput) present. No raccoon eyes, House's sign, abrasion or laceration. Jaw: There is normal jaw occlusion. Right Ear: External ear normal. No hemotympanum. Left Ear: External ear normal. No hemotympanum. Nose: Nose normal.      Mouth/Throat:      Mouth: Mucous membranes are moist.      Pharynx: Oropharynx is clear. Eyes:      General: No scleral icterus. Right eye: No discharge. Left eye: No discharge. Extraocular Movements: Extraocular movements intact. Conjunctiva/sclera: Conjunctivae normal.      Pupils: Pupils are equal, round, and reactive to light. Cardiovascular:      Rate and Rhythm: Normal rate. Pulmonary:      Effort: Pulmonary effort is normal.      Breath sounds: Normal breath sounds. Abdominal:      General: Bowel sounds are normal.      Palpations: Abdomen is soft. Tenderness: There is no abdominal tenderness. Musculoskeletal:         General: Normal range of motion.       Right shoulder: Normal.      Left shoulder: Normal.      Right elbow: Normal.      Left elbow: Normal.      Right wrist: Normal.      Left wrist: Normal.      Right hand: Normal.      Left hand: Normal.      Cervical back: Normal and normal range of motion. Thoracic back: Normal.      Lumbar back: Normal.      Right hip: Normal.      Left hip: Normal.      Right knee: Normal.      Left knee: Normal.      Right ankle: Normal.      Left ankle: Normal.      Right foot: Normal.      Left foot: Normal.   Skin:     General: Skin is warm and dry. Coloration: Skin is not jaundiced or pale. Findings: No bruising, erythema, lesion or rash. Neurological:      General: No focal deficit present. Mental Status: He is alert and oriented to person, place, and time. Mental status is at baseline. Cranial Nerves: No cranial nerve deficit (II-XII intact). Comments: Symmetrical upper and lower extremity strength. no pronator drift or facial droop. No substantial slurred speech. Patient states that his speech is at baseline. Psychiatric:         Behavior: Behavior normal.         DIAGNOSTIC RESULTS   LABS:    Labs Reviewed - No data to display    When ordered only abnormal lab results are displayed. All other labs were within normal range or not returned as of this dictation. EKG: When ordered, EKG's are interpreted by the Emergency Department Physician in the absence of a cardiologist.  Please see their note for interpretation of EKG. RADIOLOGY:   Non-plain film images such as CT, Ultrasound and MRI are read by the radiologist. Plain radiographic images are visualized and preliminarily interpreted by the ED Provider with the below findings:        Interpretation per the Radiologist below, if available at the time of this note:    CT CERVICAL SPINE WO CONTRAST   Final Result   1. No acute fracture. CT HEAD WO CONTRAST   Final Result   1. No acute intracranial abnormality.                PROCEDURES   Unless otherwise noted below, none     Procedures    CRITICAL CARE TIME   n/a    CONSULTS:  None      EMERGENCY DEPARTMENT COURSE and DIFFERENTIAL DIAGNOSIS/MDM:   Vitals:    Vitals:    06/10/22 1049   BP: 136/66   Pulse: 68   Resp: 18 Temp: 97.9 °F (36.6 °C)   TempSrc: Oral   SpO2: 98%   Weight: 210 lb (95.3 kg)   Height: 6' (1.829 m)       Patient was given the following medications:  Medications - No data to display      Is this patient to be included in the SEP-1 Core Measure due to severe sepsis or septic shock? No   Exclusion criteria - the patient is NOT to be included for SEP-1 Core Measure due to: Infection is not suspected    This patient presents to the emergency department with scalp discomfort status post mechanical fall. CT scans did not show any acute osseous abnormality or intracranial abnormality. My suspicion is low for carotid dissection, sinus abscess, acute fracture, acute CVA, ICH, SAH, TIA, meningitis, encephalitis, pseudotumor cerebri, temporal arteritis, sentinel bleed from ruptured aneurysm, hypertensive urgency or emergency, subdural hematoma, epidural hematoma, cerebellar compromise, posterior stroke, Chiari malformation, hydrocephalus, skull or facial fracture, postconcussive syndrome, ACS, life-threatening arrhythmia, acute spine fracture or dislocation, epidural abscess or hematoma, discitis, meningitis, encephalitis, transverse myelitis, cauda quina,  pyelonephritis, perinephric abscess, urosepsis, kidney stone, AAA, dissection, shingles, seizure, sepsis, dka, hypoglycemia or other concerning pathology. Follow up with PCP for recheck and may return to ED per discharge instructions. We have addressed concerns and expectations. FINAL IMPRESSION      1. Closed head injury, initial encounter    2.  Fall, initial encounter          DISPOSITION/PLAN   DISPOSITION Decision To Discharge 06/10/2022 12:22:33 PM      PATIENT REFERRED TO:  Germán Vo MD  [place] [id number]    In 3 days      [facility] Emergency Department  [address] [place] [address]  [phone]    If symptoms worsen      DISCHARGE MEDICATIONS:  New Prescriptions    No medications on file DISCONTINUED MEDICATIONS:  Discontinued Medications    No medications on file              (Please note that portions of this note were completed with a voice recognition program.  Efforts were made to edit the dictations but occasionally words are mis-transcribed.)    Efren Blizzard, PA-C (electronically signed)           Efren Blizzard, PA-C  06/10/22 0576

## 2022-06-10 NOTE — ED NOTES
Pt's daughter states that pt tripped over foot pedal of wheelchair and fell down hitting his head against a door jamb at her sisters residence. Daughter states that she was told by the physician that if he ever fell again, to have him checked at medical facility due to a previous event of him falling and which caused him internal bleeding before. CT scan already took pt this am this date.      Paola Peralta RN  06/10/22 9157

## 2022-08-15 ENCOUNTER — APPOINTMENT (OUTPATIENT)
Dept: GENERAL RADIOLOGY | Age: 87
End: 2022-08-15
Payer: MEDICARE

## 2022-08-15 ENCOUNTER — APPOINTMENT (OUTPATIENT)
Dept: CT IMAGING | Age: 87
End: 2022-08-15
Payer: MEDICARE

## 2022-08-15 ENCOUNTER — HOSPITAL ENCOUNTER (OUTPATIENT)
Age: 87
Setting detail: OBSERVATION
Discharge: OTHER INSTITUTION | End: 2022-08-20
Attending: EMERGENCY MEDICINE | Admitting: INTERNAL MEDICINE
Payer: MEDICARE

## 2022-08-15 DIAGNOSIS — Z78.9 INABILITY TO PERFORM ACTIVITIES OF DAILY LIVING: ICD-10-CM

## 2022-08-15 DIAGNOSIS — G62.9 PERIPHERAL POLYNEUROPATHY: Primary | ICD-10-CM

## 2022-08-15 DIAGNOSIS — R53.1 GENERAL WEAKNESS: ICD-10-CM

## 2022-08-15 DIAGNOSIS — R60.0 PEDAL EDEMA: ICD-10-CM

## 2022-08-15 LAB
A/G RATIO: 1.3 (ref 1.1–2.2)
ALBUMIN SERPL-MCNC: 4 G/DL (ref 3.4–5)
ALP BLD-CCNC: 103 U/L (ref 40–129)
ALT SERPL-CCNC: 12 U/L (ref 10–40)
ANION GAP SERPL CALCULATED.3IONS-SCNC: 11 MMOL/L (ref 3–16)
AST SERPL-CCNC: 13 U/L (ref 15–37)
BASOPHILS ABSOLUTE: 0 K/UL (ref 0–0.2)
BASOPHILS RELATIVE PERCENT: 0.3 %
BILIRUB SERPL-MCNC: 0.7 MG/DL (ref 0–1)
BILIRUBIN URINE: NEGATIVE
BLOOD, URINE: NEGATIVE
BUN BLDV-MCNC: 26 MG/DL (ref 7–20)
CALCIUM SERPL-MCNC: 9.1 MG/DL (ref 8.3–10.6)
CHLORIDE BLD-SCNC: 99 MMOL/L (ref 99–110)
CLARITY: CLEAR
CO2: 25 MMOL/L (ref 21–32)
COLOR: YELLOW
CREAT SERPL-MCNC: 1.4 MG/DL (ref 0.8–1.3)
EKG ATRIAL RATE: 64 BPM
EKG DIAGNOSIS: NORMAL
EKG P AXIS: 30 DEGREES
EKG P-R INTERVAL: 166 MS
EKG Q-T INTERVAL: 406 MS
EKG QRS DURATION: 116 MS
EKG QTC CALCULATION (BAZETT): 418 MS
EKG R AXIS: -41 DEGREES
EKG T AXIS: -16 DEGREES
EKG VENTRICULAR RATE: 64 BPM
EOSINOPHILS ABSOLUTE: 0.1 K/UL (ref 0–0.6)
EOSINOPHILS RELATIVE PERCENT: 1.9 %
GFR AFRICAN AMERICAN: 58
GFR NON-AFRICAN AMERICAN: 48
GLUCOSE BLD-MCNC: 107 MG/DL (ref 70–99)
GLUCOSE URINE: NEGATIVE MG/DL
HCT VFR BLD CALC: 30.9 % (ref 40.5–52.5)
HEMOGLOBIN: 10.6 G/DL (ref 13.5–17.5)
KETONES, URINE: NEGATIVE MG/DL
LEUKOCYTE ESTERASE, URINE: NEGATIVE
LYMPHOCYTES ABSOLUTE: 0.9 K/UL (ref 1–5.1)
LYMPHOCYTES RELATIVE PERCENT: 12 %
MCH RBC QN AUTO: 33 PG (ref 26–34)
MCHC RBC AUTO-ENTMCNC: 34.3 G/DL (ref 31–36)
MCV RBC AUTO: 96.1 FL (ref 80–100)
MICROSCOPIC EXAMINATION: NORMAL
MONOCYTES ABSOLUTE: 0.8 K/UL (ref 0–1.3)
MONOCYTES RELATIVE PERCENT: 10.8 %
NEUTROPHILS ABSOLUTE: 5.8 K/UL (ref 1.7–7.7)
NEUTROPHILS RELATIVE PERCENT: 75 %
NITRITE, URINE: NEGATIVE
PDW BLD-RTO: 15.3 % (ref 12.4–15.4)
PH UA: 5.5 (ref 5–8)
PLATELET # BLD: 200 K/UL (ref 135–450)
PMV BLD AUTO: 9.1 FL (ref 5–10.5)
POTASSIUM REFLEX MAGNESIUM: 4 MMOL/L (ref 3.5–5.1)
PRO-BNP: 407 PG/ML (ref 0–449)
PROCALCITONIN: 0.13 NG/ML (ref 0–0.15)
PROTEIN UA: NEGATIVE MG/DL
RBC # BLD: 3.22 M/UL (ref 4.2–5.9)
SARS-COV-2, NAAT: NOT DETECTED
SODIUM BLD-SCNC: 135 MMOL/L (ref 136–145)
SPECIFIC GRAVITY UA: 1.02 (ref 1–1.03)
TOTAL PROTEIN: 7.2 G/DL (ref 6.4–8.2)
TROPONIN: <0.01 NG/ML
URINE REFLEX TO CULTURE: NORMAL
URINE TYPE: NORMAL
UROBILINOGEN, URINE: 1 E.U./DL
WBC # BLD: 7.7 K/UL (ref 4–11)

## 2022-08-15 PROCEDURE — 93010 ELECTROCARDIOGRAM REPORT: CPT | Performed by: INTERNAL MEDICINE

## 2022-08-15 PROCEDURE — 71045 X-RAY EXAM CHEST 1 VIEW: CPT

## 2022-08-15 PROCEDURE — 99285 EMERGENCY DEPT VISIT HI MDM: CPT

## 2022-08-15 PROCEDURE — 81003 URINALYSIS AUTO W/O SCOPE: CPT

## 2022-08-15 PROCEDURE — 36415 COLL VENOUS BLD VENIPUNCTURE: CPT

## 2022-08-15 PROCEDURE — 6360000004 HC RX CONTRAST MEDICATION: Performed by: PHYSICIAN ASSISTANT

## 2022-08-15 PROCEDURE — 85025 COMPLETE CBC W/AUTO DIFF WBC: CPT

## 2022-08-15 PROCEDURE — 6370000000 HC RX 637 (ALT 250 FOR IP): Performed by: PHYSICIAN ASSISTANT

## 2022-08-15 PROCEDURE — 87635 SARS-COV-2 COVID-19 AMP PRB: CPT

## 2022-08-15 PROCEDURE — 83880 ASSAY OF NATRIURETIC PEPTIDE: CPT

## 2022-08-15 PROCEDURE — 71260 CT THORAX DX C+: CPT | Performed by: PHYSICIAN ASSISTANT

## 2022-08-15 PROCEDURE — G0378 HOSPITAL OBSERVATION PER HR: HCPCS

## 2022-08-15 PROCEDURE — 84484 ASSAY OF TROPONIN QUANT: CPT

## 2022-08-15 PROCEDURE — 93005 ELECTROCARDIOGRAM TRACING: CPT | Performed by: EMERGENCY MEDICINE

## 2022-08-15 PROCEDURE — 80053 COMPREHEN METABOLIC PANEL: CPT

## 2022-08-15 PROCEDURE — 84145 PROCALCITONIN (PCT): CPT

## 2022-08-15 PROCEDURE — 70450 CT HEAD/BRAIN W/O DYE: CPT

## 2022-08-15 RX ORDER — PANTOPRAZOLE SODIUM 40 MG/1
40 TABLET, DELAYED RELEASE ORAL
Status: DISCONTINUED | OUTPATIENT
Start: 2022-08-16 | End: 2022-08-20 | Stop reason: HOSPADM

## 2022-08-15 RX ORDER — TAMSULOSIN HYDROCHLORIDE 0.4 MG/1
0.4 CAPSULE ORAL DAILY
Status: DISCONTINUED | OUTPATIENT
Start: 2022-08-16 | End: 2022-08-20 | Stop reason: HOSPADM

## 2022-08-15 RX ORDER — AMLODIPINE BESYLATE 5 MG/1
10 TABLET ORAL DAILY
Status: DISCONTINUED | OUTPATIENT
Start: 2022-08-16 | End: 2022-08-16

## 2022-08-15 RX ORDER — SENNA AND DOCUSATE SODIUM 50; 8.6 MG/1; MG/1
2 TABLET, FILM COATED ORAL DAILY
Status: DISCONTINUED | OUTPATIENT
Start: 2022-08-16 | End: 2022-08-16

## 2022-08-15 RX ORDER — ASPIRIN 81 MG/1
81 TABLET ORAL DAILY
Status: DISCONTINUED | OUTPATIENT
Start: 2022-08-16 | End: 2022-08-20 | Stop reason: HOSPADM

## 2022-08-15 RX ORDER — CLOPIDOGREL BISULFATE 75 MG/1
75 TABLET ORAL DAILY
Status: DISCONTINUED | OUTPATIENT
Start: 2022-08-16 | End: 2022-08-20 | Stop reason: HOSPADM

## 2022-08-15 RX ORDER — ESCITALOPRAM OXALATE 10 MG/1
20 TABLET ORAL DAILY
Status: DISCONTINUED | OUTPATIENT
Start: 2022-08-16 | End: 2022-08-20 | Stop reason: HOSPADM

## 2022-08-15 RX ORDER — LEVOTHYROXINE SODIUM 0.12 MG/1
125 TABLET ORAL DAILY
Status: DISCONTINUED | OUTPATIENT
Start: 2022-08-16 | End: 2022-08-20 | Stop reason: HOSPADM

## 2022-08-15 RX ORDER — ALBUTEROL SULFATE 90 UG/1
2 AEROSOL, METERED RESPIRATORY (INHALATION) EVERY 6 HOURS PRN
Status: DISCONTINUED | OUTPATIENT
Start: 2022-08-15 | End: 2022-08-20 | Stop reason: HOSPADM

## 2022-08-15 RX ORDER — MONTELUKAST SODIUM 10 MG/1
10 TABLET ORAL NIGHTLY
Status: DISCONTINUED | OUTPATIENT
Start: 2022-08-16 | End: 2022-08-20 | Stop reason: HOSPADM

## 2022-08-15 RX ORDER — ENOXAPARIN SODIUM 100 MG/ML
30 INJECTION SUBCUTANEOUS DAILY
Status: DISCONTINUED | OUTPATIENT
Start: 2022-08-16 | End: 2022-08-20 | Stop reason: HOSPADM

## 2022-08-15 RX ORDER — METOLAZONE 2.5 MG/1
2.5 TABLET ORAL ONCE
Status: COMPLETED | OUTPATIENT
Start: 2022-08-15 | End: 2022-08-15

## 2022-08-15 RX ORDER — METOPROLOL SUCCINATE 25 MG/1
25 TABLET, EXTENDED RELEASE ORAL DAILY
Status: DISCONTINUED | OUTPATIENT
Start: 2022-08-16 | End: 2022-08-16

## 2022-08-15 RX ORDER — ATORVASTATIN CALCIUM 20 MG/1
20 TABLET, FILM COATED ORAL DAILY
Status: DISCONTINUED | OUTPATIENT
Start: 2022-08-16 | End: 2022-08-20 | Stop reason: HOSPADM

## 2022-08-15 RX ADMIN — METOLAZONE 2.5 MG: 2.5 TABLET ORAL at 19:30

## 2022-08-15 RX ADMIN — IOPAMIDOL 75 ML: 755 INJECTION, SOLUTION INTRAVENOUS at 13:38

## 2022-08-15 ASSESSMENT — ENCOUNTER SYMPTOMS
STRIDOR: 0
PHOTOPHOBIA: 0
NAUSEA: 0
SHORTNESS OF BREATH: 1
CONSTIPATION: 0
WHEEZING: 0
BACK PAIN: 0
COLOR CHANGE: 0
DIARRHEA: 0
COUGH: 1
CHEST TIGHTNESS: 0
ABDOMINAL PAIN: 0
VOMITING: 0

## 2022-08-15 ASSESSMENT — PAIN SCALES - GENERAL: PAINLEVEL_OUTOF10: 0

## 2022-08-15 NOTE — ED NOTES
Pt incontinent of urine, perineal care provided, brief changed.  Family at bedside, call light within reach     Jaylen Acosta RN  08/15/22 7415

## 2022-08-15 NOTE — ED PROVIDER NOTES
The Ekg interpreted by me in the absence of a cardiologist shows. Normal sinus rhythm with a ventricular rate of 64. Axis is left. QTc is appropriate. No specific ST or T wave abnormality when compared to prior 4-. I only performed EKG interpretation and was not otherwise involved in the care of this patient.        Alma Rosa Tracy MD  08/15/22 0257

## 2022-08-15 NOTE — ED PROVIDER NOTES
In addition to the advanced practice provider, I personally saw Vonnie Nguyen and performed a substantive portion of the visit including all aspects of the medical decision making. Briefly, this is a 81 y.o. male here for to the ER for evaluation of acute on chronic dyspnea, chronic peripheral neuropathy history of mild dementia resides in a senior living center with daughter, chronic neuropathy, uses a walker. Positive dyspnea. Normal mental status at this point time. Mild rhonchi. Screenings   [place] Coma Scale  Eye Opening: Spontaneous  Best Verbal Response: Oriented  Best Motor Response: Obeys commands  [place] Coma Scale Score: 15        MDM  Patient resents ER for evaluation of mild CHF exacerbation no evidence of PE no evidence of pneumonia likely peripheral neuropathy I do not suspect acute stroke or spinal cord compression. Neurontin as needed for paresthesias, continue home medications, additional dose of diuretic was given in the ER. Stable for outpatient management    Attending addendum: Patient was unable to ambulate in the emergency department without severe difficulty and risk for falls. As result of this patient will be admitted and require possible extended-care facility placement and rehab. Patient Referrals:  Brenda Curry MD  [facility] [id number] [phone]    Schedule an appointment as soon as possible for a visit   For a Re-check in  2-3    days. [facility] Emergency Department  [address]  [address] [address]  [phone]  Go to   As needed, If symptoms worsen      Discharge Medications:  New Prescriptions    No medications on file       FINAL IMPRESSION  1. Peripheral polyneuropathy    2. Pedal edema        Blood pressure 137/69, pulse 59, temperature 98.6 °F (37 °C), temperature source Oral, resp. rate 15, height 6' (1.829 m), weight 210 lb (95.3 kg), SpO2 98 %.      For further details of Silas Barnett's emergency department encounter, please see documentation by advanced practice provider, .     Montrell Harrington MD (electronically signed)  Attending Emergency Physician      Rian Mendenhall MD  21/19/02 1925       Rian Mendenhall MD  21/94/80 1142       Rian Mendenhall MD  39/38/91 0949

## 2022-08-16 PROBLEM — R26.2 UNABLE TO AMBULATE: Status: ACTIVE | Noted: 2022-08-16

## 2022-08-16 PROBLEM — D64.9 ANEMIA: Status: RESOLVED | Noted: 2022-04-22 | Resolved: 2022-08-16

## 2022-08-16 PROBLEM — R47.1 DYSARTHRIA: Status: RESOLVED | Noted: 2022-04-22 | Resolved: 2022-08-16

## 2022-08-16 PROBLEM — R55 SYNCOPE AND COLLAPSE: Status: RESOLVED | Noted: 2022-01-29 | Resolved: 2022-08-16

## 2022-08-16 PROBLEM — R29.810 FACIAL DROOP: Status: RESOLVED | Noted: 2022-04-22 | Resolved: 2022-08-16

## 2022-08-16 PROBLEM — I63.9 ACUTE CVA (CEREBROVASCULAR ACCIDENT) (HCC): Status: RESOLVED | Noted: 2022-04-22 | Resolved: 2022-08-16

## 2022-08-16 PROCEDURE — G0378 HOSPITAL OBSERVATION PER HR: HCPCS

## 2022-08-16 PROCEDURE — 92526 ORAL FUNCTION THERAPY: CPT

## 2022-08-16 PROCEDURE — 6370000000 HC RX 637 (ALT 250 FOR IP): Performed by: INTERNAL MEDICINE

## 2022-08-16 PROCEDURE — 97162 PT EVAL MOD COMPLEX 30 MIN: CPT

## 2022-08-16 PROCEDURE — 97530 THERAPEUTIC ACTIVITIES: CPT

## 2022-08-16 PROCEDURE — 97166 OT EVAL MOD COMPLEX 45 MIN: CPT

## 2022-08-16 PROCEDURE — 94760 N-INVAS EAR/PLS OXIMETRY 1: CPT

## 2022-08-16 PROCEDURE — 97116 GAIT TRAINING THERAPY: CPT

## 2022-08-16 PROCEDURE — 6360000002 HC RX W HCPCS: Performed by: INTERNAL MEDICINE

## 2022-08-16 PROCEDURE — 92610 EVALUATE SWALLOWING FUNCTION: CPT

## 2022-08-16 PROCEDURE — 96372 THER/PROPH/DIAG INJ SC/IM: CPT

## 2022-08-16 RX ORDER — ATORVASTATIN CALCIUM 20 MG/1
20 TABLET, FILM COATED ORAL DAILY
COMMUNITY

## 2022-08-16 RX ORDER — OXYBUTYNIN CHLORIDE 10 MG/1
10 TABLET, EXTENDED RELEASE ORAL DAILY
COMMUNITY

## 2022-08-16 RX ORDER — FLUTICASONE PROPIONATE 50 MCG
1 SPRAY, SUSPENSION (ML) NASAL DAILY
COMMUNITY

## 2022-08-16 RX ORDER — FUROSEMIDE 20 MG/1
20 TABLET ORAL 2 TIMES DAILY
Status: ON HOLD | COMMUNITY
End: 2022-08-16 | Stop reason: HOSPADM

## 2022-08-16 RX ORDER — LORATADINE 10 MG/1
10 TABLET ORAL DAILY
COMMUNITY

## 2022-08-16 RX ADMIN — LEVOTHYROXINE SODIUM 125 MCG: 0.12 TABLET ORAL at 12:00

## 2022-08-16 RX ADMIN — ATORVASTATIN CALCIUM 20 MG: 20 TABLET, FILM COATED ORAL at 11:59

## 2022-08-16 RX ADMIN — ESCITALOPRAM OXALATE 20 MG: 10 TABLET ORAL at 12:00

## 2022-08-16 RX ADMIN — MONTELUKAST SODIUM 10 MG: 10 TABLET, FILM COATED ORAL at 21:15

## 2022-08-16 RX ADMIN — ASPIRIN 81 MG: 81 TABLET, COATED ORAL at 11:59

## 2022-08-16 RX ADMIN — PANTOPRAZOLE SODIUM 40 MG: 40 TABLET, DELAYED RELEASE ORAL at 09:11

## 2022-08-16 RX ADMIN — AMLODIPINE BESYLATE 10 MG: 5 TABLET ORAL at 09:10

## 2022-08-16 RX ADMIN — TAMSULOSIN HYDROCHLORIDE 0.4 MG: 0.4 CAPSULE ORAL at 09:11

## 2022-08-16 RX ADMIN — ENOXAPARIN SODIUM 30 MG: 100 INJECTION SUBCUTANEOUS at 11:49

## 2022-08-16 RX ADMIN — CLOPIDOGREL BISULFATE 75 MG: 75 TABLET ORAL at 09:11

## 2022-08-16 ASSESSMENT — PAIN SCALES - GENERAL: PAINLEVEL_OUTOF10: 0

## 2022-08-16 ASSESSMENT — PAIN - FUNCTIONAL ASSESSMENT: PAIN_FUNCTIONAL_ASSESSMENT: NONE - DENIES PAIN

## 2022-08-16 NOTE — PROGRESS NOTES
Speech Language Pathology  Facility/Department: [facility]  Initial Assessment  DYSPHAGIA BEDSIDE SWALLOW EVALUATION     Patient: Yanna Notice   : 1935   MRN: [id number]      Evaluation Date: 2022   Admitting Diagnosis: General weakness [R53.1]  Pedal edema [R60.0]  Inability to perform activities of daily living [Z78.9]  Peripheral polyneuropathy [G62.9]  Unable to care for self [Z78.9]  Pain: Denies                                                       H&P:  Patientis a 81 y.o. male with past medical history of CAD status post CABG who presents to the ED with complaint of shortness of breath. He was admitted for weakness, SOB, difficulty with ambulation and general inability to care for self. Patient states since Thursday he has had increasing shortness of breath especially with exertion. Patient lives with his daughter in a one story home who provides  supervision and assistance. Imaging:  Chest X-ray:   Impression   Patchy left lower lobe airspace opacities. In the appropriate clinical   setting this could represent pneumonia. Head CT:   Impression   No acute intracranial abnormality. History/Prior Level of Function:   Living Status: lives with his daughter in a one story home  Prior Dysphagia History: none indicated by family or patient  Reason for referral: SLP evaluation orders received due to coughing with intake , new diagnosis of PNA , and decline in medical status     Dysphagia Impressions/Diagnosis: Oropharyngeal Dysphagia   Patient was seated upright in bed. He was alert and oriented. He was able to provide vague pieces of information about case history. His son was present to assist with providing details of medical and recent history which lead him to the ED. Patient was pleasant and cooperative with the evaluation. He was able to follow simple commands. Patient with notable wheezing with respiration throughout the evaluation. Speech is imprecise and at times he is difficulty to understand. An oral motor examination revealed normal oral motor function. He wears upper and lower dentures. He occasionally became SOB with minimal exertion. Provided a variety of consistencies to patient including, regular, soft solid and thin liquids. Patient was able to self feed. He demonstrated rapid rate of intake with large bites. Mildly impulsive with intake and consecutive swallows with drinking thin liquids. Oral phase of swallow characterized by  weakness with manipulation of solid material , munching pattern of mastication primarily with front teeth, he had prolonged oral manipulation with solids and decreased manipulation A-P. He initiated an adequate swallow reflex, mildly decreased laryngeal elevation with swallow. Patient with consecutive drinking from cup/bottle of pop. He demonstrated decreased coordination with respiration and swallow function. At times he was SOB post swallow both with solids and liquids. .  However, no overt s/s of any consistencies tested. Patient is at risk for aspiration secondary to his respiratory status, decreased coordination with swallow function and weakness with oral manipulation. Educated patient on compensatory swallowing strategies to maximize safety with oral intake. Recommend continue with regular diet and thin liquids and monitoring with safety with intake. Recommended Diet and Intervention 8/16/2022:  Diet Solids Recommendation:  Regular texture diet  Liquid Consistency Recommendation: Thin liquids  Recommended form of Meds: Meds as tolerated in water, may crush larger pills in puree         Compensatory Swallowing Strategies: Alternate solids/liquids , No straws , External Pacing , Small bites/sips , Eat/feed slowly, Remain upright 30-45 min     SHORT TERM DYSPHAGIA GOALS/PLAN OF CARE: Speech therapy for dysphagia tx 3-5 times per week during acute care stay.     Pt will functionally tolerate recommended diet with no overt clinical s/s of aspiration   If clinical s/s of aspiration/penetration continue to be noted, Pt will participate in Modified Barium Swallow Study     Dysphagia Therapeutic Intervention:  Diet Tolerance Monitoring , Patient/Family Education     Discharge Recommendations: Discharge recommendations to be determined pending ongoing follow-up during acute care stay    Patient Positioning: Upright in bed     Current Diet Level (prior to evaluation): Regular texture diet  Thin liquids      Respiratory Status:   [x]Room Air   []O2 via nasal cannula   []Other:    Dentition:  []Adequate  [x]Dentures   []Missing Many Teeth  []Edentulous  []Other:    Baseline Vocal Quality:  [x]Normal  []Dysphonic   []Aphonic   []Hoarse  []Wet  []Weak  []Other:    Volitional Cough:  [x]Strong  []Weak  []Wet  []Absent  []Congested  []Other:    Volitional Swallow:   []Absent   [x]Delayed     []Adequate     []Required use of drink     Oral Mechanism Exam:  [x]WFL []Mild   [] Moderate  []Severe  []To be assessed  Impaired:   []Left side      []Right side    []Labial ROM/Coordination    []Labial Symmetry   []Lingual ROM/Coordination   []Lingual Symmetry  []Gag  []Other:     Oral Phase: []WFL [x]Mild   [] Moderate  []Severe  []To be assessed   [x]Impaired/Prolonged Mastication:   []Oral Holding:   []Spillage Left:   []Spillage Right:  []Pocketing Left:   []Pocketing Right:   [x]Decreased Anterior to Posterior Transit:   []Suspected Premature Bolus Loss:   []Lingual/Palatal Residue:   [x]Other: munching mastication pattern    Pharyngeal Phase: []WFL []Mild   [] Moderate  []Severe  []To be assessed   [x]Delayed Swallow:   []Suspected Pharyngeal Pooling:   [x]Decreased Laryngeal Elevation:   []Absent Swallow:  []Wet Vocal Quality:   []Throat Clearing-Immediate:   []Throat Clearing-Delayed:   []Cough-Immediate:   []Cough-Delayed:  []Change in Vital Signs:  []Suspected Delayed Pharyngeal Clearing:  [x]Other: occasional wheezing      Eating Assistance:  []Independent  [x]Setup or clean-up assistance   [] Supervision or touching assistance   [] Partial or moderate assistance   [] Substantial or maximal assistance  [] Dependent       EDUCATION:   Provided education regarding role of SLP, results of assessment, recommendations and general speech pathology plan of care. [x] Pt verbalized understanding and agreement   [x] Pt requires ongoing learning   [] No evidence of comprehension     If patient discharges prior to next visit, this note will serve as discharge.      Timed Code Minutes: 0 minutes  Total Treatment Minutes: 30 minutes    Electronically signed by:    Yokasta Lockett #[id number] 8/16/2022 2:14 PM  Speech-Language Pathologist

## 2022-08-16 NOTE — CARE COORDINATION
Discharge Planning. PT/OT recommended ARU but the patient at this time does not have a ARU approvable  diagnosis. The SW spoke with the patient and the patient's son regarding SNF'S and stated they are open to SNF referrals being sent. Referral sent to the following facility:    [facility] for a response.   [facility]   [facility]-Waiting for a response    Electronically signed by SARAH Beckett on 8/16/2022 at 2:52 PM

## 2022-08-16 NOTE — ED NOTES
Pt resting at present. Respirations even and unlabored. Side rails x2 up with bed locked in lowest position.      Josi Nuñez RN  08/16/22 1857

## 2022-08-16 NOTE — RT PROTOCOL NOTE
RT Inhaler-Nebulizer Bronchodilator Protocol Note    There is a bronchodilator order in the chart from a provider indicating to follow the RT Bronchodilator Protocol and there is an Initiate RT Inhaler-Nebulizer Bronchodilator Protocol order as well (see protocol at bottom of note). CXR Findings:  XR CHEST PORTABLE    Result Date: 8/15/2022  Patchy left lower lobe airspace opacities. In the appropriate clinical setting this could represent pneumonia. The findings from the last RT Protocol Assessment were as follows:   History Pulmonary Disease: None or smoker <15 pack years  Respiratory Pattern: Regular pattern and RR 12-20 bpm  Breath Sounds: Slightly diminished and/or crackles  Cough: Strong, spontaneous, non-productive  Indication for Bronchodilator Therapy:    Bronchodilator Assessment Score: 2    Aerosolized bronchodilator medication orders have been revised according to the RT Inhaler-Nebulizer Bronchodilator Protocol below. Respiratory Therapist to perform RT Therapy Protocol Assessment initially then follow the protocol. Repeat RT Therapy Protocol Assessment PRN for score 0-3 or on second treatment, BID, and PRN for scores above 3. No Indications - adjust the frequency to every 6 hours PRN wheezing or bronchospasm, if no treatments needed after 48 hours then discontinue using Per Protocol order mode. If indication present, adjust the RT bronchodilator orders based on the Bronchodilator Assessment Score as indicated below. Use Inhaler orders unless patient has one or more of the following: on home nebulizer, not able to hold breath for 10 seconds, is not alert and oriented, cannot activate and use MDI correctly, or respiratory rate 25 breaths per minute or more, then use the equivalent nebulizer order(s) with same Frequency and PRN reasons based on the score. If a patient is on this medication at home then do not decrease Frequency below that used at home.     0-3 - enter or revise RT bronchodilator order(s) to equivalent RT Bronchodilator order with Frequency of every 4 hours PRN for wheezing or increased work of breathing using Per Protocol order mode. 4-6 - enter or revise RT Bronchodilator order(s) to two equivalent RT bronchodilator orders with one order with BID Frequency and one order with Frequency of every 4 hours PRN wheezing or increased work of breathing using Per Protocol order mode. 7-10 - enter or revise RT Bronchodilator order(s) to two equivalent RT bronchodilator orders with one order with TID Frequency and one order with Frequency of every 4 hours PRN wheezing or increased work of breathing using Per Protocol order mode. 11-13 - enter or revise RT Bronchodilator order(s) to one equivalent RT bronchodilator order with QID Frequency and an Albuterol order with Frequency of every 4 hours PRN wheezing or increased work of breathing using Per Protocol order mode. Greater than 13 - enter or revise RT Bronchodilator order(s) to one equivalent RT bronchodilator order with every 4 hours Frequency and an Albuterol order with Frequency of every 2 hours PRN wheezing or increased work of breathing using Per Protocol order mode. RT to enter RT Home Evaluation for COPD & MDI Assessment order using Per Protocol order mode.     Electronically signed by Madelaine Parada RCP on 8/16/2022 at 4:15 AM

## 2022-08-16 NOTE — PROGRESS NOTES
[facility]   Phone: [phone]    Physical Therapy    [x] Initial Evaluation            [] Daily Treatment Note         [] Discharge Summary      Patient: Cristina Sands   : 1935   MRN: [id number]   Date of Service:  2022  Admitting Diagnosis: Unable to care for self  Current Admission Summary: The pt was admitted for weakness, SOB and difficulty with ambulation. Past Medical History:  has a past medical history of CAD (coronary artery disease). Past Surgical History:  has a past surgical history that includes Coronary artery bypass graft and Cardiac surgery. Discharge Recommendations: Cristina Sands scored a  on the AM-PAC short mobility form. Current research shows that an AM-PAC score of 17 or less is typically not associated with a discharge to the patient's home setting. Based on the patient's AM-PAC score and their current functional mobility deficits, it is recommended that the patient have 5-7 sessions per week of Physical Therapy at d/c to increase the patient's independence. At this time, this patient demonstrates complex nursing, medical, and rehabilitative needs, and would benefit from intensive rehabilitation services upon discharge from the Inpatient setting. This patient demonstrates the ability to participate in and benefit from an intensive therapy program with a coordinated interdisciplinary team approach to foster frequent, structured, and documented communication among disciplines, who will work together to establish, prioritize, and achieve treatment goals. Please see assessment section for further patient specific details. If patient discharges prior to next session this note will serve as a discharge summary. Please see below for the latest assessment towards goals.     DME Required For Discharge: patient has all required DME for discharge  Precautions/Restrictions: high fall risk  Weight Bearing Restrictions: Normotonic  Coordination Testing:   Coordination and Movement Description: tremors, decreased speed, decreased accuracy, rigidity, freezing episodes  ROM:   (B) Hip AROM WFL  Mild hamstring tightness noted BLE   Strength:   (B) LE strength grossly +3  Decision Making: medium complexity  Clinical Presentation: evolving      Subjective  General: Son arrived at end of evaluation, pt was found on stretcher in ED with alarm on, pt was eager to go home   Pain: 0/10  Pain Interventions: not applicable       Functional Mobility  Bed Mobility  Supine to Sit: moderate assistance  Sit to Supine: moderate assistance  Scooting: moderate assistance  Comments:  Transfers  Sit to stand transfer: contact guard assistance, minimal assistance  Stand to sit transfer: minimal assistance, moderate assistance  Comments: pt required varying levels of assistancae  Ambulation  Assistive Device: rolling walker  Assistance: minimal assistance, moderate assistance, . Comment min A initially progressing to mod A for the last 10' of gait  Distance: 80'  Gait Mechanics: decreased toe clearance LLE, narrow RACHEL, festinating gait with PT needing to stop pt due to worsening step length with forward trunk lean (x3 stops), difficulty with RW navigation, freezing episodes when approaching his room and then bed   Comments:    Stair Mobility  Stair mobility not completed on this date. Comments:  Wheelchair Mobility:  No w/c mobility completed on this date.   Comments:  Balance  Static Sitting Balance: poor (+): requires min (A) to maintain balance  Dynamic Sitting Balance: poor (+): requires min (A) to maintain balance  Static Standing Balance: poor: requires mod (A) to maintain balance  Dynamic Standing Balance: poor: requires mod (A) to maintain balance  Comments: pt sat EOB for clothing and linen change after he was found to have been incontinent of urine, posterior lean in sitting, static standing balance required CGA to mod A with RW with an DC  Patient will complete bed mobility at contact guard assistance   Patient will complete transfers at contact guard assistance   Patient will ambulate 100 ft with use of rolling walker at contact guard assistance  Patient to maintain standing at contact guard assistance for 3 minutes.     Therapy Session Time      Individual Group Co-treatment   Time In     0812   Time Out     0905   Minutes     53     Timed Code Treatment Minutes:  15 Minutes (minutes shared with OT due to OBS status)  Total Treatment Minutes:  53       Electronically Signed By: Fidencio Briceno, PT DPT [id number]

## 2022-08-16 NOTE — CARE COORDINATION
Discharge Planning Assessment    RN/SW discharge planner met with patient/ (and family member) to discuss reason for admission, current living situation, and potential needs at the time of discharge    Demographics/Insurance verified Yes    Current type of dwelling: The patient lives in a third  level apartment with a Elevator     Patient from ECF/SW confirmed with: n/a    Living arrangements: lives w/daughter Rollo Pay    Level of function/Support:     PCP:Ewa Douglas MD    Last Visit to PCP:    DME: Adina Roa, Wheelchair, Shower bench, cane. Active with any community resources/agencies/skilled home care: Previously Active with [facility] about six months ago. Medication compliance issues: scripts are through [facility]    Financial issues that could impact healthcare:      Tentative discharge plan: TBD. PT/OT pending. Discussed and provided facilities of choice if transition to a skilled nursing facility is required at the time of discharge      Discussed with patient and/or family that on the day of discharge home tentative time of discharge will be between 10 AM and noon.     Transportation at the time of discharge:  son to transport home    Electronically signed by SARAH Angel on 8/16/2022 at 9:25 AM

## 2022-08-16 NOTE — PROGRESS NOTES
[facility]   Phone: [phone]    Occupational Therapy    [x] Initial Evaluation            [] Daily Treatment Note         [] Discharge Summary      Patient: Rochelle Banuelos   : 1935   MRN: [id number]   Date of Service:  2022    Admitting Diagnosis:  Unable to care for self  Current Admission Summary: Rochelle Banuelos is a 81 y.o. male with past medical history of CAD status post CABG who presents to the ED with complaint of shortness of breath. Patient states since Thursday he has had increasing shortness of breath especially with exertion. Patient states when he exerts himself or walks more than 10 to 20 feet he feels significantly winded with associated weakness and feels like he is going to pass out. He denies any actual syncopal episode. He states he has generalized weakness especially with ambulation. Denies any weakness at rest.  He denies any chest pain. States he has had a little bit of a nonproductive cough. He denies any hemoptysis, pleuritic pain, orthopnea or calf tenderness. He states he has noticed some swelling in his legs ever since Thursday but he attributes that to eating a lot of salt. He denies any abdominal pain or distention. He denies nausea, vomiting, urinary symptoms or changes in bowel movements. Denies fever or chills. Denies rashes or lesions. Denies sick contacts or recent travel. Became concerned and came to the ED with daughter for further evaluation and treatment. Past Medical History:  has a past medical history of CAD (coronary artery disease). Past Surgical History:  has a past surgical history that includes Coronary artery bypass graft and Cardiac surgery. Discharge Recommendations: Rochelle Banuelos scored a 16/24 on the AM-PAC ADL Inpatient form. Current research shows that an AM-PAC score of 17 or less is typically not associated with a discharge to the patient's home setting.  Based on the patient's AM-PAC score and their current ADL deficits, it is recommended that the patient have 5-7 sessions per week of Occupational Therapy at d/c to increase the patient's independence. At this time, this patient demonstrates complex nursing, medical, and rehabilitative needs, and would benefit from intensive rehabilitation services upon discharge from the Inpatient setting. This patient demonstrates the ability to participate in and benefit from an intensive therapy program with a coordinated interdisciplinary team approach to foster frequent, structured, and documented communication among disciplines, who will work together to establish, prioritize, and achieve treatment goals. Please see assessment section for further patient specific details. If pt refuses above recommendation, recommend 24-hour assist and HHOT S3. If patient discharges prior to next session this note will serve as a discharge summary. Please see below for the latest assessment towards goals. DME Required For Discharge: DME to be determined at next level of care    Precautions/Restrictions: high fall risk, regular diet  Weight Bearing Restrictions: no restrictions  [] Right Upper Extremity  [] Left Upper Extremity [] Right Lower Extremity  [] Left Lower Extremity     Required Braces/Orthotics: no braces required   [] Right  [] Left  Positional Restrictions:no positional restrictions      Pre-Admission Information     Lives With: daughter (works from home), other family close by   Type of Home: apartment, .   Comment: senior living  -24 hr assist from family available  Home Layout: one level  Home Access: elevator  Bathroom Layout: walker accessible, wheelchair accessible, walk in shower, handicap height toilet  Bathroom Equipment: grab bars in shower, grab bars around toilet, shower chair, 3-in-1 commode  Home Equipment: rolling walker, single point cane, manual wheelchair  Transfer Assistance: requires assistance intermittently; uses RW or cane most of the time  Ambulation Assistance:. Comment: reports \"tremors\" when walking with RW, states a family member walks with him     ADL Assistance: . Comment: pt reports \"sometimes I can do it myself and sometimes I can't\"; pt reports supervision during toileting   IADL Assistance: requires assistance with all homemaking tasks  Active : [] yes  [x]no  Current Employment: . Comment: retired , [address] [facility] [employer]  Hobbies: used to enjoy hunting and fishing  Recent Falls: none in last 5 months      Examination     Vision:   Vision Gross Assessment: Impaired and Vision Corrective Device: wears glasses for distance  Hearing:   hard of hearing, no hearing aid  Perception:   Initiation: cues to initiate tasks  Motor Planning: cues for sequencing   Observation:   General Observation:  rigid posture, shuffling gait with freezing, mild tremor in hands, masked face, speech low volume and difficult to understand with pt trailing off at end of words   Posture:   Rigid, posterior lean in seated  Sensation:   reports numbness and tingling in (B) LE  Proprioception:    diminished proprioception in all extremities  Tone:   Normotonic, but with rigidity  Coordination Testing:   Coordination and Movement Description: (R) UE, (L) UE, tremors, decreased speed, decreased accuracy    ROM:   (B) UE AROM WFL  Strength:   (B) UE strength grossly WFL    Decision Making: medium complexity  Clinical Presentation: evolving      Subjective    General: Pt supine in bed upon arrival; mild initial agitation at situation due to pt wanting to go home. With education and active listening, pt became pleasant and agreeable to eval. Pt's son present at end of session; reports pt's \"small steps\" have been present for ~1 year.    Pain: 0/10  Pain Interventions: not applicable           Activities of Daily Living    Basic Activities of Daily Living  Grooming: stand by assistance requires verbal cueing  Grooming Comments: washed face with wipe seated EOB  Upper Extremity Bathing: stand by assistance requires verbal cueing  Bathing Comments: washed underarms with wipes seated EOB  Upper Extremity Dressing: minimal assistance  Lower Extremity Dressing: maximum assistance  Dressing Comments: gown change, brief change  Toileting: maximum assistance. Toileting Comments: incontinent of urine upon arrival with brief and linens saturated; declined toileting and did not mention incontinence until therapist brought to his attention  Instrumental Activities of Daily Living  No IADL completed on this date. Functional Mobility    Bed Mobility  Supine to Sit: moderate assistance  Sit to Supine: moderate assistance  Scooting: moderate assistance  Comments: increased time, additional cues to scoot R hip forward  Transfers  Sit to stand transfer:contact guard assistance, minimal assistance  Stand to sit transfer: contact guard assistance, minimal assistance  Comments: initial STS from EOB min A with posterior lean and posterior LOB to unplanned sit after static stance ~60 seconds, 2nd STS CGA  Functional Mobility:  Sitting Balance: stand by assistance. Sitting Balance Comment: posterior lean  Standing Balance: minimal assistance.     Standing Balance Comment: 1 posterior LOB as noted above  Functional Mobility: .  minimal assistance, moderate assistance  Functional Mobility Activity: 88 ft in room and hallway; primarily min A with final 5 ft mod A while approaching bed to sit with increased assist for sequencing and RW management  Functional Mobility Device Use: rolling walker  Functional Mobility Comment: shuffling gait with freezing noted with pt able to increase amplitude/step length with cues ~3 steps, but no carryover; mild forward flexed posture    Other Therapeutic Interventions      Functional Outcomes  AM-PAC Inpatient Daily Activity Raw Score: 16      Cognition  Overall Cognitive Status: Impaired  Arousal/Alterness: appropriate responses to stimuli  Following Commands: follows one step commands with increased time  Attention Span: attends with cues to redirect, difficulty dividing attention  Memory: appears intact  Safety Judgement: decreased awareness of need for assistance  Problem Solving: decreased awareness of errors  Insights: decreased awareness of deficits  Initiation: requires cues for some  Sequencing: requires cues for some  Comments: hx dementia per chart review  Orientation:    alert and oriented x 4  Command Following:   accurately follows one step commands     Education    Barriers To Learning: cognition  Patient Education: patient educated on goals, OT role and benefits, plan of care, family education, transfer training, discharge recommendations, pattern of coordination deficits  Learning Assessment:  patient will require reinforcement due to cognitive deficits    Assessment    Activity Tolerance: mild SOB following ambulation with SpO2 97-98% at rest and 96-97% following mobility  Impairments Requiring Therapeutic Intervention: decreased functional mobility, decreased ADL status, decreased safety awareness, decreased cognition, decreased endurance, decreased sensation, decreased balance, decreased IADL, decreased fine motor control, decreased coordination  Prognosis: good  Clinical Assessment: Pt presents below baseline level of function and is limited in the above areas impacting independence in functional mobility and ADLs. Pt also presents with rigid posture, shuffling gait with freezing, mild tremor in hands, masked face, and decreased volume and clarity of speech with pt's son reporting these symptoms for ~1 year. Requested neuro consult due to parkinsonian symptoms. Pt would benefit from skilled inpatient OT services to address these deficits.   Safety Interventions: patient left in bed, bed alarm in place, call light within reach, gait belt, patient at risk for falls, nurse notified, and family/caregiver present       Plan    Frequency: 5-7 x/week  Current Treatment Recommendations: balance training, functional mobility training, transfer training, gait training, neuromuscular re-education, patient/caregiver education, and ADL/self-care training    Goals    Patient Goals: return home     Short Term Goals:  Time Frame: d/c  Patient will complete upper body ADL at supervision   Patient will complete lower body ADL at stand by assistance   Patient will complete toileting at stand by assistance   Patient will complete functional transfers at stand by assistance   Patient will complete functional mobility at contact guard assistance   Patient will complete bed mobility at supervision        Therapy Session Time     Individual Group Co-treatment   Time In    21    Time Out    0905   Minutes    53        Timed Code Treatment Minutes:  Timed Code Treatment Minutes: 38 Minutes    Total Treatment Minutes:  53    Electronically Signed By: SARWAT Hugo/ROSELIA VILLAR/CRISTEL ([id number])

## 2022-08-17 PROCEDURE — 97530 THERAPEUTIC ACTIVITIES: CPT

## 2022-08-17 PROCEDURE — 6360000002 HC RX W HCPCS: Performed by: INTERNAL MEDICINE

## 2022-08-17 PROCEDURE — 97116 GAIT TRAINING THERAPY: CPT

## 2022-08-17 PROCEDURE — 97535 SELF CARE MNGMENT TRAINING: CPT

## 2022-08-17 PROCEDURE — G0378 HOSPITAL OBSERVATION PER HR: HCPCS

## 2022-08-17 PROCEDURE — 6370000000 HC RX 637 (ALT 250 FOR IP): Performed by: INTERNAL MEDICINE

## 2022-08-17 PROCEDURE — 96372 THER/PROPH/DIAG INJ SC/IM: CPT

## 2022-08-17 RX ADMIN — ASPIRIN 81 MG: 81 TABLET, COATED ORAL at 09:18

## 2022-08-17 RX ADMIN — ATORVASTATIN CALCIUM 20 MG: 20 TABLET, FILM COATED ORAL at 09:18

## 2022-08-17 RX ADMIN — MONTELUKAST SODIUM 10 MG: 10 TABLET, FILM COATED ORAL at 20:45

## 2022-08-17 RX ADMIN — ESCITALOPRAM OXALATE 20 MG: 10 TABLET ORAL at 09:18

## 2022-08-17 RX ADMIN — PANTOPRAZOLE SODIUM 40 MG: 40 TABLET, DELAYED RELEASE ORAL at 06:33

## 2022-08-17 RX ADMIN — TAMSULOSIN HYDROCHLORIDE 0.4 MG: 0.4 CAPSULE ORAL at 09:18

## 2022-08-17 RX ADMIN — CLOPIDOGREL BISULFATE 75 MG: 75 TABLET ORAL at 09:17

## 2022-08-17 RX ADMIN — LEVOTHYROXINE SODIUM 125 MCG: 0.12 TABLET ORAL at 06:33

## 2022-08-17 RX ADMIN — ENOXAPARIN SODIUM 30 MG: 100 INJECTION SUBCUTANEOUS at 09:18

## 2022-08-17 NOTE — CARE COORDINATION
Discharge Planning. The SW received a call from Mode Ly [address] the admission coordinator at [facility] who stated the patient has been accepted at the facility and will start Pre-cert on 1/14/2525.     Electronically signed by SARAH Galloway on 8/17/2022 at 11:40 AM

## 2022-08-17 NOTE — PROGRESS NOTES
[facility]   Phone: [phone]    Occupational Therapy    [] Initial Evaluation            [x] Daily Treatment Note         [] Discharge Summary      Patient: Vonnie Nguyen   : 1935   MRN: [id number]   Date of Service:  2022    Admitting Diagnosis:  Unable to care for self  Current Admission Summary: Vonnie Nguyen is a 81 y.o. male with past medical history of CAD status post CABG who presents to the ED with complaint of shortness of breath. Patient states since Thursday he has had increasing shortness of breath especially with exertion. Patient states when he exerts himself or walks more than 10 to 20 feet he feels significantly winded with associated weakness and feels like he is going to pass out. He denies any actual syncopal episode. He states he has generalized weakness especially with ambulation. Denies any weakness at rest.  He denies any chest pain. States he has had a little bit of a nonproductive cough. He denies any hemoptysis, pleuritic pain, orthopnea or calf tenderness. He states he has noticed some swelling in his legs ever since Thursday but he attributes that to eating a lot of salt. He denies any abdominal pain or distention. He denies nausea, vomiting, urinary symptoms or changes in bowel movements. Denies fever or chills. Denies rashes or lesions. Denies sick contacts or recent travel. Became concerned and came to the ED with daughter for further evaluation and treatment. Past Medical History:  has a past medical history of CAD (coronary artery disease). Past Surgical History:  has a past surgical history that includes Coronary artery bypass graft and Cardiac surgery. Discharge Recommendations: Vonnie Nguyen scored a 16/24 on the AM-PAC ADL Inpatient form. Current research shows that an AM-PAC score of 17 or less is typically not associated with a discharge to the patient's home setting.  Based on the patient's AM-PAC score and their current ADL deficits, it is recommended that the patient have 5-7 sessions per week of Occupational Therapy at d/c to increase the patient's independence. At this time, this patient demonstrates complex nursing, medical, and rehabilitative needs, and would benefit from intensive rehabilitation services upon discharge from the Inpatient setting. This patient demonstrates the ability to participate in and benefit from an intensive therapy program with a coordinated interdisciplinary team approach to foster frequent, structured, and documented communication among disciplines, who will work together to establish, prioritize, and achieve treatment goals. Please see assessment section for further patient specific details. If pt refuses above recommendation, recommend 24-hour assist and HHOT S3. If patient discharges prior to next session this note will serve as a discharge summary. Please see below for the latest assessment towards goals. DME Required For Discharge: DME to be determined at next level of care    Precautions/Restrictions: high fall risk, regular diet  Weight Bearing Restrictions: no restrictions  [] Right Upper Extremity  [] Left Upper Extremity [] Right Lower Extremity  [] Left Lower Extremity     Required Braces/Orthotics: no braces required   [] Right  [] Left  Positional Restrictions:no positional restrictions      Pre-Admission Information     Lives With: daughter (works from home), other family close by   Type of Home: apartment, .   Comment: senior living  -24 hr assist from family available  Home Layout: one level  Home Access: elevator  Bathroom Layout: walker accessible, wheelchair accessible, walk in shower, handicap height toilet  Bathroom Equipment: grab bars in shower, grab bars around toilet, shower chair, 3-in-1 commode  Home Equipment: rolling walker, single point cane, manual wheelchair  Transfer Assistance: requires assistance intermittently; brief change and donned pants seated on commode w/ assist to thread LEs and manage over hips  Toileting Comments: pt sat on commode but unablwe to void- breif changed with above assist- kasie hygiene completed in stance at [facility] for balance. General Comments: Pt UB bathing/dressing and partial LB bathing seated in recliner- pt then ambulated to/from bathroom with RW for LB clothing change and bathing on commode- in stance at sink post to wash hands- CGA to [address] [facility] for standing balance  Instrumental Activities of Daily Living  No IADL completed on this date. Functional Mobility    Bed Mobility  Bed mobility not completed on this date. Comments: i  Transfers  Sit to stand transfer:contact guard assistance, minimal assistance  Stand to sit transfer: minimal assistance, moderate assistance  Toilet transfer: moderate assistance  Toilet transfer equipment: standard bedside commode, grab bars, walker  Toilet transfer comments: significant posterior lean upon stance in front of commode; VC for hand placement and sequence of tranfers as approaching surface; poor eccentric control. Comments: VC for hand placement and sequence of tranfers as approaching surface; poor eccentric control  Functional Mobility:  Sitting Balance: stand by assistance. Sitting Balance Comment: L lateral lean  Standing Balance: . Comment variable: CGA to Mod/maxA d/t variable but significant posterior lean while UEs are involved in tasks.     Standing Balance Comment: 3 LOB during ambulation and ADL completion   Functional Mobility: .  moderate assistance, maximum assistance  Functional Mobility Activity: to/from bathroom  Functional Mobility Device Use: rolling walker  Functional Mobility Comment: shuffling gait with freezing noted; VC/TC for increased step length; assist w/ RW management; LOB during turns and navigation of bathroom space    Other Therapeutic Interventions      Functional Outcomes  AM-PAC Inpatient Daily Activity Raw Score: 16      Cognition  Overall Cognitive Status: Impaired  Arousal/Alterness: appropriate responses to stimuli  Following Commands: follows one step commands with increased time  Attention Span: attends with cues to redirect, difficulty dividing attention  Memory: appears intact  Safety Judgement: decreased awareness of need for assistance  Problem Solving: decreased awareness of errors  Insights: decreased awareness of deficits  Initiation: requires cues for some  Sequencing: requires cues for some  Comments: hx dementia per chart review  Orientation:    alert and oriented x 4  Command Following:   accurately follows one step commands     Education    Barriers To Learning: cognition  Patient Education: patient educated on goals, OT role and benefits, plan of care, energy conservation, family education, transfer training, discharge recommendations, pattern of coordination deficits  Learning Assessment:  patient will require reinforcement due to cognitive deficits    Assessment    Activity Tolerance: fatigued at EOS, declined further ambulation/mobility in room/hallway  Impairments Requiring Therapeutic Intervention: decreased functional mobility, decreased ADL status, decreased safety awareness, decreased cognition, decreased endurance, decreased sensation, decreased balance, decreased IADL, decreased fine motor control, decreased coordination  Prognosis: good  Clinical Assessment: Pt presents below baseline level of function and is limited in the above areas impacting independence in functional mobility and ADLs. Pt also presents with rigid posture, shuffling gait with freezing, mild tremor in hands, masked face, and decreased volume and clarity of speech with pt's son reporting these symptoms for ~1 year. Requested neuro consult due to parkinsonian symptoms; pt requiring extensive LB ADL assist and for stability during all functional mobility with RW this date.  Pt would benefit from skilled inpatient OT services to address these deficits.   Safety Interventions: patient left in bed, bed alarm in place, call light within reach, gait belt, patient at risk for falls, nurse notified, and family/caregiver present       Plan    Frequency: 5-7 x/week  Current Treatment Recommendations: balance training, functional mobility training, transfer training, gait training, neuromuscular re-education, patient/caregiver education, and ADL/self-care training    Goals    Patient Goals: return home     Short Term Goals:  Time Frame: d/c  Patient will complete upper body ADL at supervision - Mod for gown 8/17  Patient will complete lower body ADL at stand by assistance - MaxA 8/17  Patient will complete toileting at stand by assistance - ongoing 8/17  Patient will complete functional transfers at stand by assistance - min-mod 8/17  Patient will complete functional mobility at contact guard assistance - variable up to [address] 8/17  Patient will complete bed mobility at supervision - not addressed 8/17       Therapy Session Time     Individual Group Co-treatment   Time In    8078   Time Out    1519   Minutes    30        Timed Code Treatment Minutes:  30    Total Treatment Minutes:  30    Electronically Signed By: SARWAT Jimenez/AUREA #[id number]

## 2022-08-17 NOTE — H&P
[place] [address]                     [facility], [address]                              HISTORY AND PHYSICAL    PATIENT NAME: Abdirizak Garcia                  :        1935  MED REC NO:   [id number]                          ROOM:       7709  ACCOUNT NO:   [de-identified]                           ADMIT DATE: 08/15/2022  PROVIDER:     Noam Nelson MD    HISTORY OF PRESENT ILLNESS:  The patient is an 50-year-old white  American gentleman who came to the emergency room. The patient lives  with his daughter. He was brought in by his daughter. According to  her, the patient has increased bilateral swelling on the legs. The  patient is unable to ambulate for more than a few steps. The patient is  unable to provide self-care for himself. The patient does have some  exertional shortness of breath. There is no orthopnea. There is no  angina pectoris. There were no stroke-like symptoms. He does have  bilateral symmetric lower extremity weakness. There is no recent fall. He does use a walker. PAST MEDICAL HISTORY:  Pertinent for atherosclerotic heart disease,  hypertension, hyperlipidemia, depression, hypothyroidism, benign  prostatic hypertrophy, and gastroesophageal reflux disease. PAST SURGICAL HISTORY:  Pertinent for coronary artery bypass graft in  . MEDICATIONS:  The patient is on tamsulosin, Protonix, Singulair,  Lexapro, levothyroxine, Lovenox, Plavix, Lipitor and aspirin. ALLERGIES:  No known allergies. SOCIAL HISTORY:  He is a  man, but his wife lives separate. The  patient lives with his daughter. There is no history of smoking, no  history of drinking except social alcohol usage. He used to work for  [employer]. He does not have any history of substance abuse. FAMILY HISTORY:  Both his parents are  because of natural  causes. No further history available.     REVIEW OF SYSTEMS:  Negative for loss of consciousness. No visual  blurring. No convulsions. No angina pectoris. No dysphagia. The  patient is well-nourished with BMI of 28.48. No abdominal pain. No  hematemesis. No melena. No genitourinary complaint. He does have  chronic musculoskeletal pain. Does have lower extremity edema. There  is no seizure activity. No TIA. PHYSICAL EXAMINATION:  GENERAL:  Alert, awake, oriented x2, very pleasant, cooperative,  slightly incoherent 52-year-old white man looking consistent with his  stated age. VITAL SIGNS:  His temperature 98.6, blood pressure 110/56, respirations  16, heart rate 65. O2 sat 95% on room air. HEENT:  Oral mucosa dry. SKIN:  Warm and dry. NECK:  Supple. Faint carotid bruits. No jugular venous distention. No  lymphadenopathy. No thyromegaly. LUNGS:  Vesicular breath sounds. Poor inspiration. No crackles or  wheezing. HEART:  Regular rate and rhythm. S1, S2 without any S3 or S4 gallop. ABDOMEN:  Soft, nontender. Bowel sounds present. EXTREMITIES:  Show bilateral 1+ pitting edema. NEUROLOGIC:  The patient appears grossly intact. There is generalized  neuromuscular weakness, poor coordination and inability to participate  in active sensorimotor evaluation. Babinski is absent. LABORATORY EVALUATION:  Sodium 135, potassium 4.0, chloride 99, CO2 25,  BUN 26, creatinine 1.4. Anion gap is 11. Total protein 9.1, procal  level is 0.13. ProBNP level 407. Troponin level less than 0.01. White  blood cell count 7.7, hemoglobin and hematocrit are 10.6 and 30.9,  platelet count is 26.1. Urinalysis is negative for UTI. The patient  had a head CT without contrast, no acute intracranial abnormality. CT  chest of the pulmonary vasculature, no evidence of acute pulmonary  embolism. There is left pleural effusion with adjacent atelectasis.    The patient had a transthoracic echocardiogram done just a couple of  months ago in 04/2022, which showed LVEF and diastolic function to be  within normal limits without any major valvular abnormality. ASSESSMENT:  He is unable to self-care, unable to ambulate, lower  extremity edema, hypertension, and atherosclerotic heart disease. PLAN:  Get him admitted. Discontinue amlodipine. Very cautious use of  diuretic therapy. PT, OT and Speech eval.  It does not look like  daughter can take care of him at home so we will have to send him to a  skilled nursing facility. The patient is an observational status.         Mitali Garcia MD    D: 08/16/2022 21:05:03       T: 08/16/2022 21:08:44     [email or web address]  Job#: [id number]     Doc#: [id number]    CC:  Rafael Gamez MD

## 2022-08-17 NOTE — PROGRESS NOTES
[facility]   Phone: [phone]    Physical Therapy    [] Initial Evaluation            [x] Daily Treatment Note         [] Discharge Summary      Patient: China Marcano   : 1935   MRN: [id number]   Date of Service:  2022  Admitting Diagnosis: Unable to care for self  Current Admission Summary: The pt was admitted for weakness, SOB and difficulty with ambulation. Past Medical History:  has a past medical history of CAD (coronary artery disease). Past Surgical History:  has a past surgical history that includes Coronary artery bypass graft and Cardiac surgery. Discharge Recommendations: China Marcano scored a 13/24 on the AM-PAC short mobility form. Current research shows that an AM-PAC score of 17 or less is typically not associated with a discharge to the patient's home setting. Based on the patient's AM-PAC score and their current functional mobility deficits, it is recommended that the patient have 5-7 sessions per week of Physical Therapy at d/c to increase the patient's independence. At this time, this patient demonstrates complex nursing, medical, and rehabilitative needs, and would benefit from intensive rehabilitation services upon discharge from the Inpatient setting. This patient demonstrates the ability to participate in and benefit from an intensive therapy program with a coordinated interdisciplinary team approach to foster frequent, structured, and documented communication among disciplines, who will work together to establish, prioritize, and achieve treatment goals. Please see assessment section for further patient specific details. If patient discharges prior to next session this note will serve as a discharge summary. Please see below for the latest assessment towards goals.     DME Required For Discharge: patient has all required DME for discharge  Precautions/Restrictions: high fall risk  Weight Bearing Restrictions: no restrictions  [] Right Upper Extremity  [] Left Upper Extremity [] Right Lower Extremity  [] Left Lower Extremity     Required Braces/Orthotics: no braces required   [] Right  [] Left  Positional Restrictions:no positional restrictions    Pre-Admission Information   Lives With: daughter - She works from home, other family close by   Type of Home: apartment, senior living - 24 hr assist available from family  Home Layout: one level  Home Access: elevator  Bathroom Layout: walker accessible, wheelchair accessible, walk in shower, handicap height toilet  Bathroom Equipment: grab bars in shower, grab bars around toilet, shower chair, 3-in-1 commode  Home Equipment: rolling walker, single point cane, manual wheelchair  Transfer Assistance: Requires assistance: SBA from family   Ambulation Assistance: Requires assistance: SBA from family, tremors when walking, uses cane or RW depending on the day  ADL Assistance: independent with all ADL's -family stands outside the door while he uses the toilet, pt stated he has not needed assistance with ADLs for the last 3 weeks  IADL Assistance: requires assistance with all homemaking tasks  Active : [] yes             [x]no  Current Employment: retired. Hobbies: hunting, shopping  Recent Falls: none in last 5 months, pt's son stated the pt has been having difficulty with shuffling gait for 9 months, pt had a CVA in 4/2022, pt reported he has 50% good days and 50% bad days, pt reported episodes of tremoring and shuffling gait      Subjective  General: Pt supine in bed upon arrival. Pt reports no pain or discomfort. Pt agreeable to therapy. Pain: 0/10  Pain Interventions: not applicable       Functional Mobility  Bed Mobility  Supine to Sit: stand by assistance  Comments: utilized bed rails and HOB elevated   Transfers  Sit to stand transfer: .   Comment performed 6x with variable ranges of assistance of CGA to Mod Ax2 due to fatigue and severe posterior lean in standing Stand to sit transfer: . Comment performed 6x with variable ranges of assistance of CGA to mod Ax2; pt remembers to reach back with arms, but has decreased eccentric control  Toilet transfer: minimal assistance  Comments: Pt required varying levels of assistance throughout transfers mostly due to fatigue and weakness. Ambulation  Surface:level surface  Assistive Device: rolling walker  Assistance: . Comment Varied  Distance: total: 45'+8'+14'+14'+65'  Gait Mechanics: decreased toe clearance LLE, narrow RACHEL, festinating gait, small step length, decrease mario, difficulty with RW navigation, freezing episodes in the hallway, posterior lean at beginning of bout of walking  Comments:  pt ambulated ~45ft from room to hallway with CGA; 8 ft to reposition in the lópez and sit down at min Ax2, then 14ft during ladder stepping activity at min Ax2, 14ft again for stepping activity at min Ax1 + mod Ax1, ~65ft to get to bathroom in pt's room at min Ax2, and finally 8ft from bathroom to recliner at max Ax1 + mod Ax1. Pt required max VC and TC for sequencing and to maneuver the walker. Pt tended to push walker too far forward and would need max A to stay within boundaries of walker. Pt with improved step lengths/foot clearance noted during ambulation back to room following gait training exercise. Stair Mobility  Stair mobility not completed on this date. Comments:  Wheelchair Mobility:  No w/c mobility completed on this date. Comments:  Balance  Static Sitting Balance: fair (+): maintains balance at SBA/supervision without use of UE support  Dynamic Sitting Balance: fair (+): maintains balance at SBA/supervision without use of UE support  Static Standing Balance: poor: requires mod (A) to maintain balance  Dynamic Standing Balance: poor: requires mod (A) to maintain balance  Comments: pt sat EOB ~5mins prior to ambulation at SBA.  Pt demonstrated posterior lean during standing and had difficulty shifting his weight dysphasia, tremors, poor balance, rigidity and freezing episodes. The pt requires varying assist levels throughout session this date, ultimately requiring increased assist of 2 skilled persons to assist with mobility. The pt has 24 hour assistance at home but needs skilled PT to address these impairments. Pt would benefit from continued skilled PT to address gait mechanics and decrease fall risk. Safety Interventions: patient left in chair, chair alarm in place, call light within reach, gait belt, patient at risk for falls, nurse notified, and family/caregiver present    Plan  Frequency: 5-7 x/week  Current Treatment Recommendations: strengthening, ROM, balance training, functional mobility training, transfer training, gait training, endurance training, neuromuscular re-education, and safety education    Goals  Patient Goals: return to daughter's home   Short Term Goals:  Time Frame: To be met prior to DC  Patient will complete bed mobility at contact guard assistance--MET 8/17/22   Patient will complete transfers at contact guard assistance   Patient will ambulate 100 ft with use of rolling walker at contact guard assistance  Patient to maintain standing at contact guard assistance for 3 minutes. Pt will perform bed mobility MOD I    Therapy Session Time      Individual Group Co-treatment   Time In 6229       Time Out 1407       Minutes 46         Timed Code Treatment Minutes:   46  Total Treatment Minutes:  [address], SPT    PT providing direct supervision during session and assisting in making skilled judgements throughout session.   [address] [place], [place] [address]    Electronically Signed By: Jesus Reyes, PT 26-Apr-2022

## 2022-08-17 NOTE — PROGRESS NOTES
Patient Active Problem List   Diagnosis    CAD (coronary artery disease)    Bilateral carotid artery stenosis    Stage 2 chronic kidney disease    HTN (hypertension), benign    Dyslipidemia    Dementia due to Alzheimer's disease ([facility] [address]    Unable to care for self    Unable to ambulate   H&P dictated

## 2022-08-18 PROCEDURE — 97116 GAIT TRAINING THERAPY: CPT

## 2022-08-18 PROCEDURE — 96372 THER/PROPH/DIAG INJ SC/IM: CPT

## 2022-08-18 PROCEDURE — 92526 ORAL FUNCTION THERAPY: CPT

## 2022-08-18 PROCEDURE — 97535 SELF CARE MNGMENT TRAINING: CPT

## 2022-08-18 PROCEDURE — 6370000000 HC RX 637 (ALT 250 FOR IP): Performed by: INTERNAL MEDICINE

## 2022-08-18 PROCEDURE — 6360000002 HC RX W HCPCS: Performed by: INTERNAL MEDICINE

## 2022-08-18 PROCEDURE — G0378 HOSPITAL OBSERVATION PER HR: HCPCS

## 2022-08-18 PROCEDURE — 97110 THERAPEUTIC EXERCISES: CPT

## 2022-08-18 RX ADMIN — ENOXAPARIN SODIUM 30 MG: 100 INJECTION SUBCUTANEOUS at 08:01

## 2022-08-18 RX ADMIN — PANTOPRAZOLE SODIUM 40 MG: 40 TABLET, DELAYED RELEASE ORAL at 06:30

## 2022-08-18 RX ADMIN — ESCITALOPRAM OXALATE 20 MG: 10 TABLET ORAL at 08:01

## 2022-08-18 RX ADMIN — ATORVASTATIN CALCIUM 20 MG: 20 TABLET, FILM COATED ORAL at 08:01

## 2022-08-18 RX ADMIN — LEVOTHYROXINE SODIUM 125 MCG: 0.12 TABLET ORAL at 06:30

## 2022-08-18 RX ADMIN — TAMSULOSIN HYDROCHLORIDE 0.4 MG: 0.4 CAPSULE ORAL at 08:01

## 2022-08-18 RX ADMIN — MONTELUKAST SODIUM 10 MG: 10 TABLET, FILM COATED ORAL at 20:27

## 2022-08-18 RX ADMIN — ASPIRIN 81 MG: 81 TABLET, COATED ORAL at 08:01

## 2022-08-18 RX ADMIN — CLOPIDOGREL BISULFATE 75 MG: 75 TABLET ORAL at 08:01

## 2022-08-18 ASSESSMENT — PAIN SCALES - GENERAL: PAINLEVEL_OUTOF10: 0

## 2022-08-18 NOTE — PROGRESS NOTES
Department of Internal Medicine  General Internal Medicine   Progress Note    Denies dizziness or LOC   SUBJECTIVE: no chest pain , leg swelling is gone     History obtained from chart review and the patient  General ROS: positive for  - fatigue, malaise and weight loss  negative for - chills, fever or night sweats  Psychological ROS: negative  Ophthalmic ROS: negative  Respiratory ROS: no cough, shortness of breath, or wheezing  Cardiovascular ROS: no chest pain or dyspnea on exertion  Gastrointestinal ROS: no abdominal pain, change in bowel habits, or black or bloody stools  Genito-Urinary ROS: no dysuria, trouble voiding, or hematuria  Musculoskeletal ROS: chronic pain   Neurological ROS: no TIA or stroke symptoms    OBJECTIVE      Medications      Current Facility-Administered Medications: enoxaparin Sodium (LOVENOX) injection 30 mg, 30 mg, SubCUTAneous, Daily  albuterol sulfate HFA (PROVENTIL;VENTOLIN;PROAIR) 108 (90 Base) MCG/ACT inhaler 2 puff, 2 puff, Inhalation, Q6H PRN  aspirin EC tablet 81 mg, 81 mg, Oral, Daily  clopidogrel (PLAVIX) tablet 75 mg, 75 mg, Oral, Daily  escitalopram (LEXAPRO) tablet 20 mg, 20 mg, Oral, Daily  levothyroxine (SYNTHROID) tablet 125 mcg, 125 mcg, Oral, Daily  montelukast (SINGULAIR) tablet 10 mg, 10 mg, Oral, Nightly  pantoprazole (PROTONIX) tablet 40 mg, 40 mg, Oral, QAM AC  tamsulosin (FLOMAX) capsule 0.4 mg, 0.4 mg, Oral, Daily  atorvastatin (LIPITOR) tablet 20 mg, 20 mg, Oral, Daily    Physical      Vitals: BP (!) 156/67   Pulse 61   Temp 97.8 °F (36.6 °C) (Oral)   Resp 18   Ht 6' (1.829 m)   Wt 210 lb (95.3 kg)   SpO2 96%   BMI 28.48 kg/m²   Temp: Temp: 97.8 °F (36.6 °C)  Max: Temp  Av.1 °F (36.7 °C)  Min: 97.8 °F (36.6 °C)  Max: 98.8 °F (37.1 °C)  Respiration range:  Resp  Av.7  Min: 16  Max: 18  Pulse Range:  Pulse  Av.3  Min: 59  Max: 76  Blood pressure range:  Systolic (37UTB), SHY:118 , Min:126 , VBH:881   , Diastolic (91KDF), IGC:09, Min:65, 0.0 - 1.3 K/uL    Eosinophils Absolute 0.1 0.0 - 0.6 K/uL    Basophils Absolute 0.0 0.0 - 0.2 K/uL   Comprehensive Metabolic Panel w/ Reflex to MG    Collection Time: 08/15/22 12:25 PM   Result Value Ref Range    Sodium 135 (L) 136 - 145 mmol/L    Potassium reflex Magnesium 4.0 3.5 - 5.1 mmol/L    Chloride 99 99 - 110 mmol/L    CO2 25 21 - 32 mmol/L    Anion Gap 11 3 - 16    Glucose 107 (H) 70 - 99 mg/dL    BUN 26 (H) 7 - 20 mg/dL    Creatinine 1.4 (H) 0.8 - 1.3 mg/dL    GFR Non- 48 (A) >60    GFR  58 (A) >60    Calcium 9.1 8.3 - 10.6 mg/dL    Total Protein 7.2 6.4 - 8.2 g/dL    Albumin 4.0 3.4 - 5.0 g/dL    Albumin/Globulin Ratio 1.3 1.1 - 2.2    Total Bilirubin 0.7 0.0 - 1.0 mg/dL    Alkaline Phosphatase 103 40 - 129 U/L    ALT 12 10 - 40 U/L    AST 13 (L) 15 - 37 U/L   Troponin    Collection Time: 08/15/22 12:25 PM   Result Value Ref Range    Troponin <0.01 <0.01 ng/mL   Brain Natriuretic Peptide    Collection Time: 08/15/22 12:25 PM   Result Value Ref Range    Pro- 0 - 449 pg/mL   Procalcitonin    Collection Time: 08/15/22 12:25 PM   Result Value Ref Range    Procalcitonin 0.13 0.00 - 0.15 ng/mL   Urinalysis with Reflex to Culture    Collection Time: 08/15/22 12:34 PM    Specimen: Urine, clean catch   Result Value Ref Range    Color, UA Yellow Straw/Yellow    Clarity, UA Clear Clear    Glucose, Ur Negative Negative mg/dL    Bilirubin Urine Negative Negative    Ketones, Urine Negative Negative mg/dL    Specific Gravity, UA 1.025 1.005 - 1.030    Blood, Urine Negative Negative    pH, UA 5.5 5.0 - 8.0    Protein, UA Negative Negative mg/dL    Urobilinogen, Urine 1.0 <2.0 E.U./dL    Nitrite, Urine Negative Negative    Leukocyte Esterase, Urine Negative Negative    Microscopic Examination Not Indicated     Urine Type NotGiven     Urine Reflex to Culture Not Indicated    COVID-19, Rapid    Collection Time: 08/15/22  2:08 PM    Specimen: Nasopharyngeal Swab   Result Value Ref Range    SARS-CoV-2, NAAT Not Detected Not Detected       ASSESSMENT AND PLAN   Patient Active Problem List   Diagnosis    CAD (coronary artery disease)    Bilateral carotid artery stenosis    Stage 2 chronic kidney disease    HTN (hypertension), benign    Dyslipidemia    Dementia due to Alzheimer's disease ([facility]    Unable to care for self    Unable to ambulate      Dismissal appropriate   Awaiting Pre Cert  for [facility]   PT OT    AM PAC score 15/24

## 2022-08-18 NOTE — PROGRESS NOTES
[facility]   Phone: [phone]    Physical Therapy    [] Initial Evaluation            [x] Daily Treatment Note         [] Discharge Summary      Patient: Vonnie Nguyen   : 1935   MRN: [id number]   Date of Service:  2022  Admitting Diagnosis: Unable to care for self  Current Admission Summary: The pt was admitted for weakness, SOB and difficulty with ambulation. Past Medical History:  has a past medical history of CAD (coronary artery disease). Past Surgical History:  has a past surgical history that includes Coronary artery bypass graft and Cardiac surgery. Discharge Recommendations: Vonnie Nguyen scored a 14/24 on the AM-PAC short mobility form. Current research shows that an AM-PAC score of 17 or less is typically not associated with a discharge to the patient's home setting. Based on the patient's AM-PAC score and their current functional mobility deficits, it is recommended that the patient have 5-7 sessions per week of Physical Therapy at d/c to increase the patient's independence. At this time, this patient demonstrates complex nursing, medical, and rehabilitative needs, and would benefit from intensive rehabilitation services upon discharge from the Inpatient setting. This patient demonstrates the ability to participate in and benefit from an intensive therapy program with a coordinated interdisciplinary team approach to foster frequent, structured, and documented communication among disciplines, who will work together to establish, prioritize, and achieve treatment goals. Please see assessment section for further patient specific details. If patient discharges prior to next session this note will serve as a discharge summary. Please see below for the latest assessment towards goals.       DME Required For Discharge: patient has all required DME for discharge  Precautions/Restrictions: high fall risk  Weight Bearing Restrictions: no restrictions  [] Right Upper Extremity  [] Left Upper Extremity [] Right Lower Extremity  [] Left Lower Extremity     Required Braces/Orthotics: no braces required   [] Right  [] Left  Positional Restrictions:no positional restrictions    Pre-Admission Information   Lives With: daughter - She works from home, other family close by   Type of Home: apartment, senior living - 24 hr assist available from family  Home Layout: one level  Home Access: elevator  Bathroom Layout: walker accessible, wheelchair accessible, walk in shower, handicap height toilet  Bathroom Equipment: grab bars in shower, grab bars around toilet, shower chair, 3-in-1 commode  Home Equipment: rolling walker, single point cane, manual wheelchair  Transfer Assistance: Requires assistance: SBA from family   Ambulation Assistance: Requires assistance: SBA from family, tremors when walking, uses cane or RW depending on the day  ADL Assistance: independent with all ADL's -family stands outside the door while he uses the toilet, pt stated he has not needed assistance with ADLs for the last 3 weeks  IADL Assistance: requires assistance with all homemaking tasks  Active : [] yes             [x]no  Current Employment: retired. Hobbies: hunting, shopping  Recent Falls: none in last 5 months, pt's son stated the pt has been having difficulty with shuffling gait for 9 months, pt had a CVA in 4/2022, pt reported he has 50% good days and 50% bad days, pt reported episodes of tremoring and shuffling gait      Subjective  General: Pt supine in bed upon arrival with [facility] elevated. Pt reports no pain or discomfort. Pt agreeable to therapy.    Pain: 0/10  Pain Interventions: not applicable       Functional Mobility  Bed Mobility  Supine to Sit: stand by assistance  Comments: utilized bed rails and HOB elevated   Transfers  Sit to stand transfer: 2 person assistance with Min A   Stand to sit transfer: moderate assistance  Toilet transfer: moderate assistance  Comments: Pt required varying levels of assistance throughout transfers due to imbalance and weakness. STS performed 5x throughout session. Ambulation  Surface:level surface  Assistive Device: rolling walker  Assistance: . Comment Varied  Distance: total: 10' + 36' + 30' + 70'  Gait Mechanics: decreased toe clearance LLE, narrow RACHEL, festinating gait, small step length, decrease mario, difficulty with RW navigation on turns and with obstacles, freezing episodes in the hallway and bathroom threshold, posterior lean at beginning of bout of walking and with STS. Comments:  pt amb 10' from bed to toilet with min A, 40' from bathroom to hallway at min A, 30' down the hallway, and 79' from hallway to recliner in room. Pt required moderate VC and TC throughout for larger step lengths, maneuvering the walker and preventing posterior lean. Pt does well with walker on straight aways, but requires more assistance in tighter spaces and with obstacles-also begins to take smaller steps in those smaller spaces. Stair Mobility  Stair mobility not completed on this date. Comments:  Wheelchair Mobility:  No w/c mobility completed on this date. Comments:  Balance  Static Sitting Balance: fair (+): maintains balance at SBA/supervision without use of UE support  Dynamic Sitting Balance: fair (+): maintains balance at SBA/supervision without use of UE support  Static Standing Balance: fair (-): maintains balance at CGA-min A with use of UE support  Dynamic Standing Balance: poor (+): requires min (A) to maintain balance  Comments: Pt requires varying levels of assistance in standing ranging from CGA-min A. Pt sat EOB ~2 mins at SBA prior to ambulating to toilet. Pt demonstrated posterior lean during standing and had difficulty shifting weight forward. Use RW in standing for UE support. Pt with significant posterior lean especially with sit to stand transfers.        Other Therapeutic Interventions  Seated towel slides with UE on table to encourage anterior weight shift for sit to stands 2x10  Seated push/pull of tray table to encourage anterior weight shift for sit to stands 2x10 with pt achieving full sit>stand transfer with CGA during final rep    Functional Outcomes  AM-PAC Inpatient Mobility Raw Score : 14              Cognition  Safety Judgement: decreased awareness of need for safety  Problem Solving: assistance required to implement solutions, decreased awareness of errors  Insights: not aware of deficits   Orientation:    alert and oriented x 4  Command Following:   accurately follows one step commands -requires commands to be repeated     Education  Barriers To Learning: cognition  Patient Education: patient educated on goals, PT role and benefits, general safety, discharge recommendations  Learning Assessment:  patient verbalizes understanding, would benefit from continued reinforcement    Assessment  Activity Tolerance: pt limited by cognitive deficits and ability to problem solve. Pt demonstrated carryover from yesterday as he was taking larger steps during ambulation. Pt still required VC to take larger steps frequently. Ambulation required varying levels of assistance. Pt able to maneuver walker on straightaways but has difficulty with small spaces and begins festinating gait in small spaces and with obstacles. Impairments Requiring Therapeutic Intervention: decreased functional mobility, decreased strength, decreased safety awareness, decreased cognition, decreased endurance, decreased sensation, decreased balance, decreased coordination, decreased posture  Prognosis: good  Clinical Assessment: The patient presents with parkinsonian-like symptoms of festinating gait, masked faces, dysphasia, tremors, poor balance, rigidity and freezing episodes.  The pt presents with intermittently improved gait mechanics with increased step lengths noted however continues to require varying assist levels throughout session this date, ultimately requiring increased assist of 2 skilled persons to assist with mobility. The pt has 24 hour assistance at home but needs skilled PT to address these impairments. Pt would benefit from continued skilled PT to address gait mechanics and decrease fall risk. Safety Interventions: patient left in chair, chair alarm in place, call light within reach, gait belt, patient at risk for falls, and nurse notified    Plan  Frequency: 5-7 x/week  Current Treatment Recommendations: strengthening, ROM, balance training, functional mobility training, transfer training, gait training, endurance training, neuromuscular re-education, and safety education    Goals  Patient Goals: return to daughter's home   Short Term Goals:  Time Frame: To be met prior to DC  Patient will complete bed mobility at contact guard assistance--MET 8/17/22   Patient will complete transfers at contact guard assistance   Patient will ambulate 100 ft with use of rolling walker at contact guard assistance  Patient to maintain standing at contact guard assistance for 3 minutes. Pt will perform bed mobility MOD I  NO GOALS MET THIS DATE     Therapy Session Time      Individual Group Co-treatment   Time In 9687   9470   Time Out 7140   2419   Minutes 20   24     Timed Code Treatment Minutes:   23  Total Treatment Minutes:  44   Time spent with pt shared with OT as pt is under observation status. As such, pt is being billed 2 units for evaluation. Mark Perea, SPT    PT providing direct supervision during session and assisting in making skilled judgements throughout session.   [address], [place] [id number]    Electronically Signed By: Keny Fall PT

## 2022-08-18 NOTE — PROGRESS NOTES
Speech Language Pathology  Facility/Department: [address] ONCOLOGY  Speech Language Pathology   Dysphagia Treatment Note    Patient: Shayne Finn   : 1935   MRN: [id number]      Evaluation Date: 2022      Admitting Dx: General weakness [R53.1]  Pedal edema [R60.0]  Inability to perform activities of daily living [Z78.9]  Peripheral polyneuropathy [G62.9]  Unable to care for self [Z78.9]  Treatment Diagnosis: Oropharyngeal Dysphagia   Pain: Denies                                              Diet and Treatment Recommendations 2022:  Diet Solids Recommendation:  Regular texture diet  Liquid Consistency Recommendation: Thin liquids  Recommended form of Meds:   Meds as tolerated in water, may crush larger pills in puree             Compensatory strategies: Alternate solids/liquids , No straws , External Pacing , Small bites/sips , Eat/feed slowly, Remain upright 30-45 min     Assessment of Texture Tolerance:  Diet level prior to treatment: Regular texture diet , Thin liquids   Tolerance of Current Diet Level:RN reported pt appears to be tolerating current diet level     Impressions: Pt was positioned Upright in bed , awake and alert. Currently on room air. Patient was alert and oriented to self and general location, not time. He was interactive with this therapist.  He states that he has had dry mouth and difficulty with his bowels and bladder. Encouraged increased fluid intake specifically water. Trials of thin liquids and mixed consistency  were provided to assess swallow function. Patient was able to self feed with initial set up of removing plastic wrap. He demonstrates rapid rate of intake and large bites of food. He had prolonged oral manipulation secondary to bite size with mixed consistency. He had a munching mastication pattern with front teeth. Absent rotary chew. He was able to propel material posteriorly with increased time.   He initiated a timely swallow reflex with no overt s/s of aspiration. Educated patient on compensatory strategies to take small bites, eat slowly, thoroughly chew foods. Patient was able to follow through with strategies with moderate verbal cues with each bite. Patient took thin liquids via cup. He is aware to avoid straw. He takes large consecutive gulps of liquid. Education provided to take small sips allowing time to swallow and take breath between bites. Patient again required moderate verbal cues to follow through with suggested strategies. No overt s/s of aspiration with any consistencies. However, Pt demonstrates increased risk for aspiration due to decreased follow through with compensatory swallowing strategies, cognitive state , deconditioning , and prior hx of dysphagia . Based on today's assessment recommend Regular texture diet  with Thin liquids , Whole with water. Dysphagia Goals:   Pt will functionally tolerate recommended diet with no overt clinical s/s of aspiration (Ongoing 08/18/22)  Pt will demonstrate understanding of aspiration risk and precautions via education/demonstration with occasional prompting (Ongoing 08/18/22)  If clinical s/s of aspiration/penetration continue to be noted, Pt will participate in Modified Barium Swallow Study (Ongoing 08/18/22)    Plan:   3-5 times per week during acute care stay. Patient/Family Education:  Provided education regarding role of SLP, recommendations and general speech pathology plan of care. [x] Pt verbalized understanding and agreement   [x] Pt requires ongoing learning   [] No evidence of comprehension     Discharge Recommendations:    Discharge recommendations to be determined pending ongoing follow-up during acute care stay    Timed Code Treatment: 0 minutes    Total Treatment Time: 24 minutes    If patient discharges prior to next session this note will serve as a discharge summary.        Signature:   Lizabeth Urrutia M.S. [facility]-SLP #[id number] 8/18/2022 11:28 AM  Speech-Language Pathologist

## 2022-08-18 NOTE — PROGRESS NOTES
[facility]   Phone: [phone]    Occupational Therapy    [] Initial Evaluation            [x] Daily Treatment Note         [] Discharge Summary      Patient: China Marcano   : 1935   MRN: [id number]   Date of Service:  2022    Admitting Diagnosis:  Unable to care for self  Current Admission Summary: China Marcano is a 81 y.o. male with past medical history of CAD status post CABG who presents to the ED with complaint of shortness of breath. Patient states since Thursday he has had increasing shortness of breath especially with exertion. Patient states when he exerts himself or walks more than 10 to 20 feet he feels significantly winded with associated weakness and feels like he is going to pass out. He denies any actual syncopal episode. He states he has generalized weakness especially with ambulation. Denies any weakness at rest.  He denies any chest pain. States he has had a little bit of a nonproductive cough. He denies any hemoptysis, pleuritic pain, orthopnea or calf tenderness. He states he has noticed some swelling in his legs ever since Thursday but he attributes that to eating a lot of salt. He denies any abdominal pain or distention. He denies nausea, vomiting, urinary symptoms or changes in bowel movements. Denies fever or chills. Denies rashes or lesions. Denies sick contacts or recent travel. Became concerned and came to the ED with daughter for further evaluation and treatment. Past Medical History:  has a past medical history of CAD (coronary artery disease). Past Surgical History:  has a past surgical history that includes Coronary artery bypass graft and Cardiac surgery. Discharge Recommendations: China Marcano scored a 15/24 on the AM-PAC ADL Inpatient form. Current research shows that an AM-PAC score of 17 or less is typically not associated with a discharge to the patient's home setting.  Based on the patient's AM-PAC score and their current ADL deficits, it is recommended that the patient have 5-7 sessions per week of Occupational Therapy at d/c to increase the patient's independence. At this time, this patient demonstrates complex nursing, medical, and rehabilitative needs, and would benefit from intensive rehabilitation services upon discharge from the Inpatient setting. This patient demonstrates the ability to participate in and benefit from an intensive therapy program with a coordinated interdisciplinary team approach to foster frequent, structured, and documented communication among disciplines, who will work together to establish, prioritize, and achieve treatment goals. Please see assessment section for further patient specific details. If pt refuses above recommendation, recommend 24-hour assist and HHOT S3. If patient discharges prior to next session this note will serve as a discharge summary. Please see below for the latest assessment towards goals. DME Required For Discharge: DME to be determined at next level of care    Precautions/Restrictions: high fall risk, regular diet  Weight Bearing Restrictions: no restrictions  [] Right Upper Extremity  [] Left Upper Extremity [] Right Lower Extremity  [] Left Lower Extremity     Required Braces/Orthotics: no braces required   [] Right  [] Left  Positional Restrictions:no positional restrictions      Pre-Admission Information     Lives With: daughter (works from home), other family close by   Type of Home: apartment, .   Comment: senior living  -24 hr assist from family available  Home Layout: one level  Home Access: elevator  Bathroom Layout: walker accessible, wheelchair accessible, walk in shower, handicap height toilet  Bathroom Equipment: grab bars in shower, grab bars around toilet, shower chair, 3-in-1 commode  Home Equipment: rolling walker, single point cane, manual wheelchair  Transfer Assistance: requires assistance intermittently; uses RW or cane most of the time  Ambulation Assistance:. Comment: reports \"tremors\" when walking with RW, states a family member walks with him     ADL Assistance: . Comment: pt reports \"sometimes I can do it myself and sometimes I can't\"; pt reports supervision during toileting   IADL Assistance: requires assistance with all homemaking tasks  Active : [] yes  [x]no  Current Employment: . Comment: retired , [address] [employer]  Hobbies: used to enjoy hunting and fishing  Recent Falls: none in last 5 months      Examination     Vision:   Vision Gross Assessment: Impaired and Vision Corrective Device: wears glasses for distance  Hearing:   hard of hearing, no hearing aid  Perception:   Initiation: cues to initiate tasks  Motor Planning: cues for sequencing   Posture:   Rigid, posterior lean, one LOB with Richard to correct   Proprioception:    diminished proprioception in (B) LE  Tone:   Normotonic, stiff, no horizontal rotation  Coordination Testing:   Coordination and Movement Description: (R) UE, (L) UE, tremors, decreased speed, decreased accuracy        Subjective    General: pt supine with daughter present, agreeable to OT session. Denies pain, pt needing to use the restroom  Pain: 0/10  Pain Interventions: not applicable          Activities of Daily Living    Basic Activities of Daily Living  Grooming: contact guard assistance  Dressing Equipment: none  Dressing Comments: donning/doffing brief  Toileting: dependent. Toileting Equipment: none  Toileting Comments: total assist for kasie care. Pt incontinent of stood in a brief, brief changed and new brief donned, kasie care in standing with CGA, Vcs for hand placement, grab bar used  Instrumental Activities of Daily Living  No IADL completed on this date.    Functional Mobility    Bed Mobility  Supine to Sit: stand by assistance  Scooting: stand by assistance  Comments: increased time  Transfers  Sit to stand transfer:minimal assistance  Stand to sit transfer: moderate assistance  Toilet transfer: minimal assistance, moderate assistance  Toilet transfer equipment: raised toilet seat with rails, grab bars  Toilet transfer comments: pt ambulating into bathroom, freezing during mobility, required Richard with tactile and Vcs to continue ambulating. Seated to urinate and have loose BM    Functional Mobility:  Sitting Balance: contact guard assistance. Standing Balance: minimal assistance. Functional Mobility: .  minimal assistance  Functional Mobility Activity: to/from bathroom, in hallway   Functional Mobility Device Use: rolling walker  Functional Mobility Comment: VC and tactile cues, Richard for RW management, small, shuffling steps with mobility, one seated rest break. Secondary to being assist of 1, OT leaving pt with PT in hallway   Other Therapeutic Interventions      Functional Outcomes  AM-PAC Inpatient Daily Activity Raw Score: 15      Cognition  Overall Cognitive Status: Impaired  Arousal/Alterness: delayed responses to stimuli  Following Commands: follows one step commands with increased time  Safety Judgement: decreased awareness of need for assistance, decreased awareness of need for safety  Problem Solving: decreased awareness of errors  Insights: decreased awareness of deficits  Initiation: requires cues for some  Sequencing: requires cues for some  Increased time for all tasks, at times freezing between activities, needed Vcs and Tcs to continue.  Increased time for ax  Orientation:    alert and oriented x 4  Command Following:   accurately follows one step commands     Education    Barriers To Learning: cognition, motor planning  Patient Education: patient educated on goals, OT role and benefits, plan of care, energy conservation, family education, transfer training, discharge recommendations, pattern of coordination deficits  Learning Assessment:  patient will require reinforcement due to cognitive deficits    Assessment    Activity Tolerance: tolerated well  Impairments Requiring Therapeutic Intervention: decreased functional mobility, decreased ADL status, decreased safety awareness, decreased cognition, decreased endurance, decreased sensation, decreased balance, decreased IADL, decreased fine motor control, decreased coordination  Prognosis: good  Clinical Assessment: pt not at baseline level of functioning, presents with posterior lean, motor planning and coordination difficulty. Pt no longer requires cotx and is making progress with skilled OT services. Pt would benefit from ongoing skilled OT services in order to maximize independence    Safety Interventions: no longer a cotx.  Left with Physical therapist in hallway      Plan    Frequency: 5-7 x/week  Current Treatment Recommendations: balance training, functional mobility training, transfer training, gait training, neuromuscular re-education, patient/caregiver education, and ADL/self-care training    Goals    Patient Goals: return home     Short Term Goals:  Time Frame: d/c  Patient will complete upper body ADL at supervision   Patient will complete lower body ADL at stand by assistance  Patient will complete toileting at stand by assistance   Patient will complete functional transfers at stand by assistance   Patient will complete functional mobility at contact guard assistance   Patient will complete bed mobility at supervision     No goals met 8/18       Therapy Session Time     Individual Group Co-treatment   Time In   1535   Time Out   1558   Minutes   23        Timed Code Treatment Minutes:  23 minutes    Total Treatment Minutes:  23 minutes    Electronically Signed By: SARWAT Bowie/AUREA [id number]    Pt in Observation Status, bills shared with PT

## 2022-08-19 LAB
ANION GAP SERPL CALCULATED.3IONS-SCNC: 11 MMOL/L (ref 3–16)
BUN BLDV-MCNC: 26 MG/DL (ref 7–20)
CALCIUM SERPL-MCNC: 9 MG/DL (ref 8.3–10.6)
CHLORIDE BLD-SCNC: 97 MMOL/L (ref 99–110)
CO2: 24 MMOL/L (ref 21–32)
CREAT SERPL-MCNC: 1.4 MG/DL (ref 0.8–1.3)
GFR AFRICAN AMERICAN: 58
GFR NON-AFRICAN AMERICAN: 48
GLUCOSE BLD-MCNC: 155 MG/DL (ref 70–99)
HCT VFR BLD CALC: 30.2 % (ref 40.5–52.5)
HEMOGLOBIN: 10.4 G/DL (ref 13.5–17.5)
MCH RBC QN AUTO: 32.7 PG (ref 26–34)
MCHC RBC AUTO-ENTMCNC: 34.4 G/DL (ref 31–36)
MCV RBC AUTO: 95 FL (ref 80–100)
PDW BLD-RTO: 14.6 % (ref 12.4–15.4)
PLATELET # BLD: 244 K/UL (ref 135–450)
PMV BLD AUTO: 8.4 FL (ref 5–10.5)
POTASSIUM SERPL-SCNC: 4 MMOL/L (ref 3.5–5.1)
RBC # BLD: 3.18 M/UL (ref 4.2–5.9)
SODIUM BLD-SCNC: 132 MMOL/L (ref 136–145)
WBC # BLD: 3.8 K/UL (ref 4–11)

## 2022-08-19 PROCEDURE — 92526 ORAL FUNCTION THERAPY: CPT

## 2022-08-19 PROCEDURE — G0378 HOSPITAL OBSERVATION PER HR: HCPCS

## 2022-08-19 PROCEDURE — 80048 BASIC METABOLIC PNL TOTAL CA: CPT

## 2022-08-19 PROCEDURE — 85027 COMPLETE CBC AUTOMATED: CPT

## 2022-08-19 PROCEDURE — 97530 THERAPEUTIC ACTIVITIES: CPT

## 2022-08-19 PROCEDURE — 96372 THER/PROPH/DIAG INJ SC/IM: CPT

## 2022-08-19 PROCEDURE — 6360000002 HC RX W HCPCS: Performed by: INTERNAL MEDICINE

## 2022-08-19 PROCEDURE — 36415 COLL VENOUS BLD VENIPUNCTURE: CPT

## 2022-08-19 PROCEDURE — 97112 NEUROMUSCULAR REEDUCATION: CPT

## 2022-08-19 PROCEDURE — 6370000000 HC RX 637 (ALT 250 FOR IP): Performed by: INTERNAL MEDICINE

## 2022-08-19 RX ADMIN — ENOXAPARIN SODIUM 30 MG: 100 INJECTION SUBCUTANEOUS at 08:04

## 2022-08-19 RX ADMIN — ESCITALOPRAM OXALATE 20 MG: 10 TABLET ORAL at 08:05

## 2022-08-19 RX ADMIN — ATORVASTATIN CALCIUM 20 MG: 20 TABLET, FILM COATED ORAL at 08:05

## 2022-08-19 RX ADMIN — PANTOPRAZOLE SODIUM 40 MG: 40 TABLET, DELAYED RELEASE ORAL at 06:09

## 2022-08-19 RX ADMIN — CLOPIDOGREL BISULFATE 75 MG: 75 TABLET ORAL at 08:05

## 2022-08-19 RX ADMIN — LEVOTHYROXINE SODIUM 125 MCG: 0.12 TABLET ORAL at 06:09

## 2022-08-19 RX ADMIN — ASPIRIN 81 MG: 81 TABLET, COATED ORAL at 08:05

## 2022-08-19 RX ADMIN — TAMSULOSIN HYDROCHLORIDE 0.4 MG: 0.4 CAPSULE ORAL at 08:05

## 2022-08-19 RX ADMIN — MONTELUKAST SODIUM 10 MG: 10 TABLET, FILM COATED ORAL at 20:15

## 2022-08-19 ASSESSMENT — PAIN SCALES - GENERAL
PAINLEVEL_OUTOF10: 0

## 2022-08-19 NOTE — PROGRESS NOTES
patient's AM-PAC score and their current ADL deficits, it is recommended that the patient have 5-7 sessions per week of Occupational Therapy at d/c to increase the patient's independence. At this time, this patient demonstrates complex nursing, medical, and rehabilitative needs, and would benefit from intensive rehabilitation services upon discharge from the Inpatient setting. This patient demonstrates the ability to participate in and benefit from an intensive therapy program with a coordinated interdisciplinary team approach to foster frequent, structured, and documented communication among disciplines, who will work together to establish, prioritize, and achieve treatment goals. Please see assessment section for further patient specific details. If pt refuses above recommendation, recommend 24-hour assist and HHOT S3. If patient discharges prior to next session this note will serve as a discharge summary. Please see below for the latest assessment towards goals. DME Required For Discharge: DME to be determined at next level of care    Precautions/Restrictions: high fall risk, regular diet  Weight Bearing Restrictions: no restrictions  [] Right Upper Extremity  [] Left Upper Extremity [] Right Lower Extremity  [] Left Lower Extremity     Required Braces/Orthotics: no braces required   [] Right  [] Left  Positional Restrictions:no positional restrictions      Pre-Admission Information     Lives With: daughter (works from home), other family close by   Type of Home: apartment, .   Comment: senior living  -24 hr assist from family available  Home Layout: one level  Home Access: elevator  Bathroom Layout: walker accessible, wheelchair accessible, walk in shower, handicap height toilet  Bathroom Equipment: grab bars in shower, grab bars around toilet, shower chair, 3-in-1 commode  Home Equipment: rolling walker, single point cane, manual wheelchair  Transfer Assistance: requires assistance intermittently; require reinforcement due to cognitive deficits    Assessment    Activity Tolerance: tolerated well  Impairments Requiring Therapeutic Intervention: decreased functional mobility, decreased ADL status, decreased safety awareness, decreased cognition, decreased endurance, decreased sensation, decreased balance, decreased IADL, decreased fine motor control, decreased coordination  Prognosis: good  Clinical Assessment: pt not at baseline level of functioning, presents with posterior lean, motor planning and coordination difficulty. Pt ability level waxing and waning depending on day and time of day. Evident neurological deficits significantly impacting independence with all occupational function and ability to care for himself. Pt would benefit from ongoing skilled OT services in order to maximize independence    Safety Interventions: Left in bed; bed alarm, nurse notified. Family in room at time of departure.       Plan    Frequency: 5-7 x/week  Current Treatment Recommendations: balance training, functional mobility training, transfer training, gait training, neuromuscular re-education, patient/caregiver education, and ADL/self-care training    Goals    Patient Goals: return home     Short Term Goals:  Time Frame: d/c  Patient will complete upper body ADL at supervision   Patient will complete lower body ADL at stand by assistance  Patient will complete toileting at stand by assistance   Patient will complete functional transfers at stand by assistance   Patient will complete functional mobility at contact guard assistance   Patient will complete bed mobility at supervision     No goals met 8/18, 8/19       Therapy Session Time     Individual Group Co-treatment   Time In   1439   Time Out   1524   Minutes   45        Timed Code Treatment Minutes:  45 minutes    Total Treatment Minutes:  45 minutes    Electronically Signed By: Jessica FAM/L  [id number]      Pt in Observation Status, bills shared with PT

## 2022-08-19 NOTE — PROGRESS NOTES
Speech Language Pathology  Facility/Department: [address] ONCOLOGY  Speech Language Pathology   Dysphagia Treatment Note    Patient: Yara Hill   : 1935   MRN: [id number]      Evaluation Date: 2022      Admitting Dx: General weakness [R53.1]  Pedal edema [R60.0]  Inability to perform activities of daily living [Z78.9]  Peripheral polyneuropathy [G62.9]  Unable to care for self [Z78.9]  Treatment Diagnosis: Oropharyngeal Dysphagia   Pain: Denies                                              Diet and Treatment Recommendations 2022:  Diet Solids Recommendation:  Regular texture diet  Liquid Consistency Recommendation: Thin liquids  Recommended form of Meds:   Meds as tolerated in water, may crush larger pills in puree         Compensatory strategies: Alternate solids/liquids , No straws , External Pacing , Small bites/sips , Eat/feed slowly, Remain upright 30-45 min     Assessment of Texture Tolerance:  Diet level prior to treatment: Regular texture diet , Thin liquids   Tolerance of Current Diet Level:RN reported pt appears to be tolerating current diet level     Impressions: Pt was positioned Upright in bed , awake and alert with his son present. Currently on room air. Patient was alert and oriented to self, month, day, year and tentative discharge plans. He was interactive with this therapist.  His voice was moderately hoarse with reduced articulation precision. He and his son state this has been consistent for many months to a year. Trials of thin liquid via straw and regular solid (peach) were provided to assess swallow function. Pt demonstrated improved use of appropriate bite sizes with use of hand held food, although was slightly impulsive with drink sizes via straw. Despite large successive drinks, he appeared to tolerate both textures without overt s/s of aspiration.    However, Pt demonstrates increased risk for aspiration due to decreased follow through with compensatory swallowing strategies, cognitive state , deconditioning , and prior hx of dysphagia . Based on today's assessment recommend Regular texture diet  with Thin liquids , Whole with water. Dysphagia Goals:   Pt will functionally tolerate recommended diet with no overt clinical s/s of aspiration (Ongoing 08/19/22)  Pt will demonstrate understanding of aspiration risk and precautions via education/demonstration with occasional prompting (Ongoing 08/19/22)  If clinical s/s of aspiration/penetration continue to be noted, Pt will participate in Modified Barium Swallow Study (Ongoing 08/19/22)    Plan:   3-5 times per week during acute care stay. Patient/Family Education:  Provided education regarding role of SLP, recommendations and general speech pathology plan of care. [x] Pt verbalized understanding and agreement   [x] Pt requires ongoing learning   [] No evidence of comprehension     Discharge Recommendations:    Recommend further SLP evaluation/treatment upon discharge for dysarthria. Timed Code Treatment: 0 minutes    Total Treatment Time: 14 minutes    If patient discharges prior to next session this note will serve as a discharge summary. Signature:   Yohan Spencer M.A.  CCC-SLP OCRI [id number]  Speech-Language Pathologist   8/19/2022 10:37 AM

## 2022-08-19 NOTE — CARE COORDINATION
Discharge Planning Note:    P2P approved, [facility] at [facility] updated via . CM attempted to locate transportation this evening without luck. Pt is scheduled to transfer to [facility] tomorrow at 1:15pm via [facility] will call this CM if an earlier time comes available. CM spoke to son Constantino Oscar with update. Constantino Oscar in agreement with plan and asked that I also notify sister Jefferson Melo. CM updated Jeffersno Pitcher and pt bedside. Pt appreciative and in agreement with plan.      Electronically signed by Joaquina Cooney RN on 8/19/2022 at 4:00 PM

## 2022-08-19 NOTE — CARE COORDINATION
[employer] Pre-Cert Update:    CM/SW was notified that Sofia Almonte reported intent to deny and is offering a peer to peer. MD to call [phone]   Option 4  Beginning now until 4:30pm today    Reason (if provided): Feel pt can be served at a lower level of care, Am-PAC 15/17    Reference # [id number]    MD to provide patient name,  and member ID #.

## 2022-08-19 NOTE — PROGRESS NOTES
[facility]   Phone: [phone]    Physical Therapy    [] Initial Evaluation            [x] Daily Treatment Note         [] Discharge Summary      Patient: Carey Greenberg   : 1935   MRN: [id number]   Date of Service:  2022  Admitting Diagnosis: Unable to care for self  Current Admission Summary: The pt was admitted for weakness, SOB and difficulty with ambulation. Past Medical History:  has a past medical history of CAD (coronary artery disease). Past Surgical History:  has a past surgical history that includes Coronary artery bypass graft and Cardiac surgery. Discharge Recommendations: Carey Greenberg scored a 9/24 on the AM-PAC short mobility form. Current research shows that an AM-PAC score of 17 or less is typically not associated with a discharge to the patient's home setting. Based on the patient's AM-PAC score and their current functional mobility deficits, it is recommended that the patient have 5-7 sessions per week of Physical Therapy at d/c to increase the patient's independence. At this time, this patient demonstrates complex nursing, medical, and rehabilitative needs, and would benefit from intensive rehabilitation services upon discharge from the Inpatient setting. This patient demonstrates the ability to participate in and benefit from an intensive therapy program with a coordinated interdisciplinary team approach to foster frequent, structured, and documented communication among disciplines, who will work together to establish, prioritize, and achieve treatment goals. Please see assessment section for further patient specific details. If patient discharges prior to next session this note will serve as a discharge summary. Please see below for the latest assessment towards goals.       DME Required For Discharge: patient has all required DME for discharge  Precautions/Restrictions: high fall risk  Weight Bearing attempt  Stand to sit transfer: maximum assistance  Stand step transfer: . Comment Mod Ax2  Comments: Pt with significant posterior lean noted this date throughout transfers and scooting tasks. Pt requiring max VC/TC for anterior lean. Significantly increased time required to perform stand step recliner>EOB with Mod Ax2 to maintain balance during task. Ambulation  Ambulation not tested on this date. Distance: n/a  Gait Mechanics: n/a  Comments: Deferred due to safety concerns during transfers. Stair Mobility  Stair mobility not completed on this date. Comments:  Wheelchair Mobility:  No w/c mobility completed on this date. Comments:  Balance  Static Sitting Balance: fair (+): maintains balance at SBA/supervision without use of UE support  Dynamic Sitting Balance: fair (+): maintains balance at SBA/supervision without use of UE support  Static Standing Balance: poor (-): requires max (A) to maintain balance  Dynamic Standing Balance: poor (-): requires max (A) to maintain balance  Comments: Pt stood at RW ~1-2 minutes total 2x with Max Ax2 to maintain standing balance due to significant posterior lean. Max VC and target provided for anterior weight shift however pt with limited carryover and required increased time to achieve upright posture. Other Therapeutic Interventions  Pt performed 3x10 and 1x20 seated marches in recliner with VC for to perform with increased amplitude. Attempted side stepping marches however pt with limited carryover noted. Pt performed 2x10 anterior push/pulls with tray table to facilitate anterior weight shifting and posterior chain activation during transfers. Pt performed 3x5 push/pull tasks with BUE handhold on object with max VC for anterior weight shifting and activation of posterior chain to facilitate anterior trunk lean and glute activation during transfers.      Functional Outcomes  AM-PAC Inpatient Mobility Raw Score : 9              Cognition  Safety Judgement: decreased awareness of need for safety  Problem Solving: assistance required to implement solutions, decreased awareness of errors  Insights: not aware of deficits   Orientation:    alert and oriented x 4  Command Following:   accurately follows one step commands -requires commands to be repeated     Education  Barriers To Learning: cognition  Patient Education: patient educated on goals, PT role and benefits, general safety, discharge recommendations  Learning Assessment:  patient verbalizes understanding, would benefit from continued reinforcement    Assessment  Activity Tolerance: pt limited by cognitive deficits and ability to problem solve. Impairments Requiring Therapeutic Intervention: decreased functional mobility, decreased strength, decreased safety awareness, decreased cognition, decreased endurance, decreased sensation, decreased balance, decreased coordination, decreased posture  Prognosis: good  Clinical Assessment: The patient continues to  present with parkinsonian-like symptoms of festinating gait, masked faces, dysphasia, tremors, poor balance, rigidity and freezing episodes. Pt with significantly decreased mobility this date. Pt with significant posterior lean during transfers and requiring 2 person assist for transfers and stand step transfer this date. Mobility decreased to a point that gait was deferred due to safety concerns. The pt has 24 hour assistance at home but needs skilled PT to address these impairments. Pt would benefit from continued skilled PT to address gait mechanics and decrease fall risk.    Safety Interventions: patient left in bed, bed alarm in place, call light within reach, gait belt, patient at risk for falls, and nurse notified    Plan  Frequency: 5-7 x/week  Current Treatment Recommendations: strengthening, ROM, balance training, functional mobility training, transfer training, gait training, endurance training, neuromuscular re-education, and safety education    Goals  Patient Goals: return to daughter's home   Short Term Goals:  Time Frame: To be met prior to DC  Patient will complete bed mobility at contact guard assistance--MET 8/17/22   Patient will complete transfers at contact guard assistance   Patient will ambulate 100 ft with use of rolling walker at contact guard assistance  Patient to maintain standing at contact guard assistance for 3 minutes. Pt will perform bed mobility MOD I  NO GOALS MET THIS DATE     Therapy Session Time      Individual Group Co-treatment   Time In     2507   Time Out     1523   Minutes     45     Timed Code Treatment Minutes:   23  Total Treatment Minutes:  45   Time spent with pt shared with OT as pt is under observation status. As such, pt is being billed 2 units for evaluation.      Electronically Signed By: Juany Love, [address], [address] [facility]

## 2022-08-20 VITALS
WEIGHT: 210 LBS | TEMPERATURE: 97.4 F | OXYGEN SATURATION: 97 % | SYSTOLIC BLOOD PRESSURE: 156 MMHG | RESPIRATION RATE: 16 BRPM | DIASTOLIC BLOOD PRESSURE: 77 MMHG | HEIGHT: 72 IN | HEART RATE: 57 BPM | BODY MASS INDEX: 28.44 KG/M2

## 2022-08-20 PROCEDURE — 6370000000 HC RX 637 (ALT 250 FOR IP): Performed by: NURSE PRACTITIONER

## 2022-08-20 PROCEDURE — 6370000000 HC RX 637 (ALT 250 FOR IP): Performed by: INTERNAL MEDICINE

## 2022-08-20 PROCEDURE — 6360000002 HC RX W HCPCS: Performed by: INTERNAL MEDICINE

## 2022-08-20 PROCEDURE — G0378 HOSPITAL OBSERVATION PER HR: HCPCS

## 2022-08-20 PROCEDURE — 96372 THER/PROPH/DIAG INJ SC/IM: CPT

## 2022-08-20 RX ORDER — DIPHENHYDRAMINE HCL 25 MG
25 TABLET ORAL
Status: COMPLETED | OUTPATIENT
Start: 2022-08-20 | End: 2022-08-20

## 2022-08-20 RX ADMIN — PANTOPRAZOLE SODIUM 40 MG: 40 TABLET, DELAYED RELEASE ORAL at 06:19

## 2022-08-20 RX ADMIN — ASPIRIN 81 MG: 81 TABLET, COATED ORAL at 10:24

## 2022-08-20 RX ADMIN — ATORVASTATIN CALCIUM 20 MG: 20 TABLET, FILM COATED ORAL at 10:24

## 2022-08-20 RX ADMIN — DIPHENHYDRAMINE HYDROCHLORIDE 25 MG: 25 TABLET ORAL at 00:48

## 2022-08-20 RX ADMIN — ENOXAPARIN SODIUM 30 MG: 100 INJECTION SUBCUTANEOUS at 10:24

## 2022-08-20 RX ADMIN — TAMSULOSIN HYDROCHLORIDE 0.4 MG: 0.4 CAPSULE ORAL at 10:24

## 2022-08-20 RX ADMIN — ESCITALOPRAM OXALATE 20 MG: 10 TABLET ORAL at 10:24

## 2022-08-20 RX ADMIN — LEVOTHYROXINE SODIUM 125 MCG: 0.12 TABLET ORAL at 06:19

## 2022-08-20 RX ADMIN — CLOPIDOGREL BISULFATE 75 MG: 75 TABLET ORAL at 10:24

## 2022-08-20 ASSESSMENT — PAIN SCALES - GENERAL: PAINLEVEL_OUTOF10: 0

## 2022-08-20 NOTE — PROGRESS NOTES
Department of Internal Medicine  General Internal Medicine   Progress Note      SUBJECTIVE: no unusual complaints sitting up in chair and remains comfortable     History obtained from chart review and the patient  General ROS: positive for  - fatigue, malaise and weight loss  negative for - chills, fever or night sweats  Psychological ROS: negative  Ophthalmic ROS: negative  Respiratory ROS: no cough, shortness of breath, or wheezing  Cardiovascular ROS: no chest pain or dyspnea on exertion  Gastrointestinal ROS: no abdominal pain, change in bowel habits, or black or bloody stools  Genito-Urinary ROS: no dysuria, trouble voiding, or hematuria  Musculoskeletal ROS: chronic pain   Neurological ROS: no TIA or stroke symptoms    OBJECTIVE      Medications      Current Facility-Administered Medications: enoxaparin Sodium (LOVENOX) injection 30 mg, 30 mg, SubCUTAneous, Daily  albuterol sulfate HFA (PROVENTIL;VENTOLIN;PROAIR) 108 (90 Base) MCG/ACT inhaler 2 puff, 2 puff, Inhalation, Q6H PRN  aspirin EC tablet 81 mg, 81 mg, Oral, Daily  clopidogrel (PLAVIX) tablet 75 mg, 75 mg, Oral, Daily  escitalopram (LEXAPRO) tablet 20 mg, 20 mg, Oral, Daily  levothyroxine (SYNTHROID) tablet 125 mcg, 125 mcg, Oral, Daily  montelukast (SINGULAIR) tablet 10 mg, 10 mg, Oral, Nightly  pantoprazole (PROTONIX) tablet 40 mg, 40 mg, Oral, QAM AC  tamsulosin (FLOMAX) capsule 0.4 mg, 0.4 mg, Oral, Daily  atorvastatin (LIPITOR) tablet 20 mg, 20 mg, Oral, Daily    Physical      Vitals: BP (!) 156/77   Pulse 57   Temp 97.4 °F (36.3 °C)   Resp 16   Ht 6' (1.829 m)   Wt 210 lb (95.3 kg)   SpO2 97%   BMI 28.48 kg/m²   Temp: Temp: 97.4 °F (36.3 °C)  Max: Temp  Av.8 °F (36.6 °C)  Min: 97.4 °F (36.3 °C)  Max: 98.7 °F (37.1 °C)  Respiration range:  Resp  Av.5  Min: 16  Max: 19  Pulse Range:  Pulse  Av.3  Min: 53  Max: 67  Blood pressure range:  Systolic (35XXR), DYJ:308 , Min:128 , CNZ:424   , Diastolic (55RUR), BSI:18, Min:67, Max:77    SpO2  Av %  Min: 96 %  Max: 99 %    Intake/Output Summary (Last 24 hours) at 2022 1411  Last data filed at 2022 0619  Gross per 24 hour   Intake --   Output 600 ml   Net -600 ml         Vent settings:  Pulse  Av.5  Min: 53  Max: 76  Resp  Av.2  Min: 11  Max: 22  SpO2  Av.6 %  Min: 95 %  Max: 99 %    CONSTITUTIONAL:  awake, alert, cooperative, no apparent distress, and appears stated age  EYES:  Unremarkable   NECK:  Mild JVD  and supple, symmetrical, trachea midline  BACK:  symmetric  LUNGS:  No increased work of breathing, good air exchange, clear to auscultation bilaterally, no crackles or wheezing  CARDIOVASCULAR:  normal apical pulses, regular rate and rhythm, normal S1 and S2 and no S3  ABDOMEN:  Soft BS + non tender   MUSCULOSKELETAL:  No distal edema   NEUROLOGIC:  groslly intact   SKIN:  Warm and dry  and no bruising or bleeding    Data      Recent Results (from the past 96 hour(s))   CBC    Collection Time: 22 10:50 AM   Result Value Ref Range    WBC 3.8 (L) 4.0 - 11.0 K/uL    RBC 3.18 (L) 4.20 - 5.90 M/uL    Hemoglobin 10.4 (L) 13.5 - 17.5 g/dL    Hematocrit 30.2 (L) 40.5 - 52.5 %    MCV 95.0 80.0 - 100.0 fL    MCH 32.7 26.0 - 34.0 pg    MCHC 34.4 31.0 - 36.0 g/dL    RDW 14.6 12.4 - 15.4 %    Platelets 591 167 - 196 K/uL    MPV 8.4 5.0 - 10.5 fL   Basic Metabolic Panel    Collection Time: 22 10:50 AM   Result Value Ref Range    Sodium 132 (L) 136 - 145 mmol/L    Potassium 4.0 3.5 - 5.1 mmol/L    Chloride 97 (L) 99 - 110 mmol/L    CO2 24 21 - 32 mmol/L    Anion Gap 11 3 - 16    Glucose 155 (H) 70 - 99 mg/dL    BUN 26 (H) 7 - 20 mg/dL    Creatinine 1.4 (H) 0.8 - 1.3 mg/dL    GFR Non- 48 (A) >60    GFR  58 (A) >60    Calcium 9.0 8.3 - 10.6 mg/dL       ASSESSMENT AND PLAN   Patient Active Problem List   Diagnosis    CAD (coronary artery disease)    Bilateral carotid artery stenosis    Stage 2 chronic kidney disease    HTN (hypertension), benign    Dyslipidemia    Dementia due to Alzheimer's disease ([facility] [address]    Unable to care for self    Unable to ambulate      Leg edema resolved    No orthopnea    ECHO in April 22 was recent enough and within normal range no need to repeat    Awaiting Placement

## 2022-08-20 NOTE — PROGRESS NOTES
Department of Internal Medicine  General Internal Medicine   Progress Note      SUBJECTIVE: breathing easier denies angina appetite Fair     History obtained from chart review and the patient  General ROS: positive for  - fatigue, malaise and weight loss  negative for - chills, fever or night sweats  Psychological ROS: negative  Ophthalmic ROS: negative  Respiratory ROS: no cough, shortness of breath, or wheezing  Cardiovascular ROS: no chest pain or dyspnea on exertion  Gastrointestinal ROS: no abdominal pain, change in bowel habits, or black or bloody stools  Genito-Urinary ROS: no dysuria, trouble voiding, or hematuria  Musculoskeletal ROS: chronic pain   Neurological ROS: no TIA or stroke symptoms    OBJECTIVE      Medications      Current Facility-Administered Medications: enoxaparin Sodium (LOVENOX) injection 30 mg, 30 mg, SubCUTAneous, Daily  albuterol sulfate HFA (PROVENTIL;VENTOLIN;PROAIR) 108 (90 Base) MCG/ACT inhaler 2 puff, 2 puff, Inhalation, Q6H PRN  aspirin EC tablet 81 mg, 81 mg, Oral, Daily  clopidogrel (PLAVIX) tablet 75 mg, 75 mg, Oral, Daily  escitalopram (LEXAPRO) tablet 20 mg, 20 mg, Oral, Daily  levothyroxine (SYNTHROID) tablet 125 mcg, 125 mcg, Oral, Daily  montelukast (SINGULAIR) tablet 10 mg, 10 mg, Oral, Nightly  pantoprazole (PROTONIX) tablet 40 mg, 40 mg, Oral, QAM AC  tamsulosin (FLOMAX) capsule 0.4 mg, 0.4 mg, Oral, Daily  atorvastatin (LIPITOR) tablet 20 mg, 20 mg, Oral, Daily    Physical      Vitals: BP (!) 156/77   Pulse 57   Temp 97.4 °F (36.3 °C)   Resp 16   Ht 6' (1.829 m)   Wt 210 lb (95.3 kg)   SpO2 97%   BMI 28.48 kg/m²   Temp: Temp: 97.4 °F (36.3 °C)  Max: Temp  Av.8 °F (36.6 °C)  Min: 97.4 °F (36.3 °C)  Max: 98.7 °F (37.1 °C)  Respiration range:  Resp  Av.5  Min: 16  Max: 19  Pulse Range:  Pulse  Av.3  Min: 53  Max: 67  Blood pressure range:  Systolic (20NRU), DSI:892 , Min:128 , CVJ:436   , Diastolic (84RGR), FZR:20, Min:67, Max:77    SpO2  Av % Dyslipidemia    Dementia due to Alzheimer's disease ([facility] [address]    Unable to care for self    Unable to ambulate      Awaiting a Pre cert for a SNF    Dismissal ready

## 2022-08-20 NOTE — DISCHARGE INSTR - COC
Continuity of Care Form    Patient Name: Brandy Flaherty   :  1935  MRN:  [id number]    Admit date:  8/15/2022  Discharge date:  2022    Code Status Order: Prior   Advance Directives:     Admitting Physician:  Quinn Moody MD  PCP: Claudia Morales MD    Discharging Nurse: Via [facility] Lees Unit/Room#: 8EF-6855/0062-19  Discharging Unit Phone Number:  [phone]    Emergency Contact:   Extended Emergency Contact Information  Primary Emergency Contact: [facility] DR JUDITH QUIROGA)  Home Phone: [phone]  Relation: Child  Secondary Emergency Contact: [employer] [place] Phone: [phone]  Relation: Child    Past Surgical History:  Past Surgical History:   Procedure Laterality Date    CARDIAC SURGERY          CORONARY ARTERY BYPASS GRAFT         Immunization History:   Immunization History   Administered Date(s) Administered    COVID-19, PFIZER PURPLE top, DILUTE for use, (age 15 y+), 30mcg/0.3mL 2021, 2021, 2022    Influenza Virus Vaccine 10/18/2012    Pneumococcal Conjugate 7-valent (Huson Wanda) 2002       Active Problems:  Patient Active Problem List   Diagnosis Code    CAD (coronary artery disease) I25.10    Bilateral carotid artery stenosis I65.23    Stage 2 chronic kidney disease N18.2    HTN (hypertension), benign I10    Dyslipidemia E78.5    Dementia due to Alzheimer's disease ([facility] G30.9, F02.80    Unable to care for self Z78.9    Unable to ambulate R26.2       Isolation/Infection:   Isolation            No Isolation          Patient Infection Status       Infection Onset Added Last Indicated Last Indicated By Review Planned Expiration Resolved Resolved By    None active    Resolved    COVID-19 (Rule Out) 08/15/22 08/15/22 08/15/22 COVID-19, Rapid (Ordered)   08/15/22 Rule-Out Test Resulted            Nurse Assessment:  Last Vital Signs: BP (!) 142/77   Pulse 67   Temp 98 °F (36.7 °C) (Oral)   Resp 18   Ht 6' (1.829 m)   Wt 210 lb (95.3 kg)   SpO2 97%   BMI 28.48 kg/m²     Last documented pain score (0-10 scale): Pain Level: 0  Last Weight:   Wt Readings from Last 1 Encounters:   08/15/22 210 lb (95.3 kg)     Mental Status:  disoriented and alert sundowners     IV Access:  - None    Nursing Mobility/ADLs:  Walking   Assisted  Transfer  Assisted  Bathing  Assisted  Dressing  Assisted  Toileting  Assisted  Feeding  Assisted  Med Admin  Assisted  Med Delivery   whole    Wound Care Documentation and Therapy:        Elimination:  Continence: Bowel: Yes  Bladder: Yes  Urinary Catheter: None   Colostomy/Ileostomy/Ileal Conduit: No       Date of Last BM: 8/20/22      Intake/Output Summary (Last 24 hours) at 8/20/2022 1019  Last data filed at 8/20/2022 2552  Gross per 24 hour   Intake --   Output 600 ml   Net -600 ml     I/O last 3 completed shifts:  In: -   Out: 900 [Urine:900]    Safety Concerns:     Sundowners [place] and At Risk for Falls    Impairments/Disabilities:      None    Nutrition Therapy:  Current Nutrition Therapy:   - Oral Diet:  General    Routes of Feeding: Oral  Liquids: Thin Liquids  Daily Fluid Restriction: no  Last Modified Barium Swallow with Video (Video Swallowing Test): not done    Treatments at the Time of Hospital Discharge:   Respiratory Treatments:     Oxygen Therapy:  is not on home oxygen therapy.   Ventilator:    - No ventilator support    Rehab Therapies: Physical Therapy and Occupational Therapy  Weight Bearing Status/Restrictions: No weight bearing restrictions  Other Medical Equipment (for information only, NOT a DME order):  walker  Other Treatments:       Patient's personal belongings (please select all that are sent with patient):  None, Glasses, Dentures upper    RN SIGNATURE:  Electronically signed by Lucian Romberg, RN on 8/20/22 at 11:27 AM EDT    CASE MANAGEMENT/SOCIAL WORK SECTION    Inpatient Status Date: ***    Readmission Risk Assessment Score: Obsv  Readmission Risk              Risk of Unplanned Readmission:  0           Discharging to Facility/ [address]  [address] [place] [address], [place]   Phone: [phone]  Fax: [phone]      / signature: Electronically signed by Kraig Arias RN on 8/20/22 at 10:19 AM EDT    PHYSICIAN SECTION    Prognosis: Fair    Condition at Discharge: Stable    Rehab Potential (if transferring to Rehab): Fair    Recommended Labs or Other Treatments After Discharge: PT OT SNF    Physician Certification: I certify the above information and transfer of Yanna Notice  is necessary for the continuing treatment of the diagnosis listed and that he requires [facility] for less 30 days.      Update Admission H&P: No change in H&P    PHYSICIAN SIGNATURE:  Electronically signed by Bianca Diaz MD on 8/20/22 at 2:32 PM EDT

## 2022-08-20 NOTE — PROGRESS NOTES
Patient in chair daughter at bedside. Shift assessment complete. No complaints of pain. Safety measure in place call light within reach. No or questions or concerns.

## 2022-08-20 NOTE — CARE COORDINATION
Discharge note:      CM/SW has been notified of discharge. Patient noted to have the following needs at discharge. CM/SW has coordinated the following services: Skilled rehab      Discharge Destination:   [facility] [place] [address]  Phone: [phone]  Fax: [phone]      Transportation: [facility]  ([phone])  Discharge Time: 1:15pm  AVS faxed and agency notified: Yes  The following prescriptions sent with patient: None  Nurse to call report to facility  Family advised of discharge and in agreement. Pt/Family  HENS completed. Yes          Comment:  CM notified Dr. Svetlana Hayes via PS of DC time and request for [address] completion. All CM/SW needs met, will sign off.     Electronically signed by Joya Savage RN on 8/20/2022 at 10:28 AM

## 2022-08-20 NOTE — PROGRESS NOTES
Pt in bed confused trying to get out of bed,25mg  Benadryl prn was given. Pt was wet and diaper was changed. Currently in bed with bed alarm on,and call light within reach

## 2022-08-20 NOTE — RT PROTOCOL NOTE
RT Inhaler-Nebulizer Bronchodilator Protocol Note    There is a bronchodilator order in the chart from a provider indicating to follow the RT Bronchodilator Protocol and there is an Initiate RT Inhaler-Nebulizer Bronchodilator Protocol order as well (see protocol at bottom of note). CXR Findings:  No results found. The findings from the last RT Protocol Assessment were as follows:   History Pulmonary Disease: None or smoker <15 pack years  Respiratory Pattern: Regular pattern and RR 12-20 bpm  Breath Sounds: Clear breath sounds  Cough: Strong, spontaneous, non-productive  Indication for Bronchodilator Therapy:    Bronchodilator Assessment Score: 0    Aerosolized bronchodilator medication orders have been revised according to the RT Inhaler-Nebulizer Bronchodilator Protocol below. Respiratory Therapist to perform RT Therapy Protocol Assessment initially then follow the protocol. Repeat RT Therapy Protocol Assessment PRN for score 0-3 or on second treatment, BID, and PRN for scores above 3. No Indications - adjust the frequency to every 6 hours PRN wheezing or bronchospasm, if no treatments needed after 48 hours then discontinue using Per Protocol order mode. If indication present, adjust the RT bronchodilator orders based on the Bronchodilator Assessment Score as indicated below. Use Inhaler orders unless patient has one or more of the following: on home nebulizer, not able to hold breath for 10 seconds, is not alert and oriented, cannot activate and use MDI correctly, or respiratory rate 25 breaths per minute or more, then use the equivalent nebulizer order(s) with same Frequency and PRN reasons based on the score. If a patient is on this medication at home then do not decrease Frequency below that used at home.     0-3 - enter or revise RT bronchodilator order(s) to equivalent RT Bronchodilator order with Frequency of every 4 hours PRN for wheezing or increased work of breathing using Per Protocol order mode. 4-6 - enter or revise RT Bronchodilator order(s) to two equivalent RT bronchodilator orders with one order with BID Frequency and one order with Frequency of every 4 hours PRN wheezing or increased work of breathing using Per Protocol order mode. 7-10 - enter or revise RT Bronchodilator order(s) to two equivalent RT bronchodilator orders with one order with TID Frequency and one order with Frequency of every 4 hours PRN wheezing or increased work of breathing using Per Protocol order mode. 11-13 - enter or revise RT Bronchodilator order(s) to one equivalent RT bronchodilator order with QID Frequency and an Albuterol order with Frequency of every 4 hours PRN wheezing or increased work of breathing using Per Protocol order mode. Greater than 13 - enter or revise RT Bronchodilator order(s) to one equivalent RT bronchodilator order with every 4 hours Frequency and an Albuterol order with Frequency of every 2 hours PRN wheezing or increased work of breathing using Per Protocol order mode. RT to enter RT Home Evaluation for COPD & MDI Assessment order using Per Protocol order mode.     Electronically signed by Andrés Mckay RCP on 8/19/2022 at 9:23 PM

## 2022-09-23 NOTE — DISCHARGE SUMMARY
[address]                     [facility], [facility]                               DISCHARGE SUMMARY    PATIENT NAME: Inga Bence                  :        1935  MED REC NO:   [id number]                          ROOM:       3109  ACCOUNT NO:   [de-identified]                           ADMIT DATE: 08/15/2022  PROVIDER:     Elizabet Swann MD                  DISCHARGE DATE:  2022    FINAL DIAGNOSES:  1. Unable to self-care. 2.  Unable to ambulate. 3.  Lower extremity edema. 4.  Hypertension. 5.  Atherosclerotic heart disease. DISCHARGE MEDICATIONS:  1. Lipitor 20 mg once a day. 2.  Flonase nasal spray once a day each nostril daily. 3.  Claritin 10 mg once a day. 4.  Ditropan XL 10 mg in the morning. 5.  Lasix 20 mg daily. 6.  Singulair 10 mg nightly. 7.  Lisinopril two tablets daily was discontinued. 8.  Protonix 40 mg daily. 9.  Flomax 0.4 mg daily. 10.  Norvasc 10 mg once a day. 11.  Proventil nebulizer aerosol two puffs every 4 hours as needed. 12.  Lexapro 20 mg once a day. 13.  Plavix 75 mg once a day. 14.  Levothyroxine 125 mcg once a day. HOSPITAL COURSE:  This elderly man came to the emergency room with  history of unable to self-care and unable to ambulate. The patient  lives with his daughter, brought in by his daughter. The patient has  bilateral lower extremity swelling. He does have exertional shortness  of breath. Vital signs are stable. Blood pressure was 110/56,  respirations 16, heart rate was 65, O2 sat 95% on room air. Sodium was  135, potassium 4.0, BUN 26, creatinine 1.4. Urinalysis negative for  UTI. Chest x-ray shows left pleural effusion with adjacent atelectasis. Transthoracic echocardiogram done just a couple of months ago showed  LVEF and diastolic function to be within normal limits. The patient was  discontinued on amlodipine.   The patient was gently treated with  diuretic, PT and OT, and speech evaluation. The patient needs a skilled  nursing facility placement since the daughter cannot take care of him  anymore. Medical social worker was also put to work and they are  looking for different facilities that _____ daughter. The patient  denied any dizziness or loss of consciousness. The patient was  dismissal appropriate. His AMPAC score was 15/24. After getting the  brief search for a nursing home and four days of hustle, states any  attempts made necessary arrangements for the patient to go to [place]. The patient was discharged in stable condition by Chava Viera.         Marissa Damian MD    D: 09/22/2022 13:22:56       T: 09/22/2022 21:58:13     [id number]  Job#: [id number]     Doc#: [id number]    CC:

## 2022-10-07 ENCOUNTER — APPOINTMENT (OUTPATIENT)
Dept: CT IMAGING | Age: 87
DRG: 065 | End: 2022-10-07
Payer: MEDICARE

## 2022-10-07 ENCOUNTER — APPOINTMENT (OUTPATIENT)
Dept: GENERAL RADIOLOGY | Age: 87
DRG: 065 | End: 2022-10-07
Payer: MEDICARE

## 2022-10-07 ENCOUNTER — HOSPITAL ENCOUNTER (INPATIENT)
Age: 87
LOS: 4 days | Discharge: NURSING, REHAB OR LONG-TERM CARE | DRG: 065 | End: 2022-10-12
Attending: EMERGENCY MEDICINE | Admitting: INTERNAL MEDICINE
Payer: MEDICARE

## 2022-10-07 DIAGNOSIS — G45.9 TIA (TRANSIENT ISCHEMIC ATTACK): Primary | ICD-10-CM

## 2022-10-07 DIAGNOSIS — R53.83 OTHER FATIGUE: ICD-10-CM

## 2022-10-07 PROBLEM — R47.81 SLURRED SPEECH: Status: ACTIVE | Noted: 2022-10-07

## 2022-10-07 LAB
A/G RATIO: 1.4 (ref 1.1–2.2)
ALBUMIN SERPL-MCNC: 3.9 G/DL (ref 3.4–5)
ALP BLD-CCNC: 87 U/L (ref 40–129)
ALT SERPL-CCNC: 7 U/L (ref 10–40)
ANION GAP SERPL CALCULATED.3IONS-SCNC: 10 MMOL/L (ref 3–16)
AST SERPL-CCNC: 11 U/L (ref 15–37)
BACTERIA: NORMAL /HPF
BASOPHILS ABSOLUTE: 0 K/UL (ref 0–0.2)
BASOPHILS RELATIVE PERCENT: 0.2 %
BILIRUB SERPL-MCNC: 0.7 MG/DL (ref 0–1)
BILIRUBIN URINE: NEGATIVE
BLOOD, URINE: NEGATIVE
BUN BLDV-MCNC: 21 MG/DL (ref 7–20)
CALCIUM SERPL-MCNC: 9.2 MG/DL (ref 8.3–10.6)
CHLORIDE BLD-SCNC: 100 MMOL/L (ref 99–110)
CLARITY: CLEAR
CO2: 23 MMOL/L (ref 21–32)
COLOR: YELLOW
CREAT SERPL-MCNC: 1.2 MG/DL (ref 0.8–1.3)
EOSINOPHILS ABSOLUTE: 0.1 K/UL (ref 0–0.6)
EOSINOPHILS RELATIVE PERCENT: 1.5 %
EPITHELIAL CELLS, UA: 0 /HPF (ref 0–5)
GFR AFRICAN AMERICAN: >60
GFR NON-AFRICAN AMERICAN: 57
GLUCOSE BLD-MCNC: 108 MG/DL (ref 70–99)
GLUCOSE URINE: NEGATIVE MG/DL
HCT VFR BLD CALC: 30.6 % (ref 40.5–52.5)
HEMOGLOBIN: 10.4 G/DL (ref 13.5–17.5)
HYALINE CASTS: 0 /LPF (ref 0–8)
INR BLD: 1.13 (ref 0.87–1.14)
KETONES, URINE: NEGATIVE MG/DL
LACTIC ACID, SEPSIS: 0.9 MMOL/L (ref 0.4–1.9)
LEUKOCYTE ESTERASE, URINE: NEGATIVE
LYMPHOCYTES ABSOLUTE: 0.7 K/UL (ref 1–5.1)
LYMPHOCYTES RELATIVE PERCENT: 11.7 %
MCH RBC QN AUTO: 32.4 PG (ref 26–34)
MCHC RBC AUTO-ENTMCNC: 33.8 G/DL (ref 31–36)
MCV RBC AUTO: 95.7 FL (ref 80–100)
MICROSCOPIC EXAMINATION: YES
MONOCYTES ABSOLUTE: 0.6 K/UL (ref 0–1.3)
MONOCYTES RELATIVE PERCENT: 9.8 %
NEUTROPHILS ABSOLUTE: 4.9 K/UL (ref 1.7–7.7)
NEUTROPHILS RELATIVE PERCENT: 76.8 %
NITRITE, URINE: NEGATIVE
PDW BLD-RTO: 15.4 % (ref 12.4–15.4)
PH UA: 5.5 (ref 5–8)
PLATELET # BLD: 157 K/UL (ref 135–450)
PMV BLD AUTO: 9.6 FL (ref 5–10.5)
POTASSIUM REFLEX MAGNESIUM: 4.1 MMOL/L (ref 3.5–5.1)
PRO-BNP: 1113 PG/ML (ref 0–449)
PROCALCITONIN: 0.1 NG/ML (ref 0–0.15)
PROTEIN UA: ABNORMAL MG/DL
PROTHROMBIN TIME: 14.4 SEC (ref 11.7–14.5)
RAPID INFLUENZA  B AGN: NEGATIVE
RAPID INFLUENZA A AGN: NEGATIVE
RBC # BLD: 3.2 M/UL (ref 4.2–5.9)
RBC UA: 1 /HPF (ref 0–4)
SODIUM BLD-SCNC: 133 MMOL/L (ref 136–145)
SPECIFIC GRAVITY UA: 1.01 (ref 1–1.03)
TOTAL PROTEIN: 6.7 G/DL (ref 6.4–8.2)
TROPONIN: <0.01 NG/ML
URINE REFLEX TO CULTURE: ABNORMAL
URINE TYPE: ABNORMAL
UROBILINOGEN, URINE: 0.2 E.U./DL
WBC # BLD: 6.3 K/UL (ref 4–11)
WBC UA: 1 /HPF (ref 0–5)

## 2022-10-07 PROCEDURE — 70450 CT HEAD/BRAIN W/O DYE: CPT

## 2022-10-07 PROCEDURE — 83880 ASSAY OF NATRIURETIC PEPTIDE: CPT

## 2022-10-07 PROCEDURE — U0005 INFEC AGEN DETEC AMPLI PROBE: HCPCS

## 2022-10-07 PROCEDURE — U0003 INFECTIOUS AGENT DETECTION BY NUCLEIC ACID (DNA OR RNA); SEVERE ACUTE RESPIRATORY SYNDROME CORONAVIRUS 2 (SARS-COV-2) (CORONAVIRUS DISEASE [COVID-19]), AMPLIFIED PROBE TECHNIQUE, MAKING USE OF HIGH THROUGHPUT TECHNOLOGIES AS DESCRIBED BY CMS-2020-01-R: HCPCS

## 2022-10-07 PROCEDURE — 85610 PROTHROMBIN TIME: CPT

## 2022-10-07 PROCEDURE — 99285 EMERGENCY DEPT VISIT HI MDM: CPT

## 2022-10-07 PROCEDURE — 6370000000 HC RX 637 (ALT 250 FOR IP): Performed by: PHYSICIAN ASSISTANT

## 2022-10-07 PROCEDURE — 93005 ELECTROCARDIOGRAM TRACING: CPT | Performed by: EMERGENCY MEDICINE

## 2022-10-07 PROCEDURE — 71045 X-RAY EXAM CHEST 1 VIEW: CPT

## 2022-10-07 PROCEDURE — 85025 COMPLETE CBC W/AUTO DIFF WBC: CPT

## 2022-10-07 PROCEDURE — 84145 PROCALCITONIN (PCT): CPT

## 2022-10-07 PROCEDURE — 2580000003 HC RX 258: Performed by: EMERGENCY MEDICINE

## 2022-10-07 PROCEDURE — 36415 COLL VENOUS BLD VENIPUNCTURE: CPT

## 2022-10-07 PROCEDURE — 84484 ASSAY OF TROPONIN QUANT: CPT

## 2022-10-07 PROCEDURE — 87804 INFLUENZA ASSAY W/OPTIC: CPT

## 2022-10-07 PROCEDURE — 51702 INSERT TEMP BLADDER CATH: CPT

## 2022-10-07 PROCEDURE — 81001 URINALYSIS AUTO W/SCOPE: CPT

## 2022-10-07 PROCEDURE — 80053 COMPREHEN METABOLIC PANEL: CPT

## 2022-10-07 PROCEDURE — 83605 ASSAY OF LACTIC ACID: CPT

## 2022-10-07 PROCEDURE — 87040 BLOOD CULTURE FOR BACTERIA: CPT

## 2022-10-07 PROCEDURE — G0378 HOSPITAL OBSERVATION PER HR: HCPCS

## 2022-10-07 RX ORDER — ASPIRIN 325 MG
325 TABLET ORAL ONCE
Status: COMPLETED | OUTPATIENT
Start: 2022-10-07 | End: 2022-10-07

## 2022-10-07 RX ORDER — SODIUM CHLORIDE 9 MG/ML
30 INJECTION, SOLUTION INTRAVENOUS ONCE
Status: COMPLETED | OUTPATIENT
Start: 2022-10-07 | End: 2022-10-07

## 2022-10-07 RX ADMIN — ASPIRIN 325 MG: 325 TABLET ORAL at 21:39

## 2022-10-07 RX ADMIN — SODIUM CHLORIDE 2328 ML: 9 INJECTION, SOLUTION INTRAVENOUS at 18:41

## 2022-10-07 ASSESSMENT — ENCOUNTER SYMPTOMS
VOMITING: 0
COUGH: 0
RHINORRHEA: 0
ABDOMINAL PAIN: 0
NAUSEA: 0
DIARRHEA: 0
SHORTNESS OF BREATH: 0

## 2022-10-07 ASSESSMENT — PAIN - FUNCTIONAL ASSESSMENT: PAIN_FUNCTIONAL_ASSESSMENT: 0-10

## 2022-10-07 ASSESSMENT — PAIN SCALES - GENERAL: PAINLEVEL_OUTOF10: 0

## 2022-10-07 NOTE — ED NOTES
Report received from THE [facility], this nurse to assume care. Rounded on patient, all needs addressed at this time.      Residence [facility]  10/07/22 1935

## 2022-10-07 NOTE — ED PROVIDER NOTES
I independently saw performed a substantive portion of the visit (history, physical, and MDM) on Jigar Mayberry. All diagnostic, treatment, and disposition decisions were made by myself in conjunction with the advanced practice provider. I have participated in the medical decision making and directed the treatment plan and disposition of the patient. For further details of Vanesa Vyas emergency department encounter, please see the advanced practice provider's documentation. Rio Holliday MD, am the primary physician provider of record. CHIEF COMPLAINT  Chief Complaint   Patient presents with    Altered Mental Status     Pt brought in by  EMS from home for increasing weakness at home. Pt is awake. Last known normal 10 pm last night       Briefly, Jigar Mayberry is a 81 y.o. male  who presents to the ED complaining of malaise fatigue and lethargy since yesterday, triage note says 10 PM last night however he tells me he was feeling generally unwell all day yesterday. He denies any specific cough or cold symptoms belly pain vomiting diarrhea or fevers though. He does have a history of Alzheimer's according to previous documentation and a stroke with a facial droop in April. He does seem a little confused and disoriented on my initial assessment. However it is unclear what his baseline is as no collateral was at the bedside when I evaluated him initially. He is able to follow commands and move his arms and legs without difficulty. He says he has not fallen recently or injured himself in any other way. FOCUSED PHYSICAL EXAMINATION  BP (!) 136/56   Pulse 51   Temp 98.6 °F (37 °C)   Resp 18   Ht 6' (1.829 m)   Wt 226 lb (102.5 kg)   SpO2 97%   BMI 30.65 kg/m²    Focused physical examination notable for no acute distress, well-appearing, well-nourished, normal speech and with equivocal left-sided facial droop that he says is chronic, no obvious rash.   No obvious cranial nerve deficits on my initial exam.  5 of 5 strength in the arms and legs. Dry tacky mucous membranes. No dysmetria or ataxia with finger-nose-finger testing. No abnormality with heel shin testing bilaterally. NIH of 0. Regular rate and rhythm/borderline bradycardia. Clear to auscultation bilaterally. Abdomen soft nontender nondistended. He does seem slow to respond to questions and a little disoriented and confused on initial assessment and a more generalized encephalopathy way. Is not aphasic or dysarthric    NIH Stroke Scale  Interval: Baseline  Level of Consciousness (1a): Alert  LOC Questions (1b): Answers both correctly  LOC Commands (1c): Performs both tasks correctly  Best Gaze (2): Normal  Visual (3): No visual loss  Facial Palsy (4): Normal symmetrical movement  Motor Arm, Left (5a): No drift  Motor Arm, Right (5b): No drift  Motor Leg, Left (6a): No drift  Motor Leg, Right (6b): No drift  Limb Ataxia (7): Absent  Sensory (8): Normal  Best Language (9): No aphasia  Dysarthria (10): Normal  Extinction and Inattention (11): No abnormality  Total: 0      The 12 lead EKG was interpreted by me as follows:  Rate: bradycardia with a rate of 56  Rhythm: sinus  Axis: normal  Intervals: normal ME, narrow QRS, normal QTc  ST segments: no ST elevations or depressions  T waves: inferior TWI  Non-specific T wave changes: present  Prior EKG comparison: EKG dated 8/15/22 is not significantly different      MDM:  Diagnostic considerations included stroke, TIA, intracranial bleed, trauma, infection/sepsis, medication side effect, intoxication/withdrawal, metabolic/electrolyte abnormalities    ED course was notable for AMS. Seems somewhat global/lethargy on exam, but collateral hx from family later is more suggestive of a diagnosis of TIA as sx apparently are improved now but worrisome more for slurred speech previously.   UA neg, no infectious or metabolic cause evident, and HCT was nonacute with stable chronic

## 2022-10-07 NOTE — ED NOTES
Per family hx of dementia and sundowners, also speech impediment but speech now is worse than baseline     Finesse Kim, RN  10/07/22 9517

## 2022-10-08 ENCOUNTER — APPOINTMENT (OUTPATIENT)
Dept: MRI IMAGING | Age: 87
DRG: 065 | End: 2022-10-08
Payer: MEDICARE

## 2022-10-08 ENCOUNTER — APPOINTMENT (OUTPATIENT)
Dept: CT IMAGING | Age: 87
DRG: 065 | End: 2022-10-08
Payer: MEDICARE

## 2022-10-08 PROBLEM — I63.9 ACUTE CEREBROVASCULAR ACCIDENT (CVA) (HCC): Status: ACTIVE | Noted: 2022-10-08

## 2022-10-08 LAB
ANION GAP SERPL CALCULATED.3IONS-SCNC: 10 MMOL/L (ref 3–16)
BUN BLDV-MCNC: 17 MG/DL (ref 7–20)
CALCIUM SERPL-MCNC: 8.6 MG/DL (ref 8.3–10.6)
CHLORIDE BLD-SCNC: 104 MMOL/L (ref 99–110)
CHOLESTEROL, TOTAL: 78 MG/DL (ref 0–199)
CO2: 20 MMOL/L (ref 21–32)
CREAT SERPL-MCNC: 1.2 MG/DL (ref 0.8–1.3)
EKG ATRIAL RATE: 56 BPM
EKG DIAGNOSIS: NORMAL
EKG P AXIS: 38 DEGREES
EKG P-R INTERVAL: 202 MS
EKG Q-T INTERVAL: 436 MS
EKG QRS DURATION: 104 MS
EKG QTC CALCULATION (BAZETT): 420 MS
EKG R AXIS: -19 DEGREES
EKG T AXIS: -25 DEGREES
EKG VENTRICULAR RATE: 56 BPM
GFR AFRICAN AMERICAN: >60
GFR NON-AFRICAN AMERICAN: 57
GLUCOSE BLD-MCNC: 112 MG/DL (ref 70–99)
HCT VFR BLD CALC: 28.3 % (ref 40.5–52.5)
HDLC SERPL-MCNC: 31 MG/DL (ref 40–60)
HEMOGLOBIN: 9.4 G/DL (ref 13.5–17.5)
LDL CHOLESTEROL CALCULATED: 33 MG/DL
MCH RBC QN AUTO: 32.7 PG (ref 26–34)
MCHC RBC AUTO-ENTMCNC: 33.2 G/DL (ref 31–36)
MCV RBC AUTO: 98.6 FL (ref 80–100)
PDW BLD-RTO: 16.1 % (ref 12.4–15.4)
PLATELET # BLD: 138 K/UL (ref 135–450)
PMV BLD AUTO: 9.3 FL (ref 5–10.5)
POTASSIUM REFLEX MAGNESIUM: 4.1 MMOL/L (ref 3.5–5.1)
RBC # BLD: 2.87 M/UL (ref 4.2–5.9)
SARS-COV-2, PCR: NOT DETECTED
SODIUM BLD-SCNC: 134 MMOL/L (ref 136–145)
TRIGL SERPL-MCNC: 71 MG/DL (ref 0–150)
VLDLC SERPL CALC-MCNC: 14 MG/DL
WBC # BLD: 4 K/UL (ref 4–11)

## 2022-10-08 PROCEDURE — 6370000000 HC RX 637 (ALT 250 FOR IP): Performed by: PHYSICIAN ASSISTANT

## 2022-10-08 PROCEDURE — G0378 HOSPITAL OBSERVATION PER HR: HCPCS

## 2022-10-08 PROCEDURE — 97535 SELF CARE MNGMENT TRAINING: CPT

## 2022-10-08 PROCEDURE — 97530 THERAPEUTIC ACTIVITIES: CPT

## 2022-10-08 PROCEDURE — 97116 GAIT TRAINING THERAPY: CPT

## 2022-10-08 PROCEDURE — 6370000000 HC RX 637 (ALT 250 FOR IP): Performed by: INTERNAL MEDICINE

## 2022-10-08 PROCEDURE — 99223 1ST HOSP IP/OBS HIGH 75: CPT | Performed by: PSYCHIATRY & NEUROLOGY

## 2022-10-08 PROCEDURE — 96372 THER/PROPH/DIAG INJ SC/IM: CPT

## 2022-10-08 PROCEDURE — 83036 HEMOGLOBIN GLYCOSYLATED A1C: CPT

## 2022-10-08 PROCEDURE — 97166 OT EVAL MOD COMPLEX 45 MIN: CPT

## 2022-10-08 PROCEDURE — 93010 ELECTROCARDIOGRAM REPORT: CPT | Performed by: INTERNAL MEDICINE

## 2022-10-08 PROCEDURE — 80048 BASIC METABOLIC PNL TOTAL CA: CPT

## 2022-10-08 PROCEDURE — 1200000000 HC SEMI PRIVATE

## 2022-10-08 PROCEDURE — 6360000004 HC RX CONTRAST MEDICATION: Performed by: PHYSICIAN ASSISTANT

## 2022-10-08 PROCEDURE — 36415 COLL VENOUS BLD VENIPUNCTURE: CPT

## 2022-10-08 PROCEDURE — 70496 CT ANGIOGRAPHY HEAD: CPT

## 2022-10-08 PROCEDURE — 97162 PT EVAL MOD COMPLEX 30 MIN: CPT

## 2022-10-08 PROCEDURE — 94761 N-INVAS EAR/PLS OXIMETRY MLT: CPT

## 2022-10-08 PROCEDURE — 6360000002 HC RX W HCPCS: Performed by: PHYSICIAN ASSISTANT

## 2022-10-08 PROCEDURE — 85027 COMPLETE CBC AUTOMATED: CPT

## 2022-10-08 PROCEDURE — 94640 AIRWAY INHALATION TREATMENT: CPT

## 2022-10-08 PROCEDURE — 2580000003 HC RX 258: Performed by: PHYSICIAN ASSISTANT

## 2022-10-08 PROCEDURE — 80061 LIPID PANEL: CPT

## 2022-10-08 PROCEDURE — 70551 MRI BRAIN STEM W/O DYE: CPT

## 2022-10-08 RX ORDER — HYDRALAZINE HYDROCHLORIDE 50 MG/1
50 TABLET, FILM COATED ORAL 3 TIMES DAILY PRN
Status: ON HOLD | COMMUNITY
Start: 2022-09-13 | End: 2022-10-10 | Stop reason: HOSPADM

## 2022-10-08 RX ORDER — ATORVASTATIN CALCIUM 20 MG/1
20 TABLET, FILM COATED ORAL DAILY
Status: DISCONTINUED | OUTPATIENT
Start: 2022-10-08 | End: 2022-10-12 | Stop reason: HOSPADM

## 2022-10-08 RX ORDER — ACETAMINOPHEN 325 MG/1
650 TABLET ORAL EVERY 4 HOURS PRN
Status: DISCONTINUED | OUTPATIENT
Start: 2022-10-08 | End: 2022-10-12 | Stop reason: HOSPADM

## 2022-10-08 RX ORDER — ALBUTEROL SULFATE 90 UG/1
2 AEROSOL, METERED RESPIRATORY (INHALATION) EVERY 4 HOURS PRN
Status: DISCONTINUED | OUTPATIENT
Start: 2022-10-08 | End: 2022-10-12 | Stop reason: HOSPADM

## 2022-10-08 RX ORDER — ASPIRIN 81 MG/1
81 TABLET, CHEWABLE ORAL DAILY
Status: DISCONTINUED | OUTPATIENT
Start: 2022-10-08 | End: 2022-10-12 | Stop reason: HOSPADM

## 2022-10-08 RX ORDER — ALBUTEROL SULFATE 90 UG/1
2 AEROSOL, METERED RESPIRATORY (INHALATION) EVERY 6 HOURS PRN
Status: DISCONTINUED | OUTPATIENT
Start: 2022-10-08 | End: 2022-10-08

## 2022-10-08 RX ORDER — ACETAMINOPHEN 650 MG/1
650 SUPPOSITORY RECTAL EVERY 4 HOURS PRN
Status: DISCONTINUED | OUTPATIENT
Start: 2022-10-08 | End: 2022-10-12 | Stop reason: HOSPADM

## 2022-10-08 RX ORDER — PANTOPRAZOLE SODIUM 40 MG/1
40 TABLET, DELAYED RELEASE ORAL
Status: DISCONTINUED | OUTPATIENT
Start: 2022-10-08 | End: 2022-10-12 | Stop reason: HOSPADM

## 2022-10-08 RX ORDER — MIDODRINE HYDROCHLORIDE 5 MG/1
2.5 TABLET ORAL 3 TIMES DAILY PRN
Status: DISCONTINUED | OUTPATIENT
Start: 2022-10-08 | End: 2022-10-12 | Stop reason: HOSPADM

## 2022-10-08 RX ORDER — SODIUM CHLORIDE 0.9 % (FLUSH) 0.9 %
10 SYRINGE (ML) INJECTION EVERY 12 HOURS SCHEDULED
Status: DISCONTINUED | OUTPATIENT
Start: 2022-10-08 | End: 2022-10-12 | Stop reason: HOSPADM

## 2022-10-08 RX ORDER — SODIUM CHLORIDE 0.9 % (FLUSH) 0.9 %
10 SYRINGE (ML) INJECTION PRN
Status: DISCONTINUED | OUTPATIENT
Start: 2022-10-08 | End: 2022-10-12 | Stop reason: HOSPADM

## 2022-10-08 RX ORDER — AMOXICILLIN AND CLAVULANATE POTASSIUM 875; 125 MG/1; MG/1
1 TABLET, FILM COATED ORAL 2 TIMES DAILY
Status: ON HOLD | COMMUNITY
Start: 2022-10-06 | End: 2022-10-10 | Stop reason: HOSPADM

## 2022-10-08 RX ORDER — FLUTICASONE PROPIONATE AND SALMETEROL 50; 250 UG/1; UG/1
2 POWDER RESPIRATORY (INHALATION) 2 TIMES DAILY
COMMUNITY
Start: 2022-09-14

## 2022-10-08 RX ORDER — SODIUM CHLORIDE 9 MG/ML
INJECTION, SOLUTION INTRAVENOUS PRN
Status: DISCONTINUED | OUTPATIENT
Start: 2022-10-08 | End: 2022-10-12 | Stop reason: HOSPADM

## 2022-10-08 RX ORDER — ONDANSETRON 2 MG/ML
4 INJECTION INTRAMUSCULAR; INTRAVENOUS EVERY 6 HOURS PRN
Status: DISCONTINUED | OUTPATIENT
Start: 2022-10-08 | End: 2022-10-12 | Stop reason: HOSPADM

## 2022-10-08 RX ORDER — LABETALOL HYDROCHLORIDE 5 MG/ML
10 INJECTION, SOLUTION INTRAVENOUS EVERY 10 MIN PRN
Status: DISCONTINUED | OUTPATIENT
Start: 2022-10-08 | End: 2022-10-12 | Stop reason: HOSPADM

## 2022-10-08 RX ORDER — MIDODRINE HYDROCHLORIDE 2.5 MG/1
2.5 TABLET ORAL PRN
Status: ON HOLD | COMMUNITY
Start: 2022-06-27 | End: 2022-10-10 | Stop reason: HOSPADM

## 2022-10-08 RX ORDER — CETIRIZINE HYDROCHLORIDE 10 MG/1
10 TABLET ORAL DAILY
Status: DISCONTINUED | OUTPATIENT
Start: 2022-10-08 | End: 2022-10-12 | Stop reason: HOSPADM

## 2022-10-08 RX ORDER — CLOPIDOGREL BISULFATE 75 MG/1
75 TABLET ORAL DAILY
Status: DISCONTINUED | OUTPATIENT
Start: 2022-10-08 | End: 2022-10-08

## 2022-10-08 RX ORDER — OXYBUTYNIN CHLORIDE 5 MG/1
10 TABLET, EXTENDED RELEASE ORAL DAILY
Status: DISCONTINUED | OUTPATIENT
Start: 2022-10-08 | End: 2022-10-12 | Stop reason: HOSPADM

## 2022-10-08 RX ORDER — AMOXICILLIN AND CLAVULANATE POTASSIUM 875; 125 MG/1; MG/1
1 TABLET, FILM COATED ORAL 2 TIMES DAILY
Status: DISCONTINUED | OUTPATIENT
Start: 2022-10-08 | End: 2022-10-12 | Stop reason: HOSPADM

## 2022-10-08 RX ORDER — ENOXAPARIN SODIUM 100 MG/ML
30 INJECTION SUBCUTANEOUS 2 TIMES DAILY
Status: DISCONTINUED | OUTPATIENT
Start: 2022-10-08 | End: 2022-10-12 | Stop reason: HOSPADM

## 2022-10-08 RX ORDER — MONTELUKAST SODIUM 10 MG/1
10 TABLET ORAL NIGHTLY
Status: DISCONTINUED | OUTPATIENT
Start: 2022-10-08 | End: 2022-10-12 | Stop reason: HOSPADM

## 2022-10-08 RX ORDER — ESCITALOPRAM OXALATE 10 MG/1
20 TABLET ORAL DAILY
Status: DISCONTINUED | OUTPATIENT
Start: 2022-10-08 | End: 2022-10-12 | Stop reason: HOSPADM

## 2022-10-08 RX ORDER — FLUTICASONE PROPIONATE 50 MCG
1 SPRAY, SUSPENSION (ML) NASAL DAILY
Status: DISCONTINUED | OUTPATIENT
Start: 2022-10-08 | End: 2022-10-12 | Stop reason: HOSPADM

## 2022-10-08 RX ORDER — TAMSULOSIN HYDROCHLORIDE 0.4 MG/1
0.4 CAPSULE ORAL DAILY
Status: DISCONTINUED | OUTPATIENT
Start: 2022-10-08 | End: 2022-10-12 | Stop reason: HOSPADM

## 2022-10-08 RX ADMIN — Medication 2 PUFF: at 21:49

## 2022-10-08 RX ADMIN — OXYBUTYNIN CHLORIDE 10 MG: 5 TABLET, EXTENDED RELEASE ORAL at 08:45

## 2022-10-08 RX ADMIN — AMOXICILLIN AND CLAVULANATE POTASSIUM 1 TABLET: 875; 125 TABLET, FILM COATED ORAL at 21:27

## 2022-10-08 RX ADMIN — IOPAMIDOL 75 ML: 755 INJECTION, SOLUTION INTRAVENOUS at 14:34

## 2022-10-08 RX ADMIN — AMOXICILLIN AND CLAVULANATE POTASSIUM 1 TABLET: 875; 125 TABLET, FILM COATED ORAL at 08:45

## 2022-10-08 RX ADMIN — ESCITALOPRAM OXALATE 20 MG: 10 TABLET ORAL at 08:45

## 2022-10-08 RX ADMIN — FLUTICASONE PROPIONATE 1 SPRAY: 50 SPRAY, METERED NASAL at 08:44

## 2022-10-08 RX ADMIN — Medication 2 PUFF: at 08:37

## 2022-10-08 RX ADMIN — ATORVASTATIN CALCIUM 20 MG: 20 TABLET, FILM COATED ORAL at 08:45

## 2022-10-08 RX ADMIN — SODIUM CHLORIDE, PRESERVATIVE FREE 10 ML: 5 INJECTION INTRAVENOUS at 21:27

## 2022-10-08 RX ADMIN — TAMSULOSIN HYDROCHLORIDE 0.4 MG: 0.4 CAPSULE ORAL at 08:45

## 2022-10-08 RX ADMIN — AMOXICILLIN AND CLAVULANATE POTASSIUM 1 TABLET: 875; 125 TABLET, FILM COATED ORAL at 04:14

## 2022-10-08 RX ADMIN — ENOXAPARIN SODIUM 30 MG: 100 INJECTION SUBCUTANEOUS at 21:27

## 2022-10-08 RX ADMIN — ENOXAPARIN SODIUM 30 MG: 100 INJECTION SUBCUTANEOUS at 04:13

## 2022-10-08 RX ADMIN — LEVOTHYROXINE SODIUM 125 MCG: 0.03 TABLET ORAL at 08:45

## 2022-10-08 RX ADMIN — MONTELUKAST SODIUM 10 MG: 10 TABLET, FILM COATED ORAL at 21:27

## 2022-10-08 RX ADMIN — ENOXAPARIN SODIUM 30 MG: 100 INJECTION SUBCUTANEOUS at 08:49

## 2022-10-08 RX ADMIN — PANTOPRAZOLE SODIUM 40 MG: 40 TABLET, DELAYED RELEASE ORAL at 08:45

## 2022-10-08 RX ADMIN — CETIRIZINE HYDROCHLORIDE 10 MG: 10 TABLET, FILM COATED ORAL at 08:45

## 2022-10-08 RX ADMIN — ASPIRIN 81 MG 81 MG: 81 TABLET ORAL at 08:45

## 2022-10-08 RX ADMIN — MONTELUKAST SODIUM 10 MG: 10 TABLET, FILM COATED ORAL at 04:14

## 2022-10-08 RX ADMIN — SODIUM CHLORIDE, PRESERVATIVE FREE 10 ML: 5 INJECTION INTRAVENOUS at 08:49

## 2022-10-08 ASSESSMENT — PAIN SCALES - GENERAL
PAINLEVEL_OUTOF10: 0

## 2022-10-08 NOTE — ED NOTES
Report given to Rayne Miller on 3T, pt to be taken up in bed on tele in stable condition     Isra Sam, RN  10/07/22 3476

## 2022-10-08 NOTE — H&P
[facility] Medicine History & Physical      Patient Name: Ton Cardona    : 1935    PCP: Louis Baldwin MD    Date of Service:  Patient seen and examined on 10/7/2022     Chief Complaint:  Slurred speech    History Of Present Illness:    Ton Cardona is a 81 y.o. male who presented to ED for evaluation of slurred speech and generalized weakness/fatigue. Patient is a poor historian at this time. No family currently at bedside to provide history but daughter was at bedside in ED and provided supplemental history. Most of the information is derived from conversation with the emergency room PA and review of records. Daughter reports she noticed patient seemed more fatigued and weak today. He had difficulty walking due to weakness. She also noticed slurred speech. She denies any additional concerns. Patient denies any complaints at this time. Of note, patient was started on Augmentin on 10/6 for acute sinusitis, end date 10/20. Past Medical History:    Patient has a past medical history of CAD (coronary artery disease). Past Surgical History:    Patient has a past surgical history that includes Coronary artery bypass graft and Cardiac surgery. Medications Prior to Admission:      Prior to Admission medications    Medication Sig Start Date End Date Taking?  Authorizing Provider   amoxicillin-clavulanate (AUGMENTIN) 875-125 MG per tablet Take 1 tablet by mouth 2 times daily 10/6/22 10/20/22 Yes Historical Provider, MD   midodrine (PROAMATINE) 2.5 MG tablet Take 2.5 mg by mouth as needed As needed for BP below 110 22  Yes Historical Provider, MD   ADVAIR DISKUS 250-50 MCG/ACT AEPB diskus inhaler Inhale 2 puffs into the lungs in the morning and at bedtime 22   Historical Provider, MD   hydrALAZINE (APRESOLINE) 50 MG tablet Take 50 mg by mouth 3 times daily as needed For SBP >160. 22   Historical Provider, MD   atorvastatin (LIPITOR) 20 MG tablet Take 20 mg by mouth in the morning. Historical Provider, MD   fluticasone (FLONASE) 50 MCG/ACT nasal spray 1 spray by Each Nostril route daily    Historical Provider, MD   loratadine (CLARITIN) 10 MG tablet Take 10 mg by mouth in the morning. Historical Provider, MD   oxybutynin (DITROPAN-XL) 10 MG extended release tablet Take 10 mg by mouth in the morning. Historical Provider, MD   furosemide (LASIX) 20 MG tablet Take 20 mg by mouth in the morning and 20 mg before bedtime. 8/16/22  Historical Provider, MD   montelukast (SINGULAIR) 10 MG tablet Take 10 mg by mouth nightly    Historical Provider, MD   pantoprazole (PROTONIX) 40 MG tablet Take 40 mg by mouth daily    Historical Provider, MD   tamsulosin (FLOMAX) 0.4 MG capsule Take 0.4 mg by mouth in the morning. Historical Provider, MD   amLODIPine (NORVASC) 10 MG tablet Take 10 mg by mouth in the morning. 8/16/22  Historical Provider, MD   albuterol (PROVENTIL HFA) 108 (90 BASE) MCG/ACT inhaler Inhale 2 puffs into the lungs every 6 hours as needed for Wheezing. 10/21/12   Davis Killian MD   escitalopram (LEXAPRO) 20 MG tablet Take 20 mg by mouth in the morning. Historical Provider, MD   clopidogrel (PLAVIX) 75 MG tablet Take 75 mg by mouth in the morning. Patient not taking: Reported on 10/8/2022    Historical Provider, MD   levothyroxine (SYNTHROID) 125 MCG tablet Take 125 mcg by mouth in the morning. Zachary Haq Historical Provider, MD   aspirin 81 MG tablet Take 81 mg by mouth daily. Historical Provider, MD       Allergies:  Patient has no known allergies. Social History:      TOBACCO:   reports that he has never smoked. He has never used smokeless tobacco.  ETOH:   reports no history of alcohol use. DRUGS:  reports no history of drug use. Family History:      Reviewed in detail positive as follows:    History reviewed. No pertinent family history. REVIEW OF SYSTEMS:   Pertinent positives as noted in the HPI.  All other systems reviewed and negative. PHYSICAL EXAM PERFORMED:    BP (!) 166/71   Pulse 50   Temp 98.2 °F (36.8 °C) (Oral)   Resp 12   Ht 6' (1.829 m)   Wt 226 lb (102.5 kg)   SpO2 96%   BMI 30.65 kg/m²     General appearance:  Awake, alert, no apparent distress  HEENT:  Normocephalic, atraumatic without obvious deformity. PERRL. EOM intact. Conjunctivae/corneas clear. Neck: Supple, with full range of motion. No JVD. Trachea midline. Respiratory:  Clear to auscultation bilaterally without rales, wheezes, or rhonchi. Normal respiratory effort. Cardiovascular:  + Bradycardia. Regular rhythm. No murmurs, rubs, or gallops. Abdomen: Soft, NT, ND, without rebound or guarding. Normal bowel sounds. Extremities:  No clubbing, cyanosis, or edema bilaterally. Full range of motion without deformity. +2 palpable pulses, equal bilaterally. Capillary refill brisk,< 3 seconds   Skin: No rashes or lesions. Warm/dry. Neurologic:  Neurovascularly intact without any focal sensory/motor deficits. Cranial nerves: II-XII intact, grossly non-focal. Alert and oriented x 3. No slurred speech. Psychiatric:  Thought content appropriate, normal insight.     Labs:   CBC   Recent Labs     10/07/22  1755   WBC 6.3   HGB 10.4*   HCT 30.6*           RENAL  Recent Labs     10/07/22  1755   *   K 4.1      CO2 23   BUN 21*   CREATININE 1.2       LFTS  Recent Labs     10/07/22  1755   AST 11*   ALT 7*   BILITOT 0.7   ALKPHOS 87       COAG  Recent Labs     10/07/22  1755   INR 1.13       CARDIAC ENZYMES  Recent Labs     10/07/22  1755   TROPONINI <0.01       LIPIDS  Cholesterol, Total   Date/Time Value Ref Range Status   04/23/2022 05:09 AM 60 0 - 199 mg/dL Final     Triglycerides   Date/Time Value Ref Range Status   04/23/2022 05:09 AM 76 0 - 150 mg/dL Final     HDL   Date/Time Value Ref Range Status   04/23/2022 05:09 AM 26 (L) 40 - 60 mg/dL Final   08/05/2011 08:34 AM 27 (L) 40 - 60 mg/dl Final     LDL Calculated   Date/Time Value Ref Range Status   04/23/2022 05:09 AM 19 <100 mg/dL Final         Radiology:     CT HEAD WO CONTRAST   Final Result   1. No acute intracranial abnormality. 2. Stable chronic white matter microvascular ischemic changes. XR CHEST PORTABLE   Final Result   No acute cardiopulmonary findings. Mild cardiomegaly         MRI brain without contrast    (Results Pending)       EKG:   Read by ED physician in the absence of a cardiologist:  \"Rate: bradycardia with a rate of 56  Rhythm: sinus  Axis: normal  Intervals: normal NE, narrow QRS, normal QTc  ST segments: no ST elevations or depressions  T waves: inferior TWI  Non-specific T wave changes: present  Prior EKG comparison: EKG dated 8/15/22 is not significantly different\"      ASSESSMENT/PLAN:    Slurred speech  Possible TIA/CVA, no tPA due to no obvious deficits at this time, benefits greater than risk  Head CT negative for acute pathology  CT head negative for acute process  CTA head/neck and MRI ordered   ASA, statin  Check lipids  Allow permissive HTN, SBP < 220, DBP < 110   Neurology consulted    Acute sinusitis  Continue augmentin to complete course 10/20/22  Covid test results pending    CAD s/p CABG  Continue home ASA, statin    Bilateral carotid artery stenosis  Continue home ASA, statin    Hypothyroidism  Continue home synthroid    Asthma without acute exacerbation  Continue home advair (sub w/dulera) and singulair    Autonomic dysfunction  Continue home midodrine PRN      DVT prophylaxis: Lovenox  Probiotic if on abx: N/A    Diet: Diet NPO  Code Status: Full Code    Consults:  IP CONSULT TO NEUROLOGY    Disposition: Admit to Inpatient   ELOS: Greater than two midnights due to medical therapy     Nick Jacobsen PA-C    Thank you Jere Velasquez MD for the opportunity to be involved in this patient's care. If you have any questions or concerns please feel free to contact me at [phone].

## 2022-10-08 NOTE — RT PROTOCOL NOTE
RT Inhaler-Nebulizer Bronchodilator Protocol Note    There is a bronchodilator order in the chart from a provider indicating to follow the RT Bronchodilator Protocol and there is an Initiate RT Inhaler-Nebulizer Bronchodilator Protocol order as well (see protocol at bottom of note). CXR Findings:  XR CHEST PORTABLE    Result Date: 10/7/2022  No acute cardiopulmonary findings. Mild cardiomegaly       The findings from the last RT Protocol Assessment were as follows:   History Pulmonary Disease: None or smoker <15 pack years  Respiratory Pattern: Regular pattern and RR 12-20 bpm  Breath Sounds: Clear breath sounds  Cough: Strong, spontaneous, non-productive  Indication for Bronchodilator Therapy: None  Bronchodilator Assessment Score: 0    Aerosolized bronchodilator medication orders have been revised according to the RT Inhaler-Nebulizer Bronchodilator Protocol below. Respiratory Therapist to perform RT Therapy Protocol Assessment initially then follow the protocol. Repeat RT Therapy Protocol Assessment PRN for score 0-3 or on second treatment, BID, and PRN for scores above 3. No Indications - adjust the frequency to every 6 hours PRN wheezing or bronchospasm, if no treatments needed after 48 hours then discontinue using Per Protocol order mode. If indication present, adjust the RT bronchodilator orders based on the Bronchodilator Assessment Score as indicated below. Use Inhaler orders unless patient has one or more of the following: on home nebulizer, not able to hold breath for 10 seconds, is not alert and oriented, cannot activate and use MDI correctly, or respiratory rate 25 breaths per minute or more, then use the equivalent nebulizer order(s) with same Frequency and PRN reasons based on the score. If a patient is on this medication at home then do not decrease Frequency below that used at home.     0-3 - enter or revise RT bronchodilator order(s) to equivalent RT Bronchodilator order with Frequency of every 4 hours PRN for wheezing or increased work of breathing using Per Protocol order mode. 4-6 - enter or revise RT Bronchodilator order(s) to two equivalent RT bronchodilator orders with one order with BID Frequency and one order with Frequency of every 4 hours PRN wheezing or increased work of breathing using Per Protocol order mode. 7-10 - enter or revise RT Bronchodilator order(s) to two equivalent RT bronchodilator orders with one order with TID Frequency and one order with Frequency of every 4 hours PRN wheezing or increased work of breathing using Per Protocol order mode. 11-13 - enter or revise RT Bronchodilator order(s) to one equivalent RT bronchodilator order with QID Frequency and an Albuterol order with Frequency of every 4 hours PRN wheezing or increased work of breathing using Per Protocol order mode. Greater than 13 - enter or revise RT Bronchodilator order(s) to one equivalent RT bronchodilator order with every 4 hours Frequency and an Albuterol order with Frequency of every 2 hours PRN wheezing or increased work of breathing using Per Protocol order mode. RT to enter RT Home Evaluation for COPD & MDI Assessment order using Per Protocol order mode.     Electronically signed by Kelli Avelar RCP on 10/8/2022 at 1:17 AM

## 2022-10-08 NOTE — PROGRESS NOTES
[facility]   Phone: [phone]    Occupational Therapy    [x] Initial Evaluation            [] Daily Treatment Note         [] Discharge Summary      Patient: Jeanine Santa   : 1935   MRN: [id number]   Date of Service:  10/8/2022    Admitting Diagnosis:  Slurred speech  Current Admission Summary: Jeanine Santa is a 81 y.o. male who presented to ED for evaluation of slurred speech and generalized weakness/fatigue. Patient is a poor historian at this time. No family currently at bedside to provide history but daughter was at bedside in ED and provided supplemental history. Most of the information is derived from conversation with the emergency room PA and review of records. Daughter reports she noticed patient seemed more fatigued and weak today. He had difficulty walking due to weakness. She also noticed slurred speech. She denies any additional concerns. Patient denies any complaints at this time. Of note, patient was started on Augmentin on 10/6 for acute sinusitis, end date 10/20. Past Medical History:  has a past medical history of CAD (coronary artery disease). Past Surgical History:  has a past surgical history that includes Coronary artery bypass graft and Cardiac surgery. Discharge Recommendations: Jeanine Santa scored a 16/24 on the AM-PAC ADL Inpatient form. Current research shows that an AM-PAC score of 17 or less is typically not associated with a discharge to the patient's home setting. Based on the patient's AM-PAC score and their current ADL deficits, it is recommended that the patient have 5-7 sessions per week of Occupational Therapy at d/c to increase the patient's independence. At this time, this patient demonstrates complex nursing, medical, and rehabilitative needs, and would benefit from intensive rehabilitation services upon discharge from the Inpatient setting.   This patient demonstrates the ability to participate in and benefit from an intensive therapy program with a coordinated interdisciplinary team approach to foster frequent, structured, and documented communication among disciplines, who will work together to establish, prioritize, and achieve treatment goals. Please see assessment section for further patient specific details. If patient discharges prior to next session this note will serve as a discharge summary. Please see below for the latest assessment towards goals. DME Required For Discharge: patient has all required DME for discharge    Precautions/Restrictions: medium fall risk  Weight Bearing Restrictions: no restrictions  [] Right Upper Extremity  [] Left Upper Extremity [] Right Lower Extremity  [] Left Lower Extremity     Required Braces/Orthotics: no braces required   [] Right  [] Left  Positional Restrictions:no positional restrictions    Pre-Admission Information   Lives With: lives alone, but daughters are present with pt for the entire duration of the day. Son reports that daughters do not stay overnight. One of the daughters reportedly works from home and is able to be present frequently. other family close by        Type of Home: apartment, . Comment: senior living  -24 hr assist from family available  Home Layout: one level  Home Access: elevator  Bathroom Layout: walker accessible, wheelchair accessible, walk in shower, handicap height toilet  Bathroom Equipment: grab bars in shower, grab bars around toilet, shower chair, 3-in-1 commode  Home Equipment: rolling walker, single point cane, manual wheelchair - majority of time uses RW  Transfer Assistance: reports transfers \"on my own\" and uses RW or cane most of the time  Ambulation Assistance:. Comment: per chart review, pt reports \"tremors\" when walking with RW, states a family member walks with him     ADL Assistance: .   Comment: pt reports assist with bathing, reports getting self dressed   IADL Assistance: requires assistance with all homemaking tasks - daughters complete all IADL tasks   Active : [] yes             [x]no  Current Employment: . Comment: retired , [address] -   Hobbies: used to enjoy hunting and fishing   Recent Falls: none reported in last 6 months per patient - son present in room at end of session and reports pt has had \"many falls\" recently d/t decreased balance    Examination   Vision:   Vision Gross Assessment: Impaired and Vision Corrective Device: wears glasses at all times  Hearing:   hard of hearing - minimal, pt reports \"not that bad\" but requires consistent repetition  Perception:   Motor planning: cues for initiation   Observation:   General Observation:  Pt with flat affect throughout session, requires increased stimuli for alertness and engagement in conversations. Posture:   Posterior lean in standing stance, fair posture in sitting stance   Sensation:   denies numbness and tingling  Proprioception:    WFL  Tone:   Normotonic  Coordination Testing:   Coordination and Movement Description: decreased speed, decreased accuracy    ROM:   (B) UE AROM WFL  Strength:   (B) UE strength grossly +3    Decision Making: medium complexity  Clinical Presentation: stable      Subjective  General: Pt supine in bed upon arrival, pt pleasant and agreeable to OT/PT initial evaluation. Pt with flat affect. Pain: 0/10, denies pain at beginning of session   Pain Interventions: not applicable        Activities of Daily Living  Basic Activities of Daily Living  Grooming: stand by assistance requires verbal cueing Increased time to complete task  Grooming Comments: hand hygiene standing sinkside   Lower Extremity Dressing: maximum assistance requires verbal cueing Increased time to complete task  Dressing Comments: pt attempted donning socks sitting EOB but unsuccessful, required maxA to complete   Toileting: moderate assistance.     Toileting Equipment: none  Toileting Comments: modA for pericare  General Comments: increased time to complete, verbal cues for increased safety, initiation, and sequencing. Instrumental Activities of Daily Living  No IADL completed on this date. Functional Mobility  Bed Mobility  Supine to Sit: stand by assistance  Scooting: contact guard assistance  Comments: elevated HOB and use of handrails. Cues for initiation, sequencing, and hand placement. Significantly increased time to complete. Transfers  Sit to stand transfer:minimal assistance  Stand to sit transfer: minimal assistance  Bed / Chair transfer: minimal assistance. Bed / Chair comments: Richard with cues for sequencing, transfer technique, hand placement - poor eccentric control   Toilet transfer: minimal assistance  Toilet transfer equipment: standard toilet, grab bars, walker  Toilet transfer comments: use of grab bars, cues for hand placement and sequencing/technique   Comments: Increased time to complete and cues throughout. Required multiple attempts and rocking to complete sit > stand t/fs   Functional Mobility:  Sitting Balance: stand by assistance. Standing Balance: contact guard assistance, minimal assistance. Functional Mobility: .  minimal assistance  Functional Mobility Activity: to/from bathroom, and around hospital room a household distance   Functional Mobility Device Use: rolling walker  Functional Mobility Comment: Pt completed fxl mobility and standing balance tasks with several overt LOB requiring min-mod assist for correction. Pt with posterior lean in standing stance. Cues for positioning of RW. Shuffling gait and short steps noted. Significantly increased time to complete. Significant cues required for increased safety awareness with navigation of environment and sequencing with RW. Pt with freezing episodes during fxl mobility, requiring cues for initiation.       Other Therapeutic Interventions    Functional Outcomes  AM-PAC Inpatient Daily Activity Raw Score: 16    Cognition  Overall Cognitive Status: Impaired  Arousal/Alterness: delayed responses to stimuli, inconsistent responses to stimuli  Following Commands: follows one step commands with repetition, follows one step commands with increased time  Attention Span: difficulty attending to directions, difficulty dividing attention  Memory: decreased short term memory, decreased long term memory  Safety Judgement: decreased awareness of need for assistance, decreased awareness of need for safety  Problem Solving: assistance required to generate solutions, assistance required to implement solutions, assistance required to identify errors made, assistance required to correct errors made  Insights: decreased awareness of deficits  Initiation: requires cues for some  Sequencing: requires cues for some  Comments: Pt with slow response to questions, decreased arousal level, requires increased stimuli for appropriate responses   Orientation:    oriented to person, oriented to place, oriented to situation, and disoriented to time - thought it was November, unable to correct when identified  Command Following:   impaired     Education  Barriers To Learning: cognition and hearing  Patient Education: patient educated on goals, OT role and benefits, plan of care, precautions, ADL adaptive strategies, HEP, energy conservation, transfer training, discharge recommendations  Learning Assessment:  patient verbalizes understanding, would benefit from continued reinforcement, patient will require reinforcement due to cognitive deficits    Assessment  Activity Tolerance: good  Impairments Requiring Therapeutic Intervention: decreased functional mobility, decreased ADL status, decreased strength, decreased safety awareness, decreased cognition, decreased endurance, decreased balance, decreased IADL, decreased coordination  Prognosis: good  Clinical Assessment: Pt presents with the above listed deficits and is currently functioning below his baseline functioning level. Pt occasionally requires minimal assist for ADL completion and ambulates with mod I using his RW at home. Today, he required min assist with RW for fxl mobility within hospital room and min-max assist for all ADL completion. Pt is noted with significantly decreased balance and required min-mod assist for correction with several LOB throughout session. Pt would benefit from continued skilled occupational therapy services at this time to return to PLOF and increased safety and independence in daily occupations.    Safety Interventions: patient left in chair, chair alarm in place, call light within reach, gait belt, telesitter in use, and nurse notified    Plan  Frequency: 5-7 x/week  Current Treatment Recommendations: strengthening, ROM, balance training, functional mobility training, transfer training, endurance training, patient/caregiver education, ADL/self-care training, cognitive/perceptual training, home exercise program, safety education, and positioning    Goals  Patient Goals: did not state this date    Short Term Goals:  Time Frame: by discharge   Patient will complete upper body ADL at stand by assistance   Patient will complete lower body ADL at minimal assistance   Patient will complete toileting at minimal assistance   Patient will complete grooming at supervision   Patient will complete functional transfers at supervision   Patient will complete functional mobility at supervision   Patient will increase functional standing balance to supervision for improved ADL completion    Therapy Session Time     Individual Group Co-treatment   Time In    0857   Time Out    0959   Minutes    62        Timed Code Treatment Minutes:  Timed Code Treatment Minutes: 47 Minutes (15 minutes)  Total Treatment Minutes:  62 Minutes        Electronically Signed By: Jose Alberto Garcia OTR/L [id number]

## 2022-10-08 NOTE — PROGRESS NOTES
Hospitalist Progress Note      PCP: Carmenza Canales MD    Date of Admission: 10/7/2022    Chief Complaint: Speech slurring    Hospital Course:   Feels well  Speech seems to have improved  No nausea vomiting  No headache        Medications:  Reviewed      Exam:    BP (!) 175/79   Pulse 60   Temp 97.5 °F (36.4 °C) (Oral)   Resp 18   Ht 6' (1.829 m)   Wt 205 lb 1.6 oz (93 kg)   SpO2 97%   BMI 27.82 kg/m²     General appearance: No apparent distress, appears stated age and cooperative. HEENT: Pupils equal, round, and reactive to light. Conjunctivae/corneas clear. Neck: Supple, with full range of motion. No jugular venous distention. Trachea midline. Respiratory:  Normal respiratory effort. Clear to auscultation, bilaterally without RALES/WHEEZES/Rhonchi. Cardiovascular: Regular rate and rhythm with normal S1/S2 without MURMURS, rubs or gallops. Abdomen: Soft, non-tender, non-distended with normal bowel sounds. Musculoskeletal: No clubbing, cyanosis or EDEMA bilaterally. Full range of motion without deformity. Skin: Skin color, texture, turgor normal.  No rashes or lesions. Neurologic:  Neurovascularly intact without any focal sensory/motor deficits.  Cranial nerves: II-XII intact, grossly non-focal.        Labs:   Recent Labs     10/07/22  1755 10/08/22  0452   WBC 6.3 4.0   HGB 10.4* 9.4*   HCT 30.6* 28.3*    138     Recent Labs     10/07/22  1755 10/08/22  0452   * 134*   K 4.1 4.1    104   CO2 23 20*   BUN 21* 17   CREATININE 1.2 1.2   CALCIUM 9.2 8.6     Recent Labs     10/07/22  1755   AST 11*   ALT 7*   BILITOT 0.7   ALKPHOS 87     Recent Labs     10/07/22  1755   INR 1.13     Recent Labs     10/07/22  1755   TROPONINI <0.01       Urinalysis:      Lab Results   Component Value Date/Time    NITRU Negative 10/07/2022 08:13 PM    WBCUA 1 10/07/2022 08:13 PM    BACTERIA None Seen 10/07/2022 08:13 PM    RBCUA 1 10/07/2022 08:13 PM    BLOODU Negative 10/07/2022 08:13 PM SPECGRAV 1.015 10/07/2022 08:13 PM    GLUCOSEU Negative 10/07/2022 08:13 PM       Radiology:  CTA HEAD NECK W CONTRAST   Final Result   1. 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 Robust bilateral posterior communicating   arteries are present, anatomic variant. 2. Otherwise, no evidence of large vessel occlusion or significant stenosis   in the major arteries of the head and neck. 3. Extensive paranasal sinus disease. 4. Again seen 0.6 cm ground-glass nodules in the right lung apex. See   recommendations below. RECOMMENDATIONS:   Multiple pulmonary nodules. Most severe: 6 mm right ground-glass pulmonary   nodule within the upper lobe. Recommend a non-contrast Chest CT at 3-6   months. Subsequent management based on the most suspicious nodule(s). These guidelines do not apply to immunocompromised patients and patients with   cancer. Follow up in patients with significant comorbidities as clinically   warranted. For lung cancer screening, adhere to Lung-RADS guidelines. Reference: Radiology. 2017; 284(1):228-43. MRI brain without contrast   Final Result   1. Punctate acute lacunar infarct present in the left frontal corona radiata. 2. Involutional parenchymal changes with moderate microvascular ischemic   disease. 3. Extensive paranasal sinus disease. CT HEAD WO CONTRAST   Final Result   1. No acute intracranial abnormality. 2. Stable chronic white matter microvascular ischemic changes. XR CHEST PORTABLE   Final Result   No acute cardiopulmonary findings.   Mild cardiomegaly             Assessment/Plan:    Active Hospital Problems    Diagnosis Date Noted    Acute cerebrovascular accident (CVA) ([facility] [address] [I63.9] 10/08/2022     Priority: Medium    Slurred speech [R47.81] 10/07/2022     Priority: Medium       Acute Medical Issues Being Addressed:    D11year-old admitted with slurring of speech    Slurred speech secondary to acute left frontal corona radiata acute infarct  CT of the head was negative  CT angiogram showed stenotic vertebral segment with collaterals  MRI confirmed a stroke  Seen by neurology  On aspirin and statin  LDL only 33  Check A1c  Continue cardiac monitor  No other stenosis seen on the CT angiogram of the neck as well   Needs an echocardiogram    Coronary artery disease s/p CABG  On aspirin and statin    Hypothyroidism  Continue Synthroid    ARU referred    DVT Prophylaxis: Subcu lobe and  Diet: ADULT DIET;  Regular  Code Status: Full Code      Dispo - once acute medical processes have resolved    Patricia Morales MD

## 2022-10-08 NOTE — CARE COORDINATION
SW informed by RN pt's dtr was asking to speak with me about placement. Spoke with dtr, Ms. Aguilar Yasmin, over the phone. Dtr stated that she was informed we were going to try for [id number] [facility] and she was agreeable to that. Stated too that we could send referrals to [facility] and [facility]. Stated pt has been there before. Dtr reported she is willing to stay with patient overnight at either of these SNFs d/t patient having \"sundowners\" and needing some extra care at night time. Dtr stated she did this last time pt was at [facility] [place]. PMR consult already placed and referred to [address] [place] [address]. Faxed a referral to [facility] [place] and 's.     Electronically signed by Jackye Shone, MSW, LSW on 10/8/2022 at 3:51 PM

## 2022-10-08 NOTE — CONSULTS
In patient Neurology consult        [facility] Neurology      MD Luis Alberto Carbajal  1935    Date of Service: 10/8/2022    Referring Physician: Ameena Cortes MD      Reason for the consult and CC: Acute slurred speech and possible stroke    HPI:   The patient is a 81y.o.  years old male with history of hypertension and CAD who was admitted yesterday to the hospital with acute speech impairment. Symptoms started hours prior to admission. Description difficulties with following directions and slurred speech. He was unable to walk and had generalized weakness. Degree was severe. Duration was persistent. No triggers, relieving or aggravating factors. No falling or passing out, headache, neck or back pain. He came to the ED where he was eventually admitted there. CT head showed no acute findings. Today he denies any other new symptoms. Family history: No family history of strokes.       Past Surgical History:   Procedure Laterality Date    CARDIAC SURGERY      2008    CORONARY ARTERY BYPASS GRAFT          Past Medical History:   Diagnosis Date    CAD (coronary artery disease)      Social History     Tobacco Use    Smoking status: Never    Smokeless tobacco: Never   Substance Use Topics    Alcohol use: No    Drug use: No     No Known Allergies  Current Facility-Administered Medications   Medication Dose Route Frequency Provider Last Rate Last Admin    aspirin chewable tablet 81 mg  81 mg Oral Daily Flonnie Poke, PA-C   81 mg at 10/08/22 0845    atorvastatin (LIPITOR) tablet 20 mg  20 mg Oral Daily Flonnie Poke, PA-C   20 mg at 10/08/22 0845    escitalopram (LEXAPRO) tablet 20 mg  20 mg Oral Daily Flonnie Poke, PA-C   20 mg at 10/08/22 0845    fluticasone (FLONASE) 50 MCG/ACT nasal spray 1 spray  1 spray Each Nostril Daily Flonnie Poke, PA-C   1 spray at 10/08/22 0844    levothyroxine (SYNTHROID) tablet 125 mcg  125 mcg Oral Daily Flonnie Poke, PA-C   125 mcg at 10/08/22 0845    cetirizine (ZYRTEC) tablet 10 mg  10 mg Oral Daily Nannie Loser, PA-C   10 mg at 10/08/22 0845    montelukast (SINGULAIR) tablet 10 mg  10 mg Oral Nightly Nannie Loser, PA-C   10 mg at 10/08/22 0414    oxybutynin (DITROPAN-XL) extended release tablet 10 mg  10 mg Oral Daily Nannie Loser, PA-C   10 mg at 10/08/22 0845    pantoprazole (PROTONIX) tablet 40 mg  40 mg Oral QAM AC Nannie Loser, PA-C   40 mg at 10/08/22 0845    tamsulosin (FLOMAX) capsule 0.4 mg  0.4 mg Oral Daily Nannie Loser, PA-C   0.4 mg at 10/08/22 0845    sodium chloride flush 0.9 % injection 10 mL  10 mL IntraVENous 2 times per day Nannie Loser, PA-C   10 mL at 10/08/22 0849    sodium chloride flush 0.9 % injection 10 mL  10 mL IntraVENous PRN Nannie Loser, PA-C        0.9 % sodium chloride infusion   IntraVENous PRN Nannie Loser, PA-C        acetaminophen (TYLENOL) tablet 650 mg  650 mg Oral Q4H PRN Nannie Loser, PA-C        Or    acetaminophen (TYLENOL) suppository 650 mg  650 mg Rectal Q4H PRN Nannie Loser, PA-C        magnesium hydroxide (MILK OF MAGNESIA) 400 MG/5ML suspension 30 mL  30 mL Oral Daily PRN Nannie Loser, PA-C        enoxaparin Sodium (LOVENOX) injection 30 mg  30 mg SubCUTAneous BID Nannie Loser, PA-C   30 mg at 10/08/22 0849    labetalol (NORMODYNE;TRANDATE) injection 10 mg  10 mg IntraVENous Q10 Min PRN Nannie Loser, PA-C        ondansetron (ZOFRAN) injection 4 mg  4 mg IntraVENous Q6H PRN Nannie Loser, PA-C        albuterol sulfate HFA (PROVENTIL;VENTOLIN;PROAIR) 108 (90 Base) MCG/ACT inhaler 2 puff  2 puff Inhalation Q4H PRN Gus Damon, DO        midodrine (PROAMATINE) tablet 2.5 mg  2.5 mg Oral TID PRN Jackie Paez PA-C        amoxicillin-clavulanate (AUGMENTIN) 875-125 MG per tablet 1 tablet  1 tablet Oral BID Jackie Paez PA-C   1 tablet at 10/08/22 0845    mometasone-formoterol (DULERA) 200-5 MCG/ACT inhaler 2 puff  2 puff Inhalation BID Yuly Torres PA-C   2 puff at 10/08/22 1206    perflutren lipid microspheres (DEFINITY) injection 1.65 mg  1.5 mL IntraVENous ONCE PRN Cheryl Arechiga MD           ROS : A 10-14 system review of constitutional, cardiovascular, respiratory, eyes, musculoskeletal, endocrine, GI, ENT, skin, hematological, genitourinary, psychiatric and neurologic systems was obtained and updated today and is unremarkable except as mentioned in my HPI      Exam:     Constitutional:   Vitals:    10/08/22 0720 10/08/22 0802 10/08/22 0837 10/08/22 1135   BP: (!) 180/86   (!) 175/79   Pulse: 66  52 60   Resp: 16  18 18   Temp: 98.1 °F (36.7 °C)   97.5 °F (36.4 °C)   TempSrc: Oral   Oral   SpO2: 98%  95% 97%   Weight:  205 lb 1.6 oz (93 kg)     Height:           General appearance and observation: Normal development and appear in no acute distress. Eye:  Fundus: No blurring of optic disc. Neck: supple  Cardiovascular: No lower leg edema with good pulsation. Mental Status:   Oriented to person, place, problem, and time. Memory: Good immediate recall. Intact remote memory  Normal attention span and concentration. Language: Slurred speech  poor fund of Knowledge. Aware of current events and vocabulary   Cranial Nerves:   II: Visual fields: Full. Pupils: equal, round, reactive to light  III,IV,VI: Extra Ocular Movements are intact. No nystagmus  V: Facial sensation is intact  VII: Facial strength and movements: intact and symmetric  VIII: Hearing: Intact  IX: Palate elevation is symmetric  XI: Shoulder shrug is intact  XII: Tongue movements are normal  Musculoskeletal: 5/5 in all 4 extremities. Tone: Normal tone. Reflexes: Symmetric 2+ in the arms and 1+ in the legs   Planters: flexor bilaterally. Coordination: no pronator drift, no dysmetria with FNF in upper extremities. Normal REM. Sensation: normal to all modalities in both arms and legs.   Gait/Posture: Unsteady  Data:  LABS:   Lab Results Component Value Date/Time     10/08/2022 04:52 AM    K 4.1 10/08/2022 04:52 AM     10/08/2022 04:52 AM    CO2 20 10/08/2022 04:52 AM    BUN 17 10/08/2022 04:52 AM    CREATININE 1.2 10/08/2022 04:52 AM    GFRAA >60 10/08/2022 04:52 AM    GFRAA >60 10/21/2012 07:03 AM    LABGLOM 57 10/08/2022 04:52 AM    GLUCOSE 112 10/08/2022 04:52 AM    MG 2.00 04/23/2022 05:09 AM    CALCIUM 8.6 10/08/2022 04:52 AM     Lab Results   Component Value Date/Time    WBC 4.0 10/08/2022 04:52 AM    RBC 2.87 10/08/2022 04:52 AM    HGB 9.4 10/08/2022 04:52 AM    HCT 28.3 10/08/2022 04:52 AM    MCV 98.6 10/08/2022 04:52 AM    RDW 16.1 10/08/2022 04:52 AM     10/08/2022 04:52 AM     Lab Results   Component Value Date    INR 1.13 10/07/2022    PROTIME 14.4 10/07/2022       Neuroimaging was independently reviewed by myself and discussed results with the patient  Reviewed notes from different physicians  Reviewed lab and blood testing    Impression:  Acute aphasia and slurred speech. TIA versus CVA. Hypertension, not controlled  Hyperlipidemia  Rule out COVID-19  History of carotid artery disease. Recommendation:  MRI brain  CTA head and neck was ordered  Droplet isolation  Aspirin  Statin  A1c  Lipid panel  DVT and GI prophylaxis  Resume home blood pressure medications. No need for permissive hypertension  Echo  Stroke education and secondary prevention discussed today with the patient  PT, OT and speech        Thank you for referring such patient. If you have any questions regarding my consult note, please don't hesitate to call me. Dallas Mathis MD  [phone]    This dictation was generated by voice recognition computer software.  Although all attempts are made to edit the dictation for accuracy, there may be errors in the  transcription that are not intended

## 2022-10-08 NOTE — ED PROVIDER NOTES
[facility]        Pt Name: Pooja Avelar  MRN: [id number]  [place] 1935  Date of evaluation: 10/7/2022  Provider: Kylee Hwang PA-C  PCP: Greg Duvall MD  Note Started: 9:24 PM EDT        I have seen and evaluated this patient with my supervising physician Almer Bloch, MD.    [address]       Chief Complaint   Patient presents with    Altered Mental Status     Pt brought in by  EMS from home for increasing weakness at home. Pt is awake. Last known normal 10 pm last night       HISTORY OF PRESENT ILLNESS   (Location, Timing/Onset, Context/Setting, Quality, Duration, Modifying Factors, Severity, Associated Signs and Symptoms)  Note limiting factors. Chief Complaint: Altered mental status, fatigue    Pooja Avelar is a 81 y.o. male who presents emergency department today for evaluation for altered mental status, and fatigue. The patient has a history of coronary artery disease, was previously on Plavix however was taken off of this. History of CVA and TIA in the past.  When I evaluate the patient, daughter is in the room does provide most of the history. She states that the patient reported that when he woke up yesterday he was just generally not feeling well, and has had some increasing fatigue. The daughter noticed that today his increasing fatigue had worsened, and she states that he had difficulty walking with both legs secondary to his global fatigue. She states that earlier this evening, she noticed that his speech was slurred, which is what prompted her visit to call EMS. When the patient arrives, the slurred speech has resolved, she states that he does seems to be more slow to respond, and more fatigued. The daughter states that she felt that he felt warm, although there was no documented fever. Patient is afebrile here. He has had any cough or congestion.   No abdominal pain, nausea, vomiting or diarrhea. He has no dysuria hematuria patient has no chest pain or shortness of breath. He has no headaches per no visual changes. No numbness, tingling or weakness. Patient does have multiple visitors coming including PT/OT multiple family members, and is unsure if any of them are sick. Nursing Notes were all reviewed and agreed with or any disagreements were addressed in the HPI. REVIEW OF SYSTEMS    (2-9 systems for level 4, 10 or more for level 5)     Review of Systems   Constitutional:  Positive for fatigue. Negative for activity change, appetite change, chills and fever. HENT:  Negative for congestion and rhinorrhea. Eyes:  Negative for visual disturbance. Respiratory:  Negative for cough and shortness of breath. Cardiovascular:  Negative for chest pain. Gastrointestinal:  Negative for abdominal pain, diarrhea, nausea and vomiting. Genitourinary:  Negative for difficulty urinating, dysuria and hematuria. Neurological:  Positive for speech difficulty. Negative for tremors, weakness and headaches. Positives and Pertinent negatives as per HPI. Except as noted above in the ROS, all other systems were reviewed and negative. PAST MEDICAL HISTORY     Past Medical History:   Diagnosis Date    CAD (coronary artery disease)          SURGICAL HISTORY     Past Surgical History:   Procedure Laterality Date    CARDIAC SURGERY      2008    CORONARY ARTERY BYPASS GRAFT           CURRENTMEDICATIONS       Previous Medications    ALBUTEROL (PROVENTIL HFA) 108 (90 BASE) MCG/ACT INHALER    Inhale 2 puffs into the lungs every 6 hours as needed for Wheezing. ASPIRIN 81 MG TABLET    Take 81 mg by mouth daily. ATORVASTATIN (LIPITOR) 20 MG TABLET    Take 20 mg by mouth in the morning. CLOPIDOGREL (PLAVIX) 75 MG TABLET    Take 75 mg by mouth in the morning. ESCITALOPRAM (LEXAPRO) 20 MG TABLET    Take 20 mg by mouth in the morning.     FLUTICASONE (FLONASE) 50 MCG/ACT NASAL SPRAY    1 spray by Each Nostril route daily    LEVOTHYROXINE (SYNTHROID) 125 MCG TABLET    Take 125 mcg by mouth in the morning. Lavanda Candle LORATADINE (CLARITIN) 10 MG TABLET    Take 10 mg by mouth in the morning. MONTELUKAST (SINGULAIR) 10 MG TABLET    Take 10 mg by mouth nightly    OXYBUTYNIN (DITROPAN-XL) 10 MG EXTENDED RELEASE TABLET    Take 10 mg by mouth in the morning. PANTOPRAZOLE (PROTONIX) 40 MG TABLET    Take 40 mg by mouth daily    TAMSULOSIN (FLOMAX) 0.4 MG CAPSULE    Take 0.4 mg by mouth in the morning. ALLERGIES     Patient has no known allergies. FAMILYHISTORY     History reviewed. No pertinent family history. SOCIAL HISTORY       Social History     Tobacco Use    Smoking status: Never    Smokeless tobacco: Never   Substance Use Topics    Alcohol use: No    Drug use: No       SCREENINGS   NIH Stroke Scale  Interval: Baseline  Level of Consciousness (1a): Alert  LOC Questions (1b): Answers both correctly  LOC Commands (1c): Performs both tasks correctly  Best Gaze (2): Normal  Visual (3): No visual loss  Facial Palsy (4): Normal symmetrical movement  Motor Arm, Left (5a): No drift  Motor Arm, Right (5b): No drift  Motor Leg, Left (6a): No drift  Motor Leg, Right (6b): No drift  Limb Ataxia (7): Absent  Sensory (8): Normal  Best Language (9): No aphasia  Dysarthria (10): Normal  Extinction and Inattention (11): No abnormality  Total: 0Glasgow Coma Scale  Eye Opening: Spontaneous  Best Verbal Response: Oriented  Best Motor Response: Obeys commands  Sandeep Coma Scale Score: 15        PHYSICAL EXAM    (up to 7 for level 4, 8 or more for level 5)     ED Triage Vitals [10/07/22 1736]   BP Temp Temp src Heart Rate Resp SpO2 Height Weight   (!) 170/77 98.6 °F (37 °C) -- 60 15 97 % 6' (1.829 m) 226 lb (102.5 kg)       Physical Exam  Vitals and nursing note reviewed. Constitutional:       Appearance: He is well-developed. He is not diaphoretic.    HENT:      Head: Normocephalic and atraumatic. Right Ear: External ear normal.      Left Ear: External ear normal.      Nose: Nose normal.      Mouth/Throat:      Mouth: Mucous membranes are moist.      Pharynx: Oropharynx is clear. No posterior oropharyngeal erythema. Eyes:      General:         Right eye: No discharge. Left eye: No discharge. Extraocular Movements: Extraocular movements intact. Conjunctiva/sclera: Conjunctivae normal.      Pupils: Pupils are equal, round, and reactive to light. Neck:      Trachea: No tracheal deviation. Cardiovascular:      Rate and Rhythm: Normal rate and regular rhythm. Pulmonary:      Effort: Pulmonary effort is normal. No respiratory distress. Breath sounds: Normal breath sounds. No wheezing or rales. Abdominal:      General: Bowel sounds are normal. There is no distension. Palpations: Abdomen is soft. Tenderness: There is no abdominal tenderness. There is no guarding. Musculoskeletal:         General: Normal range of motion. Cervical back: Normal range of motion and neck supple. Skin:     General: Skin is warm and dry. Neurological:      Mental Status: He is alert and oriented to person, place, and time. GCS: GCS eye subscore is 4. GCS verbal subscore is 5. GCS motor subscore is 6. Cranial Nerves: Cranial nerves 2-12 are intact. Sensory: Sensation is intact. Motor: Motor function is intact. Coordination: Coordination is intact.    Psychiatric:         Behavior: Behavior normal.       DIAGNOSTIC RESULTS   LABS:    Labs Reviewed   CBC WITH AUTO DIFFERENTIAL - Abnormal; Notable for the following components:       Result Value    RBC 3.20 (*)     Hemoglobin 10.4 (*)     Hematocrit 30.6 (*)     Lymphocytes Absolute 0.7 (*)     All other components within normal limits   COMPREHENSIVE METABOLIC PANEL W/ REFLEX TO MG FOR LOW K - Abnormal; Notable for the following components:    Sodium 133 (*)     Glucose 108 (*)     BUN 21 (*)     GFR Non- 57 (*)     ALT 7 (*)     AST 11 (*)     All other components within normal limits   BRAIN NATRIURETIC PEPTIDE - Abnormal; Notable for the following components:    Pro-BNP 1,113 (*)     All other components within normal limits   URINALYSIS WITH REFLEX TO CULTURE - Abnormal; Notable for the following components:    Protein, UA TRACE (*)     All other components within normal limits   CULTURE, BLOOD 1   CULTURE, BLOOD 2   RAPID INFLUENZA A/B ANTIGENS   TROPONIN   LACTATE, SEPSIS   PROTIME-INR   PROCALCITONIN   MICROSCOPIC URINALYSIS   COVID-19   COVID-19   COVID-19   COVID-19       When ordered only abnormal lab results are displayed. All other labs were within normal range or not returned as of this dictation. EKG: When ordered, EKG's are interpreted by the Emergency Department Physician in the absence of a cardiologist.  Please see their note for interpretation of EKG. RADIOLOGY:   Non-plain film images such as CT, Ultrasound and MRI are read by the radiologist. Plain radiographic images are visualized and preliminarily interpreted by the ED Provider with the below findings:        Interpretation per the Radiologist below, if available at the time of this note:    CT HEAD WO CONTRAST   Final Result   1. No acute intracranial abnormality. 2. Stable chronic white matter microvascular ischemic changes. XR CHEST PORTABLE   Final Result   No acute cardiopulmonary findings. Mild cardiomegaly           CT HEAD WO CONTRAST    Result Date: 10/7/2022  EXAMINATION: CT OF THE HEAD WITHOUT CONTRAST  10/7/2022 7:47 pm TECHNIQUE: CT of the head was performed without the administration of intravenous contrast. Automated exposure control, iterative reconstruction, and/or weight based adjustment of the mA/kV was utilized to reduce the radiation dose to as low as reasonably achievable.  COMPARISON: 08/15/2022 HISTORY: ORDERING SYSTEM PROVIDED HISTORY: [facility] TECHNOLOGIST PROVIDED HISTORY: Reason for exam:->AMS Has a \"code stroke\" or \"stroke alert\" been called? ->No Decision Support Exception - unselect if not a suspected or confirmed emergency medical condition->Emergency Medical Condition (MA) Reason for Exam: Altered Mental Status (Pt brought in by [facility] from home for increasing weakness at home. Pt is awake. Last known normal 10 pm last night) FINDINGS: BRAIN/VENTRICLES: There is no acute intracranial hemorrhage, mass effect or midline shift. No abnormal extra-axial fluid collection. The gray-white differentiation is maintained without evidence of an acute infarct. There is no evidence of hydrocephalus. Stable chronic white matter microvascular ischemic changes. ORBITS: The visualized portion of the orbits demonstrate no acute abnormality. SINUSES: The visualized paranasal sinuses and mastoid air cells demonstrate no acute abnormality. SOFT TISSUES/SKULL:  No acute abnormality of the visualized skull or soft tissues. 1. No acute intracranial abnormality. 2. Stable chronic white matter microvascular ischemic changes. XR CHEST PORTABLE    Result Date: 10/7/2022  EXAMINATION: ONE XRAY VIEW OF THE CHEST 10/7/2022 5:47 pm COMPARISON: Chest x-ray dated 15 August 2022 HISTORY: ORDERING SYSTEM PROVIDED HISTORY: [facility] TECHNOLOGIST PROVIDED HISTORY: Reason for exam:->ams Reason for Exam: Altered Mental Status (Pt brought in by [facility] from home for increasing weakness at home. Pt is awake. Last known normal 10 pm last night) FINDINGS: Mild cardiomegaly. No acute airspace infiltrate. No pneumothorax or pleural effusion     No acute cardiopulmonary findings.   Mild cardiomegaly           PROCEDURES   Unless otherwise noted below, none     Procedures    CRITICAL CARE TIME       CONSULTS:  None      EMERGENCY DEPARTMENT COURSE and DIFFERENTIAL DIAGNOSIS/MDM:   Vitals:    Vitals:    10/07/22 1830 10/07/22 1930 10/07/22 2000 10/07/22 2030   BP: (!) 151/72 (!) 150/68 (!) 163/66 (!) 168/65   Pulse: 57 61 56 57 Resp: 18      Temp:       SpO2: 95% 97% 96% 96%   Weight:       Height:           Patient was given the following medications:  Medications   aspirin tablet 325 mg (has no administration in time range)   0.9 % sodium chloride infusion (2,328 mLs IntraVENous New Bag 10/7/22 6425)         Is this patient to be included in the SEP-1 Core Measure due to severe sepsis or septic shock? No   Exclusion criteria - the patient is NOT to be included for SEP-1 Core Measure due to: Infection is not suspected    Briefly, this is an 52-year-old male who presents to the emergency department today for evaluation for fatigue. Patient reported that he was not feeling well yesterday, and has had general fatigue. The daughter is at bedside and does report that the patient was so fatigued that he had difficulty walking today. Daughter also reports that the patient had slurred speech, however when he arrived she states that this has resolved, and states he does have a history of TIA    Physical examination, there are no focal neurological deficits, NIH of 0    Urine shows no evidence of infection or blood. CBC shows no evidence of leukocytosis or is been stable anemia. CMP is unremarkable. Troponin negative. BNP is slightly elevated however x-ray does show cardiomegaly, no signs of CHF. Lactic acid is normal.  Procalcitonin is normal    CT of head is negative. Due to the patient's increasing weakness, and having difficulty with ambulation, I feel that he will need to be admitted for further care, evaluation, rapid flu and COVID are pending. Daughter also reports slurred speech, can certainly have a TIA component as well, aspirin given, hospitalist has been paged for admission, patient and family are updated, agreeable with plan. Stable for admission. FINAL IMPRESSION      1. TIA (transient ischemic attack)    2.  Other fatigue          DISPOSITION/PLAN   DISPOSITION Decision To Admit 10/07/2022 08:54:09 PM      PATIENT REFERRED TO:  No follow-up provider specified.     DISCHARGE MEDICATIONS:  New Prescriptions    No medications on file       DISCONTINUED MEDICATIONS:  Discontinued Medications    No medications on file              (Please note that portions of this note were completed with a voice recognition program.  Efforts were made to edit the dictations but occasionally words are mis-transcribed.)    Tung Farah PA-C (electronically signed)              Tung Farah PA-C  10/07/22 [id number]

## 2022-10-08 NOTE — PROGRESS NOTES
[facility]   Phone: [phone]    Physical Therapy    [x] Initial Evaluation            [] Daily Treatment Note         [] Discharge Summary      Patient: Bridger Finch   : 1935   MRN: [id number]   Date of Service:  10/8/2022  Admitting Diagnosis: Slurred speech    Current Admission Summary: Briefly, Bridger Finch is a 81 y.o. male  who presents to the ED complaining of malaise fatigue and lethargy since yesterday, triage note says 10 PM last night however he tells me he was feeling generally unwell all day yesterday. He denies any specific cough or cold symptoms belly pain vomiting diarrhea or fevers though. He does have a history of Alzheimer's according to previous documentation and a stroke with a facial droop in April. He does seem a little confused and disoriented on my initial assessment. However it is unclear what his baseline is as no collateral was at the bedside when I evaluated him initially. He is able to follow commands and move his arms and legs without difficulty. He says he has not fallen recently or injured himself in any other way. Past Medical History:  has a past medical history of CAD (coronary artery disease). Past Surgical History:  has a past surgical history that includes Coronary artery bypass graft and Cardiac surgery. Discharge Recommendations: Bridger Finch scored a 15/24 on the AM-PAC short mobility form. Current research shows that an AM-PAC score of 17 or less is typically not associated with a discharge to the patient's home setting. Based on the patient's AM-PAC score and their current functional mobility deficits, it is recommended that the patient have 5-7 sessions per week of Physical Therapy at d/c to increase the patient's independence.   At this time, this patient demonstrates complex nursing, medical, and rehabilitative needs, and would benefit from intensive rehabilitation services upon discharge from the Inpatient setting. This patient demonstrates the ability to participate in and benefit from an intensive therapy program with a coordinated interdisciplinary team approach to foster frequent, structured, and documented communication among disciplines, who will work together to establish, prioritize, and achieve treatment goals. Please see assessment section for further patient specific details. If patient discharges prior to next session this note will serve as a discharge summary. Please see below for the latest assessment towards goals. DME Required For Discharge: no DME required at discharge - patient has RW  Precautions/Restrictions: high fall risk  Weight Bearing Restrictions: weight bearing as tolerated  [] Right Upper Extremity  [] Left Upper Extremity [] Right Lower Extremity  [] Left Lower Extremity     Required Braces/Orthotics: no braces required   [] Right  [] Left  Positional Restrictions:no positional restrictions    Pre-Admission Information   Lives With: lives alone, but daughters are present with pt for the entire duration of the day. Son reports that daughters do not stay overnight. One of the daughters reportedly works from home and is able to be present frequently. other family close by        Type of Home: apartment, . Comment: senior living  -24 hr assist from family available  Home Layout: one level  Home Access: elevator  Bathroom Layout: walker accessible, wheelchair accessible, walk in shower, handicap height toilet  Bathroom Equipment: grab bars in shower, grab bars around toilet, shower chair, 3-in-1 commode  Home Equipment: rolling walker, single point cane, manual wheelchair - majority of time uses RW  Transfer Assistance: reports transfers \"on my own\" and uses RW or cane most of the time  Ambulation Assistance:. Comment: per chart review, pt reports \"tremors\" when walking with RW, states a family member walks with him     ADL Assistance: .   Comment: pt reports assist with bathing, reports getting self dressed   IADL Assistance: requires assistance with all homemaking tasks - daughters complete all IADL tasks   Active : [] yes             [x]no  Current Employment: . Comment: retired , [employer] - [place]  Hobbies: used to enjoy hunting and fishing   Recent Falls: none reported in last 6 months per patient - son present in room at end of session and reports pt has had \"many falls\" recently d/t decreased balance    Examination   Vision:   Vision Gross Assessment: Impaired and Vision Corrective Device: wears glasses at all times  Hearing:   hard of hearing  Observation:   General Observation:  Patient supine in bed w/ HOB elevated. Flat affect noted. Posture: Forward flexed, rounded shoulders  Sensation:   denies numbness and tingling  Proprioception:    WFL  Tone:   Normotonic  Coordination Testing:   WFL    ROM:   (B) LE AROM WFL  Unable to don socks d/t BLE inflexibility. Strength:   (B) LE strength grossly 4  Decision Making: medium complexity  Clinical Presentation: stable      Subjective  General: Patient reports ambulating with walker at home, denies falls. Son reports patient falls \"all the time\" and has for years. Pain: 0/10  Pain Interventions: not applicable       Functional Mobility  Bed Mobility  Supine to Sit: stand by assistance  Scooting: stand by assistance  Comments:  Patient used elevated HOB and bed rail with increased time to complete. Transfers  Sit to stand transfer: minimal assistance  Stand to sit transfer: minimal assistance  Bed to chair transfer: minimal assistance  Comments:  Gait belt donned. Patient attempted standing by himself x3 before completing transfer. Posterior lean against bed noted. MIN assist to transfer to chair w/ manual cues for walker position and verbal cues to stay close to walker.   Ambulation  Surface:level surface  Assistive Device: rolling walker  Assistance: moderate assistance  Distance: 10', 20'  Gait Mechanics: festinating gait, LOB posteriorly x3 requiring MOD assist  Comments:    Stair Mobility  Stair mobility not completed on this date. Comments:  Wheelchair Mobility:  No w/c mobility completed on this date. Comments:  Balance  Static Sitting Balance: fair (+): maintains balance at SBA/supervision without use of UE support  Dynamic Sitting Balance: fair (+): maintains balance at SBA/supervision without use of UE support  Static Standing Balance: fair (-): maintains balance at MIN with use of UE support  Dynamic Standing Balance: fair (-): maintains balance at MIN with use of UE support  Comments:    Other Therapeutic Interventions    Functional Outcomes  -PAC Inpatient Mobility Raw Score : 15              Cognition  Overall Cognitive Status: Impaired  Orientation:    oriented to person, oriented to place, oriented to situation, and disoriented to time   Command Following:   [facility]    Education  Barriers To Learning: cognition and hearing  Patient Education: patient educated on goals, PT role and benefits, plan of care, general safety, functional mobility training, family education, transfer training, discharge recommendations  Learning Assessment:  patient verbalizes understanding, would benefit from continued reinforcement, patient will require reinforcement due to cognitive deficits    Assessment  Activity Tolerance: Quick to fatigue with ambulation, limited ambulation distance. Impairments Requiring Therapeutic Intervention: decreased functional mobility, decreased ADL status, decreased ROM, decreased strength, decreased cognition, decreased endurance, decreased balance, decreased posture  Prognosis: fair  Clinical Assessment: Patient reports being MOD I at home w/ RW but family reports patient has had multiple falls for years. He demonstrates flat affect, bradykinesia, freezing episodes, festinating gait with frequent posterior lean/LOB. Family denies any history or diagnosis of PD.   He may benefit from neurological consult if not already ordered for TIA. Recommend 24 hour assist and continued therapy at d/c. Safety Interventions: patient left in chair, chair alarm in place, call light within reach, gait belt, patient at risk for falls, telesitter in use, nurse notified, and family/caregiver present    Plan  Frequency: 5-7 x/week  Current Treatment Recommendations: strengthening, ROM, balance training, functional mobility training, transfer training, gait training, endurance training, patient/caregiver education, safety education, and equipment evaluation/education    Goals  Patient Goals: Return home. Short Term Goals:  Time Frame: Discharge.   Patient will complete bed mobility at [facility] independent   Patient will complete transfers at supervision   Patient will ambulate 50 ft with use of rolling walker at stand by assistance    Therapy Session Time      Individual Group Co-treatment   Time In     0857   Time Out     0959   Minutes     62     Timed Code Treatment Minutes:  47 Minutes  Total Treatment Minutes:  62       Electronically Signed By: Nagi Jeffrey PT, DPT, [id number] [id number]

## 2022-10-09 LAB
ANION GAP SERPL CALCULATED.3IONS-SCNC: 10 MMOL/L (ref 3–16)
BASOPHILS ABSOLUTE: 0 K/UL (ref 0–0.2)
BASOPHILS RELATIVE PERCENT: 0.3 %
BUN BLDV-MCNC: 23 MG/DL (ref 7–20)
CALCIUM SERPL-MCNC: 9.2 MG/DL (ref 8.3–10.6)
CHLORIDE BLD-SCNC: 106 MMOL/L (ref 99–110)
CO2: 21 MMOL/L (ref 21–32)
CREAT SERPL-MCNC: 1.2 MG/DL (ref 0.8–1.3)
EOSINOPHILS ABSOLUTE: 0.3 K/UL (ref 0–0.6)
EOSINOPHILS RELATIVE PERCENT: 7.2 %
ESTIMATED AVERAGE GLUCOSE: 114 MG/DL
GFR AFRICAN AMERICAN: >60
GFR NON-AFRICAN AMERICAN: 57
GLUCOSE BLD-MCNC: 131 MG/DL (ref 70–99)
HBA1C MFR BLD: 5.6 %
HCT VFR BLD CALC: 32.1 % (ref 40.5–52.5)
HEMOGLOBIN: 10.5 G/DL (ref 13.5–17.5)
LYMPHOCYTES ABSOLUTE: 0.7 K/UL (ref 1–5.1)
LYMPHOCYTES RELATIVE PERCENT: 18.6 %
MAGNESIUM: 2.2 MG/DL (ref 1.8–2.4)
MCH RBC QN AUTO: 31.7 PG (ref 26–34)
MCHC RBC AUTO-ENTMCNC: 32.6 G/DL (ref 31–36)
MCV RBC AUTO: 97.3 FL (ref 80–100)
MONOCYTES ABSOLUTE: 0.5 K/UL (ref 0–1.3)
MONOCYTES RELATIVE PERCENT: 13 %
NEUTROPHILS ABSOLUTE: 2.3 K/UL (ref 1.7–7.7)
NEUTROPHILS RELATIVE PERCENT: 60.9 %
PDW BLD-RTO: 15.7 % (ref 12.4–15.4)
PLATELET # BLD: 152 K/UL (ref 135–450)
PMV BLD AUTO: 9 FL (ref 5–10.5)
POTASSIUM SERPL-SCNC: 4.2 MMOL/L (ref 3.5–5.1)
RBC # BLD: 3.3 M/UL (ref 4.2–5.9)
SODIUM BLD-SCNC: 137 MMOL/L (ref 136–145)
WBC # BLD: 3.8 K/UL (ref 4–11)

## 2022-10-09 PROCEDURE — 80048 BASIC METABOLIC PNL TOTAL CA: CPT

## 2022-10-09 PROCEDURE — 83735 ASSAY OF MAGNESIUM: CPT

## 2022-10-09 PROCEDURE — 51798 US URINE CAPACITY MEASURE: CPT

## 2022-10-09 PROCEDURE — 85025 COMPLETE CBC W/AUTO DIFF WBC: CPT

## 2022-10-09 PROCEDURE — 1200000000 HC SEMI PRIVATE

## 2022-10-09 PROCEDURE — 94761 N-INVAS EAR/PLS OXIMETRY MLT: CPT

## 2022-10-09 PROCEDURE — 36415 COLL VENOUS BLD VENIPUNCTURE: CPT

## 2022-10-09 PROCEDURE — 2580000003 HC RX 258: Performed by: PHYSICIAN ASSISTANT

## 2022-10-09 PROCEDURE — 94640 AIRWAY INHALATION TREATMENT: CPT

## 2022-10-09 PROCEDURE — 6370000000 HC RX 637 (ALT 250 FOR IP): Performed by: PHYSICIAN ASSISTANT

## 2022-10-09 PROCEDURE — 6360000002 HC RX W HCPCS: Performed by: PHYSICIAN ASSISTANT

## 2022-10-09 PROCEDURE — 6370000000 HC RX 637 (ALT 250 FOR IP): Performed by: INTERNAL MEDICINE

## 2022-10-09 RX ORDER — AMLODIPINE BESYLATE 5 MG/1
5 TABLET ORAL DAILY
Status: DISCONTINUED | OUTPATIENT
Start: 2022-10-09 | End: 2022-10-12 | Stop reason: HOSPADM

## 2022-10-09 RX ORDER — HYDRALAZINE HYDROCHLORIDE 25 MG/1
25 TABLET, FILM COATED ORAL EVERY 8 HOURS SCHEDULED
Status: DISCONTINUED | OUTPATIENT
Start: 2022-10-09 | End: 2022-10-12 | Stop reason: HOSPADM

## 2022-10-09 RX ADMIN — FLUTICASONE PROPIONATE 1 SPRAY: 50 SPRAY, METERED NASAL at 07:57

## 2022-10-09 RX ADMIN — MONTELUKAST SODIUM 10 MG: 10 TABLET, FILM COATED ORAL at 21:15

## 2022-10-09 RX ADMIN — AMOXICILLIN AND CLAVULANATE POTASSIUM 1 TABLET: 875; 125 TABLET, FILM COATED ORAL at 07:57

## 2022-10-09 RX ADMIN — LEVOTHYROXINE SODIUM 125 MCG: 0.03 TABLET ORAL at 06:29

## 2022-10-09 RX ADMIN — HYDRALAZINE HYDROCHLORIDE 25 MG: 25 TABLET, FILM COATED ORAL at 21:15

## 2022-10-09 RX ADMIN — SODIUM CHLORIDE, PRESERVATIVE FREE 10 ML: 5 INJECTION INTRAVENOUS at 21:15

## 2022-10-09 RX ADMIN — SODIUM CHLORIDE, PRESERVATIVE FREE 10 ML: 5 INJECTION INTRAVENOUS at 07:58

## 2022-10-09 RX ADMIN — CETIRIZINE HYDROCHLORIDE 10 MG: 10 TABLET, FILM COATED ORAL at 07:57

## 2022-10-09 RX ADMIN — OXYBUTYNIN CHLORIDE 10 MG: 5 TABLET, EXTENDED RELEASE ORAL at 07:57

## 2022-10-09 RX ADMIN — ENOXAPARIN SODIUM 30 MG: 100 INJECTION SUBCUTANEOUS at 21:15

## 2022-10-09 RX ADMIN — ASPIRIN 81 MG 81 MG: 81 TABLET ORAL at 07:57

## 2022-10-09 RX ADMIN — ENOXAPARIN SODIUM 30 MG: 100 INJECTION SUBCUTANEOUS at 07:58

## 2022-10-09 RX ADMIN — Medication 2 PUFF: at 19:12

## 2022-10-09 RX ADMIN — TAMSULOSIN HYDROCHLORIDE 0.4 MG: 0.4 CAPSULE ORAL at 07:57

## 2022-10-09 RX ADMIN — Medication 2 PUFF: at 08:43

## 2022-10-09 RX ADMIN — AMOXICILLIN AND CLAVULANATE POTASSIUM 1 TABLET: 875; 125 TABLET, FILM COATED ORAL at 21:15

## 2022-10-09 RX ADMIN — PANTOPRAZOLE SODIUM 40 MG: 40 TABLET, DELAYED RELEASE ORAL at 06:29

## 2022-10-09 RX ADMIN — AMLODIPINE BESYLATE 5 MG: 5 TABLET ORAL at 12:27

## 2022-10-09 RX ADMIN — HYDRALAZINE HYDROCHLORIDE 25 MG: 25 TABLET, FILM COATED ORAL at 12:28

## 2022-10-09 RX ADMIN — ESCITALOPRAM OXALATE 20 MG: 10 TABLET ORAL at 07:57

## 2022-10-09 RX ADMIN — ATORVASTATIN CALCIUM 20 MG: 20 TABLET, FILM COATED ORAL at 07:57

## 2022-10-09 ASSESSMENT — PAIN SCALES - GENERAL
PAINLEVEL_OUTOF10: 0

## 2022-10-09 NOTE — PLAN OF CARE
Problem: Pain  Goal: Verbalizes/displays adequate comfort level or baseline comfort level  10/9/2022 0914 by Brian Dockery RN  Outcome: Progressing  Note: Pt denies pain at this time. No acute distress noted. Will continue to assess.    10/9/2022 0242 by Quiana Da Silva RN  Outcome: Progressing

## 2022-10-09 NOTE — PROGRESS NOTES
Hospitalist Progress Note      PCP: Radha Gavin MD    Date of Admission: 10/7/2022    Chief Complaint: Speech slurring    Hospital Course:   Feels well  Speech seems to have improved  No nausea vomiting  No headache  Able to pass urine      Medications:  Reviewed      Exam:    BP (!) 173/76   Pulse 58   Temp 97.6 °F (36.4 °C) (Oral)   Resp 20   Ht 6' (1.829 m)   Wt 205 lb 1.6 oz (93 kg)   SpO2 96%   BMI 27.82 kg/m²     General appearance: No apparent distress, appears stated age and cooperative. HEENT: Pupils equal, round, and reactive to light. Conjunctivae/corneas clear. Neck: Supple, with full range of motion. No jugular venous distention. Trachea midline. Respiratory:  Normal respiratory effort. Clear to auscultation, bilaterally without RALES/WHEEZES/Rhonchi. Cardiovascular: Regular rate and rhythm with normal S1/S2 without MURMURS, rubs or gallops. Abdomen: Soft, non-tender, non-distended with normal bowel sounds. Musculoskeletal: No clubbing, cyanosis or EDEMA bilaterally. Full range of motion without deformity. Skin: Skin color, texture, turgor normal.  No rashes or lesions. Neurologic:  Neurovascularly intact without any focal sensory/motor deficits.  Cranial nerves: II-XII intact, grossly non-focal.  No change in physical exam from 10/8      Labs:   Recent Labs     10/07/22  1755 10/08/22  0452 10/09/22  0552   WBC 6.3 4.0 3.8*   HGB 10.4* 9.4* 10.5*   HCT 30.6* 28.3* 32.1*    138 152       Recent Labs     10/07/22  1755 10/08/22  0452 10/09/22  0552   * 134* 137   K 4.1 4.1 4.2    104 106   CO2 23 20* 21   BUN 21* 17 23*   CREATININE 1.2 1.2 1.2   CALCIUM 9.2 8.6 9.2       Recent Labs     10/07/22  1755   AST 11*   ALT 7*   BILITOT 0.7   ALKPHOS 87       Recent Labs     10/07/22  1755   INR 1.13       Recent Labs     10/07/22  1755   TROPONINI <0.01         Urinalysis:      Lab Results   Component Value Date/Time    NITRU Negative 10/07/2022 08:13 PM WBCUA 1 10/07/2022 08:13 PM    BACTERIA None Seen 10/07/2022 08:13 PM    RBCUA 1 10/07/2022 08:13 PM    BLOODU Negative 10/07/2022 08:13 PM    SPECGRAV 1.015 10/07/2022 08:13 PM    GLUCOSEU Negative 10/07/2022 08:13 PM       Radiology:  CTA HEAD NECK W CONTRAST   Final Result   1. 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 Robust bilateral posterior communicating   arteries are present, anatomic variant. 2. Otherwise, no evidence of large vessel occlusion or significant stenosis   in the major arteries of the head and neck. 3. Extensive paranasal sinus disease. 4. Again seen 0.6 cm ground-glass nodules in the right lung apex. See   recommendations below. RECOMMENDATIONS:   Multiple pulmonary nodules. Most severe: 6 mm right ground-glass pulmonary   nodule within the upper lobe. Recommend a non-contrast Chest CT at 3-6   months. Subsequent management based on the most suspicious nodule(s). These guidelines do not apply to immunocompromised patients and patients with   cancer. Follow up in patients with significant comorbidities as clinically   warranted. For lung cancer screening, adhere to Lung-RADS guidelines. Reference: Radiology. 2017; 284(1):228-43. MRI brain without contrast   Final Result   1. Punctate acute lacunar infarct present in the left frontal corona radiata. 2. Involutional parenchymal changes with moderate microvascular ischemic   disease. 3. Extensive paranasal sinus disease. CT HEAD WO CONTRAST   Final Result   1. No acute intracranial abnormality. 2. Stable chronic white matter microvascular ischemic changes. XR CHEST PORTABLE   Final Result   No acute cardiopulmonary findings.   Mild cardiomegaly             Assessment/Plan:    Active Hospital Problems    Diagnosis Date Noted    Acute cerebrovascular accident (CVA) ([facility] [address] [I63.9] 10/08/2022     Priority: Medium    Slurred speech [R47.81] 10/07/2022 Priority: Medium       81year-old admitted with slurring of speech    Slurred speech secondary to acute left frontal corona radiata acute infarct  CT of the head was negative  CT angiogram showed stenotic vertebral segment with collaterals  MRI confirmed a stroke  Seen by neurology  On aspirin and statin  LDL only 33  Check A1c  Continue cardiac monitor  No other stenosis seen on the CT angiogram of the neck as well   Needs an echocardiogram    Coronary artery disease s/p CABG  On aspirin and statin    Hypothyroidism  Continue Synthroid    Hypertension  Was on hydralazine  Restart hydralazine add amlodipine 5 mg    ARU referred    DVT Prophylaxis: Sc lovenox   Diet: ADULT DIET;  Regular  Code Status: Full Code    Dayan Charles MD

## 2022-10-10 LAB
ANION GAP SERPL CALCULATED.3IONS-SCNC: 12 MMOL/L (ref 3–16)
BASOPHILS ABSOLUTE: 0 K/UL (ref 0–0.2)
BASOPHILS RELATIVE PERCENT: 0.4 %
BUN BLDV-MCNC: 24 MG/DL (ref 7–20)
CALCIUM SERPL-MCNC: 9.1 MG/DL (ref 8.3–10.6)
CHLORIDE BLD-SCNC: 104 MMOL/L (ref 99–110)
CO2: 21 MMOL/L (ref 21–32)
CREAT SERPL-MCNC: 1.1 MG/DL (ref 0.8–1.3)
EOSINOPHILS ABSOLUTE: 0.3 K/UL (ref 0–0.6)
EOSINOPHILS RELATIVE PERCENT: 6.7 %
GFR AFRICAN AMERICAN: >60
GFR NON-AFRICAN AMERICAN: >60
GLUCOSE BLD-MCNC: 122 MG/DL (ref 70–99)
HCT VFR BLD CALC: 32.6 % (ref 40.5–52.5)
HEMOGLOBIN: 10.5 G/DL (ref 13.5–17.5)
LV EF: 60 %
LVEF MODALITY: NORMAL
LYMPHOCYTES ABSOLUTE: 0.8 K/UL (ref 1–5.1)
LYMPHOCYTES RELATIVE PERCENT: 18.5 %
MCH RBC QN AUTO: 31.4 PG (ref 26–34)
MCHC RBC AUTO-ENTMCNC: 32.4 G/DL (ref 31–36)
MCV RBC AUTO: 96.9 FL (ref 80–100)
MONOCYTES ABSOLUTE: 0.4 K/UL (ref 0–1.3)
MONOCYTES RELATIVE PERCENT: 9.1 %
NEUTROPHILS ABSOLUTE: 2.8 K/UL (ref 1.7–7.7)
NEUTROPHILS RELATIVE PERCENT: 65.3 %
PDW BLD-RTO: 15.6 % (ref 12.4–15.4)
PLATELET # BLD: 163 K/UL (ref 135–450)
PMV BLD AUTO: 9 FL (ref 5–10.5)
POTASSIUM REFLEX MAGNESIUM: 4.1 MMOL/L (ref 3.5–5.1)
RBC # BLD: 3.36 M/UL (ref 4.2–5.9)
SODIUM BLD-SCNC: 137 MMOL/L (ref 136–145)
WBC # BLD: 4.3 K/UL (ref 4–11)

## 2022-10-10 PROCEDURE — 6370000000 HC RX 637 (ALT 250 FOR IP): Performed by: INTERNAL MEDICINE

## 2022-10-10 PROCEDURE — 94640 AIRWAY INHALATION TREATMENT: CPT

## 2022-10-10 PROCEDURE — 94761 N-INVAS EAR/PLS OXIMETRY MLT: CPT

## 2022-10-10 PROCEDURE — 80048 BASIC METABOLIC PNL TOTAL CA: CPT

## 2022-10-10 PROCEDURE — 51798 US URINE CAPACITY MEASURE: CPT

## 2022-10-10 PROCEDURE — 93306 TTE W/DOPPLER COMPLETE: CPT

## 2022-10-10 PROCEDURE — 1200000000 HC SEMI PRIVATE

## 2022-10-10 PROCEDURE — 85025 COMPLETE CBC W/AUTO DIFF WBC: CPT

## 2022-10-10 PROCEDURE — 6360000002 HC RX W HCPCS: Performed by: PHYSICIAN ASSISTANT

## 2022-10-10 PROCEDURE — 6370000000 HC RX 637 (ALT 250 FOR IP): Performed by: PHYSICIAN ASSISTANT

## 2022-10-10 PROCEDURE — 97535 SELF CARE MNGMENT TRAINING: CPT

## 2022-10-10 PROCEDURE — 99233 SBSQ HOSP IP/OBS HIGH 50: CPT | Performed by: PSYCHIATRY & NEUROLOGY

## 2022-10-10 PROCEDURE — 36415 COLL VENOUS BLD VENIPUNCTURE: CPT

## 2022-10-10 PROCEDURE — 2580000003 HC RX 258: Performed by: PHYSICIAN ASSISTANT

## 2022-10-10 RX ORDER — AMLODIPINE BESYLATE 5 MG/1
5 TABLET ORAL DAILY
Qty: 30 TABLET | Refills: 3 | Status: ON HOLD | OUTPATIENT
Start: 2022-10-11 | End: 2022-10-18

## 2022-10-10 RX ORDER — HYDRALAZINE HYDROCHLORIDE 25 MG/1
50 TABLET, FILM COATED ORAL ONCE
Status: COMPLETED | OUTPATIENT
Start: 2022-10-10 | End: 2022-10-10

## 2022-10-10 RX ORDER — HYDRALAZINE HYDROCHLORIDE 25 MG/1
25 TABLET, FILM COATED ORAL EVERY 8 HOURS SCHEDULED
Qty: 90 TABLET | Refills: 3 | Status: ON HOLD | OUTPATIENT
Start: 2022-10-10 | End: 2022-10-18

## 2022-10-10 RX ADMIN — SODIUM CHLORIDE, PRESERVATIVE FREE 10 ML: 5 INJECTION INTRAVENOUS at 21:49

## 2022-10-10 RX ADMIN — HYDRALAZINE HYDROCHLORIDE 25 MG: 25 TABLET, FILM COATED ORAL at 21:44

## 2022-10-10 RX ADMIN — Medication 2 PUFF: at 19:55

## 2022-10-10 RX ADMIN — SODIUM CHLORIDE, PRESERVATIVE FREE 10 ML: 5 INJECTION INTRAVENOUS at 10:31

## 2022-10-10 RX ADMIN — AMOXICILLIN AND CLAVULANATE POTASSIUM 1 TABLET: 875; 125 TABLET, FILM COATED ORAL at 10:30

## 2022-10-10 RX ADMIN — ESCITALOPRAM OXALATE 20 MG: 10 TABLET ORAL at 10:30

## 2022-10-10 RX ADMIN — HYDRALAZINE HYDROCHLORIDE 50 MG: 25 TABLET, FILM COATED ORAL at 04:05

## 2022-10-10 RX ADMIN — ENOXAPARIN SODIUM 30 MG: 100 INJECTION SUBCUTANEOUS at 10:30

## 2022-10-10 RX ADMIN — OXYBUTYNIN CHLORIDE 10 MG: 5 TABLET, EXTENDED RELEASE ORAL at 10:30

## 2022-10-10 RX ADMIN — AMOXICILLIN AND CLAVULANATE POTASSIUM 1 TABLET: 875; 125 TABLET, FILM COATED ORAL at 21:44

## 2022-10-10 RX ADMIN — FLUTICASONE PROPIONATE 1 SPRAY: 50 SPRAY, METERED NASAL at 10:31

## 2022-10-10 RX ADMIN — AMLODIPINE BESYLATE 5 MG: 5 TABLET ORAL at 10:30

## 2022-10-10 RX ADMIN — MONTELUKAST SODIUM 10 MG: 10 TABLET, FILM COATED ORAL at 21:44

## 2022-10-10 RX ADMIN — ENOXAPARIN SODIUM 30 MG: 100 INJECTION SUBCUTANEOUS at 21:45

## 2022-10-10 RX ADMIN — TAMSULOSIN HYDROCHLORIDE 0.4 MG: 0.4 CAPSULE ORAL at 10:30

## 2022-10-10 RX ADMIN — LEVOTHYROXINE SODIUM 125 MCG: 0.03 TABLET ORAL at 05:21

## 2022-10-10 RX ADMIN — HYDRALAZINE HYDROCHLORIDE 25 MG: 25 TABLET, FILM COATED ORAL at 15:48

## 2022-10-10 RX ADMIN — ASPIRIN 81 MG 81 MG: 81 TABLET ORAL at 10:30

## 2022-10-10 RX ADMIN — ATORVASTATIN CALCIUM 20 MG: 20 TABLET, FILM COATED ORAL at 10:30

## 2022-10-10 RX ADMIN — CETIRIZINE HYDROCHLORIDE 10 MG: 10 TABLET, FILM COATED ORAL at 10:31

## 2022-10-10 RX ADMIN — PANTOPRAZOLE SODIUM 40 MG: 40 TABLET, DELAYED RELEASE ORAL at 05:21

## 2022-10-10 ASSESSMENT — PAIN SCALES - GENERAL: PAINLEVEL_OUTOF10: 0

## 2022-10-10 NOTE — CARE COORDINATION
Precertification request for ARU initiated with payor, Reference number:  [id number]. ARU will continue to follow progress and update discharge plan as needed.     DIDIER JuanN, .[phone]

## 2022-10-10 NOTE — CONSULTS
reactions. ENDOCRINE: negative for weight changes and diabetic symptoms including polyuria, polydipsia and polyphagia. MUSCULOSKELETAL: negative for pain, joint swelling, decreased range of motion and muscle weakness. NEUROLOGICAL: negative for headaches, slurred speech, unilateral weakness. PSYCHIATRIC/BEHAVIORAL: negative for hallucinations, behavioral problems, confusion and agitation. All pertinent positives are noted in the HPI. Physical Examination:  Vitals: Patient Vitals for the past 24 hrs:   BP Temp Temp src Pulse Resp SpO2 Weight   10/10/22 0815 -- -- -- -- -- -- 200 lb 6.4 oz (90.9 kg)   10/10/22 0751 (!) 187/75 97.9 °F (36.6 °C) Oral 59 20 98 % --   10/10/22 0515 (!) 158/87 -- -- -- -- -- --   10/10/22 0341 (!) 182/78 98.2 °F (36.8 °C) Oral 62 16 98 % --   10/10/22 0024 (!) 163/74 97.7 °F (36.5 °C) Oral 55 18 93 % --   10/09/22 2115 (!) 168/71 -- -- -- -- -- --   10/09/22 1920 (!) 154/81 97.5 °F (36.4 °C) Oral 55 18 97 % --   10/09/22 1912 -- -- -- -- -- 98 % --   10/09/22 1623 (!) 170/76 97.7 °F (36.5 °C) Oral 59 18 96 % --   10/09/22 1224 (!) 149/66 97.5 °F (36.4 °C) Oral 59 18 97 % --     Psych: Stable mood, normal affect. Const: No distress  Eyes: Conjunctiva noninjected, no icterus noted; pupils equal, round. HENT: Atraumatic, normocephalic; Oral mucosa moist  Neck: Trachea midline, neck supple. No thyromegaly noted. CV: No audible murmurs  Resp: No increased WOB, no audible wheezing   GI: Nondistended   Neuro: Alert, oriented to person and place, appropriate. Higher level cognitive deficits appreciated  Skin: No visible abnormalities  MSK: No joint abnormalities noted. Ext: No significant edema appreciated. No varicosities.     Lab Results   Component Value Date    WBC 4.3 10/10/2022    HGB 10.5 (L) 10/10/2022    HCT 32.6 (L) 10/10/2022    MCV 96.9 10/10/2022     10/10/2022     Lab Results   Component Value Date    INR 1.13 10/07/2022    INR 1.26 (H) 04/22/2022 PROTIME 14.4 10/07/2022    PROTIME 14.4 (H) 04/22/2022     Lab Results   Component Value Date    CREATININE 1.1 10/10/2022    BUN 24 (H) 10/10/2022     10/10/2022    K 4.1 10/10/2022     10/10/2022    CO2 21 10/10/2022     Lab Results   Component Value Date    ALT 7 (L) 10/07/2022    AST 11 (L) 10/07/2022    ALKPHOS 87 10/07/2022    BILITOT 0.7 10/07/2022       Most recent imaging studies revealed   EXAMINATION:   MRI OF THE BRAIN WITHOUT CONTRAST  10/8/2022 1:02 pm       TECHNIQUE:   Multiplanar multisequence MRI of the brain was performed without the   administration of intravenous contrast.       COMPARISON:   04/23/2022       HISTORY:   ORDERING SYSTEM PROVIDED HISTORY: stroke like symptoms   TECHNOLOGIST PROVIDED HISTORY:   Reason for exam:->stroke like symptoms   Reason for Exam: Slurred speech, weakness, unable to walk. FINDINGS:   INTRACRANIAL STRUCTURES/VENTRICLES: Punctate acute lacunar infarct present in   the left frontal corona radiata. No mass effect or midline shift. No   evidence of an acute intracranial hemorrhage. Scattered periventricular,   deep, and subcortical white matter T2/FLAIR hyperintensities are nonspecific   and likely related to microvascular ischemic disease. Involutional   parenchymal changes. No evidence of hydrocephalus. The sellar/suprasellar   regions appear unremarkable. The normal signal voids within the major   intracranial vessels appear maintained. ORBITS: The visualized portion of the orbits demonstrate no acute   abnormality. Bilateral lens replacement. SINUSES: Extensive paranasal sinus disease with near complete opacification   of the ethmoid sinuses. The mastoid air cells appear clear. BONES/SOFT TISSUES: The bone marrow signal intensity appears normal. The soft   tissues demonstrate no acute abnormality. Impression   1. Punctate acute lacunar infarct present in the left frontal corona radiata.    2. Involutional parenchymal changes with moderate microvascular ischemic   disease. 3. Extensive paranasal sinus disease. The above laboratory data have been reviewed. The above imaging data have been reviewed. The above medical testing have been reviewed. Body mass index is 27.18 kg/m². Assessment and Plan:  Acute left corona radiata infarct: ASA, statin. PT/OT/SLP  Dementia: SLP  Hypothyroidism  HTN  COPD  HLD  Depression    Dispo: Patient is an appropriate ARU candidate. Able to tolerate the required 3 hours of therapy in an ARU setting. Precert to be initiated today. We will continue to follow. Thank you for the consultation. Emily Loya MD 10/10/2022, 9:35 AM     * This document was created using dictation software. While all precautions were taken to ensure accuracy, errors may have occurred. Please disregard any typographical errors.

## 2022-10-10 NOTE — DISCHARGE INSTR - COC
Continuity of Care Form    Patient Name: Drew Metcalf   :  1935  MRN:  [id number]    Admit date:  10/7/2022  Discharge date:  10/12/2022    Code Status Order: Full Code   Advance Directives:     Admitting Physician:  Areli Hernandez MD  PCP: Darlene Tyler MD    Discharging Nurse: [facility] Unit/Room#: [id number]-6737/7512-11  Discharging Unit Phone Number: [phone]    Emergency Contact:   Extended Emergency Contact Information  Primary Emergency Contact: Sylvia Dc [facility] [address] JUDITH QUIROGA)  Home Phone: [phone]  Relation: Child  Secondary Emergency Contact: Luxr [place] Phone: [phone]  Relation: Child    Past Surgical History:  Past Surgical History:   Procedure Laterality Date    CARDIAC SURGERY          CORONARY ARTERY BYPASS GRAFT         Immunization History:   Immunization History   Administered Date(s) Administered    COVID-19, PFIZER PURPLE top, DILUTE for use, (age 15 y+), 30mcg/0.3mL 2021, 2021, 2022    Influenza Virus Vaccine 10/18/2012    Pneumococcal Conjugate 7-valent (Gene [place]) 2002       Active Problems:  Patient Active Problem List   Diagnosis Code    CAD (coronary artery disease) I25.10    Bilateral carotid artery stenosis I65.23    Stage 2 chronic kidney disease N18.2    HTN (hypertension), benign I10    Dyslipidemia E78.5    Dementia due to Alzheimer's disease ([facility] [address] G30.9, F02.80    Unable to care for self Z78.9    Unable to ambulate R26.2    Slurred speech R47.81    Acute cerebrovascular accident (CVA) ([facility] [address] I63.9       Isolation/Infection:   Isolation            No Isolation          Patient Infection Status       Infection Onset Added Last Indicated Last Indicated By Review Planned Expiration Resolved Resolved By    None active    Resolved    C-diff Rule Out 10/08/22 10/08/22 10/08/22 Clostridium difficile toxin/antigen (Ordered)   10/10/22 Audra Bailon RN    Order     COVID-19 (Rule Out) 10/07/22 10/07/22 10/07/22 COVID-19 (Ordered)   10/08/22 Rule-Out Test Resulted    COVID-19 (Rule Out) 08/15/22 08/15/22 08/15/22 COVID-19, Rapid (Ordered)   08/15/22 Rule-Out Test Resulted            Nurse Assessment:  Last Vital Signs: /62   Pulse 54   Temp 97.8 °F (36.6 °C) (Oral)   Resp 20   Ht 6' (1.829 m)   Wt 200 lb 6.4 oz (90.9 kg)   SpO2 97%   BMI 27.18 kg/m²     Last documented pain score (0-10 scale): Pain Level: 0  Last Weight:   Wt Readings from Last 1 Encounters:   10/10/22 200 lb 6.4 oz (90.9 kg)     Mental Status:  alert and coherent    IV Access:  - Peripheral IV - site  R Antecubital, insertion date: ***    Nursing Mobility/ADLs:  Walking   Assisted  Transfer  Assisted  Bathing  Assisted  Dressing  Assisted  Toileting  Assisted  Feeding  Independent  Med Admin  Independent  Med Delivery   whole    Wound Care Documentation and Therapy:  Wound 10/08/22 Buttocks Left Nonblanchable dark purple bruise. (Active)   Dressing/Treatment Open to air 10/10/22 0342   Wound Assessment Purple/maroon;Non-blanchable erythema 10/08/22 0720   Drainage Amount None 10/08/22 0720   Annel-wound Assessment Intact;Dry/flaky 10/08/22 0720   Number of days: 2        Elimination:  Continence: Bowel: Yes  Bladder: Yes  Urinary Catheter: None   Colostomy/Ileostomy/Ileal Conduit: No       Date of Last BM: 10/11/2022    Intake/Output Summary (Last 24 hours) at 10/10/2022 1311  Last data filed at 10/10/2022 1258  Gross per 24 hour   Intake 370 ml   Output 1050 ml   Net -680 ml     I/O last 3 completed shifts: In: 10 [I.V.:10]  Out: 1700 [Urine:1700]    Safety Concerns:      At Risk for Falls    Impairments/Disabilities:      None    Nutrition Therapy:  Current Nutrition Therapy:   - Oral Diet:  General    Routes of Feeding: Oral  Liquids: No Restrictions  Daily Fluid Restriction: no  Last Modified Barium Swallow with Video (Video Swallowing Test): not done    Treatments at the Time of Hospital Discharge:   Respiratory Treatments: na    Oxygen Therapy:  is not on home oxygen therapy. Ventilator:    - No ventilator support    Rehab Therapies: Physical Therapy  Weight Bearing Status/Restrictions: No weight bearing restrictions  Other Medical Equipment (for information only, NOT a DME order):  walker  Other Treatments: na      Patient's personal belongings (please select all that are sent with patient):  Glasses    RN SIGNATURE:  Electronically signed by Tamia Chase RN on 10/12/22 at 3:55 PM EDT    CASE MANAGEMENT/SOCIAL WORK SECTION    Inpatient Status Date: ***    Readmission Risk Assessment Score:  Readmission Risk              Risk of Unplanned Readmission:  21           Discharging to Facility/ Agency   Name:   Address:  Phone:  Fax:    Dialysis Facility (if applicable)   Name:  Address:  Dialysis Schedule:  Phone:  Fax:    / signature: {Esignature:[id number]}    PHYSICIAN SECTION    Prognosis: Good    Condition at Discharge: Stable    Rehab Potential (if transferring to Rehab): Good    Recommended Labs or Other Treatments After Discharge: none     Physician Certification: I certify the above information and transfer of Umesh Gautam  is necessary for the continuing treatment of the diagnosis listed and that he requires Acute Rehab for greater 30 days.      Update Admission H&P: No change in H&P    PHYSICIAN SIGNATURE:  Electronically signed by Sierra Pemberton MD on 10/10/22 at 1:12 PM EDT

## 2022-10-10 NOTE — PROGRESS NOTES
Jigar Shawanda  Neurology Follow-up  [facility] Neurology    Date of Service: 10/10/2022    Subjective:   CC: Follow up today regarding: Acute dysarthria and new stroke    Events noted. Chart and lab reviewed. The patient is about the same. Slightly dysarthric and unsteady. MRI showed small left subcortical lacunar stroke. No headache, dysphagia or neck or back pain. No chest pain. Other review of system was unremarkable. Discussed with family. Family history: Reviewed , noncontributory  ROS : A 10-12 system review obtained and updated today and is unremarkable except as mentioned  in my interval history.          Past Medical History:   Diagnosis Date    CAD (coronary artery disease)      Current Facility-Administered Medications   Medication Dose Route Frequency Provider Last Rate Last Admin    amLODIPine (NORVASC) tablet 5 mg  5 mg Oral Daily Joseluis Dominguez MD   5 mg at 10/10/22 1030    hydrALAZINE (APRESOLINE) tablet 25 mg  25 mg Oral 3 times per day Joseluis Dominguez MD   25 mg at 10/09/22 2115    aspirin chewable tablet 81 mg  81 mg Oral Daily DANISHA Watson-C   81 mg at 10/10/22 1030    atorvastatin (LIPITOR) tablet 20 mg  20 mg Oral Daily DANISHA Watson-C   20 mg at 10/10/22 1030    escitalopram (LEXAPRO) tablet 20 mg  20 mg Oral Daily DANISHA Watson-C   20 mg at 10/10/22 1030    fluticasone (FLONASE) 50 MCG/ACT nasal spray 1 spray  1 spray Each Nostril Daily TRAVIS WatsonC   1 spray at 10/10/22 1031    levothyroxine (SYNTHROID) tablet 125 mcg  125 mcg Oral Daily DANISHA Watson-C   125 mcg at 10/10/22 0521    cetirizine (ZYRTEC) tablet 10 mg  10 mg Oral Daily DANISHA Watson-C   10 mg at 10/10/22 1031    montelukast (SINGULAIR) tablet 10 mg  10 mg Oral Nightly Tim Bishop PA-C   10 mg at 10/09/22 2115    oxybutynin (DITROPAN-XL) extended release tablet 10 mg  10 mg Oral Daily DANISHA Watson-C   10 mg at 10/10/22 1030    pantoprazole (PROTONIX) tablet 40 mg  40 mg Oral QAM AC Bernadette Alarcon PA-C   40 mg at 10/10/22 0521    tamsulosin (FLOMAX) capsule 0.4 mg  0.4 mg Oral Daily Bernadette Alarcon PA-C   0.4 mg at 10/10/22 1030    sodium chloride flush 0.9 % injection 10 mL  10 mL IntraVENous 2 times per day Bernadette Alarcon PA-C   10 mL at 10/10/22 1031    sodium chloride flush 0.9 % injection 10 mL  10 mL IntraVENous PRN Bernadette Alarcon PA-C        0.9 % sodium chloride infusion   IntraVENous PRN Bernadette Alarcon PA-C        acetaminophen (TYLENOL) tablet 650 mg  650 mg Oral Q4H PRN Bernadette Alarcon PA-C        Or    acetaminophen (TYLENOL) suppository 650 mg  650 mg Rectal Q4H PRN Bernadette Alarcon PA-C        magnesium hydroxide (MILK OF MAGNESIA) 400 MG/5ML suspension 30 mL  30 mL Oral Daily PRN Bernadette Alarcon PA-C        enoxaparin Sodium (LOVENOX) injection 30 mg  30 mg SubCUTAneous BID Bernadette Alarcon PA-C   30 mg at 10/10/22 1030    labetalol (NORMODYNE;TRANDATE) injection 10 mg  10 mg IntraVENous Q10 Min PRN Bernadette Alarcon PA-C        ondansetron (ZOFRAN) injection 4 mg  4 mg IntraVENous Q6H PRN Bernadette Alarcon PA-C        albuterol sulfate HFA (PROVENTIL;VENTOLIN;PROAIR) 108 (90 Base) MCG/ACT inhaler 2 puff  2 puff Inhalation Q4H PRN Gsu Damon,         midodrine (PROAMATINE) tablet 2.5 mg  2.5 mg Oral TID PRN Bernadette Alarcon PA-C        amoxicillin-clavulanate (AUGMENTIN) 875-125 MG per tablet 1 tablet  1 tablet Oral BID Bernadette Alarcon PA-C   1 tablet at 10/10/22 1030    mometasone-formoterol (DULERA) 200-5 MCG/ACT inhaler 2 puff  2 puff Inhalation BID Bernadette Alarcon PA-C   2 puff at 10/09/22 1912    perflutren lipid microspheres (DEFINITY) injection 1.65 mg  1.5 mL IntraVENous ONCE PRN Josiane Calkin, MD         No Known Allergies   reports that he has never smoked. He has never used smokeless tobacco. He reports that he does not drink alcohol and does not use drugs.        Objective:  Exam: Constitutional:   Vitals:    10/10/22 0515 10/10/22 0751 10/10/22 0815 10/10/22 1131   BP: (!) 158/87 (!) 187/75  133/62   Pulse:  59  54   Resp:  20  20   Temp:  97.9 °F (36.6 °C)  97.8 °F (36.6 °C)   TempSrc:  Oral  Oral   SpO2:  98%  97%   Weight:   200 lb 6.4 oz (90.9 kg)    Height:         General appearance:  Normal development and appear in no acute distress. Mental Status:   Oriented to person and place  For immediate recall and fund of knowledge  Slurred speech  Cranial Nerves:   II: Visual fields: Full. Pupils: equal, round, reactive to light  III,IV,VI: Extra Ocular Movements are intact. No nystagmus  V: Facial sensation is intact  VII: Facial strength and movements: intact and symmetric  IX: Palate elevation is symmetric  XI: Shoulder shrug is intact  XII: Tongue movements are normal  Musculoskeletal: 5/5 in all 4 extremities. Tone: Normal tone. Reflexes: Symmetric 2+ in both arms and 1 in his legs. Coordination: no pronator drift, no dysmetria with FNF  Sensation: normal to all modalities in both arms and legs.   Gait/Posture: unsteady gait        Data:  LABS:   Lab Results   Component Value Date/Time     10/10/2022 05:37 AM    K 4.1 10/10/2022 05:37 AM     10/10/2022 05:37 AM    CO2 21 10/10/2022 05:37 AM    BUN 24 10/10/2022 05:37 AM    CREATININE 1.1 10/10/2022 05:37 AM    GFRAA >60 10/10/2022 05:37 AM    GFRAA >60 10/21/2012 07:03 AM    LABGLOM >60 10/10/2022 05:37 AM    GLUCOSE 122 10/10/2022 05:37 AM    MG 2.20 10/09/2022 05:52 AM    CALCIUM 9.1 10/10/2022 05:37 AM     Lab Results   Component Value Date/Time    WBC 4.3 10/10/2022 05:37 AM    RBC 3.36 10/10/2022 05:37 AM    HGB 10.5 10/10/2022 05:37 AM    HCT 32.6 10/10/2022 05:37 AM    MCV 96.9 10/10/2022 05:37 AM    RDW 15.6 10/10/2022 05:37 AM     10/10/2022 05:37 AM     Lab Results   Component Value Date    INR 1.13 10/07/2022    PROTIME 14.4 10/07/2022       Neuroimaging was independently reviewed by me and discussed results with the patient and his family  I reviewed blood testing and other test results and discussed results with the patient      Impression:  Acute dysarthria secondary to small left subcortical frontal stroke  Hypertension  Hyperlipidemia  Chronic cognitive impairment  Left vertebral stenosis. Maximize medical therapy. Recommendation  Stroke education and prevention discussed  PT and OT  Inpatient rehab  Speech evaluation  Aspirin  Statin  Continue current blood pressure medications  Telemetry  DVT and GI prophylaxis  Echo  Follow-up on his lung nodules. No further recommendation           Jorge Reed MD   [phone]      This dictation was generated by voice recognition computer software. Although all attempts are made to edit the dictation for accuracy, there may be errors in the transcription that are not intended.

## 2022-10-10 NOTE — PROGRESS NOTES
[facility]   Phone: [phone]    Occupational Therapy    [] Initial Evaluation            [x] Daily Treatment Note         [] Discharge Summary      Patient: Ton Cardona   : 1935   MRN: [id number]   Date of Service:  10/10/2022    Admitting Diagnosis:  Slurred speech  Current Admission Summary: Ton Cardona is a 81 y.o. male who presented to ED for evaluation of slurred speech and generalized weakness/fatigue. Patient is a poor historian at this time. No family currently at bedside to provide history but daughter was at bedside in ED and provided supplemental history. Most of the information is derived from conversation with the emergency room PA and review of records. Daughter reports she noticed patient seemed more fatigued and weak today. He had difficulty walking due to weakness. She also noticed slurred speech. She denies any additional concerns. Patient denies any complaints at this time. Of note, patient was started on Augmentin on 10/6 for acute sinusitis, end date 10/20. Past Medical History:  has a past medical history of CAD (coronary artery disease). Past Surgical History:  has a past surgical history that includes Coronary artery bypass graft and Cardiac surgery. Discharge Recommendations: Ton Cardona scored a 16/24 on the AM-PAC ADL Inpatient form. Current research shows that an AM-PAC score of 17 or less is typically not associated with a discharge to the patient's home setting. Based on the patient's AM-PAC score and their current ADL deficits, it is recommended that the patient have 5-7 sessions per week of Occupational Therapy at d/c to increase the patient's independence. At this time, this patient demonstrates complex nursing, medical, and rehabilitative needs, and would benefit from intensive rehabilitation services upon discharge from the Inpatient setting.   This patient demonstrates the ability to participate in and benefit from an intensive therapy program with a coordinated interdisciplinary team approach to foster frequent, structured, and documented communication among disciplines, who will work together to establish, prioritize, and achieve treatment goals. Please see assessment section for further patient specific details. If patient discharges prior to next session this note will serve as a discharge summary. Please see below for the latest assessment towards goals. DME Required For Discharge: patient has all required DME for discharge    Precautions/Restrictions: medium fall risk  Weight Bearing Restrictions: no restrictions  [] Right Upper Extremity  [] Left Upper Extremity [] Right Lower Extremity  [] Left Lower Extremity     Required Braces/Orthotics: no braces required   [] Right  [] Left  Positional Restrictions:no positional restrictions    Pre-Admission Information   Lives With: lives alone, but daughters are present with pt for the entire duration of the day. Son reports that daughters do not stay overnight. One of the daughters reportedly works from home and is able to be present frequently. other family close by        Type of Home: apartment, . Comment: senior living  -24 hr assist from family available  Home Layout: one level  Home Access: elevator  Bathroom Layout: walker accessible, wheelchair accessible, walk in shower, handicap height toilet  Bathroom Equipment: grab bars in shower, grab bars around toilet, shower chair, 3-in-1 commode  Home Equipment: rolling walker, single point cane, manual wheelchair - majority of time uses RW  Transfer Assistance: reports transfers \"on my own\" and uses RW or cane most of the time  Ambulation Assistance:. Comment: per chart review, pt reports \"tremors\" when walking with RW, states a family member walks with him     ADL Assistance: .   Comment: pt reports assist with bathing, reports getting self dressed   IADL Assistance: requires assistance with all homemaking tasks - daughters complete all IADL tasks   Active : [] yes             [x]no  Current Employment: . Comment: retired , [address] [employer] -   Hobbies: used to enjoy hunting and fishing   Recent Falls: none reported in last 6 months per patient - son present in room at end of session and reports pt has had \"many falls\" recently d/t decreased balance        Subjective: Pt seated in recliner upon entry with urgency to urinate. Pt is impulsive and requires vcs for safety. Pt agreeable to therapy session; pt with flat affect. General: Pt sit to stand Mod A (on 3rd attempt). Pt ambulated with walker to bathroom toilet ~12ft at Mod A for balance and walker mgmt; pt required extensive vcs for maneuvering and staying inside walker. Pt toilet transfer Mod A, pt Max A for toileting. Pt doffed brief and attempted to don brief multiple times and consecutively placed RLE into left brief opening. Pt did compete front hygiene care seated on toilet. Pt required assist  for rear hygiene care in stance. Pt in stance Mod A to wash hands at sink. Pt Mod A ambulating back to recliner with max vcs to back up to recliner, pt very fixated on walker and pushing it forward when pt was to be backing up to chair so walker was taken away as it was a distraction to backing up. Pt stand to sit Mod A. Pt doffed  socks, donned  socks and threaded brief (extended time and max vc for sequencing). Pt sit to stand Mod A (on 3rd attempt) and in stance Mod A while this writer assist with brief mgmt. Pt stand to sit Mod A. Call light in reach and chair alarm on. Pain: 0/10, denies pain at beginning of session   Pain Interventions: not applicable        Activities of Daily Living  Basic Activities of Daily Living  Grooming: moderate assistance requires verbal cueing Increased time to complete task Comment:  To wash hands at sink pt required Mod A for dynamic standing balance  Grooming Comments: hand hygiene standing at sink   Lower Extremity Dressing: setup assistance moderate assistance requires verbal cueing Increased time to complete task  Dressing Comments: pt with extensive time and repeated vcs for correct threading of BLEs into brief. Toileting: maximum assistance. Toileting Equipment: none  Toileting Comments: modA for standing balance during hygiene care/brief mgmt  General Comments: increased time to complete, verbal cues for increased safety, initiation, and sequencing. Instrumental Activities of Daily Living  No IADL completed on this date. Functional Mobility  Bed Mobility  Bed mobility not completed on this date. Transfers  Sit to stand transfer:moderate assistance  Stand to sit transfer: moderate assistance  Toilet transfer: moderate assistance  Toilet transfer equipment: standard toilet, grab bars, walker  Toilet transfer comments: use of grab bars, cues for hand placement and sequencing/technique   Comments: Increased time to complete and cues throughout. Functional Mobility:  Sitting Balance: stand by assistance. Standing Balance: minimal assistance, moderate assistance. Standing Balance Comment: up to Mod A for dynamic standing balance to wash hands at sink  Functional Mobility: .  moderate assistance  Functional Mobility Activity: to/from bathroom  Functional Mobility Device Use: standard walker  Functional Mobility Comment:  Pt with posterior lean in standing stance. Max vcs for positioning of walker. Shuffling gait and short steps noted. Significantly increased time to complete.        Other Therapeutic Interventions    Functional Outcomes  AM-PAC Inpatient Daily Activity Raw Score: 16    Cognition  Overall Cognitive Status: Impaired  Arousal/Alterness: delayed responses to stimuli, inconsistent responses to stimuli  Following Commands: follows one step commands with repetition, follows one step commands with increased time  Attention Span: difficulty attending to directions, difficulty dividing attention  Memory: decreased short term memory, decreased long term memory  Safety Judgement: decreased awareness of need for assistance, decreased awareness of need for safety  Problem Solving: assistance required to generate solutions, assistance required to implement solutions, assistance required to identify errors made, assistance required to correct errors made  Insights: decreased awareness of deficits  Initiation: requires cues for some  Sequencing: requires cues for some  Comments: Pt with slow response to questions, decreased arousal level, requires increased stimuli for appropriate responses   Orientation:    oriented to person, oriented to place, oriented to time, and disoriented to situation-   Command Following:   impaired     Education  Barriers To Learning: cognition and hearing  Patient Education: patient educated on goals, OT role and benefits, plan of care, precautions, ADL adaptive strategies, transfer training, discharge recommendations  Learning Assessment:  patient verbalizes understanding, would benefit from continued reinforcement, patient will require reinforcement due to cognitive deficits    Assessment  Activity Tolerance: good  Impairments Requiring Therapeutic Intervention: decreased functional mobility, decreased ADL status, decreased strength, decreased safety awareness, decreased cognition, decreased endurance, decreased balance, decreased IADL, decreased coordination  Prognosis: good  Clinical Assessment: Pt presents with the above listed deficits and is currently functioning below his baseline functioning level. Pt Mod A for functional transfers, functional mobility and dynamic standing balance during ADLs. Pt would benefit from continued skilled occupational therapy services at this time to return to PLOF and increased safety and independence in daily occupations.    Safety Interventions: patient left in chair, chair alarm in place, call light within reach, gait belt, telesitter in use, and nurse notified    Plan  Frequency: 5-7 x/week  Current Treatment Recommendations: strengthening, ROM, balance training, functional mobility training, transfer training, endurance training, patient/caregiver education, ADL/self-care training, cognitive/perceptual training, home exercise program, safety education, and positioning    Goals  Patient Goals: did not state this date    Short Term Goals:  Time Frame: by discharge   Patient will complete upper body ADL at stand by assistance   Patient will complete lower body ADL at minimal assistance   Patient will complete toileting at minimal assistance   Patient will complete grooming at supervision   Patient will complete functional transfers at supervision   Patient will complete functional mobility at supervision   Patient will increase functional standing balance to supervision for improved ADL completion    No goals met 10/10, continue all goals    Therapy Session Time     Individual Group Co-treatment   Time In 1137     Time Out 1215     Minutes 38          Timed Code Treatment Minutes: 38 minutes  Total Treatment Minutes:  38 Minutes        Electronically [id number] [address] Valdez, [address] [place] [address]

## 2022-10-10 NOTE — PROGRESS NOTES
Notified Dr. Nuzhat Marshall pt's last BP was 182/78 and heart rate runs 50-60's on telemetry. Requested something PRN for BP that would not decrease HR but only BP. Awaiting a response.

## 2022-10-10 NOTE — PROGRESS NOTES
Hospitalist Progress Note      PCP: Karey Pisano MD    Date of Admission: 10/7/2022    Chief Complaint: Speech slurring    Hospital Course:   Feels well  No chest pain no shortness of breath  No nausea vomiting  No headache    Medications:  Reviewed      Exam:    /62   Pulse 54   Temp 97.8 °F (36.6 °C) (Oral)   Resp 20   Ht 6' (1.829 m)   Wt 200 lb 6.4 oz (90.9 kg)   SpO2 97%   BMI 27.18 kg/m²     General appearance: No apparent distress, appears stated age and cooperative. HEENT: Pupils equal, round, and reactive to light. Conjunctivae/corneas clear. Neck: Supple, with full range of motion. No jugular venous distention. Trachea midline. Respiratory:  Normal respiratory effort. Clear to auscultation, bilaterally without RALES/WHEEZES/Rhonchi. Cardiovascular: Regular rate and rhythm with normal S1/S2 without MURMURS, rubs or gallops. Abdomen: Soft, non-tender, non-distended with normal bowel sounds. Musculoskeletal: No clubbing, cyanosis or EDEMA bilaterally. Full range of motion without deformity. Skin: Skin color, texture, turgor normal.  No rashes or lesions. Neurologic:  Neurovascularly intact without any focal sensory/motor deficits.  Cranial nerves: II-XII intact, grossly non-focal.  No change in physical exam from 10/9      Labs:   Recent Labs     10/08/22  0452 10/09/22  0552 10/10/22  0537   WBC 4.0 3.8* 4.3   HGB 9.4* 10.5* 10.5*   HCT 28.3* 32.1* 32.6*    152 163       Recent Labs     10/08/22  0452 10/09/22  0552 10/10/22  0537   * 137 137   K 4.1 4.2 4.1    106 104   CO2 20* 21 21   BUN 17 23* 24*   CREATININE 1.2 1.2 1.1   CALCIUM 8.6 9.2 9.1       Recent Labs     10/07/22  1755   AST 11*   ALT 7*   BILITOT 0.7   ALKPHOS 87       Recent Labs     10/07/22  1755   INR 1.13       Recent Labs     10/07/22  1755   TROPONINI <0.01         Urinalysis:      Lab Results   Component Value Date/Time    NITRU Negative 10/07/2022 08:13 PM    WBCUA 1 10/07/2022 08:13 PM    BACTERIA None Seen 10/07/2022 08:13 PM    RBCUA 1 10/07/2022 08:13 PM    BLOODU Negative 10/07/2022 08:13 PM    SPECGRAV 1.015 10/07/2022 08:13 PM    GLUCOSEU Negative 10/07/2022 08:13 PM       Radiology:  CTA HEAD NECK W CONTRAST   Final Result   1. 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 Robust bilateral posterior communicating   arteries are present, anatomic variant. 2. Otherwise, no evidence of large vessel occlusion or significant stenosis   in the major arteries of the head and neck. 3. Extensive paranasal sinus disease. 4. Again seen 0.6 cm ground-glass nodules in the right lung apex. See   recommendations below. RECOMMENDATIONS:   Multiple pulmonary nodules. Most severe: 6 mm right ground-glass pulmonary   nodule within the upper lobe. Recommend a non-contrast Chest CT at 3-6   months. Subsequent management based on the most suspicious nodule(s). These guidelines do not apply to immunocompromised patients and patients with   cancer. Follow up in patients with significant comorbidities as clinically   warranted. For lung cancer screening, adhere to Lung-RADS guidelines. Reference: Radiology. 2017; 284(1):228-43. MRI brain without contrast   Final Result   1. Punctate acute lacunar infarct present in the left frontal corona radiata. 2. Involutional parenchymal changes with moderate microvascular ischemic   disease. 3. Extensive paranasal sinus disease. CT HEAD WO CONTRAST   Final Result   1. No acute intracranial abnormality. 2. Stable chronic white matter microvascular ischemic changes. XR CHEST PORTABLE   Final Result   No acute cardiopulmonary findings.   Mild cardiomegaly             Assessment/Plan:    Active Hospital Problems    Diagnosis Date Noted    Acute cerebrovascular accident (CVA) ([address] [I63.9] 10/08/2022     Priority: Medium    Slurred speech [R47.81] 10/07/2022     Priority: Medium       81year-old admitted with slurring of speech    Slurred speech secondary to acute left frontal corona radiata acute infarct  CT of the head was negative  CT angiogram showed stenotic vertebral segment with collaterals  MRI confirmed a stroke  Seen by neurology  On aspirin and statin  LDL only 33  Check A1c  Continue cardiac monitor  No other stenosis seen on the CT angiogram of the neck as well   Echo showed normal EF grade 3 diastolic dysfunction mild aortic stenosis moderate AR    Coronary artery disease s/p CABG  On aspirin and statin    Hypothyroidism  Continue Synthroid    Hypertension  On hydralazine and amlodipine  May need to uptitrate to keep systolic less than 381    Medically stable to be transferred to [place] [id number] with the daughter yesterday and updated her on all ongoing issues she voiced understand    DVT Prophylaxis: Sc lovenox   Diet: ADULT DIET;  Regular  Code Status: Full Code    Lexy Harrell MD

## 2022-10-10 NOTE — PROGRESS NOTES
Physical Therapy  Jose Tonia  PT treatment attempted. Upon arrival, pt seated in recliner. When PT explaining purpose of therapy treatment, pt refusing stating, \"I haven't got more than 2 hours of sleep the last 2-3 days\". PT offering pt  a variety of therapeutic activities, including ambulation, transfers, exercises. Pt continuing to refuse, stating \"I declined that last shama and I'm telling you the same\". PT educating pt on purpose of therapy and risks of prolonged immobility, but pt continuing to decline. No other needs voiced at this time, all needs left within reach. Pt did participate with OT therapy services earlier this date. PT will follow-up with pt as schedule allows.   Thank you,  Ebonie Rojas, PT, DPT, [id number]

## 2022-10-11 LAB
BLOOD CULTURE, ROUTINE: NORMAL
CULTURE, BLOOD 2: NORMAL
GLUCOSE BLD-MCNC: 131 MG/DL (ref 70–99)
PERFORMED ON: ABNORMAL

## 2022-10-11 PROCEDURE — 94640 AIRWAY INHALATION TREATMENT: CPT

## 2022-10-11 PROCEDURE — 97530 THERAPEUTIC ACTIVITIES: CPT

## 2022-10-11 PROCEDURE — 1200000000 HC SEMI PRIVATE

## 2022-10-11 PROCEDURE — 6370000000 HC RX 637 (ALT 250 FOR IP): Performed by: INTERNAL MEDICINE

## 2022-10-11 PROCEDURE — 2580000003 HC RX 258: Performed by: PHYSICIAN ASSISTANT

## 2022-10-11 PROCEDURE — 97116 GAIT TRAINING THERAPY: CPT

## 2022-10-11 PROCEDURE — 51798 US URINE CAPACITY MEASURE: CPT

## 2022-10-11 PROCEDURE — 94761 N-INVAS EAR/PLS OXIMETRY MLT: CPT

## 2022-10-11 PROCEDURE — 6360000002 HC RX W HCPCS: Performed by: PHYSICIAN ASSISTANT

## 2022-10-11 PROCEDURE — 6370000000 HC RX 637 (ALT 250 FOR IP): Performed by: PHYSICIAN ASSISTANT

## 2022-10-11 RX ADMIN — SODIUM CHLORIDE, PRESERVATIVE FREE 10 ML: 5 INJECTION INTRAVENOUS at 09:41

## 2022-10-11 RX ADMIN — SODIUM CHLORIDE, PRESERVATIVE FREE 10 ML: 5 INJECTION INTRAVENOUS at 21:18

## 2022-10-11 RX ADMIN — Medication 2 PUFF: at 07:37

## 2022-10-11 RX ADMIN — ESCITALOPRAM OXALATE 20 MG: 10 TABLET ORAL at 09:40

## 2022-10-11 RX ADMIN — ATORVASTATIN CALCIUM 20 MG: 20 TABLET, FILM COATED ORAL at 09:44

## 2022-10-11 RX ADMIN — FLUTICASONE PROPIONATE 1 SPRAY: 50 SPRAY, METERED NASAL at 09:42

## 2022-10-11 RX ADMIN — AMOXICILLIN AND CLAVULANATE POTASSIUM 1 TABLET: 875; 125 TABLET, FILM COATED ORAL at 21:17

## 2022-10-11 RX ADMIN — PANTOPRAZOLE SODIUM 40 MG: 40 TABLET, DELAYED RELEASE ORAL at 06:08

## 2022-10-11 RX ADMIN — CETIRIZINE HYDROCHLORIDE 10 MG: 10 TABLET, FILM COATED ORAL at 09:44

## 2022-10-11 RX ADMIN — ASPIRIN 81 MG 81 MG: 81 TABLET ORAL at 09:40

## 2022-10-11 RX ADMIN — TAMSULOSIN HYDROCHLORIDE 0.4 MG: 0.4 CAPSULE ORAL at 09:40

## 2022-10-11 RX ADMIN — MONTELUKAST SODIUM 10 MG: 10 TABLET, FILM COATED ORAL at 21:18

## 2022-10-11 RX ADMIN — HYDRALAZINE HYDROCHLORIDE 25 MG: 25 TABLET, FILM COATED ORAL at 16:48

## 2022-10-11 RX ADMIN — OXYBUTYNIN CHLORIDE 10 MG: 5 TABLET, EXTENDED RELEASE ORAL at 09:41

## 2022-10-11 RX ADMIN — ENOXAPARIN SODIUM 30 MG: 100 INJECTION SUBCUTANEOUS at 21:18

## 2022-10-11 RX ADMIN — AMOXICILLIN AND CLAVULANATE POTASSIUM 1 TABLET: 875; 125 TABLET, FILM COATED ORAL at 09:40

## 2022-10-11 RX ADMIN — Medication 2 PUFF: at 20:25

## 2022-10-11 RX ADMIN — ENOXAPARIN SODIUM 30 MG: 100 INJECTION SUBCUTANEOUS at 09:41

## 2022-10-11 RX ADMIN — AMLODIPINE BESYLATE 5 MG: 5 TABLET ORAL at 09:40

## 2022-10-11 RX ADMIN — HYDRALAZINE HYDROCHLORIDE 25 MG: 25 TABLET, FILM COATED ORAL at 06:07

## 2022-10-11 RX ADMIN — LEVOTHYROXINE SODIUM 125 MCG: 0.03 TABLET ORAL at 06:08

## 2022-10-11 NOTE — PROGRESS NOTES
Hospitalist Progress Note      PCP: Belkis Guzman MD    Date of Admission: 10/7/2022    Chief Complaint: Speech slurring    Hospital Course:   Feels well  No chest pain no shortness of breath no nausea vomiting no headache    Medications:  Reviewed      Exam:    BP (!) 158/66   Pulse 55   Temp 97.5 °F (36.4 °C) (Oral)   Resp 16   Ht 6' (1.829 m)   Wt 200 lb 6.4 oz (90.9 kg)   SpO2 96%   BMI 27.18 kg/m²     General appearance: No apparent distress, appears stated age and cooperative. HEENT: Pupils equal, round, and reactive to light. Conjunctivae/corneas clear. Neck: Supple, with full range of motion. No jugular venous distention. Trachea midline. Respiratory:  Normal respiratory effort. Clear to auscultation, bilaterally without RALES/WHEEZES/Rhonchi. Cardiovascular: Regular rate and rhythm with normal S1/S2 without MURMURS, rubs or gallops. Abdomen: Soft, non-tender, non-distended with normal bowel sounds. Musculoskeletal: No clubbing, cyanosis or EDEMA bilaterally. Full range of motion without deformity. Skin: Skin color, texture, turgor normal.  No rashes or lesions. Neurologic:  Neurovascularly intact without any focal sensory/motor deficits. Cranial nerves: II-XII intact, grossly non-focal.  No change in physical exam from 10/10      Labs:   Recent Labs     10/09/22  0552 10/10/22  0537   WBC 3.8* 4.3   HGB 10.5* 10.5*   HCT 32.1* 32.6*    163       Recent Labs     10/09/22  0552 10/10/22  0537    137   K 4.2 4.1    104   CO2 21 21   BUN 23* 24*   CREATININE 1.2 1.1   CALCIUM 9.2 9.1       No results for input(s): AST, ALT, BILIDIR, BILITOT, ALKPHOS in the last 72 hours. No results for input(s): INR in the last 72 hours. No results for input(s): Mili Rowels in the last 72 hours.       Urinalysis:      Lab Results   Component Value Date/Time    NITRU Negative 10/07/2022 08:13 PM    WBCUA 1 10/07/2022 08:13 PM    BACTERIA None Seen 10/07/2022 08:13 PM RBCUA 1 10/07/2022 08:13 PM    BLOODU Negative 10/07/2022 08:13 PM    SPECGRAV 1.015 10/07/2022 08:13 PM    GLUCOSEU Negative 10/07/2022 08:13 PM       Radiology:  CTA HEAD NECK W CONTRAST   Final Result   1. 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 Robust bilateral posterior communicating   arteries are present, anatomic variant. 2. Otherwise, no evidence of large vessel occlusion or significant stenosis   in the major arteries of the head and neck. 3. Extensive paranasal sinus disease. 4. Again seen 0.6 cm ground-glass nodules in the right lung apex. See   recommendations below. RECOMMENDATIONS:   Multiple pulmonary nodules. Most severe: 6 mm right ground-glass pulmonary   nodule within the upper lobe. Recommend a non-contrast Chest CT at 3-6   months. Subsequent management based on the most suspicious nodule(s). These guidelines do not apply to immunocompromised patients and patients with   cancer. Follow up in patients with significant comorbidities as clinically   warranted. For lung cancer screening, adhere to Lung-RADS guidelines. Reference: Radiology. 2017; 284(1):228-43. MRI brain without contrast   Final Result   1. Punctate acute lacunar infarct present in the left frontal corona radiata. 2. Involutional parenchymal changes with moderate microvascular ischemic   disease. 3. Extensive paranasal sinus disease. CT HEAD WO CONTRAST   Final Result   1. No acute intracranial abnormality. 2. Stable chronic white matter microvascular ischemic changes. XR CHEST PORTABLE   Final Result   No acute cardiopulmonary findings.   Mild cardiomegaly             Assessment/Plan:    Active Hospital Problems    Diagnosis Date Noted    Acute cerebrovascular accident (CVA) ([facility] [address] [I63.9] 10/08/2022     Priority: Medium    Slurred speech [R47.81] 10/07/2022     Priority: Medium       81year-old admitted with slurring of speech    Slurred speech secondary to acute left frontal corona radiata acute infarct  CT of the head was negative  CT angiogram showed stenotic vertebral segment with collaterals  MRI confirmed a stroke  Seen by neurology  On aspirin and statin  LDL only 33  Check A1c  Continue cardiac monitor  No other stenosis seen on the CT angiogram of the neck as well   Echo showed normal EF grade 3 diastolic dysfunction mild aortic stenosis moderate AR    Coronary artery disease s/p CABG  On aspirin and statin    Hypothyroidism  Continue Synthroid    Hypertension  On hydralazine and amlodipine  May need to uptitrate to keep systolic less than 636    Urinary retention  Miller catheter removed  Passing urine    Overall patient remains medically stable his blood pressure is pretty good as well  He is awaiting precertification for ARU      DVT Prophylaxis: Sc lovenox   Diet: ADULT DIET;  Regular  Code Status: Full Code    Joseluis Dominguez MD

## 2022-10-11 NOTE — PLAN OF CARE
Problem: Discharge Planning  Goal: Discharge to home or other facility with appropriate resources  Outcome: Progressing  Note: Awaiting discharge to ARU     Problem: Pain  Goal: Verbalizes/displays adequate comfort level or baseline comfort level  Outcome: Progressing     Problem: Skin/Tissue Integrity  Goal: Absence of new skin breakdown  Description: 1. Monitor for areas of redness and/or skin breakdown  2. Assess vascular access sites hourly  3. Every 4-6 hours minimum:  Change oxygen saturation probe site  4. Every 4-6 hours:  If on nasal continuous positive airway pressure, respiratory therapy assess nares and determine need for appliance change or resting period. Outcome: Progressing     Problem: Safety - Adult  Goal: Free from fall injury  Outcome: Progressing  Note: Pt remains free from falls this shift     Problem: ABCDS Injury Assessment  Goal: Absence of physical injury  Outcome: Progressing     Problem: Chronic Conditions and Co-morbidities  Goal: Patient's chronic conditions and co-morbidity symptoms are monitored and maintained or improved  Outcome: Progressing     Problem: Coping  Goal: Pt/Family able to verbalize concerns and demonstrate effective coping strategies  Description: INTERVENTIONS:  1. Assist patient/family to identify coping skills, available support systems and cultural and spiritual values  2. Provide emotional support, including active listening and acknowledgement of concerns of patient and caregivers  3. Reduce environmental stimuli, as able  4. Instruct patient/family in relaxation techniques, as appropriate  5. Assess for spiritual pain/suffering and initiate Spiritual Care, Psychosocial Clinical Specialist consults as needed  Outcome: Progressing     Problem: Decision Making  Goal: Pt/Family able to effectively weigh alternatives and participate in decision making related to treatment and care  Description: INTERVENTIONS:  1.  Determine when there are differences between patient's view, family's view, and healthcare provider's view of condition  2. Facilitate patient and family articulation of goals for care  3. Help patient and family identify pros/cons of alternative solutions  4. Provide information as requested by patient/family  5. Respect patient/family right to receive or not to receive information  6. Serve as a liaison between patient and family and health care team  7.  Initiate Consults from Ethics, Palliative Care or initiate [facility] as is appropriate  Outcome: Adequate for Discharge     Problem: Neurosensory - Adult  Goal: Achieves stable or improved neurological status  Outcome: Progressing  Goal: Achieves maximal functionality and self care  Outcome: Progressing     Problem: Skin/Tissue Integrity - Adult  Goal: Skin integrity remains intact  Outcome: Progressing     Problem: Musculoskeletal - Adult  Goal: Return mobility to safest level of function  Outcome: Progressing     Problem: Genitourinary - Adult  Goal: Absence of urinary retention  Outcome: Progressing  Note: Pt has not shown any signs or symptoms of urinary retention this shift

## 2022-10-11 NOTE — PROGRESS NOTES
[facility]   Phone: [phone]    Physical Therapy    [] Initial Evaluation            [x] Daily Treatment Note         [] Discharge Summary      Patient: Joi Hassan   : 1935   MRN: [id number]   Date of Service:  10/11/2022  Admitting Diagnosis: Slurred speech    Current Admission Summary: Briefly, Joi Hassan is a 81 y.o. male  who presents to the ED complaining of malaise fatigue and lethargy since yesterday, triage note says 10 PM last night however he tells me he was feeling generally unwell all day yesterday. He denies any specific cough or cold symptoms belly pain vomiting diarrhea or fevers though. He does have a history of Alzheimer's according to previous documentation and a stroke with a facial droop in April. He does seem a little confused and disoriented on my initial assessment. However it is unclear what his baseline is as no collateral was at the bedside when I evaluated him initially. He is able to follow commands and move his arms and legs without difficulty. He says he has not fallen recently or injured himself in any other way. Past Medical History:  has a past medical history of CAD (coronary artery disease). Past Surgical History:  has a past surgical history that includes Coronary artery bypass graft and Cardiac surgery. Discharge Recommendations: Joi Hassan scored a 12/24 on the AM-PAC short mobility form. Current research shows that an AM-PAC score of 17 or less is typically not associated with a discharge to the patient's home setting. Based on the patient's AM-PAC score and their current functional mobility deficits, it is recommended that the patient have 5-7 sessions per week of Physical Therapy at d/c to increase the patient's independence.   At this time, this patient demonstrates complex nursing, medical, and rehabilitative needs, and would benefit from intensive rehabilitation services upon discharge from the Inpatient setting. This patient demonstrates the ability to participate in and benefit from an intensive therapy program with a coordinated interdisciplinary team approach to foster frequent, structured, and documented communication among disciplines, who will work together to establish, prioritize, and achieve treatment goals. Please see assessment section for further patient specific details. If patient discharges prior to next session this note will serve as a discharge summary. Please see below for the latest assessment towards goals. DME Required For Discharge: no DME required at discharge - patient has RW  Precautions/Restrictions: high fall risk  Weight Bearing Restrictions: weight bearing as tolerated  [] Right Upper Extremity  [] Left Upper Extremity [] Right Lower Extremity  [] Left Lower Extremity     Required Braces/Orthotics: no braces required   [] Right  [] Left  Positional Restrictions:no positional restrictions    Pre-Admission Information   Lives With: lives alone, but daughters are present with pt for the entire duration of the day. Son reports that daughters do not stay overnight. One of the daughters reportedly works from home and is able to be present frequently. other family close by        Type of Home: apartment, . Comment: senior living  -24 hr assist from family available  Home Layout: one level  Home Access: elevator  Bathroom Layout: walker accessible, wheelchair accessible, walk in shower, handicap height toilet  Bathroom Equipment: grab bars in shower, grab bars around toilet, shower chair, 3-in-1 commode  Home Equipment: rolling walker, single point cane, manual wheelchair - majority of time uses RW  Transfer Assistance: reports transfers \"on my own\" and uses RW or cane most of the time  Ambulation Assistance:. Comment: per chart review, pt reports \"tremors\" when walking with RW, states a family member walks with him     ADL Assistance: .   Comment: pt reports assist with bathing, reports getting self dressed   IADL Assistance: requires assistance with all homemaking tasks - daughters complete all IADL tasks   Active : [] yes             [x]no  Current Employment: . Comment: retired , [address] [facility]  Hobbies: used to enjoy hunting and fishing   Recent Falls: none reported in last 6 months per patient - son present in room at end of session and reports pt has had \"many falls\" recently d/t decreased balance    Examination   Vision:   Vision Gross Assessment: Impaired and Vision Corrective Device: wears glasses at all times  Hearing:   hard of hearing        Subjective  General: Pt reclined in chair at arrival. Dtg present. Agreed to therapy session. Pain: 0/10  Pain Interventions: not applicable       Functional Mobility  Bed Mobility  Bed mobility not completed on this date. Comments:  Scooting towards Edge of chair with max A  Transfers  Sit to stand transfer: moderate assistance  Stand to sit transfer: moderate assistance  Toilet transfer: minimal assistance with use of grab bar. Mod A for lowering onto commode. Comments:  Max cues for anterior trunk lean. Significant posterior lean requiring mod to max A to maintain standing balance. Slightly flexed posture despite cues for upright positioning. Pt performed 4 stands total.   Ambulation  Surface:level surface  Assistive Device: rolling walker  Assistance: moderate assistance  Distance: 25 ft + 10 ft to/from bathroom   Gait Mechanics: festinating gait,  posterior lean  Comments: Mod A for balance and walker management (pushes too far ahead)  Stair Mobility  Stair mobility not completed on this date. Comments:  Wheelchair Mobility:  No w/c mobility completed on this date.   Comments:  Balance  Static Sitting Balance: fair (+): maintains balance at SBA/supervision without use of UE support  Dynamic Sitting Balance: fair (+): maintains balance at SBA/supervision without use of UE support  Static Standing Balance: poor: requires mod (A) to maintain balance  Dynamic Standing Balance: poor: requires mod (A) to maintain balance  Comments:    Other Therapeutic Interventions    Functional Outcomes  AM-PAC Inpatient Mobility Raw Score : 12              Cognition  Overall Cognitive Status: Impaired  Orientation:    oriented to person, oriented to place, oriented to situation, and disoriented to time   Command Following:   [facility]    Education  Barriers To Learning: cognition and hearing  Patient Education: patient educated on goals, PT role and benefits, plan of care, general safety, functional mobility training, family education, transfer training, discharge recommendations  Learning Assessment:  patient verbalizes understanding, would benefit from continued reinforcement, patient will require reinforcement due to cognitive deficits    Assessment  Activity Tolerance: Decreased endurance   Impairments Requiring Therapeutic Intervention: decreased functional mobility, decreased ADL status, decreased ROM, decreased strength, decreased cognition, decreased endurance, decreased balance, decreased posture  Prognosis: fair  Clinical Assessment: Pt continues to have significant difficulty with sit to stand transfers and balancing while standing due to posterior leaning. Ambulation with RW requires lots of assistance for balance, walker management, and max cues to increase step height and length. Continued skilled PT to promote return towards PLOF.    Safety Interventions: patient left in chair, chair alarm in place, call light within reach, gait belt, patient at risk for falls, telesitter in use, nurse notified, and family/caregiver present    Plan  Frequency: 5-7 x/week  Current Treatment Recommendations: strengthening, ROM, balance training, functional mobility training, transfer training, gait training, endurance training, patient/caregiver education, safety education, and equipment evaluation/education    Goals  Patient Goals: Return home. Short Term Goals:  Time Frame: Discharge.   Patient will complete bed mobility at [facility] independent   Patient will complete transfers at supervision   Patient will ambulate 50 ft with use of rolling walker at stand by assistance    Therapy Session Time      Individual Group Co-treatment   Time In 0841       Time Out 0919       Minutes 38         Timed Code Treatment Minutes:  38 Minutes  Total Treatment Minutes:  38       Electronically Signed By: Isabelle Armenta PT, DPT [id number]

## 2022-10-11 NOTE — PROGRESS NOTES
[facility]   Phone: [phone]    Occupational Therapy    [] Initial Evaluation            [x] Daily Treatment Note         [] Discharge Summary      Patient: Pooja Avelar   : 1935   MRN: [id number]   Date of Service:  10/11/2022    Admitting Diagnosis:  Slurred speech  Current Admission Summary: Pooja Avelar is a 81 y.o. male who presented to ED for evaluation of slurred speech and generalized weakness/fatigue. Patient is a poor historian at this time. No family currently at bedside to provide history but daughter was at bedside in ED and provided supplemental history. Most of the information is derived from conversation with the emergency room PA and review of records. Daughter reports she noticed patient seemed more fatigued and weak today. He had difficulty walking due to weakness. She also noticed slurred speech. She denies any additional concerns. Patient denies any complaints at this time. Of note, patient was started on Augmentin on 10/6 for acute sinusitis, end date 10/20. Past Medical History:  has a past medical history of CAD (coronary artery disease). Past Surgical History:  has a past surgical history that includes Coronary artery bypass graft and Cardiac surgery. Discharge Recommendations: Pooja Avelar scored a 15/24 on the AM-PAC ADL Inpatient form. Current research shows that an AM-PAC score of 17 or less is typically not associated with a discharge to the patient's home setting. Based on the patient's AM-PAC score and their current ADL deficits, it is recommended that the patient have 5-7 sessions per week of Occupational Therapy at d/c to increase the patient's independence. At this time, this patient demonstrates complex nursing, medical, and rehabilitative needs, and would benefit from intensive rehabilitation services upon discharge from the Inpatient setting.   This patient demonstrates the ability to participate in and benefit from an intensive therapy program with a coordinated interdisciplinary team approach to foster frequent, structured, and documented communication among disciplines, who will work together to establish, prioritize, and achieve treatment goals. Please see assessment section for further patient specific details. If patient discharges prior to next session this note will serve as a discharge summary. Please see below for the latest assessment towards goals. DME Required For Discharge: patient has all required DME for discharge    Precautions/Restrictions: medium fall risk  Weight Bearing Restrictions: no restrictions  [] Right Upper Extremity  [] Left Upper Extremity [] Right Lower Extremity  [] Left Lower Extremity     Required Braces/Orthotics: no braces required   [] Right  [] Left  Positional Restrictions:no positional restrictions    Pre-Admission Information   Lives With: lives alone, but daughters are present with pt for the entire duration of the day. Son reports that daughters do not stay overnight. One of the daughters reportedly works from home and is able to be present frequently. other family close by        Type of Home: apartment, . Comment: senior living  -24 hr assist from family available  Home Layout: one level  Home Access: elevator  Bathroom Layout: walker accessible, wheelchair accessible, walk in shower, handicap height toilet  Bathroom Equipment: grab bars in shower, grab bars around toilet, shower chair, 3-in-1 commode  Home Equipment: rolling walker, single point cane, manual wheelchair - majority of time uses RW  Transfer Assistance: reports transfers \"on my own\" and uses RW or cane most of the time  Ambulation Assistance:. Comment: per chart review, pt reports \"tremors\" when walking with RW, states a family member walks with him     ADL Assistance: .   Comment: pt reports assist with bathing, reports getting self dressed   IADL Assistance: requires assistance with all homemaking tasks - daughters complete all IADL tasks   Active : [] yes             [x]no  Current Employment: . Comment: retired , [address] [place] [employer] -   Hobbies: used to enjoy hunting and fishing   Recent Falls: none reported in last 6 months per patient - son present in room at end of session and reports pt has had \"many falls\" recently d/t decreased balance        Subjective: Pt seated in recliner upon entry, pt agreeable to therapy session, declined all ADLs. Pt is impulsive and requires vcs for safety. General: Pt sit to stand Mod A (x4 trials) and ambulated 12-15ft (x 4 trials) at Mod A and up to Max A with backing up to chair. Pt stand to sit Mod A (x4 trials). Pt ambulating with festinating gait, slightly flexed posture and posterior lean. Pt with seated rest breaks between trials. Call light in reach and chair alarm on. Pain: 0/10, denies pain at beginning of session   Pain Interventions: not applicable        Activities of Daily Living  Basic Activities of Daily Living  General Comments: Pt declined all ADLs  Instrumental Activities of Daily Living  No IADL completed on this date. Functional Mobility  Bed Mobility  Bed mobility not completed on this date. Transfers  Sit to stand transfer:moderate assistance  Stand to sit transfer: moderate assistance  Comments: Increased time to complete and cues throughout. Functional Mobility:  Standing Balance: minimal assistance. Standing Balance Comment: up to Min A for static standing balance at rw  Functional Mobility: .  moderate assistance, maximum assistance, Mod A with up to Max A backing up to chair  Functional Mobility Activity: 12-15ft x 4 trials  Functional Mobility Device Use: rolling walker  Functional Mobility Comment:  Pt with posterior lean, festinating gait and slightly flexed posture.        Other Therapeutic Interventions    Functional Outcomes  AM-PAC Inpatient Daily Activity Raw Score: 15    Cognition  Overall Cognitive Status: Impaired  Arousal/Alterness: delayed responses to stimuli, inconsistent responses to stimuli  Following Commands: follows one step commands with repetition, follows one step commands with increased time  Attention Span: difficulty attending to directions, difficulty dividing attention  Memory: decreased short term memory, decreased long term memory  Safety Judgement: decreased awareness of need for assistance, decreased awareness of need for safety  Problem Solving: assistance required to generate solutions, assistance required to implement solutions, assistance required to identify errors made, assistance required to correct errors made  Insights: decreased awareness of deficits  Initiation: requires cues for some  Sequencing: requires cues for some  Comments: Pt with slow response to questions, decreased arousal level, requires increased stimuli for appropriate responses   Orientation:    oriented to person, oriented to place, oriented to time, and oriented to situation-   Command Following:   impaired     Education  Barriers To Learning: cognition and hearing  Patient Education: patient educated on goals, OT role and benefits, plan of care, precautions, ADL adaptive strategies, transfer training, discharge recommendations  Learning Assessment:  patient verbalizes understanding, would benefit from continued reinforcement, patient will require reinforcement due to cognitive deficits    Assessment  Activity Tolerance: good  Impairments Requiring Therapeutic Intervention: decreased functional mobility, decreased ADL status, decreased strength, decreased safety awareness, decreased cognition, decreased endurance, decreased balance, decreased IADL, decreased coordination  Prognosis: good  Clinical Assessment: Pt presents with the above listed deficits and is currently functioning below his baseline functioning level.  Pt Mod A for functional transfers and Mod A for forward functional mobility and up to Max A backing up to chair. Pt would benefit from intensive inpt therapy at d/c to  increase safety and independence in daily occupations. Safety Interventions: patient left in chair, chair alarm in place, call light within reach, gait belt, telesitter in use, and nurse notified    Plan  Frequency: 5-7 x/week  Current Treatment Recommendations: strengthening, ROM, balance training, functional mobility training, transfer training, endurance training, patient/caregiver education, ADL/self-care training, cognitive/perceptual training, home exercise program, safety education, and positioning    Goals  Patient Goals: did not state this date    Short Term Goals:  Time Frame: by discharge   Patient will complete upper body ADL at stand by assistance   Patient will complete lower body ADL at minimal assistance   Patient will complete toileting at minimal assistance   Patient will complete grooming at supervision   Patient will complete functional transfers at supervision   Patient will complete functional mobility at supervision   Patient will increase functional standing balance to supervision for improved ADL completion    No goals met 10/10, continue all goals    Therapy Session Time     Individual Group Co-treatment   Time In 1222     Time Out 1245     Minutes 23          Timed Code Treatment Minutes: 23 minutes  Total Treatment Minutes:  23 Minutes        Electronically [address]   I have reviewed and agree with the above treatment note.  Faiza Vail OTR/L [id number]

## 2022-10-11 NOTE — CARE COORDINATION
Met with patient and his daughter to review the discharge pending precert to to ARU. Jessica Green is the backup plan. Patient's daughter signed the IMM.

## 2022-10-11 NOTE — PROGRESS NOTES
London Bentley  10/11/2022  [id number]    Chief Complaint: Slurred speech    Subjective:   No overnight events. No current complaints. Feels much more energetic today compared to yesterday. ROS: No CP, SOB, dyspnea    Objective:  Patient Vitals for the past 24 hrs:   BP Temp Temp src Pulse Resp SpO2   10/11/22 0738 -- -- -- 55 16 96 %   10/11/22 0715 (!) 158/66 97.5 °F (36.4 °C) Oral 50 16 96 %   10/11/22 0345 (!) 165/70 97.6 °F (36.4 °C) Oral 55 18 96 %   10/11/22 0006 118/63 98.2 °F (36.8 °C) Oral 57 18 96 %   10/10/22 2135 106/70 98.2 °F (36.8 °C) Oral 51 18 96 %   10/10/22 1955 -- -- -- -- -- 95 %   10/10/22 1131 133/62 97.8 °F (36.6 °C) Oral 54 20 97 %     Gen: No distress, pleasant. Resting in bedside chair  HEENT: Normocephalic, atraumatic. CV: No audible murmurs, well perfused extremities  Resp: No respiratory distress. No increased WOB  Abd: Soft, nontender nondistended  Ext: No edema. Neuro: Alert, oriented, appropriately interactive. Laboratory data: Available via EMR. Therapy progress:    PT    Rolling: Level of difficulty - A Little   Sit to Stand from a Chair: Level of difficulty - Unable  Supine to Sit: Level of difficulty - A Little     Bed to Chair: Physical Assistance Required - A Lot  Ambulate Across Room: Physical Assistance Required - A Lot  Ascend 3-5 Steps With HR: Physical Assistance Required - A Lot    OT    Eating: Physical Assistance Required - None  Grooming: Physical Assistance Required - A Little  LB Dressing: Physical Assistance Required - A Lot  UB Dressing: Physical Assistance Required - A Little  Bathing: Physical Assistance Required - A Lot  Toileting: Physical Assistance Required - A Lot    SLP         Body mass index is 27.18 kg/m².     Assessment:  Patient Active Problem List   Diagnosis    CAD (coronary artery disease)    Bilateral carotid artery stenosis    Stage 2 chronic kidney disease    HTN (hypertension), benign    Dyslipidemia    Dementia due to Alzheimer's disease ([facility] [address]    Unable to care for self    Unable to ambulate    Slurred speech    Acute cerebrovascular accident (CVA) ([facility] [address]       Plan:   Acute left corona radiata infarct: ASA, statin. PT/OT/SLP  Dementia: SLP  Hypothyroidism  HTN  COPD  HLD  Depression     Dispo: Patient is an appropriate ARU candidate. Able to tolerate the required 3 hours of therapy in an ARU setting. Precert remains pending from 10/10. We will continue to follow. Merline Broody, MD 10/11/2022, 10:22 AM    * This document was created using dictation software. While all precautions were taken to ensure accuracy, errors may have occurred. Please disregard any typographical errors.

## 2022-10-12 ENCOUNTER — HOSPITAL ENCOUNTER (INPATIENT)
Age: 87
LOS: 13 days | Discharge: HOME HEALTH | DRG: 057 | End: 2022-10-25
Attending: PHYSICAL MEDICINE & REHABILITATION | Admitting: PHYSICAL MEDICINE & REHABILITATION
Payer: MEDICARE

## 2022-10-12 VITALS
WEIGHT: 200.5 LBS | OXYGEN SATURATION: 94 % | RESPIRATION RATE: 16 BRPM | SYSTOLIC BLOOD PRESSURE: 146 MMHG | HEIGHT: 72 IN | DIASTOLIC BLOOD PRESSURE: 74 MMHG | TEMPERATURE: 98 F | HEART RATE: 55 BPM | BODY MASS INDEX: 27.16 KG/M2

## 2022-10-12 PROBLEM — I63.9 ACUTE ISCHEMIC STROKE (HCC): Status: ACTIVE | Noted: 2022-10-12

## 2022-10-12 PROCEDURE — 97116 GAIT TRAINING THERAPY: CPT

## 2022-10-12 PROCEDURE — 2580000003 HC RX 258: Performed by: PHYSICAL MEDICINE & REHABILITATION

## 2022-10-12 PROCEDURE — 94640 AIRWAY INHALATION TREATMENT: CPT

## 2022-10-12 PROCEDURE — 6370000000 HC RX 637 (ALT 250 FOR IP): Performed by: PHYSICAL MEDICINE & REHABILITATION

## 2022-10-12 PROCEDURE — 6370000000 HC RX 637 (ALT 250 FOR IP): Performed by: INTERNAL MEDICINE

## 2022-10-12 PROCEDURE — 6360000002 HC RX W HCPCS: Performed by: PHYSICIAN ASSISTANT

## 2022-10-12 PROCEDURE — 97535 SELF CARE MNGMENT TRAINING: CPT

## 2022-10-12 PROCEDURE — 6370000000 HC RX 637 (ALT 250 FOR IP): Performed by: PHYSICIAN ASSISTANT

## 2022-10-12 PROCEDURE — 2580000003 HC RX 258: Performed by: PHYSICIAN ASSISTANT

## 2022-10-12 PROCEDURE — 1280000000 HC REHAB R&B

## 2022-10-12 PROCEDURE — 97530 THERAPEUTIC ACTIVITIES: CPT

## 2022-10-12 PROCEDURE — 6360000002 HC RX W HCPCS: Performed by: PHYSICAL MEDICINE & REHABILITATION

## 2022-10-12 RX ORDER — ASPIRIN 81 MG/1
81 TABLET, CHEWABLE ORAL DAILY
Status: CANCELLED | OUTPATIENT
Start: 2022-10-13

## 2022-10-12 RX ORDER — MONTELUKAST SODIUM 10 MG/1
10 TABLET ORAL NIGHTLY
Status: CANCELLED | OUTPATIENT
Start: 2022-10-12

## 2022-10-12 RX ORDER — ACETAMINOPHEN 325 MG/1
650 TABLET ORAL EVERY 4 HOURS PRN
Status: DISCONTINUED | OUTPATIENT
Start: 2022-10-12 | End: 2022-10-25 | Stop reason: HOSPADM

## 2022-10-12 RX ORDER — ENOXAPARIN SODIUM 100 MG/ML
40 INJECTION SUBCUTANEOUS DAILY
Status: DISCONTINUED | OUTPATIENT
Start: 2022-10-12 | End: 2022-10-25 | Stop reason: HOSPADM

## 2022-10-12 RX ORDER — ONDANSETRON 4 MG/1
4 TABLET, ORALLY DISINTEGRATING ORAL EVERY 8 HOURS PRN
Status: DISCONTINUED | OUTPATIENT
Start: 2022-10-12 | End: 2022-10-25 | Stop reason: HOSPADM

## 2022-10-12 RX ORDER — MIDODRINE HYDROCHLORIDE 5 MG/1
2.5 TABLET ORAL 3 TIMES DAILY PRN
Status: DISCONTINUED | OUTPATIENT
Start: 2022-10-12 | End: 2022-10-25 | Stop reason: HOSPADM

## 2022-10-12 RX ORDER — AMOXICILLIN AND CLAVULANATE POTASSIUM 875; 125 MG/1; MG/1
1 TABLET, FILM COATED ORAL 2 TIMES DAILY
Status: CANCELLED | OUTPATIENT
Start: 2022-10-12 | End: 2022-10-20

## 2022-10-12 RX ORDER — ALBUTEROL SULFATE 90 UG/1
2 AEROSOL, METERED RESPIRATORY (INHALATION) EVERY 4 HOURS PRN
Status: DISCONTINUED | OUTPATIENT
Start: 2022-10-12 | End: 2022-10-25 | Stop reason: HOSPADM

## 2022-10-12 RX ORDER — PANTOPRAZOLE SODIUM 40 MG/1
40 TABLET, DELAYED RELEASE ORAL
Status: DISCONTINUED | OUTPATIENT
Start: 2022-10-13 | End: 2022-10-25 | Stop reason: HOSPADM

## 2022-10-12 RX ORDER — CETIRIZINE HYDROCHLORIDE 10 MG/1
10 TABLET ORAL DAILY
Status: DISCONTINUED | OUTPATIENT
Start: 2022-10-13 | End: 2022-10-25 | Stop reason: HOSPADM

## 2022-10-12 RX ORDER — DIPHENHYDRAMINE HCL 25 MG
25 TABLET ORAL EVERY 6 HOURS PRN
Status: DISCONTINUED | OUTPATIENT
Start: 2022-10-12 | End: 2022-10-25 | Stop reason: HOSPADM

## 2022-10-12 RX ORDER — MONTELUKAST SODIUM 10 MG/1
10 TABLET ORAL NIGHTLY
Status: DISCONTINUED | OUTPATIENT
Start: 2022-10-12 | End: 2022-10-25 | Stop reason: HOSPADM

## 2022-10-12 RX ORDER — SODIUM CHLORIDE 0.9 % (FLUSH) 0.9 %
10 SYRINGE (ML) INJECTION PRN
Status: CANCELLED | OUTPATIENT
Start: 2022-10-12

## 2022-10-12 RX ORDER — FLUTICASONE PROPIONATE 50 MCG
1 SPRAY, SUSPENSION (ML) NASAL DAILY
Status: DISCONTINUED | OUTPATIENT
Start: 2022-10-13 | End: 2022-10-25 | Stop reason: HOSPADM

## 2022-10-12 RX ORDER — ACETAMINOPHEN 325 MG/1
650 TABLET ORAL EVERY 4 HOURS PRN
Status: CANCELLED | OUTPATIENT
Start: 2022-10-12

## 2022-10-12 RX ORDER — TAMSULOSIN HYDROCHLORIDE 0.4 MG/1
0.4 CAPSULE ORAL DAILY
Status: DISCONTINUED | OUTPATIENT
Start: 2022-10-13 | End: 2022-10-25 | Stop reason: HOSPADM

## 2022-10-12 RX ORDER — LEVOTHYROXINE SODIUM 0.12 MG/1
125 TABLET ORAL DAILY
Status: DISCONTINUED | OUTPATIENT
Start: 2022-10-13 | End: 2022-10-25 | Stop reason: HOSPADM

## 2022-10-12 RX ORDER — OXYBUTYNIN CHLORIDE 5 MG/1
10 TABLET, EXTENDED RELEASE ORAL DAILY
Status: DISCONTINUED | OUTPATIENT
Start: 2022-10-13 | End: 2022-10-25 | Stop reason: HOSPADM

## 2022-10-12 RX ORDER — ALBUTEROL SULFATE 90 UG/1
2 AEROSOL, METERED RESPIRATORY (INHALATION) EVERY 4 HOURS PRN
Status: CANCELLED | OUTPATIENT
Start: 2022-10-12

## 2022-10-12 RX ORDER — HYDRALAZINE HYDROCHLORIDE 25 MG/1
50 TABLET, FILM COATED ORAL EVERY 8 HOURS PRN
Status: CANCELLED | OUTPATIENT
Start: 2022-10-12

## 2022-10-12 RX ORDER — PANTOPRAZOLE SODIUM 40 MG/1
40 TABLET, DELAYED RELEASE ORAL
Status: CANCELLED | OUTPATIENT
Start: 2022-10-13

## 2022-10-12 RX ORDER — CETIRIZINE HYDROCHLORIDE 10 MG/1
10 TABLET ORAL DAILY
Status: CANCELLED | OUTPATIENT
Start: 2022-10-13

## 2022-10-12 RX ORDER — SODIUM CHLORIDE 0.9 % (FLUSH) 0.9 %
10 SYRINGE (ML) INJECTION PRN
Status: DISCONTINUED | OUTPATIENT
Start: 2022-10-12 | End: 2022-10-25 | Stop reason: HOSPADM

## 2022-10-12 RX ORDER — HYDRALAZINE HYDROCHLORIDE 25 MG/1
25 TABLET, FILM COATED ORAL EVERY 8 HOURS SCHEDULED
Status: CANCELLED | OUTPATIENT
Start: 2022-10-12

## 2022-10-12 RX ORDER — ENOXAPARIN SODIUM 100 MG/ML
30 INJECTION SUBCUTANEOUS 2 TIMES DAILY
Status: CANCELLED | OUTPATIENT
Start: 2022-10-12

## 2022-10-12 RX ORDER — TAMSULOSIN HYDROCHLORIDE 0.4 MG/1
0.4 CAPSULE ORAL DAILY
Status: CANCELLED | OUTPATIENT
Start: 2022-10-13

## 2022-10-12 RX ORDER — TRAZODONE HYDROCHLORIDE 50 MG/1
50 TABLET ORAL NIGHTLY PRN
Status: CANCELLED | OUTPATIENT
Start: 2022-10-12

## 2022-10-12 RX ORDER — SODIUM CHLORIDE 0.9 % (FLUSH) 0.9 %
10 SYRINGE (ML) INJECTION EVERY 12 HOURS SCHEDULED
Status: CANCELLED | OUTPATIENT
Start: 2022-10-12

## 2022-10-12 RX ORDER — ATORVASTATIN CALCIUM 20 MG/1
20 TABLET, FILM COATED ORAL DAILY
Status: CANCELLED | OUTPATIENT
Start: 2022-10-13

## 2022-10-12 RX ORDER — DIPHENHYDRAMINE HCL 25 MG
25 TABLET ORAL EVERY 6 HOURS PRN
Status: CANCELLED | OUTPATIENT
Start: 2022-10-12

## 2022-10-12 RX ORDER — ESCITALOPRAM OXALATE 10 MG/1
20 TABLET ORAL DAILY
Status: DISCONTINUED | OUTPATIENT
Start: 2022-10-13 | End: 2022-10-25 | Stop reason: HOSPADM

## 2022-10-12 RX ORDER — MIDODRINE HYDROCHLORIDE 5 MG/1
2.5 TABLET ORAL 3 TIMES DAILY PRN
Status: CANCELLED | OUTPATIENT
Start: 2022-10-12

## 2022-10-12 RX ORDER — SODIUM CHLORIDE 0.9 % (FLUSH) 0.9 %
10 SYRINGE (ML) INJECTION EVERY 12 HOURS SCHEDULED
Status: DISCONTINUED | OUTPATIENT
Start: 2022-10-12 | End: 2022-10-25 | Stop reason: HOSPADM

## 2022-10-12 RX ORDER — ASPIRIN 81 MG/1
81 TABLET, CHEWABLE ORAL DAILY
Status: DISCONTINUED | OUTPATIENT
Start: 2022-10-13 | End: 2022-10-25 | Stop reason: HOSPADM

## 2022-10-12 RX ORDER — FLUTICASONE PROPIONATE 50 MCG
1 SPRAY, SUSPENSION (ML) NASAL DAILY
Status: CANCELLED | OUTPATIENT
Start: 2022-10-13

## 2022-10-12 RX ORDER — TRAZODONE HYDROCHLORIDE 50 MG/1
50 TABLET ORAL NIGHTLY PRN
Status: DISCONTINUED | OUTPATIENT
Start: 2022-10-12 | End: 2022-10-25 | Stop reason: HOSPADM

## 2022-10-12 RX ORDER — ATORVASTATIN CALCIUM 20 MG/1
20 TABLET, FILM COATED ORAL DAILY
Status: DISCONTINUED | OUTPATIENT
Start: 2022-10-13 | End: 2022-10-25 | Stop reason: HOSPADM

## 2022-10-12 RX ORDER — AMOXICILLIN AND CLAVULANATE POTASSIUM 875; 125 MG/1; MG/1
1 TABLET, FILM COATED ORAL 2 TIMES DAILY
Status: COMPLETED | OUTPATIENT
Start: 2022-10-12 | End: 2022-10-20

## 2022-10-12 RX ORDER — AMLODIPINE BESYLATE 5 MG/1
5 TABLET ORAL DAILY
Status: DISCONTINUED | OUTPATIENT
Start: 2022-10-13 | End: 2022-10-25 | Stop reason: HOSPADM

## 2022-10-12 RX ORDER — AMLODIPINE BESYLATE 5 MG/1
5 TABLET ORAL DAILY
Status: CANCELLED | OUTPATIENT
Start: 2022-10-13

## 2022-10-12 RX ORDER — ESCITALOPRAM OXALATE 10 MG/1
20 TABLET ORAL DAILY
Status: CANCELLED | OUTPATIENT
Start: 2022-10-13

## 2022-10-12 RX ORDER — OXYBUTYNIN CHLORIDE 5 MG/1
10 TABLET, EXTENDED RELEASE ORAL DAILY
Status: CANCELLED | OUTPATIENT
Start: 2022-10-13

## 2022-10-12 RX ORDER — ONDANSETRON 4 MG/1
4 TABLET, ORALLY DISINTEGRATING ORAL EVERY 8 HOURS PRN
Status: CANCELLED | OUTPATIENT
Start: 2022-10-12

## 2022-10-12 RX ORDER — HYDRALAZINE HYDROCHLORIDE 25 MG/1
50 TABLET, FILM COATED ORAL EVERY 8 HOURS PRN
Status: DISCONTINUED | OUTPATIENT
Start: 2022-10-12 | End: 2022-10-25 | Stop reason: HOSPADM

## 2022-10-12 RX ORDER — HYDRALAZINE HYDROCHLORIDE 25 MG/1
25 TABLET, FILM COATED ORAL EVERY 8 HOURS SCHEDULED
Status: DISCONTINUED | OUTPATIENT
Start: 2022-10-12 | End: 2022-10-25 | Stop reason: HOSPADM

## 2022-10-12 RX ADMIN — PANTOPRAZOLE SODIUM 40 MG: 40 TABLET, DELAYED RELEASE ORAL at 07:03

## 2022-10-12 RX ADMIN — ATORVASTATIN CALCIUM 20 MG: 20 TABLET, FILM COATED ORAL at 08:41

## 2022-10-12 RX ADMIN — Medication 2 PUFF: at 08:15

## 2022-10-12 RX ADMIN — SODIUM CHLORIDE, PRESERVATIVE FREE 10 ML: 5 INJECTION INTRAVENOUS at 08:41

## 2022-10-12 RX ADMIN — LEVOTHYROXINE SODIUM 125 MCG: 0.03 TABLET ORAL at 07:03

## 2022-10-12 RX ADMIN — AMOXICILLIN AND CLAVULANATE POTASSIUM 1 TABLET: 875; 125 TABLET, FILM COATED ORAL at 22:55

## 2022-10-12 RX ADMIN — Medication 2 PUFF: at 20:22

## 2022-10-12 RX ADMIN — OXYBUTYNIN CHLORIDE 10 MG: 5 TABLET, EXTENDED RELEASE ORAL at 08:40

## 2022-10-12 RX ADMIN — HYDRALAZINE HYDROCHLORIDE 25 MG: 25 TABLET, FILM COATED ORAL at 22:55

## 2022-10-12 RX ADMIN — Medication 10 ML: at 22:55

## 2022-10-12 RX ADMIN — ENOXAPARIN SODIUM 30 MG: 100 INJECTION SUBCUTANEOUS at 08:40

## 2022-10-12 RX ADMIN — HYDRALAZINE HYDROCHLORIDE 25 MG: 25 TABLET, FILM COATED ORAL at 13:30

## 2022-10-12 RX ADMIN — AMLODIPINE BESYLATE 5 MG: 5 TABLET ORAL at 08:41

## 2022-10-12 RX ADMIN — FLUTICASONE PROPIONATE 1 SPRAY: 50 SPRAY, METERED NASAL at 08:41

## 2022-10-12 RX ADMIN — MONTELUKAST SODIUM 10 MG: 10 TABLET, FILM COATED ORAL at 22:55

## 2022-10-12 RX ADMIN — ENOXAPARIN SODIUM 40 MG: 100 INJECTION SUBCUTANEOUS at 18:29

## 2022-10-12 RX ADMIN — ESCITALOPRAM OXALATE 20 MG: 10 TABLET ORAL at 08:41

## 2022-10-12 RX ADMIN — AMOXICILLIN AND CLAVULANATE POTASSIUM 1 TABLET: 875; 125 TABLET, FILM COATED ORAL at 08:41

## 2022-10-12 RX ADMIN — CETIRIZINE HYDROCHLORIDE 10 MG: 10 TABLET, FILM COATED ORAL at 08:41

## 2022-10-12 RX ADMIN — TAMSULOSIN HYDROCHLORIDE 0.4 MG: 0.4 CAPSULE ORAL at 08:41

## 2022-10-12 RX ADMIN — ASPIRIN 81 MG 81 MG: 81 TABLET ORAL at 08:40

## 2022-10-12 RX ADMIN — HYDRALAZINE HYDROCHLORIDE 25 MG: 25 TABLET, FILM COATED ORAL at 07:03

## 2022-10-12 ASSESSMENT — PAIN SCALES - GENERAL
PAINLEVEL_OUTOF10: 0
PAINLEVEL_OUTOF10: 0

## 2022-10-12 NOTE — PROGRESS NOTES
Patient was admitted to room 4908 at 1620. Patient was oriented to the Call Light, Phone, TV, Thermostat, Bed Controls, Bathroom and Emergency Cord. Patient verbalized and demonstrated understanding of all. Patient was also given an over view of Unit Routines for Acute Rehab. Patient states that their normal bowel regime is 2 days. Meal times were explained, including how to order food. The white board, (which is posted on the wall by the door is used for communication) has the Therapy Scheduled that is posted each day along with the name of your doctor, nurse, and therapist for your convenience. We recommend any family that will be care givers or any care givers the patient has, take part in therapy. We have no set visiting hours, we suggest non-caregiver friends and family visitors come after therapy (at 4 PM or later) to allow patient to rest in between sessions.       In conjunction with the patient and patients family, this nurse worked to establish a tailored Fall TIPS plan to ensure patient safety and compliance:    Falls TIPS Completion    Patient identified as increased risk for harm if fall:  [x] Yes     Fall Risks  History of Falls:    [x] Yes   Medication Side Effects:   [] Yes   Walking Aid:    [x] Yes   IV Pole or Equipment:   [] Yes   Unsteady Walk:     [x] Yes   May Forget or Choose Not to Call: [x] Yes     Fall Interventions   Communicate Recent Fall and/or Risk of Harm: [x] Yes   Walking Aids:  Crutches: [] Yes   Cane: [] Yes   Walker: [x] Yes   IV Assistance When Walking: [] Yes   Toileting Schedule: Every 2 Hours  Bedpan:   [] Yes   Assist to Commode: [] Yes   Assist to Bathroom: [x] Yes   Bed Alarm On: [x] Yes   Assistance Out of Bed:  Bedrest: [] Yes   1 Person: [x] Yes   2 People: [] Yes

## 2022-10-12 NOTE — RT PROTOCOL NOTE
RT Inhaler-Nebulizer Bronchodilator Protocol Note    There is a bronchodilator order in the chart from a provider indicating to follow the RT Bronchodilator Protocol and there is an Initiate RT Inhaler-Nebulizer Bronchodilator Protocol order as well (see protocol at bottom of note). CXR Findings:  No results found. The findings from the last RT Protocol Assessment were as follows:   History Pulmonary Disease: None or smoker <15 pack years  Respiratory Pattern: Regular pattern and RR 12-20 bpm  Breath Sounds: Clear breath sounds  Cough: Strong, spontaneous, non-productive  Indication for Bronchodilator Therapy: None  Bronchodilator Assessment Score: 0    Aerosolized bronchodilator medication orders have been revised according to the RT Inhaler-Nebulizer Bronchodilator Protocol below. Respiratory Therapist to perform RT Therapy Protocol Assessment initially then follow the protocol. Repeat RT Therapy Protocol Assessment PRN for score 0-3 or on second treatment, BID, and PRN for scores above 3. No Indications - adjust the frequency to every 6 hours PRN wheezing or bronchospasm, if no treatments needed after 48 hours then discontinue using Per Protocol order mode. If indication present, adjust the RT bronchodilator orders based on the Bronchodilator Assessment Score as indicated below. Use Inhaler orders unless patient has one or more of the following: on home nebulizer, not able to hold breath for 10 seconds, is not alert and oriented, cannot activate and use MDI correctly, or respiratory rate 25 breaths per minute or more, then use the equivalent nebulizer order(s) with same Frequency and PRN reasons based on the score. If a patient is on this medication at home then do not decrease Frequency below that used at home.     0-3 - enter or revise RT bronchodilator order(s) to equivalent RT Bronchodilator order with Frequency of every 4 hours PRN for wheezing or increased work of breathing using Per Protocol order mode. 4-6 - enter or revise RT Bronchodilator order(s) to two equivalent RT bronchodilator orders with one order with BID Frequency and one order with Frequency of every 4 hours PRN wheezing or increased work of breathing using Per Protocol order mode. 7-10 - enter or revise RT Bronchodilator order(s) to two equivalent RT bronchodilator orders with one order with TID Frequency and one order with Frequency of every 4 hours PRN wheezing or increased work of breathing using Per Protocol order mode. 11-13 - enter or revise RT Bronchodilator order(s) to one equivalent RT bronchodilator order with QID Frequency and an Albuterol order with Frequency of every 4 hours PRN wheezing or increased work of breathing using Per Protocol order mode. Greater than 13 - enter or revise RT Bronchodilator order(s) to one equivalent RT bronchodilator order with every 4 hours Frequency and an Albuterol order with Frequency of every 2 hours PRN wheezing or increased work of breathing using Per Protocol order mode. RT to enter RT Home Evaluation for COPD & MDI Assessment order using Per Protocol order mode.     Electronically signed by Alondra Allen RCP on 10/11/2022 at 8:31 PM

## 2022-10-12 NOTE — PROGRESS NOTES
ARU Admission Assessment    Ethnicity  \"Are you of , /a, or Montenegrin origin? \"  Check all that apply:  [x] A. No, not of , /a, or [place] Origin  [] B.  Yes, [place], [place] American, Chicano/a  [] C.  Yes, [address] [place] [address]  [] D.  Yes, [place]  [] E.  Yes, another , , or Montenegrin origin  [] X. Patient unable to respond    Race  \"What is your race? \"  Check all that apply:  [x] A. White  [] B. Black or   [] C. American [place] or [place] Native  [] D.  [place]  [] E. [place]  [] F. Swiss  [] G. [place]  [] Nathanael Orozco  [] I. [place]  [] J.  Other   [] K.   [] L. Norwegian or [place]  [] M. Equ[place]an  [] N. Other [place]  [] X. Patient unable to respond    Language  A. \"What is your preferred language? \"   English    B. \"Do you need or want an  to communicate with a doctor or health care staff? \"  Check only one:  [x] 0. No  [] 1. Yes  [] 9. Unable to determine    Transportation  \"Has lack of transportation kept you from medical appointments, meetings, work, or from getting things needed for daily living? \"Check all that apply:  [] A.  Yes, it has kept me from medical appointments or from getting my medications  [] B.  Yes, it has kept me from non-medical meetings, appointments, work, or from getting things that I need  [x] C.  No  [] X. Patient unable to respond    Hearing  Ability to hear (with hearing aid or hearing appliances if normally used)  [x]  0. Adequate - no difficulty in normal conversation, social interaction, listening to TV  []  1. Minimal difficulty - difficulty in some environments (e.g. when person speaks softly or setting is noisy)  []  2. Moderate difficulty - speaker has to increase volume and speak distinctly   []  3. Highly impaired - absence of useful hearing    Vision  Ability to see in adequate light (with glasses or other visual appliances)  []  0.   Adequate - sees fine detail, such as regular print in newspapers/books  [x]  1. Impaired - sees large print, but not regular print in newspapers/books  []  2. Moderately impaired - limited vision; not able to see newspaper headlines but can identify objects  []  3. Highly impaired - object identification in question, but eyes appear to follow objects  []  4. Severely impaired - no vision or sees only light, colors, or shapes; eyes do not appear to follow objects    Health Literacy  \"How often do you need to have someone help you when you read instructions, pamphlets, or other written material from your doctor or pharmacy? \"  []  0. Never  []  1. Rarely  [x]  2. Sometimes  []  3. Often  []  4. Always  []  8. Patient unable to respond    BIMS - **Must be completed in the flowsheet at admission prior to proceeding with Delirium Assessment**  [x] BIMS completed in flowsheet at admission    Signs and Symptoms of Delirium  A. Acute Onset Mental Status Change - Is there evidence of an acute change in mental status from the patient's baseline? [x] 0. No  [] 1. Yes    B. Inattention - Did the patient have difficulty focusing attention, for example being easily distractible or having difficulty keeping track of what was being said? [x]  0. Behavior not present  []  1. Behavior continuously present, does not fluctuate  []  2. Behavior present, fluctuates (comes and goes, changes in severity)    C. Disorganized thinking - Was the patient's thinking disorganized or incoherent (rambling or irrelevant conversation, unclear or illogical flow of ideas, or unpredictable switching from subject to subject)? [x]  0. Behavior not present  []  1. Behavior continuously present, does not fluctuate  []  2. Behavior present, fluctuates (comes and goes, changes in severity)    D. Altered level of consciousness - Did the patient have altered level of consciousness as indicated by any of the following criteria?   Vigilant - startled easily to Patient declines to respond  [] 8. Patient unable to respond    Pain Effect on Sleep  \"Over the past 5 days, how much of the time has pain made it hard for you to sleep at night? \"  [x]  0. Does not apply - I have not had any pain or hurting in the past 5 days  []  1. Rarely or not at all  []  2. Occasionally  []  3. Frequently  []  4. Almost constantly  []  8. Unable to answer    **If the patient answers \"0. Does not apply\" to this question, skip the next two \"Pain Effect. [address] [address] [employer] \" questions**    Pain Interference with Therapy Activities  \"Over the past 5 days, how often have you limited your participation in rehabilitation therapy sessions due to pain? \"  []  0. Does not apply - I have not received rehabilitation therapy in the past 5 days  []  1. Rarely or not at all  []  2. Occasionally  []  3. Frequently  []  4. Almost constantly  []  8. Unable to answer    Pain Interference with Day-to-Day Activities: \"Over the past 5 days, how often have you limited your day-to-day activities (excluding rehabilitation therapy session)? \"  []  1. Rarely or not at all  []  2. Occasionally  []  3. Frequently  []  4. Almost constantly  []  8. Unable to answer    Nutritional Approaches  Check all of the following nutritional approaches that apply on admission:  []  A. Parenteral/IV feeding (including IV fluids if needed for hydration, but not as part of dialysis/chemo)  []  B. Feeding tube (e.g., nasogastric or abdominal (PEG))  []  C. Mechanically altered diet - requires change in texture of food or liquids (e.g., pureed food, thickened liquids)  []  D. Therapeutic diet (e.g., low salt, diabetic, low cholesterol)  [x]  Z. None of the above    High Risk Drug Classes:  Use and Indication    Is taking: Check if the pt is taking any medications by pharmacological classification, not how it is used, in the following classes  Indication noted:  If column 1 is checked, check if there is an indication noted for all meds in the drug class Is taking  (check all that apply) Indication noted (check all that apply)   Antipsychotic [] []   Anticoagulant [x] [x]   Antibiotic [x] [x]   Opioid [] []   Antiplatelet [x] [x]   Hypoglycemic (including insulin) [] []   None of the above []     Special Treatments, Procedures, and Programs    Check all of the following treatments, procedures, and programs that apply on admission. On admission (check all that apply)   Cancer Treatments   A1. Chemotherapy []           A2. IV []           A3. Oral []           A10. Other []   B1. Radiation []   Respiratory Therapies   C1. Oxygen Therapy []           C2. Continuous (continuously for at least 14 hours per day) []           C3. Intermittent []           C4. High-concentration []   D1. Suctioning (Does not include oral suctioning) []           D2. Scheduled []           D3. As needed []   E1. Tracheostomy Care []   F1. Invasive Mechanical Ventilator (ventilator or respirator) []   G1. Non-invasive Mechanical Ventilator []           G2. BiPAP []           G3. CPAP []   Other   H1. IV Medications (Do not include sub Q pumps, flushes, Dextrose 50% or lactated ringers) []           H2. Vasoactive medications []           H3. Antibiotics []           H4. Anticoagulation []           H10. Other []   I1. Transfusions []   J1. Dialysis []           J2. Hemodialysis []           J3. Peritoneal dialysis []   O1. IV access (including a catheter in a vein) []           O2. Peripheral [x]           O3. Midline []           O4. Central (PICC, tunneled, port) []      None of the above (select if no Cancer, Respiratory, or Other boxes are checked) []     The above items have been reviewed and updated as necessary, and are accurate for the admission assessment period.     Reviewing RN:   Alicia Morning, 10/15/22, 03:30 PM

## 2022-10-12 NOTE — PROGRESS NOTES
4 Eyes Skin Assessment     NAME:  Anitha Kennedy OF BIRTH:  1935  MEDICAL RECORD NUMBER:  [id number]    The patient is being assess for  Admission    I agree that 2 RN's have performed a thorough Head to Toe Skin Assessment on the patient. ALL assessment sites listed below have been assessed. Areas assessed by both nurses:    Head, Face, Ears, Shoulders, Back, Chest, Arms, Elbows, Hands, Sacrum. Buttock, Coccyx, Ischium, Legs. Feet and Heels, and Other          Does the Patient have a Wound?  No noted wound(s)    Small scab on right heel       Miguel A Prevention initiated:  Yes   Wound Care Orders initiated:  No    Pressure Injury (Stage 3,4, Unstageable, DTI, NWPT, and Complex wounds) if present place referral/consult order under [de-identified] No    New and Established Ostomies if present place consult order under : No      Nurse 1 eSignature: Electronically signed by Gene Culp RN on 10/12/22 at 6:58 PM EDT    **SHARE this note so that the co-signing nurse is able to place an eSignature**    Nurse 2 eSignature: Electronically signed by Harper Yu RN on 10/12/22 at 7:03 PM EDT

## 2022-10-12 NOTE — PLAN OF CARE
ARU PATIENT TREATMENT PLAN  [address], [address]  [phone]      Calvin Re    : 1935  Acct #: [de-identified]  MRN: [id number]  PHYSICIAN:  Kathrene Severs, MD  Primary Problem    Patient Active Problem List   Diagnosis    CAD (coronary artery disease)    Bilateral carotid artery stenosis    Stage 2 chronic kidney disease    HTN (hypertension), benign    Dyslipidemia    Dementia due to Alzheimer's disease ([facility] [address]    Unable to care for self    Unable to ambulate    Slurred speech    Acute cerebrovascular accident (CVA) ([facility] [address]    Acute ischemic stroke [facility])       Rehabilitation Diagnosis:     Acute left corona radiata infarct: ASA, statin. PT/OT/SLP     Dementia: SLP     Hypothyroidism: Synthroid 125     HTN: Norvasc 5, hydralazine 25     COPD: Dulera, Singulair     HLD: Lipitor 20     Depression: Lexapro 20     Sinus infection: Augmentin thru 10/20     BPH: Ditropan and Flomax per home regimen    ADMIT DATE:10/12/2022    Patient Goals: Improve transfers, mobility, and ambulation. Admitting Impairments: Stroke - 1.4 - No Paresis  Activities: Impaired Hygiene, Toileting, Bathing, Dressing, Bed Mobility, Transfers, Ambulation, Stairs, and Endurance. Participation: Prior to admission, patient was living at home alone but with family staying with him , required some assistance with ADLs, but was independent with transfers and ambulation with a RW, was not an active .      CARE PLAN     NURSING:  Calvin Re while on this unit will:  [x] Be continent of bowel and bladder     [x] Have an adequate number of bowel movements  [] Urinate with no urinary retention >300ml in bladder  [] Complete bladder protocol with marion removal  [] Maintain O2 SATs at ___%  [x] Have pain managed while on ARU       [] Be pain free by discharge   [x] Have no skin breakdown while on ARU  [x] Have improved skin integrity via wound measurements  [] Have no signs/symptoms of infection at the wound site  [x] Be free from injury during hospitalization   [x] Complete education with patient/family with understanding demonstrated for:  [] Adjustment   [] Other:     Nursing Interventions will include:  [x] bowel/bladder training   [x] education for medical assistive devices   [x] medication education   [x] O2 saturation management   [x] energy conservation   [x] stress management techniques   [x] fall prevention   [x] alarms protocol   [x] seating and positioning   [] skin/wound care   [] pressure relief instruction   [] dressing changes     [x] infection protection   [x] DVT prophylaxis  [x] assistance with in room safety with transfers to bed, toilet, wheelchair, shower   [x] bathroom activities and hygiene  [] Other:    Patient/Caregiver Education for:  [x] Disease/sustained injury/management     [x] Medication Use  [] Surgical intervention  [x] Safety  [x] Body mechanics and or joint protection  [x] Health maintenance     [] Other:     PHYSICAL THERAPY:  Goals:                   Short Term Goals:  Time Frame: 10-14 days  Patient will complete bed mobility at [facility] independent   Patient will complete transfers at supervision   Patient will ambulate 50 ft with use of rolling walker at supervision  Patient will complete car transfer at supervision     Plan of Care: Pt to be seen 5 out of 7 days per week per ARU protocol ( 60 minutes with PT)                     OCCUPATIONAL THERAPY:  Goals:               Patient Goals: improve transfers and mobility    Short Term Goals:  Time Frame: 10-14 days   Patient will complete upper body ADL at supervision   Patient will complete lower body ADL at supervision   Patient will complete toileting at supervision   Patient will complete grooming at set up assistance   Patient will complete functional transfers at supervision       These goals were reviewed with this patient at the time of assessment and James Herrera is in agreement    Plan of Care:  Pt to be seen 5 out of 7 days per week per ARU protocol ( 60 minutes with OT)     SPEECH THERAPY: Goals will be left blank if speech is not following this patient. Goals:               Short Term Goals  Time Frame for Short Term Goals: 7-10 days  Goal 1: Pt will tolerate recommended diet and advanced trials with no overt s/s of aspiration. Goal 2: Pt will complete auditory comprehension tasks with 80% accuracy. Goal 3: Pt will verbalize basic information (personal history etc.) with 80% accuracy or min cues using any communication modality. Goal 4: Patient will complete verbal description and word retrieval tasks with 80% accuracy  Goal 5: Pt will recall functional information (daily tasks, personal hx, etc.) with 80% accuracy. Goal 6: Patient will complete functional problem-solving tasks for daily situations with 80% accuracy              Time Frame for Short Term Goals: 7-10 days    Plan of Care:  Pt to be seen 5 out of 7 days per week per ARU protocol (60 minutes with SLP)    Therapy Treatments will include:  [x]  therapeutic exercises    [x]  gait training     [x]  neuromuscular re-ed                            [x]  transfer training             [] community reintegration    [x] bed mobility                          [x]  w/c mobility and training  [x]  self care    []home mgmt    [x]  cognitive training            [x]  energy conservation        [x]  dysphagia tx    [x]  speech/language/communication therapy   []  group therapy    [x]  patient/family education    [] Other:    CASE MANAGEMENT:  Goals:   Assist patient/family with discharge planning, patient/family counseling, and coordination with insurance during ARU stay. Julee Hagen will be seen a minimum of 3 hours of therapy per day, a minimum of 5 out of 7 days per week  (please see above for specific treatment plan per PT/OT/SLP).     [] In this rare instance due to the nature of this patient's medical involvement, this patient will be seen 15 hours per week (900 minutes within a 7 day period). In addition, dietician/nutritionist may monitor calorie count as well as intake and collaboratively work with SLP on dietary upgrades. Neuropsychology/Psychology may evaluate and provide necessary support. Medical issues being managed closely and that require 24 hour availability of a physician:  [x] Swallowing Precautions  [] Bowel/Bladder Fx  [] Weight bearing precautions  [] Wound Care    [x] Pain Mgmt   [x] Infection Protection  [x] DVT Prophylaxis   [x] Fall Precautions  [x] Fluid/Electrolyte/Nutrition Balance  [] Voice Protection   [] Respiratory  [] Other:    Medical Prognosis: [] Good  [x] Fair    [] Guarded   Total expected IRF days: 14  Anticipated discharge destination: Home  [] Home Independently   [x] Home with supervision    []SNF     [] Other                                           Physician anticipated functional outcomes:  By discharge, patient will progress to being Supervision - Independent with all mobility and activities. IPOC brief synthesis: 81-year-old male with a history of CAD and dementia who was admitted on 10/7 with slurred speech and fatigue. Concern for stroke. CT head was unremarkable for acute findings. CTA without occlusion. MRI confirmed an acute infarct in the left corona radiata. Neurology evaluated and suggested an aspirin, statin, and formal stroke work-up. He was evaluated by therapy and suggested to continue in an inpatient setting prior to returning home. I have reviewed this initial plan of care and agree with its contents:    Title   Name    Date    Time    Physician: Dr. Gordon Monroe, 10/14/22, 2 pm    Case Mgmt: Alexa Umer, MSW, LSW, 10/13/2022 @ 8:44    OT: MIKKI Pickard OTR/L 10/13/22 1551     PT: Brynn Muro PT, DPT, [id number]  10/13/22 6628    RN: Bruce Johnston RN 10/12/22 1950    ST: Jeanine Lockett #[id number] 10/14/22, [facility].    : Manuel Raza PT, [place] [id number]  10/14/22  12:31 PM

## 2022-10-12 NOTE — PROGRESS NOTES
[facility]   Phone: [phone]    Occupational Therapy    [] Initial Evaluation            [x] Daily Treatment Note         [] Discharge Summary      Patient: Kika Brooke   : 1935   MRN: [id number]   Date of Service:  10/12/2022    Admitting Diagnosis:  Slurred speech  Current Admission Summary: Kika Brooke is a 81 y.o. male who presented to ED for evaluation of slurred speech and generalized weakness/fatigue. Patient is a poor historian at this time. No family currently at bedside to provide history but daughter was at bedside in ED and provided supplemental history. Most of the information is derived from conversation with the emergency room PA and review of records. Daughter reports she noticed patient seemed more fatigued and weak today. He had difficulty walking due to weakness. She also noticed slurred speech. She denies any additional concerns. Patient denies any complaints at this time. Of note, patient was started on Augmentin on 10/6 for acute sinusitis, end date 10/20. Past Medical History:  has a past medical history of CAD (coronary artery disease). Past Surgical History:  has a past surgical history that includes Coronary artery bypass graft and Cardiac surgery. Discharge Recommendations: Kika Brooke scored a 15/24 on the AM-PAC ADL Inpatient form. Current research shows that an AM-PAC score of 17 or less is typically not associated with a discharge to the patient's home setting. Based on the patient's AM-PAC score and their current ADL deficits, it is recommended that the patient have 5-7 sessions per week of Occupational Therapy at d/c to increase the patient's independence. At this time, this patient demonstrates complex nursing, medical, and rehabilitative needs, and would benefit from intensive rehabilitation services upon discharge from the Inpatient setting.   This patient demonstrates the ability to participate in and benefit from an intensive therapy program with a coordinated interdisciplinary team approach to foster frequent, structured, and documented communication among disciplines, who will work together to establish, prioritize, and achieve treatment goals. Please see assessment section for further patient specific details. If patient discharges prior to next session this note will serve as a discharge summary. Please see below for the latest assessment towards goals. DME Required For Discharge: patient has all required DME for discharge    Precautions/Restrictions: medium fall risk  Weight Bearing Restrictions: no restrictions  [] Right Upper Extremity  [] Left Upper Extremity [] Right Lower Extremity  [] Left Lower Extremity     Required Braces/Orthotics: no braces required   [] Right  [] Left  Positional Restrictions:no positional restrictions    Pre-Admission Information   Lives With: lives alone, but daughters are present with pt for the entire duration of the day. Son reports that daughters do not stay overnight. One of the daughters reportedly works from home and is able to be present frequently. other family close by        Type of Home: apartment, . Comment: senior living  -24 hr assist from family available  Home Layout: one level  Home Access: elevator  Bathroom Layout: walker accessible, wheelchair accessible, walk in shower, handicap height toilet  Bathroom Equipment: grab bars in shower, grab bars around toilet, shower chair, 3-in-1 commode  Home Equipment: rolling walker, single point cane, manual wheelchair - majority of time uses RW  Transfer Assistance: reports transfers \"on my own\" and uses RW or cane most of the time  Ambulation Assistance:. Comment: per chart review, pt reports \"tremors\" when walking with RW, states a family member walks with him     ADL Assistance: .   Comment: pt reports assist with bathing, reports getting self dressed   IADL Assistance: requires assistance with all homemaking tasks - daughters complete all IADL tasks   Active : [] yes             [x]no  Current Employment: . Comment: retired , [employer] -   Hobbies: used to enjoy hunting and [address]: none reported in last 6 months per patient - son present in room at end of session and reports pt has had \"many falls\" recently d/t decreased balance        Subjective  General: Attempted to see pt this AM, daughter present and cutting up all breakfast foods for pt. Requesting to shave pt. Pt educated on OT role, pt declining all offered ADLs at this time, preferring daughter complete dressing/grooming/feeding tasks. Pt agreeable to afternoon session. Returned for afternoon session, RN present administering meds, pt declined all offered ADLs but agreeable to OT session for functional mobility. Pain: 0/10  Pain Interventions: not applicable        Activities of Daily Living  Basic Activities of Daily Living  Lower Extremity Dressing: dependent  Dressing Comments: Doff/Ranjeet socks seated in room chair. Pt with self-limiting behavior  Instrumental Activities of Daily Living  No IADL completed on this date. Functional Mobility  Bed Mobility  Bed mobility not completed on this date. Transfers  Sit to stand transfer:maximum assistance  Stand to sit transfer: maximum assistance  Stand step transfer: minimal assistance  Comments: Sit>Stand attempted x2, unable to complete on Trial 1. Increased time to complete sit>stand. VC for hand placement and RW mgmt. Pt educated on safe stand>sit transfer and hand placement; poor carryover observed. Decreased eccentric control, pt \"plops\" into chair. Functional Mobility:  Standing Balance: contact guard assistance, minimal assistance. Standing Balance Comment: Varying between CGA and Sayda w/RW.  Pt with posterior lean, vc to correct  Functional Mobility: .  minimal assistance, moderate assistance  Functional Mobility Activity: Taylor ambulation ~50 feet x2, Sayda progressing to [address] with observed fatigue and c/o lower back pn. Pt did not rate. Functional Mobility Device Use: rolling walker  Functional Mobility Comment:  Pt with posterior lean, short step length and slightly flexed posture. VC to correct gait; \"Take BIG steps\",  pt unable to correct.  Please defer to PT note for specific gait quality     Other Therapeutic Interventions    Functional Outcomes  AM-PAC Inpatient Daily Activity Raw Score: 15    Cognition  Overall Cognitive Status: Impaired  Arousal/Alterness: delayed responses to stimuli, inconsistent responses to stimuli  Following Commands: follows one step commands with repetition, follows one step commands with increased time  Attention Span: difficulty attending to directions, difficulty dividing attention  Memory: decreased short term memory, decreased long term memory  Safety Judgement: decreased awareness of need for assistance, decreased awareness of need for safety  Problem Solving: assistance required to generate solutions, assistance required to implement solutions, assistance required to identify errors made, assistance required to correct errors made  Insights: decreased awareness of deficits  Initiation: requires cues for some  Sequencing: requires cues for some  Comments: Pt with slow response to questions, decreased arousal level, requires increased stimuli for appropriate responses   Orientation:    oriented to person, oriented to place, oriented to time, and oriented to situation-   Command Following:   impaired     Education  Barriers To Learning: cognition and hearing  Patient Education: patient educated on goals, OT role and benefits, plan of care, precautions, ADL adaptive strategies, transfer training, discharge recommendations  Learning Assessment:  patient verbalizes understanding, would benefit from continued reinforcement, patient will require reinforcement due to cognitive deficits    Assessment  Activity Tolerance: Fair  Impairments Requiring Therapeutic Intervention: decreased functional mobility, decreased ADL status, decreased strength, decreased safety awareness, decreased cognition, decreased endurance, decreased balance, decreased IADL, decreased coordination  Prognosis: good  Clinical Assessment: Pt presents with the above listed deficits and is currently functioning below his baseline functioning level. Pt Max A for functional transfers and Mod A for forward functional mobility and up to Max A backing up to chair. Pt with decreased safety awareness and would benefit from intensive inpt therapy at d/c to  increase safety and independence in daily occupations.    Safety Interventions: patient left in chair, chair alarm in place, call light within reach, gait belt, telesitter in use, and nurse notified    Plan  Frequency: 5-7 x/week  Current Treatment Recommendations: strengthening, ROM, balance training, functional mobility training, transfer training, endurance training, patient/caregiver education, ADL/self-care training, cognitive/perceptual training, home exercise program, safety education, and positioning    Goals  Patient Goals: did not state this date    Short Term Goals:  Time Frame: by discharge   Patient will complete upper body ADL at stand by assistance   Patient will complete lower body ADL at minimal assistance   Patient will complete toileting at minimal assistance   Patient will complete grooming at supervision   Patient will complete functional transfers at supervision   Patient will complete functional mobility at supervision   Patient will increase functional standing balance to supervision for improved ADL completion    No goals met 10/12, continue all goals    Therapy Session Time     Individual Group Co-treatment   Time In 1320     Time Out 1347     Minutes 27          Timed Code Treatment Minutes: 27 minutes  Total Treatment Minutes:  27 Minutes Electronically Signed By:JEAN Lees/[id number]

## 2022-10-12 NOTE — PROGRESS NOTES
Moises Net  10/12/2022  [id number]    Chief Complaint: Slurred speech    Subjective:   No overnight events. No current complaints. Feeling good today. Precert remains pending this am.     ROS: No CP, SOB, dyspnea    Objective:  Patient Vitals for the past 24 hrs:   BP Temp Temp src Pulse Resp SpO2 Weight   10/12/22 0748 -- -- -- -- -- -- 200 lb 8 oz (90.9 kg)   10/12/22 0700 (!) 175/78 98.1 °F (36.7 °C) Oral 61 16 98 % --   10/11/22 2336 (!) 160/74 97.6 °F (36.4 °C) Oral 56 16 96 % --   10/11/22 2025 -- -- -- 54 16 97 % --   10/11/22 1915 (!) 103/53 97.3 °F (36.3 °C) Oral 51 16 97 % --   10/11/22 1228 139/62 98 °F (36.7 °C) Oral 51 16 98 % --   10/11/22 1142 (!) 150/67 97.7 °F (36.5 °C) Oral 56 16 98 % --       Gen: No distress, pleasant. Resting in bedside chair  HEENT: Normocephalic, atraumatic. CV: No audible murmurs, well perfused extremities  Resp: No respiratory distress. No increased WOB  Abd: Soft, nontender nondistended  Ext: No edema. Neuro: Alert, oriented, appropriately interactive. Laboratory data: Available via EMR. Therapy progress:    PT    Rolling: Level of difficulty - A Little   Sit to Stand from a Chair: Level of difficulty - Unable  Supine to Sit: Level of difficulty - A Little     Bed to Chair: Physical Assistance Required - A Lot  Ambulate Across Room: Physical Assistance Required - A Lot  Ascend 3-5 Steps With HR: Physical Assistance Required - A Lot    OT    Eating: Physical Assistance Required - None  Grooming: Physical Assistance Required - A Little  LB Dressing: Physical Assistance Required - A Lot  UB Dressing: Physical Assistance Required - A Little  Bathing: Physical Assistance Required - A Lot  Toileting: Physical Assistance Required - Total    SLP         Body mass index is 27.19 kg/m².     Assessment:  Patient Active Problem List   Diagnosis    CAD (coronary artery disease)    Bilateral carotid artery stenosis    Stage 2 chronic kidney disease    HTN (hypertension), benign    Dyslipidemia    Dementia due to Alzheimer's disease ([facility] [address]    Unable to care for self    Unable to ambulate    Slurred speech    Acute cerebrovascular accident (CVA) ([facility] [address]       Plan:   Acute left corona radiata infarct: ASA, statin. PT/OT/SLP  Dementia: SLP  Hypothyroidism  HTN  COPD  HLD  Depression     Dispo: Patient is an appropriate ARU candidate. Able to tolerate the required 3 hours of therapy in an ARU setting. Precert remains pending from 10/10. Able to admit today if approved. We will continue to follow. Steve Beck MD 10/12/2022, 10:07 AM    * This document was created using dictation software. While all precautions were taken to ensure accuracy, errors may have occurred. Please disregard any typographical errors.

## 2022-10-12 NOTE — PROGRESS NOTES
[facility]   Phone: [phone]    Physical Therapy    [] Initial Evaluation            [x] Daily Treatment Note         [] Discharge Summary      Patient: Jesús Olea   : 1935   MRN: [id number]   Date of Service:  10/12/2022  Admitting Diagnosis: Slurred speech    Current Admission Summary: Briefly, Jesús Olea is a 81 y.o. male  who presents to the ED complaining of malaise fatigue and lethargy since yesterday, triage note says 10 PM last night however he tells me he was feeling generally unwell all day yesterday. He denies any specific cough or cold symptoms belly pain vomiting diarrhea or fevers though. He does have a history of Alzheimer's according to previous documentation and a stroke with a facial droop in April. He does seem a little confused and disoriented on my initial assessment. However it is unclear what his baseline is as no collateral was at the bedside when I evaluated him initially. He is able to follow commands and move his arms and legs without difficulty. He says he has not fallen recently or injured himself in any other way. Past Medical History:  has a past medical history of CAD (coronary artery disease). Past Surgical History:  has a past surgical history that includes Coronary artery bypass graft and Cardiac surgery. Discharge Recommendations: Jesús Olea scored a 12/ on the AM-PAC short mobility form. Current research shows that an AM-PAC score of 17 or less is typically not associated with a discharge to the patient's home setting. Based on the patient's AM-PAC score and their current functional mobility deficits, it is recommended that the patient have 5-7 sessions per week of Physical Therapy at d/c to increase the patient's independence.   At this time, this patient demonstrates complex nursing, medical, and rehabilitative needs, and would benefit from intensive rehabilitation services upon discharge from the Inpatient setting. This patient demonstrates the ability to participate in and benefit from an intensive therapy program with a coordinated interdisciplinary team approach to foster frequent, structured, and documented communication among disciplines, who will work together to establish, prioritize, and achieve treatment goals. Please see assessment section for further patient specific details. If patient discharges prior to next session this note will serve as a discharge summary. Please see below for the latest assessment towards goals. DME Required For Discharge: no DME required at discharge - patient has RW  Precautions/Restrictions: high fall risk  Weight Bearing Restrictions: weight bearing as tolerated  [] Right Upper Extremity  [] Left Upper Extremity [] Right Lower Extremity  [] Left Lower Extremity     Required Braces/Orthotics: no braces required   [] Right  [] Left  Positional Restrictions:no positional restrictions    Pre-Admission Information   Lives With: lives alone, but daughters are present with pt for the entire duration of the day. Son reports that daughters do not stay overnight. One of the daughters reportedly works from home and is able to be present frequently. other family close by        Type of Home: apartment, . Comment: senior living  -24 hr assist from family available  Home Layout: one level  Home Access: elevator  Bathroom Layout: walker accessible, wheelchair accessible, walk in shower, handicap height toilet  Bathroom Equipment: grab bars in shower, grab bars around toilet, shower chair, 3-in-1 commode  Home Equipment: rolling walker, single point cane, manual wheelchair - majority of time uses RW  Transfer Assistance: reports transfers \"on my own\" and uses RW or cane most of the time  Ambulation Assistance:. Comment: per chart review, pt reports \"tremors\" when walking with RW, states a family member walks with him     ADL Assistance: .   Comment: pt reports assist with bathing, reports getting self dressed   IADL Assistance: requires assistance with all homemaking tasks - daughters complete all IADL tasks   Active : [] yes             [x]no  Current Employment: . Comment: retired , [address] [employer] -   Hobbies: used to enjoy hunting and fishing   Recent Falls: none reported in last 6 months per patient - son present in room at end of session and reports pt has had \"many falls\" recently d/t decreased balance    Examination   Vision:   Vision Gross Assessment: Impaired and Vision Corrective Device: wears glasses at all times  Hearing:   hard of hearing        Subjective  General: Pt reclined in chair at arrival. Agreeable to therapy  Pain Interventions: not applicable       Functional Mobility  Bed Mobility  Bed mobility not completed on this date. Comments:    Transfers  Sit to stand transfer: moderate assistance  Stand to sit transfer: moderate assistance  Toilet transfer: minimal assistance with use of grab bar. Mod A for lowering onto commode. Comments: Practiced sit<>stand x 4 beyond functional transfers. Focus was on \"nose over toes\" anterior trunk lean with both the transition up and down as pt tends to just posteriorly lean and \"plop\" into chair. Pt performing appropriately 2/4 of the transfers, needs frequent cues and visual cues for assist. When he does it properly he can be min assist.  Ambulation  Surface:level surface  Assistive Device: rolling walker  Assistance: moderate assistance  Distance: 120' and 10' from bathroom  Gait Mechanics: festinating gait,  posterior lean  Comments: Mod A for balance and max cues for walker management  Stair Mobility  Stair mobility not completed on this date. Comments:  Wheelchair Mobility:  No w/c mobility completed on this date.   Comments:  Balance  Static Sitting Balance: fair (+): maintains balance at SBA/supervision without use of UE support  Dynamic Sitting Balance: fair (+): maintains balance at SBA/supervision without use of UE support  Static Standing Balance: poor: requires mod (A) to maintain balance  Dynamic Standing Balance: poor: requires mod (A) to maintain balance  Comments: Pt needing assist for balance during hand hygiene due to posterior lean    Other Therapeutic Interventions  Pt toileting needing assist for pericare and pants management. Functional Outcomes  -PAC Inpatient Mobility Raw Score : 12              Cognition  Overall Cognitive Status: Impaired  Orientation:    oriented to person, oriented to place, oriented to situation, and disoriented to time   Command Following:   [facility]    Education  Barriers To Learning: cognition and hearing  Patient Education: patient educated on goals, PT role and benefits, plan of care, general safety, functional mobility training, family education, transfer training, discharge recommendations  Learning Assessment:  patient verbalizes understanding, would benefit from continued reinforcement, patient will require reinforcement due to cognitive deficits    Assessment  Activity Tolerance: Decreased endurance   Impairments Requiring Therapeutic Intervention: decreased functional mobility, decreased ADL status, decreased ROM, decreased strength, decreased cognition, decreased endurance, decreased balance, decreased posture  Prognosis: fair  Clinical Assessment: Pt continues to have significant difficulty with sit to stand transfers and balancing while standing due to posterior leaning, unsafe to return to home at this time as he is a significant fall risk. Would recommend further inpatient therapy and 24/7 assist. Pt is very pleasant and motivated with strong family support.     Safety Interventions: patient left in chair, chair alarm in place, call light within reach, gait belt, patient at risk for falls, telesitter in use, nurse notified, and family/caregiver present    Plan  Frequency: 5-7 x/week  Current Treatment Recommendations: strengthening, ROM, balance training, functional mobility training, transfer training, gait training, endurance training, patient/caregiver education, safety education, and equipment evaluation/education    Goals  Patient Goals: Return home. Short Term Goals:  Time Frame: Discharge.   Patient will complete bed mobility at [facility] independent   Patient will complete transfers at supervision   Patient will ambulate 50 ft with use of rolling walker at stand by assistance  No goals met 10/12/22  Therapy Session Time      Individual Group Co-treatment   Time In 1126       Time Out 1150       Minutes 24         Timed Code Treatment Minutes:  24 Minutes  Total Treatment Minutes:  24       Electronically Signed By: Barbara Machuca PT [id number]

## 2022-10-12 NOTE — PROGRESS NOTES
0730-Assessment complete and charted. Patient awake, alert and oriented. Answered all questions correctly. Patient up in chair, denies needs/distress or pain. Chair alarm in place, call light in reach and patient instructed to use call light for assistance/needs. [id number] awaiting precert to ARU      [id number] complete/accepted. Patient will transfer upstairs to ARU when bed available    1546-Report given to [facility] on ARU unit.  Will transport patient via wheelchair up to unit

## 2022-10-12 NOTE — PLAN OF CARE
Problem: Discharge Planning  Goal: Discharge to home or other facility with appropriate resources  Outcome: Adequate for Discharge  Note: Awaiting precert for ARU     Problem: Pain  Goal: Verbalizes/displays adequate comfort level or baseline comfort level  Outcome: Progressing     Problem: Skin/Tissue Integrity  Goal: Absence of new skin breakdown  Description: 1. Monitor for areas of redness and/or skin breakdown  2. Assess vascular access sites hourly  3. Every 4-6 hours minimum:  Change oxygen saturation probe site  4. Every 4-6 hours:  If on nasal continuous positive airway pressure, respiratory therapy assess nares and determine need for appliance change or resting period. Outcome: Progressing     Problem: Safety - Adult  Goal: Free from fall injury  Outcome: Progressing     Problem: ABCDS Injury Assessment  Goal: Absence of physical injury  Outcome: Progressing     Problem: Chronic Conditions and Co-morbidities  Goal: Patient's chronic conditions and co-morbidity symptoms are monitored and maintained or improved  Outcome: Progressing     Problem: Coping  Goal: Pt/Family able to verbalize concerns and demonstrate effective coping strategies  Description: INTERVENTIONS:  1. Assist patient/family to identify coping skills, available support systems and cultural and spiritual values  2. Provide emotional support, including active listening and acknowledgement of concerns of patient and caregivers  3. Reduce environmental stimuli, as able  4. Instruct patient/family in relaxation techniques, as appropriate  5. Assess for spiritual pain/suffering and initiate Spiritual Care, Psychosocial Clinical Specialist consults as needed  Outcome: Progressing     Problem: Decision Making  Goal: Pt/Family able to effectively weigh alternatives and participate in decision making related to treatment and care  Description: INTERVENTIONS:  1.  Determine when there are differences between patient's view, family's view, and healthcare provider's view of condition  2. Facilitate patient and family articulation of goals for care  3. Help patient and family identify pros/cons of alternative solutions  4. Provide information as requested by patient/family  5. Respect patient/family right to receive or not to receive information  6. Serve as a liaison between patient and family and health care team  7.  Initiate Consults from Ethics, Palliative Care or initiate [address] as is appropriate  Outcome: Progressing     Problem: Neurosensory - Adult  Goal: Achieves stable or improved neurological status  Outcome: Progressing  Goal: Achieves maximal functionality and self care  Outcome: Progressing     Problem: Skin/Tissue Integrity - Adult  Goal: Skin integrity remains intact  Outcome: Progressing     Problem: Musculoskeletal - Adult  Goal: Return mobility to safest level of function  Outcome: Progressing     Problem: Genitourinary - Adult  Goal: Absence of urinary retention  Outcome: Progressing  Note: Pt has shown no urinary retention in two days

## 2022-10-12 NOTE — PROGRESS NOTES
10/12/22 1722   RT Protocol   History Pulmonary Disease 0   Respiratory pattern 0   Breath sounds 0   Cough 0   Bronchodilator Assessment Score 0

## 2022-10-13 LAB
ANION GAP SERPL CALCULATED.3IONS-SCNC: 10 MMOL/L (ref 3–16)
BUN BLDV-MCNC: 25 MG/DL (ref 7–20)
CALCIUM SERPL-MCNC: 9.2 MG/DL (ref 8.3–10.6)
CHLORIDE BLD-SCNC: 104 MMOL/L (ref 99–110)
CO2: 21 MMOL/L (ref 21–32)
CREAT SERPL-MCNC: 1.2 MG/DL (ref 0.8–1.3)
GFR AFRICAN AMERICAN: >60
GFR NON-AFRICAN AMERICAN: 57
GLUCOSE BLD-MCNC: 123 MG/DL (ref 70–99)
HCT VFR BLD CALC: 29.7 % (ref 40.5–52.5)
HEMOGLOBIN: 10.1 G/DL (ref 13.5–17.5)
MCH RBC QN AUTO: 32.2 PG (ref 26–34)
MCHC RBC AUTO-ENTMCNC: 33.9 G/DL (ref 31–36)
MCV RBC AUTO: 94.9 FL (ref 80–100)
PDW BLD-RTO: 15.1 % (ref 12.4–15.4)
PLATELET # BLD: 158 K/UL (ref 135–450)
PMV BLD AUTO: 9.1 FL (ref 5–10.5)
POTASSIUM SERPL-SCNC: 4 MMOL/L (ref 3.5–5.1)
RBC # BLD: 3.13 M/UL (ref 4.2–5.9)
SODIUM BLD-SCNC: 135 MMOL/L (ref 136–145)
WBC # BLD: 4.3 K/UL (ref 4–11)

## 2022-10-13 PROCEDURE — 97535 SELF CARE MNGMENT TRAINING: CPT

## 2022-10-13 PROCEDURE — 97166 OT EVAL MOD COMPLEX 45 MIN: CPT

## 2022-10-13 PROCEDURE — 92610 EVALUATE SWALLOWING FUNCTION: CPT

## 2022-10-13 PROCEDURE — 2580000003 HC RX 258: Performed by: PHYSICAL MEDICINE & REHABILITATION

## 2022-10-13 PROCEDURE — 92526 ORAL FUNCTION THERAPY: CPT

## 2022-10-13 PROCEDURE — 97530 THERAPEUTIC ACTIVITIES: CPT

## 2022-10-13 PROCEDURE — 97162 PT EVAL MOD COMPLEX 30 MIN: CPT

## 2022-10-13 PROCEDURE — 94761 N-INVAS EAR/PLS OXIMETRY MLT: CPT

## 2022-10-13 PROCEDURE — 6370000000 HC RX 637 (ALT 250 FOR IP): Performed by: PHYSICAL MEDICINE & REHABILITATION

## 2022-10-13 PROCEDURE — 36415 COLL VENOUS BLD VENIPUNCTURE: CPT

## 2022-10-13 PROCEDURE — 6360000002 HC RX W HCPCS: Performed by: PHYSICAL MEDICINE & REHABILITATION

## 2022-10-13 PROCEDURE — 85027 COMPLETE CBC AUTOMATED: CPT

## 2022-10-13 PROCEDURE — 92523 SPEECH SOUND LANG COMPREHEN: CPT

## 2022-10-13 PROCEDURE — 1280000000 HC REHAB R&B

## 2022-10-13 PROCEDURE — 80048 BASIC METABOLIC PNL TOTAL CA: CPT

## 2022-10-13 PROCEDURE — 94640 AIRWAY INHALATION TREATMENT: CPT

## 2022-10-13 RX ADMIN — PANTOPRAZOLE SODIUM 40 MG: 40 TABLET, DELAYED RELEASE ORAL at 06:17

## 2022-10-13 RX ADMIN — OXYBUTYNIN CHLORIDE 10 MG: 5 TABLET, EXTENDED RELEASE ORAL at 08:22

## 2022-10-13 RX ADMIN — CETIRIZINE HYDROCHLORIDE 10 MG: 10 TABLET, FILM COATED ORAL at 08:22

## 2022-10-13 RX ADMIN — LEVOTHYROXINE SODIUM 125 MCG: 0.12 TABLET ORAL at 06:17

## 2022-10-13 RX ADMIN — AMOXICILLIN AND CLAVULANATE POTASSIUM 1 TABLET: 875; 125 TABLET, FILM COATED ORAL at 08:21

## 2022-10-13 RX ADMIN — ENOXAPARIN SODIUM 40 MG: 100 INJECTION SUBCUTANEOUS at 08:21

## 2022-10-13 RX ADMIN — FLUTICASONE PROPIONATE 1 SPRAY: 50 SPRAY, METERED NASAL at 08:23

## 2022-10-13 RX ADMIN — HYDRALAZINE HYDROCHLORIDE 25 MG: 25 TABLET, FILM COATED ORAL at 20:08

## 2022-10-13 RX ADMIN — TRAZODONE HYDROCHLORIDE 50 MG: 50 TABLET ORAL at 20:08

## 2022-10-13 RX ADMIN — ATORVASTATIN CALCIUM 20 MG: 20 TABLET, FILM COATED ORAL at 08:21

## 2022-10-13 RX ADMIN — TAMSULOSIN HYDROCHLORIDE 0.4 MG: 0.4 CAPSULE ORAL at 08:22

## 2022-10-13 RX ADMIN — HYDRALAZINE HYDROCHLORIDE 25 MG: 25 TABLET, FILM COATED ORAL at 14:20

## 2022-10-13 RX ADMIN — AMLODIPINE BESYLATE 5 MG: 5 TABLET ORAL at 08:21

## 2022-10-13 RX ADMIN — Medication 2 PUFF: at 06:00

## 2022-10-13 RX ADMIN — Medication 2 PUFF: at 20:13

## 2022-10-13 RX ADMIN — HYDRALAZINE HYDROCHLORIDE 25 MG: 25 TABLET, FILM COATED ORAL at 06:17

## 2022-10-13 RX ADMIN — AMOXICILLIN AND CLAVULANATE POTASSIUM 1 TABLET: 875; 125 TABLET, FILM COATED ORAL at 20:07

## 2022-10-13 RX ADMIN — MONTELUKAST SODIUM 10 MG: 10 TABLET, FILM COATED ORAL at 20:08

## 2022-10-13 RX ADMIN — Medication 10 ML: at 08:27

## 2022-10-13 RX ADMIN — ESCITALOPRAM OXALATE 20 MG: 10 TABLET ORAL at 08:22

## 2022-10-13 RX ADMIN — Medication 10 ML: at 20:09

## 2022-10-13 RX ADMIN — ASPIRIN 81 MG 81 MG: 81 TABLET ORAL at 08:22

## 2022-10-13 ASSESSMENT — PAIN SCALES - GENERAL
PAINLEVEL_OUTOF10: 0
PAINLEVEL_OUTOF10: 0

## 2022-10-13 NOTE — PROGRESS NOTES
Admission Period/Goal QM Codes for Ton Cardona. QM Admit Code Goal Code   Eating 5 5   Oral Hygiene 3 5   Toileting Hygiene 3 4   Shower/Bathing 3 3   UB Dressing 3 4   LB Dressing 2 4   Putting on/off Footwear 2 3   Rolling Left and Right 4 6   Sit To Lying 4 6   Lying to Sitting on Bedside 4 6   Sit to Stand 3 4   Chair/Bed to Chair Transfer 3 4   Toilet Transfers 3 4   Car Transfers 3 4   Walk 10 Feet 3 4   Walk 50 Feet with Two Turns 88 4   Walk 150 Feet 88 4   Walk 10 Feet on Uneven Surfaces 3 4   1 Step (Curb) 88 4   4 Steps 88 4   12 Steps 88 2   Picking up Object from Floor 3 4   Wheel 50 Feet with 2 Turns 9 -   Type - -   Wheel 150 Feet 9 -   Type - -       Following discussion at the Quality Measure Huddle, the above codes were determined to be the patient's usual performance at admission. OT:  Gala Suero, OTR/L #[id number], 10/18/2022, 1150     PT:  Marcelo RAMIREZ, DPT, [id number] 10/18/2022, 1135     RN:  Khadra Faith RN 10/18/2022 2736     ST:  Amador KC  [facility]-SLP #[id number] 10/18/22, 0818    :  Nini Kim PT, DPT [id number]  10/18/22  2:57 PM

## 2022-10-13 NOTE — H&P
Patient: Bridger Finch  [id number]  Date: 10/13/2022      Chief Complaint: Weakness    History of Present Illness/Hospital Course:  78-year-old male with a history of CAD and dementia who was admitted on 10/7 with slurred speech and fatigue. Concern for stroke. CT head was unremarkable for acute findings. CTA without occlusion. MRI confirmed an acute infarct in the left corona radiata. Neurology evaluated and suggested an aspirin, statin, and formal stroke work-up. He was evaluated by therapy and suggested to continue in an inpatient setting prior to returning home. He has no current complaints. Prior Level of Function:  Independent ambulation with daily family support for ADLS and IADLS    Current Level of Function: Mod A     has a past medical history of CAD (coronary artery disease). has a past surgical history that includes Coronary artery bypass graft and Cardiac surgery. reports that he has never smoked. He has never used smokeless tobacco. He reports that he does not drink alcohol and does not use drugs. family history is not contributory. Allergies: Patient has no known allergies.     Current Facility-Administered Medications   Medication Dose Route Frequency Provider Last Rate Last Admin    aspirin chewable tablet 81 mg  81 mg Oral Daily Elyssa Muir MD   81 mg at 10/13/22 8719    atorvastatin (LIPITOR) tablet 20 mg  20 mg Oral Daily Elyssa Muir MD   20 mg at 10/13/22 0821    escitalopram (LEXAPRO) tablet 20 mg  20 mg Oral Daily Elyssa Muir MD   20 mg at 10/13/22 0822    fluticasone (FLONASE) 50 MCG/ACT nasal spray 1 spray  1 spray Each Nostril Daily Elyssa Muir MD   1 spray at 10/13/22 2029    levothyroxine (SYNTHROID) tablet 125 mcg  125 mcg Oral Daily Elyssa Muir MD   125 mcg at 10/13/22 0617    cetirizine (ZYRTEC) tablet 10 mg  10 mg Oral Daily Elyssa Muir MD   10 mg at 10/13/22 0822    montelukast (SINGULAIR) tablet 10 mg  10 mg Oral Nightly Nadja Miranda MD   10 mg at 10/12/22 2255    oxybutynin (DITROPAN-XL) extended release tablet 10 mg  10 mg Oral Daily Nadja Miranda MD   10 mg at 10/13/22 9849    pantoprazole (PROTONIX) tablet 40 mg  40 mg Oral QAM AC Nadja Miranda MD   40 mg at 10/13/22 0617    tamsulosin (FLOMAX) capsule 0.4 mg  0.4 mg Oral Daily Nadja Miranda MD   0.4 mg at 10/13/22 1753    sodium chloride flush 0.9 % injection 10 mL  10 mL IntraVENous 2 times per day Nadja Miranda MD   10 mL at 10/13/22 0827    sodium chloride flush 0.9 % injection 10 mL  10 mL IntraVENous PRN Nadja Miranda MD        magnesium hydroxide (MILK OF MAGNESIA) 400 MG/5ML suspension 30 mL  30 mL Oral Daily PRN Nadja Miranda MD        enoxaparin (LOVENOX) injection 40 mg  40 mg SubCUTAneous Daily Nadja Miranda MD   40 mg at 10/13/22 0821    albuterol sulfate HFA (PROVENTIL;VENTOLIN;PROAIR) 108 (90 Base) MCG/ACT inhaler 2 puff  2 puff Inhalation Q4H PRN Nadja Miranda MD        midodrine (PROAMATINE) tablet 2.5 mg  2.5 mg Oral TID PRN Nadja Miranda MD        amoxicillin-clavulanate (AUGMENTIN) 875-125 MG per tablet 1 tablet  1 tablet Oral BID Nadja Miranda MD   1 tablet at 10/13/22 4142    mometasone-formoterol (DULERA) 200-5 MCG/ACT inhaler 2 puff  2 puff Inhalation BID Nadja Miranda MD   2 puff at 10/13/22 0600    amLODIPine (NORVASC) tablet 5 mg  5 mg Oral Daily Nadja Miranda MD   5 mg at 10/13/22 0089    hydrALAZINE (APRESOLINE) tablet 25 mg  25 mg Oral 3 times per day Nadja Miranda MD   25 mg at 10/13/22 0617    acetaminophen (TYLENOL) tablet 650 mg  650 mg Oral Q4H PRN Nadja Miranda MD        diphenhydrAMINE (BENADRYL) tablet 25 mg  25 mg Oral Q6H PRN Nadja Miranda MD        hydrALAZINE (APRESOLINE) tablet 50 mg  50 mg Oral Q8H PRN Nadja Miranda MD        ondansetron (ZOFRAN-ODT) disintegrating tablet 4 mg  4 mg Oral Q8H PRN Nadja Miranda MD        traZODone (DESYREL) tablet 50 mg  50 mg Oral Nightly PRN Sly Camacho Anjali Santizo MD           REVIEW OF SYSTEMS:   CONSTITUTIONAL: negative for fevers, chills, diaphoresis, appetite change, night sweats and unexpected weight change. HEENT: negative for hearing loss, tinnitus, ear drainage, sinus pressure, nasal congestion, epistaxis and snoring. RESPIRATORY: Negative for hemoptysis, cough, sputum production. CARDIOVASCULAR: negative for chest pain, palpitations, exertional chest pressure/discomfort, edema, syncope. GASTROINTESTINAL: negative for nausea, vomiting, diarrhea, constipation, blood in stool and abdominal pain. GENITOURINARY: negative for frequency, dysuria, urinary incontinence, decreased urine volume, and hematuria. HEMATOLOGIC/LYMPHATIC: negative for easy bruising, bleeding and lymphadenopathy. ALLERGIC/IMMUNOLOGIC: negative for recurrent infections, angioedema, anaphylaxis and drug reactions. ENDOCRINE: negative for weight changes and diabetic symptoms including polyuria, polydipsia and polyphagia. MUSCULOSKELETAL: negative for pain, joint swelling, decreased range of motion and muscle weakness. NEUROLOGICAL: negative for headaches, slurred speech, unilateral weakness. PSYCHIATRIC/BEHAVIORAL: negative for hallucinations, behavioral problems, confusion and agitation. All pertinent positives are noted in the HPI. Physical Examination:  Vitals: Patient Vitals for the past 24 hrs:   BP Temp Temp src Pulse Resp SpO2 Height Weight   10/13/22 0806 (!) 187/92 98.2 °F (36.8 °C) Oral 77 18 97 % -- --   10/13/22 0617 (!) 167/75 97.9 °F (36.6 °C) Oral 65 17 96 % -- --   10/12/22 2237 (!) 146/56 98.3 °F (36.8 °C) Oral 57 16 94 % -- --   10/12/22 2022 -- -- -- -- -- 95 % -- --   10/12/22 1720 (!) 155/74 98 °F (36.7 °C) Oral 53 18 -- -- --   10/12/22 1719 -- -- -- -- -- -- 6' (1.829 m) 220 lb 1 oz (99.8 kg)     Psych: Stable mood, normal judgement, normal affect   Const: No distress  Eyes: Conjunctiva noninjected, no icterus noted; pupils equal, round. Left ventricular systolic function is normal with ejection fraction   estimated at 60 %. Sigmoid septum is present with normal thickness of remaining left   ventricular wall segments. Grade III diastolic dysfunction with elevated filling pressure. Bi-atrial enlargement. The aortic root is moderately dilated. The aortic valve area is calculated at 1.96 cm2 with a maximum pressure   gradient of 19.36 mmHg and a mean pressure gradient of 11 mmHg. This is c/w   very mild aortic stenosis. Mild aortic regurgitation is present. Mitral annular calcification is present. Moderate mitral & tricuspid regurgitation. Mild-moderate pulmonic regurgitation present. Most recent imaging studies revealed   EXAMINATION:   MRI OF THE BRAIN WITHOUT CONTRAST  10/8/2022 1:02 pm       TECHNIQUE:   Multiplanar multisequence MRI of the brain was performed without the   administration of intravenous contrast.       COMPARISON:   04/23/2022       HISTORY:   ORDERING SYSTEM PROVIDED HISTORY: stroke like symptoms   TECHNOLOGIST PROVIDED HISTORY:   Reason for exam:->stroke like symptoms   Reason for Exam: Slurred speech, weakness, unable to walk. FINDINGS:   INTRACRANIAL STRUCTURES/VENTRICLES: Punctate acute lacunar infarct present in   the left frontal corona radiata. No mass effect or midline shift. No   evidence of an acute intracranial hemorrhage. Scattered periventricular,   deep, and subcortical white matter T2/FLAIR hyperintensities are nonspecific   and likely related to microvascular ischemic disease. Involutional   parenchymal changes. No evidence of hydrocephalus. The sellar/suprasellar   regions appear unremarkable. The normal signal voids within the major   intracranial vessels appear maintained. ORBITS: The visualized portion of the orbits demonstrate no acute   abnormality. Bilateral lens replacement.        SINUSES: Extensive paranasal sinus disease with near complete opacification   of the ethmoid sinuses. The mastoid air cells appear clear. BONES/SOFT TISSUES: The bone marrow signal intensity appears normal. The soft   tissues demonstrate no acute abnormality. Impression   1. Punctate acute lacunar infarct present in the left frontal corona radiata. 2. Involutional parenchymal changes with moderate microvascular ischemic   disease. 3. Extensive paranasal sinus disease. The above laboratory data have been reviewed. The above imaging data have been reviewed. The above medical testing have been reviewed. Body mass index is 29.85 kg/m². Barriers to Discharge: Decreased cognition, decreased safety, ADLs    Disposition: Home    Prognosis: Fair    Rehabilitation goals: To return patient to home setting at their prior level of function. POST ADMISSION PHYSICIAN EVALUATION  The patient has agreed to being admitted to our comprehensive inpatient  rehabilitation facility consisting of at least 180 minutes of therapy a day,  5 out of 7 days a week. The patient/family has a good understanding of our discharge process. The  patient has potential to make improvement and is in need of at least two of  the following multidisciplinary therapies including but not limited to  physical, occupational, respiratory, and speech, nutritional services, wound care, and prosthetics and orthotics. Given the patients complex condition  and risk of further medical complications, rehabilitation services cannot be  safely provided at a lower level of care such as a skilled nursing facility. I have compared the patients medical and functional status at the time of the  preadmission screening and the same on this date, and there are no significant changes except as documented below in the assessment and plan.     By signing this document, I acknowledge that I have personally performed a  full physical examination on this patient within 24 hours of admission to  this inpatient rehabilitation facility and have determined the patient to be  able to tolerate the above course of treatment at an intensive level for a  reasonable period of time. I will be completing a detailed individualized  Plan of Care for this patient by day four of the patients stay based upon the  Preadmission Screen, this Post-Admission Evaluation, and the therapy  evaluations. Assessment and Plan:  Acute left corona radiata infarct: ASA, statin. PT/OT/SLP    Dementia: SLP    Hypothyroidism: Synthroid 125    HTN: Norvasc 5, hydralazine 25    COPD: Dulera, Singulair    HLD: Lipitor 20    Depression: Lexapro 20    Sinus infection: Augmentin thru 10/20    BPH: Ditropan and Flomax per home regimen    Bowels: Per protocol  Bladder: Per protocol   Sleep: Trazodone provided prn. Pain: Tylenol as needed  DVT PPx: Lovenox  ELOS: 10-14    Cony Floyd MD 10/13/2022, 9:00 AM     * This document was created using dictation software. While all precautions were taken to ensure accuracy, errors may have occurred. Please disregard any typographical errors.

## 2022-10-13 NOTE — PROGRESS NOTES
Morning assessment completed, patient seems confused and anxious this morning, did not let the nurse to help him get in a chair, had to call another RN to help him move to the chair, pt denied any help from me, was able to give his meds when the daughter came to the room and explained him that I was his nurse. Patient would not believe anything I said. Needs a sitter, MD and nurse manager notified. Fall precautions in place, hourly rounding, call light and belongings in reach, bed in lowest position, wheels locked in place, side rails up x 2, walkways free of clutter Pt remains free from falls. Fall precautions in place--bed in lowest position, call light within reach, bed alarm in use. Pt aware to call for assistance before getting up.

## 2022-10-13 NOTE — PLAN OF CARE
Problem: Discharge Planning  Goal: Discharge to home or other facility with appropriate resources  10/13/2022 0922 by Halima Mckeon RN  Outcome: Progressing  Flowsheets (Taken 10/13/2022 0806)  Discharge to home or other facility with appropriate resources: Identify barriers to discharge with patient and caregiver  10/13/2022 0200 by Kim Kothari RN  Outcome: [address] (Taken 10/12/2022 1720 by Jovany Lima, RN)  Discharge to home or other facility with appropriate resources: Identify barriers to discharge with patient and caregiver     Problem: Safety - Adult  Goal: Free from fall injury  10/13/2022 0922 by Halima Mckeon RN  Outcome: Progressing  10/13/2022 0200 by Kim Kothari RN  Outcome: Progressing

## 2022-10-13 NOTE — DISCHARGE SUMMARY
Hospital Medicine Discharge Summary    Patient: Bridger Finch     Gender: male  : 1935   Age: 81 y.o. MRN: [id number]    Admitting Physician: Harry Chou MD  Discharge Physician: Dahiana Ureña MD     Code Status: Full Code     Admit Date: 10/7/2022   Discharge Date: 10/12/2022      Disposition:  [facility] ARU    Discharge Diagnoses: Active Hospital Problems    Diagnosis Date Noted    Acute cerebrovascular accident (CVA) ([address] [I63.9] 10/08/2022     Priority: Medium    Slurred speech [R47.81] 10/07/2022     Priority: Medium       Follow-up appointments:  one week    Outpatient to do list: F/U with PCP    Condition at Discharge:  Stable    Hospital Course:   80 yo M with CAD s/p CABG, HTN, hypothyroidism came to ER with slurred speech. Admitted as inpatient for acute CVA. Followed by Neuro and PMR. Slurred speech secondary to acute left frontal corona radiata acute infarct  CT of the head was negative  CT angiogram showed stenotic vertebral segment with collaterals  MRI confirmed a stroke  Seen by neurology  On aspirin and statin  LDL only 33  Check A1c  Continue cardiac monitor  No other stenosis seen on the CT angiogram of the neck as well   Echo showed normal EF grade 3 diastolic dysfunction mild aortic stenosis moderate AR    Coronary artery disease s/p CABG  On aspirin and statin    Hypothyroidism  Continue Synthroid     Hypertension  On hydralazine and amlodipine  May need to uptitrate to keep systolic less than 264     Urinary retention  Miller catheter removed  Passing urine    Discharged to ARU for rehab. Told me he would not want aggressive support (DNR-CCA seems consistent with wishes, this should be reviewed with patient and family). Remains full code upon DC. F/U with PCP.     Discharge Medications:   Discharge Medication List as of 10/12/2022  4:08 PM        Discharge Medication List as of 10/12/2022  4:08 PM        CONTINUE these medications which have CHANGED Details   hydrALAZINE (APRESOLINE) 25 MG tablet Take 1 tablet by mouth every 8 hours, Disp-90 tablet, R-3Print      amLODIPine (NORVASC) 5 MG tablet Take 1 tablet by mouth daily, Disp-30 tablet, R-3Print           Discharge Medication List as of 10/12/2022  4:08 PM        CONTINUE these medications which have NOT CHANGED    Details   ADVAIR DISKUS 250-50 MCG/ACT AEPB diskus inhaler Inhale 2 puffs into the lungs in the morning and at bedtime, DAWHistorical Med      atorvastatin (LIPITOR) 20 MG tablet Take 20 mg by mouth in the morning. Historical Med      fluticasone (FLONASE) 50 MCG/ACT nasal spray 1 spray by Each Nostril route dailyHistorical Med      loratadine (CLARITIN) 10 MG tablet Take 10 mg by mouth in the morning. Historical Med      oxybutynin (DITROPAN-XL) 10 MG extended release tablet Take 10 mg by mouth in the morning. Historical Med      montelukast (SINGULAIR) 10 MG tablet Take 10 mg by mouth nightlyHistorical Med      pantoprazole (PROTONIX) 40 MG tablet Take 40 mg by mouth dailyHistorical Med      tamsulosin (FLOMAX) 0.4 MG capsule Take 0.4 mg by mouth in the morning. Historical Med      albuterol (PROVENTIL HFA) 108 (90 BASE) MCG/ACT inhaler Inhale 2 puffs into the lungs every 6 hours as needed for Wheezing., Disp-1 Inhaler, R-0      escitalopram (LEXAPRO) 20 MG tablet Take 20 mg by mouth in the morning. Historical Med      levothyroxine (SYNTHROID) 125 MCG tablet Take 125 mcg by mouth in the morning. Darrel Prado Historical Med      aspirin 81 MG tablet Take 81 mg by mouth daily.   Historical Med           Discharge Medication List as of 10/12/2022  4:08 PM        STOP taking these medications       amoxicillin-clavulanate (AUGMENTIN) 875-125 MG per tablet Comments:   Reason for Stopping:         midodrine (PROAMATINE) 2.5 MG tablet Comments:   Reason for Stopping:         furosemide (LASIX) 20 MG tablet Comments:   Reason for Stopping:         clopidogrel (PLAVIX) 75 MG tablet Comments:   Reason for Stopping: Discharge Exam:    BP (!) 146/74   Pulse 55   Temp 98 °F (36.7 °C) (Oral)   Resp 16   Ht 6' (1.829 m)   Wt 200 lb 8 oz (90.9 kg)   SpO2 94%   BMI 27.19 kg/m²   General appearance:  NAD  HEENT:   Normal cephalic, atraumatic, moist mucous membranes, no oropharyngeal erythema or exudate  Neck: Supple, trachea midline, no anterior cervical or SC LAD  Heart[de-identified] Normal s1/s2, RRR, no murmurs, gallops, or rubs. No leg edema  Lungs:  No use of accessory musclesNormal respiratory effort. Clear to auscultation, bilaterally without Rales/Wheezes/Rhonchi. Abdomen: Soft, non-tender, non-distended, bowel sounds present, no masses  Musculoskeletal:  No clubbing, no cyanosis, no edema  Skin: No lesion or masses  Neurologic:  Neurovascularly intact without any focal sensory/motor deficits. Cranial nerves: II-XII intact, grossly non-focal.  Psychiatric:  A & O x3  Neuro: Grossly intact, moves all four extremities     Labs:  For convenience and continuity at follow-up the following most recent labs are provided:    Lab Results   Component Value Date/Time    WBC 4.3 10/10/2022 05:37 AM    HGB 10.5 10/10/2022 05:37 AM    HCT 32.6 10/10/2022 05:37 AM    MCV 96.9 10/10/2022 05:37 AM     10/10/2022 05:37 AM     10/10/2022 05:37 AM    K 4.1 10/10/2022 05:37 AM     10/10/2022 05:37 AM    CO2 21 10/10/2022 05:37 AM    BUN 24 10/10/2022 05:37 AM    CREATININE 1.1 10/10/2022 05:37 AM    CALCIUM 9.1 10/10/2022 05:37 AM    ALKPHOS 87 10/07/2022 05:55 PM    ALT 7 10/07/2022 05:55 PM    AST 11 10/07/2022 05:55 PM    BILITOT 0.7 10/07/2022 05:55 PM    BILIDIR 0.26 08/05/2011 08:34 AM    LABALBU 3.9 10/07/2022 05:55 PM    LDLCALC 33 10/08/2022 04:52 AM    TRIG 71 10/08/2022 04:52 AM     Lab Results   Component Value Date    INR 1.13 10/07/2022    INR 1.26 (H) 04/22/2022       Radiology:  CT HEAD WO CONTRAST    Result Date: 10/7/2022  EXAMINATION: CT OF THE HEAD WITHOUT CONTRAST  10/7/2022 7:47 pm TECHNIQUE: CT of the head was performed without the administration of intravenous contrast. Automated exposure control, iterative reconstruction, and/or weight based adjustment of the mA/kV was utilized to reduce the radiation dose to as low as reasonably achievable. COMPARISON: 08/15/2022 HISTORY: ORDERING SYSTEM PROVIDED HISTORY: AMS TECHNOLOGIST PROVIDED HISTORY: Reason for exam:->AMS Has a \"code stroke\" or \"stroke alert\" been called? ->No Decision Support Exception - unselect if not a suspected or confirmed emergency medical condition->Emergency Medical Condition (MA) Reason for Exam: Altered Mental Status (Pt brought in by [facility] from home for increasing weakness at home. Pt is awake. Last known normal 10 pm last night) FINDINGS: BRAIN/VENTRICLES: There is no acute intracranial hemorrhage, mass effect or midline shift. No abnormal extra-axial fluid collection. The gray-white differentiation is maintained without evidence of an acute infarct. There is no evidence of hydrocephalus. Stable chronic white matter microvascular ischemic changes. ORBITS: The visualized portion of the orbits demonstrate no acute abnormality. SINUSES: The visualized paranasal sinuses and mastoid air cells demonstrate no acute abnormality. SOFT TISSUES/SKULL:  No acute abnormality of the visualized skull or soft tissues. 1. No acute intracranial abnormality. 2. Stable chronic white matter microvascular ischemic changes. XR CHEST PORTABLE    Result Date: 10/7/2022  EXAMINATION: ONE XRAY VIEW OF THE CHEST 10/7/2022 5:47 pm COMPARISON: Chest x-ray dated 15 August 2022 HISTORY: ORDERING SYSTEM PROVIDED HISTORY: ams TECHNOLOGIST PROVIDED HISTORY: Reason for exam:->ams Reason for Exam: Altered Mental Status (Pt brought in by [facility] from home for increasing weakness at home. Pt is awake. Last known normal 10 pm last night) FINDINGS: Mild cardiomegaly. No acute airspace infiltrate. No pneumothorax or pleural effusion     No acute cardiopulmonary findings. Mild cardiomegaly     CTA HEAD NECK W CONTRAST    Result Date: 10/8/2022  EXAMINATION: CTA OF THE HEAD AND NECK WITH CONTRAST 10/8/2022 2:31 pm: TECHNIQUE: CTA of the head and neck was performed with the administration of intravenous contrast. Multiplanar reformatted images are provided for review. MIP images are provided for review. Stenosis of the internal carotid arteries measured using NASCET criteria. Automated exposure control, iterative reconstruction, and/or weight based adjustment of the mA/kV was utilized to reduce the radiation dose to as low as reasonably achievable. 3D reconstructed images were performed on a separate workstation and provided for review. COMPARISON: 04/22/2022 HISTORY: ORDERING SYSTEM PROVIDED HISTORY: slurred speech TECHNOLOGIST PROVIDED HISTORY: Reason for exam:->slurred speech Has a \"code stroke\" or \"stroke alert\" been called? ->No FINDINGS: CTA NECK: AORTIC ARCH/ARCH VESSELS: No dissection or arterial injury. No significant stenosis of the brachiocephalic or subclavian arteries. Mild stenosis of the proximal left subclavian artery. CAROTID ARTERIES: No dissection, arterial injury, or hemodynamically significant stenosis by NASCET criteria. Mild (less than 50%) stenosis of the bilateral proximal internal carotid arteries. VERTEBRAL ARTERIES: No dissection, arterial injury, or significant stenosis. Dominant right and diminutive left vertebral artery. SOFT TISSUES: No focal consolidation in the visualized lung apices. Again seen 0.6 cm ground-glass nodules in the right lung apex. No cervical or superior mediastinal lymphadenopathy. Partially visualized cardiomegaly and coronary artery calcifications. The larynx and pharynx are unremarkable. No acute abnormality of the salivary and thyroid glands. BONES: No acute osseous abnormality. Multilevel cervical spondylosis.  CTA HEAD: ANTERIOR CIRCULATION: No significant stenosis of the intracranial internal carotid, anterior cerebral, or middle cerebral arteries. No aneurysm. POSTERIOR CIRCULATION: No significant stenosis of the vertebral, basilar, or posterior cerebral arteries. Left vertebral artery V4 segments not well visualized and may be congenitally hypoplastic or severely stenotic. Robust bilateral posterior communicating arteries are present, anatomic variant. No aneurysm. OTHER: No dural venous sinus thrombosis on this non-dedicated study. Extensive paranasal sinus disease. BRAIN: No mass effect or midline shift. No extra-axial fluid collection. The gray-white differentiation is maintained. 1. Again seen severely stenotic versus congenitally hypoplastic left vertebral artery V4 segment. Robust bilateral posterior communicating arteries are present, anatomic variant. 2. Otherwise, no evidence of large vessel occlusion or significant stenosis in the major arteries of the head and neck. 3. Extensive paranasal sinus disease. 4. Again seen 0.6 cm ground-glass nodules in the right lung apex. See recommendations below. RECOMMENDATIONS: Multiple pulmonary nodules. Most severe: 6 mm right ground-glass pulmonary nodule within the upper lobe. Recommend a non-contrast Chest CT at 3-6 months. Subsequent management based on the most suspicious nodule(s). These guidelines do not apply to immunocompromised patients and patients with cancer. Follow up in patients with significant comorbidities as clinically warranted. For lung cancer screening, adhere to Lung-RADS guidelines. Reference: Radiology. 2017; 284(1):228-43.      MRI brain without contrast    Result Date: 10/8/2022  EXAMINATION: MRI OF THE BRAIN WITHOUT CONTRAST  10/8/2022 1:02 pm TECHNIQUE: Multiplanar multisequence MRI of the brain was performed without the administration of intravenous contrast. COMPARISON: 04/23/2022 HISTORY: ORDERING SYSTEM PROVIDED HISTORY: stroke like symptoms TECHNOLOGIST PROVIDED HISTORY: Reason for exam:->stroke like symptoms Reason for Exam: Slurred speech, weakness, unable to walk. FINDINGS: INTRACRANIAL STRUCTURES/VENTRICLES: Punctate acute lacunar infarct present in the left frontal corona radiata. No mass effect or midline shift. No evidence of an acute intracranial hemorrhage. Scattered periventricular, deep, and subcortical white matter T2/FLAIR hyperintensities are nonspecific and likely related to microvascular ischemic disease. Involutional parenchymal changes. No evidence of hydrocephalus. The sellar/suprasellar regions appear unremarkable. The normal signal voids within the major intracranial vessels appear maintained. ORBITS: The visualized portion of the orbits demonstrate no acute abnormality. Bilateral lens replacement. SINUSES: Extensive paranasal sinus disease with near complete opacification of the ethmoid sinuses. The mastoid air cells appear clear. BONES/SOFT TISSUES: The bone marrow signal intensity appears normal. The soft tissues demonstrate no acute abnormality. 1. Punctate acute lacunar infarct present in the left frontal corona radiata. 2. Involutional parenchymal changes with moderate microvascular ischemic disease. 3. Extensive paranasal sinus disease. The patient was seen and examined on day of discharge and this discharge summary is in conjunction with any daily progress note from day of discharge. Time Spent on discharge is 45 minutes  in the examination, evaluation, counseling and review of medications and discharge plan. Note that more than 30 minutes was spent in preparing discharge papers, discussing discharge with patient, medication review, etc.       Signed:    Jeyson Castorena MD   10/12/2022      Thank you Farrah Ny MD for the opportunity to be involved in this patient's care.  If you have any questions or concerns please feel free to contact me at [address] [place] [address]

## 2022-10-13 NOTE — PROGRESS NOTES
[facility]   Phone: [phone]    Occupational Therapy    [x] Initial Evaluation            [] Daily Treatment Note         [] Discharge Summary      Patient: Joi Hassan   : 1935   MRN: [id number]   Date of Service:  10/13/2022    Admitting Diagnosis:  Acute ischemic stroke [facility])  Current Admission Summary: 81-year-old male with a history of CAD and dementia who was admitted on 10/7 with slurred speech and fatigue. Concern for stroke. CT head was unremarkable for acute findings. CTA without occlusion. MRI confirmed an acute infarct in the left corona radiata. Neurology evaluated and suggested an aspirin, statin, and formal stroke work-up. He was evaluated by therapy and suggested to continue in an inpatient setting prior to returning home. He has no current complaints. Past Medical History:  has a past medical history of CAD (coronary artery disease). Past Surgical History:  has a past surgical history that includes Coronary artery bypass graft and Cardiac surgery. Discharge Recommendations: Home with HHOT and 24 hour supervision/assistance     DME Required For Discharge: DME to be determined pending patient progress    Precautions/Restrictions: high fall risk  Weight Bearing Restrictions: no restrictions     Required Braces/Orthotics: no braces required  Positional Restrictions:no positional restrictions    Pre-Admission Information   Lives With: daughter Family is always present. Daughter, Jennifer Smith are present during week days 8 AM - 2:30 PM (T-) and his son Betsy Stephens present on Monday 8 AM - 2:30 PM. Jennifer Schwab present during evening, overnight and weekends.     Type of Home: apartment  Home Layout: one level  Home Access: elevator  Bathroom Layout: walker accessible, wheelchair accessible, walk in shower, handicap height toilet  Bathroom Equipment: grab bars in shower, grab bars around toilet, shower chair, 3-in-1 commode  Toilet Height: elevated height  Home Equipment: rolling walker, rollator - 4 wheeled walker, single point cane, manual wheelchair, reacher  Transfer Assistance: modified independent with use of RW --occassionally family provides assistance but is always present   Ambulation Assistance:modified independent with use of RW -typically has someone close by. use wheelchair for community   ADL Assistance: requires assistance with bathing, requires assistance with dressing, requires assistance with toileting able to self feed. Daughter reports they assist with footwear and occasionally clothing mgmt during toileting. Patient is typically continent but wears pullups   IADL Assistance: requires assistance with all homemaking tasks  Active :        [] Yes  [x] No  Hand Dominance: [] Left  [x] Right  Current Employment: retired. 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 Daughter reports 3-4 falls within past 6 months     Examination   Vision:   Vision Gross Assessment: Impaired and Vision Corrective Device: wears glasses at all times  Hearing:   [facility]  Observation:   General Observation:  festinating gait. IV on R forearm   Posture: Forward head rounded shoulders   Sensation:   denies numbness and tingling  Tone:   Normotonic  Coordination Testing:   Coordination and Movement Description: decreased speed  Finger/Thumb Opposition: patient able complete but with cues and decreased speed   ROM:   (B) Shoulder AROM WFL  Strength:   (B) UE strength grossly +4    Decision Making: medium complexity  Clinical Presentation: stable      Subjective  General: Patient in recliner upon arrival, agreeable to OT evaluation. Daughter present for session.    Pain: 0/10  Pain Interventions: not applicable        Activities of Daily Living  Basic Activities of Daily Living  Feeding Comments: per daughter, patient required assistance self feeding today but typically does not   Grooming: stand by assistance minimal assistance  Grooming Comments: patient able to comb hair seated in recliner and apply deodorant seated on shower chair. Patient required Richard for oral hygiene/managing dentures   Upper Extremity Bathing: minimal assistance  Lower Extremity Bathing: moderate assistance   Bathing Equipment: none  Bathing Comments: Patient completed shower seated on shower chair in walk in shower with [place] shower head. Patient required assistance rinsing and drying UB/LB for thoroughness. IV waterproofed. Upper Extremity Dressing: minimal assistance  Lower Extremity Dressing: maximum assistance  Dressing Comments: Richard for donning shirt. maxA for donning pullup, pants and socks. Patient was able to doff socks himself with SBA. Patient utilized grab bars for sit to stand from shower chair and balance while therapist managed pants over hips   Toileting: maximum assistance. Toileting Comments: patient unable to void, patient required maxA for clothing mgmt   General Comments: Increased time for ADLs. Instrumental Activities of Daily Living  No IADL completed on this date. Functional Mobility  Bed Mobility  Bed mobility not completed on this date. Comments:  Transfers  Sit to stand transfer:moderate assistance, maximum assistance  Stand to sit transfer: moderate assistance, maximum assistance  Toilet transfer: stedy utilized requiring mod-maxA    Toilet transfer equipment: bariatric bedside commode, standard toilet  Toilet transfer comments: Stedy utilized from chair to bathroom. Bariatic bedside commode over toilet. Patient able to transfer to/from toilet to [facility] with Ofelia Humphrey transfer: patient transferred from toilet to shower chair with Stedy. Patient required maxA for stand to sit from stedy. Patient progressed and was able to transfer from chair to standing with grab bars and modA.    Shower transfer equipment: shower seat with back, grab bars, walker  Shower transfer comments: cueing for hand placement   Comments: patient's transfers fluctuating throughout session and at baseline per daughter. Patient's first stand from recliner to [address] but unable to maintain standing due to posterior lean. Stedy used from recliner to toilet and to shower chair. Patient able to complete transfer after shower from recliner to [address] [place] [address] with maxA. Patient requiring mod-maxA for stand to sit due to poor eccentric control and posterior lean. Patient able to complete transfer from EOB with modA. Patient requiring maxA for stand to sit from RW to recliner due to difficulty turning and plopping. Functional Mobility:  Sitting Balance: SBA-CGA. Sitting Balance Comment: for ADLs  Standing Balance: varies Richard-maxA with RW. Min-modA while standing in [place] . Standing Balance Comment: during functional mobility and ADLs  Functional Mobility: . Min A, progressing to mod-maxA when turning   Functional Mobility Activity: 15 ft from shower chair to recliner. 5 ft from recliner to EOB. 5 ft from EOB to recliner   Functional Mobility Device Use: rolling walker  Functional Mobility Comment: poor safety awareness. Max verbal and tactile cueing required.     Other Therapeutic Interventions         Cognition  Overall Cognitive Status: Impaired  Arousal/Alterness: appropriate responses to stimuli  Following Commands: follows one step commands consistently  Attention Span: attends with cues to redirect  Memory: appears intact  Safety Judgement: decreased awareness of need for assistance, decreased awareness of need for safety  Problem Solving: assistance required to generate solutions, decreased awareness of errors  Insights: decreased awareness of deficits  Initiation: requires cues for some  Sequencing: requires cues for some  Orientation:    alert and oriented x 4  Command Following:   impaired     Education  Barriers To Learning: cognition  Patient Education: patient educated on goals, OT role and benefits, plan of care, ADL adaptive strategies, proper use of assistive device/equipment, family education, transfer training  Learning Assessment:  patient verbalizes understanding, would benefit from continued reinforcement    Assessment  Activity Tolerance: patient tolerated well with increased time for tasks  Impairments Requiring Therapeutic Intervention: decreased functional mobility, decreased ADL status, decreased safety awareness, decreased endurance, decreased balance, decreased coordination, decreased posture  Prognosis: fair  Clinical Assessment: Patient presents below baseline function secondary to acute infarct in left corona radiata. Patient typically able to complete transfers and functional mobility with supervision and requires some assistance for ADLs. Patient required increased assistance for ADLs and transfers this date. Patient required mod-max A for transfers and use of Stedy due to posterior lean. Patient will benefit from continued OT services to address above deficits and to maximize patients ability to complete transfers, functional mobility and ADLs.     Safety Interventions: patient left in chair, chair alarm in place, call light within reach, patient at risk for falls, telesitter in use, and family/caregiver present    Plan  Frequency: 5 x/week, 60 min/day  Current Treatment Recommendations: strengthening, ROM, balance training, functional mobility training, transfer training, endurance training, neuromuscular re-education, patient/caregiver education, and ADL/self-care training    Goals  Patient Goals: improve transfers and mobility    Short Term Goals:  Time Frame: 10-14 days   Patient will complete upper body ADL at supervision   Patient will complete lower body ADL at supervision   Patient will complete toileting at supervision   Patient will complete grooming at set up assistance   Patient will complete functional transfers at supervision     Therapy Session Time     Individual Group Co-treatment   Time In [id number] [facility]      Minutes 77           Timed Code Treatment Minutes:  Timed Code Treatment Minutes: 62 Minutes  Total Treatment Minutes:  77 minutes        Electronically Signed By: June Escobar, [address], MIKKI OTR/L [id number]

## 2022-10-13 NOTE — PLAN OF CARE
Problem: Discharge Planning  Goal: Discharge to home or other facility with appropriate resources  Outcome: Progressing  Flowsheets (Taken 10/12/2022 1720 by Mari Mayer RN)  Discharge to home or other facility with appropriate resources: Identify barriers to discharge with patient and caregiver     Problem: Safety - Adult  Goal: Free from fall injury  Outcome: Progressing     Problem: ABCDS Injury Assessment  Goal: Absence of physical injury  Outcome: Progressing

## 2022-10-13 NOTE — ACP (ADVANCE CARE PLANNING)
Advanced Care Planning Note. Purpose of Encounter: Advanced care planning in light of acute CVA  Parties In Attendance: Patient  Decisional Capacity: Yes  Subjective: Patient with weakness  Objective: Cr 1.1 on 10/10  Goals of Care Determination: Patient wants limited support (No CPR, no vent, consider surgery, no HD, no trach, no PEG)  Plan:  MRI Brain. Neuro and PMR consults. ARU rehab  Code Status: Full code (I recommended DNR CCA, this should be discussed with patient and family again to be consistent with his wishes)   Time spent on Advanced care Plannin minutes  Advanced Care Planning Documents: Completed advanced directives on chart, daughter is the POA.     Booker Martinez MD  10/12/2022 9:43 PM

## 2022-10-13 NOTE — CARE COORDINATION
Social Work Admission Assessment    Objective: Spoke with patient's daughter to complete initial assessment and review role of  in rehab process. Pt oriented to unit. Pt states understanding of this. Current Home Situation:  Patient lives at home with his daughter. They reside in a senior independent apartment community. Patient also has support from his other children. Children provide 24/7 care and support to patient. Patient's apartment is also handicap accessible. Pt's plans re:  Return to work/school/volunteer:  Patient is retired. Accessibility to community resources/transportation:  Patient is active with [address] for PT/OT/Nurse/Aide. Patient is also active with [employer] for: Housekeeping and aide services. Has pt experienced a recent loss or signigicant life event that would impact their care or ability to participate?  __xNo  __Yes - Explain    Has pt ever been treated for emotional disorders? _x_No  __Yes--How does that affect current situation:    How does pt and family cope with stressful events and this hospitalization? Patient's daughter reports that patient does not handle stressful situations well. Special Problem Areas:  Patient's daughter has b[place] [facility].. Discharge Plan: To home with needed supports. Patient has a rollator, walker, WC, Hospital Bed, BSC and a RTS. Impression/Plan: Susana Zambrano (patient )is a 81year old male that has been admitted to ARU. Provided patient with this SW's contact information to contact as needed. .  SW also informed patient's daughter about the Team Conference to be held on 10/18/2022 @ 11:00 AM.   Will continue to follow for support and discharge planning.       Electronically signed by SARAH Perez on 10/13/2022 at 5:12 PM

## 2022-10-13 NOTE — PROGRESS NOTES
Physical Therapy    [facility] Department   Phone: [phone]    Physical Therapy    [x] Initial Evaluation            [] Daily Treatment Note         [] Discharge Summary      Patient: Drew Metcalf   : 1935   MRN: [id number]   Date of Service:  10/13/2022  Admitting Diagnosis: Acute ischemic stroke [facility])  Current Admission Summary:  81 y.o. male who presented to ED for evaluation of slurred speech and generalized weakness/fatigue. Patient is a poor historian at this time. No family currently at bedside to provide history but daughter was at bedside in ED and provided supplemental history. Most of the information is derived from conversation with the emergency room PA and review of records. Daughter reports she noticed patient seemed more fatigued and weak today. He had difficulty walking due to weakness. She also noticed slurred speech. She denies any additional concerns. Patient denies any complaints at this time. Of note, patient was started on Augmentin on 10/6 for acute sinusitis, end date 10/20. Past Medical History:  has a past medical history of CAD (coronary artery disease). Past Surgical History:  has a past surgical history that includes Coronary artery bypass graft and Cardiac surgery. Discharge Recommendations: HHPT S3  DME Required For Discharge: patient has all required DME for discharge  Precautions/Restrictions: high fall risk  Weight Bearing Restrictions: no restrictions  [] Right Upper Extremity  [] Left Upper Extremity [] Right Lower Extremity  [] Left Lower Extremity     Required Braces/Orthotics: no braces required   [] Right  [] Left  Positional Restrictions:no positional restrictions    Pre-Admission Information   Lives With: Daughter, Tawny Escalante. Sarah are present during week days 8 AM - 2:30 PM (T-) and his son Melody Timmons present on Monday 8 AM - 2:30 PM. Tawny Escalante present during evening, overnight and weekends. Type of Home: apartment, . Comment: 3rd floor, senior living  -24 hr assist from family available  Home Layout: one level  Home Access: elevator  Bathroom Layout: walker accessible, wheelchair accessible, walk in shower, handicap height toilet  Bathroom Equipment: grab bars in shower, grab bars around toilet, shower chair, 3-in-1 commode  Home Equipment: rolling walker, single point cane, manual wheelchair - majority of time uses RW  Transfer Assistance: reports transfers \"on my own\" and uses RW or cane most of the time Comment: Darlene Yu states someone is always with him during transfers, may not provide assistance but can if needed  Ambulation Assistance:. Comment: per chart review, pt reports \"tremors\" when walking with RW, states a family member walks with him     ADL Assistance: . Comment: pt reports assist with bathing, reports getting self dressed   IADL Assistance: requires assistance with all homemaking tasks - daughters complete all IADL tasks   Active : [] yes             [x]no  Current Employment: . Comment: retired , [address] [facility] [employer] -   Hobbies: used to enjoy hunting and [address] states he has fallen 3-4 times in past 6 months, reports commonly occurs when stepping backwards     Examination   Vision:   Vision Gross Assessment: Impaired and Vision Corrective Device: wears glasses at all times  Hearing:   [facility]  Observation:   General Observation:  Festinating gait   Posture: Forward head rounded shoulders   Sensation:   WFL  Proprioception:    WFL  Tone:   Normotonic  Coordination Testing:   WFL    ROM:   (B) LE AROM WFL  Strength:   (B) LE strength grossly WFL  Decision Making: medium complexity  Clinical Presentation: evolving      Subjective  General: Patient sitting in recliner chair with daughter, Darlene Yu, present. Patient's daughter states he needs to utilize the bathroom and can take him.  Patient's daughter was reassured that as part of the evaluation I can help transfer and ambulate patient. Patient is agreeable to PT. Pain: Patient does not rate upon questioning  Pain Interventions: repositioned        Functional Mobility  Bed Mobility  Supine to Sit: contact guard assistance  Sit to Supine: contact guard assistance  Rolling Left: contact guard assistance  Rolling Right: contact guard assistance  Scooting: contact guard assistance  Bridging: contact guard assistance  Comments: Patient performed with bed flat and no use of handrails. Patient's daughter states they have a hospital bed at home that he intermittently uses, typically sleeps in flat bed and has add on handrails but reports not currently using. Transfers  Sit to stand transfer: moderate assistance  Stand to sit transfer: moderate assistance  Toilet transfer: moderate assistance  Car transfer: minimal assistance  Comments: Patient demonstrates heavy posterior lean when transferring from sit to supine requiring mod Ax1 to bring patient upright. Patient heavily educated on forward lean, poor carry over with attempts following and will need constant reinforcement. Patient is impulsive with stand to sit as demonstrated by patient \"plopping\" onto EOB without warning while standing near EOB during this visit and poor carry over of reaching back for stand to sit and holding onto RW. Ambulation  Surface:level surface  Assistive Device: rolling walker  Assistance: minimal assistance  Distance: 25 + 25 +15 feet  Gait Mechanics: Festinating gait, posterior lean  Comments:  Patient demonstrates poor walker management with ambulation, requires max VC to stand within RW during ambulation as patient likes to keep RW far in front of him. Stair Mobility  Stair mobility not completed on this date. Comments: Patient has no stairs at home and is deemed unsafe to perform stair mobility due to current condition at this time  Wheelchair Mobility:  No w/c mobility completed on this date.   Comments:  Balance  Static Sitting Balance: fair (+): maintains balance at SBA/supervision without use of UE support  Dynamic Sitting Balance: fair (+): maintains balance at SBA/supervision without use of UE support  Static Standing Balance: poor: requires mod (A) to maintain balance  Dynamic Standing Balance: poor: requires mod (A) to maintain balance  Comments: Patient requires mod Ax1 in standing due to posterior lean increasing fall risk    Other Therapeutic Interventions    Functional Outcomes                 Cognition  Overall Cognitive Status: Impaired  Arousal/Alterness: appropriate responses to stimuli  Following Commands: follows multi step commands with repetition, follows multi step commands with increased time  Attention Span: attends with cues to redirect  Memory: appears intact  Safety Judgement: decreased awareness of need for assistance, decreased awareness of need for safety  Problem Solving: decreased awareness of errors, assistance required to identify errors made, assistance required to correct errors made  Insights: decreased awareness of deficits  Initiation: requires cues for all  Sequencing: requires cues for all  Orientation:    alert and oriented x 4  Command Following:   impaired    Education  Barriers To Learning: cognition  Patient Education: patient educated on goals, PT role and benefits, plan of care, general safety, family education, transfer training  Learning Assessment:  patient will require reinforcement due to cognitive deficits    Assessment  Activity Tolerance: Fair (-); patient demonstrates decreased endurance with ambulation  Impairments Requiring Therapeutic Intervention: decreased functional mobility, decreased strength, decreased safety awareness, decreased cognition, decreased endurance, decreased balance, decreased coordination, vestibular impairment, decreased posture  Prognosis: fair  Clinical Assessment: Patient is 80 y/o male presenting to [facility] due to slurred speech and weakness.  Patient currently presents below baseline function at this time. According to patient's daughter, patient's overall functional mobility wax and wanes. Patient requires 24 hour assistance from children with all functional mobility. Patient utilizes w/c for community distances and a RW for household distances with SBA at all times with increased assistance when patient experiences vertigo or is weak. Patient requires assistance with all ADLs and IADLs. At this time, patient requires mod A with all transfers due to posterior lean and lack of safety. Patient demonstrates ability to ambulate with RW for a distance of 25 feet at a time with min A. Patient is deemed unsafe to perform stair mobility at this time due to current condition. Patient will benefit from skilled PT in order to address listed impairments to reduce overall fall risk and increase safety prior to d/c from ARU.   Safety Interventions: patient left in chair, chair alarm in place, call light within reach, gait belt, patient at risk for falls, and family/caregiver present    Plan  Frequency: 5 x/week, 60 min/day  Current Treatment Recommendations: strengthening, balance training, functional mobility training, transfer training, gait training, endurance training, neuromuscular re-education, wheelchair mobility training, patient/caregiver education, safety education, and positioning    Goals  Patient Goals: Improve ambulation with RW   Short Term Goals:  Time Frame: 10-14 days  Patient will complete bed mobility at [facility] independent   Patient will complete transfers at supervision   Patient will ambulate 50 ft with use of rolling walker at supervision  Patient will complete car transfer at supervision    Therapy Session Time      Individual Group Co-treatment   Time In  0915       Time Out  1030       Minutes  75         Timed Code Treatment Minutes:   60 Minutes  Total Treatment Minutes:  75 Minutes       Electronically Signed By: Dulce Maria Williamson, PT  Dulce Maria Williamson PT, DPT, [id number]

## 2022-10-13 NOTE — PROGRESS NOTES
[facility]   Phone: [phone]    Speech Therapy    [x] Initial Evaluation            [] Daily Treatment Note         [] Discharge Summary      Patient: Samia Carbajal   : 1935   MRN: [id number]   Date of Service:  10/13/2022  Admitting Diagnosis: Acute ischemic stroke [facility])  Current Admission Summary: 67-year-old male with a history of CAD and dementia who was admitted on 10/7 with slurred speech and fatigue. Concern for stroke. CT head was unremarkable for acute findings. CTA without occlusion. MRI confirmed an acute infarct in the left corona radiata. Neurology evaluated and suggested an aspirin, statin, and formal stroke work-up. He was evaluated by therapy and suggested to continue in an inpatient setting prior to returning home. He has no current complaints. Past Medical History:  has a past medical history of CAD (coronary artery disease). Past Surgical History:  has a past surgical history that includes Coronary artery bypass graft and Cardiac surgery. Precautions/Restrictions: high fall risk  Recent Chest xray completed 10/7/22:     Impression   No acute cardiopulmonary findings. Mild cardiomegaly     Recent MRI Brain completed 10/8/22:  Impression   1. Punctate acute lacunar infarct present in the left frontal corona radiata. 2. Involutional parenchymal changes with moderate microvascular ischemic   disease. 3. Extensive paranasal sinus disease.        Pre-Admission Information   Living Status: Lives with daughter Pierre Molina)  Occupation/School: Retired from [employer] reports highest level of education being 8th grade   Medication Management: :  []Primary   []Secondary [x]No  Finance Management: []Primary   []Secondary [x]No  Active :   []Yes         [x]No (reportedly stopped driving 4 years ago)   Hearing:    WFL  Vision:    Vision Corrective Device: wears glasses at all times      Subjective  General: Pt was positioned upright in chair for evaluation, required ongoing cues for reorientation to hospital. Pt was pleasant during evaluation. Pt's daughter Polly Serrano) present for evaluation. Pain: Denies    Safety Interventions: patient left in chair, chair alarm in place, call light within reach, patient at risk for falls, and family/caregiver present      Dysphagia Bedside Swallow Evaluation     Prior Dysphagia History: Pt known to this speech therapy department last seen 8/16/22 with recommendation for a regular texture diet with thin liquids. Pt's daughter denies any difficulty with swallowing states he normally has a good appetite and can take his pills with water or in applesauce. Patient Complaint: Pt is a poor historian but denies any difficulty. Patient Positioning:   Upright in chair  Respiratory Status:   Room air  Pre-Evaluation Consistency Recommendation:   Regular texture diet  with Thin liquids  (pt was not seen by speech therapy in the acute setting)   Dentition:   Upper Dentures  and Lower Dentures   Oral Hygiene: Other:  dried cracked lingual surface   Baseline Vocal Quality:   normal   Volitional Cough:   adequate   Volitional Swallow:   Adequate   Oral Mechanism Exam:   Impairment Severity:Mild  and Moderate   Labial Symmetry , Labial Strength, Lingual ROM, Lingual Strength , Lingual Coordination   Oral Phase:   Impairment Severity:Mild  and To be assessed   Suspected premature bolus loss   Pharyngeal Phase:   Impairment Severity:Mild  and To be assessed   Delayed swallow initiation , Decreased laryngeal elevation, and Cough  Eating Assistance:   Setup or clean-up assistance    Impressions: Bedside swallow evaluation limited this date as pt declined all solid po trials (breakfast tray and offered solid items). Oral mech exam revealed slight L sided labial weakness/ reduced labial strength/ ROM and reduced lingual ROM/ strength and coordination.  Pt c/o reduced labial sensation on upper L portion of lip. Pt presents with mild oropharyngeal dysphagia characterized by suspected premature bolus loss to pharynx, delayed swallow initiation with overt s/s indicative of aspiration vs reduced pharyngeal clearance as pt produced a delayed cough reflex following multiple pills provided whole with water via cup in 1 out of 2 opportunities. Pt with reduced bolus control of multiple pills, would benefit from taking 1-2 pills at a time whole with water as tolerated or whole in puree. Pt seemingly tolerated thins via cup and straw in isolation with no overt clinical s/s of aspiration. Would recommend to continue with a regular texture diet and thins with use of strict aspiration precautions and 1-2 f/u to ensure diet tolerance/ need for instrumental assessment and dysphagia intervention. Diet Solids Recommendation:   Liquid Consistency Recommendation:   Recommended Form of Meds:   Regular texture diet     Thin liquids, No straws     Meds (1-2 at a time) whole with water or Meds in puree        Recommended Compensatory Swallowing Strategies: Upright as possible with all PO intake , No straws , Small bites/sips , Eat/feed slowly, To be determined pending meal assessment           Speech Language Cognitive Evaluation     Oral Mechanism Exam:    Impairment Severity:Mild  and Moderate   Labial Symmetry , Labial Strength, Lingual ROM, Lingual Strength , Lingual Coordination   Comprehension  Auditory Comprehension:   Impairment Severity:Mild   Impaired Basic questions  Impaired Complex questions  Impaired Two step commands  Reading Comprehension:   Impairment Severity: Pt daughter reports pt does not read. Expression  Primary Mode of Expression:   Verbal  Verbal Expression:   Impairment Severity:Moderate   Impaired Repetition  Impaired Confrontational Naming  Impaired Conversation  Impaired Thought Organization   Written Expression:   Impairment Severity: To be assessed   Pragmatics/Social Functioning:   Impairment Severity:Within functional limits      Motor Speech  Impairment Severity:Moderate   Dysarthria: Blended word boundaries  and Imprecise articulation    Decreased speech intelligibility   Voice  Impairment Severity:Mild   Weak   Cognition   [] Unable to be assessed secondary to Aphasia     Overall Orientation:   Impairment Severity:Mild   Disoriented to situation   Disoriented to place   Attention:   Impairment Severity:Within functional limits        Memory:   Impairment Severity:Moderate   Impaired Short-term Memory  Impaired Recall of New Learning   Impaired Immediate/working Memory  Problem Solving:   Impairment Severity:Mild   Impaired Verbal Reasoning  Impaired Complex Tasks   Comment: Daughter reports fluctuation in pt abilities   Safety/Judgement:   Impairment Severity:Mild   Impaired Insight    Additional Assessment  The patient was administered the Neurobehavioral Cognitive Status Examination (NCSE) which assesses orientation, attention, language, constructional ability, memory, reasoning and judgement. The patient scored the following:     Component Score Total Severity of Deficits   Orientation 9 12 Mild   Attention 8 8 WFL   Comprehension 3 6 Moderate   Repetition 9 12 Mild   Naming 4 8 Mild-mod    Constructional Ability NA 6 NA   Memory 3 12 Severe   Calculations NA 4 NA   Similarities  1 8 Severe   Judgement 4 6 Mild         Education  Barriers To Learning: cognition and reading  Patient Education: Provided education regarding role of SLP, results of assessment, recommendations and general speech pathology plan of care. Learning Assessment: Pt requires ongoing learning     Assessment  Impairments Requiring Therapeutic Intervention: Receptive Aphasia , Expressive Aphasia , Dysarthria , Cognitive-Linguistic Deficits , and Oropharyngeal Dysphagia   Prognosis: good    Clinical Assessment:    Pt presents with moderate receptive/expressive deficits, cognitive linguistic deficits, and dysarthria.  Deficits include impaired auditory comprehension, repetition, naming, judgement of safety, and thought organization per NCSE (See above). Relative strength noted in sustained attention. Pt's speech characterized by blurred boundaries between words and imprecise articulation contributing to decreased intelligibility with decreased understanding even with familiar listeners. Cognitive-linguistic, language, and speech deficits contributing to pt's difficulty effectively communicating wants, needs, and ideas, surveying environment for safety, and recalling pertinent information. Per chart review pt has a history of Alzheimer's dementia and dysarthria at baseline per pt's daughter report from previous TIAs/CVAs. Pt has received speech therapy in the past. Recommend skilled speech intervention to help pt return to prior level of communication and cognitive functioning and to increase level of independence at d/c. Will initiate 60 minutes/ day but may drop down to 30 minutes based on pt's progress and reported cognitive baseline by family. Plan  Frequency: 60 minutes/day; 5 days per week, as tolerated, until goals met, or discharged from ARU. Therapeutic Interventions: Diet Tolerance Monitoring , Patient/Family Education , Therapeutic Trials with SLP  Expressive/ Receptive Language intervention , Cognitive-Linguistic intervention , and Patient/ Family education   Discharge Recommendations: TBD   Continued SLP at Discharge: TBD based upon progress     Goals  Time Frame: 7-10 days     Pt will tolerate recommended diet and advanced trials with no overt s/s of aspiration. Pt will complete auditory comprehension tasks with 80% accuracy. Pt will verbalize basic information (personal history etc.) with 80% accuracy or min cues using any communication modality.   Patient will complete verbal description and word retrieval tasks with 80% accuracy   Pt will recall functional information (daily tasks, personal hx, etc.) with 80% accuracy. Patient will complete functional problem-solving tasks for daily situations with 80% accuracy       Therapy Session Time      Session 1   Time In 0830   Time Out 0915   Time Code Minutes    Individual Minutes 45     Timed Code Treatment Minutes:  0  Total Treatment Minutes:  39    Electronically Signed By:   Chen KC [facility]-SLP #[id number] 10/13/2022 11:32 AM  Speech-Language Pathologist    Misael SARKAR,  Speech-Language Pathology     The speech-language pathologist was present, directed the patient's care, made skilled judgment and was responsible for assessment and treatment.

## 2022-10-13 NOTE — CONSULTS
Nutrition Note    RECOMMENDATIONS  PO Diet: Regular    NUTRITION ASSESSMENT   Pt on regular diet with adequate PO intake >50% of meals. EMR weights fluctuate between 200-220 lbs. Family in room clarified that usual body weight is 210-215 lbs. Pt had strong appetite PTA. No nutrition concerns at this time. Nutrition Related Findings: Last BM 10/11; BLE trace edema  Wounds:  (Nonblanchable dark purple bruise L buttock)  Nutrition Education:  Education not indicated   Nutrition Goals: PO intake 75% or greater     MALNUTRITION ASSESSMENT   Acute Illness  Malnutrition Status: No malnutrition      NUTRITION DIAGNOSIS   No nutrition diagnosis at this time     CURRENT NUTRITION THERAPIES  ADULT DIET; Regular     PO Intake: %   PO Supplement Intake:None Ordered      ANTHROPOMETRICS  Current Height: 6' (182.9 cm)  Current Weight: 220 lb 1 oz (99.8 kg)    Ideal Body Weight (IBW): 178 lbs  (81 kg)    Usual Bodyweight  (210-215 lbs per pt and family)       BMI: 29.8      COMPARATIVE STANDARDS  Energy (kcal):  2181-3787 kcals     Protein (g):  104-128 g       Fluid (mL/day):  6973-7069    The patient will be monitored per nutrition standards of care. Consult dietitian if additional nutrition interventions are needed prior to RD reassessment.      Yoli Mcneil, [address], [place]    Contact: [phone]

## 2022-10-14 PROCEDURE — 92507 TX SP LANG VOICE COMM INDIV: CPT

## 2022-10-14 PROCEDURE — 97535 SELF CARE MNGMENT TRAINING: CPT

## 2022-10-14 PROCEDURE — 6370000000 HC RX 637 (ALT 250 FOR IP): Performed by: PHYSICAL MEDICINE & REHABILITATION

## 2022-10-14 PROCEDURE — 97530 THERAPEUTIC ACTIVITIES: CPT

## 2022-10-14 PROCEDURE — 97116 GAIT TRAINING THERAPY: CPT

## 2022-10-14 PROCEDURE — 97129 THER IVNTJ 1ST 15 MIN: CPT

## 2022-10-14 PROCEDURE — 6360000002 HC RX W HCPCS: Performed by: PHYSICAL MEDICINE & REHABILITATION

## 2022-10-14 PROCEDURE — 94640 AIRWAY INHALATION TREATMENT: CPT

## 2022-10-14 PROCEDURE — 94761 N-INVAS EAR/PLS OXIMETRY MLT: CPT

## 2022-10-14 PROCEDURE — 97110 THERAPEUTIC EXERCISES: CPT

## 2022-10-14 PROCEDURE — 2580000003 HC RX 258: Performed by: PHYSICAL MEDICINE & REHABILITATION

## 2022-10-14 PROCEDURE — 1280000000 HC REHAB R&B

## 2022-10-14 PROCEDURE — 97130 THER IVNTJ EA ADDL 15 MIN: CPT

## 2022-10-14 RX ADMIN — CETIRIZINE HYDROCHLORIDE 10 MG: 10 TABLET, FILM COATED ORAL at 08:24

## 2022-10-14 RX ADMIN — Medication 10 ML: at 08:23

## 2022-10-14 RX ADMIN — TRAZODONE HYDROCHLORIDE 50 MG: 50 TABLET ORAL at 21:22

## 2022-10-14 RX ADMIN — LEVOTHYROXINE SODIUM 125 MCG: 0.12 TABLET ORAL at 06:45

## 2022-10-14 RX ADMIN — TAMSULOSIN HYDROCHLORIDE 0.4 MG: 0.4 CAPSULE ORAL at 08:24

## 2022-10-14 RX ADMIN — OXYBUTYNIN CHLORIDE 10 MG: 5 TABLET, EXTENDED RELEASE ORAL at 08:24

## 2022-10-14 RX ADMIN — AMLODIPINE BESYLATE 5 MG: 5 TABLET ORAL at 08:24

## 2022-10-14 RX ADMIN — Medication 2 PUFF: at 19:56

## 2022-10-14 RX ADMIN — HYDRALAZINE HYDROCHLORIDE 25 MG: 25 TABLET, FILM COATED ORAL at 21:23

## 2022-10-14 RX ADMIN — ASPIRIN 81 MG 81 MG: 81 TABLET ORAL at 08:24

## 2022-10-14 RX ADMIN — AMOXICILLIN AND CLAVULANATE POTASSIUM 1 TABLET: 875; 125 TABLET, FILM COATED ORAL at 21:22

## 2022-10-14 RX ADMIN — PANTOPRAZOLE SODIUM 40 MG: 40 TABLET, DELAYED RELEASE ORAL at 06:45

## 2022-10-14 RX ADMIN — ESCITALOPRAM OXALATE 20 MG: 10 TABLET ORAL at 08:24

## 2022-10-14 RX ADMIN — DIPHENHYDRAMINE HYDROCHLORIDE 25 MG: 25 TABLET ORAL at 21:22

## 2022-10-14 RX ADMIN — ATORVASTATIN CALCIUM 20 MG: 20 TABLET, FILM COATED ORAL at 08:25

## 2022-10-14 RX ADMIN — AMOXICILLIN AND CLAVULANATE POTASSIUM 1 TABLET: 875; 125 TABLET, FILM COATED ORAL at 08:23

## 2022-10-14 RX ADMIN — HYDRALAZINE HYDROCHLORIDE 25 MG: 25 TABLET, FILM COATED ORAL at 06:45

## 2022-10-14 RX ADMIN — MONTELUKAST SODIUM 10 MG: 10 TABLET, FILM COATED ORAL at 21:22

## 2022-10-14 RX ADMIN — Medication 10 ML: at 21:28

## 2022-10-14 RX ADMIN — ENOXAPARIN SODIUM 40 MG: 100 INJECTION SUBCUTANEOUS at 08:25

## 2022-10-14 RX ADMIN — Medication 2 PUFF: at 05:41

## 2022-10-14 RX ADMIN — HYDRALAZINE HYDROCHLORIDE 25 MG: 25 TABLET, FILM COATED ORAL at 14:21

## 2022-10-14 ASSESSMENT — PAIN SCALES - GENERAL: PAINLEVEL_OUTOF10: 0

## 2022-10-14 NOTE — PLAN OF CARE
Problem: Safety - Adult  Goal: Free from fall injury  Outcome: Progressing  Note: Pt remains free from falls. Safety precautions in place. Bed in lowest position, bed/chair wheels locked, call light with in reach, bedside table in reach, bed/chair alarm on, fall risk wrist band on, SAFE outside of doorway. Will continue to monitor.

## 2022-10-14 NOTE — PROGRESS NOTES
Sudeep Beard  10/14/2022  [id number]    Chief Complaint: Acute ischemic stroke [facility])    Subjective: Patient seen this afternoon. Patient sitting up in bedside chair feeding himself lunch. He is feeling better, thinks he is making progress in his therapies with his balance and strength. Repeat labs yesterday look good and are stable. ROS: No N/V, SOB, chest pain, chills or fever. Objective:  Patient Vitals for the past 24 hrs:   BP Temp Temp src Pulse Resp SpO2   10/14/22 0815 (!) 148/66 98.3 °F (36.8 °C) Oral 60 16 96 %   10/14/22 0630 131/63 -- -- 55 -- --   10/14/22 0541 -- -- -- 54 18 96 %   10/13/22 2013 -- -- -- 53 18 97 %   10/13/22 2000 (!) 153/75 97.6 °F (36.4 °C) Oral -- 16 96 %     Gen: No distress, pleasant. HEENT: Normocephalic, atraumatic. CV: Regular rate and rhythm. Resp: No respiratory distress. Abd: Soft, nontender   Ext: No edema. Neuro: Alert, oriented, appropriately interactive. Decreased cognition noted.     Wt Readings from Last 3 Encounters:   10/12/22 220 lb 1 oz (99.8 kg)   10/12/22 200 lb 8 oz (90.9 kg)   08/15/22 210 lb (95.3 kg)       Laboratory data:   Lab Results   Component Value Date    WBC 4.3 10/13/2022    HGB 10.1 (L) 10/13/2022    HCT 29.7 (L) 10/13/2022    MCV 94.9 10/13/2022     10/13/2022       Lab Results   Component Value Date/Time     10/13/2022 06:26 AM    K 4.0 10/13/2022 06:26 AM    K 4.1 10/10/2022 05:37 AM     10/13/2022 06:26 AM    CO2 21 10/13/2022 06:26 AM    BUN 25 10/13/2022 06:26 AM    CREATININE 1.2 10/13/2022 06:26 AM    GLUCOSE 123 10/13/2022 06:26 AM    CALCIUM 9.2 10/13/2022 06:26 AM        Therapy progress:  PT  Objective   Bed Mobility  Supine to Sit: contact guard assistance  Sit to Supine: contact guard assistance  Rolling Left: contact guard assistance  Rolling Right: contact guard assistance  Scooting: contact guard assistance  Bridging: contact guard assistance  Comments: Patient performed supine to sit with HOB elevated and use of handrails. Transfers  Sit to stand transfer: moderate assistance, maximum assistance  Stand to sit transfer: moderate assistance  Stand step transfer: moderate assistance  Comments: Initially, patient performed STS from EOB and use of handrails at max Ax1. Patient attempted 3 STS from EOB, continued to demonstrate heavy posterior lean resulting in return to sitting due to inability to remain upright. Patient attempted STS from EOB to transfer to recliner chair in a stand step transfer with RW placed in front. Patient's posterior lean reduced, still present with additional STS allowing transfer to recliner chair. OT   Basic Activities of Daily Living  Feeding Comments: per daughter, patient required assistance self feeding today but typically does not   Grooming: stand by assistance minimal assistance  Grooming Comments: patient able to comb hair seated in recliner and apply deodorant seated on shower chair. Patient required Richard for oral hygiene/managing dentures   Upper Extremity Bathing: minimal assistance  Lower Extremity Bathing: moderate assistance   Bathing Equipment: none  Bathing Comments: Patient completed shower seated on shower chair in walk in shower [facility]. Patient required assistance rinsing and drying UB/LB for thoroughness. IV waterproofed. Upper Extremity Dressing: minimal assistance  Lower Extremity Dressing: maximum assistance  Dressing Comments: Richard for donning shirt. maxA for donning pullup, pants and socks. Patient was able to doff socks himself with SBA. Patient utilized grab bars for sit to stand from shower chair and balance while therapist managed pants over hips   Toileting: maximum assistance. Toileting Comments: patient unable to void, patient required maxA for clothing mgmt   General Comments: Increased time for ADLs.           SLP   Dysphagia: Severity: Mild   Goal not targeted this session                 Comprehension: Severity: Mild -Pt with reduced comprehension of directions for all activities   -multiple repetitions, rewording, and a model provided, following sequencing activity (see below) pt stated he did not understand the activity      Expressive Language: Severity: Moderate   Convergent naming (3 clues)   -independently named item with 3 descriptive points with 53% accuracy, increased to 80% accuracy with min cues  -pt benefited from extended time to answer with 2-3 repetitions of listed words      Cognition: Severity: Moderate   Sequencing functional activities   -pt sequenced 5 written steps to use a call light with 60% accuracy with mod cues   -pt verbally repeated steps with 40% accuracy with a visual, did not increase despite max cuing    -pt able to state when to use call light in 4/4 opportunities      -pt unable to state next therapy despite visual aid (schedule) placed in front of him      -pt and daughter report pt is close to baseline with speech and cognition. Daughter expressed she wanted to continue therapy for 60 minutes a day at this time in order to engage patient's brain while he is here             Body mass index is 29.85 kg/m². Assessment and Plan:  Acute left corona radiata infarct: ASA, statin. PT/OT/SLP     Dementia: SLP     Hypothyroidism: Synthroid 125     HTN: Norvasc 5, hydralazine 25     COPD: Dulera, Singulair     HLD: Lipitor 20     Depression: Lexapro 20     Sinus infection: Augmentin thru 10/20     BPH: Ditropan and Flomax per home regimen     Bowels: Per protocol  Bladder: Per protocol   Sleep: Trazodone provided prn. Pain: Tylenol as needed  DVT PPx: Lovenox  ELOS: 10-14       Justo Wong MD 10/14/2022, 2:14 PM

## 2022-10-14 NOTE — PROGRESS NOTES
Physical Therapy    [facility] [address]   Phone: [phone]    Physical Therapy    [] Initial Evaluation            [x] Daily Treatment Note         [] Discharge Summary      Patient: Jovany Toney   : 1935   MRN: [id number]   Date of Service:  10/14/2022  Admitting Diagnosis: Acute ischemic stroke [facility])  Current Admission Summary:  81 y.o. male who presented to ED for evaluation of slurred speech and generalized weakness/fatigue. Patient is a poor historian at this time. No family currently at bedside to provide history but daughter was at bedside in ED and provided supplemental history. Most of the information is derived from conversation with the emergency room PA and review of records. Daughter reports she noticed patient seemed more fatigued and weak today. He had difficulty walking due to weakness. She also noticed slurred speech. She denies any additional concerns. Patient denies any complaints at this time. Of note, patient was started on Augmentin on 10/6 for acute sinusitis, end date 10/20. Past Medical History:  has a past medical history of CAD (coronary artery disease). Past Surgical History:  has a past surgical history that includes Coronary artery bypass graft and Cardiac surgery. Discharge Recommendations: HHPT S3  DME Required For Discharge: patient has all required DME for discharge  Precautions/Restrictions: high fall risk  Weight Bearing Restrictions: no restrictions  [] Right Upper Extremity  [] Left Upper Extremity [] Right Lower Extremity  [] Left Lower Extremity     Required Braces/Orthotics: no braces required   [] Right  [] Left  Positional Restrictions:no positional restrictions    Pre-Admission Information   Lives With: Daughter, Lord Blas. Sarah are present during week days 8 AM - 2:30 PM (T-F) and his son Senthil Gong present on Monday 8 AM - 2:30 PM. Lord Blas present during evening, overnight and weekends. Type of Home: apartment, . Comment: 3rd floor, senior living  -24 hr assist from family available  Home Layout: one level  Home Access: elevator  Bathroom Layout: walker accessible, wheelchair accessible, walk in shower, handicap height toilet  Bathroom Equipment: grab bars in shower, grab bars around toilet, shower chair, 3-in-1 commode  Home Equipment: rolling walker, single point cane, manual wheelchair - majority of time uses RW  Transfer Assistance: reports transfers \"on my own\" and uses RW or cane most of the time Comment: Ken Morgan states someone is always with him during transfers, may not provide assistance but can if needed  Ambulation Assistance:. Comment: per chart review, pt reports \"tremors\" when walking with RW, states a family member walks with him     ADL Assistance: . Comment: pt reports assist with bathing, reports getting self dressed   IADL Assistance: requires assistance with all homemaking tasks - daughters complete all IADL tasks   Active : [] yes             [x]no  Current Employment: . Comment: retired , [address] [facility] [employer] -   Hobbies: used to enjoy hunting and [address] states he has fallen 3-4 times in past 6 months, reports commonly occurs when stepping backwards     Examination   Vision:   Vision Gross Assessment: Impaired and Vision Corrective Device: wears glasses at all times  Hearing:   [facility]  Observation:   General Observation:  Festinating gait   Posture: Forward head rounded shoulders   Sensation:   WFL  Proprioception:    WFL  Tone:   Normotonic  Coordination Testing:   WFL    ROM:   (B) LE AROM WFL  Strength:   (B) LE strength grossly WFL  Decision Making: medium complexity  Clinical Presentation: evolving      Subjective  General: Patient lying in bed upon arrival. Patient denies pain, sitter present with camera on. Patient is agreeable to PT. Patient's daughter Ken Morgan arrives during session.   Pain: 0/10  Pain Interventions: repositioned        Functional Mobility  Bed Mobility  Supine to Sit: contact guard assistance  Sit to Supine: contact guard assistance  Rolling Left: contact guard assistance  Rolling Right: contact guard assistance  Scooting: contact guard assistance  Bridging: contact guard assistance  Comments: Patient performed supine to sit with HOB elevated and use of handrails. Transfers  Sit to stand transfer: moderate assistance, maximum assistance  Stand to sit transfer: moderate assistance  Stand step transfer: moderate assistance  Comments: Initially, patient performed STS from EOB and use of handrails at max Ax1. Patient attempted 3 STS from EOB, continued to demonstrate heavy posterior lean resulting in return to sitting due to inability to remain upright. Patient attempted STS from EOB to transfer to recliner chair in a stand step transfer with RW placed in front. Patient's posterior lean reduced, still present with additional STS allowing transfer to recliner chair. Ambulation  Ambulation not tested on this date secondary to poor balance with STS. Distance:   Gait Mechanics:   Comments:    Stair Mobility  Stair mobility not completed on this date. Comments: Patient has no stairs at home and is deemed unsafe to perform stair mobility due to current condition at this time  Wheelchair Mobility:  No w/c mobility completed on this date. Comments:  Balance  Static Sitting Balance: fair: maintains balance at CGA without use of UE support  Dynamic Sitting Balance: fair: maintains balance at CGA without use of UE support  Static Standing Balance: poor: requires mod (A) to maintain balance  Dynamic Standing Balance: poor: requires mod (A) to maintain balance  Comments: Patient demonstrated inability to sit EOB without CGA this morning due to posterior/lateral lean to the left at the beginning of the session. Patient improved by end not requiring CGA but continued to require SBA.      Other Therapeutic Interventions    First Session:  Patient performed functional mobility as noted above. Patient attempted STS 3x from EOB with RW placed in front, unable to fully maintain upright position due to heavy posterior lean in standing resulting in unsafe scenario therefore patient returned to EOB where he performed seated B marching and LAQ 10x with CGA. Patient transitioned to supine where he performed bridging 2x10. Patient performed SLR B 2x10. Patient transitioned to EOB following arrival of daughter to attempt an additional STS. Patient required mod Ax1 with RW placed in front and performed stand step transfer to recliner chair mod-max for stand to sit due to poor eccentric control. Patient's chair alarm was turned on and call light was provided. Second Session:  Patient sitting in recliner chair upon arrival. Patient denies pain. Patient's sitter present. Patient agreeable to PT/OT. Patient was provided assistance by OT to don socks/shoes. Patient performed STS from recliner chair and RW ambulating 5 feet prior to stand to sit into w/c. Patient performed STS from w/c for brief change with OT assistance. Patient demonstrated multiple ambulation trials with w/c following includin feet, 20 feet, 50 feet, 60 feet, and 35 feet. Between each ambulation trial, patient required mod Ax1 with STS, mod-max Ax1 with stand to sit, and min-mod Ax1 with ambulation. Patient demonstrated festinating gait and occasional toe drop of L foot requiring max VC to pick his feet up. Patient required max VC/TC for walker management, sequencing, and hand placement with transfers. Patient returned to room demonstrating stand step transfer with RW placed in front and mod Ax2. Patient's call light was provided and chair alarm was turned on.       Functional Outcomes                 Cognition  Overall Cognitive Status: Impaired  Arousal/Alterness: appropriate responses to stimuli  Following Commands: follows multi step commands with repetition, follows multi step commands with increased time  Attention Span: attends with cues to redirect  Memory: appears intact  Safety Judgement: decreased awareness of need for assistance, decreased awareness of need for safety  Problem Solving: decreased awareness of errors, assistance required to identify errors made, assistance required to correct errors made  Insights: decreased awareness of deficits  Initiation: requires cues for all  Sequencing: requires cues for all  Orientation:    alert and oriented x 4  Command Following:   impaired    Education  Barriers To Learning: cognition  Patient Education: patient educated on goals, PT role and benefits, plan of care, general safety, family education, transfer training  Learning Assessment:  patient will require reinforcement due to cognitive deficits    Assessment  Activity Tolerance: Fair (-); patient demonstrates decreased endurance and safety with ambulation  Impairments Requiring Therapeutic Intervention: decreased functional mobility, decreased strength, decreased safety awareness, decreased cognition, decreased endurance, decreased balance, decreased coordination, vestibular impairment, decreased posture  Prognosis: fair  Clinical Assessment: Patient is 80 y/o male presenting to [address] [place] [address] due to slurred speech and weakness. Patient demonstrates increased posterior/lateral lean this visit resulting in increased assistance in sitting to CGA and an inability to ambulate in first session due to poor stability. During second session, patient required mod-max Ax1 with all transfers and min-mod Ax1 with ambulation.  Patient will continue to benefit from skilled PT in order to return to [facility] with all functional mobility prior to d/c from hospital.   Safety Interventions: patient left in chair, chair alarm in place, call light within reach, gait belt, patient at risk for falls, and family/caregiver present    Plan  Frequency: 5 x/week, 60 min/day  Current Treatment Recommendations: strengthening, balance training, functional mobility training, transfer training, gait training, endurance training, neuromuscular re-education, wheelchair mobility training, patient/caregiver education, safety education, and positioning    Goals  Patient Goals: Improve ambulation with RW   Short Term Goals:  Time Frame: 10-14 days  Patient will complete bed mobility at [facility] independent   Patient will complete transfers at supervision   Patient will ambulate 50 ft with use of rolling walker at supervision  Patient will complete car transfer at supervision    Therapy Session Time      Individual Group Co-treatment   Time In  0730       Time Out  0815       Minutes  45         Timed Code Treatment Minutes:   45 minutes     Second Session Therapy Time:   Individual Concurrent Group Co-treatment   Time In        1115   Time Out        1145   Minutes        30     Timed Code Treatment Minutes:  30 minutes    Total Treatment Minutes:  75 minutes      Electronically Signed By: Shiela Campbell, PT  Shiela Campbell PT, DPT, [id number]

## 2022-10-14 NOTE — PROGRESS NOTES
[facility] [address]   Phone: [phone]    Speech Therapy    [] Initial Evaluation            [x] Daily Treatment Note         [] Discharge Summary      Patient: James Herrera   : 1935   MRN: [id number]   Date of Service:  10/14/2022  Admitting Diagnosis: Acute ischemic stroke [facility])  Current Admission Summary: 52-year-old male with a history of CAD and dementia who was admitted on 10/7 with slurred speech and fatigue. Concern for stroke. CT head was unremarkable for acute findings. CTA without occlusion. MRI confirmed an acute infarct in the left corona radiata. Neurology evaluated and suggested an aspirin, statin, and formal stroke work-up. He was evaluated by therapy and suggested to continue in an inpatient setting prior to returning home. He has no current complaints. Past Medical History:  has a past medical history of CAD (coronary artery disease). Past Surgical History:  has a past surgical history that includes Coronary artery bypass graft and Cardiac surgery. Precautions/Restrictions: high fall risk      Pre-Admission Information   Living Status: Pt lives in an independent living facility (daughter stays with pt and they have around the care assistance from family)   Occupation/School: Retired from [employer] reports highest level of education being 8th grade   Medication Management: :  []Primary   []Secondary [x]No  Finance Management: []Primary   []Secondary [x]No  Active :   []Yes         [x]No (reportedly stopped driving 4 years ago)   Hearing:    WFL  Vision:    Vision Corrective Device: wears glasses at all times      Subjective  General: Pt upright in chair, daughter present for both sessions. Pt disoriented to current situation. Agreeable to treatment and pleasant.     Pain: Denies    Safety Interventions: patient left in chair, chair alarm in place, call light within reach, patient at risk for falls, and family/caregiver present    Therapeutic Interventions:       Session 1:   Dysphagia: Severity: Mild   -Pt finishing breakfast upon entrance  -no overt clinical s/s of aspiration with regular solid (toast)   -pt reported no difficulty with breakfast or dinner      Comprehension: Severity: Mild   -pt with reduced comprehension of directions during session, suspect exacerbated by pt's hearing   -pt asked for directions to be re-explained for functional problem solving activity (see below)      Following basic Commands   -pt followed basic one step commands with 100% accuracy  -pt followed two step commands with 66% accuracy, pt required repetition of command in 2/3 opportunities      Expressive Language: Severity: Moderate   Confrontational naming   -with min cues pt named 4/5 (80%) objects from visual scene during problem solving task     Cognition: Severity: Moderate   Orientation  -pt oriented to date, month, year, place ([facility])   -disoriented to [place] and city   -with initiation cue, pt able to read time off of analog clock     Stating Personal hx   -pt recalled 2 important events while filling out calendar, pt with confusion on exact date but per family member he was within 1-2 day range     Fx recall   -Pt recalled information learned/events (not being able to get up by self, use of call light, breakfast, eval, etc.) from yesterday with 80% accuracy     Problem solving (fall risk)   -pt independently identified items from a visual scene that could be moved to improved safety with 80% accuracy, increased to 100% given min cues to look at the whole picture     Additional Interventions:      Session 2:   Dysphagia: Severity: Mild   Goal not targeted this session      Comprehension: Severity: Mild   -Pt with reduced comprehension of directions for all activities   -multiple repetitions, rewording, and a model provided, following sequencing activity (see below) pt stated he did not understand the activity     Expressive Language: Severity: Moderate   Convergent naming (3 clues)   -independently named item with 3 descriptive points with 53% accuracy, increased to 80% accuracy with min cues  -pt benefited from extended time to answer with 2-3 repetitions of listed words     Cognition: Severity: Moderate   Sequencing functional activities   -pt sequenced 5 written steps to use a call light with 60% accuracy with mod cues   -pt verbally repeated steps with 40% accuracy with a visual, did not increase despite max cuing    -pt able to state when to use call light in 4/4 opportunities     -pt unable to state next therapy despite visual aid (schedule) placed in front of him     -pt and daughter report pt is close to baseline with speech and cognition. Daughter expressed she wanted to continue therapy for 60 minutes a day at this time in order to engage patient's brain while he is here    Additional Interventions:          Education  Barriers To Learning: cognition and reading  Patient Education: Provided education regarding role of SLP, results of assessment, recommendations and general speech pathology plan of care. Learning Assessment: Pt requires ongoing learning     Assessment  Impairments Requiring Therapeutic Intervention: Receptive Aphasia , Expressive Aphasia , Dysarthria , Cognitive-Linguistic Deficits , and Oropharyngeal Dysphagia   Prognosis: good    Clinical Assessment:    Pt continues to present with Moderate  Receptive Aphasia , Expressive Aphasia , and Cognitive-Linguistic Deficits . Progress towards goals is limited secondary to pt reduced comprehension and disorientation to situation. New strategies were introduced to include problem solving in functional scenarios and word association tasks to engage pt in cognitive activities and work on thought organization. Continue to target comprehension, cognitive skills, and language skills to facilitate safe return to prior level of independence.          Plan  Frequency: 60 minutes/day; 5 days per week, as tolerated, until goals met, or discharged from ARU. Therapeutic Interventions: Diet Tolerance Monitoring , Patient/Family Education , Therapeutic Trials with SLP  Expressive/ Receptive Language intervention , Cognitive-Linguistic intervention , and Patient/ Family education   Discharge Recommendations: TBD   Continued SLP at Discharge: TBD based upon progress     Goals  Time Frame: 7-10 days     Pt will tolerate recommended diet and advanced trials with no overt s/s of aspiration. Pt will complete auditory comprehension tasks with 80% accuracy. Pt will verbalize basic information (personal history etc.) with 80% accuracy or min cues using any communication modality (combine goal with fx recall -- see below)   Patient will complete verbal description and word retrieval tasks with 80% accuracy   Pt will recall functional information (daily tasks, personal hx, etc.) with 80% accuracy. Patient will complete functional problem-solving tasks for daily situations with 80% accuracy       Therapy Session Time      Session 1 Session 2   Time In 0830 0945   Time Out 0900 1015   Time Code Minutes 20 15   Individual Minutes 30 30     Timed Code Treatment Minutes:  35  Total Treatment Minutes:  60    Electronically Signed By:   Beatriz KC CCC-SLP #[id number] 10/14/2022 9:05 AM  Speech-Language Pathologist    Eric SARKAR,  Speech-Language Pathology     The speech-language pathologist was present, directed the patient's care, made skilled judgment and was responsible for assessment and treatment.

## 2022-10-14 NOTE — PROGRESS NOTES
[facility] [address]   Phone: [phone]    Occupational Therapy    [] Initial Evaluation            [x] Daily Treatment Note         [] Discharge Summary      Patient: Evelina Sanches   : 1935   MRN: [id number]   Date of Service:  10/14/2022    Admitting Diagnosis:  Acute ischemic stroke [facility])  Current Admission Summary: 71-year-old male with a history of CAD and dementia who was admitted on 10/7 with slurred speech and fatigue. Concern for stroke. CT head was unremarkable for acute findings. CTA without occlusion. MRI confirmed an acute infarct in the left corona radiata. Neurology evaluated and suggested an aspirin, statin, and formal stroke work-up. He was evaluated by therapy and suggested to continue in an inpatient setting prior to returning home. He has no current complaints. Past Medical History:  has a past medical history of CAD (coronary artery disease). Past Surgical History:  has a past surgical history that includes Coronary artery bypass graft and Cardiac surgery. Discharge Recommendations: Home with HHOT and 24 hour supervision/assistance     DME Required For Discharge: DME to be determined pending patient progress    Precautions/Restrictions: high fall risk  Weight Bearing Restrictions: no restrictions     Required Braces/Orthotics: no braces required  Positional Restrictions:no positional restrictions    Pre-Admission Information   Lives With: daughter Family is always present. Daughter, Libertad Smith are present during week days 8 AM - 2:30 PM (-) and his son Boni Collet present on Monday 8 AM - 2:30 PM. Libertad Guillaume present during evening, overnight and weekends.     Type of Home: apartment  Home Layout: one level  Home Access: elevator  Bathroom Layout: walker accessible, wheelchair accessible, walk in shower, handicap height toilet  Bathroom Equipment: grab bars in shower, grab bars around toilet, shower chair, 3-in-1 commode  Toilet Height: elevated height  Home Equipment: rolling walker, rollator - 4 wheeled walker, single point cane, manual wheelchair, reacher  Transfer Assistance: modified independent with use of RW --occassionally family provides assistance but is always present   Ambulation Assistance:modified independent with use of RW -typically has someone close by. use wheelchair for community   ADL Assistance: requires assistance with bathing, requires assistance with dressing, requires assistance with toileting able to self feed. Daughter reports they assist with footwear and occasionally clothing mgmt during toileting. Patient is typically continent but wears pullups   IADL Assistance: requires assistance with all homemaking tasks  Active :        [] Yes  [x] No  Hand Dominance: [] Left  [x] Right  Current Employment: retired. 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 Daughter reports 3-4 falls within past 6 months         Subjective: Pt seated in recliner upon entry, pleasant and agreeable to therapy session (cotx of both disciplines OT/PT to maximize functional independence and safety). General: Pt doffed  socks, donned personal socks and sneakers Mod A. Pt with sit to stand Mod A and ambulated Mod  ~5 ft, 20 ft, 50 ft, 60 ft + 35 ft into hallway with Mod A for balance/rw mgmt (see PT note for gait). Pt stand to sit Mod A -Max A (seated rest breaks in wc between each trial). Pt back in room Mod A x 2 for stand step transfer from wc to recliner - pt with difficulty turning and backing up to recliner with posterior lean and impulsiveness. Brief donned at beginning of session by this writer for coverage in hallway.   Pain: 0/10  Pain Interventions: not applicable        Activities of Daily Living  Basic Activities of Daily Living  Lower Extremity Dressing: moderate assistance  Dressing Comments: brief donned by this writer due to time constraints - pt Mod A for footwear (1st session)  Instrumental Activities of Daily Living  No IADL completed on this date. Functional Mobility  Bed Mobility  Bed mobility not completed on this date. Comments:  Transfers  Sit to stand transfer:moderate assistance  Stand to sit transfer: moderate assistance, maximum assistance  Functional Mobility:  Standing Balance: contact guard assistance, minimal assistance. Functional Mobility: .  ~5ft + ~20ft + ~50ft + ~60ft = ~35 ft  Functional Mobility Activity: Mod A  Functional Mobility Device Use: rolling walker  Functional Mobility Comment: poor safety awareness. Max verbal and tactile cueing required and assist with rw mgmt.     Other Therapeutic Interventions         Cognition  Overall Cognitive Status: Impaired  Arousal/Alterness: appropriate responses to stimuli  Following Commands: follows one step commands consistently  Attention Span: attends with cues to redirect  Memory: appears intact  Safety Judgement: decreased awareness of need for assistance, decreased awareness of need for safety  Problem Solving: assistance required to generate solutions, decreased awareness of errors  Insights: decreased awareness of deficits  Initiation: requires cues for some  Sequencing: requires cues for some  Orientation:    alert and oriented x 4  Command Following:   impaired     Education  Barriers To Learning: cognition  Patient Education: patient educated on goals, OT role and benefits, plan of care, ADL adaptive strategies, proper use of assistive device/equipment, family education, transfer training  Learning Assessment:  patient verbalizes understanding, would benefit from continued reinforcement    Assessment  Activity Tolerance: patient tolerated well with increased time for tasks  Impairments Requiring Therapeutic Intervention: decreased functional mobility, decreased ADL status, decreased safety awareness, decreased endurance, decreased balance, decreased coordination, decreased posture  Prognosis: fair  Clinical Assessment: Patient presents below baseline function secondary to acute infarct in left corona radiata. Patient typically able to complete transfers and functional mobility with supervision and requires some assistance for ADLs. Patient required mod-max A for transfers and Mod A for functional mobility. Pt Min A for footwear, Supervision for UB dressing and Mod A for LB dressing. Patient will benefit from continued OT services to address above deficits and to maximize patients ability to complete transfers, functional mobility and ADLs. Safety Interventions: patient left in chair, chair alarm in place, call light within reach, patient at risk for falls, telesitter in use, and family/caregiver present    ADDENDUM SECOND THERAPY SESSION  Pt seated in recliner upon entry, pleasant and agreeable to therapy session. Pt with extended time and vcs for all ADLs this session. Pt doffed t-shirt and donned 2 new t-shirts setup/SBA with extended time/vcs. Pt doffed sneakers/socks and donned new personal socks and sneakers Min A to tie sneakers with extended time and vcs. Pt threaded BLEs into pants, Mod A for LB dressing for both balance in stance and to pull up pants. Pt sit to stand Mod A and ambulated Mod A ~12 ft to sink and stand to sit Max A. Pt with oral care - setup/SBA and washed face. Pt sit to stand Mod A and ambulated back to recliner Mod A ~12 ft with rw. Pt stand to sit Max A. Call light in reach, chair alarm on and pt's lunch reheated.      Plan  Frequency: 5 x/week, 60 min/day  Current Treatment Recommendations: strengthening, ROM, balance training, functional mobility training, transfer training, endurance training, neuromuscular re-education, patient/caregiver education, and ADL/self-care training    Goals  Patient Goals: improve transfers and mobility    Short Term Goals:  Time Frame: 10-14 days   Patient will complete upper body ADL at supervision   Patient will complete lower body ADL at supervision   Patient will complete toileting at supervision   Patient will complete grooming at set up assistance   Patient will complete functional transfers at supervision     Therapy Session Time     Individual Group Co-treatment   Time In 1240  1115   Time Out 1325  1145   Minutes 45  30        Timed Code Treatment Minutes: 75 minutes  Total Treatment Minutes:  75 minutes        Electronically Signed By: Oanh Dalal, [address] [place] [address]

## 2022-10-15 PROCEDURE — 97130 THER IVNTJ EA ADDL 15 MIN: CPT

## 2022-10-15 PROCEDURE — 6360000002 HC RX W HCPCS: Performed by: PHYSICAL MEDICINE & REHABILITATION

## 2022-10-15 PROCEDURE — 97530 THERAPEUTIC ACTIVITIES: CPT

## 2022-10-15 PROCEDURE — 97129 THER IVNTJ 1ST 15 MIN: CPT

## 2022-10-15 PROCEDURE — 94640 AIRWAY INHALATION TREATMENT: CPT

## 2022-10-15 PROCEDURE — 92526 ORAL FUNCTION THERAPY: CPT

## 2022-10-15 PROCEDURE — 97116 GAIT TRAINING THERAPY: CPT

## 2022-10-15 PROCEDURE — 94761 N-INVAS EAR/PLS OXIMETRY MLT: CPT

## 2022-10-15 PROCEDURE — 97535 SELF CARE MNGMENT TRAINING: CPT

## 2022-10-15 PROCEDURE — 6370000000 HC RX 637 (ALT 250 FOR IP): Performed by: PHYSICAL MEDICINE & REHABILITATION

## 2022-10-15 PROCEDURE — 2580000003 HC RX 258: Performed by: PHYSICAL MEDICINE & REHABILITATION

## 2022-10-15 PROCEDURE — 1280000000 HC REHAB R&B

## 2022-10-15 PROCEDURE — 97110 THERAPEUTIC EXERCISES: CPT

## 2022-10-15 RX ADMIN — Medication 2 PUFF: at 12:41

## 2022-10-15 RX ADMIN — OXYBUTYNIN CHLORIDE 10 MG: 5 TABLET, EXTENDED RELEASE ORAL at 08:31

## 2022-10-15 RX ADMIN — ATORVASTATIN CALCIUM 20 MG: 20 TABLET, FILM COATED ORAL at 08:31

## 2022-10-15 RX ADMIN — HYDRALAZINE HYDROCHLORIDE 25 MG: 25 TABLET, FILM COATED ORAL at 21:46

## 2022-10-15 RX ADMIN — ASPIRIN 81 MG 81 MG: 81 TABLET ORAL at 08:32

## 2022-10-15 RX ADMIN — AMLODIPINE BESYLATE 5 MG: 5 TABLET ORAL at 08:31

## 2022-10-15 RX ADMIN — AMOXICILLIN AND CLAVULANATE POTASSIUM 1 TABLET: 875; 125 TABLET, FILM COATED ORAL at 08:31

## 2022-10-15 RX ADMIN — MONTELUKAST SODIUM 10 MG: 10 TABLET, FILM COATED ORAL at 21:45

## 2022-10-15 RX ADMIN — FLUTICASONE PROPIONATE 1 SPRAY: 50 SPRAY, METERED NASAL at 09:06

## 2022-10-15 RX ADMIN — TAMSULOSIN HYDROCHLORIDE 0.4 MG: 0.4 CAPSULE ORAL at 08:32

## 2022-10-15 RX ADMIN — Medication 10 ML: at 08:32

## 2022-10-15 RX ADMIN — TRAZODONE HYDROCHLORIDE 50 MG: 50 TABLET ORAL at 21:45

## 2022-10-15 RX ADMIN — HYDRALAZINE HYDROCHLORIDE 25 MG: 25 TABLET, FILM COATED ORAL at 05:30

## 2022-10-15 RX ADMIN — PANTOPRAZOLE SODIUM 40 MG: 40 TABLET, DELAYED RELEASE ORAL at 05:30

## 2022-10-15 RX ADMIN — ESCITALOPRAM OXALATE 20 MG: 10 TABLET ORAL at 08:32

## 2022-10-15 RX ADMIN — AMOXICILLIN AND CLAVULANATE POTASSIUM 1 TABLET: 875; 125 TABLET, FILM COATED ORAL at 21:45

## 2022-10-15 RX ADMIN — Medication 2 PUFF: at 20:11

## 2022-10-15 RX ADMIN — DIPHENHYDRAMINE HYDROCHLORIDE 25 MG: 25 TABLET ORAL at 21:45

## 2022-10-15 RX ADMIN — LEVOTHYROXINE SODIUM 125 MCG: 0.12 TABLET ORAL at 05:30

## 2022-10-15 RX ADMIN — HYDRALAZINE HYDROCHLORIDE 25 MG: 25 TABLET, FILM COATED ORAL at 14:47

## 2022-10-15 RX ADMIN — Medication 10 ML: at 21:46

## 2022-10-15 RX ADMIN — CETIRIZINE HYDROCHLORIDE 10 MG: 10 TABLET, FILM COATED ORAL at 08:32

## 2022-10-15 RX ADMIN — ENOXAPARIN SODIUM 40 MG: 100 INJECTION SUBCUTANEOUS at 08:31

## 2022-10-15 ASSESSMENT — PAIN SCALES - GENERAL
PAINLEVEL_OUTOF10: 0

## 2022-10-15 NOTE — PROGRESS NOTES
[facility] [address]   Phone: [phone]    Occupational Therapy    [] Initial Evaluation            [x] Daily Treatment Note         [] Discharge Summary      Patient: Laura Metcalf   : 1935   MRN: [id number]   Date of Service:  10/15/2022    Admitting Diagnosis:  Acute ischemic stroke [facility])  Current Admission Summary: 81-year-old male with a history of CAD and dementia who was admitted on 10/7 with slurred speech and fatigue. Concern for stroke. CT head was unremarkable for acute findings. CTA without occlusion. MRI confirmed an acute infarct in the left corona radiata. Neurology evaluated and suggested an aspirin, statin, and formal stroke work-up. He was evaluated by therapy and suggested to continue in an inpatient setting prior to returning home. He has no current complaints. Past Medical History:  has a past medical history of CAD (coronary artery disease). Past Surgical History:  has a past surgical history that includes Coronary artery bypass graft and Cardiac surgery. Discharge Recommendations: Home with HHOT and 24 hour supervision/assistance     DME Required For Discharge: DME to be determined pending patient progress    Precautions/Restrictions: high fall risk  Weight Bearing Restrictions: no restrictions     Required Braces/Orthotics: no braces required  Positional Restrictions:no positional restrictions    Pre-Admission Information   Lives With: daughter Family is always present. Daughter, Nany Smith are present during week days 8 AM - 2:30 PM (-) and his son Brittany Laureano present on Monday 8 AM - 2:30 PM. Nany Perez present during evening, overnight and weekends.     Type of Home: apartment  Home Layout: one level  Home Access: elevator  Bathroom Layout: walker accessible, wheelchair accessible, walk in shower, handicap height toilet  Bathroom Equipment: grab bars in shower, grab bars around toilet, shower chair, 3-in-1 commode  Toilet Height: elevated height  Home Equipment: rolling walker, rollator - 4 wheeled walker, single point cane, manual wheelchair, reacher  Transfer Assistance: modified independent with use of RW --occassionally family provides assistance but is always present   Ambulation Assistance:modified independent with use of RW -typically has someone close by. use wheelchair for community   ADL Assistance: requires assistance with bathing, requires assistance with dressing, requires assistance with toileting able to self feed. Daughter reports they assist with footwear and occasionally clothing mgmt during toileting. Patient is typically continent but wears pullups   IADL Assistance: requires assistance with all homemaking tasks  Active :        [] Yes  [x] No  Hand Dominance: [] Left  [x] Right  Current Employment: retired. 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 Daughter reports 3-4 falls within past 6 months         Subjective: Pt seated in recliner upon entry, pleasant and agreeable to therapy session (cotx of both disciplines OT/PT to maximize functional independence and safety [id number]). General: Pt with LB dressing required assist to thread RLE into brief/pants with vcs/extended time, pt able to thread LLE into brief, required assist for pants and assist for pants/brief pulled up. Pt SBA with setup to don personal shirt (UB dressing). Pt sit to stand Mod A and ambulated into hallway with close wc follow ~65ft + ~78ft Mod A with seated rest breaks. Pt performed car transfer at Mod A with vcs/tactile cues. Pt stand to sits Mod A - Max A. Pt in therapy gym with Mod A x 2 to stand step to NuStep. Pt with PT at EOS.   Pain: 0/10  Pain Interventions: not applicable        Activities of Daily Living  Basic Activities of Daily Living  Upper Extremity Dressing: stand by assistance requires verbal cueing Increased time to complete task  Lower Extremity Dressing: maximum assistance requires verbal cueing Increased time to complete task  Instrumental Activities of Daily Living  No IADL completed on this date. Functional Mobility  Bed Mobility  Bed mobility not completed on this date. Comments:  Transfers  Sit to stand transfer:moderate assistance  Stand to sit transfer: moderate assistance, maximum assistance  Functional Mobility:  Standing Balance: minimal assistance, moderate assistance. Functional Mobility: .  ~65ft + ~78ft   Functional Mobility Activity: to therapy gym (s)  Functional Mobility Device Use: rolling walker  Functional Mobility Comment: poor safety awareness. Max verbal and tactile cueing required and assist with rw mgmt.     Other Therapeutic Interventions         Cognition  Overall Cognitive Status: Impaired  Arousal/Alterness: appropriate responses to stimuli  Following Commands: follows one step commands consistently  Attention Span: attends with cues to redirect  Memory: appears intact  Safety Judgement: decreased awareness of need for assistance, decreased awareness of need for safety  Problem Solving: assistance required to generate solutions, decreased awareness of errors  Insights: decreased awareness of deficits  Initiation: requires cues for some  Sequencing: requires cues for some  Orientation:    alert and oriented x 4  Command Following:   impaired     Education  Barriers To Learning: cognition  Patient Education: patient educated on goals, OT role and benefits, plan of care, ADL adaptive strategies, proper use of assistive device/equipment, family education, transfer training  Learning Assessment:  patient verbalizes understanding, would benefit from continued reinforcement    Assessment  Activity Tolerance: patient tolerated well with increased time for tasks  Impairments Requiring Therapeutic Intervention: decreased functional mobility, decreased ADL status, decreased safety awareness, decreased endurance, decreased balance, decreased coordination, decreased posture  Prognosis: fair  Clinical Assessment: Patient presents below baseline function secondary to acute infarct in left corona radiata. Patient typically able to complete transfers and functional mobility with supervision and requires some assistance for ADLs. Patient required Mod A/Max A for transfers and Mod A for functional mobility. Pt Min A for footwear and SBA for UB dressing. Pt required increased assist for LB dressing this date, Max A. Patient will benefit from continued OT services to address above deficits and to maximize patients ability to complete transfers, functional mobility and ADLs. Safety Interventions: patient left in chair, chair alarm in place, call light within reach, patient at risk for falls, telesitter in use, and family/caregiver present    ADDENDUM SECOND THERAPY SESSION  Pt seated in recliner upon entry, pleasant and agreeable to therapy session. Pt with extended time and vcs for all ADLs this session. Pt sit to stand Mod A and ambulated Mod A ~15 ft to toilet. Pt toilet transfer Mod A, pt didn't void, Dependent for brief mgmt. Pt Mod A to ambulate several ft to sink Mod A. Pt stand to sit Mod A in wc. Pt with oral care - setup/SBA and washed face. Pt sit to stand Mod A and ambulated back to recliner Mod A ~12 ft with rw. Pt stand to sit Mod A. Pt doffed sneakers/socks and donned new personal socks and sneakers Min A to tie sneakers with extended time and vcs. Call light in reach, chair alarm on.      Plan  Frequency: 5 x/week, 60 min/day  Current Treatment Recommendations: strengthening, ROM, balance training, functional mobility training, transfer training, endurance training, neuromuscular re-education, patient/caregiver education, and ADL/self-care training    Goals  Patient Goals: improve transfers and mobility    Short Term Goals:  Time Frame: 10-14 days   Patient will complete upper body ADL at supervision   Patient will complete lower body ADL at supervision   Patient will complete toileting at supervision   Patient will complete grooming at set up assistance   Patient will complete functional transfers at supervision     Therapy Session Time     Individual Group Co-treatment   Time In 0830  0845   Time Out 0845  0915   Minutes 15  30   Timed Code Treatment Minutes: 45 minutes    Second Session Therapy Time:   Individual Concurrent Group Co-treatment   Time In  1015         Time Out  1045         Minutes  30           Timed Code Treatment Minutes:  30  Total Treatment Minutes:  45 + 30 = 75 minutes      Electronically Signed By: JENA Caba/L [id number]    I have reviewed and agree with the above treatment.  Thank you, Clifford Barker, [facility], [address] [facility]

## 2022-10-15 NOTE — PROGRESS NOTES
Physical Therapy    [facility] [address]   Phone: [phone]    Physical Therapy    [] Initial Evaluation            [x] Daily Treatment Note         [] Discharge Summary      Patient: Calvin Ruggiero   : 1935   MRN: [id number]   Date of Service:  10/15/2022  Admitting Diagnosis: Acute ischemic stroke [facility])  Current Admission Summary:  81 y.o. male who presented to ED for evaluation of slurred speech and generalized weakness/fatigue. Patient is a poor historian at this time. No family currently at bedside to provide history but daughter was at bedside in ED and provided supplemental history. Most of the information is derived from conversation with the emergency room PA and review of records. Daughter reports she noticed patient seemed more fatigued and weak today. He had difficulty walking due to weakness. She also noticed slurred speech. She denies any additional concerns. Patient denies any complaints at this time. Of note, patient was started on Augmentin on 10/6 for acute sinusitis, end date 10/20. Past Medical History:  has a past medical history of CAD (coronary artery disease). Past Surgical History:  has a past surgical history that includes Coronary artery bypass graft and Cardiac surgery. Discharge Recommendations: HHPT S3  DME Required For Discharge: patient has all required DME for discharge  Precautions/Restrictions: high fall risk  Weight Bearing Restrictions: no restrictions  [] Right Upper Extremity  [] Left Upper Extremity [] Right Lower Extremity  [] Left Lower Extremity     Required Braces/Orthotics: no braces required   [] Right  [] Left  Positional Restrictions:no positional restrictions    Pre-Admission Information   Lives With: Daughter, Jerica Britton. Sarah are present during week days 8 AM - 2:30 PM (T-) and his son Heather Martinez present on Monday 8 AM - 2:30 PM. Jerica Britton present during evening, overnight and weekends. Type of Home: apartment, . Comment: 3rd floor, senior living  -24 hr assist from family available  Home Layout: one level  Home Access: elevator  Bathroom Layout: walker accessible, wheelchair accessible, walk in shower, handicap height toilet  Bathroom Equipment: grab bars in shower, grab bars around toilet, shower chair, 3-in-1 commode  Home Equipment: rolling walker, single point cane, manual wheelchair - majority of time uses RW  Transfer Assistance: reports transfers \"on my own\" and uses RW or cane most of the time Comment: Mendoza Parry states someone is always with him during transfers, may not provide assistance but can if needed  Ambulation Assistance:. Comment: per chart review, pt reports \"tremors\" when walking with RW, states a family member walks with him     ADL Assistance: . Comment: pt reports assist with bathing, reports getting self dressed   IADL Assistance: requires assistance with all homemaking tasks - daughters complete all IADL tasks   Active : [] yes             [x]no  Current Employment: . Comment: retired , [employer] - [employer]  Hobbies: used to enjoy [employer] and [employer] [address] states he has fallen 3-4 times in past 6 months, reports commonly occurs when stepping backwards       Subjective  General: Patient seated in recliner chair with OT present on arrival. Patient denies pain and is agreeable to engagement in therapy session. Pain: 0/10  Pain Interventions: repositioned      Functional Mobility  Bed Mobility  Bed mobility not completed on this date. Comments: Patient begins and ends session in recliner chair.   Transfers  Sit to stand transfer: moderate assistance  Stand to sit transfer: moderate assistance, maximum assistance, poor eccentric control  Stand step transfer: moderate assistance, mod A overall, mod A x 2 for transfer to stepper due to greater impairments with sequencing due to fatigue  Car transfer: moderate assistance, via use of RW, cues to maintain body within bar with min assist. Pt Then performing forward stepping to targets with UE on bar and HHA on left to work on stepping strategies. Pt then performing heel raises x 10 and marches x 10 and minisquat x 10. Pt standing at ballet bar working on reaching for targets across midline and laterally with naomi UE with min assist. With all activities pt has a strong posterior lean. Pt returned to bed at end of session. Mod assist for stand pivot from w/c to bed, moderate assist for sit to supine.  Left with needs in reach, alarms on, sitter present    Functional Outcomes                 Cognition  Overall Cognitive Status: Impaired  Arousal/Alterness: appropriate responses to stimuli  Following Commands: follows multi step commands with repetition, follows multi step commands with increased time  Attention Span: attends with cues to redirect  Memory: appears intact  Safety Judgement: decreased awareness of need for assistance, decreased awareness of need for safety  Problem Solving: decreased awareness of errors, assistance required to identify errors made, assistance required to correct errors made  Insights: decreased awareness of deficits  Initiation: requires cues for all  Sequencing: requires cues for all  Orientation:    alert and oriented x 4  Command Following:   impaired    Education  Barriers To Learning: cognition  Patient Education: patient educated on goals, PT role and benefits, plan of care, general safety, family education, transfer training  Learning Assessment:  patient will require reinforcement due to cognitive deficits    Assessment  Activity Tolerance: Fair (-); patient demonstrates decreased endurance and safety with ambulation  Impairments Requiring Therapeutic Intervention: decreased functional mobility, decreased strength, decreased safety awareness, decreased cognition, decreased endurance, decreased balance, decreased coordination, vestibular impairment, decreased posture  Prognosis: fair  Clinical Assessment: Patient tolerates therapy session well this date, presents with progressing fatigue with activity and requires increased assistance during session. Patient presents with poor initiation as well as poor sequencing and requires mod A to maintain standing balance with activity. Patient will continue to benefit from skilled PT services to further progress independence with mobility and aide in return to PLOF.    Safety Interventions: patient left in chair, chair alarm in place, call light within reach, gait belt, patient at risk for falls, and family/caregiver present    Plan  Frequency: 5 x/week, 60 min/day  Current Treatment Recommendations: strengthening, balance training, functional mobility training, transfer training, gait training, endurance training, neuromuscular re-education, wheelchair mobility training, patient/caregiver education, safety education, and positioning    Goals  Patient Goals: Improve ambulation with RW   Short Term Goals:  Time Frame: 10-14 days  Patient will complete bed mobility at [facility] independent   Patient will complete transfers at supervision   Patient will ambulate 50 ft with use of rolling walker at supervision  Patient will complete car transfer at supervision    Therapy Session Time      Individual Group Co-treatment   Time In  0915    0845   Time Out  0930    0915   Minutes  15    30   Second Session Therapy Time:   Individual Concurrent Group Co-treatment   Time In 1245         Time Out 1315         Minutes 30               Timed Code Treatment Minutes:  45 +30    Total Treatment Minutes:  75             Electronically Signed By: Brooks Gold PT  1st session completed by: Brooks Gold PT, DPT [id number]  Second session completed by Shiraz Melo PT [id number]

## 2022-10-15 NOTE — PLAN OF CARE
Problem: Discharge Planning  Goal: Discharge to home or other facility with appropriate resources  Outcome: Progressing     Problem: Safety - Adult  Goal: Free from fall injury  Outcome: Progressing     Problem: ABCDS Injury Assessment  Goal: Absence of physical injury  Outcome: Progressing     Problem: Pain  Goal: Verbalizes/displays adequate comfort level or baseline comfort level  Outcome: Progressing    Pt able to follow redirection. Pt taking medications with water. Pt assisted to restroom for toileting. Call light in reach. Pt provided redirection and cues to help promote ADL's. Pt currently in bed watching TV. Call light in reach. Bed in lowest position.      Tri VELÁSQUEZN, RN   [phone]

## 2022-10-15 NOTE — PROGRESS NOTES
[facility] Department   Phone: [phone]    Speech Therapy    [] Initial Evaluation            [x] Daily Treatment Note         [] Discharge Summary      Patient: Farrukh Daley   : 1935   MRN: [id number]   Date of Service:  10/15/2022  Admitting Diagnosis: Acute ischemic stroke [facility])  Current Admission Summary: 44-year-old male with a history of CAD and dementia who was admitted on 10/7 with slurred speech and fatigue. Concern for stroke. CT head was unremarkable for acute findings. CTA without occlusion. MRI confirmed an acute infarct in the left corona radiata. Neurology evaluated and suggested an aspirin, statin, and formal stroke work-up. He was evaluated by therapy and suggested to continue in an inpatient setting prior to returning home. He has no current complaints. Past Medical History:  has a past medical history of CAD (coronary artery disease). Past Surgical History:  has a past surgical history that includes Coronary artery bypass graft and Cardiac surgery. Precautions/Restrictions: high fall risk      Pre-Admission Information   Living Status: Pt lives in an independent living facility (daughter stays with pt and they have around the care assistance from family)   Occupation/School: Retired from [employer] reports highest level of education being 8th grade   Medication Management: :  []Primary   []Secondary [x]No  Finance Management: []Primary   []Secondary [x]No  Active :   []Yes         [x]No (reportedly stopped driving 4 years ago)   Hearing:    WFL  Vision:    Vision Corrective Device: wears glasses at all times      Subjective  General: First Session: Pt asleep upon SLP's arrival in bed with sitter present but was easily awakened and immediately agitated due to the pt wishing to use the restroom and SLP attempting to provide education to wait (see below).    Second Session: Pt alert, seated upright in his chair with sitter present. Pt pleasant and cooperative throughout. Pt demonstrates breathy vocal quality with decreased vocal intensity throughout the session. Pain: Denies    Safety Interventions: patient left in chair, chair alarm in place, call light within reach, patient at risk for falls, and family/caregiver present, camera in place    Therapeutic Interventions:       Session 1:   Dysphagia: Severity: Mild   -AM meal arrived during the session, pt appears to benefit from meal set up including adding condiments to increase moisture on meal items. Suspect pt would benefit from having meal items cut into smaller pieces to promote increased safety.  -Pt independently feeds himself multiple large bites at a time and demonstrates extensive mastication with mild oral residue coating his tongue.   -Improved oral clearance following liquid wash. Pt did not independently alternate his bites/sips, requiring cues from SLP.   -No overt signs/symptoms of penetration/aspiration with regularly textured meal items (barker, toast), soft items (scrambled eggs) or thin liquids. Comprehension: Severity: Mild   -Pt demonstrated reduced comprehension of SLP's instructions/education to wait for assistance before attempting to transfer to the restroom, characterized by demonstrating increased vocal intensity and saying \"Get out of my way! \"  -Pt demonstrated poor comprehension to RN's explanations regarding the appropriate socks the pt needed to wear to safely transfer, requiring RN's assistance to don. -Pt required constant cueing/instructions to safely transfer in/out of the wheelchair, on/off the toilet, and ambulate with the walker.      Expressive Language: Severity: Moderate   Goal not targeted this session     Cognition: Severity: Moderate   Orientation  -pt oriented to self, location and his birthday  -disoriented to year ('[birth year])   -Pt's RN completed education with the pt to the correct year    Fx recall   -Pt demonstrates poor recall of recommendations to use his call light and not to attempt to get up by himself.   -Pt did not demonstrate improved recall/insight to his recommendations and fall risk despite education. Problem solving (fall risk/transfers)   -Pt required SLP and RN's assistance to sit upright on the edge of the bed and manipulate the walker in order to safely turn around and return to the chair.  -Pt demonstrates poor safety awareness and relied on the SLP, RN and sitter to assist and reduce his risk of falling. Additional Interventions:      Session 2:   Dysphagia: Severity: Mild   Goal not targeted this session      Comprehension: Severity: Mild   -Pt requires occasional repetitions of instructions throughout each cognitive task to promote improved comprehension.  -Following occasional repetitions, pt able to correctly complete tasks for card sorting. Expressive Language: Severity: Moderate   Divergent Naming  -Pt able to report/recall 1/4 card suites independently  -Pt able to independently report remaining 3/3 suites with visual aids     Cognition: Severity: Moderate   Card Sorting:  pt required extensive time and repetitions of instructions to complete these tasks within a quiet environment.  -Sort by color: 98% accuracy (51/52) independently  -Sort by Suite: 100% accuracy (52/52) independently    Orientation  -Pt oriented to month, year, RAZA, and location  -Disoriented to date  -Pt unable to recall or report a family member's upcoming birthday despite visual aid (calendar) before him, requiring cues. Verbal Functional Problem Solve Questions (with specific focus on using the call light)  -80% accuracy (4/5) independently  -Pt demonstrated confusion with his call light (following the wrong end of the chord, requiring cues to locate) but was able to locate the correct button on the call light to alert his RN.   -Suspect pt would benefit from further coaching/education regarding the call light to promote increased independence and safety on the unit. Recall  -Mental Manipulation (3 targets, largest number)- 100% accuracy (10/10) independently  -Mental Manipulation (3 targets, increasing order)- 0% accuracy (0/5). Pt required max verbal cues for repetition of the instructions, repetition of the targets, and step by step instructions to correctly complete the task.    -pt unable to state next therapy despite visual aid, SLP completed education to pt's schedule. Additional Interventions:          Education  Barriers To Learning: cognition and reading  Patient Education: Provided education regarding role of SLP, results of assessment, recommendations and general speech pathology plan of care. Learning Assessment: Pt requires ongoing learning     Assessment  Impairments Requiring Therapeutic Intervention: Receptive Aphasia , Expressive Aphasia , Dysarthria , Cognitive-Linguistic Deficits , and Oropharyngeal Dysphagia   Prognosis: good    Clinical Assessment:    Pt continues to present with Moderate  Receptive Aphasia , Expressive Aphasia , and Cognitive-Linguistic Deficits . Progress towards goals is limited secondary to pt reduced comprehension and disorientation to situation. New strategies were introduced to include problem solving in functional scenarios to engage pt in cognitive activities and work on thought organization and safety. Continue to target comprehension, cognitive skills, and language skills to facilitate safe return to prior level of independence. Plan  Frequency: 60 minutes/day; 5 days per week, as tolerated, until goals met, or discharged from ARU.   Therapeutic Interventions: Diet Tolerance Monitoring , Patient/Family Education , Therapeutic Trials with SLP  Expressive/ Receptive Language intervention , Cognitive-Linguistic intervention , and Patient/ Family education   Discharge Recommendations: TBD   Continued SLP at Discharge: TBD based upon progress     Goals  Time Frame: 7-10 days     Pt will tolerate recommended diet and advanced trials with no overt s/s of aspiration. Pt will complete auditory comprehension tasks with 80% accuracy. Pt will verbalize basic information (personal history etc.) with 80% accuracy or min cues using any communication modality (combine goal with fx recall -- see below)   Patient will complete verbal description and word retrieval tasks with 80% accuracy   Pt will recall functional information (daily tasks, personal hx, etc.) with 80% accuracy. Patient will complete functional problem-solving tasks for daily situations with 80% accuracy       Therapy Session Time      Session 1 Session 2   Time In 0800 1115   Time Out 0832 1145   Time Code Minutes 24 30   Individual Minutes 32 30     Timed Code Treatment Minutes:  54  Total Treatment Minutes:  58    Electronically Signed By:   LUCIA Chatman Me #SP. [id number]  10/15/2022 8:36 AM

## 2022-10-16 PROCEDURE — 94761 N-INVAS EAR/PLS OXIMETRY MLT: CPT

## 2022-10-16 PROCEDURE — 51798 US URINE CAPACITY MEASURE: CPT

## 2022-10-16 PROCEDURE — 94640 AIRWAY INHALATION TREATMENT: CPT

## 2022-10-16 PROCEDURE — 6370000000 HC RX 637 (ALT 250 FOR IP): Performed by: PHYSICAL MEDICINE & REHABILITATION

## 2022-10-16 PROCEDURE — 6360000002 HC RX W HCPCS: Performed by: PHYSICAL MEDICINE & REHABILITATION

## 2022-10-16 PROCEDURE — 1280000000 HC REHAB R&B

## 2022-10-16 PROCEDURE — 2580000003 HC RX 258: Performed by: PHYSICAL MEDICINE & REHABILITATION

## 2022-10-16 RX ORDER — SENNA AND DOCUSATE SODIUM 50; 8.6 MG/1; MG/1
2 TABLET, FILM COATED ORAL 2 TIMES DAILY
Status: DISCONTINUED | OUTPATIENT
Start: 2022-10-16 | End: 2022-10-25 | Stop reason: HOSPADM

## 2022-10-16 RX ADMIN — MONTELUKAST SODIUM 10 MG: 10 TABLET, FILM COATED ORAL at 22:45

## 2022-10-16 RX ADMIN — LEVOTHYROXINE SODIUM 125 MCG: 0.12 TABLET ORAL at 07:07

## 2022-10-16 RX ADMIN — HYDRALAZINE HYDROCHLORIDE 25 MG: 25 TABLET, FILM COATED ORAL at 07:07

## 2022-10-16 RX ADMIN — Medication 10 ML: at 22:49

## 2022-10-16 RX ADMIN — ATORVASTATIN CALCIUM 20 MG: 20 TABLET, FILM COATED ORAL at 09:17

## 2022-10-16 RX ADMIN — OXYBUTYNIN CHLORIDE 10 MG: 5 TABLET, EXTENDED RELEASE ORAL at 09:17

## 2022-10-16 RX ADMIN — ASPIRIN 81 MG 81 MG: 81 TABLET ORAL at 09:17

## 2022-10-16 RX ADMIN — Medication 2 PUFF: at 12:31

## 2022-10-16 RX ADMIN — TAMSULOSIN HYDROCHLORIDE 0.4 MG: 0.4 CAPSULE ORAL at 09:17

## 2022-10-16 RX ADMIN — CETIRIZINE HYDROCHLORIDE 10 MG: 10 TABLET, FILM COATED ORAL at 09:17

## 2022-10-16 RX ADMIN — Medication 10 ML: at 09:17

## 2022-10-16 RX ADMIN — ENOXAPARIN SODIUM 40 MG: 100 INJECTION SUBCUTANEOUS at 09:17

## 2022-10-16 RX ADMIN — FLUTICASONE PROPIONATE 1 SPRAY: 50 SPRAY, METERED NASAL at 09:18

## 2022-10-16 RX ADMIN — AMOXICILLIN AND CLAVULANATE POTASSIUM 1 TABLET: 875; 125 TABLET, FILM COATED ORAL at 22:45

## 2022-10-16 RX ADMIN — PANTOPRAZOLE SODIUM 40 MG: 40 TABLET, DELAYED RELEASE ORAL at 07:07

## 2022-10-16 RX ADMIN — HYDRALAZINE HYDROCHLORIDE 25 MG: 25 TABLET, FILM COATED ORAL at 22:45

## 2022-10-16 RX ADMIN — Medication 2 PUFF: at 19:35

## 2022-10-16 RX ADMIN — ESCITALOPRAM OXALATE 20 MG: 10 TABLET ORAL at 09:17

## 2022-10-16 RX ADMIN — STANDARDIZED SENNA CONCENTRATE AND DOCUSATE SODIUM 2 TABLET: 8.6; 5 TABLET ORAL at 22:45

## 2022-10-16 RX ADMIN — AMLODIPINE BESYLATE 5 MG: 5 TABLET ORAL at 09:17

## 2022-10-16 RX ADMIN — HYDRALAZINE HYDROCHLORIDE 25 MG: 25 TABLET, FILM COATED ORAL at 15:42

## 2022-10-16 RX ADMIN — AMOXICILLIN AND CLAVULANATE POTASSIUM 1 TABLET: 875; 125 TABLET, FILM COATED ORAL at 09:17

## 2022-10-16 RX ADMIN — STANDARDIZED SENNA CONCENTRATE AND DOCUSATE SODIUM 2 TABLET: 8.6; 5 TABLET ORAL at 12:57

## 2022-10-16 ASSESSMENT — PAIN SCALES - GENERAL
PAINLEVEL_OUTOF10: 0

## 2022-10-16 NOTE — PLAN OF CARE
Problem: Discharge Planning  Goal: Discharge to home or other facility with appropriate resources  Outcome: Progressing  Flowsheets (Taken 10/16/2022 1554)  Discharge to home or other facility with appropriate resources:   Identify barriers to discharge with patient and caregiver   Identify discharge learning needs (meds, wound care, etc)   Refer to discharge planning if patient needs post-hospital services based on physician order or complex needs related to functional status, cognitive ability or social support system     Problem: Safety - Adult  Goal: Free from fall injury  Outcome: Progressing  Flowsheets (Taken 10/16/2022 1554)  Free From Fall Injury: Instruct family/caregiver on patient safety     Problem: ABCDS Injury Assessment  Goal: Absence of physical injury  Outcome: Progressing  Flowsheets (Taken 10/16/2022 1554)  Absence of Physical Injury: Implement safety measures based on patient assessment     Problem: Pain  Goal: Verbalizes/displays adequate comfort level or baseline comfort level  Outcome: Progressing  Flowsheets (Taken 10/16/2022 1554)  Verbalizes/displays adequate comfort level or baseline comfort level:   Encourage patient to monitor pain and request assistance   Assess pain using appropriate pain scale   Administer analgesics based on type and severity of pain and evaluate response   Implement non-pharmacological measures as appropriate and evaluate response

## 2022-10-17 LAB
ANION GAP SERPL CALCULATED.3IONS-SCNC: 7 MMOL/L (ref 3–16)
BUN BLDV-MCNC: 30 MG/DL (ref 7–20)
CALCIUM SERPL-MCNC: 9.2 MG/DL (ref 8.3–10.6)
CHLORIDE BLD-SCNC: 109 MMOL/L (ref 99–110)
CO2: 26 MMOL/L (ref 21–32)
CREAT SERPL-MCNC: 1.4 MG/DL (ref 0.8–1.3)
GFR AFRICAN AMERICAN: 58
GFR NON-AFRICAN AMERICAN: 48
GLUCOSE BLD-MCNC: 125 MG/DL (ref 70–99)
HCT VFR BLD CALC: 31.1 % (ref 40.5–52.5)
HEMOGLOBIN: 10.4 G/DL (ref 13.5–17.5)
MCH RBC QN AUTO: 31.9 PG (ref 26–34)
MCHC RBC AUTO-ENTMCNC: 33.3 G/DL (ref 31–36)
MCV RBC AUTO: 95.8 FL (ref 80–100)
PDW BLD-RTO: 15.3 % (ref 12.4–15.4)
PLATELET # BLD: 174 K/UL (ref 135–450)
PMV BLD AUTO: 9 FL (ref 5–10.5)
POTASSIUM SERPL-SCNC: 4.4 MMOL/L (ref 3.5–5.1)
RBC # BLD: 3.25 M/UL (ref 4.2–5.9)
SODIUM BLD-SCNC: 142 MMOL/L (ref 136–145)
WBC # BLD: 5.1 K/UL (ref 4–11)

## 2022-10-17 PROCEDURE — 97110 THERAPEUTIC EXERCISES: CPT

## 2022-10-17 PROCEDURE — 6360000002 HC RX W HCPCS: Performed by: PHYSICAL MEDICINE & REHABILITATION

## 2022-10-17 PROCEDURE — 36415 COLL VENOUS BLD VENIPUNCTURE: CPT

## 2022-10-17 PROCEDURE — 97116 GAIT TRAINING THERAPY: CPT

## 2022-10-17 PROCEDURE — 1280000000 HC REHAB R&B

## 2022-10-17 PROCEDURE — 94761 N-INVAS EAR/PLS OXIMETRY MLT: CPT

## 2022-10-17 PROCEDURE — 97530 THERAPEUTIC ACTIVITIES: CPT

## 2022-10-17 PROCEDURE — 97130 THER IVNTJ EA ADDL 15 MIN: CPT

## 2022-10-17 PROCEDURE — 94640 AIRWAY INHALATION TREATMENT: CPT

## 2022-10-17 PROCEDURE — 6370000000 HC RX 637 (ALT 250 FOR IP): Performed by: PHYSICAL MEDICINE & REHABILITATION

## 2022-10-17 PROCEDURE — 80048 BASIC METABOLIC PNL TOTAL CA: CPT

## 2022-10-17 PROCEDURE — 85027 COMPLETE CBC AUTOMATED: CPT

## 2022-10-17 PROCEDURE — 97535 SELF CARE MNGMENT TRAINING: CPT

## 2022-10-17 PROCEDURE — 2580000003 HC RX 258: Performed by: PHYSICAL MEDICINE & REHABILITATION

## 2022-10-17 PROCEDURE — 92507 TX SP LANG VOICE COMM INDIV: CPT

## 2022-10-17 PROCEDURE — 97129 THER IVNTJ 1ST 15 MIN: CPT

## 2022-10-17 RX ORDER — BISACODYL 10 MG
10 SUPPOSITORY, RECTAL RECTAL DAILY PRN
Status: DISCONTINUED | OUTPATIENT
Start: 2022-10-17 | End: 2022-10-25 | Stop reason: HOSPADM

## 2022-10-17 RX ADMIN — LEVOTHYROXINE SODIUM 125 MCG: 0.12 TABLET ORAL at 05:25

## 2022-10-17 RX ADMIN — TAMSULOSIN HYDROCHLORIDE 0.4 MG: 0.4 CAPSULE ORAL at 09:42

## 2022-10-17 RX ADMIN — HYDRALAZINE HYDROCHLORIDE 25 MG: 25 TABLET, FILM COATED ORAL at 22:18

## 2022-10-17 RX ADMIN — TRAZODONE HYDROCHLORIDE 50 MG: 50 TABLET ORAL at 22:18

## 2022-10-17 RX ADMIN — AMOXICILLIN AND CLAVULANATE POTASSIUM 1 TABLET: 875; 125 TABLET, FILM COATED ORAL at 22:18

## 2022-10-17 RX ADMIN — Medication 2 PUFF: at 05:20

## 2022-10-17 RX ADMIN — ESCITALOPRAM OXALATE 20 MG: 10 TABLET ORAL at 09:42

## 2022-10-17 RX ADMIN — Medication 10 ML: at 23:09

## 2022-10-17 RX ADMIN — HYDRALAZINE HYDROCHLORIDE 25 MG: 25 TABLET, FILM COATED ORAL at 05:25

## 2022-10-17 RX ADMIN — CETIRIZINE HYDROCHLORIDE 10 MG: 10 TABLET, FILM COATED ORAL at 09:42

## 2022-10-17 RX ADMIN — AMLODIPINE BESYLATE 5 MG: 5 TABLET ORAL at 09:42

## 2022-10-17 RX ADMIN — ENOXAPARIN SODIUM 40 MG: 100 INJECTION SUBCUTANEOUS at 09:42

## 2022-10-17 RX ADMIN — DIPHENHYDRAMINE HYDROCHLORIDE 25 MG: 25 TABLET ORAL at 22:19

## 2022-10-17 RX ADMIN — FLUTICASONE PROPIONATE 1 SPRAY: 50 SPRAY, METERED NASAL at 09:43

## 2022-10-17 RX ADMIN — ATORVASTATIN CALCIUM 20 MG: 20 TABLET, FILM COATED ORAL at 09:42

## 2022-10-17 RX ADMIN — STANDARDIZED SENNA CONCENTRATE AND DOCUSATE SODIUM 2 TABLET: 8.6; 5 TABLET ORAL at 09:42

## 2022-10-17 RX ADMIN — Medication 2 PUFF: at 20:57

## 2022-10-17 RX ADMIN — HYDRALAZINE HYDROCHLORIDE 25 MG: 25 TABLET, FILM COATED ORAL at 16:50

## 2022-10-17 RX ADMIN — OXYBUTYNIN CHLORIDE 10 MG: 5 TABLET, EXTENDED RELEASE ORAL at 09:42

## 2022-10-17 RX ADMIN — MONTELUKAST SODIUM 10 MG: 10 TABLET, FILM COATED ORAL at 22:18

## 2022-10-17 RX ADMIN — AMOXICILLIN AND CLAVULANATE POTASSIUM 1 TABLET: 875; 125 TABLET, FILM COATED ORAL at 09:42

## 2022-10-17 RX ADMIN — PANTOPRAZOLE SODIUM 40 MG: 40 TABLET, DELAYED RELEASE ORAL at 05:25

## 2022-10-17 RX ADMIN — BISACODYL 10 MG: 10 SUPPOSITORY RECTAL at 16:39

## 2022-10-17 RX ADMIN — ASPIRIN 81 MG 81 MG: 81 TABLET ORAL at 09:42

## 2022-10-17 ASSESSMENT — PAIN SCALES - GENERAL: PAINLEVEL_OUTOF10: 0

## 2022-10-17 NOTE — PROGRESS NOTES
[facility] Department   Phone: [phone]    Occupational Therapy    [] Initial Evaluation            [x] Daily Treatment Note         [] Discharge Summary      Patient: Jazmine Pastor   : 1935   MRN: [id number]   Date of Service:  10/17/2022    Admitting Diagnosis:  Acute ischemic stroke [facility])  Current Admission Summary: 79-year-old male with a history of CAD and dementia who was admitted on 10/7 with slurred speech and fatigue. Concern for stroke. CT head was unremarkable for acute findings. CTA without occlusion. MRI confirmed an acute infarct in the left corona radiata. Neurology evaluated and suggested an aspirin, statin, and formal stroke work-up. He was evaluated by therapy and suggested to continue in an inpatient setting prior to returning home. He has no current complaints. Past Medical History:  has a past medical history of CAD (coronary artery disease). Past Surgical History:  has a past surgical history that includes Coronary artery bypass graft and Cardiac surgery. Discharge Recommendations: Home with HHOT and 24 hour supervision/assistance     DME Required For Discharge: DME to be determined pending patient progress    Precautions/Restrictions: high fall risk  Weight Bearing Restrictions: no restrictions     Required Braces/Orthotics: no braces required  Positional Restrictions:no positional restrictions    Pre-Admission Information   Lives With: daughter Family is always present. Daughter, Jorge Smith are present during week days 8 AM - 2:30 PM (T-) and his son Devyn Tobar present on Monday 8 AM - 2:30 PM. Jorge Mattson present during evening, overnight and weekends.     Type of Home: apartment  Home Layout: one level  Home Access: elevator  Bathroom Layout: walker accessible, wheelchair accessible, walk in shower, handicap height toilet  Bathroom Equipment: grab bars in shower, grab bars around toilet, shower chair, 3-in-1 commode  Toilet Height: elevated height  Home Equipment: rolling walker, rollator - 4 wheeled walker, single point cane, manual wheelchair, reacher  Transfer Assistance: modified independent with use of RW --occassionally family provides assistance but is always present   Ambulation Assistance:modified independent with use of RW -typically has someone close by. use wheelchair for community   ADL Assistance: requires assistance with bathing, requires assistance with dressing, requires assistance with toileting able to self feed. Daughter reports they assist with footwear and occasionally clothing mgmt during toileting. Patient is typically continent but wears pullups   IADL Assistance: requires assistance with all homemaking tasks  Active :        [] Yes  [x] No  Hand Dominance: [] Left  [x] Right  Current Employment: retired. Occupation: [employer]: used to hunt and fish   Recent Falls: patient reported no falls within last 6 months.  Daughter reports 3-4 falls within past 6 months         Subjective:  General: Pt supine in bed, agreeable to cotx session, denied pain; cotx completed 1st session to maximize pt safety during functional mobility and high complexity tasks requiring assist of 2 skilled therapist.   Pain: 0/10  Pain Interventions: not applicable        Activities of Daily Living  Basic Activities of Daily Living  Upper Extremity Dressing: moderate assistance requires verbal cueing Increased time to complete task  Lower Extremity Dressing: maximum assistance  Dressing Comments: dressing EOB- assist to orient shirt and begin threading of B UE, assist to adjust down trunk fully; TD for shoes, pt able to lift LE/feet into pants/brief when cued, assist to thread LE and adjust over hips upon stance to RW  General Comments: slight posterior lean upon sitting EOB, pt able to complete dressing seated a CGA to SBA for balance- static stance to RW for clothing management over hips    Instrumental Activities of Daily Living  No IADL completed on this date. Functional Mobility    Bed Mobility  Supine to Sit: contact guard assistance  HOB elevated, use of HR  Transfers  Sit to stand transfer:minimal assistance  Stand to sit transfer: minimal assistance  Stand step transfer: moderate assistance  Comments: VC and HOHA for hand placement safety; VC to sequence and initiate turns/movements, poor eccentric control and posterior lean w/ difficulty correcting w/ VC/TC    Functional Mobility:  Sitting Balance: contact guard assistance. Standing Balance: minimal assistance, moderate assistance. Functional Mobility: . ModA of 1 + w/c follow for safety   Functional Mobility Activity: 70ft + 95ft  Functional Mobility Device Use: rolling walker  Functional Mobility Comment: VC for increased step length d/t shuffling feet, VC for RW proximity   Stairs:   (3) 4inch steps w/ Sayda of 2, step through pattern ascending and step-to descending   (3) 6inch steps w/ Sayda of 2- same pattern as above      Other Therapeutic Interventions  Dynamic TA w/ focus on increasing anterior weight shift/lean, midline posture/balance, and strengthening: EOM seated on wedge- pt completed 10 sit<>stands holding small bolster (B)- PT assist w/ sit<>stands, OT HOHA and facilitation of shoulder flexion and anterior shift; seated in w/c facing mat table- pt complete (B) UE swiss ball rollout>>stance, VC for sequence throughout, variable carryover of commands and variable assist to achieve stance          Second Session:  Pt in recliner at entry, agreeable to session, denied pain. Min to [address] [facility] [place] for sit<>stands w/ VC for slowed movements/control and increased forward flexion. Pt ambulated recliner>commode w/ initial ModA for balance correction >> Sayda w/ forward stepping, assist and VC for sequence approaching commode w/ pt demonstrating difficulty motor planning lateral steps to L.  Pt attempted to void on commode but unable after extensive time- ModA for clothing over hips in stance w/ VC for pt participation and for hand placement for increased balance/stability. SPT w/ Sayda commode>w/c. W/c level grooming w/ set-up and increased time for oral care and face washing. Pt ambulated short distance ~6ft w/c>EOB w/ Sayda w/ RW. EOB dyanmic balance activity completed to increase midline posture and facilitate righting reactions: 8 elbow props to R/L w/ TC for full WB on UE, (B) target taps across multiple planes w/ fingers laced x12. Sit>supine at SBA, Sayda to scoot toward [facility] once in supine. Pt left in bed, bed alarm on, call light and needs in reach.      Cognition  Overall Cognitive Status: Impaired  Arousal/Alterness: appropriate responses to stimuli  Following Commands: follows one step commands consistently  Attention Span: attends with cues to redirect  Memory: appears intact  Safety Judgement: decreased awareness of need for assistance, decreased awareness of need for safety  Problem Solving: assistance required to generate solutions, decreased awareness of errors  Insights: decreased awareness of deficits  Initiation: requires cues for some  Sequencing: requires cues for some  Orientation:    alert and oriented x 4  Command Following:   impaired     Education  Barriers To Learning: cognition  Patient Education: patient educated on goals, OT role and benefits, plan of care, ADL adaptive strategies, proper use of assistive device/equipment, family education, transfer training  Learning Assessment:  patient verbalizes understanding, would benefit from continued reinforcement    Assessment  Activity Tolerance: patient tolerated well with increased time for tasks  Impairments Requiring Therapeutic Intervention: decreased functional mobility, decreased ADL status, decreased safety awareness, decreased endurance, decreased balance, decreased coordination, decreased posture  Prognosis: fair  Clinical Assessment: Patient presents below baseline function secondary to acute infarct in left corona radiata. Patient typically able to complete transfers and functional mobility with supervision and requires assistance for ADLs. PT is progressing variably- fluctuations in assist levels w/ mobility and ADL completion. Patient tolerated AM session well, w/ overall improved mobility compared to prior session. Continues to require extensive assist for ADL completion. patient will benefit from continued OT services to address above deficits, to maximize patients ability to complete transfers, functional mobility and ADLs and decrease caregiver burden.     Safety Interventions: patient left in chair, chair alarm in place, call light within reach, gait belt, patient at risk for falls, telesitter in use, and sitter present        Plan  Frequency: 5 x/week, 60 min/day  Current Treatment Recommendations: strengthening, ROM, balance training, functional mobility training, transfer training, endurance training, neuromuscular re-education, patient/caregiver education, and ADL/self-care training    Goals  Patient Goals: improve transfers and mobility    Short Term Goals:  Time Frame: 10-14 days   Patient will complete upper body ADL at supervision   Patient will complete lower body ADL at supervision   Patient will complete toileting at supervision   Patient will complete grooming at set up assistance   Patient will complete functional transfers at supervision     Therapy Session Time     Individual Group Co-treatment   Time In   0830   Time Out   0915   Minutes   45   Timed Code Treatment Minutes:     Second Session Therapy Time:   Individual Concurrent Group Co-treatment   Time In 9525         Time Out 1315         Minutes 30           Timed Code Treatment Minutes:  45 + 30  Total Treatment Minutes:  75      Electronically Signed By: SARWAT Blas/AUREA #[id number]

## 2022-10-17 NOTE — PROGRESS NOTES
[facility] Department   Phone: [phone]    Speech Therapy    [] Initial Evaluation            [x] Daily Treatment Note         [] Discharge Summary      Patient: Loren David   : 1935   MRN: [id number]   Date of Service:  10/17/2022  Admitting Diagnosis: Acute ischemic stroke [facility])  Current Admission Summary: 71-year-old male with a history of CAD and dementia who was admitted on 10/7 with slurred speech and fatigue. Concern for stroke. CT head was unremarkable for acute findings. CTA without occlusion. MRI confirmed an acute infarct in the left corona radiata. Neurology evaluated and suggested an aspirin, statin, and formal stroke work-up. He was evaluated by therapy and suggested to continue in an inpatient setting prior to returning home. He has no current complaints. Past Medical History:  has a past medical history of CAD (coronary artery disease). Past Surgical History:  has a past surgical history that includes Coronary artery bypass graft and Cardiac surgery. Precautions/Restrictions: high fall risk      Pre-Admission Information   Living Status: Pt lives in an independent living facility (daughter stays with pt and they have around the care assistance from family)   Occupation/School: Retired from [employer] reports highest level of education being 8th grade   Medication Management: :  []Primary   []Secondary [x]No  Finance Management: []Primary   []Secondary [x]No  Active :   []Yes         [x]No (reportedly stopped driving 4 years ago)   Hearing:    WFL  Vision:    Vision Corrective Device: wears glasses at all times      Subjective  General: First Session: Pt upright in bed, eating breakfast brought by son. Pt pleasant and agreeable to treatment. Sitter present. Second Session: Pt alert, and semi reclined in chair. Pt reported being tired but willing to participate in session.  Sitter present   Pain: Denies    Safety Interventions: Session 1: patient left in bed, bed alarm in place, call light within reach, patient at risk for falls, camera in place, sitter present    Session 2: patient left in chair, chair alarm in place, call light within reach, patient at risk for falls, and family/caregiver present, camera in place, sitter present     Therapeutic Interventions:       Session 1:   Dysphagia: Severity: Mild   -Pt eating breakfast upon entrance  -Mildly prolonged oral phase, independent use of liquid rinse x3 during meal   -No s/s of aspiration noted with soft solid (biscuits and gravy) or thin liquid   -pt with adequate oral clearance     Comprehension: Severity: Mild   -pt with reduced comprehension of directions for activities, requiring multiple repetitions for each task  -speech strategies (SLOB) introduced, pt asking for clarification/ repetition multiple times. Pt appeared to have reduced comprehension of rationale/ when to use strategies despite multiple repetitions of rationale.  Pt would likely benefit from re-education rationale and times to use strategies      Yes/No questions   -pt answered complex abstract yes/no questions with 80% accuracy     Expressive Language: Severity: Moderate   Divergent naming   -pt independently stated object from category beginning with specific letter with 50% accuracy, increased to 80% given max cues   -pt with reduced comprehension of activity, multiple times forgetting the category he was naming objects from or stating an object that began with the wrong letter, despite multiple repetitions of directions    -pt not receptive to phonemic cues during activity       Cognition: Severity: Moderate   Fx recall   -pt recalled weekend events with 80% accuracy   -pt recalled familiar SLP's and occupation , with 50% accuracy, increased to 100% given f/3     Orientation   -pt oriented in all aspects (date, month, year, place, situation)    Additional Interventions:   Speech    - Speech strategies introduced (SLOB)   -focused on strategy to over articulate    - during repetition of 21, 3 syllable phrases pt required 3 cues to speak louder/ over articulate   - pt with functional intelligibility with short phrases but increased occurrence of blended word boundaries with longer phrases/ conversational discourse     - pt expressed understanding of when to use strategies but suspect benefit from continued education/ reinforcement     Session 2:   Dysphagia: Severity: Mild   Goal not targeted this session      Comprehension: Severity: Mild   -pt requesting multiple repetitions of directions throughout session     Expressive Language: Severity: Moderate   Divergent naming using cards    -pt stated item 26 items total from 2 different categories with min cues for naming an item required 6 times  -pt with difficulty switching between categories during tasks    Cognition: Severity: Moderate   Card sorting with divergent naming   -pt required repetition/ correction of category in 23% of opportunities despite presence of visual aid and multiple repetitions of directions   -pt repeated items 7 times while sorting half the deck of cards (26 cards)    Personal hx   -pt recalled facts about hobbies and childhood with 100% accuracy     -pt unable to recall any speech strategies learned in previous session     Additional Interventions:   Speech    -speech strategies (SLOB) reviewed    -pt repeated 4 syllable phrases x5 with no need for cues to over articulate  -pt repeated 5 syllable phrases x 10 with 1 cues needed to speak louder/over articulate  -visual aid with strategies left out during repetition of sentences, pt distracted by sheet during activity    -pt requested copy of speech strategies, copy left in room        Education  Barriers To Learning: cognition and reading  Patient Education: Provided education regarding role of SLP, results of assessment, recommendations and general speech pathology plan of care. Learning Assessment: Pt requires ongoing learning     Assessment  Impairments Requiring Therapeutic Intervention: Receptive Aphasia , Expressive Aphasia , Dysarthria , Cognitive-Linguistic Deficits , and Oropharyngeal Dysphagia   Prognosis: good    Clinical Assessment:    Pt continues to present with Moderate  Receptive Aphasia , Expressive Aphasia , and Cognitive-Linguistic Deficits . Progress towards goals is limited secondary to pt reduced comprehension and short term recall deficits. New strategies were introduced to include education/ practice with speech strategies to improve intelligibility and target fx recall of newly learned information. Continue to target comprehension, cognitive skills, and language skills to facilitate safe return to prior level of independence. Plan  Frequency: 60 minutes/day; 5 days per week, as tolerated, until goals met, or discharged from ARU. Therapeutic Interventions: Diet Tolerance Monitoring , Patient/Family Education , Therapeutic Trials with SLP  Expressive/ Receptive Language intervention , Cognitive-Linguistic intervention , and Patient/ Family education   Discharge Recommendations: TBD   Continued SLP at Discharge: TBD based upon progress     Goals  Time Frame: 7-10 days     Pt will tolerate recommended diet and advanced trials with no overt s/s of aspiration. Pt will complete auditory comprehension tasks with 80% accuracy. Patient will complete verbal description and word retrieval tasks with 80% accuracy   Pt will recall functional information (daily tasks, personal hx, etc.) with 80% accuracy. Patient will complete functional problem-solving tasks for daily situations with 80% accuracy       Therapy Session Time      Session 1 Session 2   Time In 0800 1015   Time Out 0830 1045   Time Code Minutes 10 15   Individual Minutes 30 30     Timed Code Treatment Minutes:  25  Total Treatment Minutes:  60    Electronically Signed By:   Polly KC  CCC-SLP #[id number] 10/17/2022 2:26 PM  Speech-Language Pathologist    Abril SARKAR,  Speech-Language Pathology     The speech-language pathologist was present, directed the patient's care, made skilled judgment and was responsible for assessment and treatment.

## 2022-10-17 NOTE — PROGRESS NOTES
Vika Weir  10/17/2022  [id number]    Chief Complaint: Acute ischemic stroke [facility])    Subjective:   No significant weekend events. No current complaints. Labs reviewed. Mild bump in Cr.     ROS: No N/V, SOB, dyspnea    Objective:  Patient Vitals for the past 24 hrs:   BP Temp Temp src Pulse Resp SpO2   10/17/22 0525 (!) 171/75 -- -- -- -- --   10/17/22 0520 -- -- -- 64 18 95 %   10/16/22 2238 123/70 97.8 °F (36.6 °C) -- 61 16 97 %   10/16/22 1935 -- -- -- 54 18 95 %   10/16/22 1542 130/63 -- -- 53 -- --   10/16/22 1233 -- -- -- -- -- 96 %   10/16/22 0916 132/73 97.7 °F (36.5 °C) Oral 56 16 96 %       Gen: No distress, pleasant. Resting in bed  HEENT: Normocephalic, atraumatic. CV: Regular rate and rhythm. No MRG  Resp: No respiratory distress. CTAB  Abd: Soft, nontender nondistended  Ext: No edema. Neuro: Alert, oriented x2, appropriately interactive. Decreased cognition noted. Wt Readings from Last 3 Encounters:   10/12/22 220 lb 1 oz (99.8 kg)   10/12/22 200 lb 8 oz (90.9 kg)   08/15/22 210 lb (95.3 kg)       Laboratory data:   Lab Results   Component Value Date    WBC 5.1 10/17/2022    HGB 10.4 (L) 10/17/2022    HCT 31.1 (L) 10/17/2022    MCV 95.8 10/17/2022     10/17/2022       Lab Results   Component Value Date/Time     10/17/2022 05:36 AM    K 4.4 10/17/2022 05:36 AM    K 4.1 10/10/2022 05:37 AM     10/17/2022 05:36 AM    CO2 26 10/17/2022 05:36 AM    BUN 30 10/17/2022 05:36 AM    CREATININE 1.4 10/17/2022 05:36 AM    GLUCOSE 125 10/17/2022 05:36 AM    CALCIUM 9.2 10/17/2022 05:36 AM        Therapy progress:  PT  Objective   Bed Mobility  Supine to Sit: contact guard assistance  Sit to Supine: contact guard assistance  Rolling Left: contact guard assistance  Rolling Right: contact guard assistance  Scooting: contact guard assistance  Bridging: contact guard assistance  Comments: Patient performed supine to sit with HOB elevated and use of handrails.   Transfers  Sit to stand transfer: moderate assistance, maximum assistance  Stand to sit transfer: moderate assistance  Stand step transfer: moderate assistance  Comments: Initially, patient performed STS from EOB and use of handrails at max Ax1. Patient attempted 3 STS from EOB, continued to demonstrate heavy posterior lean resulting in return to sitting due to inability to remain upright. Patient attempted STS from EOB to transfer to recliner chair in a stand step transfer with RW placed in front. Patient's posterior lean reduced, still present with additional STS allowing transfer to recliner chair. OT   Basic Activities of Daily Living  Feeding Comments: per daughter, patient required assistance self feeding today but typically does not   Grooming: stand by assistance minimal assistance  Grooming Comments: patient able to comb hair seated in recliner and apply deodorant seated on shower chair. Patient required Richard for oral hygiene/managing dentures   Upper Extremity Bathing: minimal assistance  Lower Extremity Bathing: moderate assistance   Bathing Equipment: none  Bathing Comments: Patient completed shower seated on shower chair in walk [facility] head. Patient required assistance rinsing and drying UB/LB for thoroughness. IV waterproofed. Upper Extremity Dressing: minimal assistance  Lower Extremity Dressing: maximum assistance  Dressing Comments: Richard for donning shirt. maxA for donning pullup, pants and socks. Patient was able to doff socks himself with SBA. Patient utilized grab bars for sit to stand from shower chair and balance while therapist managed pants over hips   Toileting: maximum assistance. Toileting Comments: patient unable to void, patient required maxA for clothing mgmt   General Comments: Increased time for ADLs.           SLP   Dysphagia: Severity: Mild   Goal not targeted this session                 Comprehension: Severity: Mild   -Pt with reduced comprehension of directions for all

## 2022-10-17 NOTE — PROGRESS NOTES
Physical Therapy    [facility]   Phone: [phone]    Physical Therapy    [] Initial Evaluation            [x] Daily Treatment Note         [] Discharge Summary      Patient: Bridger Finch   : 1935   MRN: [id number]   Date of Service:  10/17/2022  Admitting Diagnosis: Acute ischemic stroke [facility])  Current Admission Summary:  81 y.o. male who presented to ED for evaluation of slurred speech and generalized weakness/fatigue. Patient is a poor historian at this time. No family currently at bedside to provide history but daughter was at bedside in ED and provided supplemental history. Most of the information is derived from conversation with the emergency room PA and review of records. Daughter reports she noticed patient seemed more fatigued and weak today. He had difficulty walking due to weakness. She also noticed slurred speech. She denies any additional concerns. Patient denies any complaints at this time. Of note, patient was started on Augmentin on 10/6 for acute sinusitis, end date 10/20. Past Medical History:  has a past medical history of CAD (coronary artery disease). Past Surgical History:  has a past surgical history that includes Coronary artery bypass graft and Cardiac surgery. Discharge Recommendations: HHPT S3  DME Required For Discharge: patient has all required DME for discharge  Precautions/Restrictions: high fall risk  Weight Bearing Restrictions: no restrictions  [] Right Upper Extremity  [] Left Upper Extremity [] Right Lower Extremity  [] Left Lower Extremity     Required Braces/Orthotics: no braces required   [] Right  [] Left  Positional Restrictions:no positional restrictions    Pre-Admission Information   Lives With: Daughter, Federico Smith are present during week days 8 AM - 2:30 PM (T-F) and his son Bharati Stallworth present on Monday 8 AM - 2:30 PM. Federico Odonnell present during evening, overnight and weekends. Type of Home: apartment, . Comment: 3rd floor, senior living  -24 hr assist from family available  Home Layout: one level  Home Access: elevator  Bathroom Layout: walker accessible, wheelchair accessible, walk in shower, handicap height toilet  Bathroom Equipment: grab bars in shower, grab bars around toilet, shower chair, 3-in-1 commode  Home Equipment: rolling walker, single point cane, manual wheelchair - majority of time uses RW  Transfer Assistance: reports transfers \"on my own\" and uses RW or cane most of the time Comment: Eula Daniel states someone is always with him during transfers, may not provide assistance but can if needed  Ambulation Assistance:. Comment: per chart review, pt reports \"tremors\" when walking with RW, states a family member walks with him     ADL Assistance: . Comment: pt reports assist with bathing, reports getting self dressed   IADL Assistance: requires assistance with all homemaking tasks - daughters complete all IADL tasks   Active : [] yes             [x]no  Current Employment: . Comment: retired , [address] [employer] -   Hobbies: used to enjoy hunting and [address] states he has fallen 3-4 times in past 6 months, reports commonly occurs when stepping backwards     Examination   Vision:   Vision Gross Assessment: Impaired and Vision Corrective Device: wears glasses at all times  Hearing:   [facility]  Observation:   General Observation:  Festinating gait   Posture: Forward head rounded shoulders   Sensation:   WFL  Proprioception:    WFL  Tone:   Normotonic  Coordination Testing:   WFL    ROM:   (B) LE AROM WFL  Strength:   (B) LE strength grossly WFL  Decision Making: medium complexity  Clinical Presentation: evolving      Subjective  General: Patient lying in bed upon arrival. Patient denies pain, sitter present with camera on. Patient is agreeable to PT.    Pain: 0/10  Pain Interventions: repositioned        Functional Mobility  Bed Mobility  Supine to Sit: contact guard assistance  Rolling Right: contact guard assistance  Scooting: contact guard assistance  Comments: Patient performed supine to sit with HOB elevated and use of handrails. Transfers  Sit to stand transfer: moderate assistance  Stand to sit transfer: moderate assistance  Stand step transfer: moderate assistance  Comments: Patient performed STS from EOB, w/c, and mat table with RW placed in front. Patient continues to demonstrate posterior lean with transfer. Patient requires VC for hand placement on RW during STS. Ambulation  Surface:level surface  Assistive Device: rolling walker  Assistance: moderate assistance  Distance: 70 + 95 feet  Gait Mechanics: Shuffling gait, decreased step height/length  Comments:  Patient requires VC to increase step length and height, intermittent carry over would benefit from reinforcement. Stair Mobility  Number of Steps: 3   Step Height: 4 inch  Hand Rails: (B) handrail  Assistance: 2 person assistance with min A   Comments:   Stair Mobility Trial 2  Number of Steps: 3   Step Height: 6 inch  Hand Rails: (B) handrail  Assistance: 2 person assistance with min A   Comments:  Wheelchair Mobility:  No w/c mobility completed on this date. Comments:  Balance  Static Sitting Balance: fair: maintains balance at CGA without use of UE support  Dynamic Sitting Balance: fair: maintains balance at CGA without use of UE support  Static Standing Balance: poor: requires mod (A) to maintain balance  Dynamic Standing Balance: poor: requires mod (A) to maintain balance  Comments: Patient continues to demonstrate posterior lean with intensity varying day to day    Other Therapeutic Interventions    First Session:  Patient performed functional mobility as noted above. Patient demonstrated ability to perform forward toe taps onto 4 inch step B and B UE support 2x10. Patient performed seated wedge overhead bolster STS from mat table 10x with min Ax2 for STS and initiation into overhead with bolster. Patient performed seated stability ball rollouts with yellow ball on mat table 5x to increase forward flexion with STS. Patient returned to recliner chair and was provided call light and chair alarm turned on. Sitter present. Second Session:  Patient sitting in recliner chair upon arrival. Patient denies pain. Patient is agreeable to PT. Patient performed STS from recliner chair with RW placed in front mod Ax1 with stand step transfer to w/c mod Ax1. Patient required VC for hand placement and walker management. Patient performed seated marching and LAQ B with 3 lb ankle weights 2x10. Patient performed seated hip adductor ball squeeze 2x10. Patient performed seated yellow weighted ball trunk rotation for core activation 2x10 in each direction. Patient performed STS from w/c and RW placed in front at [address] transitioning to stand step to recliner chair at mod Ax1 for stand to sit due to decreased eccentric control. Patient was provided call light and chair alarm was turned on. Sitter notified of completion and returned to patient's room.      Functional Outcomes                 Cognition  Overall Cognitive Status: Impaired  Arousal/Alterness: appropriate responses to stimuli  Following Commands: follows multi step commands with repetition, follows multi step commands with increased time  Attention Span: attends with cues to redirect  Memory: appears intact  Safety Judgement: decreased awareness of need for assistance, decreased awareness of need for safety  Problem Solving: decreased awareness of errors, assistance required to identify errors made, assistance required to correct errors made  Insights: decreased awareness of deficits  Initiation: requires cues for all  Sequencing: requires cues for all  Orientation:    alert and oriented x 4  Command Following:   impaired    Education  Barriers To Learning: cognition  Patient Education: patient educated on goals, PT role and benefits, plan of care, general safety, family education, transfer training  Learning Assessment:  patient will require reinforcement due to cognitive deficits    Assessment  Activity Tolerance: Fair (-); patient demonstrates decreased endurance and safety with ambulation  Impairments Requiring Therapeutic Intervention: decreased functional mobility, decreased strength, decreased safety awareness, decreased cognition, decreased endurance, decreased balance, decreased coordination, vestibular impairment, decreased posture  Prognosis: fair  Clinical Assessment: Patient is 80 y/o male presenting to [facility] due to slurred speech and weakness. Patient demonstrates improvement in ambulation endurance this visit as noted above. Patient continues to require reinforcement with hand placement and walker management due to poor carry over with cueing.  Patient will continue to benefit from skilled PT in order to return to [facility] with all functional mobility prior to d/c from hospital.   Safety Interventions: patient left in chair, chair alarm in place, call light within reach, gait belt, patient at risk for falls, and family/caregiver present    Plan  Frequency: 5 x/week, 60 min/day  Current Treatment Recommendations: strengthening, balance training, functional mobility training, transfer training, gait training, endurance training, neuromuscular re-education, wheelchair mobility training, patient/caregiver education, safety education, and positioning    Goals  Patient Goals: Improve ambulation with RW   Short Term Goals:  Time Frame: 10-14 days  Patient will complete bed mobility at [facility] independent   Patient will complete transfers at supervision   Patient will ambulate 50 ft with use of rolling walker at supervision  Patient will complete car transfer at supervision    Therapy Session Time      Individual Group Co-treatment   Time In      0830   Time Out      0915   Minutes      45     Timed Code Treatment Minutes:   45 minutes     Second Session Therapy Time: Individual Concurrent Group Co-treatment   Time In  1115         Time Out  1145         Minutes  30           Timed Code Treatment Minutes:  45 + 30 minutes    Total Treatment Minutes:  75 minutes      Electronically Signed By: Raymond Jay, PT  Raymond Jay PT, DPT, [id number]

## 2022-10-17 NOTE — PROGRESS NOTES
The pt up in chair eating his breakfast.  The pt is pleasant, answers questions, remembered that his \"boy\" brought the breakfast.  Sitter at bedside.

## 2022-10-17 NOTE — PATIENT CARE CONFERENCE
[facility]  Inpatient Rehabilitation  Weekly Team Conference Note    Patient Name: Allen Louise        MRN: [id number]    : 1935  (81 y.o.)  Gender: male           The team conference for this patient was held on 10/18/22 at 11:00am and led by:  Bertha Hurd MD    CASE MANAGEMENT:  Assessment:   Patient lives at home with his daughter. They reside in a senior independent apartment community. Patient's children provide patient  care and support. Prior to admission patient was active with a home health care provider for PT/OT/Nurse/Aide. Patient was also active with [facility] program for non-clinical services. Since admission to ARU patient has participated in PT/OT/SLP. Therapies have assessed that patient would continue to benefit from skilled therapies in order to return to previous level of function with all functional mobility prior to d/c from hospital.     PHYSICAL THERAPY:     Bed Mobility  Supine to Sit: contact guard assistance  Rolling Right: contact guard assistance  Scooting: contact guard assistance  Comments: Patient performed supine to sit with HOB elevated and use of handrails. Transfers  Sit to stand transfer: moderate assistance  Stand to sit transfer: moderate assistance  Stand step transfer: moderate assistance  Comments: Patient performed STS from EOB, w/c, and mat table with RW placed in front. Patient continues to demonstrate posterior lean with transfer. Patient requires VC for hand placement on RW during STS. Ambulation  Surface:level surface  Assistive Device: rolling walker  Assistance: moderate assistance  Distance: 70 + 95 feet  Gait Mechanics: Shuffling gait, decreased step height/length  Comments:  Patient requires VC to increase step length and height, intermittent carry over would benefit from reinforcement.   Stair Mobility  Number of Steps: 3   Step Height: 4 inch  Hand Rails: (B) handrail  Assistance: 2 person assistance with min A Comments:   Stair Mobility Trial 2  Number of Steps: 3   Step Height: 6 inch  Hand Rails: (B) handrail  Assistance: 2 person assistance with min A   Comments:    QM:  Roll Left and Right  Assistance Needed: Supervision or touching assistance  CARE Score: 4  Discharge Goal: Independent  Sit to Lying  Assistance Needed: Partial/moderate assistance  CARE Score: 3  Discharge Goal: Independent  Lying to Sitting on Side of Bed  Assistance Needed: Supervision or touching assistance  CARE Score: 4  Discharge Goal: Independent  Sit to Stand  Assistance Needed: Partial/moderate assistance  CARE Score: 3  Discharge Goal: Supervision or touching assistance  Chair/Bed-to-Chair Transfer  Assistance Needed: Partial/moderate assistance  CARE Score: 3  Discharge Goal: Supervision or touching assistance  Car Transfer  Assistance Needed: Partial/moderate assistance  CARE Score: 3  Discharge Goal: Supervision or touching assistance  Walk 10 Feet  Assistance Needed: Partial/moderate assistance  CARE Score: 3  Discharge Goal: Supervision or touching assistance  Walk 50 Feet with Two Turns  Assistance Needed: Partial/moderate assistance  Reason if not Attempted: Not attempted due to medical condition or safety concerns  CARE Score: 3  Discharge Goal: Supervision or touching assistance  Walk 150 Feet  Reason if not Attempted: Not attempted due to medical condition or safety concerns  CARE Score: 88  Discharge Goal: Supervision or touching assistance  Walking 10 Feet on Uneven Surfaces  Assistance Needed: Partial/moderate assistance  CARE Score: 3  Discharge Goal: Supervision or touching assistance  1 Step (Curb)  Assistance Needed: Partial/moderate assistance  Reason if not Attempted: Not attempted due to medical condition or safety concerns  CARE Score: 3  Discharge Goal: Supervision or touching assistance  4 Steps  Assistance Needed: Partial/moderate assistance  Reason if not Attempted: Not attempted due to medical condition or safety concerns  CARE Score: 3  Discharge Goal: Supervision or touching assistance  12 Steps  Reason if not Attempted: Not attempted due to medical condition or safety concerns  CARE Score: 88  Discharge Goal: Substantial/maximal assistance  Picking Up Object  Assistance Needed: Partial/moderate assistance  CARE Score: 3  Discharge Goal: Supervision or touching assistance  Wheelchair Ability  Uses a Wheelchair and/or Scooter?: No    Goals:      Time Frame: 10-14 days  Patient will complete bed mobility at modified independent   Patient will complete transfers at supervision   Patient will ambulate 50 ft with use of rolling walker at supervision  Patient will complete car transfer at supervision               These goals were reviewed with this patient at the time of assessment and Jaylyn Zayas is in agreement. Plan of Care: Pt to be seen 5 out of 7 days per week per ARU protocol ( 60 minutes with PT)                     SPEECH THERAPY:    Diet Level:ADULT DIET; Regular; No Drinking Straws    Assessment: Impressions  Diagnosis: Pt continues to present with Moderate  Receptive Aphasia , Expressive Aphasia , and Cognitive-Linguistic Deficits . Progress towards goals is limited secondary to pt reduced comprehension and short term recall deficits. Slight improvement noted with auditory processing but continues to require repetition due to pt being [facility]. New strategies were introduced to include education/ practice with speech strategies to improve intelligibility and target fx recall of newly learned information. Continue to target comprehension, cognitive skills, and language skills to facilitate safe return to prior level of independence. Goals:                  Short Term Goals  Time Frame for Short Term Goals: 7-10 days  Goal 1: Pt will tolerate recommended diet and advanced trials with no overt s/s of aspiration.  GOAL MET (monitor as needed)  Goal 2: Pt will complete auditory comprehension tasks with 80% accuracy. Progressing, continue  Goal 3: Patient will complete verbal description and word retrieval tasks with 80% accuracy. Prgressing, continue  Goal 4: Pt will recall functional information (daily tasks, personal hx, etc.) with 80% accuracy. Progressing, continue  Goal 5: Patient will complete functional problem-solving tasks for daily situations with 80% accuracy. Progressing, continue              Time Frame for Short Term Goals: 7-10 days    Plan of Care:  Pt to be seen 5 out of 7 days per week per ARU protocol (60 minutes with SLP)    OCCUPATIONAL THERAPY:    ADL: Basic Activities of Daily Living    Grooming: set-up/SUP- oral care and face washing w/c level at sink  Upper Extremity Bathing: minimal assistance  Lower Extremity Bathing: moderate assistance   Bathing Comments: Patient completed shower seated on shower chair in walk in shower with [place] shower head. Patient required assistance rinsing and drying UB/LB for thoroughness. Upper Extremity Dressing: ModA  Lower Extremity Dressing: maximum assistance- assist TD for shoes, assist for clothing over hips in stance once threaded on LE  Toileting: maximum assistance.     Toileting Comments: patient unable to void, patient required maxA for clothing mgmt     Toilet Transfers: [facility] to/from [facility] overtop toilet      Tub/ShowerTransfers: use of stedy to shower     QM:  Eating  Assistance Needed: Setup or clean-up assistance  CARE Score: 5  Discharge Goal: Set-up or clean-up assistance  Oral Hygiene  Assistance Needed: Supervision or touching assistance  CARE Score: 4  Discharge Goal: Set-up or clean-up assistance  [address] needed: Dependent  CARE Score: 1  Discharge Goal: Supervision or touching assistance  Toilet Transfer  Assistance needed: Partial/moderate assistance  CARE Score: 3  Discharge Goal: Supervision or touching assistance  Shower/Bathe Self  Assistance Needed: Partial/moderate assistance  CARE Score: 3  Discharge Goal: Partial/moderate assistance  Upper Body Dressing  Assistance Needed: Partial/moderate assistance  CARE Score: 3  Discharge Goal: Supervision or touching assistance  Lower Body Dressing  Assistance Needed: Substantial/maximal assistance  CARE Score: 2  Discharge Goal: Supervision or touching assistance  Putting On/Taking Off Footwear  Assistance Needed: Partial/moderate assistance  CARE Score: 3  Discharge Goal: Partial/moderate assistance    Goals:             Patient Goals: improve transfers and mobility    Short Term Goals:  Time Frame: 10-14 days   Patient will complete upper body ADL at supervision   Patient will complete lower body ADL at supervision   Patient will complete toileting at supervision   Patient will complete grooming at set up assistance   Patient will complete functional transfers at supervision   These goals were reviewed with this patient at the time of assessment and James Herrera is in agreement    Plan of Care:  Pt to be seen 5 out of 7 days per week per ARU protocol ( 60 minutes with OT)     NUTRITION:  Weight: 220 lb 1 oz (99.8 kg) / Body mass index is 29.85 kg/m². Diet Order: Regular    Supplements:NA    Pt on regular diet with adequate PO intake >50% of meals. EMR weights fluctuate between 200-220 lbs. Family clarified that UBW is 210-215 lbs. Pt had strong appetite PTA. No nutrition concerns at this time. Please see nutrition note for details.     NURSING:    Risk for Readmission: 19%    Bryan Fall Risk Score: 96  Wounds/Incisions/Ulcers: N/A  Medication Review: Daily   Pain: Patient satisfied  Consultations/Labs/X-rays: BMP & CBC Mo, Th    Patient/Family Education provided by team:    Discharge Plan   Estimated Length of Stay:7 days  Destination: Home  Pass:No  Services at Discharge: [place] PT/OT/SLP S3  Equipment at Discharge: pt owns needed DME  Factors facilitating achievement of predicted outcomes: Family support and Caregiver support  Barriers to the achievement of predicted outcomes: Cognitive deficit,  fluctuating assist levels     Patient Goals:  Improve transfers, mobility, and ambulation with a RW. Interdisciplinary Individualized Plan of Care Review of Previous Week:    Medical and functional progress towards goals:  Medically doing well, labs good, still on antibiotics through the 20th, otherwise stable. Still needs significant assistance for all mobility and activities, but improving steadily. Pt with increased ambulation distance, but performance is variable with all activities. Pt with increased tolerance to activities, cotx only required for ambulation. Processing is improved, as is speech with short phrases. Barriers towards progress:  Decreased strength and activity tolerance, significant assistance required for mobility  Interventions to address Barriers:  Continue cotx PT/OT with ambulation, family educated on current status to facilitate discharge home  Goals still appropriate:  Yes  Modifications to goals: None  Continue Current Plan of Care:  Yes  Modifications to Plan of Care: None    Rehab Team Members in attendance for Team Conference:  Paulina Levy MSW, LSW    Nhung Garcia, RD, LD    Rian Rojas, OTR/L    Sudhir Morales PT, DPT    Ritesh Hoover M.A., REYNALDO Jones PT, DPT,     I approve the established interdisciplinary plan of care as documented within the medical record of Jaylynemily GeronimoRandolph.     Kathie Roa MD   Electronically signed by Kathie Roa MD on 10/18/2022 at 11:26 AM

## 2022-10-18 PROCEDURE — 6370000000 HC RX 637 (ALT 250 FOR IP): Performed by: PHYSICAL MEDICINE & REHABILITATION

## 2022-10-18 PROCEDURE — 94640 AIRWAY INHALATION TREATMENT: CPT

## 2022-10-18 PROCEDURE — 1280000000 HC REHAB R&B

## 2022-10-18 PROCEDURE — 97130 THER IVNTJ EA ADDL 15 MIN: CPT

## 2022-10-18 PROCEDURE — 92507 TX SP LANG VOICE COMM INDIV: CPT

## 2022-10-18 PROCEDURE — 97110 THERAPEUTIC EXERCISES: CPT

## 2022-10-18 PROCEDURE — 97535 SELF CARE MNGMENT TRAINING: CPT

## 2022-10-18 PROCEDURE — 97530 THERAPEUTIC ACTIVITIES: CPT

## 2022-10-18 PROCEDURE — 94761 N-INVAS EAR/PLS OXIMETRY MLT: CPT

## 2022-10-18 PROCEDURE — 2580000003 HC RX 258: Performed by: PHYSICAL MEDICINE & REHABILITATION

## 2022-10-18 PROCEDURE — 97112 NEUROMUSCULAR REEDUCATION: CPT

## 2022-10-18 PROCEDURE — 97129 THER IVNTJ 1ST 15 MIN: CPT

## 2022-10-18 PROCEDURE — 6360000002 HC RX W HCPCS: Performed by: PHYSICAL MEDICINE & REHABILITATION

## 2022-10-18 RX ORDER — AMLODIPINE BESYLATE 10 MG/1
10 TABLET ORAL DAILY
COMMUNITY

## 2022-10-18 RX ORDER — CLOPIDOGREL BISULFATE 75 MG/1
75 TABLET ORAL DAILY
Status: ON HOLD | COMMUNITY
End: 2022-10-25 | Stop reason: HOSPADM

## 2022-10-18 RX ORDER — HYDRALAZINE HYDROCHLORIDE 50 MG/1
50 TABLET, FILM COATED ORAL 3 TIMES DAILY
COMMUNITY

## 2022-10-18 RX ADMIN — LEVOTHYROXINE SODIUM 125 MCG: 0.12 TABLET ORAL at 06:23

## 2022-10-18 RX ADMIN — TRAZODONE HYDROCHLORIDE 50 MG: 50 TABLET ORAL at 21:51

## 2022-10-18 RX ADMIN — AMOXICILLIN AND CLAVULANATE POTASSIUM 1 TABLET: 875; 125 TABLET, FILM COATED ORAL at 21:43

## 2022-10-18 RX ADMIN — DIPHENHYDRAMINE HYDROCHLORIDE 25 MG: 25 TABLET ORAL at 21:51

## 2022-10-18 RX ADMIN — Medication 10 ML: at 08:45

## 2022-10-18 RX ADMIN — AMLODIPINE BESYLATE 5 MG: 5 TABLET ORAL at 08:45

## 2022-10-18 RX ADMIN — Medication 2 PUFF: at 05:07

## 2022-10-18 RX ADMIN — HYDRALAZINE HYDROCHLORIDE 25 MG: 25 TABLET, FILM COATED ORAL at 06:23

## 2022-10-18 RX ADMIN — TAMSULOSIN HYDROCHLORIDE 0.4 MG: 0.4 CAPSULE ORAL at 08:59

## 2022-10-18 RX ADMIN — Medication 10 ML: at 21:49

## 2022-10-18 RX ADMIN — ENOXAPARIN SODIUM 40 MG: 100 INJECTION SUBCUTANEOUS at 08:42

## 2022-10-18 RX ADMIN — AMOXICILLIN AND CLAVULANATE POTASSIUM 1 TABLET: 875; 125 TABLET, FILM COATED ORAL at 08:42

## 2022-10-18 RX ADMIN — PANTOPRAZOLE SODIUM 40 MG: 40 TABLET, DELAYED RELEASE ORAL at 06:22

## 2022-10-18 RX ADMIN — FLUTICASONE PROPIONATE 1 SPRAY: 50 SPRAY, METERED NASAL at 08:59

## 2022-10-18 RX ADMIN — ATORVASTATIN CALCIUM 20 MG: 20 TABLET, FILM COATED ORAL at 08:43

## 2022-10-18 RX ADMIN — HYDRALAZINE HYDROCHLORIDE 25 MG: 25 TABLET, FILM COATED ORAL at 13:44

## 2022-10-18 RX ADMIN — MONTELUKAST SODIUM 10 MG: 10 TABLET, FILM COATED ORAL at 21:49

## 2022-10-18 RX ADMIN — Medication 2 PUFF: at 21:20

## 2022-10-18 RX ADMIN — ASPIRIN 81 MG 81 MG: 81 TABLET ORAL at 08:44

## 2022-10-18 RX ADMIN — HYDRALAZINE HYDROCHLORIDE 25 MG: 25 TABLET, FILM COATED ORAL at 21:48

## 2022-10-18 RX ADMIN — OXYBUTYNIN CHLORIDE 10 MG: 5 TABLET, EXTENDED RELEASE ORAL at 08:44

## 2022-10-18 RX ADMIN — ESCITALOPRAM OXALATE 20 MG: 10 TABLET ORAL at 08:43

## 2022-10-18 RX ADMIN — CETIRIZINE HYDROCHLORIDE 10 MG: 10 TABLET, FILM COATED ORAL at 08:45

## 2022-10-18 ASSESSMENT — PAIN SCALES - GENERAL
PAINLEVEL_OUTOF10: 0
PAINLEVEL_OUTOF10: 0

## 2022-10-18 NOTE — PROGRESS NOTES
The pt had large soft to loose brown stool after taking dulcolax suppository.   The pt has small incontinency but was aware of needing to use a bathroom

## 2022-10-18 NOTE — PROGRESS NOTES
[facility]   Phone: [phone]    Occupational Therapy    [] Initial Evaluation            [x] Daily Treatment Note         [] Discharge Summary      Patient: Evelina Sanches   : 1935   MRN: [id number]   Date of Service:  10/18/2022    Admitting Diagnosis:  Acute ischemic stroke [facility])  Current Admission Summary: 60-year-old male with a history of CAD and dementia who was admitted on 10/7 with slurred speech and fatigue. Concern for stroke. CT head was unremarkable for acute findings. CTA without occlusion. MRI confirmed an acute infarct in the left corona radiata. Neurology evaluated and suggested an aspirin, statin, and formal stroke work-up. He was evaluated by therapy and suggested to continue in an inpatient setting prior to returning home. He has no current complaints. Past Medical History:  has a past medical history of CAD (coronary artery disease). Past Surgical History:  has a past surgical history that includes Coronary artery bypass graft and Cardiac surgery. Discharge Recommendations: Home with HHOT and 24 hour supervision/assistance     DME Required For Discharge: DME to be determined pending patient progress    Precautions/Restrictions: high fall risk  Weight Bearing Restrictions: no restrictions     Required Braces/Orthotics: no braces required  Positional Restrictions:no positional restrictions    Pre-Admission Information   Lives With: daughter Family is always present. Daughter, Libertad Smith are present during week days 8 AM - 2:30 PM (-) and his son Boni Collet present on Monday 8 AM - 2:30 PM. Libertad Guillaume present during evening, overnight and weekends.     Type of Home: apartment  Home Layout: one level  Home Access: elevator  Bathroom Layout: walker accessible, wheelchair accessible, walk in shower, handicap height toilet  Bathroom Equipment: grab bars in shower, grab bars around toilet, shower chair, 3-in-1 commode  Toilet Height: elevated height  Home Equipment: rolling walker, rollator - 4 wheeled walker, single point cane, manual wheelchair, reacher  Transfer Assistance: modified independent with use of RW --occassionally family provides assistance but is always present   Ambulation Assistance:modified independent with use of RW -typically has someone close by. use wheelchair for community   ADL Assistance: requires assistance with bathing, requires assistance with dressing, requires assistance with toileting able to self feed. Daughter reports they assist with footwear and occasionally clothing mgmt during toileting. Patient is typically continent but wears pullups   IADL Assistance: requires assistance with all homemaking tasks  Active :        [] Yes  [x] No  Hand Dominance: [] Left  [x] Right  Current Employment: retired. Occupation: [employer]: used to hunt and fish   Recent Falls: patient reported no falls within last 6 months.  Daughter reports 3-4 falls within past 6 months         Subjective:  General: Pt in recliner at entry, agreeable to session and shower; pt's Dtr present in room throughout   Pain: 0/10  Pain Interventions: not applicable      Activities of Daily Living    Basic Activities of Daily Living  Grooming: setup assistance Comment: brushing hair  w/c level at sink  Upper Extremity Bathing: minimal assistance  Lower Extremity Bathing: moderate assistance   Bathing Comments: assist for thoroughness of UB; assist for kasie hygiene d/t small stool smear and urine incontinence, pt able to wash LE seated w/ VC, assist to dry feet   Upper Extremity Dressing: Comment: gown change w/ Sayda to thread UEs  Lower Extremity Dressing: maximum assistance  Dressing Comments: Sayda to doff clothing over hips in stance, CGA to figure-four to doff socks- pt required assist to don (B) socks, therad LE into pants/brief and for clothing over hips post shower completion; decreased motor planning with fatigue noted at end of shower; intermittent seated LOB w/ CGA to min A to correct during LB dressing. Toileting Comments: incontinent of urine 2x in shower/during dressing  General Comments: IV water-proofed prior to shower; pt demo'd flexed forward posture w/ multiple VC required for upright midline sitting   Instrumental Activities of Daily Living  No IADL completed on this date. Functional Mobility    Bed Mobility  Bed mobility not completed on this date. Comments:  Transfers  Sit to stand transfer:minimal assistance  Stand to sit transfer: minimal assistance  Stand pivot transfer: moderate assistance  Shower transfer: moderate assistance  Shower transfer equipment: tub transfer bench, grab bars, walker  Shower transfer comments: extensive time and assist for RW management secondary to motor planning  Comments: VC for sequence and RW proximity throughout all transfers, VC/TC for anterior weight shift in stance d/t significant posterior lean  Functional Mobility:  Sitting Balance: contact guard assistance, minimal assistance. Standing Balance: minimal assistance, moderate assistance. Functional Mobility: .  moderate assistance  Functional Mobility Activity: recliner>Shower   Functional Mobility Device Use: rolling walker  Functional Mobility Comment: assist for RW management, VC for step length and proximity, pt stopping frequently w/ difficulty starting gait pattern/stepping      Other Therapeutic Interventions           Second Session:  Pt in recliner at entry, agreeable to cotx session in order to maximize pt safety during high complexity activity. Pt completed all sit<>stands and SPT with RW at [address] [place] [address]. RW mobility 150ft w/ ModA + w/c follow for safety, max VC for step length/height and proximity to RW throughout.  Multiple dynamic TA in stance to increase coordination and to elicit righting reactions to improve balance- (B) shoulder flexion holding small swiss ball in stance w/o AD- 10 reps x2 w/ seated break in between, assist for balance + UE to increase ROM; bounce pass and chest pass w/ small swiss ball in stance- ~3min total, Min to [address] for balance d/t significant posterior lean- pt required VC to step forward to assist in correction of balance- pt w/ one episode of MaxA to chair d/t posterior lean (pt stating \"I'm Bill Hedger fall\" prior to requiring sit and unable to commode follow to correct). Pt continues to demo poor righting reactions making pt a high fall risk. P tolerated session well. Left in chair at EOS, chair alarm on, call light and needs in reach.      Cognition  Overall Cognitive Status: Impaired  Arousal/Alterness: appropriate responses to stimuli, delayed responses to stimuli  Following Commands: follows one step commands consistently, follows multi step commands with repetition, follows multi step commands with increased time  Attention Span: attends with cues to redirect  Memory: decreased recall of recent events, decreased short term memory  Safety Judgement: decreased awareness of need for assistance, decreased awareness of need for safety  Problem Solving: assistance required to generate solutions, assistance required to implement solutions, decreased awareness of errors  Insights: decreased awareness of deficits  Initiation: requires cues for all  Sequencing: requires cues for some  Orientation:    alert and oriented x 4  Command Following:   impaired     Education  Barriers To Learning: cognition  Patient Education: patient educated on goals, OT role and benefits, plan of care, ADL adaptive strategies, proper use of assistive device/equipment, family education, transfer training  Learning Assessment:  patient verbalizes understanding, would benefit from continued reinforcement, patient is not an independent learner    Assessment  Activity Tolerance: increased fatigue at EOS; extensive time for ADL completion   Impairments Requiring Therapeutic Intervention: decreased functional mobility, decreased ADL status, decreased safety awareness, decreased endurance, decreased balance, decreased coordination, decreased posture  Prognosis: fair  Clinical Assessment: Patient presents below baseline function secondary to acute infarct in left corona radiata. Patient typically able to complete transfers and functional mobility with supervision and requires assistance for ADLs. Pt is progressing variably- fluctuations in assist levels w/ mobility and ADL completion however overall extensive assist required for all tasks. Tolerated shower level ADL but did demo fatigue and decreased motor planning at end. Pt continues to demo poor righting reactions and posterior lean making him high fall risk. patient will benefit from continued OT services to address above deficits, to maximize patients ability to complete transfers, functional mobility and ADLs and decrease caregiver burden.   Safety Interventions: patient left in chair, chair alarm in place, call light within reach, gait belt, patient at risk for falls, telesitter in use, sitter present, and family/caregiver present        Plan  Frequency: 5 x/week, 60 min/day  Current Treatment Recommendations: strengthening, ROM, balance training, functional mobility training, transfer training, endurance training, neuromuscular re-education, patient/caregiver education, and ADL/self-care training    Goals  Patient Goals: improve transfers and mobility    Short Term Goals:  Time Frame: 10-14 days   Patient will complete upper body ADL at supervision   Patient will complete lower body ADL at supervision   Patient will complete toileting at supervision   Patient will complete grooming at set up assistance   Patient will complete functional transfers at supervision     -goals not met, progressing 10/18    Therapy Session Time     Individual Group Co-treatment   Time In 0930     Time Out 1021     Minutes 51     Timed Code Treatment Minutes:     Second Session Therapy Time:   Individual Concurrent Group Co-treatment   Time In       2614   Time Out       1432   Minutes       29     Timed Code Treatment Minutes:  51 + 29  Total Treatment Minutes:  80      Electronically Signed By: SARWAT Arriola/AUREA #[id number]

## 2022-10-18 NOTE — PROGRESS NOTES
Physical Therapy    [facility] [address] Department   Phone: [phone]    Physical Therapy    [] Initial Evaluation            [x] Daily Treatment Note         [] Discharge Summary      Patient: Calvin Ruggiero   : 1935   MRN: [id number]   Date of Service:  10/18/2022  Admitting Diagnosis: Acute ischemic stroke [facility])  Current Admission Summary:  81 y.o. male who presented to ED for evaluation of slurred speech and generalized weakness/fatigue. Patient is a poor historian at this time. No family currently at bedside to provide history but daughter was at bedside in ED and provided supplemental history. Most of the information is derived from conversation with the emergency room PA and review of records. Daughter reports she noticed patient seemed more fatigued and weak today. He had difficulty walking due to weakness. She also noticed slurred speech. She denies any additional concerns. Patient denies any complaints at this time. Of note, patient was started on Augmentin on 10/6 for acute sinusitis, end date 10/20. Past Medical History:  has a past medical history of CAD (coronary artery disease). Past Surgical History:  has a past surgical history that includes Coronary artery bypass graft and Cardiac surgery. Discharge Recommendations: HHPT S3  DME Required For Discharge: patient has all required DME for discharge  Precautions/Restrictions: high fall risk  Weight Bearing Restrictions: no restrictions  [] Right Upper Extremity  [] Left Upper Extremity [] Right Lower Extremity  [] Left Lower Extremity     Required Braces/Orthotics: no braces required   [] Right  [] Left  Positional Restrictions:no positional restrictions    Pre-Admission Information   Lives With: DaughterMariia are present during week days 8 AM - 2:30 PM (T-) and his son Heather Martinez present on Monday 8 AM - 2:30 PM. Salvador present during evening, overnight and weekends. Type of Home: apartment, . Comment: 3rd floor, senior living  -24 hr assist from family available  Home Layout: one level  Home Access: elevator  Bathroom Layout: walker accessible, wheelchair accessible, walk in shower, handicap height toilet  Bathroom Equipment: grab bars in shower, grab bars around toilet, shower chair, 3-in-1 commode  Home Equipment: rolling walker, single point cane, manual wheelchair - majority of time uses RW  Transfer Assistance: reports transfers \"on my own\" and uses RW or cane most of the time Comment: Carolyn Mendez states someone is always with him during transfers, may not provide assistance but can if needed  Ambulation Assistance:. Comment: per chart review, pt reports \"tremors\" when walking with RW, states a family member walks with him     ADL Assistance: . Comment: pt reports assist with bathing, reports getting self dressed   IADL Assistance: requires assistance with all homemaking tasks - daughters complete all IADL tasks   Active : [] yes             [x]no  Current Employment: . Comment: retired , [address] [facility]   Hobbies: used to enjoy hunting and [address] states he has fallen 3-4 times in past 6 months, reports commonly occurs when stepping backwards     Examination   Vision:   Vision Gross Assessment: Impaired and Vision Corrective Device: wears glasses at all times  Hearing:   [facility]  Observation:   General Observation:  Festinating gait   Posture: Forward head rounded shoulders   Sensation:   WFL  Proprioception:    WFL  Tone:   Normotonic  Coordination Testing:   WFL    ROM:   (B) LE AROM WFL  Strength:   (B) LE strength grossly WFL  Decision Making: medium complexity  Clinical Presentation: evolving      Subjective  General: Patient lying in bed upon arrival. Patient denies pain, camera on within patient's room. Patient is agreeable to PT. Daughter, Carolyn Mendez, arrived mid session. Patient reports upset stomach, RN notified.    Pain: 0/10  Pain Interventions: repositioned        Functional Mobility  Bed Mobility  Supine to Sit: contact guard assistance  Rolling Right: contact guard assistance  Scooting: contact guard assistance  Comments: Patient performed with bed flat and use of R handrail  Transfers  Sit to stand transfer: moderate assistance  Stand to sit transfer: moderate assistance  Stand step transfer: moderate assistance  Toilet transfer: moderate assistance  Comments: Patient performed STS from EOB and BSC. Patient required B UE support with L grab bar from [facility]. Patient performed STS from EOB with bed elevated. Ambulation  Surface:level surface  Assistive Device: rolling walker  Assistance: moderate assistance  Distance: 15 + 15 feet  Gait Mechanics: Shuffling gait, decreased step height/length  Comments:  Patient requires VC to increase step length and height, intermittent carry over would benefit from reinforcement. Stair Mobility  Stair mobility not completed on this date. Comments:   Wheelchair Mobility:  No w/c mobility completed on this date. Comments:  Balance  Static Sitting Balance: fair: maintains balance at CGA without use of UE support  Dynamic Sitting Balance: fair: maintains balance at CGA without use of UE support  Static Standing Balance: poor: requires mod (A) to maintain balance  Dynamic Standing Balance: poor: requires mod (A) to maintain balance  Comments: Patient continues to demonstrate posterior lean with intensity varying day to day    Other Therapeutic Interventions    First Session:  Patient performed functional mobility as noted above. Patient ambulated to [facility] in bathroom for BM. Patient required B UE support via L grab bar and mod Ax1 for STS with max Ax1 for pericare. PT assisted patient with donning/doffing shirt, pants, socks, and gown while seated EOB. Patient performed seated palof press with light yellow resistance band B 2x10. Patient performed trunk PNF into flexion motion 10x.  Patient performed power up 2x10 with assistance for [facility] reach. Patient performed seated scapular retraction with light yellow resistance band 2x10. Patient performed stand step transfer from EOB to recliner chair with RW placed in front and mod Ax1. Patient's chair alarm was turned on and call light was provided. PT positioned patient to eat breakfast.   Second Session:  Patient sitting in recliner chair upon arrival. Patient denies pain. Patient is agreeable to PT. Patient performed STS from recliner chair with RW placed in front mod Ax1. Patient required VC for hand placement and walker management. Patient ambulated 150 feet with RW and mod Ax1 and w/c follow. Patient performed red stability ball overhead standing with mod Ax2 for B UE support and standing balance 2x10. Patient performed standing red stability ball bouncing 2x10 demonstrating posterior lean requiring VC for correction. Patient performed STS from w/c and RW placed in front at [address] transitioning to stand step to recliner chair at mod Ax1 for stand to sit due to decreased eccentric control. Patient was provided call light and chair alarm was turned on.      Functional Outcomes                 Cognition  Overall Cognitive Status: Impaired  Arousal/Alterness: appropriate responses to stimuli  Following Commands: follows multi step commands with repetition, follows multi step commands with increased time  Attention Span: attends with cues to redirect  Memory: appears intact  Safety Judgement: decreased awareness of need for assistance, decreased awareness of need for safety  Problem Solving: decreased awareness of errors, assistance required to identify errors made, assistance required to correct errors made  Insights: decreased awareness of deficits  Initiation: requires cues for all  Sequencing: requires cues for all  Orientation:    alert and oriented x 4  Command Following:   impaired    Education  Barriers To Learning: cognition  Patient Education: patient educated on goals, PT role and benefits, plan of care, general safety, family education, transfer training  Learning Assessment:  patient will require reinforcement due to cognitive deficits    Assessment  Activity Tolerance: Fair (-); patient demonstrates decreased endurance and safety with ambulation  Impairments Requiring Therapeutic Intervention: decreased functional mobility, decreased strength, decreased safety awareness, decreased cognition, decreased endurance, decreased balance, decreased coordination, vestibular impairment, decreased posture  Prognosis: fair  Clinical Assessment: Patient is 80 y/o male presenting to [address] [place] [address] due to slurred speech and weakness. Patient continues to require mod Ax1 with STS this visit. Patient's seated tolerance continues to improve and lateral lean has reduced resulting in progression from needing CGA throughout entire session to intermittent SBA.  Patient will continue to benefit from skilled PT in order to return to [facility] with all functional mobility prior to d/c from hospital.   Safety Interventions: patient left in chair, chair alarm in place, call light within reach, gait belt, patient at risk for falls, and family/caregiver present    Plan  Frequency: 5 x/week, 60 min/day  Current Treatment Recommendations: strengthening, balance training, functional mobility training, transfer training, gait training, endurance training, neuromuscular re-education, wheelchair mobility training, patient/caregiver education, safety education, and positioning    Goals  Patient Goals: Improve ambulation with RW   Short Term Goals:  Time Frame: 10-14 days  Patient will complete bed mobility at [facility] independent   Patient will complete transfers at supervision   Patient will ambulate 50 ft with use of rolling walker at supervision  Patient will complete car transfer at supervision  No goals met this visit, 10/18    Therapy Session Time      Individual Group Co-treatment   Time In  0730       Time Out  1187 Minutes  46         Timed Code Treatment Minutes:   46 minutes     Second Session Therapy Time:   Individual Concurrent Group Co-treatment   Time In        0422   Time Out        2005   Minutes             Timed Code Treatment Minutes:  46 + 29 minutes    Total Treatment Minutes:  75 minutes    Electronically Signed By: Myron Baptiste, PT  Myron Baptiste PT, DPT, [id number]

## 2022-10-18 NOTE — PROGRESS NOTES
[facility] [address] Department   Phone: [phone]    Speech Therapy    [] Initial Evaluation            [x] Daily Treatment Note         [] Discharge Summary      Patient: Calvin Ruggiero   : 1935   MRN: [id number]   Date of Service:  10/18/2022  Admitting Diagnosis: Acute ischemic stroke [facility])  Current Admission Summary: 50-year-old male with a history of CAD and dementia who was admitted on 10/7 with slurred speech and fatigue. Concern for stroke. CT head was unremarkable for acute findings. CTA without occlusion. MRI confirmed an acute infarct in the left corona radiata. Neurology evaluated and suggested an aspirin, statin, and formal stroke work-up. He was evaluated by therapy and suggested to continue in an inpatient setting prior to returning home. He has no current complaints. Past Medical History:  has a past medical history of CAD (coronary artery disease). Past Surgical History:  has a past surgical history that includes Coronary artery bypass graft and Cardiac surgery. Precautions/Restrictions: high fall risk      Pre-Admission Information   Living Status: Pt lives in an independent living facility (daughter stays with pt and they have around the care assistance from family)   Occupation/School: Retired from [employer] reports highest level of education being 8th grade   Medication Management: :  []Primary   []Secondary [x]No  Finance Management: []Primary   []Secondary [x]No  Active :   []Yes         [x]No (reportedly stopped driving 4 years ago)   Hearing:    [facility]  Vision:    Vision Corrective Device: wears glasses at all times      Subjective  General: First Session: Pt alert and semi reclined in chair. Pt reports not sleeping well and being tired, agreeable to therapy. Daughter present at bedside. Second Session: Pt alert and semi reclined in chair. Daughter present at bedside. Pt's other daughter and son present at end of session. aphonic for brief moments. Session 2:   Dysphagia: Severity: Mild  2/2 presbyphagia   GOAL MET (monitor as needed)      Comprehension: Severity: Mild   -pt requesting multiple repetitions of directions throughout session     Expressive Language: Severity: Moderate   Convergent naming   -pt competed convergent naming task with 94% accuracy     Divergent naming   -pt stated 1 item belonging to a category with 75% accuracy, increased to 88% given min cues   -divergent naming task completed during convergent naming task, pt with mild difficulty switching between tasks as his frequency of requests for repetitions increased     Confrontational naming   -pt identified objects from visual scene during problem solving task (see below) with 66% accuracy   -pt switched to reading glasses half way through task, no increase with naming. Suspect difficulty seeing image impacting confrontational naming accuracy more than expressive language deficits      Cognition: Severity: Moderate   Problem solving from visual scene   -pt identified problem from visual scene with 50% accuracy, suspect accuracy impacted by pt visual deficits   -pt identified solution to problem from visual scene with 100% accuracy     Personal hx   -pt recalled facts about family and hobbies with 100% accuracy   -pt named all children present in room with 100% accuracy     Additional Interventions:         Education  Barriers To Learning: cognition and reading  Patient Education: Provided education regarding role of SLP, results of assessment, recommendations and general speech pathology plan of care.    Learning Assessment: Pt requires ongoing learning     Assessment  Impairments Requiring Therapeutic Intervention: Receptive Aphasia , Expressive Aphasia , Dysarthria , Cognitive-Linguistic Deficits , and Oropharyngeal Dysphagia   Prognosis: good    Clinical Assessment:    Pt continues to present with Moderate  Receptive Aphasia , Expressive Aphasia , and Cognitive-Linguistic Deficits . Pt is demonstrating progress with expressive language used for naming tasks. Progress towards goals is limited secondary to pt reduced comprehension and seeking answers from family/caregivers. Continue to target comprehension, cognitive skills, and language skills to facilitate safe return to prior level of independence. Plan  Frequency: 60 minutes/day; 5 days per week, as tolerated, until goals met, or discharged from ARU. Therapeutic Interventions: Diet Tolerance Monitoring , Patient/Family Education , Therapeutic Trials with SLP  Expressive/ Receptive Language intervention , Cognitive-Linguistic intervention , and Patient/ Family education   Discharge Recommendations: TBD   Continued SLP at Discharge: TBD based upon progress     Goals  Time Frame: 7-10 days     Pt will tolerate recommended diet and advanced trials with no overt s/s of aspiration. GOAL MET   Pt will complete auditory comprehension tasks with 80% accuracy. Patient will complete verbal description and word retrieval tasks with 80% accuracy   Pt will recall functional information (daily tasks, personal hx, etc.) with 80% accuracy. Patient will complete functional problem-solving tasks for daily situations with 80% accuracy       Therapy Session Time      Session 1 Session 2   Time In 0900 1030   Time Out 0930 1100   Time Code Minutes 10 15   Individual Minutes 30 30     Timed Code Treatment Minutes:  25  Total Treatment Minutes:  60    Electronically Signed By:   Gordan Crigler. A. [facility]-SLP #[id number] 10/18/2022 12:35 PM  Speech-Language Pathologist    Libertad SARKAR,  Speech-Language Pathology     The speech-language pathologist was present, directed the patient's care, made skilled judgment and was responsible for assessment and treatment.

## 2022-10-18 NOTE — PROGRESS NOTES
Jeanine Flood  10/18/2022  [id number]    Chief Complaint: Acute ischemic stroke [facility])    Subjective:   No overnight events. No current complaints. Cognition remains limiting. ROS: No N/V, SOB, dyspnea    Objective:  Patient Vitals for the past 24 hrs:   BP Temp Temp src Pulse Resp SpO2   10/18/22 0830 (!) 124/58 97.3 °F (36.3 °C) Oral 58 18 97 %   10/18/22 0507 -- -- -- -- -- 95 %   10/17/22 2215 (!) 153/70 97.3 °F (36.3 °C) Oral 66 18 95 %   10/17/22 2057 -- -- -- -- -- 96 %   10/17/22 0930 121/68 97.7 °F (36.5 °C) -- 62 18 97 %       Gen: No distress, pleasant. Resting in bedside chair  HEENT: Normocephalic, atraumatic. CV: Regular rate and rhythm. No MRG  Resp: No respiratory distress. CTAB  Abd: Soft, nontender nondistended  Ext: No edema. Neuro: Alert, oriented x2, appropriately interactive. Decreased cognition noted. Wt Readings from Last 3 Encounters:   10/12/22 220 lb 1 oz (99.8 kg)   10/12/22 200 lb 8 oz (90.9 kg)   08/15/22 210 lb (95.3 kg)       Laboratory data:   Lab Results   Component Value Date    WBC 5.1 10/17/2022    HGB 10.4 (L) 10/17/2022    HCT 31.1 (L) 10/17/2022    MCV 95.8 10/17/2022     10/17/2022       Lab Results   Component Value Date/Time     10/17/2022 05:36 AM    K 4.4 10/17/2022 05:36 AM    K 4.1 10/10/2022 05:37 AM     10/17/2022 05:36 AM    CO2 26 10/17/2022 05:36 AM    BUN 30 10/17/2022 05:36 AM    CREATININE 1.4 10/17/2022 05:36 AM    GLUCOSE 125 10/17/2022 05:36 AM    CALCIUM 9.2 10/17/2022 05:36 AM        Therapy progress:  PT  Objective   Bed Mobility  Supine to Sit: contact guard assistance  Sit to Supine: contact guard assistance  Rolling Left: contact guard assistance  Rolling Right: contact guard assistance  Scooting: contact guard assistance  Bridging: contact guard assistance  Comments: Patient performed supine to sit with HOB elevated and use of handrails.   Transfers  Sit to stand transfer: moderate assistance, maximum assistance  Stand to sit transfer: moderate assistance  Stand step transfer: moderate assistance  Comments: Initially, patient performed STS from EOB and use of handrails at max Ax1. Patient attempted 3 STS from EOB, continued to demonstrate heavy posterior lean resulting in return to sitting due to inability to remain upright. Patient attempted STS from EOB to transfer to recliner chair in a stand step transfer with RW placed in front. Patient's posterior lean reduced, still present with additional STS allowing transfer to recliner chair. OT   Basic Activities of Daily Living  Feeding Comments: per daughter, patient required assistance self feeding today but typically does not   Grooming: stand by assistance minimal assistance  Grooming Comments: patient able to comb hair seated in recliner and apply deodorant seated on shower chair. Patient required Richard for oral hygiene/managing dentures   Upper Extremity Bathing: minimal assistance  Lower Extremity Bathing: moderate assistance   Bathing Equipment: none  Bathing Comments: Patient completed shower seated on shower chair in walk in shower with [place] shower head. Patient required assistance rinsing and drying UB/LB for thoroughness. IV waterproofed. Upper Extremity Dressing: minimal assistance  Lower Extremity Dressing: maximum assistance  Dressing Comments: Richard for donning shirt. maxA for donning pullup, pants and socks. Patient was able to doff socks himself with SBA. Patient utilized grab bars for sit to stand from shower chair and balance while therapist managed pants over hips   Toileting: maximum assistance. Toileting Comments: patient unable to void, patient required maxA for clothing mgmt   General Comments: Increased time for ADLs.           SLP   Dysphagia: Severity: Mild   Goal not targeted this session                 Comprehension: Severity: Mild   -Pt with reduced comprehension of directions for all activities   -multiple repetitions, rewording, and a model provided, following sequencing activity (see below) pt stated he did not understand the activity      Expressive Language: Severity: Moderate   Convergent naming (3 clues)   -independently named item with 3 descriptive points with 53% accuracy, increased to 80% accuracy with min cues  -pt benefited from extended time to answer with 2-3 repetitions of listed words      Cognition: Severity: Moderate   Sequencing functional activities   -pt sequenced 5 written steps to use a call light with 60% accuracy with mod cues   -pt verbally repeated steps with 40% accuracy with a visual, did not increase despite max cuing    -pt able to state when to use call light in 4/4 opportunities      -pt unable to state next therapy despite visual aid (schedule) placed in front of him      -pt and daughter report pt is close to baseline with speech and cognition. Daughter expressed she wanted to continue therapy for 60 minutes a day at this time in order to engage patient's brain while he is here             Body mass index is 29.85 kg/m². Assessment and Plan:  Acute left corona radiata infarct: ASA, statin. PT/OT/SLP     Dementia: SLP     Hypothyroidism: Synthroid 125     HTN: Norvasc 5, hydralazine 25     COPD: Dulera, Singulair     HLD: Lipitor 20     Depression: Lexapro 20     Sinus infection: Augmentin thru 10/20     BPH: Ditropan and Flomax per home regimen     Bowels: Per protocol  Bladder: Per protocol   Sleep: Trazodone provided prn. Pain: Tylenol as needed  DVT PPx: Lovenox  CHRISTO: 10/25    Team conference was held today on the patient and discussed directly with the patient utilizing their entire treatment team. Please see separate team note for details. Total treatment time for today's care >35 min.       Steve Beck MD 10/18/2022, 8:47 AM

## 2022-10-18 NOTE — CARE COORDINATION
Team conference held today. Spoke with patient to discuss progress with therapy, barriers to discharge, and plans to return home. Estimated discharge date set for 10.25.2022. Patient anticipates discharging to home with needed supports. Will continue to follow for support and discharge planning.     Electronically signed by SARAH Garcia on 10/18/2022 at 3:06 PM

## 2022-10-19 LAB
ANION GAP SERPL CALCULATED.3IONS-SCNC: 8 MMOL/L (ref 3–16)
BUN BLDV-MCNC: 33 MG/DL (ref 7–20)
CALCIUM SERPL-MCNC: 9 MG/DL (ref 8.3–10.6)
CHLORIDE BLD-SCNC: 106 MMOL/L (ref 99–110)
CO2: 23 MMOL/L (ref 21–32)
CREAT SERPL-MCNC: 1.5 MG/DL (ref 0.8–1.3)
GFR SERPL CREATININE-BSD FRML MDRD: 45 ML/MIN/{1.73_M2}
GLUCOSE BLD-MCNC: 125 MG/DL (ref 70–99)
POTASSIUM SERPL-SCNC: 4.5 MMOL/L (ref 3.5–5.1)
SODIUM BLD-SCNC: 137 MMOL/L (ref 136–145)

## 2022-10-19 PROCEDURE — 6370000000 HC RX 637 (ALT 250 FOR IP): Performed by: PHYSICAL MEDICINE & REHABILITATION

## 2022-10-19 PROCEDURE — 94761 N-INVAS EAR/PLS OXIMETRY MLT: CPT

## 2022-10-19 PROCEDURE — 97530 THERAPEUTIC ACTIVITIES: CPT

## 2022-10-19 PROCEDURE — 80048 BASIC METABOLIC PNL TOTAL CA: CPT

## 2022-10-19 PROCEDURE — 97116 GAIT TRAINING THERAPY: CPT

## 2022-10-19 PROCEDURE — 97535 SELF CARE MNGMENT TRAINING: CPT

## 2022-10-19 PROCEDURE — 2580000003 HC RX 258: Performed by: PHYSICAL MEDICINE & REHABILITATION

## 2022-10-19 PROCEDURE — 6360000002 HC RX W HCPCS: Performed by: PHYSICAL MEDICINE & REHABILITATION

## 2022-10-19 PROCEDURE — 1280000000 HC REHAB R&B

## 2022-10-19 PROCEDURE — 94640 AIRWAY INHALATION TREATMENT: CPT

## 2022-10-19 PROCEDURE — 92526 ORAL FUNCTION THERAPY: CPT

## 2022-10-19 PROCEDURE — 97130 THER IVNTJ EA ADDL 15 MIN: CPT

## 2022-10-19 PROCEDURE — 97129 THER IVNTJ 1ST 15 MIN: CPT

## 2022-10-19 PROCEDURE — 97110 THERAPEUTIC EXERCISES: CPT

## 2022-10-19 RX ORDER — SODIUM CHLORIDE 9 MG/ML
500 INJECTION, SOLUTION INTRAVENOUS ONCE
Status: COMPLETED | OUTPATIENT
Start: 2022-10-19 | End: 2022-10-19

## 2022-10-19 RX ADMIN — HYDRALAZINE HYDROCHLORIDE 25 MG: 25 TABLET, FILM COATED ORAL at 14:45

## 2022-10-19 RX ADMIN — STANDARDIZED SENNA CONCENTRATE AND DOCUSATE SODIUM 2 TABLET: 8.6; 5 TABLET ORAL at 21:39

## 2022-10-19 RX ADMIN — LEVOTHYROXINE SODIUM 125 MCG: 0.12 TABLET ORAL at 07:01

## 2022-10-19 RX ADMIN — ESCITALOPRAM OXALATE 20 MG: 10 TABLET ORAL at 09:19

## 2022-10-19 RX ADMIN — Medication 10 ML: at 21:40

## 2022-10-19 RX ADMIN — ATORVASTATIN CALCIUM 20 MG: 20 TABLET, FILM COATED ORAL at 09:18

## 2022-10-19 RX ADMIN — AMLODIPINE BESYLATE 5 MG: 5 TABLET ORAL at 09:19

## 2022-10-19 RX ADMIN — TAMSULOSIN HYDROCHLORIDE 0.4 MG: 0.4 CAPSULE ORAL at 09:18

## 2022-10-19 RX ADMIN — Medication 10 ML: at 09:16

## 2022-10-19 RX ADMIN — ASPIRIN 81 MG 81 MG: 81 TABLET ORAL at 09:19

## 2022-10-19 RX ADMIN — OXYBUTYNIN CHLORIDE 10 MG: 5 TABLET, EXTENDED RELEASE ORAL at 09:18

## 2022-10-19 RX ADMIN — CETIRIZINE HYDROCHLORIDE 10 MG: 10 TABLET, FILM COATED ORAL at 09:18

## 2022-10-19 RX ADMIN — DIPHENHYDRAMINE HYDROCHLORIDE 25 MG: 25 TABLET ORAL at 21:39

## 2022-10-19 RX ADMIN — ENOXAPARIN SODIUM 40 MG: 100 INJECTION SUBCUTANEOUS at 09:16

## 2022-10-19 RX ADMIN — AMOXICILLIN AND CLAVULANATE POTASSIUM 1 TABLET: 875; 125 TABLET, FILM COATED ORAL at 09:18

## 2022-10-19 RX ADMIN — HYDRALAZINE HYDROCHLORIDE 25 MG: 25 TABLET, FILM COATED ORAL at 21:40

## 2022-10-19 RX ADMIN — Medication 2 PUFF: at 20:43

## 2022-10-19 RX ADMIN — MONTELUKAST SODIUM 10 MG: 10 TABLET, FILM COATED ORAL at 21:39

## 2022-10-19 RX ADMIN — SODIUM CHLORIDE 500 ML: 9 INJECTION, SOLUTION INTRAVENOUS at 14:38

## 2022-10-19 RX ADMIN — FLUTICASONE PROPIONATE 1 SPRAY: 50 SPRAY, METERED NASAL at 09:20

## 2022-10-19 RX ADMIN — STANDARDIZED SENNA CONCENTRATE AND DOCUSATE SODIUM 2 TABLET: 8.6; 5 TABLET ORAL at 09:29

## 2022-10-19 RX ADMIN — Medication 2 PUFF: at 06:06

## 2022-10-19 RX ADMIN — PANTOPRAZOLE SODIUM 40 MG: 40 TABLET, DELAYED RELEASE ORAL at 07:01

## 2022-10-19 RX ADMIN — AMOXICILLIN AND CLAVULANATE POTASSIUM 1 TABLET: 875; 125 TABLET, FILM COATED ORAL at 21:39

## 2022-10-19 RX ADMIN — HYDRALAZINE HYDROCHLORIDE 25 MG: 25 TABLET, FILM COATED ORAL at 07:01

## 2022-10-19 RX ADMIN — TRAZODONE HYDROCHLORIDE 50 MG: 50 TABLET ORAL at 21:39

## 2022-10-19 ASSESSMENT — PAIN SCALES - GENERAL: PAINLEVEL_OUTOF10: 0

## 2022-10-19 NOTE — PROGRESS NOTES
Nutrition Note    RECOMMENDATIONS  No nutrition rec's @ this time. NUTRITION ASSESSMENT   Pt remains on a regular diet with po intake greater than 75% most meals. IVF started as pt does not drink fluids well, per RN. No new wt to review for changes. Con't to encourage po meal/fluid intake. Nutrition Related Findings: LBM 10/18; trace BLE edema; gluc 125  Wounds:  (Nonblanchable dark purple bruise L buttock)  Nutrition Education:  Education not indicated   Nutrition Goals: PO intake 75% or greater     MALNUTRITION ASSESSMENT   Acute Illness  Malnutrition Status: No malnutrition    NUTRITION DIAGNOSIS   No nutrition diagnosis at this time     CURRENT NUTRITION THERAPIES  ADULT DIET; Regular; No Drinking Straws     PO Intake: %   PO Supplement Intake:None Ordered    ANTHROPOMETRICS  Current Height: 6' (182.9 cm)  Current Weight: 220 lb 1 oz (99.8 kg)    Ideal Body Weight (IBW): 178 lbs  (81 kg)    Usual Bodyweight  (210-215 lbs per pt and family)       BMI: 29.8      COMPARATIVE STANDARDS  Energy (kcal):  2056-1848 kcals     Protein (g):  104-128 g       Fluid (mL/day):  6392-3613    The patient will be monitored per nutrition standards of care. Consult dietitian if additional nutrition interventions are needed prior to RD reassessment.      Surya Park, RD, LD    Contact: [phone]

## 2022-10-19 NOTE — PROGRESS NOTES
Odalys Arroyo  10/19/2022  [id number]    Chief Complaint: Acute ischemic stroke ([address]    Subjective:   No overnight events. No current complaints. Cognition remains limiting. Cr elevated this am.     ROS: No N/V, SOB, dyspnea    Objective:  Patient Vitals for the past 24 hrs:   BP Temp Temp src Pulse Resp SpO2   10/19/22 0900 114/64 98.3 °F (36.8 °C) Oral 58 16 96 %   10/19/22 0701 136/66 -- -- -- -- --   10/19/22 0606 -- -- -- 53 18 96 %   10/18/22 2130 (!) 151/65 97.4 °F (36.3 °C) Oral 65 16 96 %   10/18/22 2120 -- -- -- 51 18 96 %       Gen: No distress, pleasant. Resting in bedside chair  HEENT: Normocephalic, atraumatic. CV: Regular rate and rhythm. No MRG  Resp: No respiratory distress. CTAB  Abd: Soft, nontender nondistended  Ext: No edema. Neuro: Alert, oriented x2, appropriately interactive. Decreased cognition noted. Wt Readings from Last 3 Encounters:   10/12/22 220 lb 1 oz (99.8 kg)   10/12/22 200 lb 8 oz (90.9 kg)   08/15/22 210 lb (95.3 kg)       Laboratory data:   Lab Results   Component Value Date    WBC 5.1 10/17/2022    HGB 10.4 (L) 10/17/2022    HCT 31.1 (L) 10/17/2022    MCV 95.8 10/17/2022     10/17/2022       Lab Results   Component Value Date/Time     10/19/2022 06:19 AM    K 4.5 10/19/2022 06:19 AM    K 4.1 10/10/2022 05:37 AM     10/19/2022 06:19 AM    CO2 23 10/19/2022 06:19 AM    BUN 33 10/19/2022 06:19 AM    CREATININE 1.5 10/19/2022 06:19 AM    GLUCOSE 125 10/19/2022 06:19 AM    CALCIUM 9.0 10/19/2022 06:19 AM        Therapy progress:  PT  Objective   Bed Mobility  Supine to Sit: contact guard assistance  Sit to Supine: contact guard assistance  Rolling Left: contact guard assistance  Rolling Right: contact guard assistance  Scooting: contact guard assistance  Bridging: contact guard assistance  Comments: Patient performed supine to sit with HOB elevated and use of handrails.   Transfers  Sit to stand transfer: moderate assistance, maximum assistance  Stand to sit transfer: moderate assistance  Stand step transfer: moderate assistance  Comments: Initially, patient performed STS from EOB and use of handrails at max Ax1. Patient attempted 3 STS from EOB, continued to demonstrate heavy posterior lean resulting in return to sitting due to inability to remain upright. Patient attempted STS from EOB to transfer to recliner chair in a stand step transfer with RW placed in front. Patient's posterior lean reduced, still present with additional STS allowing transfer to recliner chair. OT   Basic Activities of Daily Living  Feeding Comments: per daughter, patient required assistance self feeding today but typically does not   Grooming: stand by assistance minimal assistance  Grooming Comments: patient able to comb hair seated in recliner and apply deodorant seated on shower chair. Patient required Richard for oral hygiene/managing dentures   Upper Extremity Bathing: minimal assistance  Lower Extremity Bathing: moderate assistance   Bathing Equipment: none  Bathing Comments: Patient completed shower seated on shower chair in walk [facility] shower head. Patient required assistance rinsing and drying UB/LB for thoroughness. IV waterproofed. Upper Extremity Dressing: minimal assistance  Lower Extremity Dressing: maximum assistance  Dressing Comments: Richard for donning shirt. maxA for donning pullup, pants and socks. Patient was able to doff socks himself with SBA. Patient utilized grab bars for sit to stand from shower chair and balance while therapist managed pants over hips   Toileting: maximum assistance. Toileting Comments: patient unable to void, patient required maxA for clothing mgmt   General Comments: Increased time for ADLs.           SLP   Dysphagia: Severity: Mild   Goal not targeted this session                 Comprehension: Severity: Mild   -Pt with reduced comprehension of directions for all activities   -multiple repetitions, rewording, and a model provided, following sequencing activity (see below) pt stated he did not understand the activity      Expressive Language: Severity: Moderate   Convergent naming (3 clues)   -independently named item with 3 descriptive points with 53% accuracy, increased to 80% accuracy with min cues  -pt benefited from extended time to answer with 2-3 repetitions of listed words      Cognition: Severity: Moderate   Sequencing functional activities   -pt sequenced 5 written steps to use a call light with 60% accuracy with mod cues   -pt verbally repeated steps with 40% accuracy with a visual, did not increase despite max cuing    -pt able to state when to use call light in 4/4 opportunities      -pt unable to state next therapy despite visual aid (schedule) placed in front of him      -pt and daughter report pt is close to baseline with speech and cognition. Daughter expressed she wanted to continue therapy for 60 minutes a day at this time in order to engage patient's brain while he is here             Body mass index is 29.85 kg/m². Assessment and Plan:  Acute left corona radiata infarct: ASA, statin. PT/OT/SLP     Dementia: SLP     Hypothyroidism: Synthroid 125     HTN: Norvasc 5, hydralazine 25     COPD: Dulera, Singulair     HLD: Lipitor 20     Depression: Lexapro 20     Sinus infection: Augmentin thru 10/20     BPH: Ditropan and Flomax per home regimen    DARIAN: 2/2 poor PO intake, - gentle IVF, - daily labs     Bowels: Per protocol  Bladder: Per protocol   Sleep: Trazodone provided prn.    Pain: Tylenol as needed  DVT PPx: Lovenox  CHRISTO: 10/25      Lacho Braga MD 10/19/2022, 10:08 AM

## 2022-10-19 NOTE — PROGRESS NOTES
Physical Therapy    [facility]   Phone: [phone]    Physical Therapy    [] Initial Evaluation            [x] Daily Treatment Note         [] Discharge Summary      Patient: Moises Edwards   : 1935   MRN: [id number]   Date of Service:  10/19/2022  Admitting Diagnosis: Acute ischemic stroke [facility])  Current Admission Summary:  81 y.o. male who presented to ED for evaluation of slurred speech and generalized weakness/fatigue. Patient is a poor historian at this time. No family currently at bedside to provide history but daughter was at bedside in ED and provided supplemental history. Most of the information is derived from conversation with the emergency room PA and review of records. Daughter reports she noticed patient seemed more fatigued and weak today. He had difficulty walking due to weakness. She also noticed slurred speech. She denies any additional concerns. Patient denies any complaints at this time. Of note, patient was started on Augmentin on 10/6 for acute sinusitis, end date 10/20. Past Medical History:  has a past medical history of CAD (coronary artery disease). Past Surgical History:  has a past surgical history that includes Coronary artery bypass graft and Cardiac surgery. Discharge Recommendations: HHPT S3  DME Required For Discharge: patient has all required DME for discharge  Precautions/Restrictions: high fall risk  Weight Bearing Restrictions: no restrictions  [] Right Upper Extremity  [] Left Upper Extremity [] Right Lower Extremity  [] Left Lower Extremity     Required Braces/Orthotics: no braces required   [] Right  [] Left  Positional Restrictions:no positional restrictions    Pre-Admission Information   Lives With: Daughter, Ralph Wilkinson. Sarah are present during week days 8 AM - 2:30 PM (T-) and his son Tarik Palma present on Monday 8 AM - 2:30 PM. Ralph Wilkinson present during evening, overnight and weekends. Type of Home: apartment, . Comment: 3rd floor, senior living  -24 hr assist from family available  Home Layout: one level  Home Access: elevator  Bathroom Layout: walker accessible, wheelchair accessible, walk in shower, handicap height toilet  Bathroom Equipment: grab bars in shower, grab bars around toilet, shower chair, 3-in-1 commode  Home Equipment: rolling walker, single point cane, manual wheelchair - majority of time uses RW  Transfer Assistance: reports transfers \"on my own\" and uses RW or cane most of the time Comment: Christiano Farrell states someone is always with him during transfers, may not provide assistance but can if needed  Ambulation Assistance:. Comment: per chart review, pt reports \"tremors\" when walking with RW, states a family member walks with him     ADL Assistance: . Comment: pt reports assist with bathing, reports getting self dressed   IADL Assistance: requires assistance with all homemaking tasks - daughters complete all IADL tasks   Active : [] yes             [x]no  Current Employment: . Comment: retired , [address] [facility] [email or web address] -   Hobbies: used to enjoy hunting and [address] states he has fallen 3-4 times in past 6 months, reports commonly occurs when stepping backwards     Examination   Vision:   Vision Gross Assessment: Impaired and Vision Corrective Device: wears glasses at all times  Hearing:   [facility]  Observation:   General Observation:  Festinating gait   Posture: Forward head rounded shoulders   Sensation:   WFL  Proprioception:    WFL  Tone:   Normotonic  Coordination Testing:   WFL    ROM:   (B) LE AROM WFL  Strength:   (B) LE strength grossly WFL  Decision Making: medium complexity  Clinical Presentation: evolving      Subjective  General: Patient lying supine in bed upon arrival. Patient denies pain, camera on within patient's room with sitter present. Patient is agreeable to PT. Daughter, Christiano Farrell, arrived by end of session.    Pain: 0/10  Pain Interventions: repositioned Functional Mobility  Bed Mobility  Supine to Sit: contact guard assistance  Rolling Left: contact guard assistance  Rolling Right: contact guard assistance  Scooting: stand by assistance  Comments: Patient performed with HOB elevated and use of B handrails  Transfers  Sit to stand transfer: moderate assistance  Stand to sit transfer: moderate assistance  Stand step transfer: moderate assistance  Comments: Patient performed STS from EOB with bed elevated and RW placed in front 3x requiring mod Ax1. Patient performed stand step transfer with RW requiring VC on hand placement and walker management prior to performing stand to sit in recliner chair. Ambulation  Ambulation not tested on this date secondary to need for two person assist with ambulation at this time. Distance:   Gait Mechanics:   Comments:    Stair Mobility  Stair mobility not completed on this date. Comments:   Wheelchair Mobility:  No w/c mobility completed on this date. Comments:  Balance  Static Sitting Balance: fair: maintains balance at CGA without use of UE support  Dynamic Sitting Balance: fair: maintains balance at CGA without use of UE support  Static Standing Balance: poor: requires mod (A) to maintain balance  Dynamic Standing Balance: poor: requires mod (A) to maintain balance  Comments: Patient continues to demonstrate posterior lean with intensity varying day to day    Other Therapeutic Interventions    First Session:  Patient performed functional mobility as noted above. Patient performed supine SLR, bridges, resisted clamshells, and resisted ankle DF/PF with light yellow resistance band 2x10 each. Patient performed seated LAQ and marches B 2x10 each. Patient performed seated reaching beyond COG B UE 10x each. Patient performed STS 3x with mod Ax1 and RW placed in front. Patient performed stand pivot to recliner chair with RW and mod Ax1. Patient was positioned for breakfast in chair.  Chair alarm was turned on and call light was provided. Second Session:  Patient sitting in recliner chair upon arrival. Patient denies pain. Patient is agreeable to PT. Patient's daughter is present, planning on leaving shortly. Patient performed STS from recliner chair with RW placed in front mod Ax1. Patient required VC for hand placement and walker management. Patient ambulated 100 feet and 75 feet with RW and mod Ax1 with w/c follow. Patient required increased sitting breaks due to fatigue this visit. Patient ambulated 10 feet to [facility] where he utilized the toilet receiving assistance from OT. Patient performed STS from w/c and RW placed in front at [address] transitioning to stand step to recliner chair at mod Ax1 for stand to sit due to decreased eccentric control. Patient was provided call light and chair alarm was turned on.      Functional Outcomes                 Cognition  Overall Cognitive Status: Impaired  Arousal/Alterness: appropriate responses to stimuli  Following Commands: follows multi step commands with repetition, follows multi step commands with increased time  Attention Span: attends with cues to redirect  Memory: appears intact  Safety Judgement: decreased awareness of need for assistance, decreased awareness of need for safety  Problem Solving: decreased awareness of errors, assistance required to identify errors made, assistance required to correct errors made  Insights: decreased awareness of deficits  Initiation: requires cues for all  Sequencing: requires cues for all  Orientation:    alert and oriented x 4  Command Following:   impaired    Education  Barriers To Learning: cognition  Patient Education: patient educated on goals, PT role and benefits, plan of care, general safety, family education, transfer training  Learning Assessment:  patient will require reinforcement due to cognitive deficits    Assessment  Activity Tolerance: Fair (-); patient demonstrates decreased endurance and safety with ambulation  Impairments Requiring Therapeutic Intervention: decreased functional mobility, decreased strength, decreased safety awareness, decreased cognition, decreased endurance, decreased balance, decreased coordination, vestibular impairment, decreased posture  Prognosis: fair  Clinical Assessment: Patient is 82 y/o male presenting to [id number] [place] [id number] due to slurred speech and weakness. Patient continues to require mod Ax1 with STS this visit. Patient reached beyond COG in seated position this visit to initiate forward flexion at hips, patient will require reinforcement with body mechanics.  Patient will continue to benefit from skilled PT in order to return to [facility] with all functional mobility prior to d/c from hospital.   Safety Interventions: patient left in chair, chair alarm in place, call light within reach, gait belt, patient at risk for falls, and family/caregiver present    Plan  Frequency: 5 x/week, 60 min/day  Current Treatment Recommendations: strengthening, balance training, functional mobility training, transfer training, gait training, endurance training, neuromuscular re-education, wheelchair mobility training, patient/caregiver education, safety education, and positioning    Goals  Patient Goals: Improve ambulation with RW   Short Term Goals:  Time Frame: 10-14 days  Patient will complete bed mobility at modified independent   Patient will complete transfers at supervision   Patient will ambulate 50 ft with use of rolling walker at supervision  Patient will complete car transfer at supervision  No goals met this visit, 10/19    Therapy Session Time      Individual Group Co-treatment   Time In  0730       Time Out  0815       Minutes  45         Timed Code Treatment Minutes:   45 minutes     Second Session Therapy Time:   Individual Concurrent Group Co-treatment   Time In  1000      1015   Time Out  1015      1030   Minutes  15      15     Timed Code Treatment Minutes:  45 + 30 minutes    Total Treatment Minutes:  75 minutes    Electronically Signed By: Zabrina Gill, [id number] [facility] PT, DPT, [id number]

## 2022-10-19 NOTE — PROGRESS NOTES
[facility]   Phone: [phone]    Speech Therapy    [] Initial Evaluation            [x] Daily Treatment Note         [] Discharge Summary      Patient: James Herrera   : 1935   MRN: [id number]   Date of Service:  10/19/2022  Admitting Diagnosis: Acute ischemic stroke [facility])  Current Admission Summary: 79-year-old male with a history of CAD and dementia who was admitted on 10/7 with slurred speech and fatigue. Concern for stroke. CT head was unremarkable for acute findings. CTA without occlusion. MRI confirmed an acute infarct in the left corona radiata. Neurology evaluated and suggested an aspirin, statin, and formal stroke work-up. He was evaluated by therapy and suggested to continue in an inpatient setting prior to returning home. He has no current complaints. Past Medical History:  has a past medical history of CAD (coronary artery disease). Past Surgical History:  has a past surgical history that includes Coronary artery bypass graft and Cardiac surgery. Precautions/Restrictions: high fall risk      Pre-Admission Information   Living Status: Pt lives in an independent living facility (daughter stays with pt and they have around the care assistance from family)   Occupation/School: Retired from [employer] reports highest level of education being 8th grade   Medication Management: :  []Primary   []Secondary [x]No  Finance Management: []Primary   []Secondary [x]No  Active :   []Yes         [x]No (reportedly stopped driving 4 years ago)   Hearing:    WFL  Vision:    Vision Corrective Device: wears glasses at all times      Subjective  General: First Session: Pt alert and upright in chair, finishing breakfast upon entrance. Pt reports sleeping well. Daughter present at bedside. Second Session: Pt alert and upright in chair. Son present at bedside, left as session began. Pt pleasant during session.    Pain: Denies    Safety Interventions: patient left in chair, chair alarm in place, call light within reach, patient at risk for falls, and family/caregiver present, camera in place    Therapeutic Interventions:       Session 1:   Dysphagia: Severity: Mild  2/2 presbyphagia   GOAL MET (monitor as needed)   -daughter reports pt coughing approximately 30 times during meal, also reports increased drainage from current sinus infection   -per daughter, currently only coughing with solids during breakfast  -From minimal trails, noted prolonged mastication, suspected piecemeal deglutition, and intermittent reduced laryngeal elevation; coughing with soft solid PO trail x1   -pt with strong cough and reports feeling things go down the wrong way occasionally  -will continue to monitor diet tolerance with intervention as needed. Pt and daughter informed of use of swallow study if diet tolerance declines     Comprehension: Severity: Mild   Yes/No questions (comparisons)   -pt answered yes/no questions with 60% accuracy, pt benefited from rewording the question to check for pt understanding  -decreased request for repetitions of questions with this activity     Expressive Language: Severity: Moderate   Divergent naming   -pt stated one more item from a category following 3 examples with 50% accuracy, increased to 100% given min cues for items already stated and repetition of category  -task completed during menu item task (see below), pt with difficulty switching between tasks and repetition of items already on menu.  Pt may benefit from decreased distractions/ stimuli while completing tasks      Cognition: Severity: Moderate   Fx recall   -pt recalled events from morning (breakfast, therapies) with 60% accuracy     Personal hx   -pt recalled personal information (favorite pizza)  66% accuracy     Menu Organization    -pt organized menu items into 4 categories with 50% accuracy, increased to 66% accuracy with min cues for repetition  of category options -pt with difficulty reading menu categories but able to read all \"menu items\" that were a bigger font, pt benefited from switching to reading glasses to complete activity     Additional Interventions:      Session 2:   Dysphagia: Severity: Mild    GOAL MET (monitor as needed)      Comprehension: Severity: Mild   Goal not targeted this session    Expressive Language: Severity: Moderate    Divergent naming   -pt name 1 item from a category with 83% accuracy, increased to 100% given max cues, when demands were increased (state an item beginning with a specific letter) pt unable to complete task  -pt requesting repetitions of categories x 4      Cognition: Severity: Moderate   Problem solving from visual scene (bed transfer with walker)  -pt identified which visual demonstrated a safe strategy with 66% accuracy     Sequencing (functional daily activities)  -pt verbally sequenced 3 or more steps in 66% of opportunities  -visual aid (picture of activity) was utilized in one trial, but the aid was a distraction as pt scanned aid      Personal hx   -pt recalled facts about family (names and ages) and hobbies (sports) with 62% accuracy     Fx recall   -pt recalled 0/4 speech strategies learned yesterday      Additional Interventions:   Speech   -pt practiced 6 sentences with 8-9 syllables. All sentences were intelligible, pt able to improve intelligibly by over articulating and speaking louder when focusing on it. Pt would benefit from continued education and cues to use strategies when needed. Education  Barriers To Learning: cognition and reading  Patient Education: Provided education regarding role of SLP, results of assessment, recommendations and general speech pathology plan of care.    Learning Assessment: Pt requires ongoing learning     Assessment  Impairments Requiring Therapeutic Intervention: Receptive Aphasia , Expressive Aphasia , Dysarthria , Cognitive-Linguistic Deficits , and Oropharyngeal Dysphagia Prognosis: good    Clinical Assessment:    Pt continues to present with Moderate  Receptive Aphasia , Expressive Aphasia , and Cognitive-Linguistic Deficits . Pt is demonstrating progress with recall of personal information and naming during conversation. Pt with decreased word finding difficulties when naming grandchildren/ children and personal facts about them. Progress towards goals is limited secondary to pt reduced auditory comprehension. Continue to target comprehension, cognitive skills, and language skills to facilitate safe return to prior level of independence. Plan  Frequency: 60 minutes/day; 5 days per week, as tolerated, until goals met, or discharged from ARU. Therapeutic Interventions: Diet Tolerance Monitoring , Patient/Family Education , Therapeutic Trials with SLP  Expressive/ Receptive Language intervention , Cognitive-Linguistic intervention , and Patient/ Family education   Discharge Recommendations: TBD   Continued SLP at Discharge: TBD based upon progress     Goals  Time Frame: 7-10 days     Pt will tolerate recommended diet and advanced trials with no overt s/s of aspiration. GOAL MET   Pt will complete auditory comprehension tasks with 80% accuracy. Patient will complete verbal description and word retrieval tasks with 80% accuracy   Pt will recall functional information (daily tasks, personal hx, etc.) with 80% accuracy. Patient will complete functional problem-solving tasks for daily situations with 80% accuracy       Therapy Session Time      Session 1 Session 2   Time In 0830 1050   Time Out 0900 1120   Time Code Minutes 20 25   Individual Minutes 30 30     Timed Code Treatment Minutes:  45  Total Treatment Minutes:  60    Electronically Signed By:   Wilfred KC  [facility]-SLP #[id number] 10/19/2022 9:08 AM  Speech-Language Pathologist    Clifford SARKAR,  Speech-Language Pathology     The speech-language pathologist was present, directed the patient's care, made skilled judgment and was responsible for assessment and treatment.

## 2022-10-19 NOTE — PROGRESS NOTES
[facility]   Phone: [phone]    Occupational Therapy    [] Initial Evaluation            [x] Daily Treatment Note         [] Discharge Summary      Patient: Joi Hassan   : 1935   MRN: [id number]   Date of Service:  10/19/2022    Admitting Diagnosis:  Acute ischemic stroke [facility])  Current Admission Summary: 81-year-old male with a history of CAD and dementia who was admitted on 10/7 with slurred speech and fatigue. Concern for stroke. CT head was unremarkable for acute findings. CTA without occlusion. MRI confirmed an acute infarct in the left corona radiata. Neurology evaluated and suggested an aspirin, statin, and formal stroke work-up. He was evaluated by therapy and suggested to continue in an inpatient setting prior to returning home. He has no current complaints. Past Medical History:  has a past medical history of CAD (coronary artery disease). Past Surgical History:  has a past surgical history that includes Coronary artery bypass graft and Cardiac surgery. Discharge Recommendations: Home with HHOT and 24 hour supervision/assistance     DME Required For Discharge: DME to be determined pending patient progress    Precautions/Restrictions: high fall risk  Weight Bearing Restrictions: no restrictions     Required Braces/Orthotics: no braces required  Positional Restrictions:no positional restrictions    Pre-Admission Information   Lives With: daughter Family is always present. Daughter, Jennifer Smith are present during week days 8 AM - 2:30 PM (T-) and his son Betsy Stephens present on Monday 8 AM - 2:30 PM. Jennifer Schwab present during evening, overnight and weekends.     Type of Home: apartment  Home Layout: one level  Home Access: elevator  Bathroom Layout: walker accessible, wheelchair accessible, walk in shower, handicap height toilet  Bathroom Equipment: grab bars in shower, grab bars around toilet, shower chair, 3-in-1 commode  Toilet Height: elevated height  Home Equipment: rolling walker, rollator - 4 wheeled walker, single point cane, manual wheelchair, reacher  Transfer Assistance: modified independent with use of RW --occassionally family provides assistance but is always present   Ambulation Assistance:modified independent with use of RW -typically has someone close by. use wheelchair for community   ADL Assistance: requires assistance with bathing, requires assistance with dressing, requires assistance with toileting able to self feed. Daughter reports they assist with footwear and occasionally clothing mgmt during toileting. Patient is typically continent but wears pullups   IADL Assistance: requires assistance with all homemaking tasks  Active :        [] Yes  [x] No  Hand Dominance: [] Left  [x] Right  Current Employment: retired. 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 Daughter reports 3-4 falls within past 6 months         Subjective:  General: Pt received seated in wheelchair, agreeable to PT OT co tx  Pain: 0/10  Pain Interventions: not applicable      Activities of Daily Living    Basic Activities of Daily Living  Upper Extremity Dressing: moderate assistance Comment: pt donned overhead t shirt   Lower Extremity Dressing: maximum assistance  Toileting: maximum assistance. Pt with max assist managing clothes pre and post toileting and hygiene  Instrumental Activities of Daily Living  No IADL completed on this date. Functional Mobility    Bed Mobility  Bed mobility not completed on this date.   Comments:  Transfers  Sit to stand transfer:minimal assistance  Stand to sit transfer: minimal assistance  Stand pivot transfer: moderate assistance  Toilet transfer: moderate assistance  Comments: VC for sequence and RW proximity throughout all transfers, VC/TC for anterior weight shift in stance d/t significant posterior lean  Functional Mobility:  Functional Mobility: .  moderate assistance  Functional Mobility Activity: Pt walked in hallway, with PT and  ft and 75 ft  Functional Mobility Device Use: rolling walker  Functional Mobility Comment: assist for RW management      Pt left seated in recliner, call bell in reach, chair alarm on, son present in room all needs met    Other Therapeutic Interventions     Second Session:  Pt in recliner at entry, agreeable to OT session. Pt provided ADL and transfer training. Pt Max Assist don/doffing socks and shoes. Pt min assist with oral hygiene and combining hair seated in chair. Pt provided block practice sit to stand and stand to sit transfers. Moderate assist with sit to stand max tactile cues to prevent posterior lean. Pt Moderate assist with stand to sit max tactile cues for controlled descent into chair. Pt with decreased posterior lean, after 5 sit to stand practice trials. Standing tolerance training, Min assist and cues to decrease posterior lean with standing static and dynamic balance. Pt able to stand 2 min without fatigue with all 3 trials. Pt practiced weight shifting ant.and to R/L and dynamic reaching with tactile cues to prevent posterior lean.     Pt left in recliner, chair alarm on, sitter present call bell in reach    Cognition  Overall Cognitive Status: Impaired  Arousal/Alterness: appropriate responses to stimuli, delayed responses to stimuli  Following Commands: follows one step commands consistently, follows multi step commands with repetition, follows multi step commands with increased time  Attention Span: attends with cues to redirect  Memory: decreased recall of recent events, decreased short term memory  Safety Judgement: decreased awareness of need for assistance, decreased awareness of need for safety  Problem Solving: assistance required to generate solutions, assistance required to implement solutions, decreased awareness of errors  Insights: decreased awareness of deficits  Initiation: requires cues for all  Sequencing: requires cues for some  Orientation:    alert and oriented x 4  Command Following:   impaired     Education  Barriers To Learning: cognition  Patient Education: patient educated on goals, OT role and benefits, plan of care, ADL adaptive strategies, proper use of assistive device/equipment, family education, transfer training  Learning Assessment:  patient verbalizes understanding, would benefit from continued reinforcement, patient is not an independent learner    Assessment  Activity Tolerance: increased fatigue at EOS; extensive time for ADL completion   Impairments Requiring Therapeutic Intervention: decreased functional mobility, decreased ADL status, decreased safety awareness, decreased endurance, decreased balance, decreased coordination, decreased posture  Prognosis: fair  Clinical Assessment: Patient presents below baseline function secondary to acute infarct in left corona radiata. Patient typically able to complete transfers and functional mobility with supervision and requires assistance for ADLs. Pt is progressing variably- fluctuations in assist levels w/ mobility and ADL completion however overall extensive assist required for all tasks. Tolerated shower level ADL but did demo fatigue and decreased motor planning at end. Pt continues to demo poor righting reactions and posterior lean making him high fall risk. patient will benefit from continued OT services to address above deficits, to maximize patients ability to complete transfers, functional mobility and ADLs and decrease caregiver burden.   Safety Interventions: patient left in chair, chair alarm in place, call light within reach, gait belt, patient at risk for falls, telesitter in use, sitter present, and family/caregiver present        Plan  Frequency: 5 x/week, 60 min/day  Current Treatment Recommendations: strengthening, ROM, balance training, functional mobility training, transfer training, endurance

## 2022-10-20 LAB
ANION GAP SERPL CALCULATED.3IONS-SCNC: 9 MMOL/L (ref 3–16)
BUN BLDV-MCNC: 32 MG/DL (ref 7–20)
CALCIUM SERPL-MCNC: 9.3 MG/DL (ref 8.3–10.6)
CHLORIDE BLD-SCNC: 109 MMOL/L (ref 99–110)
CO2: 23 MMOL/L (ref 21–32)
CREAT SERPL-MCNC: 1.3 MG/DL (ref 0.8–1.3)
GFR SERPL CREATININE-BSD FRML MDRD: 53 ML/MIN/{1.73_M2}
GLUCOSE BLD-MCNC: 131 MG/DL (ref 70–99)
POTASSIUM SERPL-SCNC: 4 MMOL/L (ref 3.5–5.1)
SODIUM BLD-SCNC: 141 MMOL/L (ref 136–145)

## 2022-10-20 PROCEDURE — 92507 TX SP LANG VOICE COMM INDIV: CPT

## 2022-10-20 PROCEDURE — 97116 GAIT TRAINING THERAPY: CPT

## 2022-10-20 PROCEDURE — 80048 BASIC METABOLIC PNL TOTAL CA: CPT

## 2022-10-20 PROCEDURE — 97129 THER IVNTJ 1ST 15 MIN: CPT

## 2022-10-20 PROCEDURE — 6360000002 HC RX W HCPCS: Performed by: PHYSICAL MEDICINE & REHABILITATION

## 2022-10-20 PROCEDURE — 97530 THERAPEUTIC ACTIVITIES: CPT

## 2022-10-20 PROCEDURE — 1280000000 HC REHAB R&B

## 2022-10-20 PROCEDURE — 94761 N-INVAS EAR/PLS OXIMETRY MLT: CPT

## 2022-10-20 PROCEDURE — 97110 THERAPEUTIC EXERCISES: CPT

## 2022-10-20 PROCEDURE — 6370000000 HC RX 637 (ALT 250 FOR IP): Performed by: PHYSICAL MEDICINE & REHABILITATION

## 2022-10-20 PROCEDURE — 2580000003 HC RX 258: Performed by: PHYSICAL MEDICINE & REHABILITATION

## 2022-10-20 PROCEDURE — 97130 THER IVNTJ EA ADDL 15 MIN: CPT

## 2022-10-20 PROCEDURE — 94640 AIRWAY INHALATION TREATMENT: CPT

## 2022-10-20 RX ADMIN — OXYBUTYNIN CHLORIDE 10 MG: 5 TABLET, EXTENDED RELEASE ORAL at 09:25

## 2022-10-20 RX ADMIN — Medication 10 ML: at 22:20

## 2022-10-20 RX ADMIN — HYDRALAZINE HYDROCHLORIDE 25 MG: 25 TABLET, FILM COATED ORAL at 06:00

## 2022-10-20 RX ADMIN — TAMSULOSIN HYDROCHLORIDE 0.4 MG: 0.4 CAPSULE ORAL at 09:25

## 2022-10-20 RX ADMIN — FLUTICASONE PROPIONATE 1 SPRAY: 50 SPRAY, METERED NASAL at 09:31

## 2022-10-20 RX ADMIN — ESCITALOPRAM OXALATE 20 MG: 10 TABLET ORAL at 09:25

## 2022-10-20 RX ADMIN — STANDARDIZED SENNA CONCENTRATE AND DOCUSATE SODIUM 2 TABLET: 8.6; 5 TABLET ORAL at 09:25

## 2022-10-20 RX ADMIN — LEVOTHYROXINE SODIUM 125 MCG: 0.12 TABLET ORAL at 06:00

## 2022-10-20 RX ADMIN — ENOXAPARIN SODIUM 40 MG: 100 INJECTION SUBCUTANEOUS at 09:26

## 2022-10-20 RX ADMIN — ASPIRIN 81 MG 81 MG: 81 TABLET ORAL at 09:25

## 2022-10-20 RX ADMIN — Medication 10 ML: at 09:25

## 2022-10-20 RX ADMIN — HYDRALAZINE HYDROCHLORIDE 25 MG: 25 TABLET, FILM COATED ORAL at 14:37

## 2022-10-20 RX ADMIN — Medication 2 PUFF: at 21:30

## 2022-10-20 RX ADMIN — CETIRIZINE HYDROCHLORIDE 10 MG: 10 TABLET, FILM COATED ORAL at 09:25

## 2022-10-20 RX ADMIN — ATORVASTATIN CALCIUM 20 MG: 20 TABLET, FILM COATED ORAL at 09:25

## 2022-10-20 RX ADMIN — Medication 2 PUFF: at 05:39

## 2022-10-20 RX ADMIN — MONTELUKAST SODIUM 10 MG: 10 TABLET, FILM COATED ORAL at 22:17

## 2022-10-20 RX ADMIN — AMLODIPINE BESYLATE 5 MG: 5 TABLET ORAL at 09:25

## 2022-10-20 RX ADMIN — PANTOPRAZOLE SODIUM 40 MG: 40 TABLET, DELAYED RELEASE ORAL at 06:00

## 2022-10-20 RX ADMIN — AMOXICILLIN AND CLAVULANATE POTASSIUM 1 TABLET: 875; 125 TABLET, FILM COATED ORAL at 09:25

## 2022-10-20 RX ADMIN — HYDRALAZINE HYDROCHLORIDE 25 MG: 25 TABLET, FILM COATED ORAL at 22:17

## 2022-10-20 RX ADMIN — STANDARDIZED SENNA CONCENTRATE AND DOCUSATE SODIUM 2 TABLET: 8.6; 5 TABLET ORAL at 22:17

## 2022-10-20 ASSESSMENT — PAIN SCALES - GENERAL
PAINLEVEL_OUTOF10: 0
PAINLEVEL_OUTOF10: 0

## 2022-10-20 NOTE — PROGRESS NOTES
[facility]   Phone: [phone]    Speech Therapy    [] Initial Evaluation            [x] Daily Treatment Note         [] Discharge Summary      Patient: Richard Fleming   : 1935   MRN: [id number]   Date of Service:  10/20/2022  Admitting Diagnosis: Acute ischemic stroke [facility])  Current Admission Summary: 71-year-old male with a history of CAD and dementia who was admitted on 10/7 with slurred speech and fatigue. Concern for stroke. CT head was unremarkable for acute findings. CTA without occlusion. MRI confirmed an acute infarct in the left corona radiata. Neurology evaluated and suggested an aspirin, statin, and formal stroke work-up. He was evaluated by therapy and suggested to continue in an inpatient setting prior to returning home. He has no current complaints. Past Medical History:  has a past medical history of CAD (coronary artery disease). Past Surgical History:  has a past surgical history that includes Coronary artery bypass graft and Cardiac surgery. Precautions/Restrictions: high fall risk      Pre-Admission Information   Living Status: Pt lives in an independent living facility (daughter stays with pt and they have around the care assistance from family)   Occupation/School: Retired from [employer] reports highest level of education being 8th grade   Medication Management: :  []Primary   []Secondary [x]No  Finance Management: []Primary   []Secondary [x]No  Active :   []Yes         [x]No (reportedly stopped driving 4 years ago)   Hearing:    [facility]  Vision:    Vision Corrective Device: wears glasses at all times      Subjective  General: First Session: Pt alert and upright in chair. Grandson present at bedside. Pt more engaged and attentive to task this date. Second Session: Pt asleep in chair upon entrance. Pt easy to wake and alertness regained quickly. Pt pleasant and cooperative.   Pain: Denies    Safety Interventions: patient left in chair, chair alarm in place, call light within reach, patient at risk for falls, and camera in place    Therapeutic Interventions:       Session 1:   Dysphagia: Severity: Mild  2/2 presbyphagia   GOAL MET (monitor as needed)     Comprehension: Severity: Mild   Pt with increased comprehension this date, no requests for repetition of instructions for activities throughout session     Expressive Language: Severity: Moderate   Divergent naming   -pt independently stated 2 items from a concrete category with 100% accuracy   -pt able to hold conversation between categories and retain purpose of task     Responsive naming   -pt able to finish phrase by supplying the antonym with 100% accuracy    Cognition: Severity: Moderate   Orientation   -pt oriented to month, year, and d/c date with self correction   -pt not oriented to RAZA or date, pt calendar updated for correct date     Fx recall   -pt recalled events from morning (breakfast, previous completed therapies) with 50% accuracy   -pt recalled 0/4 speech strategies reviewed from last session. Pt re-educated on strategies, benfits, and appropriate time to use them     Personal hx   -pt recalled personal information (hobbies, names of family, ages)  with 100% accuracy    Problem solving   Bed transfer w/ walker  -pt identified the safest option from a visual f4 with 100% accuracy   -pt stated why the visual was unsafe with 100% accuracy   -pt sequenced 3 steps for bed transfer with walker with 100% accuracy and min cues for initiation of task  Call light   -pt answered safety question involving the call light with 66% accuracy   -pt unable to state what he could use to call nurse but able to state what button to push when presented with remote  Who would you call if. ..  -pt able to state solution to problem with 92% accuracy, min cues to state full title of person being contacted     Additional Interventions:   Speech   -pt was unintelligible or required a request for repetition x 4 during the last 10 minutes of the session  -pt educated on speaking loudly, slowly, and over articulating  -pt aware of frequently being unintelligible stating \"my daughter tells me I mumble\" Pt receptive to education and requested to work on speech during upcoming therapy sessions. Suspect reduced carry over of strategies secondary to reduced learning of new information due to impaired short term memory  -Pt with increased intelligibility and volume during structured tasks      Session 2:   Dysphagia: Severity: Mild    GOAL MET (monitor as needed)      Comprehension: Severity: Mild   -Pt with functional comprehension of task from an unfamiliar communication partner (dietary staff). Pt able to comprehend and make choice from verbal list   -pt x 2 stated he did not want an option showing comprehension of choices and context of task (ordering meals)    Expressive Language: Severity: Moderate    Goal not targeted this session     Cognition: Severity: Moderate   Fx recall   -pt recalled activities from the morning (visitors, meals) with 66% accuracy   -pt unable to recall d/c date, but knew date was in the near future     Newly learned Card game (kings in the corner) to target immediate/working memory, short term recall, and recall of new information   -pt explained was given 4 rules of game, and provided a visual aid (written out rules) to use while playing. Pt did not refer to visual aid during activity   -pt identified the color of the card that could be played in 62% of opportunities   -pt identified the number of a card that could be played in 88% of opportunities  -pt identified a card he could play in 66% of opportunities with mod cues from SLP to scan cards in hand and on the table.  Pt benefited from cues from SLP to check each card on the table    -pt identified when he could not make a move in 33% of opportunities, pt required max cues to draw a card      Motor speech: Severity: Mild    NEW GOAL added (see below)   -pt intelligibility repeated 7-8 syllables phrases in 86% of opportunities, pt benefited for request for repetition with feedback to speak louder/slower/over articulate.  -pt with increased intelligibility during structured tasks, intelligibility decreases during novel conversation. Pt with intelligible speech to unfamiliar listener (dietary staff) using short words/ phrases. Pt independently increased volume when speaking with unfamiliar listener    -pt unable to recall any speech strategies despite extensive re-education, visual, multiple opportunities (0/4) to answer       Additional Interventions:      Education  Barriers To Learning: cognition and reading  Patient Education: Provided education regarding role of SLP, results of assessment, recommendations and general speech pathology plan of care. Learning Assessment: Pt requires ongoing learning     Assessment  Impairments Requiring Therapeutic Intervention: Receptive Aphasia , Expressive Aphasia , Dysarthria , Cognitive-Linguistic Deficits , and Oropharyngeal Dysphagia   Prognosis: good    Clinical Assessment:    Pt continues to present with Moderate  Receptive/ expressive deficits and Cognitive-Linguistic Deficits . Pt is demonstrating progress with divergent and responsive naming tasks. Pt with increased awareness and comprehension of task this date. Progress towards goals is limited secondary to reduced short term memory impacting ability to learn new information. Continue to target comprehension, cognitive skills, speech intellgibility and language skills to facilitate safe return to prior level of independence. Pt reported wanting to work more on his speech intelligibility this date, new goal added to address. Plan  Frequency: 60 minutes/day; 5 days per week, as tolerated, until goals met, or discharged from ARU.   Therapeutic Interventions: Diet Tolerance Monitoring , Patient/Family Education , Therapeutic Trials with SLP  Expressive/ Receptive Language intervention , Cognitive-Linguistic intervention , and Patient/ Family education   Discharge Recommendations: TBD   Continued SLP at Discharge: TBD based upon progress     Goals  Time Frame: 7-10 days     Pt will tolerate recommended diet and advanced trials with no overt s/s of aspiration. GOAL MET   Pt will complete auditory comprehension tasks with 80% accuracy. Patient will complete verbal description and word retrieval tasks with 80% accuracy   Pt will recall functional information (daily tasks, personal hx, etc.) with 80% accuracy. Patient will complete functional problem-solving tasks for daily situations with 80% accuracy   Pt will improve speech intelligibility to 80% with use of speech strategies during conversations NEW GOAL 10/20/22      Therapy Session Time      Session 1 Session 2   Time In 1030 1245   Time Out 1100 1320   Time Code Minutes 15 20   Individual Minutes 30 35     Timed Code Treatment Minutes:  35  Total Treatment Minutes:  72    Electronically Signed By:   Elida KC CCC-SLP #[id number] 10/20/2022 11:17 AM  Speech-Language Pathologist    Vannesa SARKAR,  Speech-Language Pathology     The speech-language pathologist was present, directed the patient's care, made skilled judgment and was responsible for assessment and treatment.

## 2022-10-20 NOTE — PROGRESS NOTES
Physical Therapy    [facility] [address] Department   Phone: [phone]    Physical Therapy    [] Initial Evaluation            [x] Daily Treatment Note         [] Discharge Summary      Patient: Loren David   : 1935   MRN: [id number]   Date of Service:  10/20/2022  Admitting Diagnosis: Acute ischemic stroke [facility])  Current Admission Summary:  81 y.o. male who presented to ED for evaluation of slurred speech and generalized weakness/fatigue. Patient is a poor historian at this time. No family currently at bedside to provide history but daughter was at bedside in ED and provided supplemental history. Most of the information is derived from conversation with the emergency room PA and review of records. Daughter reports she noticed patient seemed more fatigued and weak today. He had difficulty walking due to weakness. She also noticed slurred speech. She denies any additional concerns. Patient denies any complaints at this time. Of note, patient was started on Augmentin on 10/6 for acute sinusitis, end date 10/20. Past Medical History:  has a past medical history of CAD (coronary artery disease). Past Surgical History:  has a past surgical history that includes Coronary artery bypass graft and Cardiac surgery. Discharge Recommendations: HHPT S3  DME Required For Discharge: patient has all required DME for discharge  Precautions/Restrictions: high fall risk  Weight Bearing Restrictions: no restrictions  [] Right Upper Extremity  [] Left Upper Extremity [] Right Lower Extremity  [] Left Lower Extremity     Required Braces/Orthotics: no braces required   [] Right  [] Left  Positional Restrictions:no positional restrictions    Pre-Admission Information   Lives With: Daughter, Rebecca Jefferson. Sarah are present during week days 8 AM - 2:30 PM (T-) and his son Concepcion Muir present on Monday 8 AM - 2:30 PM. Rebecca Jefferson present during evening, overnight and weekends. Type of Home: apartment, . Comment: 3rd floor, senior living  -24 hr assist from family available  Home Layout: one level  Home Access: elevator  Bathroom Layout: walker accessible, wheelchair accessible, walk in shower, handicap height toilet  Bathroom Equipment: grab bars in shower, grab bars around toilet, shower chair, 3-in-1 commode  Home Equipment: rolling walker, single point cane, manual wheelchair - majority of time uses RW  Transfer Assistance: reports transfers \"on my own\" and uses RW or cane most of the time Comment: Mira Davidson states someone is always with him during transfers, may not provide assistance but can if needed  Ambulation Assistance:. Comment: per chart review, pt reports \"tremors\" when walking with RW, states a family member walks with him     ADL Assistance: . Comment: pt reports assist with bathing, reports getting self dressed   IADL Assistance: requires assistance with all homemaking tasks - daughters complete all IADL tasks   Active : [] yes             [x]no  Current Employment: . Comment: retired , [address] [employer] -   Hobbies: used to enjoy hunting and [address] states he has fallen 3-4 times in past 6 months, reports commonly occurs when stepping backwards     Examination   Vision:   Vision Gross Assessment: Impaired and Vision Corrective Device: wears glasses at all times  Hearing:   [facility] [address]  Observation:   General Observation:  Festinating gait   Posture: Forward head rounded shoulders   Sensation:   WFL  Proprioception:    WFL  Tone:   Normotonic  Coordination Testing:   WFL    ROM:   (B) LE AROM WFL  Strength:   (B) LE strength grossly WFL  Decision Making: medium complexity  Clinical Presentation: evolving      Subjective  General: Patient sitting in recliner chair upon arrival. Patient's grandson, Narayan Turpin, present. Patient denies pain. Patient is agreeable to PT/OT.   Pain: 0/10  Pain Interventions: repositioned        Functional Mobility  Bed Mobility  Supine to Sit: contact guard assistance  Sit to Supine: contact guard assistance  Rolling Left: contact guard assistance  Rolling Right: contact guard assistance  Scooting: stand by assistance  Comments: Patient performed on mat table, required CGA to bring self to upright from supine to sit. Patient performed sitting EOB to prone mod Ax2 to initiate quadruped positioning. Transfers  Sit to stand transfer: minimal assistance, moderate assistance  Stand to sit transfer: moderate assistance, maximum assistance  Stand step transfer: moderate assistance  Car transfer: moderate assistance  Comments: Patient performed STS from EOB, recliner chair, mat table, and w/c with RW placed in front at mod Ax1. Patient demonstrated intermittent min Ax1 STS from w/c. Patient continues to require mod Ax1 with stand to sit into w/c this visit and required max Ax1 following ball toss due to no eccentric control upon descent and heavy posterior lean. Ambulation  Surface:level surface  Assistive Device: rolling walker  Assistance: minimal assistance, moderate assistance  Distance: 130 feet + 50 feet + 10 feet  Gait Mechanics: Shuffling gait, decreased step height/length   Comments:  Patient fluctuated between min Ax1 and mod Ax1 with ambulation this visit with a w/c follow. Patient required max VC for walker management. Stair Mobility  Stair mobility not completed on this date. Comments:   Wheelchair Mobility:  No w/c mobility completed on this date.   Comments:  Balance  Static Sitting Balance: fair: maintains balance at CGA without use of UE support  Dynamic Sitting Balance: fair: maintains balance at CGA without use of UE support  Static Standing Balance: poor: requires mod (A) to maintain balance  Dynamic Standing Balance: poor: requires mod (A) to maintain balance  Comments: Patient demonstrated ability to stand for ~2 minutes with mod Ax1 due to posterior lean with ball reaching     Other Therapeutic Interventions    First Session:  Patient performed functional mobility as noted above. Patient performed car transfer requiring max VC for hand placement for increased safety. Patient performed standing reaching beyond RACHEL with mod Ax1 for ~2 minutes in standing. Patient performed quadruped positioning mod Ax2 for positioning, demonstrated ability to maintain position independently for 5 seconds. Patient required mod Ax2 with prone to sitting EOB. Patient performed STS from mat table at min Ax1 and ambulated to recliner chair. Patient performed stand step to recliner chair with RW and mod Ax1. Chair alarm was turned on and call light was provided. Second Session:  Patient sitting in recliner chair upon arrival. Patient denies pain. Patient is agreeable to PT. Patient performed STS from recliner chair with RW placed in front mod Ax1. Patient required VC for hand placement and walker management. Patient ambulated 10 feet with RW at mod Ax1 to w/c. Patient performed seated B marching and LAQ with 3 lb ankle weights 2x10. Patient performed seated heel/toe raises B 2x10. Patient performed seated hip adductor ball squeeze with 5 second hold 2x10. Patient performed resisted hip abduction with orange resistance band 2x10. Patient performed STS from w/c and RW placed in front at [address] transitioning to stand step to recliner chair at mod Ax1 for stand to sit due to decreased eccentric control. Patient was provided call light and chair alarm was turned on.      Functional Outcomes                 Cognition  Overall Cognitive Status: Impaired  Arousal/Alterness: appropriate responses to stimuli  Following Commands: follows multi step commands with repetition, follows multi step commands with increased time  Attention Span: attends with cues to redirect  Memory: appears intact  Safety Judgement: decreased awareness of need for assistance, decreased awareness of need for safety  Problem Solving: decreased awareness of errors, assistance required to identify errors made, assistance required to correct errors made  Insights: decreased awareness of deficits  Initiation: requires cues for all  Sequencing: requires cues for all  Orientation:    alert and oriented x 4  Command Following:   impaired    Education  Barriers To Learning: cognition  Patient Education: patient educated on goals, PT role and benefits, plan of care, general safety, family education, transfer training  Learning Assessment:  patient will require reinforcement due to cognitive deficits    Assessment  Activity Tolerance: Fair (-); patient demonstrates decreased endurance and safety with ambulation  Impairments Requiring Therapeutic Intervention: decreased functional mobility, decreased strength, decreased safety awareness, decreased cognition, decreased endurance, decreased balance, decreased coordination, vestibular impairment, decreased posture  Prognosis: fair  Clinical Assessment: Patient is 80 y/o male presenting to [facility] due to slurred speech and weakness. Patient demonstrated improvements in STS and ambulation with intermittent progression to min Ax1 for mobility throughout session. Patient demonstrated ability to maintain quadruped positioning for ~5 seconds in order to increase core activation for increased stability.  Patient will continue to benefit from skilled PT in order to return to [facility] with all functional mobility prior to d/c from hospital.   Safety Interventions: patient left in chair, chair alarm in place, call light within reach, gait belt, patient at risk for falls, and family/caregiver present    Plan  Frequency: 5 x/week, 60 min/day  Current Treatment Recommendations: strengthening, balance training, functional mobility training, transfer training, gait training, endurance training, neuromuscular re-education, wheelchair mobility training, patient/caregiver education, safety education, and positioning    Goals  Patient Goals: Improve ambulation with RW   Short Term Goals:  Time Frame: 10-14 days  Patient will complete bed mobility at [facility] independent   Patient will complete transfers at supervision   Patient will ambulate 50 ft with use of rolling walker at supervision  Patient will complete car transfer at supervision  No goals met this visit, 10/20    Therapy Session Time      Individual Group Co-treatment   Time In      0930   Time Out      1015   Minutes      45     Timed Code Treatment Minutes:   45 minutes     Second Session Therapy Time:   Individual Concurrent Group Co-treatment   Time In  1330         Time Out  1400         Minutes  30           Timed Code Treatment Minutes:  45 + 30 minutes    Total Treatment Minutes:  75 minutes    Electronically Signed By: Ignacio Summers, [address] [facility] PT, DPT, [id number]

## 2022-10-20 NOTE — PROGRESS NOTES
[facility] [address] Department   Phone: [phone]    Occupational Therapy    [] Initial Evaluation            [x] Daily Treatment Note         [] Discharge Summary      Patient: Umesh Gautam   : 1935   MRN: [id number]   Date of Service:  10/20/2022    Admitting Diagnosis:  Acute ischemic stroke [facility])  Current Admission Summary: 81-year-old male with a history of CAD and dementia who was admitted on 10/7 with slurred speech and fatigue. Concern for stroke. CT head was unremarkable for acute findings. CTA without occlusion. MRI confirmed an acute infarct in the left corona radiata. Neurology evaluated and suggested an aspirin, statin, and formal stroke work-up. He was evaluated by therapy and suggested to continue in an inpatient setting prior to returning home. He has no current complaints. Past Medical History:  has a past medical history of CAD (coronary artery disease). Past Surgical History:  has a past surgical history that includes Coronary artery bypass graft and Cardiac surgery. Discharge Recommendations: Home with HHOT and 24 hour supervision/assistance     DME Required For Discharge: DME to be determined pending patient progress    Precautions/Restrictions: high fall risk  Weight Bearing Restrictions: no restrictions     Required Braces/Orthotics: no braces required  Positional Restrictions:no positional restrictions    Pre-Admission Information   Lives With: daughter Family is always present. Daughter, Seth Smith are present during week days 8 AM - 2:30 PM (-) and his son Magda Salgado present on Monday 8 AM - 2:30 PM. Seth Patino present during evening, overnight and weekends.     Type of Home: apartment  Home Layout: one level  Home Access: elevator  Bathroom Layout: walker accessible, wheelchair accessible, walk in shower, handicap height toilet  Bathroom Equipment: grab bars in shower, grab bars around toilet, shower chair, 3-in-1 commode  Toilet Height: elevated height  Home Equipment: rolling walker, rollator - 4 wheeled walker, single point cane, manual wheelchair, reacher  Transfer Assistance: modified independent with use of RW --occassionally family provides assistance but is always present   Ambulation Assistance:modified independent with use of RW -typically has someone close by. use wheelchair for community   ADL Assistance: requires assistance with bathing, requires assistance with dressing, requires assistance with toileting able to self feed. Daughter reports they assist with footwear and occasionally clothing mgmt during toileting. Patient is typically continent but wears pullups   IADL Assistance: requires assistance with all homemaking tasks  Active :        [] Yes  [x] No  Hand Dominance: [] Left  [x] Right  Current Employment: retired. 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 Daughter reports 3-4 falls within past 6 months         Subjective: Pt seated in recliner upon entry, pt very pleasant and agreeable to therapy session. Pt's grandson Devyn Tobar present throughout therapy session, cotx of both disciplines OT/PT to maximize functional independence and safety. General: Pt with multiple sit to stand (wc/recliner/mat table/simulated car) ranging from [address] A/Mod A and multiple stand to sits Mod A/Max A. Pt ambulated with close wc follow ~130ft + ~50ft + ~80 ft at [address] A/Mod A. Pt performed car transfer at Mod A. Pt with dynamic standing balance activity > 2 min with reaching for therapy ball and throwing/catching ball Mod A progressing to Max A. Pt stand to sit on Mat table Mod A and sit to prone on mat table Mod A x 2. Pt supine to quadriped Max A x 2, pt held on forearms and knees ~5 sec at SBA. Pt Max A x 2 prone to EOM. Pt back in room stand to sit Mod A in recliner.  Call light in reach and chair alarm on.   quadriped   Pain: 0/10  Pain Interventions: not applicable Activities of Daily Living    Basic Activities of Daily Living  General Comments: No ADLS performed PInstrumental Activities of Daily Living  No IADL completed on this date. Functional Mobility    Bed Mobility  Bed mobility not completed on this date. Comments: Pt sit to prone Mod A x 2 and prone to to sit EOM Max A x 2  Transfers  Sit to stand transfer:minimal assistance, moderate assistance  Stand to sit transfer: moderate assistance, maximum assistance  Car transfer: moderate assistance  Comments: VC for sequence hand placement  Functional Mobility:  Standing Balance: CGA static and up to Mod A/Max A with dynamic standing balance activity. Standing Balance Comment: static at rw and dynamic reaching activity - posterior lean and requires vcs to correct  Functional Mobility: .  minimal assistance, moderate assistance  Functional Mobility Activity: Pt walked in hallway with close wc follow ~130 ft+ ~50 ft + ~80ft  Functional Mobility Device Use: rolling walker  Functional Mobility Comment: assist for RW management      Pt left seated in recliner, chair alarm in reach and grandson present    Other Therapeutic Interventions     Second Session:  Pt in recliner at entry, agreeable to OT session. Pt participated in balance and transfer training. Pt Block practice x 5 trials sit to stand. Max to mod tactile cues to prevent posterior weight shift with standing. Pt Block practice stand and step tranfer Bed<>recliner x3 trials. At [facility] pt provided trunk control Arom and isometric strengthening. Post/Ant/R/L lateral and trunk rotation. (2 set of 10 for each)  Pt isometric strengthening trunk flexion and extension. 2 set of 5. BUE dynamic reaching in all planes seated EOB. Standing balance activities, via dynamic reaching at waist/shl level. Weight shifting to R/L. Pt required min/mod tactile cues to avoid post weight shift with standing theres.   Pt demonstrated good tolerance for therex, with some carryover with transfers post block practice. Pt left in recliner, chair alarm on, tele sitter, call bell in reach    Cognition  Overall Cognitive Status: Impaired  Arousal/Alterness: appropriate responses to stimuli, delayed responses to stimuli  Following Commands: follows one step commands consistently, follows multi step commands with repetition, follows multi step commands with increased time  Attention Span: attends with cues to redirect  Memory: decreased recall of recent events, decreased short term memory  Safety Judgement: decreased awareness of need for assistance, decreased awareness of need for safety  Problem Solving: assistance required to generate solutions, assistance required to implement solutions, decreased awareness of errors  Insights: decreased awareness of deficits  Initiation: requires cues for all  Sequencing: requires cues for some  Orientation:    alert and oriented x 4  Command Following:   impaired     Education  Barriers To Learning: cognition  Patient Education: patient educated on goals, OT role and benefits, plan of care, ADL adaptive strategies, proper use of assistive device/equipment, family education, transfer training  Learning Assessment:  patient verbalizes understanding, would benefit from continued reinforcement, patient is not an independent learner    Assessment  Activity Tolerance: Good, requires rest breaks  Impairments Requiring Therapeutic Intervention: decreased functional mobility, decreased ADL status, decreased safety awareness, decreased endurance, decreased balance, decreased coordination, decreased posture  Prognosis: fair  Clinical Assessment: Patient presents below baseline function secondary to acute infarct in left corona radiata. Patient typically able to complete transfers and functional mobility with supervision and requires assistance for ADLs. Pt Min A/Mod A for transfers, functional mobility and up to Max A for dynamic standing balance activity.  Pt continues to demo poor righting reactions and posterior lean making him high fall risk. patient will benefit from continued OT services to address above deficits, to maximize patients ability to complete transfers, functional mobility and ADLs and decrease caregiver burden.   Safety Interventions: patient left in chair, chair alarm in place, call light within reach, gait belt, patient at risk for falls, telesitter in use, and family/caregiver present        Plan  Frequency: 5 x/week, 60 min/day  Current Treatment Recommendations: strengthening, ROM, balance training, functional mobility training, transfer training, endurance training, neuromuscular re-education, patient/caregiver education, and ADL/self-care training    Goals  Patient Goals: improve transfers and mobility    Short Term Goals:  Time Frame: 10-14 days   Patient will complete upper body ADL at supervision   Patient will complete lower body ADL at supervision   Patient will complete toileting at supervision   Patient will complete grooming at set up assistance   Patient will complete functional transfers at supervision     -goals not met, progressing 10/19    Therapy Session Time     Individual Group Co-treatment   Time In   930   Time Out   1015   Minutes   45   Timed Code Treatment Minutes: 45    Second Session Therapy Time:   Individual Concurrent Group Co-treatment   Time In 1115         Time Out 1145         Minutes 30          Timed Code Treatment Minutes: 45 + 30  Total Treatment Minutes: 75      Electronically Signed By: Ace Titus, [address]  (Treatment and documentation for 1st therapy session)    Session [address], OTR/L [id number]

## 2022-10-20 NOTE — PLAN OF CARE
Problem: Discharge Planning  Goal: Discharge to home or other facility with appropriate resources  Outcome: Progressing  Flowsheets (Taken 10/20/2022 0930)  Discharge to home or other facility with appropriate resources: Identify barriers to discharge with patient and caregiver     Problem: Safety - Adult  Goal: Free from fall injury  Outcome: Progressing     Problem: ABCDS Injury Assessment  Goal: Absence of physical injury  Outcome: Progressing     Problem: Pain  Goal: Verbalizes/displays adequate comfort level or baseline comfort level  Outcome: Progressing  Flowsheets (Taken 10/20/2022 0930)  Verbalizes/displays adequate comfort level or baseline comfort level: Encourage patient to monitor pain and request assistance     Problem: Skin/Tissue Integrity  Goal: Absence of new skin breakdown  Description: 1. Monitor for areas of redness and/or skin breakdown  2. Assess vascular access sites hourly  3. Every 4-6 hours minimum:  Change oxygen saturation probe site  4. Every 4-6 hours:  If on nasal continuous positive airway pressure, respiratory therapy assess nares and determine need for appliance change or resting period.   Outcome: Progressing

## 2022-10-20 NOTE — PROGRESS NOTES
Moises Jerry  10/20/2022  [id number]    Chief Complaint: Acute ischemic stroke [facility])    Subjective:   No overnight events. No current complaints. Cognition remains limiting. Cr improved this am.     ROS: No N/V, SOB, dyspnea    Objective:  Patient Vitals for the past 24 hrs:   BP Temp Temp src Pulse Resp SpO2   10/20/22 0545 (!) 182/74 -- -- 62 -- --   10/20/22 0539 -- -- -- 65 18 96 %   10/19/22 2130 119/73 97.3 °F (36.3 °C) Oral 55 16 95 %   10/19/22 2043 -- -- -- 55 18 96 %   10/19/22 0900 114/64 98.3 °F (36.8 °C) Oral 58 16 96 %       Gen: No distress, pleasant. Resting in bedside chair  HEENT: Normocephalic, atraumatic  CV: Regular rate and rhythm. No MRG  Resp: No respiratory distress. CTAB  Abd: Soft, nontender nondistended  Ext: No edema  Neuro: Alert, oriented x2, appropriately interactive. Decreased cognition noted. Wt Readings from Last 3 Encounters:   10/12/22 220 lb 1 oz (99.8 kg)   10/12/22 200 lb 8 oz (90.9 kg)   08/15/22 210 lb (95.3 kg)       Laboratory data:   Lab Results   Component Value Date    WBC 5.1 10/17/2022    HGB 10.4 (L) 10/17/2022    HCT 31.1 (L) 10/17/2022    MCV 95.8 10/17/2022     10/17/2022       Lab Results   Component Value Date/Time     10/20/2022 06:43 AM    K 4.0 10/20/2022 06:43 AM    K 4.1 10/10/2022 05:37 AM     10/20/2022 06:43 AM    CO2 23 10/20/2022 06:43 AM    BUN 32 10/20/2022 06:43 AM    CREATININE 1.3 10/20/2022 06:43 AM    GLUCOSE 131 10/20/2022 06:43 AM    CALCIUM 9.3 10/20/2022 06:43 AM        Therapy progress:  PT  Objective   Bed Mobility  Supine to Sit: contact guard assistance  Sit to Supine: contact guard assistance  Rolling Left: contact guard assistance  Rolling Right: contact guard assistance  Scooting: contact guard assistance  Bridging: contact guard assistance  Comments: Patient performed supine to sit with HOB elevated and use of handrails.   Transfers  Sit to stand transfer: moderate assistance, maximum assistance  Stand to sit transfer: moderate assistance  Stand step transfer: moderate assistance  Comments: Initially, patient performed STS from EOB and use of handrails at max Ax1. Patient attempted 3 STS from EOB, continued to demonstrate heavy posterior lean resulting in return to sitting due to inability to remain upright. Patient attempted STS from EOB to transfer to recliner chair in a stand step transfer with RW placed in front. Patient's posterior lean reduced, still present with additional STS allowing transfer to recliner chair. OT   Basic Activities of Daily Living  Feeding Comments: per daughter, patient required assistance self feeding today but typically does not   Grooming: stand by assistance minimal assistance  Grooming Comments: patient able to comb hair seated in recliner and apply deodorant seated on shower chair. Patient required Richard for oral hygiene/managing dentures   Upper Extremity Bathing: minimal assistance  Lower Extremity Bathing: moderate assistance   Bathing Equipment: none  Bathing Comments: Patient completed shower seated on shower chair in walk in shower with [place] shower head. Patient required assistance rinsing and drying UB/LB for thoroughness. IV waterproofed. Upper Extremity Dressing: minimal assistance  Lower Extremity Dressing: maximum assistance  Dressing Comments: Richard for donning shirt. maxA for donning pullup, pants and socks. Patient was able to doff socks himself with SBA. Patient utilized grab bars for sit to stand from shower chair and balance while therapist managed pants over hips   Toileting: maximum assistance. Toileting Comments: patient unable to void, patient required maxA for clothing mgmt   General Comments: Increased time for ADLs.           SLP   Dysphagia: Severity: Mild   Goal not targeted this session                 Comprehension: Severity: Mild   -Pt with reduced comprehension of directions for all activities   -multiple repetitions, rewording, and a model provided, following sequencing activity (see below) pt stated he did not understand the activity      Expressive Language: Severity: Moderate   Convergent naming (3 clues)   -independently named item with 3 descriptive points with 53% accuracy, increased to 80% accuracy with min cues  -pt benefited from extended time to answer with 2-3 repetitions of listed words      Cognition: Severity: Moderate   Sequencing functional activities   -pt sequenced 5 written steps to use a call light with 60% accuracy with mod cues   -pt verbally repeated steps with 40% accuracy with a visual, did not increase despite max cuing    -pt able to state when to use call light in 4/4 opportunities      -pt unable to state next therapy despite visual aid (schedule) placed in front of him      -pt and daughter report pt is close to baseline with speech and cognition. Daughter expressed she wanted to continue therapy for 60 minutes a day at this time in order to engage patient's brain while he is here             Body mass index is 29.85 kg/m². Assessment and Plan:  Acute left corona radiata infarct: ASA, statin. PT/OT/SLP     Dementia: SLP     Hypothyroidism: Synthroid 125     HTN: Norvasc 5, hydralazine 25     COPD: Dulera, Singulair     HLD: Lipitor 20     Depression: Lexapro 20     Sinus infection: Augmentin thru 10/20 - to complete today     BPH: Ditropan and Flomax per home regimen    DARIAN: 2/2 poor PO intake, s/p gentle IVF, - space labs     Bowels: Per protocol  Bladder: Per protocol   Sleep: Trazodone provided prn.    Pain: Tylenol as needed  DVT PPx: Lovenox  CHRISTO: 10/25    Constantine Shone, MD 10/20/2022, 8:37 AM

## 2022-10-21 LAB
ANION GAP SERPL CALCULATED.3IONS-SCNC: 11 MMOL/L (ref 3–16)
BUN BLDV-MCNC: 26 MG/DL (ref 7–20)
CALCIUM SERPL-MCNC: 9.3 MG/DL (ref 8.3–10.6)
CHLORIDE BLD-SCNC: 107 MMOL/L (ref 99–110)
CO2: 21 MMOL/L (ref 21–32)
CREAT SERPL-MCNC: 1.2 MG/DL (ref 0.8–1.3)
GFR SERPL CREATININE-BSD FRML MDRD: 59 ML/MIN/{1.73_M2}
GLUCOSE BLD-MCNC: 122 MG/DL (ref 70–99)
HCT VFR BLD CALC: 33.3 % (ref 40.5–52.5)
HEMOGLOBIN: 11 G/DL (ref 13.5–17.5)
MCH RBC QN AUTO: 31.9 PG (ref 26–34)
MCHC RBC AUTO-ENTMCNC: 33.2 G/DL (ref 31–36)
MCV RBC AUTO: 96.1 FL (ref 80–100)
PDW BLD-RTO: 15.2 % (ref 12.4–15.4)
PLATELET # BLD: 197 K/UL (ref 135–450)
PMV BLD AUTO: 9.3 FL (ref 5–10.5)
POTASSIUM SERPL-SCNC: 4.3 MMOL/L (ref 3.5–5.1)
RBC # BLD: 3.46 M/UL (ref 4.2–5.9)
SODIUM BLD-SCNC: 139 MMOL/L (ref 136–145)
WBC # BLD: 5.7 K/UL (ref 4–11)

## 2022-10-21 PROCEDURE — 2580000003 HC RX 258: Performed by: PHYSICAL MEDICINE & REHABILITATION

## 2022-10-21 PROCEDURE — 1280000000 HC REHAB R&B

## 2022-10-21 PROCEDURE — 94760 N-INVAS EAR/PLS OXIMETRY 1: CPT

## 2022-10-21 PROCEDURE — 94640 AIRWAY INHALATION TREATMENT: CPT

## 2022-10-21 PROCEDURE — 97129 THER IVNTJ 1ST 15 MIN: CPT

## 2022-10-21 PROCEDURE — 97530 THERAPEUTIC ACTIVITIES: CPT

## 2022-10-21 PROCEDURE — 6370000000 HC RX 637 (ALT 250 FOR IP): Performed by: PHYSICAL MEDICINE & REHABILITATION

## 2022-10-21 PROCEDURE — 85027 COMPLETE CBC AUTOMATED: CPT

## 2022-10-21 PROCEDURE — 97130 THER IVNTJ EA ADDL 15 MIN: CPT

## 2022-10-21 PROCEDURE — 97535 SELF CARE MNGMENT TRAINING: CPT

## 2022-10-21 PROCEDURE — 97110 THERAPEUTIC EXERCISES: CPT

## 2022-10-21 PROCEDURE — 92507 TX SP LANG VOICE COMM INDIV: CPT

## 2022-10-21 PROCEDURE — 80048 BASIC METABOLIC PNL TOTAL CA: CPT

## 2022-10-21 PROCEDURE — 97116 GAIT TRAINING THERAPY: CPT

## 2022-10-21 PROCEDURE — 6360000002 HC RX W HCPCS: Performed by: PHYSICAL MEDICINE & REHABILITATION

## 2022-10-21 RX ADMIN — STANDARDIZED SENNA CONCENTRATE AND DOCUSATE SODIUM 2 TABLET: 8.6; 5 TABLET ORAL at 09:20

## 2022-10-21 RX ADMIN — ATORVASTATIN CALCIUM 20 MG: 20 TABLET, FILM COATED ORAL at 09:22

## 2022-10-21 RX ADMIN — LEVOTHYROXINE SODIUM 125 MCG: 0.12 TABLET ORAL at 07:12

## 2022-10-21 RX ADMIN — HYDRALAZINE HYDROCHLORIDE 25 MG: 25 TABLET, FILM COATED ORAL at 13:57

## 2022-10-21 RX ADMIN — SODIUM CHLORIDE, PRESERVATIVE FREE 10 ML: 5 INJECTION INTRAVENOUS at 09:38

## 2022-10-21 RX ADMIN — Medication 10 ML: at 21:58

## 2022-10-21 RX ADMIN — STANDARDIZED SENNA CONCENTRATE AND DOCUSATE SODIUM 2 TABLET: 8.6; 5 TABLET ORAL at 21:54

## 2022-10-21 RX ADMIN — Medication 2 PUFF: at 20:31

## 2022-10-21 RX ADMIN — TAMSULOSIN HYDROCHLORIDE 0.4 MG: 0.4 CAPSULE ORAL at 09:22

## 2022-10-21 RX ADMIN — HYDRALAZINE HYDROCHLORIDE 25 MG: 25 TABLET, FILM COATED ORAL at 21:53

## 2022-10-21 RX ADMIN — OXYBUTYNIN CHLORIDE 10 MG: 5 TABLET, EXTENDED RELEASE ORAL at 09:20

## 2022-10-21 RX ADMIN — Medication 2 PUFF: at 05:35

## 2022-10-21 RX ADMIN — HYDRALAZINE HYDROCHLORIDE 25 MG: 25 TABLET, FILM COATED ORAL at 07:13

## 2022-10-21 RX ADMIN — ESCITALOPRAM OXALATE 20 MG: 10 TABLET ORAL at 09:22

## 2022-10-21 RX ADMIN — CETIRIZINE HYDROCHLORIDE 10 MG: 10 TABLET, FILM COATED ORAL at 09:22

## 2022-10-21 RX ADMIN — PANTOPRAZOLE SODIUM 40 MG: 40 TABLET, DELAYED RELEASE ORAL at 07:12

## 2022-10-21 RX ADMIN — MONTELUKAST SODIUM 10 MG: 10 TABLET, FILM COATED ORAL at 21:53

## 2022-10-21 RX ADMIN — FLUTICASONE PROPIONATE 1 SPRAY: 50 SPRAY, METERED NASAL at 09:38

## 2022-10-21 RX ADMIN — ASPIRIN 81 MG 81 MG: 81 TABLET ORAL at 09:20

## 2022-10-21 RX ADMIN — ENOXAPARIN SODIUM 40 MG: 100 INJECTION SUBCUTANEOUS at 09:23

## 2022-10-21 RX ADMIN — AMLODIPINE BESYLATE 5 MG: 5 TABLET ORAL at 09:22

## 2022-10-21 ASSESSMENT — PAIN SCALES - GENERAL: PAINLEVEL_OUTOF10: 0

## 2022-10-21 NOTE — DISCHARGE INSTR - COC
Continuity of Care Form    Patient Name: Odalys Arroyo   :  1935  MRN:  [id number]    Admit date:  10/12/2022  Discharge date:  10/25/2022    Code Status Order: Full Code   Advance Directives:     Admitting Physician:  Kristan Mac MD  PCP: Roberto Rodriguez MD    Discharging Nurse: Chandler Mcknight Unit/Room#: GVM-1401/8928-07  Discharging Unit Phone Number: [phone]    Emergency Contact:   Extended Emergency Contact Information  [facility] [address] JUDITH QUIROGA)  Home Phone: [phone]  Relation: Child  Secondary Emergency Contact: THUBIT [facility] Phone: [phone]  Relation: Child    Past Surgical History:  Past Surgical History:   Procedure Laterality Date    CARDIAC SURGERY          CORONARY ARTERY BYPASS GRAFT         Immunization History:   Immunization History   Administered Date(s) Administered    COVID-19, PFIZER PURPLE top, DILUTE for use, (age 15 y+), 30mcg/0.3mL 2021, 2021, 2022    Influenza Virus Vaccine 10/18/2012    Pneumococcal Conjugate 7-valent ([place]) 2002       Active Problems:  Patient Active Problem List   Diagnosis Code    CAD (coronary artery disease) I25.10    Bilateral carotid artery stenosis I65.23    Stage 2 chronic kidney disease N18.2    HTN (hypertension), benign I10    Dyslipidemia E78.5    Dementia due to Alzheimer's disease ([facility] [address] G30.9, F02.80    Unable to care for self Z78.9    Unable to ambulate R26.2    Slurred speech R47.81    Acute cerebrovascular accident (CVA) ([facility] [address] I63.9    Acute ischemic stroke ([facility] [address] I63.9       Isolation/Infection:   Isolation            No Isolation          Patient Infection Status       Infection Onset Added Last Indicated Last Indicated By Review Planned Expiration Resolved Resolved By    None active    Resolved    C-diff Rule Out 10/08/22 10/08/22 10/08/22 Clostridium difficile toxin/antigen (Ordered)   10/10/22 Jayesh Neal RN    Order  COVID-19 (Rule Out) 10/07/22 10/07/22 10/07/22 COVID-19 (Ordered)   10/08/22 Rule-Out Test Resulted    COVID-19 (Rule Out) 08/15/22 08/15/22 08/15/22 COVID-19, Rapid (Ordered)   08/15/22 Rule-Out Test Resulted            Nurse Assessment:  Last Vital Signs: /65   Pulse 66   Temp 98.3 °F (36.8 °C) (Oral)   Resp 16   Ht 6' (1.829 m)   Wt 220 lb 1 oz (99.8 kg)   SpO2 95%   BMI 29.85 kg/m²     Last documented pain score (0-10 scale): Pain Level: 0  Last Weight:   Wt Readings from Last 1 Encounters:   10/12/22 220 lb 1 oz (99.8 kg)     Mental Status:  oriented, alert, coherent, and able to concentrate and follow conversation. Disoriented at times. IV Access:  - None    Nursing Mobility/ADLs:  Walking   Assisted  Transfer  Assisted  Bathing  Dependent  Dressing  Dependent  Toileting  Dependent  Feeding  Assisted  Med Admin  Dependent  Med Delivery   whole and prefers mixed with applesauce    Wound Care Documentation and Therapy:  Wound 10/08/22 Buttocks Left Nonblanchable dark purple bruise. (Active)   Dressing/Treatment Open to air 10/21/22 1000   Wound Assessment Non-blanchable erythema 10/21/22 1000   Drainage Amount None 10/21/22 1000   Annel-wound Assessment Intact;Dry/flaky 10/21/22 1000   Number of days: 13        Elimination:  Continence: Bowel: Yes  Bladder: No  Urinary Catheter: None   Colostomy/Ileostomy/Ileal Conduit: No       Date of Last BM: 10/22/2022    Intake/Output Summary (Last 24 hours) at 10/21/2022 1651  Last data filed at 10/21/2022 0920  Gross per 24 hour   Intake 840 ml   Output --   Net 840 ml     I/O last 3 completed shifts: In: 480 [P.O.:480]  Out: 475 [Urine:475]    Safety Concerns:     Sundowners [place] and At Risk for Falls    Impairments/Disabilities:      Speech and Vision    Nutrition Therapy:  Current Nutrition Therapy:   - Oral Diet:  General    Routes of Feeding: Oral  Liquids:  Thin Liquids  Daily Fluid Restriction: no  Last Modified Barium Swallow with Video (Video Swallowing Test): not done    Treatments at the Time of Hospital Discharge:   Respiratory Treatments: Inhalers daily  Oxygen Therapy:  is not on home oxygen therapy. Ventilator:    - No ventilator support    Rehab Therapies: Physical Therapy and Occupational Therapy, SLP  Weight Bearing Status/Restrictions: No weight bearing restrictions  Other Medical Equipment (for information only, NOT a DME order):  wheelchair and walker  Other Treatments: N/A    Patient's personal belongings (please select all that are sent with patient):  Glasses, Dentures upper and lower    RN SIGNATURE:  Electronically signed by Robert Singh RN on 10/25/22 at 9:23 AM EDT    CASE MANAGEMENT/SOCIAL WORK SECTION    Inpatient Status Date: 10/12/2022    Readmission Risk Assessment Score:18%  Readmission Risk              Risk of Unplanned Readmission:  25           Discharging to Facility/ Agency   Name: [facility]  Phone: [phone]  Fax:      / signature: Electronically signed by SARAH Mcgrath on 10/25/2022 at 10:36 AM     PHYSICIAN SECTION    Prognosis: Fair    Condition at Discharge: Stable    Rehab Potential (if transferring to Rehab): Fair    Recommended Labs or Other Treatments After Discharge: PT/OT/SLP/RN/Aide    Physician Certification: I certify the above information and transfer of Odalys Arroyo  is necessary for the continuing treatment of the diagnosis listed and that he requires Home Care for greater 30 days.      Update Admission H&P: No change in H&P    PHYSICIAN SIGNATURE:  Electronically signed by Kristan Mac MD on 10/25/22 at 8:52 AM EDT

## 2022-10-21 NOTE — PROGRESS NOTES
Physical Therapy    [facility] [address]   Phone: [phone]    Physical Therapy    [] Initial Evaluation            [x] Daily Treatment Note         [] Discharge Summary      Patient: Jeanine Santa   : 1935   MRN: [id number]   Date of Service:  10/21/2022  Admitting Diagnosis: Acute ischemic stroke [facility])  Current Admission Summary:  81 y.o. male who presented to ED for evaluation of slurred speech and generalized weakness/fatigue. Patient is a poor historian at this time. No family currently at bedside to provide history but daughter was at bedside in ED and provided supplemental history. Most of the information is derived from conversation with the emergency room PA and review of records. Daughter reports she noticed patient seemed more fatigued and weak today. He had difficulty walking due to weakness. She also noticed slurred speech. She denies any additional concerns. Patient denies any complaints at this time. Of note, patient was started on Augmentin on 10/6 for acute sinusitis, end date 10/20. Past Medical History:  has a past medical history of CAD (coronary artery disease). Past Surgical History:  has a past surgical history that includes Coronary artery bypass graft and Cardiac surgery. Discharge Recommendations: HHPT S3  DME Required For Discharge: patient has all required DME for discharge  Precautions/Restrictions: high fall risk  Weight Bearing Restrictions: no restrictions  [] Right Upper Extremity  [] Left Upper Extremity [] Right Lower Extremity  [] Left Lower Extremity     Required Braces/Orthotics: no braces required   [] Right  [] Left  Positional Restrictions:no positional restrictions    Pre-Admission Information   Lives With: Daughter, Dunia Nesbitt. Sarah are present during week days 8 AM - 2:30 PM (T-) and his son Katie Barr present on Monday 8 AM - 2:30 PM. Dunia Nesbitt present during evening, overnight and weekends. Type of Home: apartment, . Comment: 3rd floor, senior living  -24 hr assist from family available  Home Layout: one level  Home Access: elevator  Bathroom Layout: walker accessible, wheelchair accessible, walk in shower, handicap height toilet  Bathroom Equipment: grab bars in shower, grab bars around toilet, shower chair, 3-in-1 commode  Home Equipment: rolling walker, single point cane, manual wheelchair - majority of time uses RW  Transfer Assistance: reports transfers \"on my own\" and uses RW or cane most of the time Comment: Charity Hernandez states someone is always with him during transfers, may not provide assistance but can if needed  Ambulation Assistance:. Comment: per chart review, pt reports \"tremors\" when walking with RW, states a family member walks with him     ADL Assistance: . Comment: pt reports assist with bathing, reports getting self dressed   IADL Assistance: requires assistance with all homemaking tasks - daughters complete all IADL tasks   Active : [] yes             [x]no  Current Employment: . Comment: retired , [address] [facility] [employer] -   Hobbies: used to enjoy hunting and [address] states he has fallen 3-4 times in past 6 months, reports commonly occurs when stepping backwards     Examination   Vision:   Vision Gross Assessment: Impaired and Vision Corrective Device: wears glasses at all times  Hearing:   [facility]  Observation:   General Observation:  Festinating gait   Posture: Forward head rounded shoulders   Sensation:   WFL  Proprioception:    WFL  Tone:   Normotonic  Coordination Testing:   WFL    ROM:   (B) LE AROM WFL  Strength:   (B) LE strength grossly WFL  Decision Making: medium complexity  Clinical Presentation: evolving      Subjective  General: Patient laying supine in bed with HOB elevated upon arrival. Patient's sitter present. Patient denies pain, reports having wanted \"to do my exercises\" last night.    Pain: 0/10  Pain Interventions: repositioned        Functional Mobility  Bed Mobility  Supine to Sit: contact guard assistance  Rolling Right: contact guard assistance  Scooting: stand by assistance  Comments: Patient performed supine to sit with HOB elevated and use of R handrail. Transfers  Sit to stand transfer: minimal assistance  Stand to sit transfer: moderate assistance  Stand step transfer: moderate assistance  Comments: Patient performed STS from EOB with bed elevated and use of RW placed in front 5x. Patient was provided visual cueing with use of  to initiate forward flexion with STS, patient demonstrated good carryover with visual cueing. Patient continues to require mod Ax1 with stand to sit due to decreased eccentric control. Ambulation  Ambulation not tested on this date secondary to need for second person due to poor safety. Distance:   Gait Mechanics: Shuffling gait, decreased step height/length   Comments:   Stair Mobility  Stair mobility not completed on this date. Comments:   Wheelchair Mobility:  No w/c mobility completed on this date. Comments:  Balance  Static Sitting Balance: fair: maintains balance at CGA without use of UE support  Dynamic Sitting Balance: fair: maintains balance at CGA without use of UE support  Static Standing Balance: poor: requires mod (A) to maintain balance  Dynamic Standing Balance: poor: requires mod (A) to maintain balance  Comments: Patient demonstrated ability to sit EOB for ~8 minutes with intermittent SBA-CGA     Other Therapeutic Interventions    First Session:  Patient performed functional mobility as noted above. Patient performed hooklying resisted marching and resisted hip abduction with orange resistance band 2x10 each. Patient performed bridging 2x10 while PT maintained position of B feet. Patient performed resisted ankle DF/PF with orange resistance band 2x10 each. Patient performed modified crunch with forward reach while positioned with HOB elevated 2x10 each.  Patient performed STS from EOB with RW placed in front and bed elevated with visual cueing via  for forward flexion 5x. Patient performed stand step transfer to recliner chair with RW requiring mod Ax1 for stand to sit into recliner chair. Patient was positioned for breakfast, call light was provided, and chair alarm was turned on. Second Session:  Patient seated in recliner chair upon arrival with RN providing medication and taking vitals. Patient denies pain. Patient is agreeable to PT/OT. Patient performed STS from recliner chair and RW placed in front at mod Ax1. Patient ambulated 20 feet with RW and min Ax1 to [facility] for BM. Patient required mod Ax1 with STS from [facility] and assistance with donning/doffing pants, socks, shoes, and brief. Patient ambulated 125 feet with RW and min Ax1 before requiring seated rest break, patient requires walker management and max VC for hand placement during transfers. Patient ambulated an additional 125 feet min Ax1 to [facility] with RW to attempt BM, patient unsuccessful. Patient performed STS from [facility] mod Ax1 and use of L grab bar. Patient ambulated 10 feet to recliner chair and performed stand step transfer at mod Ax1 with stand to sit. Patient demonstrates intermittent min-mod Ax1 with all functional mobility. Patient requires mod Ax1 with turns during ambulation and min Ax1 when utilizing grab bar for STS and stand to sit. Patient was provided call light and chair alarm was turned on.      Functional Outcomes                 Cognition  Overall Cognitive Status: Impaired  Arousal/Alterness: appropriate responses to stimuli  Following Commands: follows multi step commands with repetition, follows multi step commands with increased time  Attention Span: attends with cues to redirect  Memory: appears intact  Safety Judgement: decreased awareness of need for assistance, decreased awareness of need for safety  Problem Solving: decreased awareness of errors, assistance required to identify errors made, assistance required to correct errors made  Insights: decreased awareness of deficits  Initiation: requires cues for all  Sequencing: requires cues for all  Orientation:    alert and oriented x 4  Command Following:   impaired    Education  Barriers To Learning: cognition  Patient Education: patient educated on goals, PT role and benefits, plan of care, general safety, family education, transfer training  Learning Assessment:  patient will require reinforcement due to cognitive deficits    Assessment  Activity Tolerance: Fair (-); patient lacks carry over with walker management during ambulation and transfers increasing fall risk  Impairments Requiring Therapeutic Intervention: decreased functional mobility, decreased strength, decreased safety awareness, decreased cognition, decreased endurance, decreased balance, decreased coordination, vestibular impairment, decreased posture  Prognosis: fair  Clinical Assessment: Patient is 82 y/o male presenting to [facility] due to slurred speech and weakness. Patient continues to increase overall ambulation distance and endurance as indicated by ambulating 125 feet 2x this visit at min Ax1. Patient demonstrated slight carryover of forward flexion with STS when utilizing visual cueing.  Patient will continue to benefit from skilled PT in order to return to [facility] with all functional mobility prior to d/c from hospital.   Safety Interventions: patient left in chair, chair alarm in place, call light within reach, gait belt, patient at risk for falls, and family/caregiver present    Plan  Frequency: 5 x/week, 60 min/day  Current Treatment Recommendations: strengthening, balance training, functional mobility training, transfer training, gait training, endurance training, neuromuscular re-education, wheelchair mobility training, patient/caregiver education, safety education, and positioning    Goals  Patient Goals: Improve ambulation with RW   Short Term Goals:  Time Frame: 10-14 days  Patient will complete bed mobility at modified independent   Patient will complete transfers at supervision   Patient will ambulate 50 ft with use of rolling walker at supervision  Patient will complete car transfer at supervision  No goals met this visit, 10/21    Therapy Session Time      Individual Group Co-treatment   Time In  0730       Time Out  0815       Minutes  45         Timed Code Treatment Minutes:   45 minutes     Second Session Therapy Time:   Individual Concurrent Group Co-treatment   Time In        0930   Time Out        1000   Minutes        30     Timed Code Treatment Minutes:  45 + 30 minutes    Total Treatment Minutes:  75 minutes    Electronically Signed By: Mumtaz Gonzalez, PT  Mumtaz Gonzalez PT, DPT, [id number]

## 2022-10-21 NOTE — PROGRESS NOTES
[facility] [address]   Phone: [phone]    Speech Therapy    [] Initial Evaluation            [x] Daily Treatment Note         [] Discharge Summary      Patient: Pablo Rocha   : 1935   MRN: [id number]   Date of Service:  10/21/2022  Admitting Diagnosis: Acute ischemic stroke [facility])  Current Admission Summary: 16-year-old male with a history of CAD and dementia who was admitted on 10/7 with slurred speech and fatigue. Concern for stroke. CT head was unremarkable for acute findings. CTA without occlusion. MRI confirmed an acute infarct in the left corona radiata. Neurology evaluated and suggested an aspirin, statin, and formal stroke work-up. He was evaluated by therapy and suggested to continue in an inpatient setting prior to returning home. He has no current complaints. Past Medical History:  has a past medical history of CAD (coronary artery disease). Past Surgical History:  has a past surgical history that includes Coronary artery bypass graft and Cardiac surgery. Precautions/Restrictions: high fall risk      Pre-Admission Information   Living Status: Pt lives in an independent living facility (daughter stays with pt and they have around the care assistance from family)   Occupation/School: Retired from [employer] reports highest level of education being 8th grade   Medication Management: :  []Primary   []Secondary [x]No  Finance Management: []Primary   []Secondary [x]No  Active :   []Yes         [x]No (reportedly stopped driving 4 years ago)   Hearing:    WFL  Vision:    Vision Corrective Device: wears glasses at all times      Subjective  General: First Session: Pt alert and upright in chair. Finishing breakfast. Sitter present at bedside. Second Session: Pt awake and alert, upright in chair with sitter initially present. Pleasant and cooperative throughout session. Wife and son present at end of session.     Pain: Denies Safety Interventions: patient left in chair, chair alarm in place, call light within reach, patient at risk for falls, and camera in place left in presence of sitter first session, left in presence of family during the second session     Therapeutic Interventions:       Session 1:   Dysphagia: Severity: Mild  2/2 presbyphagia   GOAL MET (monitor as needed)     Comprehension: Severity: Mild   -pt required x 10 repetition of category corresponding to suit despite visual aid during card sorting task     Expressive Language: Severity: Moderate   Divergent naming with cards   -pt able to independently state 19 items (9 cities, 10 states) during card sorting task. With min-mod cues pt able to state 26 items (13 cities, 13 states)   -pt stated the correct category based on the suit of the card in 57% of opportunities with the use of a visual aid, on multiple occasions pt stated correct category but then provided item from other category (pt stated states correspond with spades, then would list a city)  -pt repeated items previously listed x 6, with one self correction, able to not repeat previously listed items in 81% of opp      Cognition: Severity: Moderate   Fx recall   -pt stated d/c date Independently   -pt recalled events from previous day and current morning with 75% accuracy     Personal hx   -pt recalled personal information (hobbies and personal information)  with 100% accuracy    Motor speech: Severity: Mild   -requested repetition from pt due to decreased ineligibility (low volume, fast rate, blended word boundaries) x 4 during 30 minute session    Additional Interventions:     Session 2:   Dysphagia: Severity: Mild    GOAL MET (monitor as needed)      Comprehension: Severity: Mild   -pt with decreased auditory comprehension of sequencing task, despite multiple repetitions of directions and use of binary choices for selection of items pt requiring max cues to order events during sequencing activity.  When asked to verbally sequence steps, pt able to independently complete task      Expressive Language: Severity: Moderate    Abstract convergent naming   -pt independently named commonality from 3 items listed with 50% accuracy, increased to 60% with min cues, increased to 100% given mod cues. Pt benefited from binary choices when discussing attributes of items given mod cues     Cognition: Severity: Moderate   Fx recall   -pt recalled 0/4 speech strategies reviewed from previous session. When presented with binary options pt accuracy increased to 75%  -pt recalled activities from the morning (visitors, meals) with 66% accuracy     -pt stated he could press the red button for help, requiring assistance to locate remote    Sequencing 5 steps (for fx tasks - putting in dentures, removing dentures)   -when provided with 5 written steps and asked to place them in order, pt unable to complete task. When given a choice from a field of 2 pt able to state which item came first. Despite model and request to move written item, pt only able to verbalize step.   -during second trial of sequencing, pt provided with binary choice for all steps. Pt able to select next item in sequence in 60% of opportunities       Motor speech: Severity: Mild    -re-education provided on speech strategies and rationale for using them    -pt instructed to focus on speaking slowly and loudly during conversational discourse with SLP   -pt required 3 cues to speak louder during 2 minutes of discourse     Additional Interventions:      Education  Barriers To Learning: cognition and reading  Patient Education: Provided education regarding role of SLP, results of assessment, recommendations and general speech pathology plan of care.    Learning Assessment: Pt requires ongoing learning     Assessment  Impairments Requiring Therapeutic Intervention: Receptive Aphasia , Expressive Aphasia , Dysarthria , Cognitive-Linguistic Deficits , and Oropharyngeal Dysphagia Prognosis: good    Clinical Assessment:    Pt continues to present with Moderate  Receptive/ expressive deficits, Cognitive-Linguistic Deficits and mild dysarthria. Pt is demonstrating progress with divergent and convergent naming tasks. Progress towards goals is limited secondary to reduced short term memory impacting ability to remember previous education/ speech strategies. Continue to target comprehension, cognitive skills, speech intellgibility and language skills to facilitate safe return to prior level of independence. Plan  Frequency: 60 minutes/day; 5 days per week, as tolerated, until goals met, or discharged from ARU. Therapeutic Interventions: Diet Tolerance Monitoring , Patient/Family Education , Therapeutic Trials with SLP  Expressive/ Receptive Language intervention , Cognitive-Linguistic intervention , and Patient/ Family education   Discharge Recommendations: TBD   Continued SLP at Discharge: TBD based upon progress     Goals  Time Frame: 7-10 days     Pt will tolerate recommended diet and advanced trials with no overt s/s of aspiration. GOAL MET   Pt will complete auditory comprehension tasks with 80% accuracy. Patient will complete verbal description and word retrieval tasks with 80% accuracy   Pt will recall functional information (daily tasks, personal hx, etc.) with 80% accuracy. Patient will complete functional problem-solving tasks for daily situations with 80% accuracy   Pt will improve speech intelligibility to 80% with use of speech strategies during conversations NEW GOAL 10/20/22      Therapy Session Time      Session 1 Session 2   Time In 0830 1030   Time Out 0900 1100   Time Code Minutes 30 15   Individual Minutes 30 30     Timed Code Treatment Minutes:  45  Total Treatment Minutes:  60    Electronically Signed By:   Polly KC  CCC-SLP #[id number] 10/21/2022 1:06 PM  Speech-Language Pathologist    Megan SARKAR,  Speech-Language Pathology     The speech-language pathologist was present, directed the patient's care, made skilled judgment and was responsible for assessment and treatment.

## 2022-10-21 NOTE — PROGRESS NOTES
Stacy Plaster  10/21/2022  [id number]    Chief Complaint: Acute ischemic stroke [facility])    Subjective:   No overnight events. No current complaints. Cognition remains limiting. Cr again improved this am.     ROS: No N/V, SOB, dyspnea    Objective:  Patient Vitals for the past 24 hrs:   BP Temp Temp src Pulse Resp SpO2   10/21/22 0700 (!) 142/72 98.2 °F (36.8 °C) Oral 68 16 94 %   10/21/22 0535 -- -- -- 61 18 96 %   10/20/22 2210 128/71 97.6 °F (36.4 °C) Oral 54 17 96 %   10/20/22 0930 131/71 97.7 °F (36.5 °C) Oral 59 18 98 %       Gen: No distress, pleasant. Resting in bed  HEENT: Normocephalic, atraumatic  CV: Regular rate and rhythm. No MRG  Resp: No respiratory distress. CTAB  Abd: Soft, nontender nondistended  Ext: No edema  Neuro: Alert, oriented x2, appropriately interactive. Decreased cognition noted. Wt Readings from Last 3 Encounters:   10/12/22 220 lb 1 oz (99.8 kg)   10/12/22 200 lb 8 oz (90.9 kg)   08/15/22 210 lb (95.3 kg)       Laboratory data:   Lab Results   Component Value Date    WBC 5.7 10/21/2022    HGB 11.0 (L) 10/21/2022    HCT 33.3 (L) 10/21/2022    MCV 96.1 10/21/2022     10/21/2022       Lab Results   Component Value Date/Time     10/21/2022 06:27 AM    K 4.3 10/21/2022 06:27 AM    K 4.1 10/10/2022 05:37 AM     10/21/2022 06:27 AM    CO2 21 10/21/2022 06:27 AM    BUN 26 10/21/2022 06:27 AM    CREATININE 1.2 10/21/2022 06:27 AM    GLUCOSE 122 10/21/2022 06:27 AM    CALCIUM 9.3 10/21/2022 06:27 AM        Therapy progress:  PT  Objective   Bed Mobility  Supine to Sit: contact guard assistance  Sit to Supine: contact guard assistance  Rolling Left: contact guard assistance  Rolling Right: contact guard assistance  Scooting: contact guard assistance  Bridging: contact guard assistance  Comments: Patient performed supine to sit with HOB elevated and use of handrails.   Transfers  Sit to stand transfer: moderate assistance, maximum assistance  Stand to sit transfer: moderate assistance  Stand step transfer: moderate assistance  Comments: Initially, patient performed STS from EOB and use of handrails at max Ax1. Patient attempted 3 STS from EOB, continued to demonstrate heavy posterior lean resulting in return to sitting due to inability to remain upright. Patient attempted STS from EOB to transfer to recliner chair in a stand step transfer with RW placed in front. Patient's posterior lean reduced, still present with additional STS allowing transfer to recliner chair. OT   Basic Activities of Daily Living  Feeding Comments: per daughter, patient required assistance self feeding today but typically does not   Grooming: stand by assistance minimal assistance  Grooming Comments: patient able to comb hair seated in recliner and apply deodorant seated on shower chair. Patient required Richard for oral hygiene/managing dentures   Upper Extremity Bathing: minimal assistance  Lower Extremity Bathing: moderate assistance   Bathing Equipment: none  Bathing Comments: Patient completed shower seated on shower chair in walk in shower with [facility] shower head. Patient required assistance rinsing and drying UB/LB for thoroughness. IV waterproofed. Upper Extremity Dressing: minimal assistance  Lower Extremity Dressing: maximum assistance  Dressing Comments: Richard for donning shirt. maxA for donning pullup, pants and socks. Patient was able to doff socks himself with SBA. Patient utilized grab bars for sit to stand from shower chair and balance while therapist managed pants over hips   Toileting: maximum assistance. Toileting Comments: patient unable to void, patient required maxA for clothing mgmt   General Comments: Increased time for ADLs.           SLP   Dysphagia: Severity: Mild   Goal not targeted this session                 Comprehension: Severity: Mild   -Pt with reduced comprehension of directions for all activities   -multiple repetitions, rewording, and a model provided, following sequencing activity (see below) pt stated he did not understand the activity      Expressive Language: Severity: Moderate   Convergent naming (3 clues)   -independently named item with 3 descriptive points with 53% accuracy, increased to 80% accuracy with min cues  -pt benefited from extended time to answer with 2-3 repetitions of listed words      Cognition: Severity: Moderate   Sequencing functional activities   -pt sequenced 5 written steps to use a call light with 60% accuracy with mod cues   -pt verbally repeated steps with 40% accuracy with a visual, did not increase despite max cuing    -pt able to state when to use call light in 4/4 opportunities      -pt unable to state next therapy despite visual aid (schedule) placed in front of him      -pt and daughter report pt is close to baseline with speech and cognition. Daughter expressed she wanted to continue therapy for 60 minutes a day at this time in order to engage patient's brain while he is here             Body mass index is 29.85 kg/m². Assessment and Plan:  Acute left corona radiata infarct: ASA, statin. PT/OT/SLP     Dementia: SLP     Hypothyroidism: Synthroid 125     HTN: Norvasc 5, hydralazine 25     COPD: Dulera, Singulair     HLD: Lipitor 20     Depression: Lexapro 20     Sinus infection: Augmentin completed     BPH: Ditropan and Flomax per home regimen    DARIAN: 2/2 poor PO intake, s/p gentle IVF, Resolved     Bowels: Per protocol  Bladder: Per protocol   Sleep: Trazodone provided prn.    Pain: Tylenol as needed  DVT PPx: Lovenox  CHRISTO: 10/25    Pau Johnston MD 10/21/2022, 7:57 AM

## 2022-10-21 NOTE — PROGRESS NOTES
[facility] Department   Phone: [phone]    Occupational Therapy    [] Initial Evaluation            [x] Daily Treatment Note         [] Discharge Summary      Patient: Jeanine Santa   : 1935   MRN: [id number]   Date of Service:  10/21/2022    Admitting Diagnosis:  Acute ischemic stroke [facility])  Current Admission Summary: 56-year-old male with a history of CAD and dementia who was admitted on 10/7 with slurred speech and fatigue. Concern for stroke. CT head was unremarkable for acute findings. CTA without occlusion. MRI confirmed an acute infarct in the left corona radiata. Neurology evaluated and suggested an aspirin, statin, and formal stroke work-up. He was evaluated by therapy and suggested to continue in an inpatient setting prior to returning home. He has no current complaints. Past Medical History:  has a past medical history of CAD (coronary artery disease). Past Surgical History:  has a past surgical history that includes Coronary artery bypass graft and Cardiac surgery. Discharge Recommendations: Home with HHOT and 24 hour supervision/assistance     DME Required For Discharge: DME to be determined pending patient progress    Precautions/Restrictions: high fall risk  Weight Bearing Restrictions: no restrictions     Required Braces/Orthotics: no braces required  Positional Restrictions:no positional restrictions    Pre-Admission Information   Lives With: daughter Family is always present. Daughter, Dunia Smith are present during week days 8 AM - 2:30 PM (-) and his son Katie Barr present on Monday 8 AM - 2:30 PM. Dunia Nesbitt present during evening, overnight and weekends.     Type of Home: apartment  Home Layout: one level  Home Access: elevator  Bathroom Layout: walker accessible, wheelchair accessible, walk in shower, handicap height toilet  Bathroom Equipment: grab bars in shower, grab bars around toilet, shower chair, 3-in-1 commode  Toilet Height: elevated height  Home Equipment: rolling walker, rollator - 4 wheeled walker, single point cane, manual wheelchair, reacher  Transfer Assistance: modified independent with use of RW --occassionally family provides assistance but is always present   Ambulation Assistance:modified independent with use of RW -typically has someone close by. use wheelchair for community   ADL Assistance: requires assistance with bathing, requires assistance with dressing, requires assistance with toileting able to self feed. Daughter reports they assist with footwear and occasionally clothing mgmt during toileting. Patient is typically continent but wears pullups   IADL Assistance: requires assistance with all homemaking tasks  Active :        [] Yes  [x] No  Hand Dominance: [] Left  [x] Right  Current Employment: retired. Occupation: [employer]: used to hunt and fish   Recent Falls: patient reported no falls within last 6 months. Daughter reports 3-4 falls within past 6 months         Subjective:   General: pt in recliner at entry, agreeable to cotx session, denied pain. RN administering meds at start of session. Pain: 0/10  Pain Interventions: not applicable      Activities of Daily Living  Basic Activities of Daily Living  Toileting: maximum assistance. Toileting Comments: Pt w/ urgency to use commode during mobility- ambulated to bathroom, voided small amount of stool requiring assist for kasie hygiene; pt urinated on pants/floor after sitting back onto commode post kasie hygiene- MaxA for clothing change seated on commode. Pt returned to commode 2nd time at end of session d/t need for BM  General Comments: No other ADL completed     Instrumental Activities of Daily Living  No IADL completed on this date. Functional Mobility    Bed Mobility  Bed mobility not completed on this date.   Comments:   Transfers  Sit to stand transfer:minimal assistance, moderate assistance  Stand to sit transfer: minimal assistance, moderate assistance  Toilet transfer: minimal assistance, moderate assistance  Toilet transfer equipment: drop-arm bariatric bedside commode, grab bars, walker  Toilet transfer comments: completed 2x during session, grab bar on R  Comments: VC for sequence, hand placement, and proximity to surfaces   Functional Mobility:  Sitting Balance: stand by assistance. Standing Balance: minimal assistance, moderate assistance. Functional Mobility Activity: to/from bathroom, in hallway to/from dining area  Functional Mobility Device Use: rolling walker  Functional Mobility Comment: Mod to Richard; increased assist for turns, Richard and VC during straight path mobility, w/c provided for safety d/t cog and impulsivity at times; VC for increased step length and RW proximity throughout     Other Therapeutic Interventions        Second Session:  Pt in recliner at entry, agreeable to session, denied pain. SPTs and sit<>stands variable from [address] to Richard during session with RW. Pt completed oral care in stance at sink- initially CGA w/ flexed forward posture, pt able to self-correct minor LOB posterior initially but requiring increased assist up to [address] w/ fatigue- pt able to retrieve and manage all tools for task w/o assist for VC for location. Multiple dynamic standing activities completed at wall and window to facilitate extensor posture, forward WB and weight shifting, and increase righting reactions/balance: UE ball walk vertically w/ small swiss ball- 10 reps x2 (periods of ModA for balance); wall push-ups (standard and wide- 8 reps x2 each w/ 1 seated break, CGA for balance w/ VC for technique sequence); erasing/washing medium from window w/ wash cloth- CGA for balance throughout w/ VC for thoroughness, 4min stance to complete. Pt tolerated well but did have episode of increased dizziness w/ pt stating it's \"vertigo\", symptoms quickly passed w/ rest.   Pt requesting to lay in bed at EOS- sit>supine w/ Richard for trunk.  Pt left Continues to require extensive assist for all ADLs. Pt continues to demo poor righting reactions and posterior lean making him high fall risk. patient will benefit from continued OT services to address above deficits, to maximize patients ability to complete transfers, functional mobility and ADLs and decrease caregiver burden.     Safety Interventions: patient left in chair, chair alarm in place, call light within reach, gait belt, patient at risk for falls, and telesitter in use    Plan  Frequency: 5 x/week, 60 min/day  Current Treatment Recommendations: strengthening, ROM, balance training, functional mobility training, transfer training, endurance training, neuromuscular re-education, patient/caregiver education, and ADL/self-care training    Goals  Patient Goals: improve transfers and mobility    Short Term Goals:  Time Frame: 10-14 days   Patient will complete upper body ADL at supervision   Patient will complete lower body ADL at supervision   Patient will complete toileting at supervision   Patient will complete grooming at set up assistance   Patient will complete functional transfers at supervision     -goals not met, progressing 10/21    Therapy Session Time     Individual Group Co-treatment   Time In   0930   Time Out   1000   Minutes   30       Second Session Therapy Time:   Individual Concurrent Group Co-treatment   Time In 1245         Time Out 1330         Minutes 45          Timed Code Treatment Minutes: 30 + 45  Total Treatment Minutes: 75      Electronically Signed By: SARWAT Dimas/AUREA #[id number]

## 2022-10-22 PROCEDURE — 6360000002 HC RX W HCPCS: Performed by: PHYSICAL MEDICINE & REHABILITATION

## 2022-10-22 PROCEDURE — 2580000003 HC RX 258: Performed by: PHYSICAL MEDICINE & REHABILITATION

## 2022-10-22 PROCEDURE — 6370000000 HC RX 637 (ALT 250 FOR IP): Performed by: PHYSICAL MEDICINE & REHABILITATION

## 2022-10-22 PROCEDURE — 94640 AIRWAY INHALATION TREATMENT: CPT

## 2022-10-22 PROCEDURE — 1280000000 HC REHAB R&B

## 2022-10-22 RX ADMIN — PANTOPRAZOLE SODIUM 40 MG: 40 TABLET, DELAYED RELEASE ORAL at 06:45

## 2022-10-22 RX ADMIN — Medication 10 ML: at 08:51

## 2022-10-22 RX ADMIN — STANDARDIZED SENNA CONCENTRATE AND DOCUSATE SODIUM 2 TABLET: 8.6; 5 TABLET ORAL at 20:31

## 2022-10-22 RX ADMIN — Medication 2 PUFF: at 11:30

## 2022-10-22 RX ADMIN — Medication 10 ML: at 20:30

## 2022-10-22 RX ADMIN — STANDARDIZED SENNA CONCENTRATE AND DOCUSATE SODIUM 2 TABLET: 8.6; 5 TABLET ORAL at 08:44

## 2022-10-22 RX ADMIN — ATORVASTATIN CALCIUM 20 MG: 20 TABLET, FILM COATED ORAL at 08:44

## 2022-10-22 RX ADMIN — HYDRALAZINE HYDROCHLORIDE 25 MG: 25 TABLET, FILM COATED ORAL at 20:37

## 2022-10-22 RX ADMIN — ASPIRIN 81 MG 81 MG: 81 TABLET ORAL at 08:44

## 2022-10-22 RX ADMIN — AMLODIPINE BESYLATE 5 MG: 5 TABLET ORAL at 08:44

## 2022-10-22 RX ADMIN — LEVOTHYROXINE SODIUM 125 MCG: 0.12 TABLET ORAL at 06:45

## 2022-10-22 RX ADMIN — ESCITALOPRAM OXALATE 20 MG: 10 TABLET ORAL at 08:44

## 2022-10-22 RX ADMIN — HYDRALAZINE HYDROCHLORIDE 25 MG: 25 TABLET, FILM COATED ORAL at 06:45

## 2022-10-22 RX ADMIN — HYDRALAZINE HYDROCHLORIDE 25 MG: 25 TABLET, FILM COATED ORAL at 13:26

## 2022-10-22 RX ADMIN — OXYBUTYNIN CHLORIDE 10 MG: 5 TABLET, EXTENDED RELEASE ORAL at 08:45

## 2022-10-22 RX ADMIN — CETIRIZINE HYDROCHLORIDE 10 MG: 10 TABLET, FILM COATED ORAL at 08:45

## 2022-10-22 RX ADMIN — TAMSULOSIN HYDROCHLORIDE 0.4 MG: 0.4 CAPSULE ORAL at 08:45

## 2022-10-22 RX ADMIN — ENOXAPARIN SODIUM 40 MG: 100 INJECTION SUBCUTANEOUS at 08:45

## 2022-10-22 RX ADMIN — FLUTICASONE PROPIONATE 1 SPRAY: 50 SPRAY, METERED NASAL at 08:49

## 2022-10-22 RX ADMIN — MONTELUKAST SODIUM 10 MG: 10 TABLET, FILM COATED ORAL at 20:31

## 2022-10-22 RX ADMIN — Medication 2 PUFF: at 22:03

## 2022-10-22 ASSESSMENT — PAIN SCALES - GENERAL
PAINLEVEL_OUTOF10: 0

## 2022-10-22 NOTE — PROGRESS NOTES
Calvin Re  10/22/2022  [id number]    Chief Complaint: Acute ischemic stroke [facility])    Subjective:   No overnight events. Family arriving to visit. No new c/o. ROS: No N/V, SOB, dyspnea    Objective:  Patient Vitals for the past 24 hrs:   BP Temp Temp src Pulse Resp SpO2   10/22/22 0841 (!) 158/76 98.2 °F (36.8 °C) Oral 68 18 96 %   10/22/22 0630 (!) 154/74 98.7 °F (37.1 °C) Oral 62 17 96 %   10/21/22 2145 136/61 98.3 °F (36.8 °C) Oral 58 15 95 %   10/21/22 2033 -- -- -- -- -- 94 %   10/21/22 1245 122/65 98.3 °F (36.8 °C) Oral 66 16 95 %       Gen: No distress, pleasant. Resting in bed  HEENT: Normocephalic, atraumatic  CV: Regular rate and rhythm. No MRG  Resp: No respiratory distress. CTAB  Abd: Soft, nontender nondistended  Ext: No edema  Neuro: Alert, oriented x2, appropriately interactive. Decreased cognition noted. Wt Readings from Last 3 Encounters:   10/12/22 220 lb 1 oz (99.8 kg)   10/12/22 200 lb 8 oz (90.9 kg)   08/15/22 210 lb (95.3 kg)       Laboratory data:   Lab Results   Component Value Date    WBC 5.7 10/21/2022    HGB 11.0 (L) 10/21/2022    HCT 33.3 (L) 10/21/2022    MCV 96.1 10/21/2022     10/21/2022       Lab Results   Component Value Date/Time     10/21/2022 06:27 AM    K 4.3 10/21/2022 06:27 AM    K 4.1 10/10/2022 05:37 AM     10/21/2022 06:27 AM    CO2 21 10/21/2022 06:27 AM    BUN 26 10/21/2022 06:27 AM    CREATININE 1.2 10/21/2022 06:27 AM    GLUCOSE 122 10/21/2022 06:27 AM    CALCIUM 9.3 10/21/2022 06:27 AM        Therapy progress:  PT  Objective   Bed Mobility  Supine to Sit: contact guard assistance  Sit to Supine: contact guard assistance  Rolling Left: contact guard assistance  Rolling Right: contact guard assistance  Scooting: contact guard assistance  Bridging: contact guard assistance  Comments: Patient performed supine to sit with HOB elevated and use of handrails.   Transfers  Sit to stand transfer: moderate assistance, maximum assistance  Stand to sit transfer: moderate assistance  Stand step transfer: moderate assistance  Comments: Initially, patient performed STS from EOB and use of handrails at max Ax1. Patient attempted 3 STS from EOB, continued to demonstrate heavy posterior lean resulting in return to sitting due to inability to remain upright. Patient attempted STS from EOB to transfer to recliner chair in a stand step transfer with RW placed in front. Patient's posterior lean reduced, still present with additional STS allowing transfer to recliner chair. OT   Basic Activities of Daily Living  Feeding Comments: per daughter, patient required assistance self feeding today but typically does not   Grooming: stand by assistance minimal assistance  Grooming Comments: patient able to comb hair seated in recliner and apply deodorant seated on shower chair. Patient required Richard for oral hygiene/managing dentures   Upper Extremity Bathing: minimal assistance  Lower Extremity Bathing: moderate assistance   Bathing Equipment: none  Bathing Comments: Patient completed shower seated on shower chair in walk in shower with [facility] shower head. Patient required assistance rinsing and drying UB/LB for thoroughness. IV waterproofed. Upper Extremity Dressing: minimal assistance  Lower Extremity Dressing: maximum assistance  Dressing Comments: Richard for donning shirt. maxA for donning pullup, pants and socks. Patient was able to doff socks himself with SBA. Patient utilized grab bars for sit to stand from shower chair and balance while therapist managed pants over hips   Toileting: maximum assistance. Toileting Comments: patient unable to void, patient required maxA for clothing mgmt   General Comments: Increased time for ADLs.           SLP   Dysphagia: Severity: Mild   Goal not targeted this session                 Comprehension: Severity: Mild   -Pt with reduced comprehension of directions for all activities   -multiple repetitions, rewording, and a model provided, following sequencing activity (see below) pt stated he did not understand the activity      Expressive Language: Severity: Moderate   Convergent naming (3 clues)   -independently named item with 3 descriptive points with 53% accuracy, increased to 80% accuracy with min cues  -pt benefited from extended time to answer with 2-3 repetitions of listed words      Cognition: Severity: Moderate   Sequencing functional activities   -pt sequenced 5 written steps to use a call light with 60% accuracy with mod cues   -pt verbally repeated steps with 40% accuracy with a visual, did not increase despite max cuing    -pt able to state when to use call light in 4/4 opportunities      -pt unable to state next therapy despite visual aid (schedule) placed in front of him      -pt and daughter report pt is close to baseline with speech and cognition. Daughter expressed she wanted to continue therapy for 60 minutes a day at this time in order to engage patient's brain while he is here             Body mass index is 29.85 kg/m². Assessment and Plan:  Acute left corona radiata infarct: ASA, statin. PT/OT/SLP     Dementia: SLP     Hypothyroidism: Synthroid 125     HTN: Norvasc 5, hydralazine 25     COPD: Dulera, Singulair     HLD: Lipitor 20     Depression: Lexapro 20     Sinus infection: Augmentin completed     BPH: Ditropan and Flomax per home regimen    DARIAN: 2/2 poor PO intake, s/p gentle IVF, Resolved     Bowels: Per protocol  Bladder: Per protocol   Sleep: Trazodone provided prn.    Pain: Tylenol as needed  DVT PPx: Lovenox  CHRISTO: 10/25    Sheryl Vallecillo MD  10/22/2022, 10:46 AM

## 2022-10-23 PROCEDURE — 6360000002 HC RX W HCPCS: Performed by: PHYSICAL MEDICINE & REHABILITATION

## 2022-10-23 PROCEDURE — 2580000003 HC RX 258: Performed by: PHYSICAL MEDICINE & REHABILITATION

## 2022-10-23 PROCEDURE — 6370000000 HC RX 637 (ALT 250 FOR IP): Performed by: PHYSICAL MEDICINE & REHABILITATION

## 2022-10-23 PROCEDURE — 94761 N-INVAS EAR/PLS OXIMETRY MLT: CPT

## 2022-10-23 PROCEDURE — 1280000000 HC REHAB R&B

## 2022-10-23 PROCEDURE — 94640 AIRWAY INHALATION TREATMENT: CPT

## 2022-10-23 RX ADMIN — HYDRALAZINE HYDROCHLORIDE 25 MG: 25 TABLET, FILM COATED ORAL at 20:58

## 2022-10-23 RX ADMIN — LEVOTHYROXINE SODIUM 125 MCG: 0.12 TABLET ORAL at 07:24

## 2022-10-23 RX ADMIN — ENOXAPARIN SODIUM 40 MG: 100 INJECTION SUBCUTANEOUS at 08:36

## 2022-10-23 RX ADMIN — AMLODIPINE BESYLATE 5 MG: 5 TABLET ORAL at 08:37

## 2022-10-23 RX ADMIN — HYDRALAZINE HYDROCHLORIDE 25 MG: 25 TABLET, FILM COATED ORAL at 12:51

## 2022-10-23 RX ADMIN — HYDRALAZINE HYDROCHLORIDE 25 MG: 25 TABLET, FILM COATED ORAL at 07:23

## 2022-10-23 RX ADMIN — PANTOPRAZOLE SODIUM 40 MG: 40 TABLET, DELAYED RELEASE ORAL at 07:24

## 2022-10-23 RX ADMIN — Medication 10 ML: at 08:35

## 2022-10-23 RX ADMIN — Medication 10 ML: at 20:05

## 2022-10-23 RX ADMIN — STANDARDIZED SENNA CONCENTRATE AND DOCUSATE SODIUM 2 TABLET: 8.6; 5 TABLET ORAL at 08:36

## 2022-10-23 RX ADMIN — TAMSULOSIN HYDROCHLORIDE 0.4 MG: 0.4 CAPSULE ORAL at 08:38

## 2022-10-23 RX ADMIN — STANDARDIZED SENNA CONCENTRATE AND DOCUSATE SODIUM 2 TABLET: 8.6; 5 TABLET ORAL at 20:05

## 2022-10-23 RX ADMIN — ATORVASTATIN CALCIUM 20 MG: 20 TABLET, FILM COATED ORAL at 08:37

## 2022-10-23 RX ADMIN — Medication 2 PUFF: at 06:00

## 2022-10-23 RX ADMIN — OXYBUTYNIN CHLORIDE 10 MG: 5 TABLET, EXTENDED RELEASE ORAL at 08:37

## 2022-10-23 RX ADMIN — MONTELUKAST SODIUM 10 MG: 10 TABLET, FILM COATED ORAL at 20:05

## 2022-10-23 RX ADMIN — ESCITALOPRAM OXALATE 20 MG: 10 TABLET ORAL at 08:37

## 2022-10-23 RX ADMIN — Medication 2 PUFF: at 20:43

## 2022-10-23 RX ADMIN — ASPIRIN 81 MG 81 MG: 81 TABLET ORAL at 08:38

## 2022-10-23 RX ADMIN — CETIRIZINE HYDROCHLORIDE 10 MG: 10 TABLET, FILM COATED ORAL at 08:38

## 2022-10-23 RX ADMIN — FLUTICASONE PROPIONATE 1 SPRAY: 50 SPRAY, METERED NASAL at 08:42

## 2022-10-23 ASSESSMENT — PAIN SCALES - GENERAL
PAINLEVEL_OUTOF10: 0

## 2022-10-23 NOTE — PROGRESS NOTES
0230 Patient attempted to get out of bed. Patient was confused. Offered patient bathroom break; patient denied. Reoriented patient, patient closed eyes. No further needs assessed. [id number] patient called out confused wanting/attempted to get out of bed. Reoriented patient and asked patient to go back to sleep.     0300 PCA returned to patient's room from break. 0715 Patient sitting up in chair. Call light nearby. Fall precautions in place.  No further needs at this time

## 2022-10-23 NOTE — PROGRESS NOTES
Pt shaved and assisted with ADL's with staff PCA. Pt tolerated well. Pt' son visited early in shift.      Juana VELÁSQUEZN, RN   [phone]

## 2022-10-24 LAB
ANION GAP SERPL CALCULATED.3IONS-SCNC: 10 MMOL/L (ref 3–16)
BUN BLDV-MCNC: 25 MG/DL (ref 7–20)
CALCIUM SERPL-MCNC: 9.1 MG/DL (ref 8.3–10.6)
CHLORIDE BLD-SCNC: 107 MMOL/L (ref 99–110)
CO2: 22 MMOL/L (ref 21–32)
CREAT SERPL-MCNC: 1.2 MG/DL (ref 0.8–1.3)
GFR SERPL CREATININE-BSD FRML MDRD: 59 ML/MIN/{1.73_M2}
GLUCOSE BLD-MCNC: 123 MG/DL (ref 70–99)
HCT VFR BLD CALC: 32.6 % (ref 40.5–52.5)
HEMOGLOBIN: 11 G/DL (ref 13.5–17.5)
MCH RBC QN AUTO: 32.3 PG (ref 26–34)
MCHC RBC AUTO-ENTMCNC: 33.7 G/DL (ref 31–36)
MCV RBC AUTO: 95.8 FL (ref 80–100)
PDW BLD-RTO: 15.1 % (ref 12.4–15.4)
PLATELET # BLD: 170 K/UL (ref 135–450)
PMV BLD AUTO: 9.4 FL (ref 5–10.5)
POTASSIUM SERPL-SCNC: 4.4 MMOL/L (ref 3.5–5.1)
RBC # BLD: 3.4 M/UL (ref 4.2–5.9)
SODIUM BLD-SCNC: 139 MMOL/L (ref 136–145)
WBC # BLD: 4.1 K/UL (ref 4–11)

## 2022-10-24 PROCEDURE — 80048 BASIC METABOLIC PNL TOTAL CA: CPT

## 2022-10-24 PROCEDURE — 97535 SELF CARE MNGMENT TRAINING: CPT

## 2022-10-24 PROCEDURE — 97129 THER IVNTJ 1ST 15 MIN: CPT

## 2022-10-24 PROCEDURE — 2580000003 HC RX 258: Performed by: PHYSICAL MEDICINE & REHABILITATION

## 2022-10-24 PROCEDURE — 6370000000 HC RX 637 (ALT 250 FOR IP): Performed by: PHYSICAL MEDICINE & REHABILITATION

## 2022-10-24 PROCEDURE — 94761 N-INVAS EAR/PLS OXIMETRY MLT: CPT

## 2022-10-24 PROCEDURE — 97530 THERAPEUTIC ACTIVITIES: CPT

## 2022-10-24 PROCEDURE — 97130 THER IVNTJ EA ADDL 15 MIN: CPT

## 2022-10-24 PROCEDURE — 92507 TX SP LANG VOICE COMM INDIV: CPT

## 2022-10-24 PROCEDURE — 6360000002 HC RX W HCPCS: Performed by: PHYSICAL MEDICINE & REHABILITATION

## 2022-10-24 PROCEDURE — 97110 THERAPEUTIC EXERCISES: CPT

## 2022-10-24 PROCEDURE — 85027 COMPLETE CBC AUTOMATED: CPT

## 2022-10-24 PROCEDURE — 94640 AIRWAY INHALATION TREATMENT: CPT

## 2022-10-24 PROCEDURE — 1280000000 HC REHAB R&B

## 2022-10-24 RX ADMIN — MONTELUKAST SODIUM 10 MG: 10 TABLET, FILM COATED ORAL at 21:44

## 2022-10-24 RX ADMIN — FLUTICASONE PROPIONATE 1 SPRAY: 50 SPRAY, METERED NASAL at 09:48

## 2022-10-24 RX ADMIN — CETIRIZINE HYDROCHLORIDE 10 MG: 10 TABLET, FILM COATED ORAL at 09:46

## 2022-10-24 RX ADMIN — Medication 10 ML: at 09:47

## 2022-10-24 RX ADMIN — STANDARDIZED SENNA CONCENTRATE AND DOCUSATE SODIUM 2 TABLET: 8.6; 5 TABLET ORAL at 09:46

## 2022-10-24 RX ADMIN — TRAZODONE HYDROCHLORIDE 50 MG: 50 TABLET ORAL at 21:44

## 2022-10-24 RX ADMIN — HYDRALAZINE HYDROCHLORIDE 25 MG: 25 TABLET, FILM COATED ORAL at 21:44

## 2022-10-24 RX ADMIN — LEVOTHYROXINE SODIUM 125 MCG: 0.12 TABLET ORAL at 05:23

## 2022-10-24 RX ADMIN — OXYBUTYNIN CHLORIDE 10 MG: 5 TABLET, EXTENDED RELEASE ORAL at 09:46

## 2022-10-24 RX ADMIN — Medication 2 PUFF: at 05:25

## 2022-10-24 RX ADMIN — Medication 2 PUFF: at 19:54

## 2022-10-24 RX ADMIN — STANDARDIZED SENNA CONCENTRATE AND DOCUSATE SODIUM 2 TABLET: 8.6; 5 TABLET ORAL at 21:44

## 2022-10-24 RX ADMIN — ASPIRIN 81 MG 81 MG: 81 TABLET ORAL at 09:46

## 2022-10-24 RX ADMIN — PANTOPRAZOLE SODIUM 40 MG: 40 TABLET, DELAYED RELEASE ORAL at 05:23

## 2022-10-24 RX ADMIN — DIPHENHYDRAMINE HYDROCHLORIDE 25 MG: 25 TABLET ORAL at 21:44

## 2022-10-24 RX ADMIN — AMLODIPINE BESYLATE 5 MG: 5 TABLET ORAL at 09:47

## 2022-10-24 RX ADMIN — TAMSULOSIN HYDROCHLORIDE 0.4 MG: 0.4 CAPSULE ORAL at 09:46

## 2022-10-24 RX ADMIN — HYDRALAZINE HYDROCHLORIDE 25 MG: 25 TABLET, FILM COATED ORAL at 05:23

## 2022-10-24 RX ADMIN — ENOXAPARIN SODIUM 40 MG: 100 INJECTION SUBCUTANEOUS at 09:46

## 2022-10-24 RX ADMIN — ATORVASTATIN CALCIUM 20 MG: 20 TABLET, FILM COATED ORAL at 09:47

## 2022-10-24 RX ADMIN — ESCITALOPRAM OXALATE 20 MG: 10 TABLET ORAL at 09:46

## 2022-10-24 RX ADMIN — HYDRALAZINE HYDROCHLORIDE 25 MG: 25 TABLET, FILM COATED ORAL at 14:19

## 2022-10-24 ASSESSMENT — PAIN SCALES - GENERAL
PAINLEVEL_OUTOF10: 0
PAINLEVEL_OUTOF10: 0

## 2022-10-24 NOTE — PROGRESS NOTES
Physical Therapy    [facility] [address]   Phone: [phone]    Physical Therapy    [] Initial Evaluation            [x] Daily Treatment Note         [x] Discharge Summary      Patient: Julee Hagen   : 1935   MRN: [id number]   Date of Service:  10/24/2022  Admitting Diagnosis: Acute ischemic stroke [facility])  Current Admission Summary:  81 y.o. male who presented to ED for evaluation of slurred speech and generalized weakness/fatigue. Patient is a poor historian at this time. No family currently at bedside to provide history but daughter was at bedside in ED and provided supplemental history. Most of the information is derived from conversation with the emergency room PA and review of records. Daughter reports she noticed patient seemed more fatigued and weak today. He had difficulty walking due to weakness. She also noticed slurred speech. She denies any additional concerns. Patient denies any complaints at this time. Of note, patient was started on Augmentin on 10/6 for acute sinusitis, end date 10/20. Past Medical History:  has a past medical history of CAD (coronary artery disease). Past Surgical History:  has a past surgical history that includes Coronary artery bypass graft and Cardiac surgery. Discharge Recommendations: HHPT S3  DME Required For Discharge: patient has all required DME for discharge  Precautions/Restrictions: high fall risk  Weight Bearing Restrictions: no restrictions  [] Right Upper Extremity  [] Left Upper Extremity [] Right Lower Extremity  [] Left Lower Extremity     Required Braces/Orthotics: no braces required   [] Right  [] Left  Positional Restrictions:no positional restrictions    Pre-Admission Information   Lives With: Daughter, Trevor Smiley. Sarah are present during week days 8 AM - 2:30 PM (T-) and his son Meagan Damian present on Monday 8 AM - 2:30 PM. Trevor Smiley present during evening, overnight and weekends. Type of Home: apartment, . Comment: 3rd floor, senior living  -24 hr assist from family available  Home Layout: one level  Home Access: elevator  Bathroom Layout: walker accessible, wheelchair accessible, walk in shower, handicap height toilet  Bathroom Equipment: grab bars in shower, grab bars around toilet, shower chair, 3-in-1 commode  Home Equipment: rolling walker, single point cane, manual wheelchair - majority of time uses RW  Transfer Assistance: reports transfers \"on my own\" and uses RW or cane most of the time Comment: Ruth Vo states someone is always with him during transfers, may not provide assistance but can if needed  Ambulation Assistance:. Comment: per chart review, pt reports \"tremors\" when walking with RW, states a family member walks with him     ADL Assistance: . Comment: pt reports assist with bathing, reports getting self dressed   IADL Assistance: requires assistance with all homemaking tasks - daughters complete all IADL tasks   Active : [] yes             [x]no  Current Employment: . Comment: retired , [place] [employer]  Hobbies: used to enjoy [employer] [address] states he has fallen 3-4 times in past 6 months, reports commonly occurs when stepping backwards     Examination   Vision:   Vision Gross Assessment: Impaired and Vision Corrective Device: wears glasses at all times  [facility]        Subjective  General: Seated in chair at arrival. Agreed to therapy session.    Pain: 0/10  Pain Interventions: repositioned        Functional Mobility  Bed Mobility  Supine to Sit: minimal assistance  Sit to Supine: stand by assistance  Rolling Left: stand by assistance  Rolling Right: stand by assistance  Scooting: stand by assistance  Comments: performed from flat surface without railings  Transfers  Sit to stand transfer: minimal assistance  Stand to sit transfer: moderate assistance  Stand step transfer: minimal assistance with RW  Car transfer: minimal assistance with RW (pt able to get LEs in/out of car without assistance but needed help lowering and standing up from car seat)  Comments: STS transfers varied CGA to min A. Poor eccentric control lowering to surfaces. Max cues with RW especially during turns. Ambulation  Surface:level surface  Assistive Device: rolling walker  Assistance: minimal assistance  Distance: 15 ft, 175 ft in 2nd session   Gait Mechanics: Shuffling gait, decreased step height/length, max cues for walker proximity; assist with walker management during turns  Comments: Constant cues to increase step length    Ambulation Trial 2  Surface:carpet  Assistive Device: rolling walker  Assistance: minimal assistance  Distance: 10 ft   Gait Mechanics: same quality as above  Comments:      Stair Mobility  (2nd session)  Number of Steps: 4  Step Height: 4 inch  Hand Rails: (B) handrail  Device: no device  Assistance: contact guard assistance  Comments: cues to place entire foot on step   Wheelchair Mobility:  No w/c mobility completed on this date. Comments:  Balance  Static Sitting Balance: fair: maintains balance at CGA without use of UE support  Dynamic Sitting Balance: fair: maintains balance at CGA without use of UE support  Static Standing Balance: poor: requires mod (A) to maintain balance  Dynamic Standing Balance: poor: requires mod (A) to maintain balance  Comments: Picking up item from floor level deferred due to balance impairments. Other Therapeutic Interventions    First Session:  See functional mobility details to address goals. Seated manual HS stretch 2x30\" B.  Seated fwd flexion rolling large yellow ball x 10, seated OH ball raise with yellow ball x 10. Standing tall chair push/pull x 10, big step fwd/back x 10 B and 4inch alternating taps x 10 B. Second Session: Cotx with OT. Pt performed stairs and long distance ambulation as detailed above. STS transfers CGA to min A throughout.     Seated on wedge performing STS transfers while pushing fwd and elevating small bolster with UEs 2x10, seated on wedge trunk rotation with bolster and tc at trunk to increase ROM followed by standing trunk rotation with bolster x 10 each. CGA for balance. Returned to chair, alarm on and needs in reach. Functional Outcomes                 Cognition  Overall Cognitive Status: Impaired  Arousal/Alterness: appropriate responses to stimuli  Following Commands: follows multi step commands with repetition, follows multi step commands with increased time  Attention Span: attends with cues to redirect  Memory: appears intact  Safety Judgement: decreased awareness of need for assistance, decreased awareness of need for safety  Problem Solving: decreased awareness of errors, assistance required to identify errors made, assistance required to correct errors made  Insights: decreased awareness of deficits  Initiation: requires cues for all  Sequencing: requires cues for all  Orientation:    alert and oriented x 4  Command Following:   impaired    Education  Barriers To Learning: cognition  Patient Education: patient educated on goals, PT role and benefits, plan of care, general safety, family education, transfer training  Learning Assessment:  patient will require reinforcement due to cognitive deficits    Assessment  Activity Tolerance: decreased endurance but tolerates well, decreased carry-over secondary to cognition  Impairments Requiring Therapeutic Intervention: decreased functional mobility, decreased strength, decreased safety awareness, decreased cognition, decreased endurance, decreased balance, decreased coordination, vestibular impairment, decreased posture  Prognosis: fair  Clinical Assessment: Patient is 82 y/o male presenting to [facility] due to slurred speech and weakness. Pt varies with assistance levels needed to complete STS transfers secondary to pt mechanics of movement vary.  Pt often displays a posterior lean requiring increased assistance to maintain balance needed to complete a stand. Pt able to walk with RW and CGA when on a straight path but needs assistance for turns as walker gets away from patient and pt's shuffles more. Pt at risk for falls. Continued skilled PT after home discharge. Safety Interventions: patient left in chair, chair alarm in place, call light within reach, gait belt, patient at risk for falls, and family/caregiver present    Plan  Frequency: 5 x/week, 60 min/day  Current Treatment Recommendations: strengthening, balance training, functional mobility training, transfer training, gait training, endurance training, neuromuscular re-education, wheelchair mobility training, patient/caregiver education, safety education, and positioning    Goals  Patient Goals: Improve ambulation with RW   Short Term Goals:  Time Frame: 10-14 days  Patient will complete bed mobility at [facility] independent   Patient will complete transfers at supervision   Patient will ambulate 50 ft with use of rolling walker at supervision  Patient will complete car transfer at supervision    No goals met by discharge. Will need 24/7 supervision/assistance for functional mobility.      Therapy Session Time      Individual Group Co-treatment   Time In 0845       Time Out 0930       Minutes 39              Second Session Therapy Time:   Individual Concurrent Group Co-treatment   Time In      6404   Time Out      1315   Minutes      30     Timed Code Treatment Minutes:  75    Total Treatment Minutes:  75      Electronically Signed By: Brock Munguia, [id number]

## 2022-10-24 NOTE — PROGRESS NOTES
[facility]   Phone: [phone]    Speech Therapy    [] Initial Evaluation            [x] Daily Treatment Note         [x] Discharge Summary      Patient: Stacy Garza   : 1935   MRN: [id number]   Date of Service:  10/24/2022  Admitting Diagnosis: Acute ischemic stroke [facility])  Current Admission Summary: 78-year-old male with a history of CAD and dementia who was admitted on 10/7 with slurred speech and fatigue. Concern for stroke. CT head was unremarkable for acute findings. CTA without occlusion. MRI confirmed an acute infarct in the left corona radiata. Neurology evaluated and suggested an aspirin, statin, and formal stroke work-up. He was evaluated by therapy and suggested to continue in an inpatient setting prior to returning home. He has no current complaints. Past Medical History:  has a past medical history of CAD (coronary artery disease). Past Surgical History:  has a past surgical history that includes Coronary artery bypass graft and Cardiac surgery. Precautions/Restrictions: high fall risk      Pre-Admission Information   Living Status: Pt lives in an independent living facility (daughter stays with pt and they have around the care assistance from family)   Occupation/School: Retired from [employer] reports highest level of education being 8th grade   Medication Management: :  []Primary   []Secondary [x]No  Finance Management: []Primary   []Secondary [x]No  Active :   []Yes         [x]No (reportedly stopped driving 4 years ago)   Hearing:    WFL  Vision:    Vision Corrective Device: wears glasses at all times      Subjective  General: First Session: Pt asleep in chair, easily awoken upon entrance. Son present at end of session. Student nurses in observing session       Second Session: Pt upright in chair, awake and alert. Pt pleasant throughout session. Pt less intelligible, reports feeling congested in his throat. Pt's voice significantly dysphonic and hoarse further impacting speech intelligibility. Pain: Denies    Safety Interventions: patient left in chair, chair alarm in place, call light within reach, patient at risk for falls, and camera in place left in presence of son following the first session       Therapeutic Interventions:       Session 1:   Dysphagia: Severity: Mild  2/2 presbyphagia   GOAL MET (monitor as needed)     Comprehension: Severity: Mild   Yes/no   -pt answered complex yes/no questions involving time prepositions (before/after) with 86% accuracy, increased to 100% when question was restated      Expressive Language: Severity: Moderate   Convergent naming   -pt stated object from 3 attributes with 40% accuracy, increased to 60% with repetition of attributes formed into a sentence (time, hands, wristband vs this tells time, has hands and a wrist band), increased to 75% with given mod cues        Cognition: Severity: Moderate   Fx recall   -pt recalled activities from the weekend and the morning (visitors, therapy) with 100% accuracy  -pt able to state discharge date of tomorrow but stated he thought it was originally scheduled for today     Orientation  -pt oriented to RAZA, Month, Year  -when presented with calendar and verbal information, pt corrected incorrect date (grandson's birthday stating it was recorded in the wrong month)    Personal hx   -pt recalled past family events (trips) with 100% accuracy, pt able to name state but unable to name city they visited, required prompting from son     Sequencing 5 steps   -pt verbally sequenced 5 steps for a basic activity (brushing teeth) with 60% accuracy, pt required min cues for initiation, thought organization and expansion (I.e. step 1 is). Pt skipped 2 steps but added them with min cues for forgotten step.  Following total verbalization of steps, pt restated steps with 80% accuracy, pt again forgot first step (get out toothbrush)   -After verbalizing steps, pt sequenced 5 written steps using a visual (steps typed out) choosing steps from a field of 2 with 50% accuracy. Pt stated he could read the cards but suspect difficulty reading impacted accuracy. Accuracy improved to 80% when choices were verbalized and SLP grad student manipulated visual stimuli.  -following organization of visual stimuli and SLP grad student verbally reviewing correct order, pt verbalized steps with 60% accuracy, accuracy increased to 80% with initiation cues (I.e step 1 is), pt still forgetting step 1 despite ongoing repetition and exposure     Motor speech: Severity: Mild   Goal not targeted this session     Additional Interventions:     Session 2:   Dysphagia: Severity: Mild    GOAL MET (monitor as needed)      Comprehension: Severity: Mild   Direction comprehension of newly learned multi-step task (kings in the corner)   -pt with reduced comprehension of card game rules illustrated by frequent need for repetition of rules (card color, descending)  -while playing with an open hand, pt confusing cards in his hand and SLP grad student hand with cards on the table for play. Despite multiple repetitions pt continued to try to play new cards from the cards in his hand      Expressive Language: Severity: Moderate    [place] divergent naming   -alternating between 4 categories, pt stated 28 items, pt repeated items from category x 3 requiring cue to state a new item  -increased task difficulty by giving pt a letter that each word had to start with, pt independently stated items with 50% accuracy, increased to 75% with max cues (semantic and phonemic cues)      Cognition: Severity: Moderate   Card game (kings in the corner)   -pt recalled playing the game from session last week, but unable to state name. Pt could state name when given a carrier phrase (kings in the. ...).   -pt presented with visual aid (written directions) but not utilized during game, suspect pt with difficulty reading due to visual impairments  -pt stated the color of a card that could be played with 62% accuracy   -pt stated the number that could be played with 75% accuracy   -during play pt identified he had to draw in 3/3 situations   -with mod cues, pt identified it was the end of his turn in 3/3 opportunities      Motor speech: Severity: Mild    Goal not targeted this session    Additional Interventions:      Education  Barriers To Learning: cognition and reading  Patient Education: Provided education regarding role of SLP, results of assessment, recommendations and general speech pathology plan of care. Learning Assessment: Pt requires ongoing learning     Assessment  Impairments Requiring Therapeutic Intervention: Receptive Aphasia , Expressive Aphasia , Dysarthria , Cognitive-Linguistic Deficits , and Oropharyngeal Dysphagia   Prognosis: good    Clinical Assessment:    Pt continues to present with Moderate  Receptive/ expressive deficits, Cognitive-Linguistic Deficits and mild dysarthria. Pt is demonstrating progress with divergent and convergent naming tasks. Progress towards goals is limited secondary to reduced short term memory impacting ability to remember previous education/ speech strategies. Pt with improved use of compensatory speech strategies in structured tasks with poor carryover in conversation as speech becomes more unintelligible when pt attempting to converse in longer utterances due to dysarthria. Pt also continues to present with moderate dysphonia and vocal hoarseness that fluctuates throughout the day further exacerbating intelligibility. Pt's family reports pt's voice/ speech are at baseline. No further speech therapy for speech/ cognition indicated at discharge as pt appears to be at baseline level per family report and due to limited progress towards goals. Plan  Frequency: 60 minutes/day; 5 days per week, as tolerated, until goals met, or discharged from ARU.   Therapeutic Interventions: Diet Tolerance Monitoring , Patient/Family Education , Therapeutic Trials with SLP  Expressive/ Receptive Language intervention , Cognitive-Linguistic intervention , and Patient/ Family education   Discharge Recommendations: TBD   Continued SLP at Discharge: TBD based upon progress     Goals  Time Frame: 7-10 days     Pt will tolerate recommended diet and advanced trials with no overt s/s of aspiration. GOAL MET   Pt will complete auditory comprehension tasks with 80% accuracy. GOAL MET  Patient will complete verbal description and word retrieval tasks with 80% accuracy. Goal not met  Pt will recall functional information (daily tasks, personal hx, etc.) with 80% accuracy. GOAL MET (for stating personal hx and daily tasks with set up of cognitive aid/ min cues)   Patient will complete functional problem-solving tasks for daily situations with 80% accuracy. Goal not met   Pt will improve speech intelligibility to 80% with use of speech strategies during conversations. Goal not met during conversation (goal met during structured tasks - baseline for pt)       Therapy Session Time      Session 1 Session 2   Time In 1000 1115   Time Out 1030 1145   Time Code Minutes 20 15   Individual Minutes 30 30     Timed Code Treatment Minutes:  35  Total Treatment Minutes:  60    Electronically Signed By:   Beatriz KC CCC-SLP #[id number] 10/24/2022 11:12 AM  Speech-Language Pathologist    Eric SARKAR,  Speech-Language Pathology     The speech-language pathologist was present, directed the patient's care, made skilled judgment and was responsible for assessment and treatment.

## 2022-10-24 NOTE — CARE COORDINATION
SW met with patient's daughter. Daughter stating that patient is ready for discharge on tomorrow. Patient's daughter stating that patient's other daughter and son will transport patient @ 10:00 AM on 10/25/2022.     Electronically signed by SARAH Perez on 10/24/2022 at 5:51 PM

## 2022-10-24 NOTE — PROGRESS NOTES
[facility]   Phone: [phone]    Occupational Therapy    [] Initial Evaluation            [x] Daily Treatment Note         [x] Discharge Summary      Patient: Odalys Arroyo   : 1935   MRN: [id number]   Date of Service:  10/24/2022    Admitting Diagnosis:  Acute ischemic stroke [facility])  Current Admission Summary: 62-year-old male with a history of CAD and dementia who was admitted on 10/7 with slurred speech and fatigue. Concern for stroke. CT head was unremarkable for acute findings. CTA without occlusion. MRI confirmed an acute infarct in the left corona radiata. Neurology evaluated and suggested an aspirin, statin, and formal stroke work-up. He was evaluated by therapy and suggested to continue in an inpatient setting prior to returning home. He has no current complaints. Past Medical History:  has a past medical history of CAD (coronary artery disease). Past Surgical History:  has a past surgical history that includes Coronary artery bypass graft and Cardiac surgery. Discharge Recommendations: Home with HHOT and 24 hour supervision/assistance     DME Required For Discharge: pt owns needed DME    Precautions/Restrictions: high fall risk  Weight Bearing Restrictions: no restrictions     Required Braces/Orthotics: no braces required  Positional Restrictions:no positional restrictions    Pre-Admission Information   Lives With: daughter Family is always present. Daughter, Ian Hatch. Sarah are present during week days 8 AM - 2:30 PM (-) and his son Diana Martinez present on Monday 8 AM - 2:30 PM. Ian Hatch present during evening, overnight and weekends.     Type of Home: apartment  Home Layout: one level  Home Access: elevator  Bathroom Layout: walker accessible, wheelchair accessible, walk in shower, handicap height toilet  Bathroom Equipment: grab bars in shower, grab bars around toilet, shower chair, 3-in-1 commode  Toilet Height: elevated height  Home Equipment: rolling walker, rollator - 4 wheeled walker, single point cane, manual wheelchair, reacher  Transfer Assistance: modified independent with use of RW --occassionally family provides assistance but is always present   Ambulation Assistance:modified independent with use of RW -typically has someone close by. use wheelchair for community   ADL Assistance: requires assistance with bathing, requires assistance with dressing, requires assistance with toileting able to self feed. Daughter reports they assist with footwear and occasionally clothing mgmt during toileting. Patient is typically continent but wears pullups   IADL Assistance: requires assistance with all homemaking tasks  Active :        [] Yes  [x] No  Hand Dominance: [] Left  [x] Right  Current Employment: retired. 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  Daughter reports 3-4 falls within past 6 months         Subjective:   General: pt in bed at entry with RN students assisting in kasie hygiene and brief change bed level- pt agreeable to session and shower   Pain: 0/10  Pain Interventions: not applicable      Activities of Daily Living  Basic Activities of Daily Living  Feeding: setup assistance  Grooming: setup assistance  Grooming Comments: oral care and combing hair seated at sink   Upper Extremity Bathing: stand by assistance  Lower Extremity Bathing: minimal assistance   Bathing Comments: completed on TTB in shower- stances to grab bar; VC for initiation of UB bathing/drying thoroughly; assist to thoroughly dry feet + standing balance (CGA to Richard) for pt to completed kasie hygiene   Upper Extremity Dressing: setup assistance  Lower Extremity Dressing: minimal assistance  Dressing Comments: intermittent assist for orientation of LB clothing and fully threading feet into pants, CGA for clothing over hips- increased time and VC for initiation and problem solving Toileting: minimal assistance. Toileting Comments: pt required assist for thorough kasie hygiene and cga for balance during clothing over hips- voided urine seated on commode  General Comments: Pt ambulated to bathroom for ADL needs- toileting completed prior to UB/LB bathing seated on TTB in shower- post dressing pt transferred to w/c for grooming at sink    Instrumental Activities of Daily Living  No IADL completed on this date. Functional Mobility    Bed Mobility  Supine to Sit: minimal assistance  Comments: assist to trunk, HOB flat  Transfers  Sit to stand transfer:contact guard assistance, minimal assistance  Stand to sit transfer: minimal assistance  Toilet transfer: minimal assistance  Toilet transfer equipment: drop-arm bariatric bedside commode, grab bars, walker  Toilet transfer comments: grab bar on R, cue for proximity to commode   Shower transfer: minimal assistance  Shower transfer equipment: tub transfer bench, grab bars  Shower transfer comments: use of RW to shower w/ Sayda for turn and slow/controled descent, CGA w/ grab bars for TTB> wc post.   Comments: VC for sequence, hand placement, and proximity to surfaces at times; poor eccentric control     Functional Mobility:  Sitting Balance: supervision, stand by assistance. Standing Balance: contact guard assistance, minimal assistance. - posterior lean upon stances ~50% of time w/ Min to correct   Functional Mobility: .  minimal assistance  Functional Mobility Activity: to/from bathroom  Functional Mobility Device Use: rolling walker  Functional Mobility Comment: assist for RW management and proximity for safety      Other Therapeutic Interventions        Second Session:  Pt in recliner at entry, agreeable to session, denied pain and declined ADL needs. Pt ambulated 175ft with RW at [facility] 22 for navigating turns secondary to poor ability to maintain safe RW proximity.  Sit<>stands at [facility] 62 to [place] secondary to intermittent posterior lean upon stances. Extensive VC for proximity to surfaces w/ RW during turns prior to stand>sits. Pt completed (4) 4inch steps at [place] [address], VC for hand placement- ascended w/ reciprocal pattern, descending w/ step-to. Dynamic EOM activities to increase balance, trunk rotations, and forward weight shift facilitation: seated on high-perch on wedge pt complete 10 reps x2 of sit<>stands holding small bolster (B) with anterior and upward visual/verbal cues, seated trunk rotation holding bolster (B), standing trunk rotation holding bolster (B)- assist of 2 for all activities, variable assist anterior to pt, after reps w/ HOHA pt able to carryover ~75% of the time w/ verbal/visual cues provided. Pt assisted back to room at EOS, left in recliner, chair alarm on, call light and needs in reach.        Cognition  Overall Cognitive Status: Impaired  Arousal/Alterness: appropriate responses to stimuli, delayed responses to stimuli  Following Commands: follows one step commands consistently, follows multi step commands with repetition, follows multi step commands with increased time  Attention Span: attends with cues to redirect  Memory: decreased recall of recent events, decreased short term memory  Safety Judgement: decreased awareness of need for assistance, decreased awareness of need for safety  Problem Solving: assistance required to generate solutions, assistance required to implement solutions, decreased awareness of errors  Insights: decreased awareness of deficits  Initiation: requires cues for all  Sequencing: requires cues for some  Orientation:    alert and oriented x 4  Command Following:   impaired     Education  Barriers To Learning: cognition  Patient Education: patient educated on goals, OT role and benefits, plan of care, ADL adaptive strategies, proper use of assistive device/equipment, family education, transfer training  Learning Assessment:  patient verbalizes understanding, would benefit from continued reinforcement, patient is not an independent learner    Assessment  Activity Tolerance: tolerated well overall, rest taken as needed; overall session limited d/t multiple toileting needs   Impairments Requiring Therapeutic Intervention: decreased functional mobility, decreased ADL status, decreased safety awareness, decreased endurance, decreased balance, decreased coordination, decreased posture  Prognosis: fair  Clinical Assessment: Patient presents below baseline function secondary to acute infarct in left corona radiata. Patient typically able to complete transfers and functional mobility with supervision and requires assistance for ADLs. Pt has progressed fairly throughout during stay- CGA to Sayda for mobility with RW required d/t balance and decreased righting reactions. Pt's activity tolerance has improved throughout stay. Ongoing ADL assist required at d/c d/t assist required for problem solving and for balance during LB tasks in stance. Pt continues to demo poor righting reactions a mobility/ADL completion making him a  high fall risk. Ongoing OT services uppon d/c recommended in order to maximize patients safety and decrease caregiver burden.     Safety Interventions: patient left in chair, chair alarm in place, call light within reach, gait belt, patient at risk for falls, and telesitter in use    Plan  Frequency: 5 x/week, 60 min/day  Current Treatment Recommendations: strengthening, ROM, balance training, functional mobility training, transfer training, endurance training, neuromuscular re-education, patient/caregiver education, and ADL/self-care training    Goals  Patient Goals: improve transfers and mobility    Short Term Goals:  Time Frame: 10-14 days   Patient will complete upper body ADL at supervision - goal not met  Patient will complete lower body ADL at supervision - goal not met  Patient will complete toileting at supervision - goal not met  Patient will complete grooming at set up assistance -- goal met if pt seated at sink  Patient will complete functional transfers at supervision - goal not met         Therapy Session Time     Individual Group Co-treatment   Time In 0730     Time Out 0815     Minutes 45         Second Session Therapy Time:   Individual Concurrent Group Co-treatment   Time In       6122   Time Out       1315   Minutes      30     Timed Code Treatment Minutes: 14+82  Total Treatment Minutes: 75      Electronically Signed By: SARWAT Boland/AUREA #[id number]

## 2022-10-24 NOTE — PLAN OF CARE
Problem: Discharge Planning  Goal: Discharge to home or other facility with appropriate resources  10/23/2022 2002 by Winnie Zaidi RN  Outcome: Progressing  Flowsheets (Taken 10/23/2022 0828 by Shawn Gayle RN)  Discharge to home or other facility with appropriate resources: Identify barriers to discharge with patient and caregiver     Problem: Safety - Adult  Goal: Free from fall injury  10/23/2022 2002 by Winnie Zaidi RN  Outcome: Progressing     Problem: ABCDS Injury Assessment  Goal: Absence of physical injury  10/23/2022 2002 by Winnie Zaidi RN  Outcome: Progressing     Problem: Pain  Goal: Verbalizes/displays adequate comfort level or baseline comfort level  10/23/2022 2002 by Winnie Zaidi RN  Outcome: Progressing  Flowsheets (Taken 10/23/2022 0828 by Shawn Gayle RN)  Verbalizes/displays adequate comfort level or baseline comfort level:   Encourage patient to monitor pain and request assistance   Assess pain using appropriate pain scale     Problem: Skin/Tissue Integrity  Goal: Absence of new skin breakdown  Description: 1. Monitor for areas of redness and/or skin breakdown  2. Assess vascular access sites hourly  3. Every 4-6 hours minimum:  Change oxygen saturation probe site  4. Every 4-6 hours:  If on nasal continuous positive airway pressure, respiratory therapy assess nares and determine need for appliance change or resting period.   10/23/2022 2002 by Winnie Zaidi RN  Outcome: Progressing     Problem: Chronic Conditions and Co-morbidities  Goal: Patient's chronic conditions and co-morbidity symptoms are monitored and maintained or improved  10/23/2022 2002 by Winnie Zaidi RN  Outcome: Lizabeth Burch (Taken 10/23/2022 0828 by Shawn Gayle RN)  Care Plan - Patient's Chronic Conditions and Co-Morbidity Symptoms are Monitored and Maintained or Improved: Monitor and assess patient's chronic conditions and comorbid symptoms for stability, deterioration, or improvement

## 2022-10-24 NOTE — DISCHARGE INSTRUCTIONS
We hope your stay on rehab has exceeded your expectations. Once again the entire Acute Rehab Staff at [facility] wish to thank you for allowing us the privilege to care for you. A few days after you are discharged from Rehab, you will receive a survey [employer]) in the mail. This is a nationally distributed survey sent to thousands of rehab patients throughout the nation. It is very important to the staff and Dr. Brianne Horta to receive feedback based on your experience on the Rehab Unit. Thank you, we wish you good health always,         Acute Rehab Team      Hospital Preference:     [facility] [address] Diagnosis/Conditions    _______________________ (free text)    Emergency Contact:    ________________________________________Phone#________________________      Advanced Directives:    Code Status: ?  []  Full Code  ? []  DNR  ? []  [facility]  ? []  [facility] - Arrest    (as of date of discharge:  _________)      Medical POA: ?  []   Yes ______________________________ ? []   No                                       (Name and phone number)                     Living Will:   ?   []   Yes    ?  []   No        Insurance Information:    _______________________ (free text)      Individual Responsible  for the coordination of the discharge/follow up:    ______________________________________________________    Functional Status:    VISUAL DEFICITS:    Yes [x]  No  []       If yes, assisted device:   Wears Glasses Yes [x]  No  []  Wears Contacts  Yes []  No  []  Legally Blind Yes []  No  []    HEARING DEFICITS:    Yes [x]  No  []       If yes, assisted device:   Wears Hearing Aids Yes []  No  [x]  Pocket Talker  Yes []  No  [x]       Physical Therapist & Contact #:  OccupationalTherapist & Contact #: Aleja Guzman [phone]   Speech Therapist & Contact #: Samantha KC  [facility] [phone]     Activities of Daily Living:     ADL's - Adaptive Equipment used: shower chair with back, grab bars, hand held showerhead,      []  Independent ? []  Modified Independent ? []  Supervision                [x]  Minimal Assistance ? []  Moderate Assistance ? []  Maximal Assistance     ** patient will require assistance with all ADLs for initiation/sequencing and for safety. Any activity that requires standing while pt is using upper extremities (such as managing clothing over hips)  will require someone to be contact-guard/touching assist for patient to ensure safety and assist with balance if needed (recommend pt hold on to bilateral upper extremity support such as grab bar or RW while helper completes the tasks, such as clothing management or perineal hygiene, to ensure maximize safety at home for patient and caregiver). Driving Restriction:      [x]  YES   [] NO     Mobility:     Ambulation: Device _____________________ (free text)     []  Independent ? []  Modified Independent ? []  Supervision                []  Minimal Assistance ? []  Moderate Assistance ? []  Maximal Assistance     Stairs:  Device _____________________ (free text)    Number of stairs: _____________ (free text)    Handrails:    [] Right   [] Left      [] Bilateral   []  Independent ? []  Modified Independent ? []  Supervision                []  Minimal Assistance ? []  Moderate Assistance ? []  Maximal Assistance     Wheelchair:     ? []  Independent ? []  Modified Independent ? []  Supervision                 []  Minimal Assistance  ? []  Moderate Assistance ? []  Maximal Assistance       Current Diet Consistency:?       [x]  Regular   [] Soft and Bite-Sized  ? [] Minced and Moist     ?[]  Puree     Current Liquid Level:?         [x] Thin Liquids (no straws)    [] Mildly Thick (Nectar) ?     [] Moderately Thick (Honey)    [] Pudding Thick    [] Raejean Sturgis Water Protocol     Dietary Restrictions:?      [x]  General Diet   []  Tube Feed, w/ Diet   []  Tube Feed, NPO   []  Carb Control   []  Cardiac   [] Renal    []  Other:

## 2022-10-24 NOTE — PLAN OF CARE
Problem: Discharge Planning  Goal: Discharge to home or other facility with appropriate resources  Outcome: Progressing  Flowsheets (Taken 10/24/2022 0945)  Discharge to home or other facility with appropriate resources: Identify barriers to discharge with patient and caregiver     Problem: Safety - Adult  Goal: Free from fall injury  Outcome: Progressing     Problem: ABCDS Injury Assessment  Goal: Absence of physical injury  Outcome: Progressing     Problem: Pain  Goal: Verbalizes/displays adequate comfort level or baseline comfort level  Outcome: Progressing     Problem: Skin/Tissue Integrity  Goal: Absence of new skin breakdown  Description: 1. Monitor for areas of redness and/or skin breakdown  2. Assess vascular access sites hourly  3. Every 4-6 hours minimum:  Change oxygen saturation probe site  4. Every 4-6 hours:  If on nasal continuous positive airway pressure, respiratory therapy assess nares and determine need for appliance change or resting period.   Outcome: Progressing     Problem: Chronic Conditions and Co-morbidities  Goal: Patient's chronic conditions and co-morbidity symptoms are monitored and maintained or improved  Outcome: Progressing  Flowsheets (Taken 10/24/2022 0904)  Care Plan - Patient's Chronic Conditions and Co-Morbidity Symptoms are Monitored and Maintained or Improved: Monitor and assess patient's chronic conditions and comorbid symptoms for stability, deterioration, or improvement

## 2022-10-24 NOTE — PATIENT CARE CONFERENCE
[facility]  Inpatient Rehabilitation  Weekly Team Conference Note    Patient Name: Allen Louise        MRN: [id number]    : 1935  (81 y.o.)  Gender: male           The team conference for this patient was held on 10/25/22 at 08:30am and led by:  Bertha Hurd MD    CASE MANAGEMENT:  Assessment:   Patient lives at home with his daughter. Children provide 24/7 care and support to patient. Patient was active with with [address] for PT/OT/Nurse/Aide. Patient is also active with [facility] for: Housekeeping and aide services. While in ARU patient was followed by PT/OT and SLP. Patient is discharging to home with home health care PT/OT/SLP and support from family. PHYSICAL THERAPY:    Bed Mobility  Supine to Sit: minimal assistance  Sit to Supine: stand by assistance  Rolling Left: stand by assistance  Rolling Right: stand by assistance  Scooting: stand by assistance  Comments: performed from flat surface without railings  Transfers  Sit to stand transfer: minimal assistance  Stand to sit transfer: moderate assistance  Stand step transfer: minimal assistance with RW  Car transfer: minimal assistance with RW (pt able to get LEs in/out of car without assistance but needed help lowering and standing up from car seat)  Comments: STS transfers varied CGA to min A. Poor eccentric control lowering to surfaces. Max cues with RW especially during turns.    Ambulation  Surface:level surface  Assistive Device: rolling walker  Assistance: minimal assistance  Distance: 15 ft, 175 ft in 2nd session   Gait Mechanics: Shuffling gait, decreased step height/length, max cues for walker proximity; assist with walker management during turns  Comments: Constant cues to increase step length     Ambulation Trial 2  Surface:carpet  Assistive Device: rolling walker  Assistance: minimal assistance  Distance: 10 ft   Gait Mechanics: same quality as above  Comments:       Stair Mobility  (2nd assistance  Reason if not Attempted: Not attempted due to medical condition or safety concerns  CARE Score: 3  Discharge Goal: Supervision or touching assistance  12 Steps  Reason if not Attempted: Not attempted due to medical condition or safety concerns  CARE Score: 88  Discharge Goal: Substantial/maximal assistance  Picking Up Object  Assistance Needed: Partial/moderate assistance  CARE Score: 3  Discharge Goal: Supervision or touching assistance  Wheelchair Ability  Uses a Wheelchair and/or Scooter?: No    Goals:                   Short Term Goals:  Time Frame: 10-14 days  Patient will complete bed mobility at [facility] independent   Patient will complete transfers at supervision   Patient will ambulate 50 ft with use of rolling walker at supervision  Patient will complete car transfer at supervision               These goals were reviewed with this patient at the time of assessment and Mary Carmen Pedersen is in agreement. Plan of Care: Pt to be seen 5 out of 7 days per week per ARU protocol ( 75minutes with PT)                     SPEECH THERAPY:    Diet Level:ADULT DIET; Regular; No Drinking Straws    Assessment: Impressions  Diagnosis: Pt continues to present with Moderate  Receptive/ expressive deficits, Cognitive-Linguistic Deficits and mild dysarthria. Pt is demonstrating progress with divergent and convergent naming tasks. Progress towards goals is limited secondary to reduced short term memory impacting ability to remember previous education/ speech strategies. Continue to target comprehension, cognitive skills, speech intellgibility and language skills to facilitate safe return to prior level of independence. Goals:               Short Term Goals  Time Frame for Short Term Goals: 7-10 days  Goal 1: Pt will tolerate recommended diet and advanced trials with no overt s/s of aspiration. GOAL MET (monitor as needed)  Goal 2: Pt will complete auditory comprehension tasks with 80% accuracy.  Progressing, continue  Goal 3: -  Goal 4: Patient will complete verbal description and word retrieval tasks with 80% accuracy. Prgressing, continue  Goal 5: Pt will recall functional information (daily tasks, personal hx, etc.) with 80% accuracy. Progressing, continue  Goal 6: Patient will complete functional problem-solving tasks for daily situations with 80% accuracy.  Progressing, continue              Time Frame for Short Term Goals: 7-10 days    Plan of Care:  Pt to be seen 5 out of 7 days per week per ARU protocol ( 60 minutes with SLP)    OCCUPATIONAL THERAPY:    ADL: UB bathing and dressing- variable Min to SBA; LB bathing with Sayda; LB dressing with variable Min to [address]; Toileting Sy[place]     Toilet Transfers: Sayda to/from [facility] placed over commode, use of grab bar     Tub/ShowerTransfers:Sayda to/from TTB with use of RW to shower; CGA to SPT to w/c post shower w/ use of grab bars    QM:  Eating  Assistance Needed: Setup or clean-up assistance  CARE Score: 5  Discharge Goal: Set-up or clean-up assistance  Oral Hygiene  Assistance Needed: Supervision or touching assistance  CARE Score: 4  Discharge Goal: Set-up or clean-up assistance  [address] needed: Substantial/maximal assistance  CARE Score: 2  Discharge Goal: Supervision or touching assistance  Toilet Transfer  Assistance needed: Supervision or touching assistance  CARE Score: 4  Discharge Goal: Supervision or touching assistance  Shower/Bathe Self  Assistance Needed: Partial/moderate assistance  CARE Score: 3  Discharge Goal: Partial/moderate assistance  Upper Body Dressing  Assistance Needed: Partial/moderate assistance  CARE Score: 3  Discharge Goal: Supervision or touching assistance  Lower Body Dressing  Assistance Needed: Substantial/maximal assistance  CARE Score: 2  Discharge Goal: Supervision or touching assistance  Putting On/Taking Off Footwear  Assistance Needed: Substantial/maximal assistance  CARE Score: 2  Discharge Goal: Partial/moderate assistance    Goals:               :Short Term Goals:  Time Frame: 10-14 days   Patient will complete upper body ADL at supervision   Patient will complete lower body ADL at supervision   Patient will complete toileting at supervision   Patient will complete grooming at set up assistance   Patient will complete functional transfers at supervision      These goals were reviewed with this patient at the time of assessment and Sudeep Beard is in agreement    Plan of Care:  Pt to be seen 5 out of 7 days per week per ARU protocol ( 60 minutes with OT)     NUTRITION:  Weight: 220 lb 1 oz (99.8 kg) / Body mass index is 29.85 kg/m². Diet Order:Regular, no straws    Supplements:NA    Po intake greater than 75% of meals. Wt is stable. Please see nutrition note for details. NURSING:    Risk for Readmission: 18%    Bryan Fall Risk Score: 94  Wounds/Incisions/Ulcers: N/A  Medication Review: With patient and family  Pain: No complaints of pain  Consultations/Labs/X-rays: BMP & CBC MWF    Patient/Family Education provided by team:    Discharge Plan   Estimated Length of Stay:0 days  Destination: Home  Pass:No  Services at Discharge: home PT and OT  Equipment at Discharge: has needed equipment   Factors facilitating achievement of predicted outcomes: Family support, Cooperative, and Pleasant  Barriers to the achievement of predicted outcomes: Cognitive deficit and Limited safety awareness, long standing deficits     Patient Goals:  Improve transfers and mobility, and ambulation with RW    Interdisciplinary Individualized Plan of Care Review of Previous Week:    Medical and functional progress towards goals:  Medically doing well, labs and vitals stable, ready for discharge. Pt progressing with transfers and ambulation, increased ambulation distance, activity tolerance improved, increasingly independent with ADLs, but still requires assistance.   Pt still with cognitive impairment and decreased safety awareness. Barriers towards progress:  Impaired balance, decreased activity tolerance, decreased cognition  Interventions to address Barriers:  Recommend 24/7 supv/assist at discharge, family educated on pt's progress and current level of required assistance, continue PT/OT/SLP. Goals still appropriate:  Yes  Modifications to goals: None  Continue Current Plan of Care:  Yes  Modifications to Plan of Care: None    Rehab Team Members in attendance for Team Conference:  Guido Lo, MSW, LSW    Jacqualine Brittle, RD, LD    Ranulfo Rea OTR/L    Sarah Waggoner, PT, DPT    Erin Leavitt M.A., Kit Still, REYNALDO Chiang PT, DPT,     I approve the established interdisciplinary plan of care as documented within the medical record of Stacy Garza.     Rah Rincon MD   Electronically signed by Rah Rincon MD on 10/25/2022 at 8:38 AM

## 2022-10-25 VITALS
RESPIRATION RATE: 18 BRPM | HEIGHT: 72 IN | HEART RATE: 66 BPM | WEIGHT: 220.06 LBS | TEMPERATURE: 98.2 F | BODY MASS INDEX: 29.81 KG/M2 | OXYGEN SATURATION: 98 % | SYSTOLIC BLOOD PRESSURE: 133 MMHG | DIASTOLIC BLOOD PRESSURE: 65 MMHG

## 2022-10-25 PROCEDURE — 94761 N-INVAS EAR/PLS OXIMETRY MLT: CPT

## 2022-10-25 PROCEDURE — 94640 AIRWAY INHALATION TREATMENT: CPT

## 2022-10-25 PROCEDURE — 6370000000 HC RX 637 (ALT 250 FOR IP): Performed by: PHYSICAL MEDICINE & REHABILITATION

## 2022-10-25 PROCEDURE — 6360000002 HC RX W HCPCS: Performed by: PHYSICAL MEDICINE & REHABILITATION

## 2022-10-25 RX ADMIN — FLUTICASONE PROPIONATE 1 SPRAY: 50 SPRAY, METERED NASAL at 09:11

## 2022-10-25 RX ADMIN — ENOXAPARIN SODIUM 40 MG: 100 INJECTION SUBCUTANEOUS at 09:11

## 2022-10-25 RX ADMIN — ESCITALOPRAM OXALATE 20 MG: 10 TABLET ORAL at 09:15

## 2022-10-25 RX ADMIN — CETIRIZINE HYDROCHLORIDE 10 MG: 10 TABLET, FILM COATED ORAL at 09:14

## 2022-10-25 RX ADMIN — AMLODIPINE BESYLATE 5 MG: 5 TABLET ORAL at 09:14

## 2022-10-25 RX ADMIN — LEVOTHYROXINE SODIUM 125 MCG: 0.12 TABLET ORAL at 05:22

## 2022-10-25 RX ADMIN — ATORVASTATIN CALCIUM 20 MG: 20 TABLET, FILM COATED ORAL at 09:15

## 2022-10-25 RX ADMIN — HYDRALAZINE HYDROCHLORIDE 25 MG: 25 TABLET, FILM COATED ORAL at 05:21

## 2022-10-25 RX ADMIN — ASPIRIN 81 MG 81 MG: 81 TABLET ORAL at 09:12

## 2022-10-25 RX ADMIN — PANTOPRAZOLE SODIUM 40 MG: 40 TABLET, DELAYED RELEASE ORAL at 05:22

## 2022-10-25 RX ADMIN — Medication 2 PUFF: at 05:32

## 2022-10-25 RX ADMIN — TAMSULOSIN HYDROCHLORIDE 0.4 MG: 0.4 CAPSULE ORAL at 09:16

## 2022-10-25 RX ADMIN — OXYBUTYNIN CHLORIDE 10 MG: 5 TABLET, EXTENDED RELEASE ORAL at 09:12

## 2022-10-25 RX ADMIN — STANDARDIZED SENNA CONCENTRATE AND DOCUSATE SODIUM 2 TABLET: 8.6; 5 TABLET ORAL at 09:15

## 2022-10-25 NOTE — PROGRESS NOTES
Discharge instructions and medications reviewed with patient and family. Patient and daughter voice understanding and deny further questions/ concerns at this time. Patient instructed to make follow up appointment with PCP. All belongings given to daughter. Patient discharged by wheelchair per vehicle with son at [address] [facility].

## 2022-10-25 NOTE — DISCHARGE SUMMARY
Physical Medicine & Rehabilitation  Discharge Summary     Patient Identification:  Nicole Maxwell  : 1935  Admit date: 10/12/2022  Discharge date:   10/25/2022  Attending provider: Shawn Moralez MD        Primary care provider: Eric Priest MD     Discharge Diagnoses:   Patient Active Problem List   Diagnosis    CAD (coronary artery disease)    Bilateral carotid artery stenosis    Stage 2 chronic kidney disease    HTN (hypertension), benign    Dyslipidemia    Dementia due to Alzheimer's disease ([facility] [address]    Unable to care for self    Unable to ambulate    Slurred speech    Acute cerebrovascular accident (CVA) ([facility] [address]    Acute ischemic stroke [facility])       Discharge Functional Status:      Physical therapy:  Supine to Sit: Partial/moderate assistance  Sit to Supine: Supervision or touching assistance      Sit to Stand: Partial/moderate assistance  Chair/Bed to Chair Transfer: Partial/moderate assistance  Car Transfer: Partial/moderate assistance     Ambulation 10 ft: Partial/moderate assistance  Ambulation 50 ft: Partial/moderate assistance  Ambulation 150 ft: Partial/moderate assistance  Stairs - 1 Step: Supervision or touching assistance  Stairs - 4 Step: Supervision or touching assistance  Stairs - 12 Step:      Occupational therapy:  Eating: Setup or clean-up assistance  Oral Hygiene: Supervision or touching assistance  Bathing: Partial/moderate assistance  Upper Body Dressing: Partial/moderate assistance  Lower Body Dressing: Substantial/maximal assistance     Toilet Transfer: Partial/moderate assistance  Toilet Hygiene: Substantial/maximal assistance    Speech therapy:    Pt continues to present with Moderate  Receptive/ expressive deficits, Cognitive-Linguistic Deficits and mild dysarthria. Pt is demonstrating progress with divergent and convergent naming tasks.  Progress towards goals is limited secondary to reduced short term memory impacting ability to remember previous education/ speech respiratory distress. CTAB  Abd: Soft, nontender nondistended  Ext: No edema  Neuro: Alert, oriented x2, appropriately interactive. Decreased cognition remains    Significant Diagnostics:   Lab Results   Component Value Date    CREATININE 1.2 10/24/2022    BUN 25 (H) 10/24/2022     10/24/2022    K 4.4 10/24/2022     10/24/2022    CO2 22 10/24/2022       Lab Results   Component Value Date    WBC 4.1 10/24/2022    HGB 11.0 (L) 10/24/2022    HCT 32.6 (L) 10/24/2022    MCV 95.8 10/24/2022     10/24/2022       Disposition:  Home in stable condition. Follow-up:  See after visit summary from hospitalization    Discharge Medications:     Medication List        CHANGE how you take these medications      amLODIPine 10 MG tablet  Commonly known as: Nadya Tomlin  What changed: Another medication with the same name was removed. Continue taking this medication, and follow the directions you see here. CONTINUE taking these medications      Advair Diskus 250-50 MCG/ACT Aepb diskus inhaler  Generic drug: fluticasone-salmeterol  Notes to patient: Use:  prevent symptoms of asthma and chronic obstructive pulmonary disease  Side Effects:  tired, weak, and low blood pressure     albuterol sulfate  (90 Base) MCG/ACT inhaler  Commonly known as: Proventil HFA  Inhale 2 puffs into the lungs every 6 hours as needed for Wheezing.   Notes to patient: Uses: a bronchodilator used help prevent bronchospasms, helps treat wheezing in asthma patients  Side Effects: nervousness, shaking, dizziness, dry mouth     aspirin 81 MG tablet  Notes to patient: Use: To ease pain  Side Effects:  Headache, gas, nausea and vomiting     atorvastatin 20 MG tablet  Commonly known as: LIPITOR  Notes to patient: Uses: treatment of high cholesterol  Side Effects: muscle pain, nausea, vomiting, weakness,dizziness     escitalopram 20 MG tablet  Commonly known as: LEXAPRO  Notes to patient: Uses:anti depressant  Side Effects: diarrhea, drowsiness, headache,insomnia     fluticasone 50 MCG/ACT nasal spray  Commonly known as: FLONASE  Notes to patient: Uses: to treat seasonal allergies  Side Effects: dry irritation to mucus membranes in nose, nausea, vomiting     hydrALAZINE 50 MG tablet  Commonly known as: APRESOLINE     levothyroxine 125 MCG tablet  Commonly known as: SYNTHROID  Notes to patient: Use: To treat an underactive thyroid  Side Effects:  Abdm. Pain, sweating, and headache     loratadine 10 MG tablet  Commonly known as: CLARITIN  Notes to patient: Uses: Antihistamine for allergies  Side Effects: Headache, fatigue, dry mouth     montelukast 10 MG tablet  Commonly known as: SINGULAIR  Notes to patient: Use:   To treat or prevent asthma  Side Effects:  Cough, dizziness, and drowsiness     oxybutynin 10 MG extended release tablet  Commonly known as: DITROPAN-XL  Notes to patient: To treat an overactive bladder  Side Effects:  Dry mouth, drowsiness, and headache     pantoprazole 40 MG tablet  Commonly known as: PROTONIX  Notes to patient: Use: Prevention and treatment of gastric ulcers  Side Effects: Headache, fatigue, constipation, dry  mouth     tamsulosin 0.4 MG capsule  Commonly known as: FLOMAX  Notes to patient: Use:  to treat an enlarged prostate  Side Effects:  dizziness, weakness, and headache            STOP taking these medications      clopidogrel 75 MG tablet  Commonly known as: PLAVIX     furosemide 20 MG tablet  Commonly known as: LASIX  Notes to patient: USES: Congestive Heart Failure, Swelling, Fuid retention  SIDE EFFECTS: Headache, Loss of Appetite              I spent over 35 minutes on this discharge encounter between counseling, coordination of care, and medication reconciliation. To comply with [facility] bylaw R.II.4.1:  Discharge order placed in advance to facilitate patient's discharge needs. Neil Chavez MD    * This document was created using dictation software.   While all precautions were taken to ensure accuracy, errors may have occurred. Please disregard any typographical errors.

## 2022-10-25 NOTE — PLAN OF CARE
Problem: Discharge Planning  Goal: Discharge to home or other facility with appropriate resources  10/25/2022 0748 by Inder Traylor RN  Outcome: Completed     Problem: Safety - Adult  Goal: Free from fall injury  10/25/2022 0748 by Inder Traylor RN  Outcome: Completed     Problem: ABCDS Injury Assessment  Goal: Absence of physical injury  10/25/2022 0748 by Inder Traylor RN  Outcome: Completed     Problem: Pain  Goal: Verbalizes/displays adequate comfort level or baseline comfort level  10/25/2022 0748 by Inder Traylor RN  Outcome: Completed     Problem: Skin/Tissue Integrity  Goal: Absence of new skin breakdown  Description: 1. Monitor for areas of redness and/or skin breakdown  2. Assess vascular access sites hourly  3. Every 4-6 hours minimum:  Change oxygen saturation probe site  4. Every 4-6 hours:  If on nasal continuous positive airway pressure, respiratory therapy assess nares and determine need for appliance change or resting period.   10/25/2022 0748 by Inder Traylor RN  Outcome: Completed     Problem: Chronic Conditions and Co-morbidities  Goal: Patient's chronic conditions and co-morbidity symptoms are monitored and maintained or improved  10/25/2022 0748 by Inder Traylor RN  Outcome: Completed

## 2022-10-25 NOTE — CARE COORDINATION
Patient discharged home via car with family. Patient to receive home nursing and therapy from [address]  No further needs voiced by patient or treatment team.     Guido Lo, MSW, LSW   [phone]

## 2022-10-25 NOTE — PROGRESS NOTES
[facility] Discharge Assessment    Transportation  \"Has lack of transportation kept you from medical appointments, meetings, work, or from getting things needed for daily living? \"Check all that apply:  [] A.  Yes, it has kept me from medical appointments or from getting my medications  [] B.  Yes, it has kept me from non-medical meetings, appointments, work, or from getting things that I need  [x] C.  No  [] X. Patient unable to respond  [] Y. Patient declines to respond    Provision of Current Reconciled Medication List to Subsequent Provider at Discharge  [] No, current reconciled medication list not provided to the subsequent provider. [x] Yes, current reconciled medication list provided to the subsequent provider. (**Select route of transmission below**)   [x] Via Electronic Health Record   [] [employer]  [] Verbal (e.g. in person, telephone, video conferencing)  [x] Paper-based (e.g. fax, copies, printouts)   [] Other Methods (e.g. texting, email, CDs)    Provision of Current Reconciled Medication List to Patient at Discharge  [] No, current reconciled medication list not provided to the patient, family and/or caregiver. [x] Yes, current reconciled medication list provided to the patient, family and/or caregiver.  (**Select route of transmission below**)   [] Via Electronic Health Record (e.g., electronic access to patient portal)   [] Via [employer] Information [employer] Organization  [x] Verbal (e.g. in person, telephone, video conferencing)  [x] Paper-based (e.g. fax, copies, printouts)   [] Other Methods (e.g. texting, email, CDs)    Health Literacy  \"How often do you need to have someone help you when you read instructions, pamphlets, or other written material from your doctor or pharmacy? \"  []  0. Never  []  1. Rarely  []  2. Sometimes  [x]  3. Often  []  4. Always  []  8.   Patient unable to respond    BIMS - **Must be completed in the flowsheet at discharge prior to proceeding with Delirium Assessment**  [x] BIMS completed in flowsheet at discharge    Signs and Symptoms of Delirium  A. Acute Onset Mental Status Change - Is there evidence of an acute change in mental status from the patient's baseline? [x] 0. No  [] 1. Yes    B. Inattention - Did the patient have difficulty focusing attention, for example being easily distractible or having difficulty keeping track of what was being said? [x]  0. Behavior not present  []  1. Behavior continuously present, does not fluctuate  []  2. Behavior present, fluctuates (comes and goes, changes in severity)    C. Disorganized thinking - Was the patient's thinking disorganized or incoherent (rambling or irrelevant conversation, unclear or illogical flow of ideas, or unpredictable switching from subject to subject)? [x]  0. Behavior not present  []  1. Behavior continuously present, does not fluctuate  []  2. Behavior present, fluctuates (comes and goes, changes in severity)    D. Altered level of consciousness - Did the patient have altered level of consciousness as indicated by any of the following criteria? Vigilant - startled easily to any sound or touch  Lethargic - repeatedly dozed off while being asked questions, but responded to voice or touch  Stuporous - very difficulty to arouse and keep aroused for the interview  Comatose - could not be aroused  [x]  0. Behavior not present  []  1. Behavior continuously present, does not fluctuate  []  2. Behavior present, fluctuates (comes and goes, changes in severity)    Mood    \"Over the last 2 weeks, have you been bothered by any of the following problems?\" 1. Symptom Presence    0 = No  1 = Yes  9 = No Response 2.  Symptom Frequency    0 = Never or 1 day  1 = 2-6 days (several days)  2 = 7-11 days (half or more of the days)  3 = 12-14 days (nearly every day)  **Leave blank if 'No Reponse'**      Enter scores in boxes    Column 1 Column 2   Little interest or pleasure in doing things   0 0   Feeling down, depressed, or hopeless   0 0   **If either A or B in column 2 is coded 2 or 3, CONTINUE asking the questions below. If not, END the interview. **     Trouble falling or staying asleep, or sleeping too much       Feeling tired or having little energy       Poor appetite or overeating       Feeling bad about yourself - or that you are a failure or have let yourself or your family down       Trouble concentrating on things, such as reading the newspaper or watching television       Moving or speaking so slowly that other people could have noticed. Or the opposite- being so fidgety or restless that you have been moving around a lot more than usual.       Thoughts that you would be better off dead, or of hurting yourself in some way. Total Severity: Add scores for all frequency responses in column 2 (possible score 0-27, or enter 99 if unable to complete (if symptom frequency (column 2) is blank for 3 or more items). 0     Social Isolation  \"How often do you feel lonely or isolated from those around you? \"  [x] 0. Never  [] 1. Rarely  [] 2. Sometimes  [] 3. Often  [] 4. Always  [] 7. Patient declines to respond  [] 8. Patient unable to respond    Pain Effect on Sleep  \"Over the past 5 days, how much of the time has pain made it hard for you to sleep at night? \"  [x]  0. Does not apply - I have not had any pain or hurting in the past 5 days  []  1. Rarely or not at all  []  2. Occasionally  []  3. Frequently  []  4. Almost constantly  []  8. Unable to answer    **If the patient answers \"0. Does not apply\" to this question, skip the next two \"Pain Effect. Les Pimple Les Pimple \" questions**    Pain Interference with Therapy Activities  \"Over the past 5 days, how often have you limited your participation in rehabilitation therapy sessions due to pain? \"  []  0. Does not apply - I have not received rehabilitation therapy in the past 5 days  []  1. Rarely or not at all  []  2. Occasionally  []  3. Frequently  []  4. Almost constantly  []  8. Unable to answer    Pain Interference with Day-to-Day Activities: \"Over the past 5 days, how often have you limited your day-to-day activities (excluding rehabilitation therapy session)? \"  []  1. Rarely or not at all  []  2. Occasionally  []  3. Frequently  []  4. Almost constantly  []  8. Unable to answer    Nutritional Approaches  Last 7 Days - Check all of the following nutritional approaches that were received within the last 7 days:  []  A. Parenteral/IV feeding  []  B. Feeding tube (e.g., nasogastric or abdominal (PEG))  []  C. Mechanically altered diet - requires change in texture of food or liquids (e.g., pureed food, thickened liquids)  []  D. Therapeutic diet (e.g., low salt, diabetic, low cholesterol)  [x]  Z. None of the above    At time of Discharge - Check all of the following nutritional approaches that were being received at discharge:  []  A. Parenteral/IV feeding (including IV fluids if needed for hydration, but not as part of dialysis/chemo)  []  B. Feeding tube (e.g., nasogastric or abdominal (PEG))  []  C. Mechanically altered diet - requires change in texture of food or liquids (e.g., pureed food, thickened liquids)  []  D. Therapeutic diet (e.g., low salt, diabetic, low cholesterol  [x]  Z. None of the above    High Risk Drug Classes:  Use and Indication    Is taking: Check if the pt is taking any medications by pharmacological classification, not how it is used, in the following classes  Indication noted:  If column 1 is checked, check if there is an indication noted for all meds in the drug class Is taking  (check all that apply) Indication noted (check all that apply)   Antipsychotic [] []   Anticoagulant [x] [x]   Antibiotic [] []   Opioid [] []   Antiplatelet [x] [x]   Hypoglycemic (including insulin) [] []   None of the above []     Special Treatments, Procedures, and Programs    Check all of the following treatments, procedures, and programs that apply at discharge. At Discharge (check all that apply)   Cancer Treatments   A1. Chemotherapy []           A2. IV []           A3. Oral []           A10. Other []   B1. Radiation []   Respiratory Therapies   C1. Oxygen Therapy []           C2. Continuous (continuously for at least 14 hours per day) []           C3. Intermittent []           C4. High-concentration []   D1. Suctioning (Does not include oral suctioning) []           D2. Scheduled []           D3. As needed []   E1. Tracheostomy Care []   F1. Invasive Mechanical Ventilator (ventilator or respirator) []   G1. Non-invasive Mechanical Ventilator []           G2. BiPAP []           G3. CPAP []   Other   H1. IV Medications (Do not include sub Q pumps, flushes, Dextrose 50% or lactated ringers) []           H2. Vasoactive medications []           H3. Antibiotics []           H4. Anticoagulation []           H10. Other []   I1. Transfusions []   J1. Dialysis []           J2. Hemodialysis []           J3. Peritoneal dialysis []   O1. IV access (including a catheter in a vein) []           O2. Peripheral []           O3. Midline []           O4.  Central (PICC, tunneled, port) []      None of the above (select if no Cancer, Respiratory, or Other boxes are checked) [x]

## 2022-10-26 ENCOUNTER — TELEPHONE (OUTPATIENT)
Dept: CASE MANAGEMENT | Age: 87
End: 2022-10-26

## 2022-10-26 NOTE — TELEPHONE ENCOUNTER
Call to Dr St Reason to speak to staff- Left message on voice mail that I was [phone] with incidental pulmonary nodule to [phone] to MD for review of recommendations for f/u.

## 2023-01-14 ENCOUNTER — APPOINTMENT (OUTPATIENT)
Dept: GENERAL RADIOLOGY | Age: 88
DRG: 065 | End: 2023-01-14
Payer: MEDICARE

## 2023-01-14 ENCOUNTER — APPOINTMENT (OUTPATIENT)
Dept: CT IMAGING | Age: 88
DRG: 065 | End: 2023-01-14
Payer: MEDICARE

## 2023-01-14 ENCOUNTER — HOSPITAL ENCOUNTER (INPATIENT)
Age: 88
LOS: 5 days | Discharge: NURSING, REHAB OR LONG-TERM CARE | DRG: 065 | End: 2023-01-19
Attending: EMERGENCY MEDICINE | Admitting: INTERNAL MEDICINE
Payer: MEDICARE

## 2023-01-14 DIAGNOSIS — R20.0 NUMBNESS: Primary | ICD-10-CM

## 2023-01-14 DIAGNOSIS — R26.2 AMBULATORY DYSFUNCTION: ICD-10-CM

## 2023-01-14 PROBLEM — I63.9 CEREBROVASCULAR ACCIDENT (CVA) ASSOCIATED WITH SEVERE ACUTE RESPIRATORY SYNDROME CORONAVIRUS 2 (SARS-COV-2) INFECTION (HCC): Status: ACTIVE | Noted: 2023-01-14

## 2023-01-14 PROBLEM — U07.1 CEREBROVASCULAR ACCIDENT (CVA) ASSOCIATED WITH SEVERE ACUTE RESPIRATORY SYNDROME CORONAVIRUS 2 (SARS-COV-2) INFECTION (HCC): Status: ACTIVE | Noted: 2023-01-14

## 2023-01-14 LAB
A/G RATIO: 1.4 (ref 1.1–2.2)
ALBUMIN SERPL-MCNC: 3.7 G/DL (ref 3.4–5)
ALP BLD-CCNC: 81 U/L (ref 40–129)
ALT SERPL-CCNC: 8 U/L (ref 10–40)
ANION GAP SERPL CALCULATED.3IONS-SCNC: 11 MMOL/L (ref 3–16)
AST SERPL-CCNC: 14 U/L (ref 15–37)
BASOPHILS ABSOLUTE: 0 K/UL (ref 0–0.2)
BASOPHILS RELATIVE PERCENT: 0.2 %
BILIRUB SERPL-MCNC: 1 MG/DL (ref 0–1)
BILIRUBIN URINE: NEGATIVE
BLOOD, URINE: NEGATIVE
BUN BLDV-MCNC: 24 MG/DL (ref 7–20)
CALCIUM SERPL-MCNC: 8.8 MG/DL (ref 8.3–10.6)
CHLORIDE BLD-SCNC: 100 MMOL/L (ref 99–110)
CLARITY: CLEAR
CO2: 23 MMOL/L (ref 21–32)
COLOR: YELLOW
CREAT SERPL-MCNC: 1.3 MG/DL (ref 0.8–1.3)
EOSINOPHILS ABSOLUTE: 0.1 K/UL (ref 0–0.6)
EOSINOPHILS RELATIVE PERCENT: 2.3 %
GFR SERPL CREATININE-BSD FRML MDRD: 53 ML/MIN/{1.73_M2}
GLUCOSE BLD-MCNC: 120 MG/DL (ref 70–99)
GLUCOSE URINE: NEGATIVE MG/DL
HCT VFR BLD CALC: 31.3 % (ref 40.5–52.5)
HEMOGLOBIN: 10.4 G/DL (ref 13.5–17.5)
KETONES, URINE: ABNORMAL MG/DL
LEUKOCYTE ESTERASE, URINE: NEGATIVE
LYMPHOCYTES ABSOLUTE: 0.9 K/UL (ref 1–5.1)
LYMPHOCYTES RELATIVE PERCENT: 16.3 %
MCH RBC QN AUTO: 31.8 PG (ref 26–34)
MCHC RBC AUTO-ENTMCNC: 33.2 G/DL (ref 31–36)
MCV RBC AUTO: 95.8 FL (ref 80–100)
MICROSCOPIC EXAMINATION: YES
MONOCYTES ABSOLUTE: 0.8 K/UL (ref 0–1.3)
MONOCYTES RELATIVE PERCENT: 14.8 %
NEUTROPHILS ABSOLUTE: 3.8 K/UL (ref 1.7–7.7)
NEUTROPHILS RELATIVE PERCENT: 66.4 %
NITRITE, URINE: NEGATIVE
PDW BLD-RTO: 15.2 % (ref 12.4–15.4)
PH UA: 5.5 (ref 5–8)
PLATELET # BLD: 151 K/UL (ref 135–450)
PMV BLD AUTO: 9.4 FL (ref 5–10.5)
POTASSIUM REFLEX MAGNESIUM: 4 MMOL/L (ref 3.5–5.1)
PROTEIN UA: ABNORMAL MG/DL
RBC # BLD: 3.26 M/UL (ref 4.2–5.9)
RBC UA: NORMAL /HPF (ref 0–4)
SODIUM BLD-SCNC: 134 MMOL/L (ref 136–145)
SPECIFIC GRAVITY UA: 1.02 (ref 1–1.03)
TOTAL PROTEIN: 6.4 G/DL (ref 6.4–8.2)
TROPONIN: <0.01 NG/ML
URINE REFLEX TO CULTURE: ABNORMAL
URINE TYPE: ABNORMAL
UROBILINOGEN, URINE: 0.2 E.U./DL
WBC # BLD: 5.7 K/UL (ref 4–11)
WBC UA: NORMAL /HPF (ref 0–5)

## 2023-01-14 PROCEDURE — 2060000000 HC ICU INTERMEDIATE R&B

## 2023-01-14 PROCEDURE — 70450 CT HEAD/BRAIN W/O DYE: CPT

## 2023-01-14 PROCEDURE — 93005 ELECTROCARDIOGRAM TRACING: CPT | Performed by: EMERGENCY MEDICINE

## 2023-01-14 PROCEDURE — 81001 URINALYSIS AUTO W/SCOPE: CPT

## 2023-01-14 PROCEDURE — 71045 X-RAY EXAM CHEST 1 VIEW: CPT

## 2023-01-14 PROCEDURE — 85025 COMPLETE CBC W/AUTO DIFF WBC: CPT

## 2023-01-14 PROCEDURE — 6370000000 HC RX 637 (ALT 250 FOR IP): Performed by: INTERNAL MEDICINE

## 2023-01-14 PROCEDURE — 99285 EMERGENCY DEPT VISIT HI MDM: CPT

## 2023-01-14 PROCEDURE — 80053 COMPREHEN METABOLIC PANEL: CPT

## 2023-01-14 PROCEDURE — 1200000000 HC SEMI PRIVATE

## 2023-01-14 PROCEDURE — 6370000000 HC RX 637 (ALT 250 FOR IP): Performed by: EMERGENCY MEDICINE

## 2023-01-14 PROCEDURE — 84484 ASSAY OF TROPONIN QUANT: CPT

## 2023-01-14 RX ORDER — ESCITALOPRAM OXALATE 10 MG/1
20 TABLET ORAL DAILY
Status: DISCONTINUED | OUTPATIENT
Start: 2023-01-15 | End: 2023-01-19 | Stop reason: HOSPADM

## 2023-01-14 RX ORDER — ENOXAPARIN SODIUM 100 MG/ML
40 INJECTION SUBCUTANEOUS DAILY
Status: DISCONTINUED | OUTPATIENT
Start: 2023-01-15 | End: 2023-01-19 | Stop reason: HOSPADM

## 2023-01-14 RX ORDER — PANTOPRAZOLE SODIUM 40 MG/1
40 TABLET, DELAYED RELEASE ORAL
Status: DISCONTINUED | OUTPATIENT
Start: 2023-01-15 | End: 2023-01-19 | Stop reason: HOSPADM

## 2023-01-14 RX ORDER — ASPIRIN 81 MG/1
324 TABLET, CHEWABLE ORAL ONCE
Status: COMPLETED | OUTPATIENT
Start: 2023-01-14 | End: 2023-01-14

## 2023-01-14 RX ORDER — LACTULOSE 10 G/15ML
20 SOLUTION ORAL 3 TIMES DAILY PRN
COMMUNITY
Start: 2023-01-09

## 2023-01-14 RX ORDER — POLYETHYLENE GLYCOL 3350 17 G/17G
17 POWDER, FOR SOLUTION ORAL DAILY
COMMUNITY
Start: 2022-04-21

## 2023-01-14 RX ORDER — HYDRALAZINE HYDROCHLORIDE 25 MG/1
50 TABLET, FILM COATED ORAL 3 TIMES DAILY
Status: DISCONTINUED | OUTPATIENT
Start: 2023-01-14 | End: 2023-01-19 | Stop reason: HOSPADM

## 2023-01-14 RX ORDER — OXYBUTYNIN CHLORIDE 5 MG/1
10 TABLET, EXTENDED RELEASE ORAL DAILY
Status: DISCONTINUED | OUTPATIENT
Start: 2023-01-15 | End: 2023-01-19 | Stop reason: HOSPADM

## 2023-01-14 RX ORDER — ASPIRIN 81 MG/1
81 TABLET ORAL DAILY
Status: DISCONTINUED | OUTPATIENT
Start: 2023-01-15 | End: 2023-01-19 | Stop reason: HOSPADM

## 2023-01-14 RX ORDER — LACTULOSE 10 G/15ML
20 SOLUTION ORAL DAILY
Status: DISCONTINUED | OUTPATIENT
Start: 2023-01-15 | End: 2023-01-19 | Stop reason: HOSPADM

## 2023-01-14 RX ORDER — TAMSULOSIN HYDROCHLORIDE 0.4 MG/1
0.4 CAPSULE ORAL DAILY
Status: DISCONTINUED | OUTPATIENT
Start: 2023-01-15 | End: 2023-01-19 | Stop reason: HOSPADM

## 2023-01-14 RX ORDER — ATORVASTATIN CALCIUM 40 MG/1
40 TABLET, FILM COATED ORAL DAILY
Status: DISCONTINUED | OUTPATIENT
Start: 2023-01-15 | End: 2023-01-19 | Stop reason: HOSPADM

## 2023-01-14 RX ORDER — FLUTICASONE PROPIONATE 50 MCG
1 SPRAY, SUSPENSION (ML) NASAL DAILY
Status: DISCONTINUED | OUTPATIENT
Start: 2023-01-15 | End: 2023-01-19 | Stop reason: HOSPADM

## 2023-01-14 RX ORDER — ALBUTEROL SULFATE 90 UG/1
2 AEROSOL, METERED RESPIRATORY (INHALATION) EVERY 6 HOURS PRN
Status: DISCONTINUED | OUTPATIENT
Start: 2023-01-14 | End: 2023-01-19 | Stop reason: HOSPADM

## 2023-01-14 RX ORDER — MONTELUKAST SODIUM 10 MG/1
10 TABLET ORAL NIGHTLY
Status: DISCONTINUED | OUTPATIENT
Start: 2023-01-14 | End: 2023-01-19 | Stop reason: HOSPADM

## 2023-01-14 RX ORDER — POLYETHYLENE GLYCOL 3350 17 G/17G
17 POWDER, FOR SOLUTION ORAL DAILY
Status: DISCONTINUED | OUTPATIENT
Start: 2023-01-15 | End: 2023-01-19 | Stop reason: HOSPADM

## 2023-01-14 RX ORDER — AMLODIPINE BESYLATE 5 MG/1
10 TABLET ORAL DAILY
Status: DISCONTINUED | OUTPATIENT
Start: 2023-01-15 | End: 2023-01-19 | Stop reason: HOSPADM

## 2023-01-14 RX ORDER — CETIRIZINE HYDROCHLORIDE 10 MG/1
5 TABLET ORAL DAILY
Status: DISCONTINUED | OUTPATIENT
Start: 2023-01-15 | End: 2023-01-19 | Stop reason: HOSPADM

## 2023-01-14 RX ADMIN — HYDRALAZINE HYDROCHLORIDE 50 MG: 25 TABLET, FILM COATED ORAL at 23:20

## 2023-01-14 RX ADMIN — MONTELUKAST SODIUM 10 MG: 10 TABLET, FILM COATED ORAL at 23:20

## 2023-01-14 RX ADMIN — ASPIRIN 324 MG: 81 TABLET, CHEWABLE ORAL at 17:39

## 2023-01-14 ASSESSMENT — PAIN - FUNCTIONAL ASSESSMENT: PAIN_FUNCTIONAL_ASSESSMENT: NONE - DENIES PAIN

## 2023-01-14 NOTE — ED PROVIDER NOTES
[address] PROVIDER NOTE    Patient Identification  Pt Name: Ivanna Pineda  MRN: [id number]  G[place]lo 1935  Date of evaluation: 1/14/2023  Provider: Savita Arreaga MD  PCP: Laura East MD    Chief Complaint  Weakness and numbness    HPI  History provided by patient   This is a 81 y.o. male who presents to the ED for weakness and numbness. Abbey Beatrice Has been ongoing for approximately 48 hours. Has history of stroke with facial droop which is unchanged and slurred speech which has been progressively worsening over the course of the week. From his prior stroke, he has residual left-sided weakness and numbness of the arm and leg. Has had numbness and tingling in bilateral hands and feet, worse on the left side, that waxes and wanes and is usually worse in the evening. Also has been having weakness in bilateral legs and difficulty walking. Has had cough and nasal congestion. No fevers although has felt hot at night. No chest pain or shortness of breath or abdominal pain. Feels fine right now. Not in any pain now. ROS  12 systems reviewed, pertinent positives/negatives per HPI otherwise noted to be negative. I have reviewed the following nursing documentation:  Allergies: Patient has no known allergies. Past medical history:   Past Medical History:   Diagnosis Date    CAD (coronary artery disease)     Cerebral artery occlusion with cerebral infarction [facility])      Past surgical history:   Past Surgical History:   Procedure Laterality Date    CARDIAC SURGERY      2008    CORONARY ARTERY BYPASS GRAFT         Home medications:   Previous Medications    ADVAIR DISKUS 250-50 MCG/ACT AEPB DISKUS INHALER    Inhale 2 puffs into the lungs in the morning and at bedtime    ALBUTEROL (PROVENTIL HFA) 108 (90 BASE) MCG/ACT INHALER    Inhale 2 puffs into the lungs every 6 hours as needed for Wheezing.     AMLODIPINE (NORVASC) 10 MG TABLET    Take 10 mg by mouth daily    ASPIRIN 81 MG TABLET    Take 81 mg by mouth daily. ATORVASTATIN (LIPITOR) 20 MG TABLET    Take 20 mg by mouth in the morning. ESCITALOPRAM (LEXAPRO) 20 MG TABLET    Take 20 mg by mouth in the morning. FLUTICASONE (FLONASE) 50 MCG/ACT NASAL SPRAY    1 spray by Each Nostril route daily    HYDRALAZINE (APRESOLINE) 50 MG TABLET    Take 50 mg by mouth 3 times daily    LEVOTHYROXINE (SYNTHROID) 125 MCG TABLET    Take 125 mcg by mouth in the morning. Newborn Ela LORATADINE (CLARITIN) 10 MG TABLET    Take 10 mg by mouth daily    MONTELUKAST (SINGULAIR) 10 MG TABLET    Take 10 mg by mouth nightly    OXYBUTYNIN (DITROPAN-XL) 10 MG EXTENDED RELEASE TABLET    Take 10 mg by mouth in the morning. PANTOPRAZOLE (PROTONIX) 40 MG TABLET    Take 40 mg by mouth daily    TAMSULOSIN (FLOMAX) 0.4 MG CAPSULE    Take 0.4 mg by mouth in the morning. Social history:  reports that he has never smoked. He has never used smokeless tobacco. He reports that he does not drink alcohol and does not use drugs. Family history:  History reviewed. No pertinent family history. Exam  ED Triage Vitals [01/14/23 1338]   BP Temp Temp Source Heart Rate Resp SpO2 Height Weight   131/61 98 °F (36.7 °C) Oral 58 18 98 % 6' (1.829 m) 240 lb (108.9 kg)     Nursing note and vitals reviewed. Constitutional: In no acute distress  HENT:      Head: Normocephalic      Ears: External ears normal.      Nose: Nose normal.     Mouth: Membrane mucosa moist   Eyes: No discharge. Neck: Supple. Trachea midline. Cardiovascular: Regular rate. Warm extremities  Pulmonary/Chest: Effort normal. No respiratory distress. Abdominal: Soft. No distension. Nontender  : Deferred  Rectal: Deferred   Musculoskeletal: Moves all extremities. No gross deformity. Neurological: Alert and oriented to person. Left arm and left leg decree sensation light palpation. Slurred speech which per daughter is at baseline.   Left facial droop which per daughter is at baseline  Skin: Warm and dry. Psychiatric: Normal mood and affect. Behavior is normal.    Procedures      EKG  The Ekg interpreted by me in the absence of a cardiologist shows. dale sinus rhythm. No specific ST changes appreciated. HR 53  No significant change from prior EKG dated 10/22  Old t wave inv leads III avf    Radiology  CT HEAD WO CONTRAST   Final Result   1. No acute intracranial abnormality. 2. Chronic pattern of sinusitis. XR CHEST PORTABLE   Final Result   No radiographic evidence of acute pulmonary disease.              Labs  Results for orders placed or performed during the hospital encounter of 01/14/23   CBC with Auto Differential   Result Value Ref Range    WBC 5.7 4.0 - 11.0 K/uL    RBC 3.26 (L) 4.20 - 5.90 M/uL    Hemoglobin 10.4 (L) 13.5 - 17.5 g/dL    Hematocrit 31.3 (L) 40.5 - 52.5 %    MCV 95.8 80.0 - 100.0 fL    MCH 31.8 26.0 - 34.0 pg    MCHC 33.2 31.0 - 36.0 g/dL    RDW 15.2 12.4 - 15.4 %    Platelets 035 248 - 613 K/uL    MPV 9.4 5.0 - 10.5 fL    Neutrophils % 66.4 %    Lymphocytes % 16.3 %    Monocytes % 14.8 %    Eosinophils % 2.3 %    Basophils % 0.2 %    Neutrophils Absolute 3.8 1.7 - 7.7 K/uL    Lymphocytes Absolute 0.9 (L) 1.0 - 5.1 K/uL    Monocytes Absolute 0.8 0.0 - 1.3 K/uL    Eosinophils Absolute 0.1 0.0 - 0.6 K/uL    Basophils Absolute 0.0 0.0 - 0.2 K/uL   CMP w/ Reflex to MG   Result Value Ref Range    Sodium 134 (L) 136 - 145 mmol/L    Potassium reflex Magnesium 4.0 3.5 - 5.1 mmol/L    Chloride 100 99 - 110 mmol/L    CO2 23 21 - 32 mmol/L    Anion Gap 11 3 - 16    Glucose 120 (H) 70 - 99 mg/dL    BUN 24 (H) 7 - 20 mg/dL    Creatinine 1.3 0.8 - 1.3 mg/dL    Est, Glom Filt Rate 53 (A) >60    Calcium 8.8 8.3 - 10.6 mg/dL    Total Protein 6.4 6.4 - 8.2 g/dL    Albumin 3.7 3.4 - 5.0 g/dL    Albumin/Globulin Ratio 1.4 1.1 - 2.2    Total Bilirubin 1.0 0.0 - 1.0 mg/dL    Alkaline Phosphatase 81 40 - 129 U/L    ALT 8 (L) 10 - 40 U/L    AST 14 (L) 15 - 37 U/L   Troponin   Result Value Ref Range    Troponin <0.01 <0.01 ng/mL   Urinalysis with Reflex to Culture    Specimen: Urine   Result Value Ref Range    Color, UA Yellow Straw/Yellow    Clarity, UA Clear Clear    Glucose, Ur Negative Negative mg/dL    Bilirubin Urine Negative Negative    Ketones, Urine TRACE (A) Negative mg/dL    Specific Gravity, UA 1.020 1.005 - 1.030    Blood, Urine Negative Negative    pH, UA 5.5 5.0 - 8.0    Protein, UA TRACE (A) Negative mg/dL    Urobilinogen, Urine 0.2 <2.0 E.U./dL    Nitrite, Urine Negative Negative    Leukocyte Esterase, Urine Negative Negative    Microscopic Examination YES     Urine Type NotGiven     Urine Reflex to Culture Not Indicated    Microscopic Urinalysis   Result Value Ref Range    WBC, UA 3-5 0 - 5 /HPF    RBC, UA None seen 0 - 4 /HPF   EKG 12 Lead   Result Value Ref Range    Ventricular Rate 53 BPM    Atrial Rate 53 BPM    P-R Interval 178 ms    QRS Duration 106 ms    Q-T Interval 458 ms    QTc Calculation (Bazett) 429 ms    P Axis 17 degrees    R Axis -27 degrees    T Axis -25 degrees    Diagnosis       Sinus bradycardiaVoltage criteria for left ventricular hypertrophyNonspecific ST and T wave abnormalityAbnormal ECG       Screenings  NIH Stroke Scale  Interval: Baseline  Level of Consciousness (1a): Alert  LOC Questions (1b): Answers both correctly  LOC Commands (1c): Performs both tasks correctly  Best Gaze (2): Normal  Visual (3): No visual loss  Facial Palsy (4): (!) Minor paralysis  Motor Arm, Left (5a): No drift  Motor Arm, Right (5b): No drift  Motor Leg, Left (6a): No drift  Motor Leg, Right (6b):  No drift  Limb Ataxia (7): Absent  Sensory (8): (!) Mild to Moderate  Best Language (9): No aphasia  Dysarthria (10): Mild to moderate, slurs some words  Extinction and Inattention (11): No abnormality  Total: 3Glasgow Coma Scale  Eye Opening: Spontaneous  Best Verbal Response: Oriented  Best Motor Response: Obeys commands  Sandeep Coma Scale Score: 15       MDM and ED Course  This is a 81 y.o. male who presents to the ED for concern for TIA by family. Is having bilateral leg weakness and worsening numbness in hands and feet but more so on the left side. Weakness actually not seen on NIHSS exam. Not activated a stroke alert because onset was 48 hours ago. NIH stroke scale 3. Obtaining CT head to evaluate for intracranial bleed. Not within the window for thrombolytics. No chest pain or dyspnea to indicate acs/ pul embolism/dissection    No back pain to indicate cord compressive injury/cauda equina     ------------    CT head shows no intracranial bleed. Patient resting comfortably. Admitted to Dr. Kira Rangel for further work-up    [unfilled]    Is this patient to be included in the SEP-1 Core Measure due to severe sepsis or septic shock? No   Exclusion criteria - the patient is NOT to be included for SEP-1 Core Measure due to: Infection is not suspected        Final Impression  1. Numbness    2. Ambulatory dysfunction        Blood pressure 137/64, pulse 58, temperature 98 °F (36.7 °C), temperature source Oral, resp. rate 20, height 6' (1.829 m), weight 240 lb (108.9 kg), SpO2 97 %. Disposition:  DISPOSITION Decision To Admit 01/14/2023 05:31:28 PM      Patient Referrals:  No follow-up provider specified. Discharge Medications:  New Prescriptions    No medications on file       Discontinued Medications:  Discontinued Medications    No medications on file       This chart was generated using the [address] [place] [address] [employer] system. I created this record but it may contain dictation errors given the limitations of this technology.        Frederick Doan MD  01/14/23 4172

## 2023-01-14 NOTE — ED NOTES
MD Kali Barajas evaluated patient. Not calling stroke alert at this time.       Philip [id number], RN  01/14/23 3926

## 2023-01-15 PROBLEM — I50.32 CHRONIC DIASTOLIC HEART FAILURE (HCC): Status: ACTIVE | Noted: 2023-01-15

## 2023-01-15 PROBLEM — I63.9 CEREBROVASCULAR ACCIDENT (CVA) ASSOCIATED WITH SEVERE ACUTE RESPIRATORY SYNDROME CORONAVIRUS 2 (SARS-COV-2) INFECTION (HCC): Status: RESOLVED | Noted: 2023-01-14 | Resolved: 2023-01-15

## 2023-01-15 PROBLEM — U07.1 CEREBROVASCULAR ACCIDENT (CVA) ASSOCIATED WITH SEVERE ACUTE RESPIRATORY SYNDROME CORONAVIRUS 2 (SARS-COV-2) INFECTION (HCC): Status: RESOLVED | Noted: 2023-01-14 | Resolved: 2023-01-15

## 2023-01-15 PROBLEM — G45.9 TIA (TRANSIENT ISCHEMIC ATTACK): Status: ACTIVE | Noted: 2023-01-15

## 2023-01-15 PROBLEM — I69.959 HEMIPARESIS AS LATE EFFECT OF CEREBROVASCULAR DISEASE (HCC): Status: ACTIVE | Noted: 2023-01-15

## 2023-01-15 LAB
EKG ATRIAL RATE: 53 BPM
EKG DIAGNOSIS: NORMAL
EKG P AXIS: 17 DEGREES
EKG P-R INTERVAL: 178 MS
EKG Q-T INTERVAL: 458 MS
EKG QRS DURATION: 106 MS
EKG QTC CALCULATION (BAZETT): 429 MS
EKG R AXIS: -27 DEGREES
EKG T AXIS: -25 DEGREES
EKG VENTRICULAR RATE: 53 BPM

## 2023-01-15 PROCEDURE — 1200000000 HC SEMI PRIVATE

## 2023-01-15 PROCEDURE — 6360000002 HC RX W HCPCS: Performed by: INTERNAL MEDICINE

## 2023-01-15 PROCEDURE — 93010 ELECTROCARDIOGRAM REPORT: CPT | Performed by: INTERNAL MEDICINE

## 2023-01-15 PROCEDURE — 2060000000 HC ICU INTERMEDIATE R&B

## 2023-01-15 PROCEDURE — 6370000000 HC RX 637 (ALT 250 FOR IP): Performed by: INTERNAL MEDICINE

## 2023-01-15 PROCEDURE — 94640 AIRWAY INHALATION TREATMENT: CPT

## 2023-01-15 PROCEDURE — 94761 N-INVAS EAR/PLS OXIMETRY MLT: CPT

## 2023-01-15 PROCEDURE — 97530 THERAPEUTIC ACTIVITIES: CPT

## 2023-01-15 PROCEDURE — 97162 PT EVAL MOD COMPLEX 30 MIN: CPT

## 2023-01-15 PROCEDURE — 97535 SELF CARE MNGMENT TRAINING: CPT

## 2023-01-15 PROCEDURE — 92610 EVALUATE SWALLOWING FUNCTION: CPT

## 2023-01-15 PROCEDURE — 97166 OT EVAL MOD COMPLEX 45 MIN: CPT

## 2023-01-15 PROCEDURE — 92526 ORAL FUNCTION THERAPY: CPT

## 2023-01-15 RX ADMIN — Medication 2 PUFF: at 01:08

## 2023-01-15 RX ADMIN — FLUTICASONE PROPIONATE 1 SPRAY: 50 SPRAY, METERED NASAL at 09:41

## 2023-01-15 RX ADMIN — MONTELUKAST SODIUM 10 MG: 10 TABLET, FILM COATED ORAL at 20:21

## 2023-01-15 RX ADMIN — LEVOTHYROXINE SODIUM 125 MCG: 0.1 TABLET ORAL at 09:09

## 2023-01-15 RX ADMIN — HYDRALAZINE HYDROCHLORIDE 50 MG: 25 TABLET, FILM COATED ORAL at 09:12

## 2023-01-15 RX ADMIN — CETIRIZINE HYDROCHLORIDE 5 MG: 10 TABLET, FILM COATED ORAL at 09:11

## 2023-01-15 RX ADMIN — ESCITALOPRAM OXALATE 20 MG: 10 TABLET ORAL at 09:12

## 2023-01-15 RX ADMIN — ENOXAPARIN SODIUM 40 MG: 100 INJECTION SUBCUTANEOUS at 09:11

## 2023-01-15 RX ADMIN — Medication 2 PUFF: at 20:50

## 2023-01-15 RX ADMIN — OXYBUTYNIN CHLORIDE 10 MG: 5 TABLET, EXTENDED RELEASE ORAL at 09:14

## 2023-01-15 RX ADMIN — HYDRALAZINE HYDROCHLORIDE 50 MG: 25 TABLET, FILM COATED ORAL at 20:21

## 2023-01-15 RX ADMIN — TAMSULOSIN HYDROCHLORIDE 0.4 MG: 0.4 CAPSULE ORAL at 09:11

## 2023-01-15 RX ADMIN — AMLODIPINE BESYLATE 10 MG: 5 TABLET ORAL at 09:13

## 2023-01-15 RX ADMIN — ASPIRIN 81 MG: 81 TABLET, COATED ORAL at 09:13

## 2023-01-15 RX ADMIN — ATORVASTATIN CALCIUM 40 MG: 40 TABLET, FILM COATED ORAL at 09:13

## 2023-01-15 RX ADMIN — HYDRALAZINE HYDROCHLORIDE 50 MG: 25 TABLET, FILM COATED ORAL at 14:41

## 2023-01-15 RX ADMIN — PANTOPRAZOLE SODIUM 40 MG: 40 TABLET, DELAYED RELEASE ORAL at 09:09

## 2023-01-15 RX ADMIN — Medication 2 PUFF: at 09:51

## 2023-01-15 NOTE — PROGRESS NOTES
Facility/Department: [address]  Initial Assessment  DYSPHAGIA BEDSIDE SWALLOW EVALUATION     Patient: Nikolay Gamboa   : 1935   MRN: [id number]      Evaluation Date: 1/15/2023   Admitting Diagnosis: Numbness [R20.0]  Ambulatory dysfunction [R26.2]  Cerebrovascular accident (CVA) associated with severe acute respiratory syndrome coronavirus 2 (SARS-CoV-2) infection ([facility]) [U07.1, I63.9]  Pain: Denies                                                       H&P: \"This is a 81 y.o. male who presents to the ED for weakness and numbness. Cortney Portillo Has been ongoing for approximately 48 hours. Has history of stroke with facial droop which is unchanged and slurred speech which has been progressively worsening over the course of the week. From his prior stroke, he has residual left-sided weakness and numbness of the arm and leg. Has had numbness and tingling in bilateral hands and feet, worse on the left side, that waxes and wanes and is usually worse in the evening. Also has been having weakness in bilateral legs and difficulty walking. Has had cough and nasal congestion. No fevers although has felt hot at night. No chest pain or shortness of breath or abdominal pain. Feels fine right now. Not in any pain now. \"      Imaging:  Chest X-ray:   Impression   No radiographic evidence of acute pulmonary disease. Head CT:   Impression   1. No acute intracranial abnormality. 2. Chronic pattern of sinusitis. History/Prior Level of Function:   Living Status: Resides with daughter. Prior Dysphagia History: Patient is familiar to SLP services here at [facility], with most recent BSE complete on 10/13/22 with recommendation for regular diet texture consistencies and thin liquids, meds whole in water or whole in puree. D/C from services on 10/24/22 with continued recommendations for regular solids and thin liquids. Pts son present at bedside and reports appropriate toleration of recommended diet. Reason for referral: SLP evaluation orders received due to CVA protocol , difficulty communicating , and prior hx of CVA     Dysphagia Impressions/Diagnosis: Oropharyngeal Dysphagia   Chart reviewed and SLP cleared for entry by RN. Son present at bedside- negates concerns with pts current diet of regular solids and thin liquids. Pt poor historian, but denies difficulty with current diet. Pt and son report pt taking meds in puree at baseline, w pt preference to continue. At baseline, pt with L lean when upright. OME revealed slight L facial weakness and asymmetry, reduced buccal tension, mildly reduced labial pucker and strength, and slightly reduced lingual coordination and strength. Trials included thin liquids by cup edge, puree, peaches, and severiano cracker. Pt able to self feed, with some L sided weakness. Functional oral acceptance, mastication, and clearance across solids. No overt s/s of aspiration across solids. Pt with no overt s/s on thin liquids via cup edge with single or consecutive sips. Pt slightly SOB on consecutive sips- pt and caregiver edu given on 1-2 sips at a time to promote respiratory coordination. Verbal return from son and pt. Upon palpation, suspect slightly reduced laryngeal elevation on thin liquids. Pt noted with slightly slurred speech throughout evaluation, requiring occasional repetition and use of y/n questions by SLP to clarify pts message. Son reports pts speech back to baseline, w son able to understand pt on ~70% opp at baseline. Pt and son with no current concerns for pts communication effectiveness with nursing staff or other caregivers. Recommend continuation of regular diet texture consistencies with thin liquids, meds whole in puree per pt preference. Further recommend referral for re-evaluation if change in communication or swallow function occurs.      Recommended Diet and Intervention 1/15/2023:  Diet Solids Recommendation:  Regular texture diet  Liquid Consistency Recommendation: Thin liquids  Recommended form of Meds:   Meds whole in puree          Compensatory Swallowing Strategies: Alternate solids/liquids , Upright as possible with all PO intake , Small bites/sips , Remain upright 30-45 min     SHORT TERM DYSPHAGIA GOALS/PLAN OF CARE: Speech therapy for dysphagia tx No further follow-up indicated       Dysphagia Therapeutic Intervention:      Discharge Recommendations: Do not anticipate need for further speech/dysphagia therapy upon discharge from hospital     Patient Positioning: Upright in bed     Current Diet Level (prior to evaluation): Regular texture diet  Thin liquids      Respiratory Status:   [x]Room Air   []O2 via nasal cannula   []Other:    Dentition:  []Adequate  [x]Dentures: Upper and lower dentures  []Missing Many Teeth  []Edentulous  []Other:    Baseline Vocal Quality:  [x]Normal  []Dysphonic   []Aphonic   []Hoarse  []Wet  []Weak  []Other:    Volitional Cough:  Not Elicited     Volitional Swallow:   []Absent   [x]Delayed     []Adequate     []Required use of drink     Oral Mechanism Exam:  []WFL [x]Mild   [] Moderate  []Severe  []To be assessed  Impaired:   [x]Left side      []Right side    [x]Labial ROM/Coordination    [x]Labial Symmetry   [x]Lingual ROM/Coordination   []Lingual Symmetry  []Gag  []Other:     Oral Phase: [x]WFL []Mild   [] Moderate  []Severe  []To be assessed   []Impaired/Prolonged Mastication:   []Oral Holding:   []Spillage Left:   []Spillage Right:  []Pocketing Left:   []Pocketing Right:   []Decreased Anterior to Posterior Transit:   []Suspected Premature Bolus Loss:   []Lingual/Palatal Residue:   []Other:     Pharyngeal Phase: [x]WFL []Mild   [] Moderate  []Severe  []To be assessed   []Delayed Swallow:   []Suspected Pharyngeal Pooling:   [x]Decreased Laryngeal Elevation: Suspected upon palpation with intake of thin liquids by cup edge. However, pt presents with no overt s/s of aspiration at bedside.     []Absent Swallow:  []Wet Vocal Quality:   []Throat Clearing-Immediate:   []Throat Clearing-Delayed:   []Cough-Immediate:   []Cough-Delayed:  []Change in Vital Signs:  []Suspected Delayed Pharyngeal Clearing:  []Other:     Eating Assistance:  []Independent  [x]Setup or clean-up assistance   [] Supervision or touching assistance   [] Partial or moderate assistance   [] Substantial or maximal assistance  [] Dependent     EDUCATION:   Provided education regarding role of SLP, results of assessment, recommendations and general speech pathology plan of care. [x] Pt and pts son verbalized understanding and agreement  [] Pt requires ongoing learning   [] No evidence of comprehension     If patient discharges prior to next visit, this note will serve as discharge. Treatment time:  Timed Code Treatment Minutes: 0   Total Treatment time:    Electronically signed by:  17 minutes  Akhil Arreaga   Speech-Language Pathologist

## 2023-01-15 NOTE — RT PROTOCOL NOTE
RT Inhaler-Nebulizer Bronchodilator Protocol Note    There is a bronchodilator order in the chart from a provider indicating to follow the RT Bronchodilator Protocol and there is an Initiate RT Inhaler-Nebulizer Bronchodilator Protocol order as well (see protocol at bottom of note). CXR Findings:  XR CHEST PORTABLE    Result Date: 1/14/2023  No radiographic evidence of acute pulmonary disease. The findings from the last RT Protocol Assessment were as follows:   History Pulmonary Disease: None or smoker <15 pack years  Respiratory Pattern: Regular pattern and RR 12-20 bpm  Breath Sounds: Slightly diminished and/or crackles  Cough: Strong, spontaneous, non-productive  Indication for Bronchodilator Therapy:    Bronchodilator Assessment Score: 2    Aerosolized bronchodilator medication orders have been revised according to the RT Inhaler-Nebulizer Bronchodilator Protocol below. Respiratory Therapist to perform RT Therapy Protocol Assessment initially then follow the protocol. Repeat RT Therapy Protocol Assessment PRN for score 0-3 or on second treatment, BID, and PRN for scores above 3. No Indications - adjust the frequency to every 6 hours PRN wheezing or bronchospasm, if no treatments needed after 48 hours then discontinue using Per Protocol order mode. If indication present, adjust the RT bronchodilator orders based on the Bronchodilator Assessment Score as indicated below. Use Inhaler orders unless patient has one or more of the following: on home nebulizer, not able to hold breath for 10 seconds, is not alert and oriented, cannot activate and use MDI correctly, or respiratory rate 25 breaths per minute or more, then use the equivalent nebulizer order(s) with same Frequency and PRN reasons based on the score. If a patient is on this medication at home then do not decrease Frequency below that used at home.     0-3 - enter or revise RT bronchodilator order(s) to equivalent RT Bronchodilator order with Frequency of every 4 hours PRN for wheezing or increased work of breathing using Per Protocol order mode. 4-6 - enter or revise RT Bronchodilator order(s) to two equivalent RT bronchodilator orders with one order with BID Frequency and one order with Frequency of every 4 hours PRN wheezing or increased work of breathing using Per Protocol order mode. 7-10 - enter or revise RT Bronchodilator order(s) to two equivalent RT bronchodilator orders with one order with TID Frequency and one order with Frequency of every 4 hours PRN wheezing or increased work of breathing using Per Protocol order mode. 11-13 - enter or revise RT Bronchodilator order(s) to one equivalent RT bronchodilator order with QID Frequency and an Albuterol order with Frequency of every 4 hours PRN wheezing or increased work of breathing using Per Protocol order mode. Greater than 13 - enter or revise RT Bronchodilator order(s) to one equivalent RT bronchodilator order with every 4 hours Frequency and an Albuterol order with Frequency of every 2 hours PRN wheezing or increased work of breathing using Per Protocol order mode. RT to enter RT Home Evaluation for COPD & MDI Assessment order using Per Protocol order mode.     Electronically signed by Olive Nicole RCP on 1/15/2023 at 12:11 AM

## 2023-01-15 NOTE — PLAN OF CARE
Problem: Discharge Planning  Goal: Discharge to home or other facility with appropriate resources  Outcome: Progressing     Problem: Risk for Elopement  Goal: Patient will not exit the unit/facility without proper excort  Outcome: Progressing  Flowsheets (Taken 1/14/2023 2100)  Nursing Interventions for Elopement Risk: Assist with personal care needs such as toileting, eating, dressing, as needed to reduce the risk of wandering     Problem: Skin/Tissue Integrity  Goal: Absence of new skin breakdown  Description: 1. Monitor for areas of redness and/or skin breakdown  2. Assess vascular access sites hourly  3. Every 4-6 hours minimum:  Change oxygen saturation probe site  4. Every 4-6 hours:  If on nasal continuous positive airway pressure, respiratory therapy assess nares and determine need for appliance change or resting period.   Outcome: Progressing     Problem: Safety - Adult  Goal: Free from fall injury  Outcome: Progressing     Problem: ABCDS Injury Assessment  Goal: Absence of physical injury  Outcome: Progressing

## 2023-01-15 NOTE — ED NOTES
Report called to 3T, v/u and denies further questions at this time. Patient being transported to unit via stretcher and transport.      Chen Weir RN  01/14/23 2014

## 2023-01-15 NOTE — PROGRESS NOTES
[facility] Department   Phone: [phone]    Physical Therapy    [x] Initial Evaluation            [] Daily Treatment Note         [] Discharge Summary      Patient: Nancy Mckeon   : 1935   MRN: [id number]   Date of Service:  1/15/2023  Admitting Diagnosis: Cerebrovascular accident (CVA) associated with severe acute respiratory syndrome coronavirus 2 (SARS-CoV-2) infection [facility])  Current Admission Summary: This is a 81 y.o. male who presents to the ED for weakness and numbness. Has been ongoing for approximately 48 hours. Has history of stroke with facial droop which is unchanged and slurred speech which has been progressively worsening over the course of the week. From his prior stroke, he has residual left-sided weakness and numbness of the arm and leg. Has had numbness and tingling in bilateral hands and feet, worse on the left side, that waxes and wanes and is usually worse in the evening. Also has been having weakness in bilateral legs and difficulty walking. Has had cough and nasal congestion. No fevers although has felt hot at night. No chest pain or shortness of breath or abdominal pain. Feels fine right now. Not in any pain now  Past Medical History:  has a past medical history of CAD (coronary artery disease) and Cerebral artery occlusion with cerebral infarction ([address] Past Surgical History:  has a past surgical history that includes Coronary artery bypass graft and Cardiac surgery. Discharge Recommendations: Nancy Mckeon scored a 9/ on the AM-PAC short mobility form. Current research shows that an AM-PAC score of 17 or less is typically not associated with a discharge to the patient's home setting. Based on the patient's AM-PAC score and their current functional mobility deficits, it is recommended that the patient have 5-7 sessions per week of Physical Therapy at d/c to increase the patient's independence.   At this time, this patient demonstrates complex nursing, medical, and rehabilitative needs, and would benefit from intensive rehabilitation services upon discharge from the Inpatient setting. This patient demonstrates the ability to participate in and benefit from an intensive therapy program with a coordinated interdisciplinary team approach to foster frequent, structured, and documented communication among disciplines, who will work together to establish, prioritize, and achieve treatment goals. Please see assessment section for further patient specific details. If patient discharges prior to next session this note will serve as a discharge summary. Please see below for the latest assessment towards goals. DME Required For Discharge: DME to be determined at next level of care  Precautions/Restrictions: high fall risk, up as tolerated  Weight Bearing Restrictions: no restrictions  Required Braces/Orthotics: no braces required  Positional Restrictions:no positional restrictions    Pre-Admission Information   Lives With: spouse, daughter    Type of Home: apartment- third floor  Home Layout: one level  Home Access: elevator  Bathroom Layout: walk in [facility]: grab bars in shower, grab bars around toilet, shower chair  Toilet Height: standard height  Home Equipment: rolling walker, rollator - 4 wheeled walker, single point cane  Transfer Assistance: modified independent with use of SPC  Ambulation Assistance:modified independent with use of SPC  ADL Assistance: requires assistance with bathing  IADL Assistance: requires assistance with all homemaking tasks- daughters complete  Active :        [] Yes  [x] No  Hand Dominance: [] Left  [x] Right  Current Employment: retired.   Occupation: [employer]  Recent Falls: Pt reports one fall in the last 6 months    Examination   Vision:   Vision Gross Assessment: Impaired and Vision Corrective Device: wears glasses at all times  Hearing: WFL  Observation:   General Observation:  Pt on RA on arrival.  Posture:   Mild forward head, rounded shoulders, and increased thoracic kyphosis in sitting and standing. Sensation:   reports numbness and tingling in (B) LE  Proprioception:    WFL  Tone:   Normotonic  Coordination Testing:   Coordination and Movement Description: decreased speed, decreased accuracy  Finger to Nose: Impaired Bilaterally  Alternating Pronation/Supination: Impaired Bilaterally  Finger/Thumb Opposition: Impaired Bilaterally  Alternating Toe Tapping: WFL    ROM:   (B) Hip AROM WFL  (L) Knee: lacking ~ 10 deg ext     (R) Knee: lacking ~ 10 deg ext  (B) Ankle AROM WFL  Strength:   (B) LE strength grossly <3/5 based on observed functional mobility  Therapist Clinical Decision Making (Complexity): medium complexity  Clinical Presentation: evolving      Subjective  General: Pt supine in bed on arrival, agreeable to participate in PT/OT initial evaluation. Pain: 0/10  Pain Interventions: not applicable     Functional Mobility  Bed Mobility  Supine to Sit: moderate assistance  Scooting: moderate assistance- toward EOB  Comments: Supine to sit: HOB elevated, increased time required to complete task, use of bed rail, physical assistance required for trunk  Transfers  Sit to stand transfer: 2 person assistance with varying levels of assist- max A x 2 from EOB and BSC, min A x 2 with ugo stedy   Stand to sit transfer: 2 person assistance with varying levels of assist- max A x 2 from EOB and BSC, min A x 2 with ugo stedy   Stand step transfer: 2 person assistance with max A x 2   Comments: Pt demonstrates poor initiation on all transfers with significant posterior lean requiring tactile and verbal cues to shift weight forward for transfers  Ambulation  Comments: Attempted short distance ambulation with use of a RW from the [facility] to the recliner. Pt was unable to advance LEs despite maximal verbal cues. Pt was max A x 2.   Stair Mobility  Stair mobility not completed on this date. Comments:  Wheelchair Mobility:  No w/c mobility completed on this date. Comments:  Balance  Static Sitting Balance: poor: requires mod (A) to maintain balance  Static Standing Balance: poor (-): requires max (A) to maintain balance  Dynamic Standing Balance: poor (-): requires max (A) to maintain balance  Comments: In sitting, pt demonstrates a R posterolateral lean- states he has done this for a long time (not new). In standing, the pt demonstrates a posterior lean. While standing in the ugo stedy, pt was able to lift each LE in preparation for gait. Other Therapeutic Interventions  Pt was dependent for pericare and brief change following a BM. Functional Outcomes  AM-PAC Inpatient Mobility Raw Score : 9              Cognition  Overall Cognitive Status: Impaired  Following Commands: follows one step commands with repetition, follows one step commands with increased time  Insights: decreased awareness of deficits  Initiation: requires cues for some  Sequencing: requires cues for some  Orientation:    oriented to person, oriented to place, oriented to situation, and disoriented to time   Command Following:   impaired- pt follows one step commands with increased time and repetition of instruction    Education  Barriers To Learning: cognition  Patient Education: patient educated on goals, PT role and benefits, general safety, functional mobility training, transfer training, discharge recommendations  Learning Assessment:  patient verbalizes understanding, would benefit from continued reinforcement    Assessment  Activity Tolerance: Pt tolerated the PT/OT initial evaluation well with no significant limitations.   Impairments Requiring Therapeutic Intervention: decreased functional mobility, decreased strength, decreased cognition, decreased endurance, decreased balance  Prognosis: good  Clinical Assessment: Pt is an 80 y/o male who presents to the hospital for weakness and numbness. Prior to admission to the hospital, the pt was modified independent for performance of functional mobility tasks with the use of a SPC. Today, the pt required significantly increased assistance for performance of functional mobility tasks due to generalized weakness and was unable to ambulate. Pt is at a HIGH risk of falling at this time. Pt will benefit from skilled PT to facilitate return to PLOF and to promote independence.   Safety Interventions: patient left in chair, chair alarm in place, call light within reach, gait belt, and nurse notified    Plan  Frequency: 5-7 x/week  Current Treatment Recommendations: strengthening, balance training, functional mobility training, transfer training, gait training, endurance training, neuromuscular re-education, patient/caregiver education, home exercise program, and safety education    Goals  Patient Goals: Pt did not state   Short Term Goals:  Time Frame: By discharge  Patient will complete bed mobility at contact guard assistance   Patient will complete transfers at maximum assistance   Patient will ambulate 10 ft with use of LRAD at maximum assistance    Therapy Session Time      Individual Group Co-treatment   Time In 1330       Time Out 1423       Minutes 53         Timed Code Treatment Minutes: 38 Minutes  Total Treatment Minutes: 53 Minutes        Electronically Signed By: Madeleine Ramirez, PT  Barbara Mora PT, DPT [id number]

## 2023-01-15 NOTE — PROGRESS NOTES
[address] [facility],6[address] Msb Department   Phone: [phone]    Occupational Therapy    [x] Initial Evaluation            [] Daily Treatment Note         [] Discharge Summary      Patient: Eusebio Muller   : 1935   MRN: [id number]   Date of Service:  1/15/2023    Admitting Diagnosis:  Cerebrovascular accident (CVA) associated with severe acute respiratory syndrome coronavirus 2 (SARS-CoV-2) infection [facility])  Current Admission Summary: \"This is a 81 y.o. male who presents to the ED for weakness and numbness. Clenton Reas Has been ongoing for approximately 48 hours. Has history of stroke with facial droop which is unchanged and slurred speech which has been progressively worsening over the course of the week. From his prior stroke, he has residual left-sided weakness and numbness of the arm and leg. Has had numbness and tingling in bilateral hands and feet, worse on the left side, that waxes and wanes and is usually worse in the evening. Also has been having weakness in bilateral legs and difficulty walking. Has had cough and nasal congestion. No fevers although has felt hot at night. No chest pain or shortness of breath or abdominal pain. Feels fine right now. Not in any pain now  Past Medical History:  has a past medical history of CAD (coronary artery disease) and Cerebral artery occlusion with cerebral infarction ([address] Past Surgical History:  has a past surgical history that includes Coronary artery bypass graft and Cardiac surgery. Discharge Recommendations: Eusebio Muller scored a 13/24 on the AM-PAC ADL Inpatient form. Current research shows that an AM-PAC score of 17 or less is typically not associated with a discharge to the patient's home setting. Based on the patient's AM-PAC score and their current ADL deficits, it is recommended that the patient have 5-7 sessions per week of Occupational Therapy at d/c to increase the patient's independence.   At this time, this patient demonstrates complex nursing, medical, and rehabilitative needs, and would benefit from intensive rehabilitation services upon discharge from the Inpatient setting. This patient demonstrates the ability to participate in and benefit from an intensive therapy program with a coordinated interdisciplinary team approach to foster frequent, structured, and documented communication among disciplines, who will work together to establish, prioritize, and achieve treatment goals. Please see assessment section for further patient specific details. If patient discharges prior to next session this note will serve as a discharge summary. Please see below for the latest assessment towards goals. DME Required For Discharge: DME to be determined at next level of care    Precautions/Restrictions: high fall risk, up as tolerated  Weight Bearing Restrictions: no restrictions  [] Right Upper Extremity  [] Left Upper Extremity [] Right Lower Extremity  [] Left Lower Extremity     Required Braces/Orthotics: no braces required   [] Right  [] Left  Positional Restrictions:no positional restrictions    Pre-Admission Information   Lives With: spouse, daughter                         Type of Home: apartment- third floor  Home Layout: one level  Home Access: elevator  Bathroom Layout: walk in shower  Bathroom Equipment: grab bars in shower, grab bars around toilet, shower chair  Toilet Height: standard height  Home Equipment: rolling walker, rollator - 4 wheeled walker, single point cane  Transfer Assistance: modified independent with use of SPC  Ambulation Assistance:modified independent with use of SPC  ADL Assistance: requires assistance with bathing  IADL Assistance: requires assistance with all homemaking tasks- daughters complete  Active :        [] Yes                 [x] No  Hand Dominance: [] Left                 [x] Right  Current Employment: retired.   Occupation: [address]: [employer]  Recent Falls: Pt reports one fall in the last 6 months    Patient is questionable historian, per chart pt left ARU in October of 2022 using a RW    Examination   Vision:   Vision Gross Assessment: Impaired and Vision Corrective Device: wears glasses at all times  Patient with significant head turning and difficulty maintaining focus during smooth pursuit testing in all directions. Was able to accurately read clock in room. Hearing:   WFL  Perception:   Initiation: cues to initiate tasks  Motor Planning: hand over hand to sequence tasks - difficulty motor planning turning for functional transfers, hand over hand assist for hand placement  Observation:   General Observation:  pt on room air   Posture:   Mild forward head, rounded shoulders and kyphosis in sitting and standing   Sensation:   reports numbness and tingling in (B) LE - reports it has been worse in last few weeks and that when he has numbness he often experiences difficulty picking up his legs/weakness in legs   Proprioception:    diminished proprioception in all extremities  Significant posterior lean in standing, tendency to lean to R in sitting   Tone:   Normotonic  Coordination Testing:   Finger to Nose: Impaired Bilaterally  Alternating Pronation/Supination: Impaired Bilaterally  Finger/Thumb Opposition: Impaired Bilaterally    ROM:   (B) UE AROM WFL  Strength:   (B) UE strength grossly WFL    Therapist Clinical Decision Making (Complexity): medium complexity  Clinical Presentation: stable      Subjective  General: pt in bed on arrival - denies pain - agreeable to evaluation   Pain: 0/10  Pain Interventions: not applicable        Activities of Daily Living  Basic Activities of Daily Living  Grooming: minimal assistance  Upper Extremity Bathing: minimal assistance  Lower Extremity Bathing: moderate assistance   Upper Extremity Dressing: minimal assistance  Lower Extremity Dressing: dependent  Toileting: dependent.     Toileted, attempting to void bowels from [facility], pure wick in place throughout session  Sponge bathing at EOB, brief change from EOB, assist for thoroughness   Instrumental Activities of Daily Living  No IADL completed on this date. Functional Mobility  Bed Mobility  Supine to Sit: moderate assistance  Comments: scooting mod A  HOB elevated for supine to sit   Transfers  Sit to stand transfer:2 person assistance with mod-max A of 2    Stand to sit transfer: 2 person assistance with min A of 2    Stand pivot transfer: 2 person assistance with mod-max a of 2    Toilet transfer: 2 person assistance with mod-max A of 2    Toilet transfer equipment: bariatric bedside commode  Comments: pt with posterior lean, difficulty motor planning, poor hand placement during transfers   Functional Mobility:  Sitting Balance: moderate assistance.     Standing Balance Comment: max A to maintain static balance during posterior lean   Functional Mobility Comment: STEDY with min A of 2- recommended for use with nursing staff due to pt's variable status with mobility    Other Therapeutic Interventions    Functional Outcomes  AM-PAC Inpatient Daily Activity Raw Score: 13    Cognition  Overall Cognitive Status: Impaired  Arousal/Alterness: delayed responses to stimuli, inconsistent responses to stimuli  Following Commands: follows one step commands with repetition  Attention Span: appears intact  Memory: decreased recall of recent events, decreased short term memory  Safety Judgement: decreased awareness of need for assistance  Problem Solving: assistance required to generate solutions, assistance required to implement solutions, assistance required to identify errors made, assistance required to correct errors made  Insights: decreased awareness of deficits  Initiation: requires cues for some  Sequencing: requires cues for some  Comments: difficult to understand during conversation/history gathering due to mumbling and difficulty with expression  Orientation:    oriented to person, oriented to place, oriented to situation, and disoriented to time   Command Following:   impaired accurately follows one step commands with repetition and extra time, occasional need for [facility] assist      Education  Barriers To Learning: cognition  Patient Education: patient educated on goals, OT role and benefits, plan of care, ADL adaptive strategies, discharge recommendations  Learning Assessment:  patient will require reinforcement due to cognitive deficits    Assessment  Activity Tolerance: tolerated well, HR, O2 saturation and BP WNL in chair after activity  Impairments Requiring Therapeutic Intervention: decreased functional mobility, decreased ADL status, decreased strength, decreased safety awareness, decreased cognition, decreased endurance, decreased balance, decreased fine motor control  Prognosis: fair  Clinical Assessment: Patient presents below baseline function secondary to increased B LE weakness and numbness. Patient will benefit from OT services to address the above deficits. Patient typically has assist with ADLS, ambulates with AE. Patient is primarily limited by decreased balance, decreased strength LEs, decreased endurance. Patient will benefit from OT services to maximize safety and independence in ADLs, transfers and functional mobility prior to returning home. Patient currently requiring assistance : max/dep ADLs and dep A for functional mobility.     Safety Interventions: patient left in chair, chair alarm in place, call light within reach, and nurse notified    Plan  Frequency: 5-7 x/week  Current Treatment Recommendations: strengthening, ROM, balance training, functional mobility training, neuromuscular re-education, ADL/self-care training, IADL training, cognitive reorientation, and safety education    Goals  Patient Goals: to get stronger    Short Term Goals:  Time Frame: discharge   Patient will complete upper body ADL at stand by assistance   Patient will complete lower body ADL at moderate assistance   Patient will complete toileting at maximum assistance   Patient will complete self-feeding at set up assistance   Patient will complete grooming at set up assistance   Patient will complete functional transfers at moderate assistance   Patient will complete functional mobility at moderate assistance     Therapy Session Time     Individual Group Co-treatment   Time In    1330   Time Out    1423   Minutes    53        Timed Code Treatment Minutes:  Timed Code Treatment Minutes: 38 Minutes  Total Treatment Minutes:  48       Electronically Signed By: Marilee Rasheed [id number]

## 2023-01-16 PROCEDURE — 97530 THERAPEUTIC ACTIVITIES: CPT

## 2023-01-16 PROCEDURE — 94640 AIRWAY INHALATION TREATMENT: CPT

## 2023-01-16 PROCEDURE — 99223 1ST HOSP IP/OBS HIGH 75: CPT | Performed by: PSYCHIATRY & NEUROLOGY

## 2023-01-16 PROCEDURE — 2060000000 HC ICU INTERMEDIATE R&B

## 2023-01-16 PROCEDURE — 6360000002 HC RX W HCPCS: Performed by: INTERNAL MEDICINE

## 2023-01-16 PROCEDURE — 6370000000 HC RX 637 (ALT 250 FOR IP): Performed by: INTERNAL MEDICINE

## 2023-01-16 PROCEDURE — 97535 SELF CARE MNGMENT TRAINING: CPT

## 2023-01-16 PROCEDURE — 1200000000 HC SEMI PRIVATE

## 2023-01-16 PROCEDURE — 94761 N-INVAS EAR/PLS OXIMETRY MLT: CPT

## 2023-01-16 RX ORDER — DIPHENHYDRAMINE HYDROCHLORIDE 50 MG/ML
25 INJECTION INTRAMUSCULAR; INTRAVENOUS ONCE
Status: COMPLETED | OUTPATIENT
Start: 2023-01-17 | End: 2023-01-17

## 2023-01-16 RX ORDER — DILTIAZEM HYDROCHLORIDE 5 MG/ML
10 INJECTION INTRAVENOUS ONCE
Status: DISCONTINUED | OUTPATIENT
Start: 2023-01-17 | End: 2023-01-16

## 2023-01-16 RX ADMIN — PANTOPRAZOLE SODIUM 40 MG: 40 TABLET, DELAYED RELEASE ORAL at 07:16

## 2023-01-16 RX ADMIN — MONTELUKAST SODIUM 10 MG: 10 TABLET, FILM COATED ORAL at 20:42

## 2023-01-16 RX ADMIN — LEVOTHYROXINE SODIUM 125 MCG: 0.1 TABLET ORAL at 07:16

## 2023-01-16 RX ADMIN — HYDRALAZINE HYDROCHLORIDE 50 MG: 25 TABLET, FILM COATED ORAL at 15:34

## 2023-01-16 RX ADMIN — ASPIRIN 81 MG: 81 TABLET, COATED ORAL at 09:10

## 2023-01-16 RX ADMIN — FLUTICASONE PROPIONATE 1 SPRAY: 50 SPRAY, METERED NASAL at 09:13

## 2023-01-16 RX ADMIN — ESCITALOPRAM OXALATE 20 MG: 10 TABLET ORAL at 09:11

## 2023-01-16 RX ADMIN — HYDRALAZINE HYDROCHLORIDE 50 MG: 25 TABLET, FILM COATED ORAL at 20:41

## 2023-01-16 RX ADMIN — TAMSULOSIN HYDROCHLORIDE 0.4 MG: 0.4 CAPSULE ORAL at 09:10

## 2023-01-16 RX ADMIN — AMLODIPINE BESYLATE 10 MG: 5 TABLET ORAL at 09:10

## 2023-01-16 RX ADMIN — Medication 2 PUFF: at 20:39

## 2023-01-16 RX ADMIN — HYDRALAZINE HYDROCHLORIDE 50 MG: 25 TABLET, FILM COATED ORAL at 09:09

## 2023-01-16 RX ADMIN — OXYBUTYNIN CHLORIDE 10 MG: 5 TABLET, EXTENDED RELEASE ORAL at 09:10

## 2023-01-16 RX ADMIN — CETIRIZINE HYDROCHLORIDE 5 MG: 10 TABLET, FILM COATED ORAL at 09:10

## 2023-01-16 RX ADMIN — ATORVASTATIN CALCIUM 40 MG: 40 TABLET, FILM COATED ORAL at 09:10

## 2023-01-16 RX ADMIN — Medication 2 PUFF: at 08:37

## 2023-01-16 RX ADMIN — ENOXAPARIN SODIUM 40 MG: 100 INJECTION SUBCUTANEOUS at 09:09

## 2023-01-16 NOTE — CONSULTS
In patient Neurology consult        [facility]      MD Fidencio Pressley  1935    Date of Service: 1/16/2023    Referring Physician: Farzana Sxaena MD      Reason for the consult and CC: Acute left-sided weakness and numbness for possible stroke    History was obtained from chart reviewing and discussion with the patient. The patient is not a good historian. HPI:   The patient is a 81y.o.  years old male with history of hypertension, CAD and prior CVA who was admitted to the hospital yesterday with above concern. Symptoms started 2 to 3 days ago. Description generalized weakness more left than right. Symptoms started gradually but no fall or injury. Degree was severe persistent duration. Other associated symptoms include numbness and tingling his left side and some mild slurred speech. There was concern for possible stroke and he came to the hospital where he was admitted. Initial imaging with CT of the head showed no acute findings. Today he denies any new symptoms. Feels weaker in his left leg compared to the right side. No neck or back pain. No headache or chest pain, dysphagia or dysarthria. Constitutional:   Vitals:    01/16/23 0714 01/16/23 0801 01/16/23 0838 01/16/23 1141   BP: (!) 169/73   126/76   Pulse: 60   72   Resp: 17      Temp:       TempSrc:       SpO2: 95%  95% 98%   Weight:  240 lb (108.9 kg)     Height:             I personally reviewed and updated social history, past medical history, medications, allergy, surgical history, and family history as documented in the patient's electronic health records. ROS: 10-14 ROS reviewed with the patient/nurse/family which were unremarkable except mentioned in H&P. General appearance:  Normal development and appear in no acute distress.    Mental Status:   Oriented to person, problem but not time  Poor immediate recall and fund of knowledge  Language is fluent  Intact remote memory but impaired recent memory with poor fund of knowledge. Cranial Nerves:   II: Visual fields: Full. Pupils: equal, round, reactive to light, bilaterally  III,IV,VI: Extra Ocular Movements are intact. No nystagmus  V: Facial sensation is intact  VII: Facial strength and movements: intact and symmetric  IX: Normal palatal elevation and shoulder shrug  XII: Tongue movements are normal  Musculoskeletal: No weakness in his right upper extremity. Mild weakness of the left arm. Weakness bilateral lower extremity 3/5. So far patient is not sure whether this is acute versus chronic. Tone: Normal tone. No rigidity or abnormal movement. Reflexes: Symmetric 2+ in the arms and 1 in the legs   Planters: Flexor bilaterally  Coordination: no pronator drift, no dysmetria with FNF and normal REM  Sensation: normal in both arms and diminished vibration is distal legs. Gait/Posture: Unable to stand due to weakness. Medical decision making:  Data: reviewed   LABS:   Lab Results   Component Value Date/Time     01/14/2023 02:39 PM    K 4.0 01/14/2023 02:39 PM     01/14/2023 02:39 PM    CO2 23 01/14/2023 02:39 PM    BUN 24 01/14/2023 02:39 PM    CREATININE 1.3 01/14/2023 02:39 PM    GFRAA 58 10/17/2022 05:36 AM    GFRAA >60 10/21/2012 07:03 AM    LABGLOM 53 01/14/2023 02:39 PM    GLUCOSE 120 01/14/2023 02:39 PM    MG 2.20 10/09/2022 05:52 AM    CALCIUM 8.8 01/14/2023 02:39 PM     Lab Results   Component Value Date/Time    WBC 5.7 01/14/2023 02:39 PM    RBC 3.26 01/14/2023 02:39 PM    HGB 10.4 01/14/2023 02:39 PM    HCT 31.3 01/14/2023 02:39 PM    MCV 95.8 01/14/2023 02:39 PM    RDW 15.2 01/14/2023 02:39 PM     01/14/2023 02:39 PM     Lab Results   Component Value Date    INR 1.13 10/07/2022    PROTIME 14.4 10/07/2022       Neuroimaging was independently reviewed by myself and discussed results with the patient  Reviewed notes from different physicians including H&P and ED notes.   Reviewed lab and blood testing    Impression:  Acute generalized weakness more left than right. Possible new ischemic stroke. Will need MRI of the brain. Last CT of the head and neck showed vertebral stenosis. Hypertension  Hyperlipidemia  Chronic cognitive impairment      Recommendation:  MRI of the brain  PT and OT  Aspirin  Statin  Speech evaluation  Telemetry  DVT and GI prophylaxis  Blood pressure monitor  Neurochecks  Lipid panel and A1c  Continue current blood pressure medications  Secondary stroke prevention provided  High risk for hospital-acquired delirium and worsening dementia          Thank you for referring such patient. If you have any questions regarding my consult note, please don't hesitate to call me. Sommer Guerra MD  [phone]    This dictation was generated by voice recognition computer software.  Although all attempts are made to edit the dictation for accuracy, there may be errors in the  transcription that are not intended

## 2023-01-16 NOTE — H&P
[place] [address]                              HISTORY AND PHYSICAL    PATIENT NAME: MEHRAN HARE                  :        1935  MED REC NO:   [id number]                          ROOM:       3362  ACCOUNT NO:   [id number]                           ADMIT DATE: 2023  PROVIDER:     Ludmila Lake MD    HISTORY OF PRESENT ILLNESS:  The patient is an 87-year-old white  American gentleman came to the emergency room with history of increasing  weakness and numbness.  This has been ongoing for 48 hours.  He does  have prior history of stroke with facial droop, which is unchanged and  very slurring of speech, which has been progressively worsening over the  course of the week.  From his stroke before, he has residual left-sided  weakness and numbness of the arms and legs.  He has this numbness and  tingling in the bilateral hands and feet, worse on the left side with  waxing and waning and is usually worse in the evening.  Also he has been  having weakness in bilateral legs and difficulty in walking.  He does  have some cough and nasal congestion.  No fever, no chills.  Frequently  incontinent to urine.  All of his symptoms may have resolved now.    PAST MEDICAL HISTORY:  Pertinent for prior stroke, atherosclerotic heart  disease.    PAST SURGICAL HISTORY:  Pertinent for coronary artery bypass graft.    FAMILY HISTORY:  Both the parents are  because of natural  causes.    SOCIAL HISTORY:  He is a  man, but he lives separate from his  wife.  He has to live with one of his daughters for his day-to-day  needs.  He has four children.  He was never a smoker.  His alcohol usage  was very rare and only restricted to social intake or even less than  that.  He used to work for [employer].  He does not have any  history of substance abuse.    MEDICATIONS:  Norvasc, aspirin, Lipitor, Zyrtec, Lexapro,  Flonase,  hydralazine, Chronulac, levothyroxine, Dulera, Singulair, Ditropan,  pantoprazole, MiraLAX, Flomax. ALLERGIES:  No known allergies. REVIEW OF SYSTEMS:  Negative for loss of consciousness. No blurring of  vision. No slurring of speech. No disorientation. No dysphagia. No  angina pectoris. The patient does ambulate with a walker. No orthopnea  or paroxysmal nocturnal dyspnea. No hematemesis or melena. No  abdominal pain. Does have chronic musculoskeletal pain. Does have  left-sided hemiparesis. PHYSICAL EXAMINATION:  GENERAL:  Alert, awake, oriented x2, moderately distressed 44-year-old  white man looking without any distress. VITAL SIGNS:  Temperature 97.7, blood pressure 131/61, respiration 18,  heart rate 58. O2 sat 98% on room air. No evidence of atrial  fibrillation. HEENT:  Oral mucosa dry. SKIN:  Warm and dry. NECK:  Supple. No jugular venous distention. No lymphadenopathy. No  thyromegaly. LUNGS:  Vesicular breath sounds. Poor inspiration. No crackles or  wheezing. HEART:  Regular rate and rhythm. S1, S2.  ABDOMEN:  Soft, nontender. Bowel sounds present. EXTREMITIES:  No cyanosis. There is trace edema. NEUROLOGICALLY:  Positive Babinski on the left side with left-sided  hemiparesis. Although it is not very clear how much of this is old. EKG; sinus bradycardia, voltage criteria for LVH, nonspecific ST-T  changes, no evidence of atrial fibrillation. LABORATORY EVALUATION:  Sodium 134, potassium 4.0, chloride 100, CO2 23,  BUN 24, creatinine 1.3, blood glucose 120, calcium is 8.8. Troponin is  less than 0.01. Albumin 3.7, alkaline phosphatase 81. AST, ALT 14 and  8. Urinalysis negative for UTI. Head CT without contrast; no acute intracranial abnormality, chronic  pattern of the sinusitis. Chest x-ray, no radiographic evidence of acute cardiopulmonary disease.     ASSESSMENT:  Possible CVA; the patient had a transthoracic  echocardiogram that showed LVEF of 60%, it was done back recently in  November; grade 3 diastolic dysfunction with elevated left ventricular  filling pressure; chronic diastolic heart failure; hypertension;  atherosclerotic heart disease; CVA; left-sided hemiparesis. PLAN:  Get him admitted. Put him on neuro checks. DVT prophylaxis. MRI of the brain, although I do not anticipate it will change anything  what we do with him. We will also get a Neurology consultation. We can  also get the carotid Doppler done, although the patient has had CT of  the head and neck, which excluded any major extra or intracranial  stenosis.         Jacques Katz MD    D: 01/15/2023 21:27:38       T: 01/15/2023 21:31:32     [email or web address]  Job#: [id number]     Doc#: [id number]    CC:

## 2023-01-16 NOTE — PROGRESS NOTES
[facility],[address] Msb Department   Phone: [phone]    Occupational Therapy    [] Initial Evaluation            [x] Daily Treatment Note         [] Discharge Summary      Patient: Mary Paez   : 1935   MRN: [id number]   Date of Service:  2023    Admitting Diagnosis:  <principal problem not specified>  Current Admission Summary: \"This is a 81 y.o. male who presents to the ED for weakness and numbness. [place] Has been ongoing for approximately 48 hours. Has history of stroke with facial droop which is unchanged and slurred speech which has been progressively worsening over the course of the week. From his prior stroke, he has residual left-sided weakness and numbness of the arm and leg. Has had numbness and tingling in bilateral hands and feet, worse on the left side, that waxes and wanes and is usually worse in the evening. Also has been having weakness in bilateral legs and difficulty walking. Has had cough and nasal congestion. No fevers although has felt hot at night. No chest pain or shortness of breath or abdominal pain. Feels fine right now. Not in any pain now  Past Medical History:  has a past medical history of CAD (coronary artery disease) and Cerebral artery occlusion with cerebral infarction ([facility] [address] Past Surgical History:  has a past surgical history that includes Coronary artery bypass graft and Cardiac surgery. Discharge Recommendations: Mary Paez scored a 13/24 on the AM-PAC ADL Inpatient form. Current research shows that an AM-PAC score of 17 or less is typically not associated with a discharge to the patient's home setting. Based on the patient's AM-PAC score and their current ADL deficits, it is recommended that the patient have 5-7 sessions per week of Occupational Therapy at d/c to increase the patient's independence.   At this time, this patient demonstrates complex nursing, medical, and rehabilitative needs, and would benefit from intensive rehabilitation services upon discharge from the Inpatient setting. This patient demonstrates the ability to participate in and benefit from an intensive therapy program with a coordinated interdisciplinary team approach to foster frequent, structured, and documented communication among disciplines, who will work together to establish, prioritize, and achieve treatment goals. Please see assessment section for further patient specific details. If patient discharges prior to next session this note will serve as a discharge summary. Please see below for the latest assessment towards goals. DME Required For Discharge: DME to be determined at next level of care    Precautions/Restrictions: high fall risk, up as tolerated  Weight Bearing Restrictions: no restrictions  [] Right Upper Extremity  [] Left Upper Extremity [] Right Lower Extremity  [] Left Lower Extremity     Required Braces/Orthotics: no braces required   [] Right  [] Left  Positional Restrictions:no positional restrictions    Pre-Admission Information   Lives With: spouse, daughter                         Type of Home: apartment- third floor  Home Layout: one level  Home Access: elevator  Bathroom Layout: walk in shower  Bathroom Equipment: grab bars in shower, grab bars around toilet, shower chair  Toilet Height: standard height  Home Equipment: rolling walker, rollator - 4 wheeled walker, single point cane  Transfer Assistance: modified independent with use of SPC  Ambulation Assistance:modified independent with use of SPC  ADL Assistance: requires assistance with bathing  IADL Assistance: requires assistance with all homemaking tasks- daughters complete  Active :        [] Yes                 [x] No  Hand Dominance: [] Left                 [x] Right  Current Employment: retired.   Occupation: [employer]  Hobbies: Watch sports  Recent Falls: Pt reports one fall in the last 6 months    Patient is questionable historian, per chart pt left ARU in October of 2022 using a RW        Subjective  General: pt in bed on arrival - denies pain - agreeable to evaluation   Pain: 4/10. Location: low back (chronic)  Pain Interventions: not applicable        Activities of Daily Living  Basic Activities of Daily Living  Grooming: minimal assistance  Grooming Comments: oral care with dentures; assist to rinse  Upper Extremity Bathing: stand by assistance requires verbal cueing Increased time to complete task Comment: Min to Mod A for sitting balance when unsupported  Lower Extremity Bathing: dependent Comment: posterior hygiene   Upper Extremity Dressing: minimal assistance Comment: gown  Sponge bathing at EOB, brief change from EOB,  Instrumental Activities of Daily Living  No IADL completed on this date. Functional Mobility  Bed Mobility  Supine to Sit: minimal assistance  Scooting: minimal assistance  Comments: HOB elevated for supine to sit   Transfers  Sit to stand transfer:2 person assistance with Min A of 2 from EOB to Ghada Noelle, Max A of 1 & Mod A of 1 from recliner   Stand to sit transfer: 2 person assistance with min A of 2    Comments: pt with posterior lean, difficulty motor planning, poor hand placement during transfers  Pt cued to scoot forward in recliner and rock for sit to stand. Transfer EOB to recliner with Ghada Noelle. Functional Mobility:  Sitting Balance: minimal assistance, moderate assistance, on EOB, posterior an R lateral lean. Standing Balance: 2 person assistance with Max A of 2 w/RW static and dynamic .     Functional Mobility Activity: 4 steps forward and back  Functional Mobility Device Use: rolling walker  Functional Mobility Comment: Very strong posterior lean, especially with ambulating backwards  Comment: Pt stood for 4 min for static standing and ambulating      Functional Outcomes  AM-PAC Inpatient Daily Activity Raw Score: 13    Cognition  Overall Cognitive Status: Impaired  Arousal/Alterness: delayed responses to stimuli, inconsistent responses to stimuli  Following Commands: follows one step commands with repetition  Attention Span: appears intact  Memory: decreased recall of recent events, decreased short term memory  Safety Judgement: decreased awareness of need for assistance  Problem Solving: assistance required to generate solutions, assistance required to implement solutions, assistance required to identify errors made, assistance required to correct errors made  Insights: decreased awareness of deficits  Initiation: requires cues for some  Sequencing: requires cues for some  Comments: difficult to understand during conversation due to mumbling and difficulty with expression  Orientation:    oriented to person, oriented to place, oriented to situation, and disoriented to time   Command Following:   impaired accurately follows one step commands with repetition and extra time, occasional need for [facility] assist      Education  Barriers To Learning: cognition  Patient Education: patient educated on goals, OT role and benefits, plan of care, ADL adaptive strategies, discharge recommendations  Learning Assessment:  patient will require reinforcement due to cognitive deficits    Assessment  Activity Tolerance: tolerated well, HR, O2 saturation and BP WNL in chair after activity  Impairments Requiring Therapeutic Intervention: decreased functional mobility, decreased ADL status, decreased strength, decreased safety awareness, decreased cognition, decreased endurance, decreased balance, decreased fine motor control  Prognosis: fair  Clinical Assessment: Patient presents below baseline function secondary to increased B LE weakness and numbness. Patient will benefit from OT services to address the above deficits. Patient typically has assist with ADLS, ambulates with AE. Patient is primarily limited by decreased balance, decreased strength LEs, decreased endurance.  Patient will benefit from OT services to maximize safety and independence in ADLs, transfers and functional mobility prior to returning home. Patient currently requiring assistance : max/dep LB ADLs and dep A for functional mobility.    Safety Interventions: patient left in chair, chair alarm in place, call light within reach, nurse notified, and family/caregiver present    Second session: Pt seated in recliner in room with alarm going off. Pt asking to use the toilet. Pt did not mention being in pain. Pt required Min A sit to stand from recliner to Ofelia STEDY. Ofelia STEDY used to bathroom, and CGA sit to  STEDY. Min A stand to sit on BSC over toilet. CGA sit to stand from BSC over toilet to Ofelia STEDY. D for posterior kasie hygiene. Pt washed his hands at sink with SBA in Ofelia STEDY. Pt wheeled to EOB with Ofelia STEDY. Min A sit to supine for LEs. Max A of 2 to scoot up in bed in supine. Pt left in bed with bed alarm set and all needs in reach, RN aware. 23 min spent with pt.  Plan  Frequency: 5-7 x/week  Current Treatment Recommendations: strengthening, ROM, balance training, functional mobility training, neuromuscular re-education, ADL/self-care training, IADL training, cognitive reorientation, and safety education    Goals  Patient Goals: to get stronger    Short Term Goals:  Time Frame: discharge   Patient will complete upper body ADL at stand by assistance   Patient will complete lower body ADL at moderate assistance   Patient will complete toileting at maximum assistance   Patient will complete self-feeding at set up assistance   Patient will complete grooming at set up assistance   Patient will complete functional transfers at moderate assistance   Patient will complete functional mobility at moderate assistance     No goals met 1/16  Therapy Session Time     Individual Group Co-treatment   Time In    1056, 1346   Time Out    1149, 1409   Minutes    53, 23        Timed Code Treatment Minutes:  53 + 23 min (Total 76  min)  Total Treatment Minutes:  53 + 23 min (Total 76 min)       Electronically Signed By: ELVIN Arias [place]., OTR/L, [id number]

## 2023-01-16 NOTE — PROGRESS NOTES
[facility]   Phone: [phone]    Physical Therapy    [] Initial Evaluation            [x] Daily Treatment Note         [] Discharge Summary      Patient: Valentino Zarate   : 1935   MRN: [id number]   Date of Service:  2023  Admitting Diagnosis: <principal problem not specified>  Current Admission Summary: This is a 81 y.o. male who presents to the ED for weakness and numbness. Has been ongoing for approximately 48 hours. Has history of stroke with facial droop which is unchanged and slurred speech which has been progressively worsening over the course of the week. From his prior stroke, he has residual left-sided weakness and numbness of the arm and leg. Has had numbness and tingling in bilateral hands and feet, worse on the left side, that waxes and wanes and is usually worse in the evening. Also has been having weakness in bilateral legs and difficulty walking. Has had cough and nasal congestion. No fevers although has felt hot at night. No chest pain or shortness of breath or abdominal pain. Feels fine right now. Not in any pain now  Past Medical History:  has a past medical history of CAD (coronary artery disease) and Cerebral artery occlusion with cerebral infarction ([facility] [address] Past Surgical History:  has a past surgical history that includes Coronary artery bypass graft and Cardiac surgery. Discharge Recommendations: Valentino Zarate scored a 9/24 on the AM-PAC short mobility form. Current research shows that an AM-PAC score of 17 or less is typically not associated with a discharge to the patient's home setting. Based on the patient's AM-PAC score and their current functional mobility deficits, it is recommended that the patient have 5-7 sessions per week of Physical Therapy at d/c to increase the patient's independence.   At this time, this patient demonstrates complex nursing, medical, and rehabilitative needs, and would benefit from intensive rehabilitation services upon discharge from the Inpatient setting. This patient demonstrates the ability to participate in and benefit from an intensive therapy program with a coordinated interdisciplinary team approach to foster frequent, structured, and documented communication among disciplines, who will work together to establish, prioritize, and achieve treatment goals. Please see assessment section for further patient specific details. If patient discharges prior to next session this note will serve as a discharge summary. Please see below for the latest assessment towards goals. DME Required For Discharge: DME to be determined at next level of care  Precautions/Restrictions: high fall risk, up as tolerated  Weight Bearing Restrictions: no restrictions  Required Braces/Orthotics: no braces required  Positional Restrictions:no positional restrictions    Pre-Admission Information   Lives With: spouse, daughter    Type of Home: apartment- third floor  Home Layout: one level  Home Access: elevator  Bathroom Layout: walk in [address] [place] [address] St: grab bars in shower, grab bars around toilet, shower chair,uses a lift chair  Toilet Height: standard height  Home Equipment: rolling walker, rollator - 4 wheeled walker, single point cane, shanice lift  Transfer Assistance: modified independent with use of SPC  Ambulation Assistance:modified independent with use of SPC  ADL Assistance: requires assistance with bathing  IADL Assistance: requires assistance with all homemaking tasks- daughters complete  Active :        [] Yes  [x] No  Hand Dominance: [] Left  [x] Right  Current Employment: retired.   Occupation: [place]: [employer] sports  Recent Falls: Pt reports one fall in the last 6 months      Vision:   Vision Gross Assessment: Impaired and Vision Corrective Device: wears glasses at all times  Hearing:   [place]        Subjective  General: Pt supine in bed on arrival, agreeable to participate in PT/OT treatment session. Repeatedly asking to go home. Pain: 0/10  Pain Interventions: not applicable  Vitals  Heart Rate: 72  Heart Rate Source: Monitor  BP: 126/76  BP Location: Right upper arm  BP Method: Automatic  Patient Position: Sitting  MAP (Calculated): 93  SpO2: 98 %  O2 Device: None (Room air)  Functional Mobility  Bed Mobility  Supine to Sit: minimal assistance  Scooting: minimal assistance- toward EOB  Comments: HOB elevated, increased time and cueing, use of bed railing    Transfers  Sit to stand transfer: 2 person assistance with mod + max A of 2 from recliner , min A of 2 from elevated bed with use of STEDY  Stand to sit transfer: 2 person assistance with max A of 2 with RW due to posterior lean;  min A of 2 with use of STEDY   Comments:  Transfer bed to chair via STEDY due to reports of fatigue. Pt then agreeable to mobility with RW. Increased assistance required due to posterior leaning and inability to self-correct. Max A of 2 to maintain standing balance. Ambulation  Surface:level surface  Assistive Device: rolling walker  Assistance: 2 person assistance with max A of 2   Distance: 4 steps fwd and retro  Gait Mechanics: RACHEL posterior to feet, flexed posture, NBOS, small step length B  Comments:  Max A of 2 to maintain standing balance,  more support during retro steps; difficulty following cues     Stair Mobility  Stair mobility not completed on this date. Comments:  Wheelchair Mobility:  No w/c mobility completed on this date. Comments:  Balance  Static Sitting Balance: poor: requires mod (A) to maintain balance  Dynamic Sitting Balance: poor: requires mod (A) to maintain balance  Static Standing Balance: poor (-): requires max (A) to maintain balance  Dynamic Standing Balance: poor (-): requires max (A) to maintain balance  Comments: R posterior lateral leaning more pronounced with fatigue and when utilizing UEs for grooming/ADL tasks. Assist to correct but difficulty maintaining. Other Therapeutic Interventions    2nd session:  Alarm going off and pt attempting to get out of chair. Stating he needed to use bathroom. Min A with STEDY from recliner, CGA within STEDY to stand. Raised toilet transfer with STEDY and CGA. Dependent for pericare. Sit to supine with min A. Dependent for scooting towards HOB. Bed alarm on and needs in reach. Functional Outcomes  AM-PAC Inpatient Mobility Raw Score : 9              Cognition  Overall Cognitive Status: Impaired  Following Commands: follows one step commands with repetition, follows one step commands with increased time  Insights: decreased awareness of deficits  Initiation: requires cues for some  Sequencing: requires cues for some  Orientation:    oriented to person, oriented to place, oriented to situation, and disoriented to time   Command Following:   impaired- pt follows one step commands with increased time and repetition of instruction    Education  Barriers To Learning: cognition  Patient Education: patient educated on goals, PT role and benefits, general safety, functional mobility training, transfer training, discharge recommendations  Learning Assessment:  patient verbalizes understanding, would benefit from continued reinforcement    Assessment  Activity Tolerance:limited by fatigue   Impairments Requiring Therapeutic Intervention: decreased functional mobility, decreased strength, decreased cognition, decreased endurance, decreased balance  Prognosis: good  Clinical Assessment: Pt is an 82 y/o male who presents to the hospital for weakness and numbness. Prior to admission to the hospital, the pt was modified independent for performance of functional mobility tasks with the use of a SPC. Pt continues to require 2 person MAX A for transfers and balance at  with inability to safely ambulate due to posterior leaning. Continued skilled PT to promote return towards PLOF.     Safety Interventions: patient left in chair, chair alarm in place, call light within reach, gait belt, and nurse notified    Plan  Frequency: 5-7 x/week  Current Treatment Recommendations: strengthening, balance training, functional mobility training, transfer training, gait training, endurance training, neuromuscular re-education, patient/caregiver education, home exercise program, and safety education    Goals  Patient Goals: Pt did not state   Short Term Goals:  Time Frame: By discharge  Patient will complete bed mobility at contact guard assistance   Patient will complete transfers at maximum assistance   Patient will ambulate 10 ft with use of LRAD at maximum assistance    No goals met     Therapy Session Time      Individual Group Co-treatment   Time In     1056   Time Out     1149   Minutes     53     Timed Code Treatment Minutes: 53 Minutes  Total Treatment Minutes: 53 Minutes        Second Session Therapy Time:   Individual Concurrent Group Co-treatment   Time In       1346   Time Out       1409   Minutes       23     Timed Code Treatment Minutes:  76    Total Treatment Minutes:  76    Electronically Signed By: Janice Kyle PT

## 2023-01-16 NOTE — PROGRESS NOTES
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

## 2023-01-16 NOTE — PLAN OF CARE
Problem: Risk for Elopement  Goal: Patient will not exit the unit/facility without proper excort  Outcome: Progressing     Problem: Safety - Adult  Goal: Free from fall injury  Outcome: Progressing  Note: Safety precautions in place. Bed locked, alarmed, lowest position. Call light in reach, gripper socks on. Camera for safety. No falls or physical noted this shift. Problem: ABCDS Injury Assessment  Goal: Absence of physical injury  Outcome: Progressing     Problem: Neurosensory - Adult  Goal: Achieves stable or improved neurological status  Outcome: Progressing     Problem: Skin/Tissue Integrity  Goal: Absence of new skin breakdown  Description: 1. Monitor for areas of redness and/or skin breakdown  2.   Assess vascular access sites hourly  Outcome: Progressing

## 2023-01-17 ENCOUNTER — APPOINTMENT (OUTPATIENT)
Dept: MRI IMAGING | Age: 88
DRG: 065 | End: 2023-01-17
Payer: MEDICARE

## 2023-01-17 PROCEDURE — 6360000002 HC RX W HCPCS: Performed by: NURSE PRACTITIONER

## 2023-01-17 PROCEDURE — 97530 THERAPEUTIC ACTIVITIES: CPT

## 2023-01-17 PROCEDURE — 70551 MRI BRAIN STEM W/O DYE: CPT

## 2023-01-17 PROCEDURE — 97112 NEUROMUSCULAR REEDUCATION: CPT

## 2023-01-17 PROCEDURE — 1200000000 HC SEMI PRIVATE

## 2023-01-17 PROCEDURE — 2060000000 HC ICU INTERMEDIATE R&B

## 2023-01-17 PROCEDURE — 97535 SELF CARE MNGMENT TRAINING: CPT

## 2023-01-17 PROCEDURE — 6370000000 HC RX 637 (ALT 250 FOR IP): Performed by: INTERNAL MEDICINE

## 2023-01-17 PROCEDURE — 94640 AIRWAY INHALATION TREATMENT: CPT

## 2023-01-17 PROCEDURE — 94761 N-INVAS EAR/PLS OXIMETRY MLT: CPT

## 2023-01-17 RX ORDER — ATORVASTATIN CALCIUM 40 MG/1
40 TABLET, FILM COATED ORAL DAILY
Qty: 30 TABLET | Refills: 3 | Status: SHIPPED | OUTPATIENT
Start: 2023-01-18

## 2023-01-17 RX ADMIN — OXYBUTYNIN CHLORIDE 10 MG: 5 TABLET, EXTENDED RELEASE ORAL at 09:01

## 2023-01-17 RX ADMIN — ASPIRIN 81 MG: 81 TABLET, COATED ORAL at 09:01

## 2023-01-17 RX ADMIN — ATORVASTATIN CALCIUM 40 MG: 40 TABLET, FILM COATED ORAL at 09:00

## 2023-01-17 RX ADMIN — PANTOPRAZOLE SODIUM 40 MG: 40 TABLET, DELAYED RELEASE ORAL at 05:35

## 2023-01-17 RX ADMIN — LEVOTHYROXINE SODIUM 125 MCG: 0.1 TABLET ORAL at 05:35

## 2023-01-17 RX ADMIN — HYDRALAZINE HYDROCHLORIDE 50 MG: 25 TABLET, FILM COATED ORAL at 13:55

## 2023-01-17 RX ADMIN — TAMSULOSIN HYDROCHLORIDE 0.4 MG: 0.4 CAPSULE ORAL at 09:01

## 2023-01-17 RX ADMIN — DIPHENHYDRAMINE HYDROCHLORIDE 25 MG: 50 INJECTION, SOLUTION INTRAMUSCULAR; INTRAVENOUS at 00:05

## 2023-01-17 RX ADMIN — LACTULOSE 20 G: 20 SOLUTION ORAL at 09:07

## 2023-01-17 RX ADMIN — AMLODIPINE BESYLATE 10 MG: 5 TABLET ORAL at 09:01

## 2023-01-17 RX ADMIN — Medication 2 PUFF: at 22:15

## 2023-01-17 RX ADMIN — CETIRIZINE HYDROCHLORIDE 5 MG: 10 TABLET, FILM COATED ORAL at 09:00

## 2023-01-17 RX ADMIN — HYDRALAZINE HYDROCHLORIDE 50 MG: 25 TABLET, FILM COATED ORAL at 19:40

## 2023-01-17 RX ADMIN — Medication 2 PUFF: at 07:22

## 2023-01-17 RX ADMIN — MONTELUKAST SODIUM 10 MG: 10 TABLET, FILM COATED ORAL at 19:41

## 2023-01-17 RX ADMIN — POLYETHYLENE GLYCOL 3350 17 G: 17 POWDER, FOR SOLUTION ORAL at 09:01

## 2023-01-17 RX ADMIN — HYDRALAZINE HYDROCHLORIDE 50 MG: 25 TABLET, FILM COATED ORAL at 09:00

## 2023-01-17 RX ADMIN — ESCITALOPRAM OXALATE 20 MG: 10 TABLET ORAL at 09:00

## 2023-01-17 NOTE — DISCHARGE INSTR - COC
Continuity of Care Form    Patient Name: Patricio Dubois   :  1935  MRN:  [id number]    Admit date:  2023  Discharge date:  2023    Code Status Order: Full Code   Advance Directives:     Admitting Physician:  Latrell Lucas MD  PCP: Frances Barr MD    Discharging Nurse: Chandler Mcintyre Unit/Room#: 0XN-4810/6779-84  Discharging Unit Phone Number: [phone]    Emergency Contact:   Extended Emergency Contact Information  Primary Emergency Contact: Servando Baez [facility] [address] JUDITH QUIROGA)  Home Phone: [phone]  Relation: Child  Secondary Emergency Contact: [address] [place] [address] Phone: [phone]  Relation: Child    Past Surgical History:  Past Surgical History:   Procedure Laterality Date    CARDIAC SURGERY          CORONARY ARTERY BYPASS GRAFT         Immunization History:   Immunization History   Administered Date(s) Administered    COVID-19, PFIZER PURPLE top, DILUTE for use, (age 15 y+), 30mcg/0.3mL 2021, 2021, 2022    Influenza Virus Vaccine 10/18/2012    Pneumococcal Conjugate 7-valent (Arletta Gift) 2002       Active Problems:  Patient Active Problem List   Diagnosis Code    ASHD (arteriosclerotic heart disease) I25.10    Bilateral carotid artery stenosis I65.23    Stage 2 chronic kidney disease N18.2    Essential hypertension I10    Dyslipidemia E78.5    Dementia due to Alzheimer's disease ([facility] G30.9, F02.80    Unable to care for self Z78.9    Unable to ambulate R26.2    Slurred speech R47.81    Acute cerebrovascular accident (CVA) ([address] I63.9    CVA (cerebrovascular accident) ([address] I63.9    TIA (transient ischemic attack) G45.9    Hemiparesis as late effect of cerebrovascular disease ([address] I69.959    Chronic diastolic heart failure (HCC) I50.32       Isolation/Infection:   Isolation            No Isolation          Patient Infection Status       Infection Onset Added Last Indicated Last Indicated By Review Planned Expiration Resolved Resolved By    None active    Resolved    C-diff Rule Out 10/08/22 10/08/22 10/08/22 Clostridium difficile toxin/antigen (Ordered)   10/10/22 Joi Cuevas RN    Order     COVID-19 (Rule Out) 10/07/22 10/07/22 10/07/22 COVID-19 (Ordered)   10/08/22 Rule-Out Test Resulted    COVID-19 (Rule Out) 08/15/22 08/15/22 08/15/22 COVID-19, Rapid (Ordered)   08/15/22 Rule-Out Test Resulted            Nurse Assessment:  Last Vital Signs: BP (!) 158/74   Pulse 70   Temp 97.7 °F (36.5 °C) (Oral)   Resp 18   Ht 6' (1.829 m)   Wt 211 lb 3.2 oz (95.8 kg)   SpO2 96%   BMI 28.64 kg/m²     Last documented pain score (0-10 scale):    Last Weight:   Wt Readings from Last 1 Encounters:   23 211 lb 3.2 oz (95.8 kg)     Mental Status:  Alert and disoriented    IV Access:  - None    Nursing Mobility/ADLs:  Walking   Assisted  Transfer  Assisted  Bathing  Assisted  Dressing  Assisted  Toileting  Dependent  Feeding  Assisted  Med Admin  Dependent  Med Delivery   whole, crushed, and prefers mixed with applesauce    Wound Care Documentation and Therapy:        Elimination:  Continence: Bowel: No  Bladder: No  Urinary Catheter: None   Colostomy/Ileostomy/Ileal Conduit: No       Date of Last BM: 2023    Intake/Output Summary (Last 24 hours) at 2023 1134  Last data filed at 2023 0425  Gross per 24 hour   Intake 120 ml   Output 900 ml   Net -780 ml     I/O last 3 completed shifts: In: 120 [P.O.:120]  Out: 2500 [Urine:2500]    Safety Concerns: At Risk for Falls    Impairments/Disabilities:      Vision    Nutrition Therapy:  Current Nutrition Therapy:   - Oral Diet:  General, Carb Control 3 carbs/meal (1500kcals/day), Low Fat, and Low Chol, High Fiber, SUKHWINDER    Routes of Feeding: Oral  Liquids:  Thin Liquids  Daily Fluid Restriction: no  Last Modified Barium Swallow with Video (Video Swallowing Test): not done    Treatments at the Time of Hospital Discharge:   Respiratory Treatments: N/A  Oxygen Therapy:  is not on home oxygen therapy. Ventilator:    - No ventilator support    Rehab Therapies: Physical Therapy, Occupational Therapy, and Speech/Language Therapy  Weight Bearing Status/Restrictions: No weight bearing restrictions  Other Medical Equipment (for information only, NOT a DME order):  bath bench and hospital bed  Other Treatments: N/A    Patient's personal belongings (please select all that are sent with patient):  Glasses, Dentures upper and lower    RN SIGNATURE:  Electronically signed by Kirsten King RN on 1/19/23 at 9:52 AM EST    CASE MANAGEMENT/SOCIAL WORK SECTION    Inpatient Status Date: 1/14/2023    Readmission Risk Assessment Score:  Readmission Risk              Risk of Unplanned Readmission:  24           Discharging to Facility/ [facility]  [address] [address]  Phone: [phone]  Fax: [phone]      / signature: Electronically signed by SARAH Mcdaniel on 1/19/23 at 9:05 AM EST    PHYSICIAN SECTION    Prognosis: Poor    Condition at Discharge: Stable    Rehab Potential (if transferring to Rehab): Guarded    Recommended Labs or Other Treatments After Discharge: PT OT SLP     Physician Certification: I certify the above information and transfer of Marquis Daley  is necessary for the continuing treatment of the diagnosis listed and that he requires [facility] for less 30 days.      Update Admission H&P: No change in H&P    PHYSICIAN SIGNATURE:  Electronically signed by Roro Fernandes MD on 1/17/23 at 11:35 AM EST

## 2023-01-17 NOTE — PLAN OF CARE
Patient progressing towards discharge, family support at bedside for education/safety reinforcement

## 2023-01-17 NOTE — PROGRESS NOTES
[address] [facility],[address] Msb Department   Phone: [phone]    Occupational Therapy    [] Initial Evaluation            [x] Daily Treatment Note         [] Discharge Summary      Patient: Meg Gifford   : 1935   MRN: [id number]   Date of Service:  2023    Admitting Diagnosis:  <principal problem not specified>  Current Admission Summary: \"This is a 81 y.o. male who presents to the ED for weakness and numbness. Claudio Palm Has been ongoing for approximately 48 hours. Has history of stroke with facial droop which is unchanged and slurred speech which has been progressively worsening over the course of the week. From his prior stroke, he has residual left-sided weakness and numbness of the arm and leg. Has had numbness and tingling in bilateral hands and feet, worse on the left side, that waxes and wanes and is usually worse in the evening. Also has been having weakness in bilateral legs and difficulty walking. Has had cough and nasal congestion. No fevers although has felt hot at night. No chest pain or shortness of breath or abdominal pain. Feels fine right now. Not in any pain now  Past Medical History:  has a past medical history of CAD (coronary artery disease) and Cerebral artery occlusion with cerebral infarction ([address] Past Surgical History:  has a past surgical history that includes Coronary artery bypass graft and Cardiac surgery. Discharge Recommendations: Meg Gifford scored a 14/24 on the AM-PAC ADL Inpatient form. Current research shows that an AM-PAC score of 17 or less is typically not associated with a discharge to the patient's home setting. Based on the patient's AM-PAC score and their current ADL deficits, it is recommended that the patient have 5-7 sessions per week of Occupational Therapy at d/c to increase the patient's independence.   At this time, this patient demonstrates complex nursing, medical, and rehabilitative needs, and would benefit from intensive rehabilitation services upon discharge from the Inpatient setting. This patient demonstrates the ability to participate in and benefit from an intensive therapy program with a coordinated interdisciplinary team approach to foster frequent, structured, and documented communication among disciplines, who will work together to establish, prioritize, and achieve treatment goals. Please see assessment section for further patient specific details. If patient discharges prior to next session this note will serve as a discharge summary. Please see below for the latest assessment towards goals. DME Required For Discharge: DME to be determined at next level of care    Precautions/Restrictions: high fall risk, up as tolerated  Weight Bearing Restrictions: no restrictions  [] Right Upper Extremity  [] Left Upper Extremity [] Right Lower Extremity  [] Left Lower Extremity     Required Braces/Orthotics: no braces required   [] Right  [] Left  Positional Restrictions:no positional restrictions    Pre-Admission Information   Lives With: spouse, daughter                         Type of Home: apartment- third floor  Home Layout: one level  Home Access: elevator  Bathroom Layout: walk in shower  Bathroom Equipment: grab bars in shower, grab bars around toilet, shower chair  Toilet Height: standard height  Home Equipment: rolling walker, rollator - 4 wheeled walker, single point cane  Transfer Assistance: modified independent with use of SPC  Ambulation Assistance:modified independent with use of SPC  ADL Assistance: requires assistance with bathing  IADL Assistance: requires assistance with all homemaking tasks- daughters complete  Active :        [] Yes                 [x] No  Hand Dominance: [] Left                 [x] Right  Current Employment: retired.   Occupation: [employer]  Hobbies: Watch sports  Recent Falls: Pt reports one fall in the last 6 months    Patient is questionable historian, per chart pt left ARU in October of 2022 using a RW. Subjective  General: Pt supine in bed on arrival and agreeable for cotx. Cotx with PT this date to maximize function and safety. Daughter present initially but then left during session. Pain: 0/10  Pain Interventions: not applicable        Activities of Daily Living  Basic Activities of Daily Living  Grooming: Setup (hair combing and face washing)  Lower body dressing: Maximum Assistance (don pants and brief)  Comments: Pt with personal shirt already donned. Pt agreeable to don pants. Pants donned while pt seated EOB and then once in STEDY. Pt transported to  using STEDY and completed sink level grooming with assistance as needed. Instrumental Activities of Daily Living  No IADL completed on this date. Functional Mobility  Bed Mobility  Supine to Sit: Assistance of 2 with moderate assistance  Scooting: Maximum Assistance  Comments: HOB elevated for supine to sit; pt with no c/o dizziness upon sitting EOB but soon resolved with increased time. Transfers  Sit to stand transfer:Minimal assistance (EOB to STEDY)  Stand to sit transfer: 2 person assistance with Moderate assistance of 2    Comments: pt with posterior lean, difficulty motor planning, poor hand placement during transfers . Verbal and tactile cues required for safety. EOB to STEDY to recliner chair. Functional Mobility:  Sitting Balance: Maximum assistance initially and progressing to CGA x 10 minutes EOB (dynamic and static sitting balance)  Standing Balance: Minimal Assistance progressing to Maximum Assistance standing supported in STEDY--increased assistance d/t fatigue. Pt completed 4 stands in STEDY--each stand x >1 minutes. Pt performed dynamic reaching while supported standing in STEDY--2 sets crossing midline and 2 sets reaching towards front of self. Better command following noted this date.      Functional Outcomes  AM-PAC Inpatient Daily Activity Raw Score: 14    Cognition  Overall Cognitive Status: Impaired  Arousal/Alterness: delayed responses to stimuli, inconsistent responses to stimuli  Following Commands: follows one step commands with repetition  Attention Span: appears intact  Memory: decreased recall of recent events, decreased short term memory  Safety Judgement: decreased awareness of need for assistance  Problem Solving: assistance required to generate solutions, assistance required to implement solutions, assistance required to identify errors made, assistance required to correct errors made  Insights: decreased awareness of deficits  Initiation: requires cues for some  Sequencing: requires cues for some  Comments: difficult to understand during conversation due to mumbling and difficulty with expression; pt refusing food and medications this date and alerting PT and OT when he was finished and OT/PT needed to leave. Pt repeatedly speaking about leaving hospital this date and returning home. Education provided about benefits of continued skilled therapy services--pt verbalized understanding.    Orientation:    oriented to person, oriented to place, oriented to situation, and disoriented to time   Command Following:   impaired accurately follows one step commands with repetition and extra time, occasional need for [facility] assist      Education  Barriers To Learning: cognition  Patient Education: patient educated on goals, OT role and benefits, plan of care, ADL adaptive strategies, discharge recommendations  Learning Assessment:  patient will require reinforcement due to cognitive deficits    Assessment  Activity Tolerance: Fair  Impairments Requiring Therapeutic Intervention: decreased functional mobility, decreased ADL status, decreased strength, decreased safety awareness, decreased cognition, decreased endurance, decreased balance, decreased fine motor control  Prognosis: fair  Clinical Assessment: Patient presents with the above deficits impacting daily occupational performance and would benefit from continued skilled OT services. Pt continues to require assistance of 2 skilled therapists with bed mobility, ADL transfers and continues to require use of STEDY for OOB mobility and transfers. Pt refusing food and medications this date. Pt educated on benefits of continued skilled OT and PT tx. Continue with present POC and recommend DC to ARU. Safety Interventions: patient left in chair, chair alarm in place, call light within reach, nurse notified, and family/caregiver present    Plan  Frequency: 5-7 x/week  Current Treatment Recommendations: strengthening, ROM, balance training, functional mobility training, neuromuscular re-education, ADL/self-care training, IADL training, cognitive reorientation, and safety education    Goals  Patient Goals: to get stronger    Short Term Goals:  Time Frame: discharge   Patient will complete upper body ADL at stand by assistance   Patient will complete lower body ADL at moderate assistance   Patient will complete toileting at maximum assistance   Patient will complete self-feeding at set up assistance   Patient will complete grooming at set up assistance   Patient will complete functional transfers at moderate assistance   Patient will complete functional mobility at moderate assistance   NO GOALS MET THIS DATE 1/17/2023. Therapy Session Time     Individual Group Co-treatment   Time In    9092   Time Out    1052   Minutes    38        Timed Code Treatment Minutes:  38 minutes  Total Treatment Minutes: 38 minutes       Electronically Signed By: Dada Ponce, [address], OTR/L [id number].

## 2023-01-17 NOTE — CARE COORDINATION
Discharge Planning Assessment  Readmission Score of 18%    RN/SW discharge planner met with patient/ (and family member) to discuss reason for admission, current living situation, and potential needs at the time of discharge    Demographics/Insurance verified Yes    Current type of dwelling:  apartment- third floor    Patient from ECF/SW confirmed with: n/a    Living arrangements: spouse, daughter     Level of function/Support:   modified independent with use of SPC. The daughters daughter assist with the patient's needs. PCP:  Gloria Jacobson MD    Last Visit to PCP: 12/27/2023    DME:  rolling walker, rollator - 4 wheeled walker, single point cane    Active with any community resources/agencies/skilled home care: The patient is active with [facility]. Medication compliance issues: None reported. Financial issues that could impact healthcare: None reported. Tentative discharge plan: PT/OT recommended ARU but the patient declined. The family requested a referral sent to the following SNF's    [place]-Waiting for a response. Discussed and provided facilities of choice if transition to a skilled nursing facility is required at the time of discharge    Transportation at the time of discharge:  Medical Transport.     Electronically signed by SARAH Young on 1/17/2023 at 12:53 PM

## 2023-01-17 NOTE — PROGRESS NOTES
[facility] [facility]   Phone: [phone]    Physical Therapy    [] Initial Evaluation            [x] Daily Treatment Note         [] Discharge Summary      Patient: Nikolay Gamboa   : 1935   MRN: [id number]   Date of Service:  2023  Admitting Diagnosis: <principal problem not specified>  Current Admission Summary: This is a 81 y.o. male who presents to the ED for weakness and numbness. Has been ongoing for approximately 48 hours. Has history of stroke with facial droop which is unchanged and slurred speech which has been progressively worsening over the course of the week. From his prior stroke, he has residual left-sided weakness and numbness of the arm and leg. Has had numbness and tingling in bilateral hands and feet, worse on the left side, that waxes and wanes and is usually worse in the evening. Also has been having weakness in bilateral legs and difficulty walking. Has had cough and nasal congestion. No fevers although has felt hot at night. No chest pain or shortness of breath or abdominal pain. Feels fine right now. Not in any pain now  Past Medical History:  has a past medical history of CAD (coronary artery disease) and Cerebral artery occlusion with cerebral infarction ([facility] [address] Past Surgical History:  has a past surgical history that includes Coronary artery bypass graft and Cardiac surgery. Discharge Recommendations: Nikolay Gamboa scored a 9/24 on the AM-PAC short mobility form. Current research shows that an AM-PAC score of 17 or less is typically not associated with a discharge to the patient's home setting. Based on the patient's AM-PAC score and their current functional mobility deficits, it is recommended that the patient have 5-7 sessions per week of Physical Therapy at d/c to increase the patient's independence.   At this time, this patient demonstrates complex nursing, medical, and rehabilitative needs, and would benefit from intensive rehabilitation services upon discharge from the Inpatient setting. This patient demonstrates the ability to participate in and benefit from an intensive therapy program with a coordinated interdisciplinary team approach to foster frequent, structured, and documented communication among disciplines, who will work together to establish, prioritize, and achieve treatment goals. Please see assessment section for further patient specific details. If patient discharges prior to next session this note will serve as a discharge summary. Please see below for the latest assessment towards goals. DME Required For Discharge: DME to be determined at next level of care  Precautions/Restrictions: high fall risk, up as tolerated  Weight Bearing Restrictions: no restrictions  Required Braces/Orthotics: no braces required  Positional Restrictions:no positional restrictions    Pre-Admission Information   Lives With: spouse, daughter    Type of Home: apartment- third floor  Home Layout: one level  Home Access: elevator  Bathroom Layout: walk in [address] [place] [address] St: grab bars in shower, grab bars around toilet, shower chair,uses a lift chair  Toilet Height: standard height  Home Equipment: rolling walker, rollator - 4 wheeled walker, single point cane, shanice lift  Transfer Assistance: modified independent with use of SPC  Ambulation Assistance:modified independent with use of SPC  ADL Assistance: requires assistance with bathing  IADL Assistance: requires assistance with all homemaking tasks- daughters complete  Active :        [] Yes  [x] No  Hand Dominance: [] Left  [x] Right  Current Employment: retired.   Occupation: [employer]  Recent Falls: Pt reports one fall in the last 6 months      Vision:   Vision Gross Assessment: Impaired and Vision Corrective Device: wears glasses at all times  Hearing:   [facility]        Subjective  General: Pt supine in bed on arrival, agreeable to participate in PT/OT treatment session. Pain: 0/10  Pain Interventions: not applicable     Functional Mobility  Bed Mobility  Supine to Sit: moderate assistance x2  Scooting: Max A x1 to EOB, Max A x2 in chair  Comments: HOB elevated, increased time and verbal cueing, use of bed railing    Transfers  Sit to stand transfer: stedy utilized requiring Min A from EOB, CGA from stedy (pt using UEs on bar of stedy)    Stand to sit transfer: stedy utilized requiring Min A from stand => stedy, Mod A x2 from standing on stedy => recliner   Comments: Stedy used for safety during transfers  Ambulation  Ambulation not tested on this date secondary to current level of function. Comments:     Stair Mobility  Stair mobility not completed on this date. Comments:  Wheelchair Mobility:  No w/c mobility completed on this date. Comments:  Balance  Static Sitting Balance: poor: requires mod (A) to maintain balance  Dynamic Sitting Balance: poor: requires mod (A) to maintain balance  Static Standing Balance: poor (-): requires max (A) to maintain balance  Dynamic Standing Balance: poor (-): requires max (A) to maintain balance  Comments: Patient is able to progress to CGA for sitting balance when cued for anterior lean. Other Therapeutic Interventions    Assisted patient in donning brief, pants, and shoes at EOB/standing at max A. Sat EOB for ~5 min at Max A progressing to [address] for static sitting balance with encouragement for participation. Posterior leaning pronounced with fatigue and when utilizing UEs for grooming/ADL tasks. Transferred to bathroom using Stedy for combing hair and washing face. Min A to correct posterior leaning, more pronounced with fatigue and when utilizing UEs for grooming/ADL tasks. Performed standing x3 on Stedy at min A progressing to Mod A x2 to encourage hip extension. Performed stand to sit transfer to recliner as described above, requiring verbal and tactile cues to bend knees.  At end of session, pt remains up in recliner with chair alarm and call light within reach. No new complaints following session. Functional Outcomes                 Cognition  Overall Cognitive Status: Impaired  Following Commands: follows one step commands with repetition, follows one step commands with increased time  Insights: decreased awareness of deficits  Initiation: requires cues for some  Sequencing: requires cues for some  Orientation:    oriented to person, oriented to place, oriented to situation, and disoriented to time   Command Following:   impaired- pt follows one step commands with increased time and repetition of instruction    Education  Barriers To Learning: cognition  Patient Education: patient educated on goals, PT role and benefits, general safety, functional mobility training, transfer training, discharge recommendations  Learning Assessment:  patient verbalizes understanding, would benefit from continued reinforcement    Assessment  Activity Tolerance:limited by fatigue   Impairments Requiring Therapeutic Intervention: decreased functional mobility, decreased strength, decreased cognition, decreased endurance, decreased balance  Prognosis: good  Clinical Assessment: Pt is an 82 y/o male who presents to the hospital for weakness and numbness. Pt continues to require min-max A for transfers and use of stedy for mobilizing and standing. Continued skilled PT to promote return towards PLOF.     Safety Interventions: patient left in chair, chair alarm in place, call light within reach, gait belt, and nurse notified    Plan  Frequency: 5-7 x/week  Current Treatment Recommendations: strengthening, balance training, functional mobility training, transfer training, gait training, endurance training, neuromuscular re-education, patient/caregiver education, home exercise program, and safety education    Goals  Patient Goals: Pt did not state   Short Term Goals:  Time Frame: By discharge  Patient will complete bed mobility at contact guard assistance   Patient will complete transfers at maximum assistance   Patient will ambulate 10 ft with use of LRAD at maximum assistance    No goals met 1/17    Therapy Session Time      Individual Group Co-treatment   Time In  1014       Time Out  1052       Minutes  38         Timed Code Treatment Minutes:  38  Total Treatment Minutes: 45 Minutes      Electronically Signed By:     Alyssa Vogt [facility]    Therapist was present, directed the patient's care, made skilled judgement, and was responsible for assessment and treatment of the patient.   Co-signed and supervised by: Iliana Sena PT, DPT [id number]

## 2023-01-17 NOTE — PLAN OF CARE
Problem: Discharge Planning  Goal: Discharge to home or other facility with appropriate resources  Outcome: Progressing     Problem: Risk for Elopement  Goal: Patient will not exit the unit/facility without proper excort  Outcome: Progressing     Problem: Skin/Tissue Integrity  Goal: Absence of new skin breakdown  Description: 1. Monitor for areas of redness and/or skin breakdown  2. Assess vascular access sites hourly  3. Every 4-6 hours minimum:  Change oxygen saturation probe site  4. Every 4-6 hours:  If on nasal continuous positive airway pressure, respiratory therapy assess nares and determine need for appliance change or resting period.   Outcome: Progressing     Problem: Safety - Adult  Goal: Free from fall injury  Outcome: Progressing     Problem: ABCDS Injury Assessment  Goal: Absence of physical injury  Outcome: Progressing     Problem: Chronic Conditions and Co-morbidities  Goal: Patient's chronic conditions and co-morbidity symptoms are monitored and maintained or improved  Outcome: Progressing

## 2023-01-17 NOTE — CARE COORDINATION
PM&R referral received. Chart reviewed and evaluating. Discussed with patient/family. Awaiting MRI results to determine if ARU appropriate. Discussed with Dr. Julián Herzog. Full consult to follow if ARU able to accept. If patient would come he would require a pre certification with payor. ARU will continue to follow progress and update discharge plan as needed. SYBIL Armas, .[phone]    Addendum at 3908    Updated by Emi Squires, stating patient is much more amenable fajardo an ARU stay. ARU is family's wishes. He was successful on ARU in October of last year. If MRI is positive for CVA then a precert can be initiated. However, if MRI is negative for CVA and approval from payor  is highly unlikely since they have historically denied ARU approvals for similar diagnosis. If MRI is confirmed a full PM&R consult note will be placed in am.    ARU will continue to follow progress and update discharge plan as needed.     SYBIL Armas, .[phone]

## 2023-01-17 NOTE — PROGRESS NOTES
Department of Internal Medicine  General Internal Medicine   Progress Note      SUBJECTIVE:poor historian , can not describe  if left sided weakness is worse than what was existing before     History obtained from chart review and the patient  General ROS: positive for  - fatigue, malaise and weight loss  negative for - chills, fever or night sweats  Psychological ROS: negative  Ophthalmic ROS: negative  Respiratory ROS: no cough, shortness of breath, or wheezing  Cardiovascular ROS: no chest pain or dyspnea on exertion  Gastrointestinal ROS: no abdominal pain, change in bowel habits, or black or bloody stools  Genito-Urinary ROS: no dysuria, trouble voiding, or hematuria  Musculoskeletal ROS: chronic pain   Neurological ROS: no TIA or stroke symptoms    OBJECTIVE      Medications      Current Facility-Administered Medications: mometasone-formoterol (DULERA) 200-5 MCG/ACT inhaler 2 puff, 2 puff, Inhalation, BID  albuterol sulfate HFA (PROVENTIL;VENTOLIN;PROAIR) 108 (90 Base) MCG/ACT inhaler 2 puff, 2 puff, Inhalation, Q6H PRN  amLODIPine (NORVASC) tablet 10 mg, 10 mg, Oral, Daily  aspirin EC tablet 81 mg, 81 mg, Oral, Daily  atorvastatin (LIPITOR) tablet 40 mg, 40 mg, Oral, Daily  escitalopram (LEXAPRO) tablet 20 mg, 20 mg, Oral, Daily  fluticasone (FLONASE) 50 MCG/ACT nasal spray 1 spray, 1 spray, Each Nostril, Daily  hydrALAZINE (APRESOLINE) tablet 50 mg, 50 mg, Oral, TID  lactulose (CHRONULAC) 10 GM/15ML solution 20 g, 20 g, Oral, Daily  levothyroxine (SYNTHROID) tablet 125 mcg, 125 mcg, Oral, QAM AC  cetirizine (ZYRTEC) tablet 5 mg, 5 mg, Oral, Daily  montelukast (SINGULAIR) tablet 10 mg, 10 mg, Oral, Nightly  oxybutynin (DITROPAN-XL) extended release tablet 10 mg, 10 mg, Oral, Daily  pantoprazole (PROTONIX) tablet 40 mg, 40 mg, Oral, QAM AC  polyethylene glycol (GLYCOLAX) packet 17 g, 17 g, Oral, Daily  tamsulosin (FLOMAX) capsule 0.4 mg, 0.4 mg, Oral, Daily  enoxaparin (LOVENOX) injection 40 mg, 40 mg, SubCUTAneous, Daily    Physical      Vitals: BP (!) 164/78   Pulse 76   Temp 97.7 °F (36.5 °C) (Oral)   Resp 16   Ht 6' (1.829 m)   Wt 211 lb 3.2 oz (95.8 kg)   SpO2 96%   BMI 28.64 kg/m²   Temp: Temp: 97.7 °F (36.5 °C)  Max: Temp  Av.9 °F (36.6 °C)  Min: 97.5 °F (36.4 °C)  Max: 98.5 °F (36.9 °C)  Respiration range:  Resp  Av.2  Min: 16  Max: 17  Pulse Range:  Pulse  Av  Min: 52  Max: 76  Blood pressure range:  Systolic (74RJJ), KCT:112 , Min:126 , IYV:821   , Diastolic (03APL), IUX:39, Min:62, Max:82    SpO2  Av.1 %  Min: 94 %  Max: 98 %    Intake/Output Summary (Last 24 hours) at 2023 0810  Last data filed at 2023 0425  Gross per 24 hour   Intake 120 ml   Output 900 ml   Net -780 ml         Vent settings:  Pulse  Av.6  Min: 52  Max: 76  Resp  Av.4  Min: 10  Max: 20  SpO2  Av.9 %  Min: 94 %  Max: 98 %    CONSTITUTIONAL:  awake, alert, cooperative, no apparent distress, and appears stated age  EYES:  Unremarkable   NECK:  Mild JVD  and supple, symmetrical, trachea midline  BACK:  symmetric  LUNGS:  No increased work of breathing, good air exchange, clear to auscultation bilaterally, no crackles or wheezing  CARDIOVASCULAR:  normal apical pulses, regular rate and rhythm, normal S1 and S2 and no S3  ABDOMEN:  Soft BS + non tender   MUSCULOSKELETAL:  No distal edema   NEUROLOGIC:  left hemiparesis   SKIN:  Warm and dry  and no bruising or bleeding    Data      Recent Results (from the past 96 hour(s))   CBC with Auto Differential    Collection Time: 23  2:39 PM   Result Value Ref Range    WBC 5.7 4.0 - 11.0 K/uL    RBC 3.26 (L) 4.20 - 5.90 M/uL    Hemoglobin 10.4 (L) 13.5 - 17.5 g/dL    Hematocrit 31.3 (L) 40.5 - 52.5 %    MCV 95.8 80.0 - 100.0 fL    MCH 31.8 26.0 - 34.0 pg    MCHC 33.2 31.0 - 36.0 g/dL    RDW 15.2 12.4 - 15.4 %    Platelets 779 465 - 228 K/uL    MPV 9.4 5.0 - 10.5 fL    Neutrophils % 66.4 %    Lymphocytes % 16.3 %    Monocytes % 14.8 % Eosinophils % 2.3 %    Basophils % 0.2 %    Neutrophils Absolute 3.8 1.7 - 7.7 K/uL    Lymphocytes Absolute 0.9 (L) 1.0 - 5.1 K/uL    Monocytes Absolute 0.8 0.0 - 1.3 K/uL    Eosinophils Absolute 0.1 0.0 - 0.6 K/uL    Basophils Absolute 0.0 0.0 - 0.2 K/uL   CMP w/ Reflex to MG    Collection Time: 01/14/23  2:39 PM   Result Value Ref Range    Sodium 134 (L) 136 - 145 mmol/L    Potassium reflex Magnesium 4.0 3.5 - 5.1 mmol/L    Chloride 100 99 - 110 mmol/L    CO2 23 21 - 32 mmol/L    Anion Gap 11 3 - 16    Glucose 120 (H) 70 - 99 mg/dL    BUN 24 (H) 7 - 20 mg/dL    Creatinine 1.3 0.8 - 1.3 mg/dL    Est, Glom Filt Rate 53 (A) >60    Calcium 8.8 8.3 - 10.6 mg/dL    Total Protein 6.4 6.4 - 8.2 g/dL    Albumin 3.7 3.4 - 5.0 g/dL    Albumin/Globulin Ratio 1.4 1.1 - 2.2    Total Bilirubin 1.0 0.0 - 1.0 mg/dL    Alkaline Phosphatase 81 40 - 129 U/L    ALT 8 (L) 10 - 40 U/L    AST 14 (L) 15 - 37 U/L   Troponin    Collection Time: 01/14/23  2:39 PM   Result Value Ref Range    Troponin <0.01 <0.01 ng/mL   EKG 12 Lead    Collection Time: 01/14/23  3:04 PM   Result Value Ref Range    Ventricular Rate 53 BPM    Atrial Rate 53 BPM    P-R Interval 178 ms    QRS Duration 106 ms    Q-T Interval 458 ms    QTc Calculation (Bazett) 429 ms    P Axis 17 degrees    R Axis -27 degrees    T Axis -25 degrees    Diagnosis       Sinus bradycardiaVoltage criteria for left ventricular hypertrophyNonspecific ST and T wave abnormalityAbnormal ECGConfirmed by [facility] Farrukh ACOSTA ([id number]) on 1/15/2023 10:47:31 AM   Urinalysis with Reflex to Culture    Collection Time: 01/14/23  4:37 PM    Specimen: Urine   Result Value Ref Range    Color, UA Yellow Straw/Yellow    Clarity, UA Clear Clear    Glucose, Ur Negative Negative mg/dL    Bilirubin Urine Negative Negative    Ketones, Urine TRACE (A) Negative mg/dL    Specific Gravity, UA 1.020 1.005 - 1.030    Blood, Urine Negative Negative    pH, UA 5.5 5.0 - 8.0    Protein, UA TRACE (A) Negative mg/dL Urobilinogen, Urine 0.2 <2.0 E.U./dL    Nitrite, Urine Negative Negative    Leukocyte Esterase, Urine Negative Negative    Microscopic Examination YES     Urine Type NotGiven     Urine Reflex to Culture Not Indicated    Microscopic Urinalysis    Collection Time: 01/14/23  4:37 PM   Result Value Ref Range    WBC, UA 3-5 0 - 5 /HPF    RBC, UA None seen 0 - 4 /HPF       ASSESSMENT AND PLAN   Patient Active Problem List   Diagnosis    ASHD (arteriosclerotic heart disease)    Bilateral carotid artery stenosis    Stage 2 chronic kidney disease    Essential hypertension    Dyslipidemia    Dementia due to Alzheimer's disease ([address]    Unable to care for self    Unable to ambulate    Slurred speech    Acute cerebrovascular accident (CVA) ([address]    CVA (cerebrovascular accident) ([address]    TIA (transient ischemic attack)    Hemiparesis as late effect of cerebrovascular disease ([address]    Chronic diastolic heart failure ([address]    Can do MRI brain will not change treatment or out look    Neuro consult   Can benefit with SNF or ARU    Patient appears confused and quite vague in his description

## 2023-01-18 PROBLEM — I63.81 ACUTE LACUNAR INFARCTION (HCC): Status: ACTIVE | Noted: 2023-01-18

## 2023-01-18 PROCEDURE — 6360000002 HC RX W HCPCS: Performed by: INTERNAL MEDICINE

## 2023-01-18 PROCEDURE — 94761 N-INVAS EAR/PLS OXIMETRY MLT: CPT

## 2023-01-18 PROCEDURE — 97535 SELF CARE MNGMENT TRAINING: CPT

## 2023-01-18 PROCEDURE — 97530 THERAPEUTIC ACTIVITIES: CPT

## 2023-01-18 PROCEDURE — 2060000000 HC ICU INTERMEDIATE R&B

## 2023-01-18 PROCEDURE — 94640 AIRWAY INHALATION TREATMENT: CPT

## 2023-01-18 PROCEDURE — 1200000000 HC SEMI PRIVATE

## 2023-01-18 PROCEDURE — 6370000000 HC RX 637 (ALT 250 FOR IP): Performed by: INTERNAL MEDICINE

## 2023-01-18 RX ADMIN — CETIRIZINE HYDROCHLORIDE 5 MG: 10 TABLET, FILM COATED ORAL at 10:01

## 2023-01-18 RX ADMIN — Medication 2 PUFF: at 09:15

## 2023-01-18 RX ADMIN — HYDRALAZINE HYDROCHLORIDE 50 MG: 25 TABLET, FILM COATED ORAL at 10:00

## 2023-01-18 RX ADMIN — POLYETHYLENE GLYCOL 3350 17 G: 17 POWDER, FOR SOLUTION ORAL at 10:07

## 2023-01-18 RX ADMIN — PANTOPRAZOLE SODIUM 40 MG: 40 TABLET, DELAYED RELEASE ORAL at 10:02

## 2023-01-18 RX ADMIN — HYDRALAZINE HYDROCHLORIDE 50 MG: 25 TABLET, FILM COATED ORAL at 14:27

## 2023-01-18 RX ADMIN — ASPIRIN 81 MG: 81 TABLET, COATED ORAL at 10:02

## 2023-01-18 RX ADMIN — FLUTICASONE PROPIONATE 1 SPRAY: 50 SPRAY, METERED NASAL at 10:13

## 2023-01-18 RX ADMIN — OXYBUTYNIN CHLORIDE 10 MG: 5 TABLET, EXTENDED RELEASE ORAL at 10:01

## 2023-01-18 RX ADMIN — ESCITALOPRAM OXALATE 20 MG: 10 TABLET ORAL at 10:00

## 2023-01-18 RX ADMIN — AMLODIPINE BESYLATE 10 MG: 5 TABLET ORAL at 10:00

## 2023-01-18 RX ADMIN — LACTULOSE 20 G: 20 SOLUTION ORAL at 10:07

## 2023-01-18 RX ADMIN — ATORVASTATIN CALCIUM 40 MG: 40 TABLET, FILM COATED ORAL at 10:00

## 2023-01-18 RX ADMIN — Medication 2 PUFF: at 20:23

## 2023-01-18 RX ADMIN — ENOXAPARIN SODIUM 40 MG: 100 INJECTION SUBCUTANEOUS at 10:07

## 2023-01-18 RX ADMIN — MONTELUKAST SODIUM 10 MG: 10 TABLET, FILM COATED ORAL at 21:44

## 2023-01-18 RX ADMIN — LEVOTHYROXINE SODIUM 125 MCG: 0.1 TABLET ORAL at 10:01

## 2023-01-18 RX ADMIN — TAMSULOSIN HYDROCHLORIDE 0.4 MG: 0.4 CAPSULE ORAL at 10:02

## 2023-01-18 NOTE — PROGRESS NOTES
Tele reapplied. Compliant with leaving in place at this time. Restless. Lights dimmed to decrease stimulation. Sports channel per patient preference. Psmontbeltran RN

## 2023-01-18 NOTE — PLAN OF CARE
Problem: Discharge Planning  Goal: Discharge to home or other facility with appropriate resources  Outcome: Progressing     Problem: Risk for Elopement  Goal: Patient will not exit the unit/facility without proper excort  Outcome: Progressing  Flowsheets  Taken 1/18/2023 0044  Nursing Interventions for Elopement Risk: Assist with personal care needs such as toileting, eating, dressing, as needed to reduce the risk of wandering  Taken 1/17/2023 1942  Nursing Interventions for Elopement Risk: Assist with personal care needs such as toileting, eating, dressing, as needed to reduce the risk of wandering     Problem: Skin/Tissue Integrity  Goal: Absence of new skin breakdown  Description: 1. Monitor for areas of redness and/or skin breakdown  2. Assess vascular access sites hourly  3. Every 4-6 hours minimum:  Change oxygen saturation probe site  4. Every 4-6 hours:  If on nasal continuous positive airway pressure, respiratory therapy assess nares and determine need for appliance change or resting period.   Outcome: Progressing     Problem: Safety - Adult  Goal: Free from fall injury  Outcome: Progressing     Problem: ABCDS Injury Assessment  Goal: Absence of physical injury  Outcome: Progressing     Problem: Neurosensory - Adult  Goal: Achieves stable or improved neurological status  Outcome: Progressing  Goal: Absence of seizures  Outcome: Progressing  Goal: Remains free of injury related to seizures activity  Outcome: Progressing  Goal: Achieves maximal functionality and self care  Outcome: Progressing     Problem: Chronic Conditions and Co-morbidities  Goal: Patient's chronic conditions and co-morbidity symptoms are monitored and maintained or improved  Outcome: Progressing     Problem: Cardiovascular - Adult  Goal: Maintains optimal cardiac output and hemodynamic stability  Outcome: Progressing     Problem: Gastrointestinal - Adult  Goal: Minimal or absence of nausea and vomiting  Outcome: Progressing Problem: Genitourinary - Adult  Goal: Absence of urinary retention  Outcome: Progressing     Problem: Spiritual Care  Goal: Pt/Family able to move forward in process of forgiving self, others, and/or higher power  Description: INTERVENTIONS:  1. Assist patient/family to overcome blocks to healing by use of spiritual practices (prayer, meditation, guided imagery, reiki, breath work, etc). 2. De-myth guilt and help patient/family identify possible irrational spiritual/cultural beliefs and values. 3. Explore possibilities of making amends & reconciliation with self, others, and/or a greater power. 4. Guide patient/family in identifying painful feelings of guilt. 5. Help patient/famiy explore and identify spiritual beliefs, cultural understandings or values that may help or hinder letting go of issue. 6. Help patient/family explore feelings of anger, bitterness, resentment. 7. Help patient/family identify and examine the situation in which these feelings are experienced. 8. Help patient/family identify destructive displacement of feelings onto other individuals. 9. Invite use of sacraments/rituals/ceremonies as appropriate (e.g. - confession, anointing, smudging). 10. Refer patient/family to formal counseling and/or to [facility] community for further support work.   Outcome: Progressing

## 2023-01-18 NOTE — CARE COORDINATION
[facility] pre-cert request submitted via [facility] [address]:  [address]  [address] [facility]   Phone: [phone]  Fax: [phone]  Anticipated Admit Date to [facility]:  1/18/2023  [facility] #:  [id number]      Electronically signed by SARAH Dowling on 1/18/2023 at 11:54 AM

## 2023-01-18 NOTE — PROGRESS NOTES
Department of Internal Medicine  General Internal Medicine   Progress Note      SUBJECTIVE: looking very good  fully alert  co-operative     History obtained from chart review and the patient  General ROS: positive for  - fatigue, malaise and weight loss  negative for - chills, fever or night sweats  Psychological ROS: negative  Ophthalmic ROS: negative  Respiratory ROS: no cough, shortness of breath, or wheezing  Cardiovascular ROS: no chest pain or dyspnea on exertion  Gastrointestinal ROS: no abdominal pain, change in bowel habits, or black or bloody stools  Genito-Urinary ROS: no dysuria, trouble voiding, or hematuria  Musculoskeletal ROS: chronic pain   Neurological ROS: no TIA or stroke symptoms    OBJECTIVE      Medications      Current Facility-Administered Medications: mometasone-formoterol (DULERA) 200-5 MCG/ACT inhaler 2 puff, 2 puff, Inhalation, BID  albuterol sulfate HFA (PROVENTIL;VENTOLIN;PROAIR) 108 (90 Base) MCG/ACT inhaler 2 puff, 2 puff, Inhalation, Q6H PRN  amLODIPine (NORVASC) tablet 10 mg, 10 mg, Oral, Daily  aspirin EC tablet 81 mg, 81 mg, Oral, Daily  atorvastatin (LIPITOR) tablet 40 mg, 40 mg, Oral, Daily  escitalopram (LEXAPRO) tablet 20 mg, 20 mg, Oral, Daily  fluticasone (FLONASE) 50 MCG/ACT nasal spray 1 spray, 1 spray, Each Nostril, Daily  hydrALAZINE (APRESOLINE) tablet 50 mg, 50 mg, Oral, TID  lactulose (CHRONULAC) 10 GM/15ML solution 20 g, 20 g, Oral, Daily  levothyroxine (SYNTHROID) tablet 125 mcg, 125 mcg, Oral, QAM AC  cetirizine (ZYRTEC) tablet 5 mg, 5 mg, Oral, Daily  montelukast (SINGULAIR) tablet 10 mg, 10 mg, Oral, Nightly  oxybutynin (DITROPAN-XL) extended release tablet 10 mg, 10 mg, Oral, Daily  pantoprazole (PROTONIX) tablet 40 mg, 40 mg, Oral, QAM AC  polyethylene glycol (GLYCOLAX) packet 17 g, 17 g, Oral, Daily  tamsulosin (FLOMAX) capsule 0.4 mg, 0.4 mg, Oral, Daily  enoxaparin (LOVENOX) injection 40 mg, 40 mg, SubCUTAneous, Daily    Physical      Vitals: BP (!) 146/71 Pulse 77   Temp 98.6 °F (37 °C) (Temporal)   Resp 18   Ht 6' (1.829 m)   Wt 211 lb 14.4 oz (96.1 kg)   SpO2 95%   BMI 28.74 kg/m²   Temp: Temp: 98.6 °F (37 °C)  Max: Temp  Av.3 °F (36.8 °C)  Min: 97.7 °F (36.5 °C)  Max: 98.8 °F (37.1 °C)  Respiration range:  Resp  Av.5  Min: 18  Max: 20  Pulse Range:  Pulse  Av.3  Min: 66  Max: 77  Blood pressure range:  Systolic (61PZF), ILV:329 , Min:134 , YDR:784   , Diastolic (29GER), FCH:66, Min:69, Max:74    SpO2  Av.4 %  Min: 95 %  Max: 98 %  No intake or output data in the 24 hours ending 23 0850        Vent settings:  Pulse  Av.9  Min: 52  Max: 77  Resp  Av.7  Min: 10  Max: 20  SpO2  Av %  Min: 94 %  Max: 98 %    CONSTITUTIONAL:  awake, alert, cooperative, no apparent distress, and appears stated age  EYES:  Unremarkable   NECK:  Mild JVD  and supple, symmetrical, trachea midline  BACK:  symmetric  LUNGS:  No increased work of breathing, good air exchange, clear to auscultation bilaterally, no crackles or wheezing  CARDIOVASCULAR:  normal apical pulses, regular rate and rhythm, normal S1 and S2 and no S3  ABDOMEN:  Soft BS + non tender   MUSCULOSKELETAL:  No distal edema   NEUROLOGIC:  left hemiparesis   SKIN:  Warm and dry  and no bruising or bleeding    Data      Recent Results (from the past 96 hour(s))   CBC with Auto Differential    Collection Time: 23  2:39 PM   Result Value Ref Range    WBC 5.7 4.0 - 11.0 K/uL    RBC 3.26 (L) 4.20 - 5.90 M/uL    Hemoglobin 10.4 (L) 13.5 - 17.5 g/dL    Hematocrit 31.3 (L) 40.5 - 52.5 %    MCV 95.8 80.0 - 100.0 fL    MCH 31.8 26.0 - 34.0 pg    MCHC 33.2 31.0 - 36.0 g/dL    RDW 15.2 12.4 - 15.4 %    Platelets 915 560 - 139 K/uL    MPV 9.4 5.0 - 10.5 fL    Neutrophils % 66.4 %    Lymphocytes % 16.3 %    Monocytes % 14.8 %    Eosinophils % 2.3 %    Basophils % 0.2 %    Neutrophils Absolute 3.8 1.7 - 7.7 K/uL    Lymphocytes Absolute 0.9 (L) 1.0 - 5.1 K/uL    Monocytes Absolute 0.8 0.0 - 1.3 K/uL    Eosinophils Absolute 0.1 0.0 - 0.6 K/uL    Basophils Absolute 0.0 0.0 - 0.2 K/uL   CMP w/ Reflex to MG    Collection Time: 01/14/23  2:39 PM   Result Value Ref Range    Sodium 134 (L) 136 - 145 mmol/L    Potassium reflex Magnesium 4.0 3.5 - 5.1 mmol/L    Chloride 100 99 - 110 mmol/L    CO2 23 21 - 32 mmol/L    Anion Gap 11 3 - 16    Glucose 120 (H) 70 - 99 mg/dL    BUN 24 (H) 7 - 20 mg/dL    Creatinine 1.3 0.8 - 1.3 mg/dL    Est, Glom Filt Rate 53 (A) >60    Calcium 8.8 8.3 - 10.6 mg/dL    Total Protein 6.4 6.4 - 8.2 g/dL    Albumin 3.7 3.4 - 5.0 g/dL    Albumin/Globulin Ratio 1.4 1.1 - 2.2    Total Bilirubin 1.0 0.0 - 1.0 mg/dL    Alkaline Phosphatase 81 40 - 129 U/L    ALT 8 (L) 10 - 40 U/L    AST 14 (L) 15 - 37 U/L   Troponin    Collection Time: 01/14/23  2:39 PM   Result Value Ref Range    Troponin <0.01 <0.01 ng/mL   EKG 12 Lead    Collection Time: 01/14/23  3:04 PM   Result Value Ref Range    Ventricular Rate 53 BPM    Atrial Rate 53 BPM    P-R Interval 178 ms    QRS Duration 106 ms    Q-T Interval 458 ms    QTc Calculation (Bazett) 429 ms    P Axis 17 degrees    R Axis -27 degrees    T Axis -25 degrees    Diagnosis       Sinus bradycardiaVoltage criteria for left ventricular hypertrophyNonspecific ST and T wave abnormalityAbnormal ECGConfirmed by [facility] \[facility]\"" Jeanine ACOSTA ([id number]) on 1/15/2023 10:47:31 AM   Urinalysis with Reflex to Culture    Collection Time: 01/14/23  4:37 PM    Specimen: Urine   Result Value Ref Range    Color, UA Yellow Straw/Yellow    Clarity, UA Clear Clear    Glucose, Ur Negative Negative mg/dL    Bilirubin Urine Negative Negative    Ketones, Urine TRACE (A) Negative mg/dL    Specific Gravity, UA 1.020 1.005 - 1.030    Blood, Urine Negative Negative    pH, UA 5.5 5.0 - 8.0    Protein, UA TRACE (A) Negative mg/dL    Urobilinogen, Urine 0.2 <2.0 E.U./dL    Nitrite, Urine Negative Negative    Leukocyte Esterase, Urine Negative Negative    Microscopic Examination YES     Urine Type NotGiven     Urine Reflex to Culture Not Indicated    Microscopic Urinalysis    Collection Time: 01/14/23  4:37 PM   Result Value Ref Range    WBC, UA 3-5 0 - 5 /HPF    RBC, UA None seen 0 - 4 /HPF       ASSESSMENT AND PLAN   Patient Active Problem List   Diagnosis    ASHD (arteriosclerotic heart disease)    Bilateral carotid artery stenosis    Stage 2 chronic kidney disease    Essential hypertension    Dyslipidemia    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

## 2023-01-18 NOTE — CONSULTS
Abby Chetan  1/18/2023  [id number]    Rehab Brief Consult:    Irving Fox to visit the patient yesterday with family present. Patient agitated and unwilling to consider staying at this facility any longer. He states he is going to discharge to home however family was trying to calm him. We excused ourselves to decrease the agitation in the room. Further discussion with family with case management suggested a skilled nursing level of care. With a negative MRI and is [employer], this would be appropriate. We will sign off. Thank you for the consultation.     Wesly Aguilar MD 1/18/2023 8:49 AM

## 2023-01-18 NOTE — PROGRESS NOTES
[address] [facility],[address] Msb Department   Phone: [phone]    Occupational Therapy    [] Initial Evaluation            [x] Daily Treatment Note         [] Discharge Summary      Patient: Nikolay Gamboa   : 1935   MRN: [id number]   Date of Service:  2023    Admitting Diagnosis:  <principal problem not specified>  Current Admission Summary: \"This is a 81 y.o. male who presents to the ED for weakness and numbness. Cortney Portillo Has been ongoing for approximately 48 hours. Has history of stroke with facial droop which is unchanged and slurred speech which has been progressively worsening over the course of the week. From his prior stroke, he has residual left-sided weakness and numbness of the arm and leg. Has had numbness and tingling in bilateral hands and feet, worse on the left side, that waxes and wanes and is usually worse in the evening. Also has been having weakness in bilateral legs and difficulty walking. Has had cough and nasal congestion. No fevers although has felt hot at night. No chest pain or shortness of breath or abdominal pain. Feels fine right now. Not in any pain now  Past Medical History:  has a past medical history of CAD (coronary artery disease) and Cerebral artery occlusion with cerebral infarction ([address] Past Surgical History:  has a past surgical history that includes Coronary artery bypass graft and Cardiac surgery. Discharge Recommendations: Nikolay Gamboa scored a  on the AM-PAC ADL Inpatient form. Current research shows that an AM-PAC score of 17 or less is typically not associated with a discharge to the patient's home setting. Based on the patient's AM-PAC score and their current ADL deficits, it is recommended that the patient have 5-7 sessions per week of Occupational Therapy at d/c to increase the patient's independence.   At this time, this patient demonstrates complex nursing, medical, and rehabilitative needs, and would benefit from intensive rehabilitation services upon discharge from the Inpatient setting. This patient demonstrates the ability to participate in and benefit from an intensive therapy program with a coordinated interdisciplinary team approach to foster frequent, structured, and documented communication among disciplines, who will work together to establish, prioritize, and achieve treatment goals. Please see assessment section for further patient specific details. If patient discharges prior to next session this note will serve as a discharge summary. Please see below for the latest assessment towards goals. DME Required For Discharge: DME to be determined at next level of care    Precautions/Restrictions: high fall risk, up as tolerated  Weight Bearing Restrictions: no restrictions  [] Right Upper Extremity  [] Left Upper Extremity [] Right Lower Extremity  [] Left Lower Extremity     Required Braces/Orthotics: no braces required   [] Right  [] Left  Positional Restrictions:no positional restrictions    Pre-Admission Information   Lives With: spouse, daughter                         Type of Home: apartment- third floor  Home Layout: one level  Home Access: elevator  Bathroom Layout: walk in shower  Bathroom Equipment: grab bars in shower, grab bars around toilet, shower chair  Toilet Height: standard height  Home Equipment: rolling walker, rollator - 4 wheeled walker, single point cane  Transfer Assistance: modified independent with use of SPC  Ambulation Assistance:modified independent with use of SPC  ADL Assistance: requires assistance with bathing  IADL Assistance: requires assistance with all homemaking tasks- daughters complete  Active :        [] Yes                 [x] No  Hand Dominance: [] Left                 [x] Right  Current Employment: retired.   Occupation: [employer] [employer]  Hobbies: Watch sports  Recent Falls: Pt reports one fall in the last 6 months    Patient is questionable historian, per chart pt left ARU in October of 2022 using a RW. Subjective  General: Pt supine in bed on arrival and agreeable for cotx. Cotx with PT this date to maximize function and safety. Daughter present initially but then left during session. Pain: 0/10  Pain Interventions: not applicable        Activities of Daily Living    Basic Activities of Daily Living  Grooming: stand by assistance requires verbal cueing  Grooming Comments: wash face, apply Deoderant   Upper Extremity Bathing: setup assistance minimal assistance  Lower Extremity Bathing: setup assistance dependent   Bathing Equipment: none  Bathing Comments: min(A) for thoroughness of UB, max(A)x1 for balance in stance + max(A)x1 for kasie care in stance   Upper Extremity Dressing: minimal assistance  Lower Extremity Dressing: dependent  Dressing Equipment: none  Dressing Comments: min(A) for gown change, max(A)x1 for balance in stance + max(A)x1 for brief change  Toileting: dependent. Instrumental Activities of Daily Living  No IADL completed on this date. Functional Mobility    Bed Mobility  Supine to Sit: 2 person assistance with mod(A)x1 + min(A)x1   Comments: pt in recliner chair at EOS  Transfers  Sit to stand transfer:2 person assistance with min(A)x2   Stand to sit transfer: 2 person assistance with min(A)x2   Stand step transfer: 2 person assistance with mod(A)x2   Comments: assist to bring hands to RW, verbal and tactile cues for initiation. Functional Mobility:  Sitting Balance: stand by assistance, contact guard assistance. Sitting Balance Comment: intermittent periods of posterior leans requiring verbal and tactile cues to correct   Standing Balance: 2 person assistance with max(A)x1 for ADLs progressing to mod(A)x2 as pt fatigued. .    Standing Balance Comment: Increased posterior lean with inability to correct without assistance and verbal cues. Functional Mobility: .   Stand step transfer with mod(A)x2, no additional mobility attempted d/t safety concerns. Functional Mobility Activity: bed>chair  Functional Mobility Device Use: rolling walker  Functional Mobility Comment: decreased step length, speed, midline awareness in stance. Appears to have decreased proprioception in BLE. Functional Outcomes  AM-PAC Inpatient Daily Activity Raw Score: 12    Cognition  Overall Cognitive Status: Impaired  Arousal/Alterness: delayed responses to stimuli, inconsistent responses to stimuli  Following Commands: follows one step commands with repetition  Attention Span: appears intact  Memory: decreased recall of recent events, decreased short term memory  Safety Judgement: decreased awareness of need for assistance  Problem Solving: assistance required to generate solutions, assistance required to implement solutions, assistance required to identify errors made, assistance required to correct errors made  Insights: decreased awareness of deficits  Initiation: requires cues for some  Sequencing: requires cues for some  Comments: difficult to understand during conversation due to mumbling and difficulty with expression; pt refusing food and medications this date and alerting PT and OT when he was finished and OT/PT needed to leave. Pt repeatedly speaking about leaving hospital this date and returning home. Education provided about benefits of continued skilled therapy services--pt verbalized understanding.    Orientation:    oriented to person, oriented to place, oriented to situation, and disoriented to time   Command Following:   impaired accurately follows one step commands with repetition and extra time, occasional need for [facility] assist      Education  Barriers To Learning: cognition  Patient Education: patient educated on goals, OT role and benefits, plan of care, ADL adaptive strategies, discharge recommendations  Learning Assessment:  patient will require reinforcement due to cognitive deficits    Assessment  Activity Tolerance: Fair  Impairments Requiring Therapeutic Intervention: decreased functional mobility, decreased ADL status, decreased strength, decreased safety awareness, decreased cognition, decreased endurance, decreased balance, decreased fine motor control  Prognosis: fair  Clinical Assessment: Patient presents with the above deficits impacting daily occupational performance and would benefit from continued skilled OT services. Pt continues to require assistance of 2 skilled therapists with bed mobility, transfers, and LB ADL completion. Pt would benefit from continued skilled OT services to address these deficits and increase IND, safety, and ease with all occupational pursuits. Safety Interventions: patient left in chair, chair alarm in place, call light within reach, and nurse notified    Plan  Frequency: 5-7 x/week  Current Treatment Recommendations: strengthening, ROM, balance training, functional mobility training, transfer training, endurance training, neuromuscular re-education, ADL/self-care training, IADL training, cognitive reorientation, and safety education    Goals  Patient Goals: to get stronger    Short Term Goals:  Time Frame: discharge   Patient will complete upper body ADL at stand by assistance   Patient will complete lower body ADL at moderate assistance   Patient will complete toileting at maximum assistance   Patient will complete self-feeding at set up assistance   Patient will complete grooming at set up assistance   Patient will complete functional transfers at moderate assistance   Patient will complete functional mobility at moderate assistance     NO GOALS MET THIS DATE, ONGOING 1/18/2023.     Therapy Session Time     Individual Group Co-treatment   Time In   7063   Time Out   1000   Minutes   38        Timed Code Treatment Minutes:  38 minutes  Total Treatment Minutes: 38 minutes       Electronically Signed By: SARWAT Gibson/AUREA [id number]

## 2023-01-18 NOTE — PROGRESS NOTES
Pt removed PIV rolling over in bed trying to push himself up in the bed in an awkward manner. Sit is no longer bleeding and pt is in no distress about it. PIV found and is intact. Pt refusing to allow anther PIV to be placed at this time.

## 2023-01-18 NOTE — PROGRESS NOTES
[facility] [address]   Phone: [phone]    Physical Therapy    [] Initial Evaluation            [x] Daily Treatment Note         [] Discharge Summary      Patient: Fausto Pryor   : 1935   MRN: [id number]   Date of Service:  2023  Admitting Diagnosis: <principal problem not specified>  Current Admission Summary: This is a 81 y.o. male who presents to the ED for weakness and numbness. Has been ongoing for approximately 48 hours. Has history of stroke with facial droop which is unchanged and slurred speech which has been progressively worsening over the course of the week. From his prior stroke, he has residual left-sided weakness and numbness of the arm and leg. Has had numbness and tingling in bilateral hands and feet, worse on the left side, that waxes and wanes and is usually worse in the evening. Also has been having weakness in bilateral legs and difficulty walking. Has had cough and nasal congestion. No fevers although has felt hot at night. No chest pain or shortness of breath or abdominal pain. Feels fine right now. Not in any pain now  Past Medical History:  has a past medical history of CAD (coronary artery disease) and Cerebral artery occlusion with cerebral infarction ([facility] [address] Past Surgical History:  has a past surgical history that includes Coronary artery bypass graft and Cardiac surgery. Discharge Recommendations: Fausto Pryor scored a 7/24 on the AM-PAC short mobility form. Current research shows that an AM-PAC score of 17 or less is typically not associated with a discharge to the patient's home setting. Based on the patient's AM-PAC score and their current functional mobility deficits, it is recommended that the patient have 5-7 sessions per week of Physical Therapy at d/c to increase the patient's independence.   At this time, this patient demonstrates complex nursing, medical, and rehabilitative needs, and would benefit from intensive rehabilitation services upon discharge from the Inpatient setting. This patient demonstrates the ability to participate in and benefit from an intensive therapy program with a coordinated interdisciplinary team approach to foster frequent, structured, and documented communication among disciplines, who will work together to establish, prioritize, and achieve treatment goals. Please see assessment section for further patient specific details. If patient discharges prior to next session this note will serve as a discharge summary. Please see below for the latest assessment towards goals. DME Required For Discharge: DME to be determined at next level of care  Precautions/Restrictions: high fall risk, up as tolerated  Weight Bearing Restrictions: no restrictions  Required Braces/Orthotics: no braces required  Positional Restrictions:no positional restrictions    Pre-Admission Information   Lives With: spouse, daughter    Type of Home: apartment- third floor  Home Layout: one level  Home Access: elevator  Bathroom Layout: walk in [address] [place] [address] St: grab bars in shower, grab bars around toilet, shower chair,uses a lift chair  Toilet Height: standard height  Home Equipment: rolling walker, rollator - 4 wheeled walker, single point cane, shanice lift  Transfer Assistance: modified independent with use of SPC  Ambulation Assistance:modified independent with use of SPC  ADL Assistance: requires assistance with bathing  IADL Assistance: requires assistance with all homemaking tasks- daughters complete  Active :        [] Yes  [x] No  Hand Dominance: [] Left  [x] Right  Current Employment: retired.   Occupation: [employer]: [employer]  Recent Falls: Pt reports one fall in the last 6 months      Vision:   Vision Gross Assessment: Impaired and Vision Corrective Device: wears glasses at all times  Hearing:   [facility]        Subjective  General: Pt supine in bed on arrival, agreeable to participate in PT/OT treatment session. Pt with confabulated speech during session, frequently asking therapist if he had been to doctor, and reporting that he recently went hunting. Pain: 0/10  Pain Interventions: not applicable     Functional Mobility  Bed Mobility  Supine to Sit: moderate assistance + min A  Scooting: Max A x1 to EOB, Max A x2 in chair  Comments: HOB elevated, increased time and verbal cueing, use of bed railing    Transfers  Sit to stand transfer: 2 person assistance with min A x2   Stand to sit transfer: 2 person assistance with mod A x2   Stand step transfer: 2 person assistance with max A x2   Comments: Use of RW for transfer this date. Pt demonstrates heavy posterior lean during static stance that decreases with cues for ambulation/transfer to chair, though still demonstrates moderate posterior lean. Ambulation  Ambulation not tested on this date secondary to current level of function. Comments:     Stair Mobility  Stair mobility not completed on this date. Comments:  Wheelchair Mobility:  No w/c mobility completed on this date. Comments:  Balance  Static Sitting Balance: fair (-): maintains balance at CGA with use of UE support  Dynamic Sitting Balance: poor (+): requires min (A) to maintain balance  Static Standing Balance: poor (-): requires max (A) to maintain balance  Dynamic Standing Balance: poor (-): requires max (A) to maintain balance  Comments: Pt demonstrates heavy posterior lean in static stance while completing pericare. Pt also requiring assistance to place BUEs on RW. Other Therapeutic Interventions  Pt completing ADLs at bedside with CGA for balance. See OT note for full details.       Functional Outcomes  AM-PAC Inpatient Mobility Raw Score : 7              Cognition  Overall Cognitive Status: Impaired  Following Commands: follows one step commands with repetition, follows one step commands with increased time  Insights: decreased awareness of deficits  Initiation: requires cues for some  Sequencing: requires cues for some  Orientation:    oriented to person, oriented to place, oriented to situation, and disoriented to time   Command Following:   impaired- pt follows one step commands with increased time and repetition of instruction    Education  Barriers To Learning: cognition  Patient Education: patient educated on goals, PT role and benefits, general safety, functional mobility training, transfer training, discharge recommendations  Learning Assessment:  patient verbalizes understanding, would benefit from continued reinforcement    Assessment  Activity Tolerance:limited by fatigue   Impairments Requiring Therapeutic Intervention: decreased functional mobility, decreased strength, decreased cognition, decreased endurance, decreased balance  Prognosis: good  Clinical Assessment: Pt is an 82 y/o male who presents to the hospital for weakness and numbness. Pt able to complete transfer without use of Lift equipment this date, though pt requires increased assistance of 2 skilled therapists to safely complete. Pt would benefit from skilled PT services to promote safe return to PLOF.   Safety Interventions: patient left in chair, chair alarm in place, call light within reach, gait belt, and nurse notified    Plan  Frequency: 5-7 x/week  Current Treatment Recommendations: strengthening, balance training, functional mobility training, transfer training, gait training, endurance training, neuromuscular re-education, patient/caregiver education, home exercise program, and safety education    Goals  Patient Goals: Pt did not state   Short Term Goals:  Time Frame: By discharge  Patient will complete bed mobility at contact guard assistance   Patient will complete transfers at maximum assistance   Patient will ambulate 10 ft with use of LRAD at maximum assistance    No goals met 1/18    Therapy Session Time      Individual Group Co-treatment   Time In      0922   Time Out      1000   Minutes 38     Timed Code Treatment Minutes:  38  Total Treatment Minutes: 38 Minutes      Electronically Signed By: Gabriel Danielson PT, DPT, [id number]

## 2023-01-18 NOTE — PLAN OF CARE
Problem: Safety - Adult  Goal: Free from fall injury  1/18/2023 1446 by Elijah Cannon RN  Outcome: Progressing  Note: Patient is a high fall risk. Patient free of falls this shift. Bed low, locked and alarmed at all times. Call light and bedside table is within reach. Orange blanket on bed, arm band on wrist and fall sign posted in the room. Notified patient to ask for assistance when needed. Patient verbalized understanding.     1/18/2023 0128 by Luis Swartz RN  Outcome: Progressing

## 2023-01-19 VITALS
OXYGEN SATURATION: 96 % | BODY MASS INDEX: 28.88 KG/M2 | HEIGHT: 72 IN | HEART RATE: 51 BPM | SYSTOLIC BLOOD PRESSURE: 156 MMHG | WEIGHT: 213.2 LBS | RESPIRATION RATE: 16 BRPM | DIASTOLIC BLOOD PRESSURE: 70 MMHG | TEMPERATURE: 97.7 F

## 2023-01-19 PROCEDURE — 94761 N-INVAS EAR/PLS OXIMETRY MLT: CPT

## 2023-01-19 PROCEDURE — 94640 AIRWAY INHALATION TREATMENT: CPT

## 2023-01-19 PROCEDURE — 6370000000 HC RX 637 (ALT 250 FOR IP): Performed by: INTERNAL MEDICINE

## 2023-01-19 PROCEDURE — 6360000002 HC RX W HCPCS: Performed by: INTERNAL MEDICINE

## 2023-01-19 RX ADMIN — LEVOTHYROXINE SODIUM 125 MCG: 0.1 TABLET ORAL at 06:41

## 2023-01-19 RX ADMIN — PANTOPRAZOLE SODIUM 40 MG: 40 TABLET, DELAYED RELEASE ORAL at 06:41

## 2023-01-19 RX ADMIN — HYDRALAZINE HYDROCHLORIDE 50 MG: 25 TABLET, FILM COATED ORAL at 10:09

## 2023-01-19 RX ADMIN — ESCITALOPRAM OXALATE 20 MG: 10 TABLET ORAL at 10:09

## 2023-01-19 RX ADMIN — AMLODIPINE BESYLATE 10 MG: 5 TABLET ORAL at 10:08

## 2023-01-19 RX ADMIN — POLYETHYLENE GLYCOL 3350 17 G: 17 POWDER, FOR SOLUTION ORAL at 10:09

## 2023-01-19 RX ADMIN — TAMSULOSIN HYDROCHLORIDE 0.4 MG: 0.4 CAPSULE ORAL at 10:09

## 2023-01-19 RX ADMIN — ATORVASTATIN CALCIUM 40 MG: 40 TABLET, FILM COATED ORAL at 10:09

## 2023-01-19 RX ADMIN — FLUTICASONE PROPIONATE 1 SPRAY: 50 SPRAY, METERED NASAL at 10:10

## 2023-01-19 RX ADMIN — ENOXAPARIN SODIUM 40 MG: 100 INJECTION SUBCUTANEOUS at 10:09

## 2023-01-19 RX ADMIN — LACTULOSE 20 G: 20 SOLUTION ORAL at 10:08

## 2023-01-19 RX ADMIN — ASPIRIN 81 MG: 81 TABLET, COATED ORAL at 10:09

## 2023-01-19 RX ADMIN — OXYBUTYNIN CHLORIDE 10 MG: 5 TABLET, EXTENDED RELEASE ORAL at 10:08

## 2023-01-19 RX ADMIN — Medication 2 PUFF: at 08:11

## 2023-01-19 RX ADMIN — CETIRIZINE HYDROCHLORIDE 5 MG: 10 TABLET, FILM COATED ORAL at 10:09

## 2023-01-19 NOTE — PLAN OF CARE
Problem: Discharge Planning  Goal: Discharge to home or other facility with appropriate resources  Outcome: Completed  Flowsheets (Taken 1/19/2023 1000)  Discharge to home or other facility with appropriate resources: Identify barriers to discharge with patient and caregiver     Problem: Risk for Elopement  Goal: Patient will not exit the unit/facility without proper excort  Outcome: Completed  Flowsheets (Taken 1/19/2023 1000)  Nursing Interventions for Elopement Risk: Assist with personal care needs such as toileting, eating, dressing, as needed to reduce the risk of wandering     Problem: Skin/Tissue Integrity  Goal: Absence of new skin breakdown  Description: 1. Monitor for areas of redness and/or skin breakdown  2. Assess vascular access sites hourly  3. Every 4-6 hours minimum:  Change oxygen saturation probe site  4. Every 4-6 hours:  If on nasal continuous positive airway pressure, respiratory therapy assess nares and determine need for appliance change or resting period. Outcome: Completed     Problem: Safety - Adult  Goal: Free from fall injury  Outcome: Completed     Problem: ABCDS Injury Assessment  Goal: Absence of physical injury  Outcome: Completed     Problem: Neurosensory - Adult  Goal: Achieves stable or improved neurological status  Outcome: Completed  Flowsheets (Taken 1/19/2023 1000)  Achieves stable or improved neurological status: Assess for and report changes in neurological status  Goal: Absence of seizures  Outcome: Completed  Flowsheets (Taken 1/19/2023 1000)  Absence of seizures: Monitor for seizure activity.   If seizure occurs, document type and location of movements and any associated apnea  Goal: Remains free of injury related to seizures activity  Outcome: Completed  Flowsheets (Taken 1/19/2023 1000)  Remains free of injury related to seizure activity: Maintain airway, patient safety  and administer oxygen as ordered  Goal: Achieves maximal functionality and self care  Outcome: Completed  Flowsheets (Taken 1/19/2023 1000)  Achieves maximal functionality and self care: Monitor swallowing and airway patency with patient fatigue and changes in neurological status     Problem: Chronic Conditions and Co-morbidities  Goal: Patient's chronic conditions and co-morbidity symptoms are monitored and maintained or improved  Outcome: Completed  Flowsheets (Taken 1/19/2023 1000)  Care Plan - Patient's Chronic Conditions and Co-Morbidity Symptoms are Monitored and Maintained or Improved: Monitor and assess patient's chronic conditions and comorbid symptoms for stability, deterioration, or improvement     Problem: Cardiovascular - Adult  Goal: Maintains optimal cardiac output and hemodynamic stability  Outcome: Completed     Problem: Gastrointestinal - Adult  Goal: Minimal or absence of nausea and vomiting  Outcome: Completed     Problem: Genitourinary - Adult  Goal: Absence of urinary retention  Outcome: Completed     Problem: Spiritual Care  Goal: Pt/Family able to move forward in process of forgiving self, others, and/or higher power  Description: INTERVENTIONS:  1. Assist patient/family to overcome blocks to healing by use of spiritual practices (prayer, meditation, guided imagery, reiki, breath work, etc). 2. De-myth guilt and help patient/family identify possible irrational spiritual/cultural beliefs and values. 3. Explore possibilities of making amends & reconciliation with self, others, and/or a greater power. 4. Guide patient/family in identifying painful feelings of guilt. 5. Help patient/famiy explore and identify spiritual beliefs, cultural understandings or values that may help or hinder letting go of issue. 6. Help patient/family explore feelings of anger, bitterness, resentment. 7. Help patient/family identify and examine the situation in which these feelings are experienced. 8. Help patient/family identify destructive displacement of feelings onto other individuals.   9. Invite use of sacraments/rituals/ceremonies as appropriate (e.g. - confession, anointing, smudging). 10. Refer patient/family to formal counseling and/or to [facility] community for further support work.   Outcome: Completed

## 2023-01-19 NOTE — CARE COORDINATION
Discharge note:      CM/SW has been notified of discharge. Patient noted to have the following needs at discharge. CM/SW has coordinated the following services: skilled nursing facility       [facility] [address]  [address] [facility]  Phone: [phone]  Fax: [phone]     Transportation: [address] [facility]  ([phone])   Discharge Time: 12:00pm  AVS faxed and agency notified  Nurse to call report to facility  Family advised of discharge and in agreement. HENS completed. All CM/SW needs met, will sign off.      Electronically signed by SARAH Giraldo on 1/19/2023 at 9:39 AM

## 2023-01-19 NOTE — RT PROTOCOL NOTE
RT Nebulizer Bronchodilator Protocol Note    There is a bronchodilator order in the chart from a provider indicating to follow the RT Bronchodilator Protocol and there is an Initiate RT Bronchodilator Protocol order as well (see protocol at bottom of note). CXR Findings:  No results found. The findings from the last RT Protocol Assessment were as follows:  Smoking: None or smoker <15 pack years  Respiratory Pattern: Regular pattern and RR 12-20 bpm  Breath Sounds: Slightly diminished and/or crackles  Cough: Strong, spontaneous, non-productive  Indication for Bronchodilator Therapy: On home bronchodilators  Bronchodilator Assessment Score: 2    Aerosolized bronchodilator medication orders have been revised according to the RT Nebulizer Bronchodilator Protocol below. Respiratory Therapist to perform RT Therapy Protocol Assessment initially then follow the protocol. Repeat RT Therapy Protocol Assessment PRN for score 0-3 or on second treatment, BID, and PRN for scores above 3. No Indications - adjust the frequency to every 6 hours PRN wheezing or bronchospasm, if no treatments needed after 48 hours then discontinue using Per Protocol order mode. If indication present, adjust the RT bronchodilator orders based on the Bronchodilator Assessment Score as indicated below. If a patient is on this medication at home then do not decrease Frequency below that used at home. 0-3 - enter or revise RT bronchodilator order(s) to equivalent RT Bronchodilator order with Frequency of every 4 hours PRN for wheezing or increased work of breathing using Per Protocol order mode. 4-6 - enter or revise RT Bronchodilator order(s) to two equivalent RT bronchodilator orders with one order with BID Frequency and one order with Frequency of every 4 hours PRN wheezing or increased work of breathing using Per Protocol order mode.          7-10 - enter or revise RT Bronchodilator order(s) to two equivalent RT bronchodilator orders with one order with TID Frequency and one order with Frequency of every 4 hours PRN wheezing or increased work of breathing using Per Protocol order mode. 11-13 - enter or revise RT Bronchodilator order(s) to one equivalent RT bronchodilator order with QID Frequency and an Albuterol order with Frequency of every 4 hours PRN wheezing or increased work of breathing using Per Protocol order mode. Greater than 13 - enter or revise RT Bronchodilator order(s) to one equivalent RT bronchodilator order with every 4 hours Frequency and an Albuterol order with Frequency of every 2 hours PRN wheezing or increased work of breathing using Per Protocol order mode. RT to enter RT Home Evaluation for COPD & MDI Assessment order using Per Protocol order mode.     Electronically signed by Abigail Ojead RCP on 1/19/2023 at 12:08 AM

## 2023-01-19 NOTE — PROGRESS NOTES
Discharge instructions and medications reviewed with patient and daughter. Patient's daughter voices understanding and denies further questions/ concerns at this time. Patient discharged at 1205 per stretcher with transportation services to [address] in stable condition.

## 2023-01-19 NOTE — PROGRESS NOTES
Attempted to call in report to [employer] twice. Left voicemail with contact info. Santiago9Shanel Lewis nurse called.  Report given

## 2023-02-17 ENCOUNTER — HOSPITAL ENCOUNTER (INPATIENT)
Age: 88
LOS: 5 days | Discharge: HOME | DRG: 418 | End: 2023-02-22
Attending: EMERGENCY MEDICINE | Admitting: INTERNAL MEDICINE
Payer: MEDICARE

## 2023-02-17 ENCOUNTER — APPOINTMENT (OUTPATIENT)
Dept: CT IMAGING | Age: 88
DRG: 418 | End: 2023-02-17
Payer: MEDICARE

## 2023-02-17 ENCOUNTER — APPOINTMENT (OUTPATIENT)
Dept: MRI IMAGING | Age: 88
DRG: 418 | End: 2023-02-17
Payer: MEDICARE

## 2023-02-17 DIAGNOSIS — J18.9 PNEUMONIA OF LEFT LOWER LOBE DUE TO INFECTIOUS ORGANISM: ICD-10-CM

## 2023-02-17 DIAGNOSIS — K85.10 ACUTE BILIARY PANCREATITIS WITHOUT INFECTION OR NECROSIS: Primary | ICD-10-CM

## 2023-02-17 DIAGNOSIS — K81.9 CHOLECYSTITIS: ICD-10-CM

## 2023-02-17 DIAGNOSIS — K80.81 BILIARY CALCULUS OF OTHER SITE WITH OBSTRUCTION: ICD-10-CM

## 2023-02-17 LAB
A/G RATIO: 1.1 (ref 1.1–2.2)
ALBUMIN SERPL-MCNC: 3.6 G/DL (ref 3.4–5)
ALP BLD-CCNC: 585 U/L (ref 40–129)
ALT SERPL-CCNC: 400 U/L (ref 10–40)
ANION GAP SERPL CALCULATED.3IONS-SCNC: 11 MMOL/L (ref 3–16)
AST SERPL-CCNC: 439 U/L (ref 15–37)
BACTERIA: NORMAL /HPF
BASOPHILS ABSOLUTE: 0 K/UL (ref 0–0.2)
BASOPHILS RELATIVE PERCENT: 0.1 %
BILIRUB SERPL-MCNC: 4.4 MG/DL (ref 0–1)
BILIRUBIN URINE: ABNORMAL
BLOOD, URINE: ABNORMAL
BUN BLDV-MCNC: 16 MG/DL (ref 7–20)
CALCIUM SERPL-MCNC: 9.6 MG/DL (ref 8.3–10.6)
CHLORIDE BLD-SCNC: 97 MMOL/L (ref 99–110)
CLARITY: CLEAR
CO2: 25 MMOL/L (ref 21–32)
COLOR: ABNORMAL
CREAT SERPL-MCNC: 1.2 MG/DL (ref 0.8–1.3)
EKG ATRIAL RATE: 75 BPM
EKG DIAGNOSIS: NORMAL
EKG P AXIS: 43 DEGREES
EKG P-R INTERVAL: 184 MS
EKG Q-T INTERVAL: 364 MS
EKG QRS DURATION: 110 MS
EKG QTC CALCULATION (BAZETT): 406 MS
EKG R AXIS: -33 DEGREES
EKG T AXIS: -4 DEGREES
EKG VENTRICULAR RATE: 75 BPM
EOSINOPHILS ABSOLUTE: 0 K/UL (ref 0–0.6)
EOSINOPHILS RELATIVE PERCENT: 0.1 %
EPITHELIAL CELLS, UA: 0 /HPF (ref 0–5)
GFR SERPL CREATININE-BSD FRML MDRD: 59 ML/MIN/{1.73_M2}
GLUCOSE BLD-MCNC: 211 MG/DL (ref 70–99)
GLUCOSE URINE: NEGATIVE MG/DL
HCT VFR BLD CALC: 36.7 % (ref 40.5–52.5)
HEMOGLOBIN: 12.1 G/DL (ref 13.5–17.5)
HYALINE CASTS: 0 /LPF (ref 0–8)
KETONES, URINE: NEGATIVE MG/DL
LEUKOCYTE ESTERASE, URINE: NEGATIVE
LIPASE: >3000 U/L (ref 13–60)
LYMPHOCYTES ABSOLUTE: 0.5 K/UL (ref 1–5.1)
LYMPHOCYTES RELATIVE PERCENT: 4.9 %
MCH RBC QN AUTO: 31.7 PG (ref 26–34)
MCHC RBC AUTO-ENTMCNC: 33 G/DL (ref 31–36)
MCV RBC AUTO: 96 FL (ref 80–100)
MICROSCOPIC EXAMINATION: YES
MONOCYTES ABSOLUTE: 0.7 K/UL (ref 0–1.3)
MONOCYTES RELATIVE PERCENT: 6.5 %
NEUTROPHILS ABSOLUTE: 9.1 K/UL (ref 1.7–7.7)
NEUTROPHILS RELATIVE PERCENT: 88.4 %
NITRITE, URINE: NEGATIVE
PDW BLD-RTO: 15.5 % (ref 12.4–15.4)
PH UA: 8 (ref 5–8)
PLATELET # BLD: 169 K/UL (ref 135–450)
PMV BLD AUTO: 10 FL (ref 5–10.5)
POTASSIUM REFLEX MAGNESIUM: 3.8 MMOL/L (ref 3.5–5.1)
PROTEIN UA: 100 MG/DL
RBC # BLD: 3.82 M/UL (ref 4.2–5.9)
RBC UA: 1 /HPF (ref 0–4)
SODIUM BLD-SCNC: 133 MMOL/L (ref 136–145)
SPECIFIC GRAVITY UA: 1.01 (ref 1–1.03)
TOTAL PROTEIN: 6.9 G/DL (ref 6.4–8.2)
TROPONIN: <0.01 NG/ML
URINE REFLEX TO CULTURE: ABNORMAL
URINE TYPE: ABNORMAL
UROBILINOGEN, URINE: 2 E.U./DL
WBC # BLD: 10.2 K/UL (ref 4–11)
WBC UA: 0 /HPF (ref 0–5)

## 2023-02-17 PROCEDURE — 84484 ASSAY OF TROPONIN QUANT: CPT

## 2023-02-17 PROCEDURE — 74181 MRI ABDOMEN W/O CONTRAST: CPT

## 2023-02-17 PROCEDURE — 74176 CT ABD & PELVIS W/O CONTRAST: CPT

## 2023-02-17 PROCEDURE — 36415 COLL VENOUS BLD VENIPUNCTURE: CPT

## 2023-02-17 PROCEDURE — 83690 ASSAY OF LIPASE: CPT

## 2023-02-17 PROCEDURE — 6370000000 HC RX 637 (ALT 250 FOR IP): Performed by: INTERNAL MEDICINE

## 2023-02-17 PROCEDURE — C9113 INJ PANTOPRAZOLE SODIUM, VIA: HCPCS | Performed by: EMERGENCY MEDICINE

## 2023-02-17 PROCEDURE — 1200000000 HC SEMI PRIVATE

## 2023-02-17 PROCEDURE — 94761 N-INVAS EAR/PLS OXIMETRY MLT: CPT

## 2023-02-17 PROCEDURE — 2580000003 HC RX 258: Performed by: EMERGENCY MEDICINE

## 2023-02-17 PROCEDURE — 93010 ELECTROCARDIOGRAM REPORT: CPT | Performed by: INTERNAL MEDICINE

## 2023-02-17 PROCEDURE — 99285 EMERGENCY DEPT VISIT HI MDM: CPT

## 2023-02-17 PROCEDURE — 80053 COMPREHEN METABOLIC PANEL: CPT

## 2023-02-17 PROCEDURE — 81001 URINALYSIS AUTO W/SCOPE: CPT

## 2023-02-17 PROCEDURE — 80074 ACUTE HEPATITIS PANEL: CPT

## 2023-02-17 PROCEDURE — 6360000002 HC RX W HCPCS: Performed by: EMERGENCY MEDICINE

## 2023-02-17 PROCEDURE — 2580000003 HC RX 258: Performed by: INTERNAL MEDICINE

## 2023-02-17 PROCEDURE — 96375 TX/PRO/DX INJ NEW DRUG ADDON: CPT

## 2023-02-17 PROCEDURE — 93005 ELECTROCARDIOGRAM TRACING: CPT | Performed by: EMERGENCY MEDICINE

## 2023-02-17 PROCEDURE — 96365 THER/PROPH/DIAG IV INF INIT: CPT

## 2023-02-17 PROCEDURE — 85025 COMPLETE CBC W/AUTO DIFF WBC: CPT

## 2023-02-17 RX ORDER — POLYETHYLENE GLYCOL 3350 17 G/17G
17 POWDER, FOR SOLUTION ORAL DAILY
Status: DISCONTINUED | OUTPATIENT
Start: 2023-02-17 | End: 2023-02-17 | Stop reason: SDUPTHER

## 2023-02-17 RX ORDER — SODIUM CHLORIDE, SODIUM LACTATE, POTASSIUM CHLORIDE, CALCIUM CHLORIDE 600; 310; 30; 20 MG/100ML; MG/100ML; MG/100ML; MG/100ML
INJECTION, SOLUTION INTRAVENOUS CONTINUOUS
Status: DISCONTINUED | OUTPATIENT
Start: 2023-02-17 | End: 2023-02-21

## 2023-02-17 RX ORDER — ASPIRIN 81 MG/1
81 TABLET ORAL DAILY
Status: DISCONTINUED | OUTPATIENT
Start: 2023-02-17 | End: 2023-02-22 | Stop reason: HOSPADM

## 2023-02-17 RX ORDER — PANTOPRAZOLE SODIUM 40 MG/1
40 TABLET, DELAYED RELEASE ORAL DAILY
Status: DISCONTINUED | OUTPATIENT
Start: 2023-02-17 | End: 2023-02-22 | Stop reason: HOSPADM

## 2023-02-17 RX ORDER — ONDANSETRON 2 MG/ML
4 INJECTION INTRAMUSCULAR; INTRAVENOUS EVERY 6 HOURS PRN
Status: DISCONTINUED | OUTPATIENT
Start: 2023-02-17 | End: 2023-02-22 | Stop reason: HOSPADM

## 2023-02-17 RX ORDER — ATORVASTATIN CALCIUM 40 MG/1
40 TABLET, FILM COATED ORAL DAILY
Status: DISCONTINUED | OUTPATIENT
Start: 2023-02-17 | End: 2023-02-22 | Stop reason: HOSPADM

## 2023-02-17 RX ORDER — AMLODIPINE BESYLATE 5 MG/1
10 TABLET ORAL DAILY
Status: DISCONTINUED | OUTPATIENT
Start: 2023-02-17 | End: 2023-02-22 | Stop reason: HOSPADM

## 2023-02-17 RX ORDER — 0.9 % SODIUM CHLORIDE 0.9 %
1000 INTRAVENOUS SOLUTION INTRAVENOUS ONCE
Status: COMPLETED | OUTPATIENT
Start: 2023-02-17 | End: 2023-02-17

## 2023-02-17 RX ORDER — ESCITALOPRAM OXALATE 10 MG/1
20 TABLET ORAL DAILY
Status: DISCONTINUED | OUTPATIENT
Start: 2023-02-17 | End: 2023-02-22 | Stop reason: HOSPADM

## 2023-02-17 RX ORDER — MONTELUKAST SODIUM 10 MG/1
10 TABLET ORAL NIGHTLY
Status: DISCONTINUED | OUTPATIENT
Start: 2023-02-17 | End: 2023-02-22 | Stop reason: HOSPADM

## 2023-02-17 RX ORDER — ALBUTEROL SULFATE 90 UG/1
2 AEROSOL, METERED RESPIRATORY (INHALATION) EVERY 6 HOURS PRN
Status: DISCONTINUED | OUTPATIENT
Start: 2023-02-17 | End: 2023-02-22 | Stop reason: HOSPADM

## 2023-02-17 RX ORDER — POLYETHYLENE GLYCOL 3350 17 G/17G
17 POWDER, FOR SOLUTION ORAL DAILY
Status: DISCONTINUED | OUTPATIENT
Start: 2023-02-17 | End: 2023-02-22 | Stop reason: HOSPADM

## 2023-02-17 RX ORDER — CETIRIZINE HYDROCHLORIDE 10 MG/1
5 TABLET ORAL DAILY
Status: DISCONTINUED | OUTPATIENT
Start: 2023-02-17 | End: 2023-02-22 | Stop reason: HOSPADM

## 2023-02-17 RX ORDER — ONDANSETRON 2 MG/ML
4 INJECTION INTRAMUSCULAR; INTRAVENOUS ONCE
Status: COMPLETED | OUTPATIENT
Start: 2023-02-17 | End: 2023-02-17

## 2023-02-17 RX ORDER — OXYBUTYNIN CHLORIDE 5 MG/1
10 TABLET, EXTENDED RELEASE ORAL DAILY
Status: DISCONTINUED | OUTPATIENT
Start: 2023-02-17 | End: 2023-02-22 | Stop reason: HOSPADM

## 2023-02-17 RX ORDER — HYDROMORPHONE HYDROCHLORIDE 1 MG/ML
0.5 INJECTION, SOLUTION INTRAMUSCULAR; INTRAVENOUS; SUBCUTANEOUS EVERY 4 HOURS PRN
Status: DISCONTINUED | OUTPATIENT
Start: 2023-02-17 | End: 2023-02-22

## 2023-02-17 RX ORDER — IPRATROPIUM BROMIDE AND ALBUTEROL SULFATE 2.5; .5 MG/3ML; MG/3ML
1 SOLUTION RESPIRATORY (INHALATION) EVERY 4 HOURS PRN
Status: DISCONTINUED | OUTPATIENT
Start: 2023-02-17 | End: 2023-02-22 | Stop reason: HOSPADM

## 2023-02-17 RX ORDER — HYDRALAZINE HYDROCHLORIDE 25 MG/1
50 TABLET, FILM COATED ORAL 3 TIMES DAILY
Status: DISCONTINUED | OUTPATIENT
Start: 2023-02-17 | End: 2023-02-22 | Stop reason: HOSPADM

## 2023-02-17 RX ORDER — ENOXAPARIN SODIUM 100 MG/ML
40 INJECTION SUBCUTANEOUS DAILY
Status: DISCONTINUED | OUTPATIENT
Start: 2023-02-17 | End: 2023-02-22 | Stop reason: HOSPADM

## 2023-02-17 RX ORDER — PANTOPRAZOLE SODIUM 40 MG/10ML
40 INJECTION, POWDER, LYOPHILIZED, FOR SOLUTION INTRAVENOUS ONCE
Status: COMPLETED | OUTPATIENT
Start: 2023-02-17 | End: 2023-02-17

## 2023-02-17 RX ORDER — LEVOTHYROXINE SODIUM 0.12 MG/1
125 TABLET ORAL DAILY
Status: DISCONTINUED | OUTPATIENT
Start: 2023-02-17 | End: 2023-02-22 | Stop reason: HOSPADM

## 2023-02-17 RX ORDER — DEXTROSE AND SODIUM CHLORIDE 5; .45 G/100ML; G/100ML
INJECTION, SOLUTION INTRAVENOUS CONTINUOUS
Status: DISCONTINUED | OUTPATIENT
Start: 2023-02-17 | End: 2023-02-20

## 2023-02-17 RX ORDER — LACTULOSE 10 G/15ML
20 SOLUTION ORAL 3 TIMES DAILY PRN
Status: DISCONTINUED | OUTPATIENT
Start: 2023-02-17 | End: 2023-02-22 | Stop reason: HOSPADM

## 2023-02-17 RX ORDER — FLUTICASONE PROPIONATE 50 MCG
1 SPRAY, SUSPENSION (ML) NASAL DAILY
Status: DISCONTINUED | OUTPATIENT
Start: 2023-02-17 | End: 2023-02-22 | Stop reason: HOSPADM

## 2023-02-17 RX ORDER — TAMSULOSIN HYDROCHLORIDE 0.4 MG/1
0.4 CAPSULE ORAL DAILY
Status: DISCONTINUED | OUTPATIENT
Start: 2023-02-17 | End: 2023-02-22 | Stop reason: HOSPADM

## 2023-02-17 RX ADMIN — SODIUM CHLORIDE 1000 ML: 9 INJECTION, SOLUTION INTRAVENOUS at 09:58

## 2023-02-17 RX ADMIN — PANTOPRAZOLE SODIUM 40 MG: 40 INJECTION, POWDER, FOR SOLUTION INTRAVENOUS at 09:58

## 2023-02-17 RX ADMIN — ONDANSETRON 4 MG: 2 INJECTION INTRAMUSCULAR; INTRAVENOUS at 09:58

## 2023-02-17 RX ADMIN — DEXTROSE AND SODIUM CHLORIDE: 5; 450 INJECTION, SOLUTION INTRAVENOUS at 17:32

## 2023-02-17 RX ADMIN — PIPERACILLIN AND TAZOBACTAM 3375 MG: 3; .375 INJECTION, POWDER, FOR SOLUTION INTRAVENOUS at 11:39

## 2023-02-17 ASSESSMENT — LIFESTYLE VARIABLES
HOW MANY STANDARD DRINKS CONTAINING ALCOHOL DO YOU HAVE ON A TYPICAL DAY: PATIENT DOES NOT DRINK
HOW OFTEN DO YOU HAVE A DRINK CONTAINING ALCOHOL: NEVER

## 2023-02-17 ASSESSMENT — PAIN - FUNCTIONAL ASSESSMENT: PAIN_FUNCTIONAL_ASSESSMENT: NONE - DENIES PAIN

## 2023-02-17 NOTE — ED NOTES
EMERGENCY MEDICINE PROVIDER NOTE    Patient Identification  Pt Name: Farrukh Daley  MRN: [id number]  G[place]agfchelo 1935  Date of evaluation: 2/17/2023  Provider: Rey Lorenzo DO  PCP: Cooper Goldstein MD    Chief Complaint  Abdominal Pain (Pt arrived via squad. Daughter updated EMS with pt n/v/d and abdominal pain, liquid stool, \"dark\" emesis. Recent [facility] ER visit with negative results.)      HPI  (History provided by patient and EMS)  This is a 81 y.o. male who was brought in by EMS transportation for reported abdominal pain nausea and vomiting. Patient has known history of dementia and history is extremely limited. Unclear when symptoms started. Per EMS report, his nursing home was concerned for dark emesis. No gross blood. I have reviewed the following nursing documentation:  Allergies: Patient has no known allergies. Past medical history:   Past Medical History:   Diagnosis Date    CAD (coronary artery disease)     Cerebral artery occlusion with cerebral infarction [facility])      Past surgical history:   Past Surgical History:   Procedure Laterality Date    CARDIAC SURGERY      2008    CORONARY ARTERY BYPASS GRAFT         Home medications:   Previous Medications    ADVAIR DISKUS 250-50 MCG/ACT AEPB DISKUS INHALER    Inhale 2 puffs into the lungs in the morning and at bedtime    ALBUTEROL (PROVENTIL HFA) 108 (90 BASE) MCG/ACT INHALER    Inhale 2 puffs into the lungs every 6 hours as needed for Wheezing. AMLODIPINE (NORVASC) 10 MG TABLET    Take 10 mg by mouth daily    ASPIRIN 81 MG TABLET    Take 81 mg by mouth daily. ATORVASTATIN (LIPITOR) 40 MG TABLET    Take 1 tablet by mouth daily    ESCITALOPRAM (LEXAPRO) 20 MG TABLET    Take 20 mg by mouth in the morning.     FLUTICASONE (FLONASE) 50 MCG/ACT NASAL SPRAY    1 spray by Each Nostril route daily    HYDRALAZINE (APRESOLINE) 50 MG TABLET    Take 50 mg by mouth 3 times daily    LACTULOSE (CHRONULAC) 10 GM/15ML SOLUTION    Take 20 g by mouth 3 times daily as needed    LEVOTHYROXINE (SYNTHROID) 125 MCG TABLET    Take 125 mcg by mouth in the morning. Lavanda [employer]le LORATADINE (CLARITIN) 10 MG TABLET    Take 10 mg by mouth daily    MONTELUKAST (SINGULAIR) 10 MG TABLET    Take 10 mg by mouth nightly    OXYBUTYNIN (DITROPAN-XL) 10 MG EXTENDED RELEASE TABLET    Take 10 mg by mouth in the morning. PANTOPRAZOLE (PROTONIX) 40 MG TABLET    Take 40 mg by mouth daily    POLYETHYLENE GLYCOL (GLYCOLAX) 17 GM/SCOOP POWDER    Take 17 g by mouth daily    TAMSULOSIN (FLOMAX) 0.4 MG CAPSULE    Take 0.4 mg by mouth in the morning. Social history:  reports that he has never smoked. He has never used smokeless tobacco. He reports that he does not drink alcohol and does not use drugs. Family history:  No family history on file. Exam  ED Triage Vitals   BP Temp Temp Source Heart Rate Resp SpO2 Height Weight   02/17/23 0956 02/17/23 0950 02/17/23 0950 02/17/23 0938 02/17/23 0938 02/17/23 0939 -- --   (!) 167/71 99.6 °F (37.6 °C) Oral 87 24 96 %       Nursing note and vitals reviewed. General: ill appearing, in no distress  Head: atraumatic, normocephalic  Eyes: Anicteric sclera. No discharge. Neck: Supple. Trachea midline. Cardiovascular: RRR; no murmur  Pulmonary/Chest: Effort normal. No respiratory distress. Abdominal: Soft. No distension. LLQ tenderness. Neurological: Alert and oriented. Face symmetric. Speech is clear. Skin: Warm and dry. No rash. Procedures      EKG  The Ekg interpreted by me in the absence of a cardiologist shows. normal sinus rhythm with a rate of 75  Axis is   Left axis deviation  QTc is  normal  Intervals and Durations are unremarkable. No specific ST-T wave changes appreciated. No evidence of acute ischemia. No significant change from prior EKG dated 1/14/23.  Some PACs    Radiology  CT ABDOMEN PELVIS WO CONTRAST Additional Contrast? None    (Results Pending)       Labs  Results for orders placed or performed during the hospital encounter of 02/17/23   CBC with Auto Differential   Result Value Ref Range    WBC 10.2 4.0 - 11.0 K/uL    RBC 3.82 (L) 4.20 - 5.90 M/uL    Hemoglobin 12.1 (L) 13.5 - 17.5 g/dL    Hematocrit 36.7 (L) 40.5 - 52.5 %    MCV 96.0 80.0 - 100.0 fL    MCH 31.7 26.0 - 34.0 pg    MCHC 33.0 31.0 - 36.0 g/dL    RDW 15.5 (H) 12.4 - 15.4 %    Platelets 481 211 - 515 K/uL    MPV 10.0 5.0 - 10.5 fL    Neutrophils % 88.4 %    Lymphocytes % 4.9 %    Monocytes % 6.5 %    Eosinophils % 0.1 %    Basophils % 0.1 %    Neutrophils Absolute 9.1 (H) 1.7 - 7.7 K/uL    Lymphocytes Absolute 0.5 (L) 1.0 - 5.1 K/uL    Monocytes Absolute 0.7 0.0 - 1.3 K/uL    Eosinophils Absolute 0.0 0.0 - 0.6 K/uL    Basophils Absolute 0.0 0.0 - 0.2 K/uL   EKG 12 Lead   Result Value Ref Range    Ventricular Rate 75 BPM    Atrial Rate 75 BPM    P-R Interval 184 ms    QRS Duration 110 ms    Q-T Interval 364 ms    QTc Calculation (Bazett) 406 ms    P Axis 43 degrees    R Axis -33 degrees    T Axis -4 degrees    Diagnosis       Sinus rhythm with Premature atrial complexesLeft axis deviationModerate voltage criteria for LVH, may be normal variantAbnormal ECG       SEP-1  Is this patient to be included in the SEP-1 Core Measure due to severe sepsis or septic shock? No Exclusion criteria - the patient is NOT to be included for SEP-1 Core Measure due to:  Infection is not suspected     Screenings                    Medications Given During ED Visit  Medications   0.9 % sodium chloride bolus (1,000 mLs IntraVENous New Bag 2/17/23 8140)   ondansetron (ZOFRAN) injection 4 mg (4 mg IntraVENous Given 2/17/23 4370)   pantoprazole (PROTONIX) injection 40 mg (40 mg IntraVENous Given 2/17/23 5910)       Records Reviewed  Recent presentation for a fall with a work-up including labs and a CT of the brain which were normal.    Social Determinants of Health  None    Chronic Conditions affecting care   has a past medical history of CAD (coronary artery disease) and Cerebral artery occlusion with cerebral infarction ([facility] [address] MDM and ED Course  Presents today by EMS from his nursing home with reported nausea vomiting diarrhea. He apparently had some abdominal pain and some dark emesis. He offers no history otherwise. We are waiting his daughter for more history. On my evaluation he is awake alert and in no distress. He is a soft abdomen with left lower quadrant tenderness. We will obtain labs and imaging to assess for signs of diverticulitis versus colitis versus peptic ulcer disease versus other etiology. He will be given some IV fluids, antiemetics, as well as some Protonix empirically. Addendum-his labs were reviewed. His lipase is elevated. His LFTs are elevated. CAT scan consistent with pancreatitis and possibly pneumonia. I suspect aspiration. He has no signs of sepsis currently. He will be given antibiotics. He will require admission. [unfilled]    Final Impression  No diagnosis found. Blood pressure (!) 167/71, pulse 78, temperature 99.6 °F (37.6 °C), temperature source Oral, resp. rate 26, SpO2 96 %. Disposition:  DISPOSITION        Patient Referrals:  No follow-up provider specified. Discharge Medications:  New Prescriptions    No medications on file       This chart was generated using the [address] [place], [place] [address]  dictation system. I created this record but it may contain dictation errors given the limitations of this technology.        Bob Hayes 8, DO  02/17/23 0913

## 2023-02-17 NOTE — CONSULTS
Gastroenterology Consult Note        Patient: Amador Garcia  : 1935  Acct#:      Date:  2023    Subjective:       History of Present Illness  Patient is a 81 y.o.  male admitted with gallstone pancreatitis who is seen in consult for the same. History of remote CABG. History of CVA. Daughter thinks he is on Plavix. Also on aspirin. H/o diastolic heart failure. History of dementia and is a poor historian. He lives with his daughter who lives in an ECF of some sort. .  He had nausea and vomiting this morning along with abdominal pain. Also felt feverish. Was brought to the ER. Denies any abdominal pain currently. No vomiting here. Was diagnosed with gallstone pancreatitis. Past Medical History:   Diagnosis Date    CAD (coronary artery disease)     Cerebral artery occlusion with cerebral infarction [facility])       Past Surgical History:   Procedure Laterality Date    CARDIAC SURGERY          CORONARY ARTERY BYPASS GRAFT        Past Endoscopic History    Admission Meds  No current facility-administered medications on file prior to encounter.      Current Outpatient Medications on File Prior to Encounter   Medication Sig Dispense Refill    atorvastatin (LIPITOR) 40 MG tablet Take 1 tablet by mouth daily 30 tablet 3    lactulose (CHRONULAC) 10 GM/15ML solution Take 20 g by mouth 3 times daily as needed      polyethylene glycol (GLYCOLAX) 17 GM/SCOOP powder Take 17 g by mouth daily      amLODIPine (NORVASC) 10 MG tablet Take 10 mg by mouth daily      hydrALAZINE (APRESOLINE) 50 MG tablet Take 50 mg by mouth 3 times daily      ADVAIR DISKUS 250-50 MCG/ACT AEPB diskus inhaler Inhale 2 puffs into the lungs in the morning and at bedtime      fluticasone (FLONASE) 50 MCG/ACT nasal spray 1 spray by Each Nostril route daily      loratadine (CLARITIN) 10 MG tablet Take 10 mg by mouth daily      oxybutynin (DITROPAN-XL) 10 MG extended release tablet Take 10 mg by mouth in the morning. [DISCONTINUED] furosemide (LASIX) 20 MG tablet Take 20 mg by mouth in the morning and 20 mg before bedtime. montelukast (SINGULAIR) 10 MG tablet Take 10 mg by mouth nightly      pantoprazole (PROTONIX) 40 MG tablet Take 40 mg by mouth daily      tamsulosin (FLOMAX) 0.4 MG capsule Take 0.4 mg by mouth in the morning. albuterol (PROVENTIL HFA) 108 (90 BASE) MCG/ACT inhaler Inhale 2 puffs into the lungs every 6 hours as needed for Wheezing. 1 Inhaler 0    escitalopram (LEXAPRO) 20 MG tablet Take 20 mg by mouth in the morning. levothyroxine (SYNTHROID) 125 MCG tablet Take 125 mcg by mouth in the morning. Zachary Parisian aspirin 81 MG tablet Take 81 mg by mouth daily. Allergies  No Known Allergies   Social   Social History     Tobacco Use    Smoking status: Never    Smokeless tobacco: Never   Substance Use Topics    Alcohol use: No        No family history on file. Physical Exam  Blood pressure 136/73, pulse 84, temperature 99.6 °F (37.6 °C), temperature source Oral, resp. rate 28, SpO2 95 %. General appearance: alert, cooperative, no distress, appears stated age  Eyes: Anicteric  Head: Normocephalic, without obvious abnormality  Lungs: clear to auscultation bilaterally, Normal Effort  Heart: regular rate and rhythm, normal S1 and S2, no murmurs or rubs  Abdomen: soft, non-tender. Bowel sounds normal. No masses,  no organomegaly.    Extremities: atraumatic, no cyanosis or edema  Skin: warm and dry, no jaundice  Neuro: Grossly intact, alert, pleasantly confused  Musculoskeletal: 5/5  strength BUE      Data Review:    Recent Labs     02/17/23  0945   WBC 10.2   HGB 12.1*   HCT 36.7*   MCV 96.0        Recent Labs     02/17/23  0945   *   K 3.8   CL 97*   CO2 25   BUN 16   CREATININE 1.2     Recent Labs     02/17/23 0945   *   *   BILITOT 4.4*   ALKPHOS 585*     Recent Labs     02/17/23 0945   LIPASE >3,000.0*     No results for input(s): PROTIME, INR in the last 72 hours. No results for input(s): PTT in the last 72 hours. No results for input(s): OCCULTBLD in the last 72 hours. Imaging Studies:                             CT-scan of abdomen and pelvis wo contrast 23:  Impression   1. Left basilar segmental atelectasis versus pneumonia with small   parapneumonic effusion. 2. Mild acute uncomplicated pancreatitis. 3. Cholelithiasis. Assessment/Plan:     Acute gallstone pancreatitis -elevated liver enzymes along with lipase over 3000. His CT evidence of gallstones and mild acute uncomplicated pancreatitis. Monitor liver enzymes  IV fluid hydration with LR at 150 ml/hr  N.p.o. Check MRCP  Surgery consult for cholecystectomy with IOC when pancreatitis improves  Will monitor liver enzymes, MRCP, and clinical course for need for ERCP      Discussed with Dr. Geraldine Resendiz, JESSICA  [facility]    I have personally performed a face to face diagnostic evaluation on this patient. I have spent more than 50% of the total clinical encounter in interviewing/examining the patient, reviewing patient chart (including but not limited to notes, labs, imaging and other testing), documentation of findings and subsequent follow up of ordered medication and testing, placing referrals and communication with patient care providers, coordinating future care, as well as documentation in the EHR. I agree with the findings and recommended plan of care, as documented by the physician assistant/nurse practitioner. In summary, my findings and plan are the followin-year-old  male with dementia, CAD status post CABG and CVA. He presented to the ER with nausea, vomiting and abdominal pain. Work-up revealed acute gallstone pancreatitis. His liver enzymes were also elevated. Vital signs are stable. Patient is afebrile.   Abdominal exam benign  Impression and plan  Acute gallstone pancreatitis  -Keep n.p.o., IV LR at 150 mL/h, pain control per hospitalist service. Surgical consult for eventual cholecystectomy  Elevated liver enzymes.   MRCP did not reveal choledocholithiasis.  -Check acute hepatitis panel, trend LFTs    Marcia Veras MD, MSc  [facility]  02/17/23

## 2023-02-17 NOTE — ED TRIAGE NOTES
Pt arrived via squad. Daughter updated EMS with pt n/v/d and abdominal pain, liquid stool, \"dark\" emesis. Recent [facility] ER visit with negative results. Patient alert and oriented x1. GCS 15/15. Skin appropriate for ethnicity, dry and intact. No signs of acute distress noted at this time. Regular respiratory pattern, normal respiratory depth, unlabored respirations. denies pain. Pt is on a continuous pulse oximetry and telemetry monitoring. Bedside Monitor on with Alarms audible and alarms set. Pt continued on cycling blood pressure. Fall risk precautions in place, call light in reach, bed side table within reach, bed alarm on, will continue to monitor.

## 2023-02-17 NOTE — ED TRIAGE NOTES
Patient poor historian on home medications, allergies, safety/security questions, and medical/surgical history.

## 2023-02-17 NOTE — RT PROTOCOL NOTE
RT Inhaler-Nebulizer Bronchodilator Protocol Note    There is a bronchodilator order in the chart from a provider indicating to follow the RT Bronchodilator Protocol and there is an Initiate RT Inhaler-Nebulizer Bronchodilator Protocol order as well (see protocol at bottom of note). CXR Findings:  No results found. The findings from the last RT Protocol Assessment were as follows:   History Pulmonary Disease: None or smoker <15 pack years  Respiratory Pattern: Regular pattern and RR 12-20 bpm  Breath Sounds: Slightly diminished and/or crackles  Cough: Strong, spontaneous, non-productive  Indication for Bronchodilator Therapy: Decreased or absent breath sounds  Bronchodilator Assessment Score: 2    Aerosolized bronchodilator medication orders have been revised according to the RT Inhaler-Nebulizer Bronchodilator Protocol below. Respiratory Therapist to perform RT Therapy Protocol Assessment initially then follow the protocol. Repeat RT Therapy Protocol Assessment PRN for score 0-3 or on second treatment, BID, and PRN for scores above 3. No Indications - adjust the frequency to every 6 hours PRN wheezing or bronchospasm, if no treatments needed after 48 hours then discontinue using Per Protocol order mode. If indication present, adjust the RT bronchodilator orders based on the Bronchodilator Assessment Score as indicated below. Use Inhaler orders unless patient has one or more of the following: on home nebulizer, not able to hold breath for 10 seconds, is not alert and oriented, cannot activate and use MDI correctly, or respiratory rate 25 breaths per minute or more, then use the equivalent nebulizer order(s) with same Frequency and PRN reasons based on the score. If a patient is on this medication at home then do not decrease Frequency below that used at home.     0-3 - enter or revise RT bronchodilator order(s) to equivalent RT Bronchodilator order with Frequency of every 4 hours PRN for wheezing or increased work of breathing using Per Protocol order mode. 4-6 - enter or revise RT Bronchodilator order(s) to two equivalent RT bronchodilator orders with one order with BID Frequency and one order with Frequency of every 4 hours PRN wheezing or increased work of breathing using Per Protocol order mode. 7-10 - enter or revise RT Bronchodilator order(s) to two equivalent RT bronchodilator orders with one order with TID Frequency and one order with Frequency of every 4 hours PRN wheezing or increased work of breathing using Per Protocol order mode. 11-13 - enter or revise RT Bronchodilator order(s) to one equivalent RT bronchodilator order with QID Frequency and an Albuterol order with Frequency of every 4 hours PRN wheezing or increased work of breathing using Per Protocol order mode. Greater than 13 - enter or revise RT Bronchodilator order(s) to one equivalent RT bronchodilator order with every 4 hours Frequency and an Albuterol order with Frequency of every 2 hours PRN wheezing or increased work of breathing using Per Protocol order mode. RT to enter RT Home Evaluation for COPD & MDI Assessment order using Per Protocol order mode.     Electronically signed by Lord Ridge RCP on 2/17/2023 at 6:02 PM

## 2023-02-17 NOTE — PROGRESS NOTES
Pharmacist Review and Automatic Dose Adjustment of Prophylactic Enoxaparin      The reviewing pharmacist has made an adjustment to the ordered enoxaparin dose or converted to UFH per the approved [facility] protocol and table as identified below. Moises Edwards is a 81 y.o. male. Recent Labs     02/17/23  0945   CREATININE 1.2       Estimated Creatinine Clearance: 49 mL/min (based on SCr of 1.2 mg/dL). Recent Labs     02/17/23  0945   HGB 12.1*   HCT 36.7*        No results for input(s): INR in the last 72 hours.     Height:   Ht Readings from Last 1 Encounters:   01/17/23 6' (1.829 m)     Weight:  Wt Readings from Last 1 Encounters:   02/17/23 205 lb (93 kg)               Plan: Based upon the patient's weight and renal function    Ordered: Enoxaparin 30mg SUBQ Daily    Changed/converted to    New Order: Enoxaparin 40mg SUBQ Daily      Thank you,  Barbara Nunn, [facility]  2/17/2023, 4:55 PM

## 2023-02-17 NOTE — ED NOTES
Patent resting in bed. daughter at bedside. No signs of acute distress noted at this time. Regular respiratory pattern, normal respiratory depth, unlabored respirations. Fall precautions in place. Bed in lowest position, 2/2 bedrails up, bedside table within reach, bed alarm in place, bed wheels locked. Daughter Denies any needs at this time. Call light in reach. Will continue to monitor.            Tomer Newsome RN  02/17/23 2607

## 2023-02-17 NOTE — ED PROVIDER NOTES
EMERGENCY MEDICINE PROVIDER NOTE     Patient Identification  Pt Name: Odalys Arroyo  MRN: [id number]  [place] 1935  Date of evaluation: 2/17/2023  Provider: Bob Hayes 8, DO  PCP: Roberto Rodriguez MD     Chief Complaint  Abdominal Pain (Pt arrived via squad. Daughter updated EMS with pt n/v/d and abdominal pain, liquid stool, \"dark\" emesis. Recent [facility] ER visit with negative results.)        HPI  (History provided by patient and EMS)  This is a 81 y.o. male who was brought in by EMS transportation for reported abdominal pain nausea and vomiting. Patient has known history of dementia and history is extremely limited. Unclear when symptoms started. Per EMS report, his nursing home was concerned for dark emesis. No gross blood. I have reviewed the following nursing documentation:  Allergies: Patient has no known allergies. Past medical history:   Past Medical History        Past Medical History:   Diagnosis Date    CAD (coronary artery disease)      Cerebral artery occlusion with cerebral infarction [facility])           Past surgical history:   Past Surgical History         Past Surgical History:   Procedure Laterality Date    CARDIAC SURGERY         2008    CORONARY ARTERY BYPASS GRAFT                Home medications:        Previous Medications     ADVAIR DISKUS 250-50 MCG/ACT AEPB DISKUS INHALER    Inhale 2 puffs into the lungs in the morning and at bedtime     ALBUTEROL (PROVENTIL HFA) 108 (90 BASE) MCG/ACT INHALER    Inhale 2 puffs into the lungs every 6 hours as needed for Wheezing. AMLODIPINE (NORVASC) 10 MG TABLET    Take 10 mg by mouth daily     ASPIRIN 81 MG TABLET    Take 81 mg by mouth daily. ATORVASTATIN (LIPITOR) 40 MG TABLET    Take 1 tablet by mouth daily     ESCITALOPRAM (LEXAPRO) 20 MG TABLET    Take 20 mg by mouth in the morning.      FLUTICASONE (FLONASE) 50 MCG/ACT NASAL SPRAY    1 spray by Each Nostril route daily     HYDRALAZINE (APRESOLINE) 50 MG TABLET Take 50 mg by mouth 3 times daily     LACTULOSE (CHRONULAC) 10 GM/15ML SOLUTION    Take 20 g by mouth 3 times daily as needed     LEVOTHYROXINE (SYNTHROID) 125 MCG TABLET    Take 125 mcg by mouth in the morning. Martin Hidden LORATADINE (CLARITIN) 10 MG TABLET    Take 10 mg by mouth daily     MONTELUKAST (SINGULAIR) 10 MG TABLET    Take 10 mg by mouth nightly     OXYBUTYNIN (DITROPAN-XL) 10 MG EXTENDED RELEASE TABLET    Take 10 mg by mouth in the morning. PANTOPRAZOLE (PROTONIX) 40 MG TABLET    Take 40 mg by mouth daily     POLYETHYLENE GLYCOL (GLYCOLAX) 17 GM/SCOOP POWDER    Take 17 g by mouth daily     TAMSULOSIN (FLOMAX) 0.4 MG CAPSULE    Take 0.4 mg by mouth in the morning. Social history:  reports that he has never smoked. He has never used smokeless tobacco. He reports that he does not drink alcohol and does not use drugs. Family history:    Family History   No family history on file. Exam            ED Triage Vitals   BP Temp Temp Source Heart Rate Resp SpO2 Height Weight   02/17/23 0956 02/17/23 0950 02/17/23 0950 02/17/23 0938 02/17/23 0938 02/17/23 0939 -- --   (!) 167/71 99.6 °F (37.6 °C) Oral 87 24 96 %          Nursing note and vitals reviewed. General: ill appearing, in no distress  Head: atraumatic, normocephalic  Eyes: Anicteric sclera. No discharge. Neck: Supple. Trachea midline. Cardiovascular: RRR; no murmur  Pulmonary/Chest: Effort normal. No respiratory distress. Abdominal: Soft. No distension. LLQ tenderness. Neurological: Alert and oriented. Face symmetric. Speech is clear. Skin: Warm and dry. No rash. Procedures        EKG  The Ekg interpreted by me in the absence of a cardiologist shows. normal sinus rhythm with a rate of 75  Axis is   Left axis deviation  QTc is  normal  Intervals and Durations are unremarkable. No specific ST-T wave changes appreciated. No evidence of acute ischemia. No significant change from prior EKG dated 1/14/23.  Some PACs Radiology  CT ABDOMEN PELVIS WO CONTRAST Additional Contrast? None    (Results Pending)         Labs         Results for orders placed or performed during the hospital encounter of 02/17/23   CBC with Auto Differential   Result Value Ref Range     WBC 10.2 4.0 - 11.0 K/uL     RBC 3.82 (L) 4.20 - 5.90 M/uL     Hemoglobin 12.1 (L) 13.5 - 17.5 g/dL     Hematocrit 36.7 (L) 40.5 - 52.5 %     MCV 96.0 80.0 - 100.0 fL     MCH 31.7 26.0 - 34.0 pg     MCHC 33.0 31.0 - 36.0 g/dL     RDW 15.5 (H) 12.4 - 15.4 %     Platelets 061 835 - 352 K/uL     MPV 10.0 5.0 - 10.5 fL     Neutrophils % 88.4 %     Lymphocytes % 4.9 %     Monocytes % 6.5 %     Eosinophils % 0.1 %     Basophils % 0.1 %     Neutrophils Absolute 9.1 (H) 1.7 - 7.7 K/uL     Lymphocytes Absolute 0.5 (L) 1.0 - 5.1 K/uL     Monocytes Absolute 0.7 0.0 - 1.3 K/uL     Eosinophils Absolute 0.0 0.0 - 0.6 K/uL     Basophils Absolute 0.0 0.0 - 0.2 K/uL   EKG 12 Lead   Result Value Ref Range     Ventricular Rate 75 BPM     Atrial Rate 75 BPM     P-R Interval 184 ms     QRS Duration 110 ms     Q-T Interval 364 ms     QTc Calculation (Bazett) 406 ms     P Axis 43 degrees     R Axis -33 degrees     T Axis -4 degrees     Diagnosis           Sinus rhythm with Premature atrial complexesLeft axis deviationModerate voltage criteria for LVH, may be normal variantAbnormal ECG         SEP-1  Is this patient to be included in the SEP-1 Core Measure due to severe sepsis or septic shock? No Exclusion criteria - the patient is NOT to be included for SEP-1 Core Measure due to:  Infection is not suspected      Screenings         Medications Given During ED Visit  Medications   0.9 % sodium chloride bolus (1,000 mLs IntraVENous New Bag 2/17/23 0019)   ondansetron (ZOFRAN) injection 4 mg (4 mg IntraVENous Given 2/17/23 0728)   pantoprazole (PROTONIX) injection 40 mg (40 mg IntraVENous Given 2/17/23 6504)         Records Reviewed  Recent presentation for a fall with a work-up including labs and a CT of the brain which were normal.     Social Determinants of Health  None     Chronic Conditions affecting care  Past Medical History in prose (no negatives)    has a past medical history of CAD (coronary artery disease) and Cerebral artery occlusion with cerebral infarction ([address] MDM and ED Course  Presents today by EMS from his nursing home with reported nausea vomiting diarrhea. He apparently had some abdominal pain and some dark emesis. He offers no history otherwise. We are waiting his daughter for more history. On my evaluation he is awake alert and in no distress. He is a soft abdomen with left lower quadrant tenderness. We will obtain labs and imaging to assess for signs of diverticulitis versus colitis versus peptic ulcer disease versus other etiology. He will be given some IV fluids, antiemetics, as well as some Protonix empirically. Addendum-his labs were reviewed. His lipase is elevated. His LFTs are elevated. CAT scan consistent with pancreatitis and possibly pneumonia. I suspect aspiration. He has no signs of sepsis currently. He will be given antibiotics. He will require admission. I discussed the case over the phone with Dr. Maynard Pass up GI. I have discussed with Dr. Alix Caballero and the patient will be admitted for further management. Final Impression    ICD-10-CM    1. Acute biliary pancreatitis without infection or necrosis  K85.10       2. Biliary calculus of other site with obstruction  K80.81       3. Pneumonia of left lower lobe due to infectious organism  J18.9            Blood pressure (!) 167/71, pulse 78, temperature 99.6 °F (37.6 °C), temperature source Oral, resp. rate 26, SpO2 96 %. Disposition:  DISPOSITION     Admit     Patient Referrals:  No follow-up provider specified. Discharge Medications:      New Prescriptions     No medications on file         This chart was generated using the [facility] dictation system.  I created this record but it may contain dictation errors given the limitations of this technology.         Bob Hayes 8, DO  02/17/23 1504

## 2023-02-17 NOTE — PROGRESS NOTES
Patient seen in ED, room 19. Family present during admission; 2 son's Dinesh Maciel and Susana All as well as Arden Alcazar the [address] [place] [address]. Patient and children all assisted with admission questions. Admission completed except for: 4 Eyes Assessment, Immunizations, Covid Vaccines, Rights and Responsibilities, Orientation to room, Plan of Care, education, white board, height and weight, pain assessment and head to toe assessment. Patient is alert and oriented X 4 (patient does have dementia). Patient lives at [facility] independent living with a family member and is being admitted for acute biliary pancreatitis. Home Medication Reconciliation has been verbally reviewed with patient and POA and updated as appropriate. Plan of care updated if indicated. All questions answered.  POA Arden Alcazar stated she will bring in advance directives tomorrow (Healthcare power of  and the living will)

## 2023-02-17 NOTE — PROGRESS NOTES
02/17/23 1802   RT Protocol   History Pulmonary Disease 0   Respiratory pattern 0   Breath sounds 2   Cough 0   Indications for Bronchodilator Therapy Decreased or absent breath sounds   Bronchodilator Assessment Score 2

## 2023-02-18 PROBLEM — Z79.4 CONTROLLED TYPE 2 DIABETES MELLITUS, WITH LONG-TERM CURRENT USE OF INSULIN (HCC): Status: ACTIVE | Noted: 2023-02-18

## 2023-02-18 PROBLEM — I69.30 LATE EFFECTS OF CEREBRAL ISCHEMIC STROKE: Status: ACTIVE | Noted: 2023-02-18

## 2023-02-18 PROBLEM — E11.9 CONTROLLED TYPE 2 DIABETES MELLITUS, WITH LONG-TERM CURRENT USE OF INSULIN (HCC): Status: ACTIVE | Noted: 2023-02-18

## 2023-02-18 PROBLEM — K80.20 CHOLELITHIASIS: Status: ACTIVE | Noted: 2023-02-18

## 2023-02-18 LAB
A/G RATIO: 1.1 (ref 1.1–2.2)
ALBUMIN SERPL-MCNC: 2.8 G/DL (ref 3.4–5)
ALP BLD-CCNC: 370 U/L (ref 40–129)
ALT SERPL-CCNC: 220 U/L (ref 10–40)
ANION GAP SERPL CALCULATED.3IONS-SCNC: 6 MMOL/L (ref 3–16)
AST SERPL-CCNC: 146 U/L (ref 15–37)
BILIRUB SERPL-MCNC: 3.8 MG/DL (ref 0–1)
BUN BLDV-MCNC: 18 MG/DL (ref 7–20)
CALCIUM SERPL-MCNC: 8.4 MG/DL (ref 8.3–10.6)
CHLORIDE BLD-SCNC: 103 MMOL/L (ref 99–110)
CO2: 27 MMOL/L (ref 21–32)
CREAT SERPL-MCNC: 1.2 MG/DL (ref 0.8–1.3)
ESTIMATED AVERAGE GLUCOSE: 131.2 MG/DL
GFR SERPL CREATININE-BSD FRML MDRD: 59 ML/MIN/{1.73_M2}
GLUCOSE BLD-MCNC: 126 MG/DL (ref 70–99)
GLUCOSE BLD-MCNC: 142 MG/DL (ref 70–99)
GLUCOSE BLD-MCNC: 144 MG/DL (ref 70–99)
GLUCOSE BLD-MCNC: 152 MG/DL (ref 70–99)
GLUCOSE BLD-MCNC: 184 MG/DL (ref 70–99)
HAV IGM SER IA-ACNC: NORMAL
HBA1C MFR BLD: 6.2 %
HCT VFR BLD CALC: 29.1 % (ref 40.5–52.5)
HEMOGLOBIN: 9.7 G/DL (ref 13.5–17.5)
HEPATITIS B CORE IGM ANTIBODY: NORMAL
HEPATITIS B SURFACE ANTIGEN INTERPRETATION: NORMAL
HEPATITIS C ANTIBODY INTERPRETATION: NORMAL
MCH RBC QN AUTO: 31.4 PG (ref 26–34)
MCHC RBC AUTO-ENTMCNC: 33.1 G/DL (ref 31–36)
MCV RBC AUTO: 94.9 FL (ref 80–100)
PDW BLD-RTO: 15.8 % (ref 12.4–15.4)
PERFORMED ON: ABNORMAL
PLATELET # BLD: 118 K/UL (ref 135–450)
PMV BLD AUTO: 9.6 FL (ref 5–10.5)
POTASSIUM SERPL-SCNC: 3.2 MMOL/L (ref 3.5–5.1)
RBC # BLD: 3.07 M/UL (ref 4.2–5.9)
SODIUM BLD-SCNC: 136 MMOL/L (ref 136–145)
TOTAL PROTEIN: 5.4 G/DL (ref 6.4–8.2)
WBC # BLD: 5.5 K/UL (ref 4–11)

## 2023-02-18 PROCEDURE — 94640 AIRWAY INHALATION TREATMENT: CPT

## 2023-02-18 PROCEDURE — 85027 COMPLETE CBC AUTOMATED: CPT

## 2023-02-18 PROCEDURE — 6370000000 HC RX 637 (ALT 250 FOR IP): Performed by: INTERNAL MEDICINE

## 2023-02-18 PROCEDURE — 1200000000 HC SEMI PRIVATE

## 2023-02-18 PROCEDURE — 83036 HEMOGLOBIN GLYCOSYLATED A1C: CPT

## 2023-02-18 PROCEDURE — 6360000002 HC RX W HCPCS: Performed by: INTERNAL MEDICINE

## 2023-02-18 PROCEDURE — 80053 COMPREHEN METABOLIC PANEL: CPT

## 2023-02-18 PROCEDURE — 2580000003 HC RX 258: Performed by: INTERNAL MEDICINE

## 2023-02-18 PROCEDURE — 99222 1ST HOSP IP/OBS MODERATE 55: CPT | Performed by: SURGERY

## 2023-02-18 PROCEDURE — 94761 N-INVAS EAR/PLS OXIMETRY MLT: CPT

## 2023-02-18 RX ORDER — INSULIN LISPRO 100 [IU]/ML
0-8 INJECTION, SOLUTION INTRAVENOUS; SUBCUTANEOUS
Status: DISCONTINUED | OUTPATIENT
Start: 2023-02-18 | End: 2023-02-22 | Stop reason: HOSPADM

## 2023-02-18 RX ORDER — POTASSIUM CHLORIDE 7.45 MG/ML
10 INJECTION INTRAVENOUS PRN
Status: DISCONTINUED | OUTPATIENT
Start: 2023-02-18 | End: 2023-02-22 | Stop reason: HOSPADM

## 2023-02-18 RX ORDER — DEXTROSE MONOHYDRATE 100 MG/ML
INJECTION, SOLUTION INTRAVENOUS CONTINUOUS PRN
Status: DISCONTINUED | OUTPATIENT
Start: 2023-02-18 | End: 2023-02-22 | Stop reason: HOSPADM

## 2023-02-18 RX ORDER — POTASSIUM CHLORIDE 20 MEQ/1
40 TABLET, EXTENDED RELEASE ORAL PRN
Status: DISCONTINUED | OUTPATIENT
Start: 2023-02-18 | End: 2023-02-22 | Stop reason: HOSPADM

## 2023-02-18 RX ORDER — INSULIN LISPRO 100 [IU]/ML
0-4 INJECTION, SOLUTION INTRAVENOUS; SUBCUTANEOUS NIGHTLY
Status: DISCONTINUED | OUTPATIENT
Start: 2023-02-18 | End: 2023-02-22 | Stop reason: HOSPADM

## 2023-02-18 RX ADMIN — ASPIRIN 81 MG: 81 TABLET, COATED ORAL at 09:44

## 2023-02-18 RX ADMIN — ESCITALOPRAM 20 MG: 10 TABLET, FILM COATED ORAL at 09:43

## 2023-02-18 RX ADMIN — HYDRALAZINE HYDROCHLORIDE 50 MG: 25 TABLET, FILM COATED ORAL at 09:44

## 2023-02-18 RX ADMIN — HYDRALAZINE HYDROCHLORIDE 50 MG: 25 TABLET, FILM COATED ORAL at 20:36

## 2023-02-18 RX ADMIN — ENOXAPARIN SODIUM 40 MG: 100 INJECTION SUBCUTANEOUS at 09:43

## 2023-02-18 RX ADMIN — CETIRIZINE HYDROCHLORIDE 5 MG: 10 TABLET, FILM COATED ORAL at 09:44

## 2023-02-18 RX ADMIN — AMLODIPINE BESYLATE 10 MG: 5 TABLET ORAL at 09:44

## 2023-02-18 RX ADMIN — HYDRALAZINE HYDROCHLORIDE 50 MG: 25 TABLET, FILM COATED ORAL at 15:17

## 2023-02-18 RX ADMIN — POLYETHYLENE GLYCOL 3350 17 G: 17 POWDER, FOR SOLUTION ORAL at 09:43

## 2023-02-18 RX ADMIN — DEXTROSE AND SODIUM CHLORIDE: 5; 450 INJECTION, SOLUTION INTRAVENOUS at 05:48

## 2023-02-18 RX ADMIN — MONTELUKAST SODIUM 10 MG: 10 TABLET, FILM COATED ORAL at 20:36

## 2023-02-18 RX ADMIN — ATORVASTATIN CALCIUM 40 MG: 40 TABLET, FILM COATED ORAL at 09:44

## 2023-02-18 RX ADMIN — PIPERACILLIN AND TAZOBACTAM 3375 MG: 3; .375 INJECTION, POWDER, FOR SOLUTION INTRAVENOUS at 11:46

## 2023-02-18 RX ADMIN — TAMSULOSIN HYDROCHLORIDE 0.4 MG: 0.4 CAPSULE ORAL at 09:44

## 2023-02-18 RX ADMIN — LEVOTHYROXINE SODIUM 125 MCG: 0.12 TABLET ORAL at 09:52

## 2023-02-18 RX ADMIN — MONTELUKAST SODIUM 10 MG: 10 TABLET, FILM COATED ORAL at 01:45

## 2023-02-18 RX ADMIN — PIPERACILLIN AND TAZOBACTAM 3375 MG: 3; .375 INJECTION, POWDER, FOR SOLUTION INTRAVENOUS at 20:36

## 2023-02-18 RX ADMIN — PIPERACILLIN AND TAZOBACTAM 3375 MG: 3; .375 INJECTION, POWDER, FOR SOLUTION INTRAVENOUS at 07:13

## 2023-02-18 RX ADMIN — PIPERACILLIN AND TAZOBACTAM 3375 MG: 3; .375 INJECTION, POWDER, FOR SOLUTION INTRAVENOUS at 01:55

## 2023-02-18 RX ADMIN — PANTOPRAZOLE SODIUM 40 MG: 40 TABLET, DELAYED RELEASE ORAL at 09:44

## 2023-02-18 RX ADMIN — Medication 2 PUFF: at 07:47

## 2023-02-18 RX ADMIN — OXYBUTYNIN CHLORIDE 10 MG: 5 TABLET, EXTENDED RELEASE ORAL at 09:52

## 2023-02-18 RX ADMIN — HYDRALAZINE HYDROCHLORIDE 50 MG: 25 TABLET, FILM COATED ORAL at 01:45

## 2023-02-18 RX ADMIN — FLUTICASONE PROPIONATE 1 SPRAY: 50 SPRAY, METERED NASAL at 09:43

## 2023-02-18 RX ADMIN — Medication 2 PUFF: at 20:57

## 2023-02-18 NOTE — PROGRESS NOTES
Gastroenterology Progress Note            Pablo Rocha is a 81 y.o. male patient. 1. Acute biliary pancreatitis without infection or necrosis    2. Biliary calculus of other site with obstruction    3. Pneumonia of left lower lobe due to infectious organism        SUBJECTIVE:  Feels better. No pain. No nausea. Physical    VITALS:  BP (!) 154/69   Pulse 53   Temp 98.2 °F (36.8 °C) (Oral)   Resp 16   Wt 210 lb 3 oz (95.3 kg)   SpO2 96%   BMI 28.51 kg/m²   TEMPERATURE:  Current - Temp: 98.2 °F (36.8 °C); Max - Temp  Av.7 °F (37.1 °C)  Min: 97.5 °F (36.4 °C)  Max: 99.7 °F (37.6 °C)    Abdomen soft, ND, NT, no HSM, Bowel sounds normal     Data      Recent Labs     23  0945 23  0551   WBC 10.2 5.5   HGB 12.1* 9.7*   HCT 36.7* 29.1*   MCV 96.0 94.9    118*     Recent Labs     23  0945 23  0551   * 136   K 3.8 3.2*   CL 97* 103   CO2 25 27   BUN 16 18   CREATININE 1.2 1.2     Recent Labs     23  0945 23  0551   * 146*   * 220*   BILITOT 4.4* 3.8*   ALKPHOS 585* 370*     Recent Labs     23  0945   LIPASE >3,000.0*     MRCP 2023:      FINDINGS:   Image quality significantly degraded by motion artifact. Gallbladder: The gallbladder contains stones and sludge. No significant   gallbladder wall thickening or pericholecystic edema. Bile Ducts: No biliary ductal dilatation or intraductal filling defect. Common bile duct measures 6-7 mm. Pancreas: Peripancreatic edema. No focal fluid collection. Pancreatic duct   is not dilated and there is no pancreas divisum. Other:  No hepatic steatosis. Remaining solid organs are unremarkable. No   ascites or significant lymphadenopathy. Visualized gastrointestinal tract is   unremarkable. Degenerative changes of the spine. Atherosclerotic disease of   the abdominal aorta without aneurysm. Susceptibility artifact from   sternotomy changes. Impression   Acute pancreatitis. Cholelithiasis without other findings to suggest acute   cholecystitis. No biliary ductal dilatation or choledocholithiasis. ASSESSMENT :    Acute gallstone pancreatitis -elevated liver enzymes along with lipase over 3000. His CT evidence of gallstones and mild acute uncomplicated pancreatitis. CT and MRI show no biliary dilation nor visible choledocholithiasis. Feels better today and pain has resolved. Will try clear liquid diet and consider advancing to low fat diet in AM if doing well. Will ask surgery to see re: laparoscopic cholecystectomy. PLAN   :  1) Clear liquid diet  2) IVF  3) SLP eval  4) Surgery consult for lap carolyne.     Trevor Savage, [address] [place] [address] Road  2/18/2023

## 2023-02-18 NOTE — PROGRESS NOTES
Speech Language Pathology  Attempt/Hold    Jose Randall  1935   [id number]     New SLP eval and treat orders received this date. Per discussion with RN, pt is currently being held NPO per GI. Will follow-up and re-attempt when pt is able to participate. Thanks,    Raiza KC  CCC-SLP S.P. [id number]  Speech-Language Pathologist

## 2023-02-18 NOTE — CONSULTS
[facility] GENERAL AND LAPAROSCOPIC SURGERY                       PATIENT NAME: Ofelia Mcbride     ADMISSION DATE: 2/17/2023  9:32 AM      TODAY'S DATE: 2/18/2023    Reason for Consult:  Abd pain    Requesting Physician:  Dr. Obrien Portal:              The patient is a 81 y.o. male who presents with abd pain, prior visit to [facility] and sent out. Has had epigastric pain, moderate, focal, more with pressure. Admitted here yesterday. Feeling better today, NO N/V. No CP or SOB. On 81 mg ASA, no other anticoagulation.     Past Medical History:        Diagnosis Date    CAD (coronary artery disease)     Cerebral artery occlusion with cerebral infarction ([facility] [address]     major strokes and TIA's       Past Surgical History:        Procedure Laterality Date    CARDIAC SURGERY      2008open heart surgery    CORONARY ARTERY BYPASS GRAFT         Current Medications:   Current Facility-Administered Medications: dextrose bolus 10% 125 mL, 125 mL, IntraVENous, PRN **OR** dextrose bolus 10% 250 mL, 250 mL, IntraVENous, PRN  glucagon (rDNA) injection 1 mg, 1 mg, SubCUTAneous, PRN  dextrose 10 % infusion, , IntraVENous, Continuous PRN  insulin lispro (HUMALOG) injection vial 0-8 Units, 0-8 Units, SubCUTAneous, TID WC  insulin lispro (HUMALOG) injection vial 0-4 Units, 0-4 Units, SubCUTAneous, Nightly  lactated ringers IV soln infusion, , IntraVENous, Continuous  mometasone-formoterol (DULERA) 200-5 MCG/ACT inhaler 2 puff, 2 puff, Inhalation, BID  albuterol sulfate HFA (PROVENTIL;VENTOLIN;PROAIR) 108 (90 Base) MCG/ACT inhaler 2 puff, 2 puff, Inhalation, Q6H PRN  amLODIPine (NORVASC) tablet 10 mg, 10 mg, Oral, Daily  aspirin EC tablet 81 mg, 81 mg, Oral, Daily  atorvastatin (LIPITOR) tablet 40 mg, 40 mg, Oral, Daily  escitalopram (LEXAPRO) tablet 20 mg, 20 mg, Oral, Daily  fluticasone (FLONASE) 50 MCG/ACT nasal spray 1 spray, 1 spray, Each Nostril, Daily  hydrALAZINE (APRESOLINE) tablet 50 mg, 50 mg, Oral, TID  lactulose (CHRONULAC) 10 GM/15ML solution 20 g, 20 g, Oral, TID PRN  levothyroxine (SYNTHROID) tablet 125 mcg, 125 mcg, Oral, Daily  cetirizine (ZYRTEC) tablet 5 mg, 5 mg, Oral, Daily  montelukast (SINGULAIR) tablet 10 mg, 10 mg, Oral, Nightly  oxybutynin (DITROPAN-XL) extended release tablet 10 mg, 10 mg, Oral, Daily  pantoprazole (PROTONIX) tablet 40 mg, 40 mg, Oral, Daily  tamsulosin (FLOMAX) capsule 0.4 mg, 0.4 mg, Oral, Daily  dextrose 5 % and 0.45 % sodium chloride infusion, , IntraVENous, Continuous  piperacillin-tazobactam (ZOSYN) 3,375 mg in sodium chloride 0.9 % 50 mL IVPB (mini-bag), 3,375 mg, IntraVENous, q8h  enoxaparin (LOVENOX) injection 40 mg, 40 mg, SubCUTAneous, Daily  HYDROmorphone HCl PF (DILAUDID) injection 0.5 mg, 0.5 mg, IntraVENous, Q4H PRN  ondansetron (ZOFRAN) injection 4 mg, 4 mg, IntraVENous, Q6H PRN  ipratropium-albuterol (DUONEB) nebulizer solution 1 ampule, 1 ampule, Inhalation, Q4H PRN  polyethylene glycol (GLYCOLAX) packet 17 g, 17 g, Oral, Daily  Prior to Admission medications    Medication Sig Start Date End Date Taking?  Authorizing Provider   atorvastatin (LIPITOR) 40 MG tablet Take 1 tablet by mouth daily 1/18/23   Johny Obrien MD   lactulose [facility]) 10 GM/15ML solution Take 20 g by mouth 3 times daily as needed  Patient not taking: Reported on 2/17/2023 1/9/23   Historical Provider, MD   polyethylene glycol (GLYCOLAX) 17 GM/SCOOP powder Take 17 g by mouth daily  Patient not taking: Reported on 2/17/2023 4/21/22   Historical Provider, MD   amLODIPine (NORVASC) 10 MG tablet Take 10 mg by mouth daily    Historical Provider, MD   hydrALAZINE (APRESOLINE) 50 MG tablet Take 50 mg by mouth 3 times daily    Historical Provider, MD   ADVAIR DISKUS 250-50 MCG/ACT AEPB diskus inhaler Inhale 2 puffs into the lungs in the morning and at bedtime 9/14/22   Historical Provider, MD   fluticasone (FLONASE) 50 MCG/ACT nasal spray 1 spray by Each Nostril route daily    Historical Provider, MD   loratadine (CLARITIN) 10 MG tablet Take 10 mg by mouth daily    Historical Provider, MD   oxybutynin (DITROPAN-XL) 10 MG extended release tablet Take 10 mg by mouth in the morning. Historical Provider, MD   furosemide (LASIX) 20 MG tablet Take 20 mg by mouth in the morning and 20 mg before bedtime. 8/16/22  Historical Provider, MD   montelukast (SINGULAIR) 10 MG tablet Take 10 mg by mouth nightly    Historical Provider, MD   pantoprazole (PROTONIX) 40 MG tablet Take 40 mg by mouth daily    Historical Provider, MD   tamsulosin (FLOMAX) 0.4 MG capsule Take 0.4 mg by mouth in the morning. Historical Provider, MD   albuterol (PROVENTIL HFA) 108 (90 BASE) MCG/ACT inhaler Inhale 2 puffs into the lungs every 6 hours as needed for Wheezing. 10/21/12   Isa Guajardo MD   escitalopram (LEXAPRO) 20 MG tablet Take 20 mg by mouth in the morning. Historical Provider, MD   levothyroxine (SYNTHROID) 125 MCG tablet Take 125 mcg by mouth in the morning. Asmita Zhou Historical Provider, MD   aspirin 81 MG tablet Take 81 mg by mouth daily. Historical Provider, MD        Allergies:  Patient has no known allergies. Social History:    reports that he has never smoked. He has never used smokeless tobacco. He reports that he does not drink alcohol and does not use drugs.     Family History:        Problem Relation Age of Onset    Heart Disease Mother     Cancer Father         black lung    Cancer Brother         throat       REVIEW OF SYSTEMS:  CONSTITUTIONAL:  negative  HEENT:  negative  RESPIRATORY:  negative  CARDIOVASCULAR:  negative  GASTROINTESTINAL:  negative except for abdominal pain  GENITOURINARY:  negative  HEMATOLOGIC/LYMPHATIC:  negative  NEUROLOGICAL:  negative  SKIN: negative    PHYSICAL EXAM:  VITALS:  /64   Pulse 55   Temp 98.3 °F (36.8 °C) (Oral)   Resp 16   Wt 210 lb 3 oz (95.3 kg)   SpO2 95%   BMI 28.51 kg/m²   24HR INTAKE/OUTPUT:    Intake/Output Summary (Last 24 hours) at 2/18/2023 0815  Last data filed at 2/17/2023 1209  Gross per 24 hour   Intake 1050 ml   Output --   Net 1050 ml     DRAIN/TUBE OUTPUT:     CONSTITUTIONAL:  alert, no apparent distress and normal weight  EYES:  sclera clear  ENT:  normocepalic, without obvious abnormality  NECK:  supple, symmetrical, trachea midline and no carotid bruits  LUNGS:  clear to auscultation  CARDIOVASCULAR:  regular rate and rhythm and no murmur noted  ABDOMEN:  no scars, normal bowel sounds, soft, non-distended, tenderness noted in the epigastric region, voluntary guarding absent, no masses palpated, no hepatosplenomegally and hernia absent  MUSCULOSKELETAL:  0+ pitting edema lower extremities  NEUROLOGIC:  Mental Status Exam:  Level of Alertness:   awake  Orientation:   person, place, time  SKIN:  no bruising or bleeding, normal skin color, texture, turgor, and no redness, warmth, or swelling    DATA:    CBC:   Recent Labs     02/17/23  0945 02/18/23  0551   WBC 10.2 5.5   HGB 12.1* 9.7*   HCT 36.7* 29.1*    118*     BMP:    Recent Labs     02/17/23  0945 02/18/23  0551   * 136   K 3.8 3.2*   CL 97* 103   CO2 25 27   BUN 16 18   CREATININE 1.2 1.2   GLUCOSE 211* 152*     Hepatic:   Recent Labs     02/17/23  0945 02/18/23  0551   * 146*   * 220*   BILITOT 4.4* 3.8*   ALKPHOS 585* 370*     Mag:    No results for input(s): MG in the last 72 hours. Phos:   No results for input(s): PHOS in the last 72 hours. INR: No results for input(s): INR in the last 72 hours. LIPASE:   Recent Labs     02/17/23  0945   LIPASE >3,000.0*      AMYLASE: No results for input(s): AMYLASE in the last 72 hours.      Radiology Review:      CT ABDOMEN PELVIS WO CONTRAST Additional Contrast? None    Result Date: 2/17/2023  EXAMINATION: CT OF THE ABDOMEN AND PELVIS WITHOUT CONTRAST 2/17/2023 10:05 am TECHNIQUE: CT of the abdomen and pelvis was performed without the administration of intravenous contrast. Multiplanar reformatted images are provided for review. Automated exposure control, iterative reconstruction, and/or weight based adjustment of the mA/kV was utilized to reduce the radiation dose to as low as reasonably achievable. COMPARISON: None. HISTORY: ORDERING SYSTEM PROVIDED HISTORY: nausea and vomiting TECHNOLOGIST PROVIDED HISTORY: Reason for exam:->nausea and vomiting Additional Contrast?->None Decision Support Exception - unselect if not a suspected or confirmed emergency medical condition->Emergency Medical Condition (MA) Reason for Exam: nausea and vomiting FINDINGS: Lower Chest: There is segmental consolidation within the left posterior costophrenic angle along with a trace left parapneumonic effusion. Organs: Multiple calcified gallstones are present within the gallbladder. There are mild inflammatory changes within and surrounding the entirety of the pancreas. The remainder of the solid abdominal organs are unremarkable. GI/Bowel: There is no bowel dilatation, wall thickening or obstruction. The appendix is normal. Pelvis: The bladder and prostate are unremarkable. Peritoneum/Retroperitoneum: There is no free air, free fluid or intraperitoneal inflammatory change. There is no adenopathy. Bones/Soft Tissues: There is no acute fracture or aggressive osseous lesion. 1. Left basilar segmental atelectasis versus pneumonia with small parapneumonic effusion. 2. Mild acute uncomplicated pancreatitis. 3. Cholelithiasis. MRI ABDOMEN WO CONTRAST MRCP    Result Date: 2/17/2023  EXAMINATION: MRI OF THE ABDOMEN WITHOUT CONTRAST AND MRCP 2/17/2023 6:24 pm TECHNIQUE: Multiplanar multisequence MRI of the abdomen was performed without the administration of intravenous contrast.  After initial T2 axial and coronal images, thick slab, thin slab and 3D coronal MRCP sequences were obtained without the administration of intravenous contrast.  MIP images are provided for review.  COMPARISON: CT earlier today HISTORY: Acute pancreatitis, elevated LFTs. FINDINGS: Image quality significantly degraded by motion artifact. Gallbladder: The gallbladder contains stones and sludge. No significant gallbladder wall thickening or pericholecystic edema. Bile Ducts: No biliary ductal dilatation or intraductal filling defect. Common bile duct measures 6-7 mm. Pancreas: Peripancreatic edema. No focal fluid collection. Pancreatic duct is not dilated and there is no pancreas divisum. Other:  No hepatic steatosis. Remaining solid organs are unremarkable. No ascites or significant lymphadenopathy. Visualized gastrointestinal tract is unremarkable. Degenerative changes of the spine. Atherosclerotic disease of the abdominal aorta without aneurysm. Susceptibility artifact from sternotomy changes. Acute pancreatitis. Cholelithiasis without other findings to suggest acute cholecystitis. No biliary ductal dilatation or choledocholithiasis.        IMPRESSION/RECOMMENDATIONS:    Gallstone pancreatitis  Cholelithiasis  CBD OK per MRCP    Allow pancreas to improve and LFT to settle  Laurent labs in am  Plan , [address] 2/20 - Monday    DW pt and daughter, all Q answered, agree with the plan      Thank you,    Iona Beltran MD

## 2023-02-18 NOTE — PROGRESS NOTES
Shift assessment completed. Neuro WNL. Daughter at bedside. Denies any pain at this time. AM meds administered per MAR. The care plan and education has been reviewed and mutually agreed upon with the patient. Standard safety measures in place.

## 2023-02-18 NOTE — PLAN OF CARE
Problem: Neurosensory - Adult  Goal: Achieves stable or improved neurological status  Outcome: Progressing     Problem: Musculoskeletal - Adult  Goal: Return mobility to safest level of function  Outcome: Progressing  Goal: Maintain proper alignment of affected body part  Outcome: Progressing     Problem: Gastrointestinal - Adult  Goal: Minimal or absence of nausea and vomiting  Outcome: Progressing  Flowsheets (Taken 2/18/2023 0930)  Minimal or absence of nausea and vomiting: Administer IV fluids as ordered to ensure adequate hydration  Goal: Maintains or returns to baseline bowel function  Outcome: Progressing     Problem: Genitourinary - Adult  Goal: Absence of urinary retention  Outcome: Progressing  Flowsheets (Taken 2/18/2023 0930)  Absence of urinary retention: Monitor intake/output and perform bladder scan as needed     Problem: Metabolic/Fluid and Electrolytes - Adult  Goal: Electrolytes maintained within normal limits  Outcome: Progressing  Goal: Hemodynamic stability and optimal renal function maintained  Outcome: Progressing  Goal: Glucose maintained within prescribed range  Outcome: Progressing

## 2023-02-18 NOTE — PROGRESS NOTES
Hospital Problems             Last Modified POA    * (Principal) Acute biliary pancreatitis without infection or necrosis 2/17/2023 Yes    Dementia ([facility] [address] 2/18/2023 Yes    Cholelithiasis 2/18/2023 Yes    Late effects of cerebral ischemic stroke 2/18/2023 Yes    Controlled type 2 diabetes mellitus, with long-term current use of insulin [facility] 2/18/2023 Yes    Primary hypertension 2/18/2023 Yes   H&P dictated

## 2023-02-19 ENCOUNTER — ANESTHESIA EVENT (OUTPATIENT)
Dept: OPERATING ROOM | Age: 88
DRG: 418 | End: 2023-02-19
Payer: MEDICARE

## 2023-02-19 LAB
A/G RATIO: 0.8 (ref 1.1–2.2)
ALBUMIN SERPL-MCNC: 2.5 G/DL (ref 3.4–5)
ALP BLD-CCNC: 342 U/L (ref 40–129)
ALT SERPL-CCNC: 151 U/L (ref 10–40)
ANION GAP SERPL CALCULATED.3IONS-SCNC: 11 MMOL/L (ref 3–16)
AST SERPL-CCNC: 70 U/L (ref 15–37)
BILIRUB SERPL-MCNC: 1.5 MG/DL (ref 0–1)
BUN BLDV-MCNC: 16 MG/DL (ref 7–20)
CALCIUM SERPL-MCNC: 8.6 MG/DL (ref 8.3–10.6)
CHLORIDE BLD-SCNC: 105 MMOL/L (ref 99–110)
CO2: 22 MMOL/L (ref 21–32)
CREAT SERPL-MCNC: 1.2 MG/DL (ref 0.8–1.3)
GFR SERPL CREATININE-BSD FRML MDRD: 59 ML/MIN/{1.73_M2}
GLUCOSE BLD-MCNC: 150 MG/DL (ref 70–99)
GLUCOSE BLD-MCNC: 161 MG/DL (ref 70–99)
GLUCOSE BLD-MCNC: 213 MG/DL (ref 70–99)
GLUCOSE BLD-MCNC: 216 MG/DL (ref 70–99)
GLUCOSE BLD-MCNC: 247 MG/DL (ref 70–99)
HCT VFR BLD CALC: 30.7 % (ref 40.5–52.5)
HEMOGLOBIN: 10.1 G/DL (ref 13.5–17.5)
LIPASE: 153 U/L (ref 13–60)
MCH RBC QN AUTO: 31.3 PG (ref 26–34)
MCHC RBC AUTO-ENTMCNC: 32.9 G/DL (ref 31–36)
MCV RBC AUTO: 95.2 FL (ref 80–100)
PDW BLD-RTO: 15.6 % (ref 12.4–15.4)
PERFORMED ON: ABNORMAL
PLATELET # BLD: 124 K/UL (ref 135–450)
PMV BLD AUTO: 9.4 FL (ref 5–10.5)
POTASSIUM REFLEX MAGNESIUM: 3.7 MMOL/L (ref 3.5–5.1)
RBC # BLD: 3.22 M/UL (ref 4.2–5.9)
SODIUM BLD-SCNC: 138 MMOL/L (ref 136–145)
TOTAL PROTEIN: 5.7 G/DL (ref 6.4–8.2)
WBC # BLD: 3.8 K/UL (ref 4–11)

## 2023-02-19 PROCEDURE — 83690 ASSAY OF LIPASE: CPT

## 2023-02-19 PROCEDURE — 94640 AIRWAY INHALATION TREATMENT: CPT

## 2023-02-19 PROCEDURE — 94760 N-INVAS EAR/PLS OXIMETRY 1: CPT

## 2023-02-19 PROCEDURE — 2580000003 HC RX 258: Performed by: INTERNAL MEDICINE

## 2023-02-19 PROCEDURE — 94761 N-INVAS EAR/PLS OXIMETRY MLT: CPT

## 2023-02-19 PROCEDURE — 6370000000 HC RX 637 (ALT 250 FOR IP): Performed by: INTERNAL MEDICINE

## 2023-02-19 PROCEDURE — 85027 COMPLETE CBC AUTOMATED: CPT

## 2023-02-19 PROCEDURE — 99232 SBSQ HOSP IP/OBS MODERATE 35: CPT | Performed by: SURGERY

## 2023-02-19 PROCEDURE — 36415 COLL VENOUS BLD VENIPUNCTURE: CPT

## 2023-02-19 PROCEDURE — 80053 COMPREHEN METABOLIC PANEL: CPT

## 2023-02-19 PROCEDURE — 6360000002 HC RX W HCPCS: Performed by: INTERNAL MEDICINE

## 2023-02-19 PROCEDURE — 1200000000 HC SEMI PRIVATE

## 2023-02-19 RX ADMIN — TAMSULOSIN HYDROCHLORIDE 0.4 MG: 0.4 CAPSULE ORAL at 09:23

## 2023-02-19 RX ADMIN — ESCITALOPRAM 20 MG: 10 TABLET, FILM COATED ORAL at 09:22

## 2023-02-19 RX ADMIN — DEXTROSE AND SODIUM CHLORIDE: 5; 450 INJECTION, SOLUTION INTRAVENOUS at 16:29

## 2023-02-19 RX ADMIN — HYDRALAZINE HYDROCHLORIDE 50 MG: 25 TABLET, FILM COATED ORAL at 09:23

## 2023-02-19 RX ADMIN — CETIRIZINE HYDROCHLORIDE 5 MG: 10 TABLET, FILM COATED ORAL at 09:23

## 2023-02-19 RX ADMIN — INSULIN LISPRO 2 UNITS: 100 INJECTION, SOLUTION INTRAVENOUS; SUBCUTANEOUS at 16:27

## 2023-02-19 RX ADMIN — LEVOTHYROXINE SODIUM 125 MCG: 0.12 TABLET ORAL at 07:41

## 2023-02-19 RX ADMIN — ENOXAPARIN SODIUM 40 MG: 100 INJECTION SUBCUTANEOUS at 09:22

## 2023-02-19 RX ADMIN — Medication 2 PUFF: at 07:58

## 2023-02-19 RX ADMIN — ATORVASTATIN CALCIUM 40 MG: 40 TABLET, FILM COATED ORAL at 09:22

## 2023-02-19 RX ADMIN — Medication 2 PUFF: at 20:41

## 2023-02-19 RX ADMIN — POLYETHYLENE GLYCOL 3350 17 G: 17 POWDER, FOR SOLUTION ORAL at 09:22

## 2023-02-19 RX ADMIN — PIPERACILLIN AND TAZOBACTAM 3375 MG: 3; .375 INJECTION, POWDER, FOR SOLUTION INTRAVENOUS at 06:34

## 2023-02-19 RX ADMIN — HYDRALAZINE HYDROCHLORIDE 50 MG: 25 TABLET, FILM COATED ORAL at 12:12

## 2023-02-19 RX ADMIN — INSULIN LISPRO 2 UNITS: 100 INJECTION, SOLUTION INTRAVENOUS; SUBCUTANEOUS at 12:10

## 2023-02-19 RX ADMIN — OXYBUTYNIN CHLORIDE 10 MG: 5 TABLET, EXTENDED RELEASE ORAL at 09:36

## 2023-02-19 RX ADMIN — AMLODIPINE BESYLATE 10 MG: 5 TABLET ORAL at 09:22

## 2023-02-19 RX ADMIN — MONTELUKAST SODIUM 10 MG: 10 TABLET, FILM COATED ORAL at 20:55

## 2023-02-19 RX ADMIN — PANTOPRAZOLE SODIUM 40 MG: 40 TABLET, DELAYED RELEASE ORAL at 09:22

## 2023-02-19 RX ADMIN — PIPERACILLIN AND TAZOBACTAM 3375 MG: 3; .375 INJECTION, POWDER, FOR SOLUTION INTRAVENOUS at 20:58

## 2023-02-19 RX ADMIN — FLUTICASONE PROPIONATE 1 SPRAY: 50 SPRAY, METERED NASAL at 09:23

## 2023-02-19 RX ADMIN — PIPERACILLIN AND TAZOBACTAM 3375 MG: 3; .375 INJECTION, POWDER, FOR SOLUTION INTRAVENOUS at 12:15

## 2023-02-19 RX ADMIN — ASPIRIN 81 MG: 81 TABLET, COATED ORAL at 09:23

## 2023-02-19 NOTE — PROGRESS NOTES
Speech Language Pathology  ATTEMPT      Julee Hagen  1935 2/19/2023        SLP Eval and Treat orders received. SLP performed chart review and consulted with RN. Patient is currently on clear liquid diet per GI, tolerating liquid consistency with no concerns for oropharyngeal dysphagia. RN reports patient is scheduled for surgery tomorrow and has not been cleared for solid textures yet. Thus, patient not appropriate to participate in BSE at this time. Will hold-off until patient able to consume solids per MD recommendations.        Thanks,  Ramana Leong M.A., Aisha Hanley 2  Speech-Language Pathologist

## 2023-02-19 NOTE — PROGRESS NOTES
Gastroenterology Progress Note            Landon Barnett is a 87 y.o. male patient.  1. Acute biliary pancreatitis without infection or necrosis    2. Biliary calculus of other site with obstruction    3. Pneumonia of left lower lobe due to infectious organism        SUBJECTIVE:  No complaints.  Tolerating liquid diet.    Physical    VITALS:  /65   Pulse 61   Temp 97.8 °F (36.6 °C) (Oral)   Resp 16   Ht 6' 2\" (1.88 m)   Wt 210 lb 3 oz (95.3 kg)   SpO2 96%   BMI 26.99 kg/m²   TEMPERATURE:  Current - Temp: 97.8 °F (36.6 °C); Max - Temp  Av.9 °F (36.6 °C)  Min: 97.7 °F (36.5 °C)  Max: 98.2 °F (36.8 °C)    Abdomen soft, ND, NT, no HSM, Bowel sounds normal     Data      Recent Labs     23  0945 23  0551 23  0506   WBC 10.2 5.5 3.8*   HGB 12.1* 9.7* 10.1*   HCT 36.7* 29.1* 30.7*   MCV 96.0 94.9 95.2    118* 124*     Recent Labs     23  0945 23  0551 23  0506   * 136 138   K 3.8 3.2* 3.7   CL 97* 103 105   CO2 25 27 22   BUN 16 18 16   CREATININE 1.2 1.2 1.2     Recent Labs     23  0945 23  0551 23  0506   * 146* 70*   * 220* 151*   BILITOT 4.4* 3.8* 1.5*   ALKPHOS 585* 370* 342*     Recent Labs     23  0945 23  0506   LIPASE >3,000.0* 153.0*         ASSESSMENT :     Acute gallstone pancreatitis -elevated liver enzymes along with lipase over 3000.  His CT evidence of gallstones and mild acute uncomplicated pancreatitis.  CT and MRI show no biliary dilation nor visible choledocholithiasis.  Feels fine today and pain has resolved. Liver enzymes continue to improve. Tolerating clear liquid diet. OK to advance advance to low fat diet from GI standpoint.  Surgery planning laparoscopic cholecystectomy.        PLAN   :  1) Clear liquid diet -?Advance if ok with Surgery.  2) IVF  3) Surgery plan for lap carolyne.    Will follow from a distance.  Please call with questions.  We are available if IOC (+)  requiring ERCP.     [facility], [address] [place] [address] Road  2/19/2023

## 2023-02-19 NOTE — PROGRESS NOTES
[facility]        PATIENT NAME: Amador Garcia     TODAY'S DATE: 2/19/2023    CC: abd pain    SUBJECTIVE:    Pt feels better, no pain today, no assoc N/V. No CP or SOB. No pain with pressure.      Current Medications:   Current Facility-Administered Medications: dextrose bolus 10% 125 mL, 125 mL, IntraVENous, PRN **OR** dextrose bolus 10% 250 mL, 250 mL, IntraVENous, PRN  glucagon (rDNA) injection 1 mg, 1 mg, SubCUTAneous, PRN  dextrose 10 % infusion, , IntraVENous, Continuous PRN  insulin lispro (HUMALOG) injection vial 0-8 Units, 0-8 Units, SubCUTAneous, TID WC  insulin lispro (HUMALOG) injection vial 0-4 Units, 0-4 Units, SubCUTAneous, Nightly  potassium chloride (KLOR-CON M) extended release tablet 40 mEq, 40 mEq, Oral, PRN **OR** potassium bicarb-citric acid (EFFER-K) effervescent tablet 40 mEq, 40 mEq, Oral, PRN **OR** potassium chloride 10 mEq/100 mL IVPB (Peripheral Line), 10 mEq, IntraVENous, PRN  lactated ringers IV soln infusion, , IntraVENous, Continuous  mometasone-formoterol (DULERA) 200-5 MCG/ACT inhaler 2 puff, 2 puff, Inhalation, BID  albuterol sulfate HFA (PROVENTIL;VENTOLIN;PROAIR) 108 (90 Base) MCG/ACT inhaler 2 puff, 2 puff, Inhalation, Q6H PRN  amLODIPine (NORVASC) tablet 10 mg, 10 mg, Oral, Daily  aspirin EC tablet 81 mg, 81 mg, Oral, Daily  atorvastatin (LIPITOR) tablet 40 mg, 40 mg, Oral, Daily  escitalopram (LEXAPRO) tablet 20 mg, 20 mg, Oral, Daily  fluticasone (FLONASE) 50 MCG/ACT nasal spray 1 spray, 1 spray, Each Nostril, Daily  hydrALAZINE (APRESOLINE) tablet 50 mg, 50 mg, Oral, TID  lactulose (CHRONULAC) 10 GM/15ML solution 20 g, 20 g, Oral, TID PRN  levothyroxine (SYNTHROID) tablet 125 mcg, 125 mcg, Oral, Daily  cetirizine (ZYRTEC) tablet 5 mg, 5 mg, Oral, Daily  montelukast (SINGULAIR) tablet 10 mg, 10 mg, Oral, Nightly  oxybutynin (DITROPAN-XL) extended release tablet 10 mg, 10 mg, Oral, Daily  pantoprazole (PROTONIX) tablet 40 mg, 40 mg, Oral, Daily  tamsulosin (FLOMAX) capsule 0.4 mg, 0.4 mg, Oral, Daily  dextrose 5 % and 0.45 % sodium chloride infusion, , IntraVENous, Continuous  piperacillin-tazobactam (ZOSYN) 3,375 mg in sodium chloride 0.9 % 50 mL IVPB (mini-bag), 3,375 mg, IntraVENous, q8h  enoxaparin (LOVENOX) injection 40 mg, 40 mg, SubCUTAneous, Daily  HYDROmorphone HCl PF (DILAUDID) injection 0.5 mg, 0.5 mg, IntraVENous, Q4H PRN  ondansetron (ZOFRAN) injection 4 mg, 4 mg, IntraVENous, Q6H PRN  ipratropium-albuterol (DUONEB) nebulizer solution 1 ampule, 1 ampule, Inhalation, Q4H PRN  polyethylene glycol (GLYCOLAX) packet 17 g, 17 g, Oral, Daily  Prior to Admission medications    Medication Sig Start Date End Date Taking? Authorizing Provider   atorvastatin (LIPITOR) 40 MG tablet Take 1 tablet by mouth daily 1/18/23   Lazarus Powers, MD   lactulose [facility]) 10 GM/15ML solution Take 20 g by mouth 3 times daily as needed  Patient not taking: Reported on 2/17/2023 1/9/23   Historical Provider, MD   polyethylene glycol (GLYCOLAX) 17 GM/SCOOP powder Take 17 g by mouth daily  Patient not taking: Reported on 2/17/2023 4/21/22   Historical Provider, MD   amLODIPine (NORVASC) 10 MG tablet Take 10 mg by mouth daily    Historical Provider, MD   hydrALAZINE (APRESOLINE) 50 MG tablet Take 50 mg by mouth 3 times daily    Historical Provider, MD   ADVAIR DISKUS 250-50 MCG/ACT AEPB diskus inhaler Inhale 2 puffs into the lungs in the morning and at bedtime 9/14/22   Historical Provider, MD   fluticasone (FLONASE) 50 MCG/ACT nasal spray 1 spray by Each Nostril route daily    Historical Provider, MD   loratadine (CLARITIN) 10 MG tablet Take 10 mg by mouth daily    Historical Provider, MD   oxybutynin (DITROPAN-XL) 10 MG extended release tablet Take 10 mg by mouth in the morning. Historical Provider, MD   furosemide (LASIX) 20 MG tablet Take 20 mg by mouth in the morning and 20 mg before bedtime.   8/16/22  Historical Provider, MD   montelukast (SINGULAIR) 10 MG tablet Take 10 mg by mouth nightly    Historical Provider, MD   pantoprazole (PROTONIX) 40 MG tablet Take 40 mg by mouth daily    Historical Provider, MD   tamsulosin (FLOMAX) 0.4 MG capsule Take 0.4 mg by mouth in the morning. Historical Provider, MD   albuterol (PROVENTIL HFA) 108 (90 BASE) MCG/ACT inhaler Inhale 2 puffs into the lungs every 6 hours as needed for Wheezing. 10/21/12   Aston Mustafa MD   escitalopram (LEXAPRO) 20 MG tablet Take 20 mg by mouth in the morning. Historical Provider, MD   levothyroxine (SYNTHROID) 125 MCG tablet Take 125 mcg by mouth in the morning. Cindy Adams Historical Provider, MD   aspirin 81 MG tablet Take 81 mg by mouth daily. Historical Provider, MD        Allergies:  Patient has no known allergies. Social History:    reports that he has never smoked. He has never used smokeless tobacco. He reports that he does not drink alcohol and does not use drugs. Family History:        Problem Relation Age of Onset    Heart Disease Mother     Cancer Father         black lung    Cancer Brother         throat       REVIEW OF SYSTEMS:  CONSTITUTIONAL:  negative  HEENT:  negative  RESPIRATORY:  negative  CARDIOVASCULAR:  negative  GASTROINTESTINAL:  negative except for abdominal pain  GENITOURINARY:  negative  HEMATOLOGIC/LYMPHATIC:  negative  NEUROLOGICAL:  negative  SKIN: negative      OBJECTIVE:  VITALS:  /65   Pulse 54   Temp 98.1 °F (36.7 °C) (Axillary)   Resp 16   Ht 6' 2\" (1.88 m)   Wt 210 lb 3 oz (95.3 kg)   SpO2 96%   BMI 26.99 kg/m²     INTAKE/OUTPUT:    I/O last 3 completed shifts: In: 2050.3 [P.O.:900; I.V.:1000.3; IV Piggyback:150]  Out: 1000 [Urine:1000]  No intake/output data recorded.     CONSTITUTIONAL:  awake and alert  LUNGS:  clear to auscultation  HEART: RRR  ABDOMEN:  normal bowel sounds, soft, non-distended, non-tender         Data:  CBC:   Recent Labs     02/17/23  0945 02/18/23  0551 02/19/23  0506   WBC 10.2 5.5 3.8*   HGB 12.1* 9.7* 10.1*   HCT 36.7* 29.1* 30.7*    118* 124*     BMP:    Recent Labs     02/17/23  0945 02/18/23  0551 02/19/23  0506   * 136 138   K 3.8 3.2* 3.7   CL 97* 103 105   CO2 25 27 22   BUN 16 18 16   CREATININE 1.2 1.2 1.2   GLUCOSE 211* 152* 150*     Hepatic:   Recent Labs     02/17/23  0945 02/18/23  0551 02/19/23  0506   * 146* 70*   * 220* 151*   BILITOT 4.4* 3.8* 1.5*   ALKPHOS 585* 370* 342*     Mag:    No results for input(s): MG in the last 72 hours. Phos:   No results for input(s): PHOS in the last 72 hours. INR: No results for input(s): INR in the last 72 hours.     Radiology Review:  No new studies      ASSESSMENT AND PLAN:  Acute gallstone pancreatitis  Doing well, exam and labs are better  Clears  NPO after MN  Lap carolyne tomorrow    Lia Coon MD

## 2023-02-20 ENCOUNTER — APPOINTMENT (OUTPATIENT)
Dept: GENERAL RADIOLOGY | Age: 88
DRG: 418 | End: 2023-02-20
Payer: MEDICARE

## 2023-02-20 ENCOUNTER — ANESTHESIA (OUTPATIENT)
Dept: OPERATING ROOM | Age: 88
DRG: 418 | End: 2023-02-20
Payer: MEDICARE

## 2023-02-20 LAB
A/G RATIO: 0.9 (ref 1.1–2.2)
ALBUMIN SERPL-MCNC: 2.9 G/DL (ref 3.4–5)
ALP BLD-CCNC: 319 U/L (ref 40–129)
ALT SERPL-CCNC: 118 U/L (ref 10–40)
ANION GAP SERPL CALCULATED.3IONS-SCNC: 12 MMOL/L (ref 3–16)
AST SERPL-CCNC: 38 U/L (ref 15–37)
BILIRUB SERPL-MCNC: 1.1 MG/DL (ref 0–1)
BUN BLDV-MCNC: 13 MG/DL (ref 7–20)
CALCIUM SERPL-MCNC: 8.9 MG/DL (ref 8.3–10.6)
CHLORIDE BLD-SCNC: 104 MMOL/L (ref 99–110)
CO2: 22 MMOL/L (ref 21–32)
CREAT SERPL-MCNC: 1.1 MG/DL (ref 0.8–1.3)
GFR SERPL CREATININE-BSD FRML MDRD: >60 ML/MIN/{1.73_M2}
GLUCOSE BLD-MCNC: 172 MG/DL (ref 70–99)
GLUCOSE BLD-MCNC: 178 MG/DL (ref 70–99)
GLUCOSE BLD-MCNC: 198 MG/DL (ref 70–99)
GLUCOSE BLD-MCNC: 216 MG/DL (ref 70–99)
GLUCOSE BLD-MCNC: 222 MG/DL (ref 70–99)
HCT VFR BLD CALC: 31.2 % (ref 40.5–52.5)
HEMOGLOBIN: 10.4 G/DL (ref 13.5–17.5)
MCH RBC QN AUTO: 31.9 PG (ref 26–34)
MCHC RBC AUTO-ENTMCNC: 33.3 G/DL (ref 31–36)
MCV RBC AUTO: 95.8 FL (ref 80–100)
PDW BLD-RTO: 15.5 % (ref 12.4–15.4)
PERFORMED ON: ABNORMAL
PLATELET # BLD: 153 K/UL (ref 135–450)
PMV BLD AUTO: 10 FL (ref 5–10.5)
POTASSIUM SERPL-SCNC: 3.9 MMOL/L (ref 3.5–5.1)
RBC # BLD: 3.25 M/UL (ref 4.2–5.9)
SODIUM BLD-SCNC: 138 MMOL/L (ref 136–145)
TOTAL PROTEIN: 6.2 G/DL (ref 6.4–8.2)
WBC # BLD: 4 K/UL (ref 4–11)

## 2023-02-20 PROCEDURE — 85027 COMPLETE CBC AUTOMATED: CPT

## 2023-02-20 PROCEDURE — 47563 LAPARO CHOLECYSTECTOMY/GRAPH: CPT | Performed by: SURGERY

## 2023-02-20 PROCEDURE — 6360000002 HC RX W HCPCS: Performed by: INTERNAL MEDICINE

## 2023-02-20 PROCEDURE — 0FT44ZZ RESECTION OF GALLBLADDER, PERCUTANEOUS ENDOSCOPIC APPROACH: ICD-10-PCS | Performed by: SURGERY

## 2023-02-20 PROCEDURE — 2580000003 HC RX 258: Performed by: SURGERY

## 2023-02-20 PROCEDURE — 3600000004 HC SURGERY LEVEL 4 BASE: Performed by: SURGERY

## 2023-02-20 PROCEDURE — 74300 X-RAY BILE DUCTS/PANCREAS: CPT

## 2023-02-20 PROCEDURE — 94640 AIRWAY INHALATION TREATMENT: CPT

## 2023-02-20 PROCEDURE — 3700000000 HC ANESTHESIA ATTENDED CARE: Performed by: SURGERY

## 2023-02-20 PROCEDURE — 94761 N-INVAS EAR/PLS OXIMETRY MLT: CPT

## 2023-02-20 PROCEDURE — 7100000001 HC PACU RECOVERY - ADDTL 15 MIN: Performed by: SURGERY

## 2023-02-20 PROCEDURE — 2580000003 HC RX 258: Performed by: ANESTHESIOLOGY

## 2023-02-20 PROCEDURE — 7100000000 HC PACU RECOVERY - FIRST 15 MIN: Performed by: SURGERY

## 2023-02-20 PROCEDURE — 2709999900 HC NON-CHARGEABLE SUPPLY: Performed by: SURGERY

## 2023-02-20 PROCEDURE — 36415 COLL VENOUS BLD VENIPUNCTURE: CPT

## 2023-02-20 PROCEDURE — 2500000003 HC RX 250 WO HCPCS: Performed by: ANESTHESIOLOGY

## 2023-02-20 PROCEDURE — 6370000000 HC RX 637 (ALT 250 FOR IP): Performed by: SURGERY

## 2023-02-20 PROCEDURE — 2500000003 HC RX 250 WO HCPCS: Performed by: SURGERY

## 2023-02-20 PROCEDURE — 6360000002 HC RX W HCPCS: Performed by: SURGERY

## 2023-02-20 PROCEDURE — 1200000000 HC SEMI PRIVATE

## 2023-02-20 PROCEDURE — 88304 TISSUE EXAM BY PATHOLOGIST: CPT

## 2023-02-20 PROCEDURE — 99232 SBSQ HOSP IP/OBS MODERATE 35: CPT | Performed by: SURGERY

## 2023-02-20 PROCEDURE — 3600000014 HC SURGERY LEVEL 4 ADDTL 15MIN: Performed by: SURGERY

## 2023-02-20 PROCEDURE — 3700000001 HC ADD 15 MINUTES (ANESTHESIA): Performed by: SURGERY

## 2023-02-20 PROCEDURE — 80053 COMPREHEN METABOLIC PANEL: CPT

## 2023-02-20 PROCEDURE — 92610 EVALUATE SWALLOWING FUNCTION: CPT

## 2023-02-20 PROCEDURE — 2720000010 HC SURG SUPPLY STERILE: Performed by: SURGERY

## 2023-02-20 PROCEDURE — 2580000003 HC RX 258: Performed by: INTERNAL MEDICINE

## 2023-02-20 PROCEDURE — 6360000004 HC RX CONTRAST MEDICATION: Performed by: SURGERY

## 2023-02-20 PROCEDURE — 6360000002 HC RX W HCPCS: Performed by: ANESTHESIOLOGY

## 2023-02-20 RX ORDER — SODIUM CHLORIDE 9 MG/ML
INJECTION, SOLUTION INTRAVENOUS PRN
Status: DISCONTINUED | OUTPATIENT
Start: 2023-02-20 | End: 2023-02-22 | Stop reason: HOSPADM

## 2023-02-20 RX ORDER — SODIUM CHLORIDE, SODIUM LACTATE, POTASSIUM CHLORIDE, AND CALCIUM CHLORIDE .6; .31; .03; .02 G/100ML; G/100ML; G/100ML; G/100ML
IRRIGANT IRRIGATION
Status: COMPLETED | OUTPATIENT
Start: 2023-02-20 | End: 2023-02-20

## 2023-02-20 RX ORDER — FENTANYL CITRATE 50 UG/ML
INJECTION, SOLUTION INTRAMUSCULAR; INTRAVENOUS PRN
Status: DISCONTINUED | OUTPATIENT
Start: 2023-02-20 | End: 2023-02-20 | Stop reason: SDUPTHER

## 2023-02-20 RX ORDER — ONDANSETRON 2 MG/ML
4 INJECTION INTRAMUSCULAR; INTRAVENOUS
Status: ACTIVE | OUTPATIENT
Start: 2023-02-20 | End: 2023-02-21

## 2023-02-20 RX ORDER — ACETAMINOPHEN 500 MG
1000 TABLET ORAL EVERY 6 HOURS PRN
Status: DISCONTINUED | OUTPATIENT
Start: 2023-02-20 | End: 2023-02-22

## 2023-02-20 RX ORDER — DEXAMETHASONE SODIUM PHOSPHATE 4 MG/ML
INJECTION, SOLUTION INTRA-ARTICULAR; INTRALESIONAL; INTRAMUSCULAR; INTRAVENOUS; SOFT TISSUE PRN
Status: DISCONTINUED | OUTPATIENT
Start: 2023-02-20 | End: 2023-02-20 | Stop reason: SDUPTHER

## 2023-02-20 RX ORDER — ONDANSETRON 2 MG/ML
INJECTION INTRAMUSCULAR; INTRAVENOUS PRN
Status: DISCONTINUED | OUTPATIENT
Start: 2023-02-20 | End: 2023-02-20 | Stop reason: SDUPTHER

## 2023-02-20 RX ORDER — SODIUM CHLORIDE 0.9 % (FLUSH) 0.9 %
5-40 SYRINGE (ML) INJECTION EVERY 12 HOURS SCHEDULED
Status: DISCONTINUED | OUTPATIENT
Start: 2023-02-20 | End: 2023-02-22 | Stop reason: HOSPADM

## 2023-02-20 RX ORDER — OXYCODONE HYDROCHLORIDE AND ACETAMINOPHEN 5; 325 MG/1; MG/1
1 TABLET ORAL EVERY 4 HOURS PRN
Status: DISCONTINUED | OUTPATIENT
Start: 2023-02-20 | End: 2023-02-22

## 2023-02-20 RX ORDER — DIPHENHYDRAMINE HYDROCHLORIDE 50 MG/ML
12.5 INJECTION INTRAMUSCULAR; INTRAVENOUS
Status: ACTIVE | OUTPATIENT
Start: 2023-02-20 | End: 2023-02-21

## 2023-02-20 RX ORDER — HYDROMORPHONE HCL 110MG/55ML
0.25 PATIENT CONTROLLED ANALGESIA SYRINGE INTRAVENOUS EVERY 5 MIN PRN
Status: DISCONTINUED | OUTPATIENT
Start: 2023-02-20 | End: 2023-02-20 | Stop reason: ALTCHOICE

## 2023-02-20 RX ORDER — HYDROMORPHONE HCL 110MG/55ML
0.5 PATIENT CONTROLLED ANALGESIA SYRINGE INTRAVENOUS EVERY 5 MIN PRN
Status: DISCONTINUED | OUTPATIENT
Start: 2023-02-20 | End: 2023-02-20 | Stop reason: ALTCHOICE

## 2023-02-20 RX ORDER — SODIUM CHLORIDE 0.9 % (FLUSH) 0.9 %
5-40 SYRINGE (ML) INJECTION PRN
Status: DISCONTINUED | OUTPATIENT
Start: 2023-02-20 | End: 2023-02-22 | Stop reason: HOSPADM

## 2023-02-20 RX ORDER — MORPHINE SULFATE 2 MG/ML
2 INJECTION, SOLUTION INTRAMUSCULAR; INTRAVENOUS
Status: DISCONTINUED | OUTPATIENT
Start: 2023-02-20 | End: 2023-02-22

## 2023-02-20 RX ORDER — BUPIVACAINE HYDROCHLORIDE AND EPINEPHRINE 5; 5 MG/ML; UG/ML
INJECTION, SOLUTION EPIDURAL; INTRACAUDAL; PERINEURAL
Status: COMPLETED | OUTPATIENT
Start: 2023-02-20 | End: 2023-02-20

## 2023-02-20 RX ORDER — SUCCINYLCHOLINE/SOD CL,ISO/PF 200MG/10ML
SYRINGE (ML) INTRAVENOUS PRN
Status: DISCONTINUED | OUTPATIENT
Start: 2023-02-20 | End: 2023-02-20 | Stop reason: SDUPTHER

## 2023-02-20 RX ORDER — PROPOFOL 10 MG/ML
INJECTION, EMULSION INTRAVENOUS PRN
Status: DISCONTINUED | OUTPATIENT
Start: 2023-02-20 | End: 2023-02-20 | Stop reason: SDUPTHER

## 2023-02-20 RX ORDER — SODIUM CHLORIDE 9 MG/ML
INJECTION, SOLUTION INTRAVENOUS CONTINUOUS
Status: DISCONTINUED | OUTPATIENT
Start: 2023-02-20 | End: 2023-02-20 | Stop reason: HOSPADM

## 2023-02-20 RX ORDER — MEPERIDINE HYDROCHLORIDE 25 MG/ML
12.5 INJECTION INTRAMUSCULAR; INTRAVENOUS; SUBCUTANEOUS EVERY 5 MIN PRN
Status: DISCONTINUED | OUTPATIENT
Start: 2023-02-20 | End: 2023-02-20 | Stop reason: ALTCHOICE

## 2023-02-20 RX ORDER — ROCURONIUM BROMIDE 10 MG/ML
INJECTION, SOLUTION INTRAVENOUS PRN
Status: DISCONTINUED | OUTPATIENT
Start: 2023-02-20 | End: 2023-02-20 | Stop reason: SDUPTHER

## 2023-02-20 RX ADMIN — FENTANYL CITRATE 50 MCG: 50 INJECTION, SOLUTION INTRAMUSCULAR; INTRAVENOUS at 09:52

## 2023-02-20 RX ADMIN — PHENYLEPHRINE HYDROCHLORIDE 50 MCG: 10 INJECTION INTRAVENOUS at 10:35

## 2023-02-20 RX ADMIN — DEXAMETHASONE SODIUM PHOSPHATE 8 MG: 4 INJECTION, SOLUTION INTRAMUSCULAR; INTRAVENOUS at 10:55

## 2023-02-20 RX ADMIN — FENTANYL CITRATE 25 MCG: 50 INJECTION, SOLUTION INTRAMUSCULAR; INTRAVENOUS at 10:17

## 2023-02-20 RX ADMIN — PHENYLEPHRINE HYDROCHLORIDE 50 MCG: 10 INJECTION INTRAVENOUS at 10:44

## 2023-02-20 RX ADMIN — PHENYLEPHRINE HYDROCHLORIDE 100 MCG: 10 INJECTION INTRAVENOUS at 11:15

## 2023-02-20 RX ADMIN — ROCURONIUM BROMIDE 15 MG: 10 INJECTION INTRAVENOUS at 09:59

## 2023-02-20 RX ADMIN — PIPERACILLIN AND TAZOBACTAM 3375 MG: 3; .375 INJECTION, POWDER, FOR SOLUTION INTRAVENOUS at 13:46

## 2023-02-20 RX ADMIN — ONDANSETRON 4 MG: 2 INJECTION INTRAMUSCULAR; INTRAVENOUS at 10:55

## 2023-02-20 RX ADMIN — SUGAMMADEX 200 MG: 100 INJECTION, SOLUTION INTRAVENOUS at 11:21

## 2023-02-20 RX ADMIN — ROCURONIUM BROMIDE 10 MG: 10 INJECTION INTRAVENOUS at 10:11

## 2023-02-20 RX ADMIN — PROPOFOL 25 MG: 10 INJECTION, EMULSION INTRAVENOUS at 10:21

## 2023-02-20 RX ADMIN — ROCURONIUM BROMIDE 10 MG: 10 INJECTION INTRAVENOUS at 11:02

## 2023-02-20 RX ADMIN — PIPERACILLIN AND TAZOBACTAM 3375 MG: 3; .375 INJECTION, POWDER, FOR SOLUTION INTRAVENOUS at 21:57

## 2023-02-20 RX ADMIN — ROCURONIUM BROMIDE 5 MG: 10 INJECTION INTRAVENOUS at 09:52

## 2023-02-20 RX ADMIN — Medication 100 MG: at 09:52

## 2023-02-20 RX ADMIN — HYDRALAZINE HYDROCHLORIDE 50 MG: 25 TABLET, FILM COATED ORAL at 13:35

## 2023-02-20 RX ADMIN — PROPOFOL 100 MG: 10 INJECTION, EMULSION INTRAVENOUS at 09:52

## 2023-02-20 RX ADMIN — HYDRALAZINE HYDROCHLORIDE 50 MG: 25 TABLET, FILM COATED ORAL at 21:58

## 2023-02-20 RX ADMIN — Medication 2 PUFF: at 08:45

## 2023-02-20 RX ADMIN — PROPOFOL 25 MG: 10 INJECTION, EMULSION INTRAVENOUS at 10:17

## 2023-02-20 RX ADMIN — PIPERACILLIN AND TAZOBACTAM 3375 MG: 3; .375 INJECTION, POWDER, FOR SOLUTION INTRAVENOUS at 04:22

## 2023-02-20 RX ADMIN — FENTANYL CITRATE 25 MCG: 50 INJECTION, SOLUTION INTRAMUSCULAR; INTRAVENOUS at 11:04

## 2023-02-20 RX ADMIN — SODIUM CHLORIDE, POTASSIUM CHLORIDE, SODIUM LACTATE AND CALCIUM CHLORIDE: 600; 310; 30; 20 INJECTION, SOLUTION INTRAVENOUS at 13:34

## 2023-02-20 RX ADMIN — SODIUM CHLORIDE: 9 INJECTION, SOLUTION INTRAVENOUS at 09:45

## 2023-02-20 RX ADMIN — MONTELUKAST SODIUM 10 MG: 10 TABLET, FILM COATED ORAL at 21:58

## 2023-02-20 RX ADMIN — INSULIN LISPRO 2 UNITS: 100 INJECTION, SOLUTION INTRAVENOUS; SUBCUTANEOUS at 16:44

## 2023-02-20 ASSESSMENT — PAIN - FUNCTIONAL ASSESSMENT: PAIN_FUNCTIONAL_ASSESSMENT: 0-10

## 2023-02-20 ASSESSMENT — PAIN SCALES - GENERAL
PAINLEVEL_OUTOF10: 0
PAINLEVEL_OUTOF10: 0

## 2023-02-20 NOTE — PROGRESS NOTES
[facility]        PATIENT NAME: Richard Fleming     TODAY'S DATE: 2/20/2023    CC: abd pain    SUBJECTIVE:    Pt feels better, alert and in good spirits. Remembers me. Has had no pain overnight, no assoc N/V. No CP or SOB. No pain with pressure.      Current Medications:   Current Facility-Administered Medications: dextrose bolus 10% 125 mL, 125 mL, IntraVENous, PRN **OR** dextrose bolus 10% 250 mL, 250 mL, IntraVENous, PRN  glucagon (rDNA) injection 1 mg, 1 mg, SubCUTAneous, PRN  dextrose 10 % infusion, , IntraVENous, Continuous PRN  insulin lispro (HUMALOG) injection vial 0-8 Units, 0-8 Units, SubCUTAneous, TID WC  insulin lispro (HUMALOG) injection vial 0-4 Units, 0-4 Units, SubCUTAneous, Nightly  potassium chloride (KLOR-CON M) extended release tablet 40 mEq, 40 mEq, Oral, PRN **OR** potassium bicarb-citric acid (EFFER-K) effervescent tablet 40 mEq, 40 mEq, Oral, PRN **OR** potassium chloride 10 mEq/100 mL IVPB (Peripheral Line), 10 mEq, IntraVENous, PRN  lactated ringers IV soln infusion, , IntraVENous, Continuous  mometasone-formoterol (DULERA) 200-5 MCG/ACT inhaler 2 puff, 2 puff, Inhalation, BID  albuterol sulfate HFA (PROVENTIL;VENTOLIN;PROAIR) 108 (90 Base) MCG/ACT inhaler 2 puff, 2 puff, Inhalation, Q6H PRN  amLODIPine (NORVASC) tablet 10 mg, 10 mg, Oral, Daily  aspirin EC tablet 81 mg, 81 mg, Oral, Daily  atorvastatin (LIPITOR) tablet 40 mg, 40 mg, Oral, Daily  escitalopram (LEXAPRO) tablet 20 mg, 20 mg, Oral, Daily  fluticasone (FLONASE) 50 MCG/ACT nasal spray 1 spray, 1 spray, Each Nostril, Daily  hydrALAZINE (APRESOLINE) tablet 50 mg, 50 mg, Oral, TID  lactulose (CHRONULAC) 10 GM/15ML solution 20 g, 20 g, Oral, TID PRN  levothyroxine (SYNTHROID) tablet 125 mcg, 125 mcg, Oral, Daily  cetirizine (ZYRTEC) tablet 5 mg, 5 mg, Oral, Daily  montelukast (SINGULAIR) tablet 10 mg, 10 mg, Oral, Nightly  oxybutynin (DITROPAN-XL) extended release tablet 10 mg, 10 mg, Oral, Daily  pantoprazole (PROTONIX) tablet 40 mg, 40 mg, Oral, Daily  tamsulosin (FLOMAX) capsule 0.4 mg, 0.4 mg, Oral, Daily  dextrose 5 % and 0.45 % sodium chloride infusion, , IntraVENous, Continuous  piperacillin-tazobactam (ZOSYN) 3,375 mg in sodium chloride 0.9 % 50 mL IVPB (mini-bag), 3,375 mg, IntraVENous, q8h  enoxaparin (LOVENOX) injection 40 mg, 40 mg, SubCUTAneous, Daily  HYDROmorphone HCl PF (DILAUDID) injection 0.5 mg, 0.5 mg, IntraVENous, Q4H PRN  ondansetron (ZOFRAN) injection 4 mg, 4 mg, IntraVENous, Q6H PRN  ipratropium-albuterol (DUONEB) nebulizer solution 1 ampule, 1 ampule, Inhalation, Q4H PRN  polyethylene glycol (GLYCOLAX) packet 17 g, 17 g, Oral, Daily  Prior to Admission medications    Medication Sig Start Date End Date Taking? Authorizing Provider   atorvastatin (LIPITOR) 40 MG tablet Take 1 tablet by mouth daily 1/18/23   Clarke Brenner MD   lactulose [facility]) 10 GM/15ML solution Take 20 g by mouth 3 times daily as needed  Patient not taking: Reported on 2/17/2023 1/9/23   Historical Provider, MD   polyethylene glycol (GLYCOLAX) 17 GM/SCOOP powder Take 17 g by mouth daily  Patient not taking: Reported on 2/17/2023 4/21/22   Historical Provider, MD   amLODIPine (NORVASC) 10 MG tablet Take 10 mg by mouth daily    Historical Provider, MD   hydrALAZINE (APRESOLINE) 50 MG tablet Take 50 mg by mouth 3 times daily    Historical Provider, MD   ADVAIR DISKUS 250-50 MCG/ACT AEPB diskus inhaler Inhale 2 puffs into the lungs in the morning and at bedtime 9/14/22   Historical Provider, MD   fluticasone (FLONASE) 50 MCG/ACT nasal spray 1 spray by Each Nostril route daily    Historical Provider, MD   loratadine (CLARITIN) 10 MG tablet Take 10 mg by mouth daily    Historical Provider, MD   oxybutynin (DITROPAN-XL) 10 MG extended release tablet Take 10 mg by mouth in the morning. Historical Provider, MD   furosemide (LASIX) 20 MG tablet Take 20 mg by mouth in the morning and 20 mg before bedtime. 8/16/22  Historical Provider, MD   montelukast (SINGULAIR) 10 MG tablet Take 10 mg by mouth nightly    Historical Provider, MD   pantoprazole (PROTONIX) 40 MG tablet Take 40 mg by mouth daily    Historical Provider, MD   tamsulosin (FLOMAX) 0.4 MG capsule Take 0.4 mg by mouth in the morning. Historical Provider, MD   albuterol (PROVENTIL HFA) 108 (90 BASE) MCG/ACT inhaler Inhale 2 puffs into the lungs every 6 hours as needed for Wheezing. 10/21/12   Lesvia Anthony MD   escitalopram (LEXAPRO) 20 MG tablet Take 20 mg by mouth in the morning. Historical Provider, MD   levothyroxine (SYNTHROID) 125 MCG tablet Take 125 mcg by mouth in the morning. Martin Hidden Historical Provider, MD   aspirin 81 MG tablet Take 81 mg by mouth daily. Historical Provider, MD        Allergies:  Patient has no known allergies. Social History:    reports that he has never smoked. He has never used smokeless tobacco. He reports that he does not drink alcohol and does not use drugs. Family History:        Problem Relation Age of Onset    Heart Disease Mother     Cancer Father         black lung    Cancer Brother         throat       REVIEW OF SYSTEMS:  CONSTITUTIONAL:  negative  HEENT:  negative  RESPIRATORY:  negative  CARDIOVASCULAR:  negative  GASTROINTESTINAL:  negative except for abdominal pain  GENITOURINARY:  negative  HEMATOLOGIC/LYMPHATIC:  negative  NEUROLOGICAL:  negative  SKIN: negative      OBJECTIVE:  VITALS:  BP (!) 146/71   Pulse 74   Temp 97.6 °F (36.4 °C) (Temporal)   Resp 16   Ht 6' 2\" (1.88 m)   Wt 230 lb 3.2 oz (104.4 kg)   SpO2 97%   BMI 29.56 kg/m²     INTAKE/OUTPUT:    I/O last 3 completed shifts: In: 809.5 [P.O.:660; IV Piggyback:149.5]  Out: 1700 [Urine:1700]  No intake/output data recorded.     CONSTITUTIONAL:  awake and alert  LUNGS:  clear to auscultation  HEART: RRR  ABDOMEN:  normal bowel sounds, soft, non-distended, non-tender   EXTR: no edema      Data:  CBC:   Recent Labs     02/18/23  0551 02/19/23  0506 02/20/23  0521   WBC 5.5 3.8* 4.0   HGB 9.7* 10.1* 10.4*   HCT 29.1* 30.7* 31.2*   * 124* 153       BMP:    Recent Labs     02/18/23  0551 02/19/23  0506 02/20/23  0521    138 138   K 3.2* 3.7 3.9    105 104   CO2 27 22 22   BUN 18 16 13   CREATININE 1.2 1.2 1.1   GLUCOSE 152* 150* 178*       Hepatic:   Recent Labs     02/18/23  0551 02/19/23  0506 02/20/23  0521   * 70* 38*   * 151* 118*   BILITOT 3.8* 1.5* 1.1*   ALKPHOS 370* 342* 319*       Mag:    No results for input(s): MG in the last 72 hours. Phos:   No results for input(s): PHOS in the last 72 hours. INR: No results for input(s): INR in the last 72 hours.     Radiology Review:  No new studies      ASSESSMENT AND PLAN:  Acute gallstone pancreatitis  Doing well, exam and labs are better, LFT continue to fall  NPO for surgery today  Met with pt and son, all Q answered    Kassandra Vela MD

## 2023-02-20 NOTE — PROGRESS NOTES
02/20/23 1844   RT Protocol   History Pulmonary Disease 0   Respiratory pattern 0   Breath sounds 2   Cough 0   Bronchodilator Assessment Score 2

## 2023-02-20 NOTE — CARE COORDINATION
02/20/23 1433   Readmission Assessment   Number of Days since last admission? 8-30 days   Previous Disposition SNF   Who is being Interviewed Patient   Did you visit your Primary Care Physician after you left the hospital, before you returned this time? (Was under the care of the physician at the rehab facility)   Who advised the patient to return to the hospital? Caregiver   Does the patient report anything that got in the way of taking their medications? No   In our efforts to provide the best possible care to you and others like you, can you think of anything that we could have done to help you after you left the hospital the first time, so that you might not have needed to return so soon? Teaching during hospitalization regarding your illness; Discharge instructions that are concise, clear, and non contradictory

## 2023-02-20 NOTE — PROGRESS NOTES
Speech Language Pathology  HOLD    Kimball June 1935    SLP eval and treat orders received. Attempted to see pt for dysphagia evaluation. Per discussion with RN, pt is being held NPO for doreen barfield this date. Will hold evaluation at this time and attempt to follow-up as schedule allows.      Thank you,     Zena Bermeo M.A., [address] [facility]  Speech-Language Pathologist

## 2023-02-20 NOTE — PROGRESS NOTES
Facility/Department: [address] ORTHO/NEURO NURSING  Speech Language Pathology  DYSPHAGIA BEDSIDE SWALLOW EVALUATION     Patient: Sudeep Beard   : 1935   MRN: [id number]      Evaluation Date: 2023   Admitting Diagnosis: Biliary calculus of other site with obstruction [K80.81]  Pneumonia of left lower lobe due to infectious organism [J18.9]  Acute biliary pancreatitis without infection or necrosis [K85.10]  Pain: Did not state                                                       H&P: \"The patient is an 69-year-old white  American man came to the emergency room with history of abdominal pain,  brought in by the daughter. The patient was _____ squad. The patient  had nausea, vomiting and diarrhea. The patient also had abdominal pain  and a liquid stool, with a dark emesis. The patient recently visited  the [facility] ER and there was a negative workup. The patient does  have known history of dementia, was brought in from his nursing home. \"    Imaging:  CT Abdomen   1. Left basilar segmental atelectasis versus pneumonia with small   parapneumonic effusion. 2. Mild acute uncomplicated pancreatitis. 3. Cholelithiasis. MRI:  No acute infarct. History/Prior Level of Function:   Living Status: SNF per chart review  Prior Dysphagia History: Patient is familiar to SLP services here at [facility], with most recent BSE complete on 1/15/23 with recommendation for regular diet texture consistencies and thin liquids, meds whole in water or whole in puree  Reason for referral: SLP evaluation orders received due to prior hx of dysphagia     Dysphagia Impressions/Diagnosis: Oropharyngeal Dysphagia   Pt alert and agreeable to evaluation. Pt did not allow SLP to elevate bed to 90 degrees; ~60/70 degrees was allowed by pt. Pt would not follow dx to complete formal OME; informal exam revealed mild oral weakness and discoordination. Pt wears both upper and lower dentures.  RN reports difficulty with pills.   Pt assessed with limited PO trials; thins via cup edge and straw, puree solids and regular solids. Pt presenting with no anterior loss, decreased and disorganized mastication, decreased AP transit and oral prep, mild oral residue, suspected premature bolus loss, delayed swallow, no overt s/s associated with aspiration, no cough/ throat clear/ wet vocal quality noted. Recommend soft and bite sized solids, thin liquids and meds whole in puree. Recommended Diet and Intervention 2/20/2023:  Diet Solids Recommendation:  Dysphagia III Soft and bite sized  Liquid Consistency Recommendation: Thin liquids  Recommended form of Meds: Meds in puree      Compensatory Swallowing Strategies: Alternate solids/liquids , Upright as possible with all PO intake , Small bites/sips , Eat/feed slowly, Remain upright 30-45 min     SHORT TERM DYSPHAGIA GOALS/PLAN OF CARE: Speech therapy for dysphagia tx 3-5 times per week during acute care stay.     Pt will functionally tolerate recommended diet with no overt clinical s/s of aspiration   Pt will functionally tolerate ongoing assessment of swallow function with diet to be determined as indicated   Pt will demonstrate understanding of aspiration risk and precautions via education/demonstration with occasional prompting  Pt will advance to least restrictive diet as indicated     Dysphagia Therapeutic Intervention:  Patient/Family Education , Therapeutic Trials with SLP     Discharge Recommendations: Discharge recommendations to be determined pending ongoing follow-up during acute care stay    Patient Positioning: Upright in bed     Current Diet Level (prior to evaluation): Regular texture diet  Thin liquids      Respiratory Status:   [x]Room Air   []O2 via nasal cannula   []Other:    Dentition:  []Adequate  [x]Dentures   []Missing Many Teeth  []Edentulous  []Other:    Baseline Vocal Quality:  []Normal  []Dysphonic   []Aphonic   []Hoarse  []Wet  [x]Weak  []Other:    Volitional Cough:  Not Elicited     Volitional Swallow:   []Absent   [x]Delayed     []Adequate     [x]Required use of drink     Oral Mechanism Exam:  []WFL []Mild   [] Moderate  []Severe  [x]To be assessed  Impaired:   []Left side      []Right side    []Labial ROM/Coordination    []Labial Symmetry   []Lingual ROM/Coordination   []Lingual Symmetry  []Gag  []Other:     Oral Phase: []WFL [x]Mild   [x] Moderate  []Severe  []To be assessed   [x]Impaired/Prolonged Mastication:   []Oral Holding:   []Spillage Left:   []Spillage Right:  []Pocketing Left:   []Pocketing Right:   [x]Decreased Anterior to Posterior Transit:   [x]Suspected Premature Bolus Loss:   [x]Lingual/Palatal Residue:   []Other:     Pharyngeal Phase: []WFL [x]Mild   [] Moderate  []Severe  []To be assessed   [x]Delayed Swallow:   []Suspected Pharyngeal Pooling:   []Decreased Laryngeal Elevation:   []Absent Swallow:  []Wet Vocal Quality:   []Throat Clearing-Immediate:   []Throat Clearing-Delayed:   []Cough-Immediate:   []Cough-Delayed:  []Change in Vital Signs:  []Suspected Delayed Pharyngeal Clearing:  []Other:     Eating Assistance:  []Independent  []Setup or clean-up assistance   [] Supervision or touching assistance   [x] Partial or moderate assistance   [] Substantial or maximal assistance  [] Dependent     EDUCATION:   Provided education regarding role of SLP, results of assessment, recommendations and general speech pathology plan of care. [] Pt verbalized understanding and agreement   [x] Pt requires ongoing learning   [x] No evidence of comprehension     If patient discharges prior to next visit, this note will serve as discharge.      Treatment time:  Timed Code Treatment Minutes: 0  Total Treatment time: 19 minutes    Electronically signed by:    Gene Calzada MA. CF- SLP  Speech Language Pathologist  [id number]

## 2023-02-20 NOTE — RT PROTOCOL NOTE
RT Inhaler-Nebulizer Bronchodilator Protocol Note    There is a bronchodilator order in the chart from a provider indicating to follow the RT Bronchodilator Protocol and there is an Initiate RT Inhaler-Nebulizer Bronchodilator Protocol order as well (see protocol at bottom of note). CXR Findings:  No results found. The findings from the last RT Protocol Assessment were as follows:   History Pulmonary Disease: None or smoker <15 pack years  Respiratory Pattern: Regular pattern and RR 12-20 bpm  Breath Sounds: Slightly diminished and/or crackles  Cough: Strong, spontaneous, non-productive  Indication for Bronchodilator Therapy: Decreased or absent breath sounds  Bronchodilator Assessment Score: 2    Aerosolized bronchodilator medication orders have been revised according to the RT Inhaler-Nebulizer Bronchodilator Protocol below. Respiratory Therapist to perform RT Therapy Protocol Assessment initially then follow the protocol. Repeat RT Therapy Protocol Assessment PRN for score 0-3 or on second treatment, BID, and PRN for scores above 3. No Indications - adjust the frequency to every 6 hours PRN wheezing or bronchospasm, if no treatments needed after 48 hours then discontinue using Per Protocol order mode. If indication present, adjust the RT bronchodilator orders based on the Bronchodilator Assessment Score as indicated below. Use Inhaler orders unless patient has one or more of the following: on home nebulizer, not able to hold breath for 10 seconds, is not alert and oriented, cannot activate and use MDI correctly, or respiratory rate 25 breaths per minute or more, then use the equivalent nebulizer order(s) with same Frequency and PRN reasons based on the score. If a patient is on this medication at home then do not decrease Frequency below that used at home.     0-3 - enter or revise RT bronchodilator order(s) to equivalent RT Bronchodilator order with Frequency of every 4 hours PRN for wheezing or increased work of breathing using Per Protocol order mode. 4-6 - enter or revise RT Bronchodilator order(s) to two equivalent RT bronchodilator orders with one order with BID Frequency and one order with Frequency of every 4 hours PRN wheezing or increased work of breathing using Per Protocol order mode. 7-10 - enter or revise RT Bronchodilator order(s) to two equivalent RT bronchodilator orders with one order with TID Frequency and one order with Frequency of every 4 hours PRN wheezing or increased work of breathing using Per Protocol order mode. 11-13 - enter or revise RT Bronchodilator order(s) to one equivalent RT bronchodilator order with QID Frequency and an Albuterol order with Frequency of every 4 hours PRN wheezing or increased work of breathing using Per Protocol order mode. Greater than 13 - enter or revise RT Bronchodilator order(s) to one equivalent RT bronchodilator order with every 4 hours Frequency and an Albuterol order with Frequency of every 2 hours PRN wheezing or increased work of breathing using Per Protocol order mode. RT to enter RT Home Evaluation for COPD & MDI Assessment order using Per Protocol order mode.     Electronically signed by Alvarez Nicholson RCP on 2/20/2023 at 6:44 PM

## 2023-02-20 NOTE — ANESTHESIA PRE PROCEDURE
Department of Anesthesiology  Preprocedure Note       Name:  Amador Garcia   Age:  81 y.o.  :  1935                                          MRN:  [id number]         Date:  2023      Surgeon: Jeannine Tobar):  Eloy Hodgson MD    Procedure: Procedure(s):  CHOLECYSTECTOMY LAPAROSCOPIC, CHOLANGIOGRAM    Medications prior to admission:   Prior to Admission medications    Medication Sig Start Date End Date Taking? Authorizing Provider   atorvastatin (LIPITOR) 40 MG tablet Take 1 tablet by mouth daily 23   Johny Obrien MD   lactulose [facility]) 10 GM/15ML solution Take 20 g by mouth 3 times daily as needed  Patient not taking: Reported on 2023   Historical Provider, MD   polyethylene glycol (GLYCOLAX) 17 GM/SCOOP powder Take 17 g by mouth daily  Patient not taking: Reported on 2023   Historical Provider, MD   amLODIPine (NORVASC) 10 MG tablet Take 10 mg by mouth daily    Historical Provider, MD   hydrALAZINE (APRESOLINE) 50 MG tablet Take 50 mg by mouth 3 times daily    Historical Provider, MD   ADVAIR DISKUS 250-50 MCG/ACT AEPB diskus inhaler Inhale 2 puffs into the lungs in the morning and at bedtime 22   Historical Provider, MD   fluticasone (FLONASE) 50 MCG/ACT nasal spray 1 spray by Each Nostril route daily    Historical Provider, MD   loratadine (CLARITIN) 10 MG tablet Take 10 mg by mouth daily    Historical Provider, MD   oxybutynin (DITROPAN-XL) 10 MG extended release tablet Take 10 mg by mouth in the morning. Historical Provider, MD   furosemide (LASIX) 20 MG tablet Take 20 mg by mouth in the morning and 20 mg before bedtime. 22  Historical Provider, MD   montelukast (SINGULAIR) 10 MG tablet Take 10 mg by mouth nightly    Historical Provider, MD   pantoprazole (PROTONIX) 40 MG tablet Take 40 mg by mouth daily    Historical Provider, MD   tamsulosin (FLOMAX) 0.4 MG capsule Take 0.4 mg by mouth in the morning.     Historical Provider, MD   albuterol (PROVENTIL HFA) 108 (90 BASE) MCG/ACT inhaler Inhale 2 puffs into the lungs every 6 hours as needed for Wheezing. 10/21/12   Sebastián Duarte MD   escitalopram (LEXAPRO) 20 MG tablet Take 20 mg by mouth in the morning.    Historical Provider, MD   levothyroxine (SYNTHROID) 125 MCG tablet Take 125 mcg by mouth in the morning.  .    Historical Provider, MD   aspirin 81 MG tablet Take 81 mg by mouth daily.      Historical Provider, MD       Current medications:    Current Facility-Administered Medications   Medication Dose Route Frequency Provider Last Rate Last Admin   • dextrose bolus 10% 125 mL  125 mL IntraVENous PRN Ludmila Lake MD        Or   • dextrose bolus 10% 250 mL  250 mL IntraVENous PRN Ludmila Lake MD       • glucagon (rDNA) injection 1 mg  1 mg SubCUTAneous PRN Ludmila Lake MD       • dextrose 10 % infusion   IntraVENous Continuous PRN Ludmila Lake MD       • insulin lispro (HUMALOG) injection vial 0-8 Units  0-8 Units SubCUTAneous TID WC Ludmila Lake MD   2 Units at 02/19/23 1627   • insulin lispro (HUMALOG) injection vial 0-4 Units  0-4 Units SubCUTAneous Nightly Ludmila Lake MD       • potassium chloride (KLOR-CON M) extended release tablet 40 mEq  40 mEq Oral PRN Ludmila Lake MD        Or   • potassium bicarb-citric acid (EFFER-K) effervescent tablet 40 mEq  40 mEq Oral PRN Ludmila Lake MD        Or   • potassium chloride 10 mEq/100 mL IVPB (Peripheral Line)  10 mEq IntraVENous PRN Ludmila Lake MD       • lactated ringers IV soln infusion   IntraVENous Continuous Cookie Zeng PA-C       • mometasone-formoterol (DULERA) 200-5 MCG/ACT inhaler 2 puff  2 puff Inhalation BID Ludmila Lake MD   2 puff at 02/19/23 2041   • albuterol sulfate HFA (PROVENTIL;VENTOLIN;PROAIR) 108 (90 Base) MCG/ACT inhaler 2 puff  2 puff Inhalation Q6H PRN Ludmila Lake MD       • amLODIPine (NORVASC) tablet 10 mg  10 mg Oral Daily Ludmila Lake MD   10 mg at 02/19/23  6843    aspirin EC tablet 81 mg  81 mg Oral Daily Ana Luisa Fuentes MD   81 mg at 02/19/23 5012    atorvastatin (LIPITOR) tablet 40 mg  40 mg Oral Daily Ana Luisa Fuentes MD   40 mg at 02/19/23 7115    escitalopram (LEXAPRO) tablet 20 mg  20 mg Oral Daily Ana Luisa Fuentes MD   20 mg at 02/19/23 0922    fluticasone (FLONASE) 50 MCG/ACT nasal spray 1 spray  1 spray Each Nostril Daily Ana Luisa Fuentes MD   1 spray at 02/19/23 1130    hydrALAZINE (APRESOLINE) tablet 50 mg  50 mg Oral TID Ana Luisa Fuentes MD   50 mg at 02/19/23 1212    lactulose (CHRONULAC) 10 GM/15ML solution 20 g  20 g Oral TID PRN Ana Luisa Fuentes MD        levothyroxine (SYNTHROID) tablet 125 mcg  125 mcg Oral Daily Ana Luisa Fuentes MD   125 mcg at 02/19/23 0741    cetirizine (ZYRTEC) tablet 5 mg  5 mg Oral Daily Ana Luisa Fuentes MD   5 mg at 02/19/23 0923    montelukast (SINGULAIR) tablet 10 mg  10 mg Oral Nightly Ana Luisa Fuentes MD   10 mg at 02/19/23 2055    oxybutynin (DITROPAN-XL) extended release tablet 10 mg  10 mg Oral Daily Ana Luisa Fuentes MD   10 mg at 02/19/23 0936    pantoprazole (PROTONIX) tablet 40 mg  40 mg Oral Daily Ana Luisa Fuentes MD   40 mg at 02/19/23 0922    tamsulosin (FLOMAX) capsule 0.4 mg  0.4 mg Oral Daily Ana Luisa Fuentes MD   0.4 mg at 02/19/23 0923    dextrose 5 % and 0.45 % sodium chloride infusion   IntraVENous Continuous Ana Luisa Fuentes  mL/hr at 02/19/23 1629 New Bag at 02/19/23 1629    piperacillin-tazobactam (ZOSYN) 3,375 mg in sodium chloride 0.9 % 50 mL IVPB (mini-bag)  3,375 mg IntraVENous q8h Ana Luisa Fuentes MD 12.5 mL/hr at 02/20/23 0422 3,375 mg at 02/20/23 0422    enoxaparin (LOVENOX) injection 40 mg  40 mg SubCUTAneous Daily Ana Luisa Fuentes MD   40 mg at 02/19/23 0922    HYDROmorphone HCl PF (DILAUDID) injection 0.5 mg  0.5 mg IntraVENous Q4H PRN Ana Luisa Fuentes MD        ondansetron [facility]) injection 4 mg  4 mg IntraVENous Q6H PRN MD London Aguilar ipratropium-albuterol (DUONEB) nebulizer solution 1 ampule  1 ampule Inhalation Q4H PRN Johny Obrien MD        polyethylene glycol (GLYCOLAX) packet 17 g  17 g Oral Daily Johny Obrien MD   17 g at 02/19/23 1286       Allergies:  No Known Allergies    Problem List:    Patient Active Problem List   Diagnosis Code    ASHD (arteriosclerotic heart disease) I25.10    Bilateral carotid artery stenosis I65.23    Stage 2 chronic kidney disease N18.2    Primary hypertension I10    Dyslipidemia E78.5    Dementia ([address] F03.90    Unable to care for self Z78.9    Unable to ambulate R26.2    Slurred speech R47.81    Acute cerebrovascular accident (CVA) ([address] I63.9    CVA (cerebrovascular accident) ([address] I63.9    TIA (transient ischemic attack) G45.9    Hemiparesis as late effect of cerebrovascular disease ([address] I69.959    Chronic diastolic heart failure (HCC) I50.32    Acute lacunar infarction ([address] I63.81    Acute biliary pancreatitis without infection or necrosis K85.10    Cholelithiasis K80.20    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    Controlled type 2 diabetes mellitus, with long-term current use of insulin (HCC) E11.9, Z79.4       Past Medical History:        Diagnosis Date    CAD (coronary artery disease)     Cerebral artery occlusion with cerebral infarction ([address]     major strokes and TIA's       Past Surgical History:        Procedure Laterality Date    CARDIAC SURGERY      2008open heart surgery    CORONARY ARTERY BYPASS GRAFT         Social History:    Social History     Tobacco Use    Smoking status: Never    Smokeless tobacco: Never   Substance Use Topics    Alcohol use:  No                                Counseling given: Not Answered      Vital Signs (Current):   Vitals:    02/19/23 2045 02/20/23 0100 02/20/23 0400 02/20/23 0826   BP: (!) 98/57 100/65 134/71 (!) 155/73   Pulse: 54 60 74 67   Resp: 17 16 17 18   Temp: 97.6 °F (36.4 °C) 97.6 °F (36.4 °C) 97.6 °F (36.4 °C) 98 °F (36.7 °C)   TempSrc: Oral Oral Oral Oral   SpO2: 97% 97% 97% 96%   Weight:       Height:                                                  BP Readings from Last 3 Encounters:   02/20/23 (!) 155/73   01/19/23 (!) 156/70   10/25/22 133/65       NPO Status:                                                                                 BMI:   Wt Readings from Last 3 Encounters:   02/18/23 210 lb 3 oz (95.3 kg)   01/19/23 213 lb 3.2 oz (96.7 kg)   10/12/22 220 lb 1 oz (99.8 kg)     Body mass index is 26.99 kg/m².     CBC:   Lab Results   Component Value Date/Time    WBC 4.0 02/20/2023 05:21 AM    RBC 3.25 02/20/2023 05:21 AM    HGB 10.4 02/20/2023 05:21 AM    HCT 31.2 02/20/2023 05:21 AM    MCV 95.8 02/20/2023 05:21 AM    RDW 15.5 02/20/2023 05:21 AM     02/20/2023 05:21 AM       CMP:   Lab Results   Component Value Date/Time     02/20/2023 05:21 AM    K 3.9 02/20/2023 05:21 AM    K 3.7 02/19/2023 05:06 AM     02/20/2023 05:21 AM    CO2 22 02/20/2023 05:21 AM    BUN 13 02/20/2023 05:21 AM    CREATININE 1.1 02/20/2023 05:21 AM    GFRAA 58 10/17/2022 05:36 AM    GFRAA >60 10/21/2012 07:03 AM    AGRATIO 0.9 02/20/2023 05:21 AM    LABGLOM >60 02/20/2023 05:21 AM    GLUCOSE 178 02/20/2023 05:21 AM    PROT 6.2 02/20/2023 05:21 AM    PROT 7.0 08/05/2011 08:34 AM    CALCIUM 8.9 02/20/2023 05:21 AM    BILITOT 1.1 02/20/2023 05:21 AM    ALKPHOS 319 02/20/2023 05:21 AM    AST 38 02/20/2023 05:21 AM     02/20/2023 05:21 AM       POC Tests:   Recent Labs     02/20/23  0748   POCGLU 172*       Coags:   Lab Results   Component Value Date/Time    PROTIME 14.4 10/07/2022 05:55 PM    INR 1.13 10/07/2022 05:55 PM       HCG (If Applicable): No results found for: PREGTESTUR, PREGSERUM, HCG, HCGQUANT     ABGs: No results found for: PHART, PO2ART, TET8UPX, FCO6LKR, BEART, Z3RYACPG     Type & Screen (If Applicable):  No results found for: LABABO, LABRH    Drug/Infectious Status (If Applicable):  No results found for: HIV, HEPCAB    COVID-19 Screening (If Applicable):   Lab Results   Component Value Date/Time    COVID19 Not Detected 10/07/2022 09:42 PM           Anesthesia Evaluation  Patient summary reviewed and Nursing notes reviewed  Airway: Mallampati: II  TM distance: >3 FB   Neck ROM: full  Mouth opening: > = 3 FB   Dental:          Pulmonary:                              Cardiovascular:  Exercise tolerance: poor (<4 METS),   (+) hypertension: moderate, CAD: non-obstructive,                ROS comment: ECHO 2023  A bubble study was performed and fails to show evidence of shunting. Left ventricular systolic function is normal with ejection fraction   estimated at 60 %. Sigmoid septum is present with normal thickness of remaining left   ventricular wall segments. Grade III diastolic dysfunction with elevated filling pressure. Bi-atrial enlargement. The aortic root is moderately dilated. The aortic valve area is calculated at 1.96 cm2 with a maximum pressure   gradient of 19.36 mmHg and a mean pressure gradient of 11 mmHg. This is c/w   very mild aortic stenosis. Mild aortic regurgitation is present. Mitral annular calcification is present. Moderate mitral & tricuspid regurgitation. Mild-moderate pulmonic regurgitation present. Neuro/Psych:   (+) CVA (RT HEMIPARESIS): residual symptoms, TIA, psychiatric history: stable with treatment            GI/Hepatic/Renal:             Endo/Other:    (+) DiabetesType II DM, well controlled, , .                 Abdominal:             Vascular: Other Findings:           Anesthesia Plan      general     ASA 3       Induction: intravenous and rapid sequence. MIPS: Postoperative opioids intended and Postoperative trial extubation. Anesthetic plan and risks discussed with patient.               Post-op pain plan if not by surgeon: single peripheral nerve block            Iris Rodriguez MD   2/20/2023

## 2023-02-20 NOTE — BRIEF OP NOTE
Brief Postoperative Note      Patient: Julee Hagen  YOB: 1935  MRN: [id number]    Date of Procedure: 2/20/2023    Pre-Op Diagnosis: CHOLECYSTITIS    Post-Op Diagnosis: Same       Procedure(s):  CHOLECYSTECTOMY LAPAROSCOPIC, CHOLANGIOGRAM    Surgeon(s):  Mike Darling MD    Assistant:  Surgical Assistant: Lizeth Mendez    Anesthesia: General    Estimated Blood Loss (mL): Minimal    Complications: None    Specimens:   ID Type Source Tests Collected by Time Destination   A : A- GALLBLADDER AND CONTENTS Tissue Tissue SURGICAL PATHOLOGY Mike Darling MD 2/20/2023 1023        Implants:  * No implants in log *      Drains:   [REMOVED] External Urinary Catheter (Removed)   Site Assessment Clean,dry & intact; Intact 01/17/23 0800   Placement Replaced 01/17/23 0643   Catheter Care Catheter/Wick replaced 01/17/23 0643   Perineal Care Yes 01/17/23 0643   Suction 40 mmgHg continuous 01/17/23 0643   Urine Color Yellow 01/17/23 0004   Output (mL) 900 mL 01/17/23 0004       Findings: Severe chronic cholecystitis, IOC clear. Pancreatitis appears resolved. Continue periop antibiotics, adv diet. Follow up labs tomorrow.     Electronically signed by Mike Darling MD on 2/20/2023 at 11:41 AM

## 2023-02-20 NOTE — PROGRESS NOTES
Pt arrived from OR to PACU bay 7. Report received from OR staff. Surgical glue in place to 4 lap sites. Pt on 6 L simple mask, NSR, VSS. Will continue to monitor.

## 2023-02-20 NOTE — PROGRESS NOTES
Department of Internal Medicine  General Internal Medicine   Progress Note      SUBJECTIVE: looking less distressed sensorium improving     History obtained from chart review and the patient  General ROS: positive for  - fatigue, malaise and weight loss  negative for - chills, fever or night sweats  Psychological ROS: negative  Ophthalmic ROS: negative  Respiratory ROS: no cough, shortness of breath, or wheezing  Cardiovascular ROS: no chest pain or dyspnea on exertion  Gastrointestinal ROS: no abdominal pain, change in bowel habits, or black or bloody stools  Genito-Urinary ROS: no dysuria, trouble voiding, or hematuria  Musculoskeletal ROS: chronic pain   Neurological ROS: no TIA or stroke symptoms    OBJECTIVE      Medications      Current Facility-Administered Medications: dextrose bolus 10% 125 mL, 125 mL, IntraVENous, PRN **OR** dextrose bolus 10% 250 mL, 250 mL, IntraVENous, PRN  glucagon (rDNA) injection 1 mg, 1 mg, SubCUTAneous, PRN  dextrose 10 % infusion, , IntraVENous, Continuous PRN  insulin lispro (HUMALOG) injection vial 0-8 Units, 0-8 Units, SubCUTAneous, TID WC  insulin lispro (HUMALOG) injection vial 0-4 Units, 0-4 Units, SubCUTAneous, Nightly  potassium chloride (KLOR-CON M) extended release tablet 40 mEq, 40 mEq, Oral, PRN **OR** potassium bicarb-citric acid (EFFER-K) effervescent tablet 40 mEq, 40 mEq, Oral, PRN **OR** potassium chloride 10 mEq/100 mL IVPB (Peripheral Line), 10 mEq, IntraVENous, PRN  lactated ringers IV soln infusion, , IntraVENous, Continuous  mometasone-formoterol (DULERA) 200-5 MCG/ACT inhaler 2 puff, 2 puff, Inhalation, BID  albuterol sulfate HFA (PROVENTIL;VENTOLIN;PROAIR) 108 (90 Base) MCG/ACT inhaler 2 puff, 2 puff, Inhalation, Q6H PRN  amLODIPine (NORVASC) tablet 10 mg, 10 mg, Oral, Daily  aspirin EC tablet 81 mg, 81 mg, Oral, Daily  atorvastatin (LIPITOR) tablet 40 mg, 40 mg, Oral, Daily  escitalopram (LEXAPRO) tablet 20 mg, 20 mg, Oral, Daily  fluticasone (FLONASE) 50 MCG/ACT nasal spray 1 spray, 1 spray, Each Nostril, Daily  hydrALAZINE (APRESOLINE) tablet 50 mg, 50 mg, Oral, TID  lactulose (CHRONULAC) 10 GM/15ML solution 20 g, 20 g, Oral, TID PRN  levothyroxine (SYNTHROID) tablet 125 mcg, 125 mcg, Oral, Daily  cetirizine (ZYRTEC) tablet 5 mg, 5 mg, Oral, Daily  montelukast (SINGULAIR) tablet 10 mg, 10 mg, Oral, Nightly  oxybutynin (DITROPAN-XL) extended release tablet 10 mg, 10 mg, Oral, Daily  pantoprazole (PROTONIX) tablet 40 mg, 40 mg, Oral, Daily  tamsulosin (FLOMAX) capsule 0.4 mg, 0.4 mg, Oral, Daily  dextrose 5 % and 0.45 % sodium chloride infusion, , IntraVENous, Continuous  piperacillin-tazobactam (ZOSYN) 3,375 mg in sodium chloride 0.9 % 50 mL IVPB (mini-bag), 3,375 mg, IntraVENous, q8h  enoxaparin (LOVENOX) injection 40 mg, 40 mg, SubCUTAneous, Daily  HYDROmorphone HCl PF (DILAUDID) injection 0.5 mg, 0.5 mg, IntraVENous, Q4H PRN  ondansetron (ZOFRAN) injection 4 mg, 4 mg, IntraVENous, Q6H PRN  ipratropium-albuterol (DUONEB) nebulizer solution 1 ampule, 1 ampule, Inhalation, Q4H PRN  polyethylene glycol (GLYCOLAX) packet 17 g, 17 g, Oral, Daily    Physical      Vitals: BP (!) 98/57   Pulse 54   Temp 97.6 °F (36.4 °C) (Oral)   Resp 17   Ht 6' 2\" (1.88 m)   Wt 210 lb 3 oz (95.3 kg)   SpO2 97%   BMI 26.99 kg/m²   Temp: Temp: 97.6 °F (36.4 °C)  Max: Temp  Av.7 °F (36.5 °C)  Min: 97.5 °F (36.4 °C)  Max: 98.1 °F (36.7 °C)  Respiration range:  Resp  Avg: 15.9  Min: 14  Max: 17  Pulse Range:  Pulse  Av.8  Min: 53  Max: 54  Blood pressure range:  Systolic (23FNY), VJK:368 , Min:98 , MPS:085   , Diastolic (30MGC), NHC:70, Min:57, Max:76    SpO2  Av.1 %  Min: 95 %  Max: 97 %    Intake/Output Summary (Last 24 hours) at 2023 2211  Last data filed at 2023 1614  Gross per 24 hour   Intake 809.48 ml   Output 1000 ml   Net -190.52 ml           Vent settings:  Pulse  Av.7  Min: 49  Max: 94  Resp  Av.6  Min: 10  Max: 28  SpO2  Av.3 %  Min: 93 %  Max: 98 %    CONSTITUTIONAL:  awake, alert, cooperative, no apparent distress, and appears stated age  EYES:  Unremarkable   NECK:  Mild JVD  and supple, symmetrical, trachea midline  BACK:  symmetric  LUNGS:  No increased work of breathing, good air exchange, clear to auscultation bilaterally, no crackles or wheezing  CARDIOVASCULAR:  normal apical pulses, regular rate and rhythm, normal S1 and S2 and no S3  ABDOMEN:  Soft BS + non tender   MUSCULOSKELETAL:  No distal edema   NEUROLOGIC:  left hemiparesis   SKIN:  Warm and dry  and no bruising or bleeding    Data      Recent Results (from the past 96 hour(s))   CBC with Auto Differential    Collection Time: 02/17/23  9:45 AM   Result Value Ref Range    WBC 10.2 4.0 - 11.0 K/uL    RBC 3.82 (L) 4.20 - 5.90 M/uL    Hemoglobin 12.1 (L) 13.5 - 17.5 g/dL    Hematocrit 36.7 (L) 40.5 - 52.5 %    MCV 96.0 80.0 - 100.0 fL    MCH 31.7 26.0 - 34.0 pg    MCHC 33.0 31.0 - 36.0 g/dL    RDW 15.5 (H) 12.4 - 15.4 %    Platelets 637 838 - 073 K/uL    MPV 10.0 5.0 - 10.5 fL    Neutrophils % 88.4 %    Lymphocytes % 4.9 %    Monocytes % 6.5 %    Eosinophils % 0.1 %    Basophils % 0.1 %    Neutrophils Absolute 9.1 (H) 1.7 - 7.7 K/uL    Lymphocytes Absolute 0.5 (L) 1.0 - 5.1 K/uL    Monocytes Absolute 0.7 0.0 - 1.3 K/uL    Eosinophils Absolute 0.0 0.0 - 0.6 K/uL    Basophils Absolute 0.0 0.0 - 0.2 K/uL   CMP w/ Reflex to MG    Collection Time: 02/17/23  9:45 AM   Result Value Ref Range    Sodium 133 (L) 136 - 145 mmol/L    Potassium reflex Magnesium 3.8 3.5 - 5.1 mmol/L    Chloride 97 (L) 99 - 110 mmol/L    CO2 25 21 - 32 mmol/L    Anion Gap 11 3 - 16    Glucose 211 (H) 70 - 99 mg/dL    BUN 16 7 - 20 mg/dL    Creatinine 1.2 0.8 - 1.3 mg/dL    Est, Glom Filt Rate 59 (A) >60    Calcium 9.6 8.3 - 10.6 mg/dL    Total Protein 6.9 6.4 - 8.2 g/dL    Albumin 3.6 3.4 - 5.0 g/dL    Albumin/Globulin Ratio 1.1 1.1 - 2.2    Total Bilirubin 4.4 (H) 0.0 - 1.0 mg/dL    Alkaline Phosphatase 585 (H) 40 - 129 U/L     (H) 10 - 40 U/L     (H) 15 - 37 U/L   Lipase    Collection Time: 02/17/23  9:45 AM   Result Value Ref Range    Lipase >3,000.0 (H) 13.0 - 60.0 U/L   Troponin    Collection Time: 02/17/23  9:45 AM   Result Value Ref Range    Troponin <0.01 <0.01 ng/mL   Hepatitis Panel, Acute    Collection Time: 02/17/23  9:45 AM   Result Value Ref Range    Hep A IgM Non-reactive Non-reactive    Hep B Core Ab, IgM Non-reactive Non-reactive    Hep B S Ag Interp Non-reactive Non-reactive    Hep C Ab Interp Non-reactive Non-reactive   EKG 12 Lead    Collection Time: 02/17/23  9:55 AM   Result Value Ref Range    Ventricular Rate 75 BPM    Atrial Rate 75 BPM    P-R Interval 184 ms    QRS Duration 110 ms    Q-T Interval 364 ms    QTc Calculation (Bazett) 406 ms    P Axis 43 degrees    R Axis -33 degrees    T Axis -4 degrees    Diagnosis       Sinus rhythm with Premature atrial complexesLeft axis deviationModerate voltage criteria for LVH, may be normal variantAbnormal ECGConfirmed by [facility] Alan ACOSTA ([id number]) on 2/17/2023 6:55:09 PM   Urinalysis with Reflex to Culture    Collection Time: 02/17/23 11:53 AM    Specimen: Urine   Result Value Ref Range    Color, UA DARK YELLOW (A) Straw/Yellow    Clarity, UA Clear Clear    Glucose, Ur Negative Negative mg/dL    Bilirubin Urine SMALL (A) Negative    Ketones, Urine Negative Negative mg/dL    Specific Gravity, UA 1.010 1.005 - 1.030    Blood, Urine SMALL (A) Negative    pH, UA 8.0 5.0 - 8.0    Protein,  (A) Negative mg/dL    Urobilinogen, Urine 2.0 (A) <2.0 E.U./dL    Nitrite, Urine Negative Negative    Leukocyte Esterase, Urine Negative Negative    Microscopic Examination YES     Urine Type NotGiven     Urine Reflex to Culture Not Indicated    Microscopic Urinalysis    Collection Time: 02/17/23 11:53 AM   Result Value Ref Range    Bacteria, UA None Seen None Seen /HPF    Hyaline Casts, UA 0 0 - 8 /LPF    WBC, UA 0 0 - 5 /HPF    RBC, UA 1 0 - 4 /HPF Epithelial Cells, UA 0 0 - 5 /HPF   CBC    Collection Time: 02/18/23  5:51 AM   Result Value Ref Range    WBC 5.5 4.0 - 11.0 K/uL    RBC 3.07 (L) 4.20 - 5.90 M/uL    Hemoglobin 9.7 (L) 13.5 - 17.5 g/dL    Hematocrit 29.1 (L) 40.5 - 52.5 %    MCV 94.9 80.0 - 100.0 fL    MCH 31.4 26.0 - 34.0 pg    MCHC 33.1 31.0 - 36.0 g/dL    RDW 15.8 (H) 12.4 - 15.4 %    Platelets 752 (L) 115 - 450 K/uL    MPV 9.6 5.0 - 10.5 fL   Comprehensive Metabolic Panel    Collection Time: 02/18/23  5:51 AM   Result Value Ref Range    Sodium 136 136 - 145 mmol/L    Potassium 3.2 (L) 3.5 - 5.1 mmol/L    Chloride 103 99 - 110 mmol/L    CO2 27 21 - 32 mmol/L    Anion Gap 6 3 - 16    Glucose 152 (H) 70 - 99 mg/dL    BUN 18 7 - 20 mg/dL    Creatinine 1.2 0.8 - 1.3 mg/dL    Est, Glom Filt Rate 59 (A) >60    Calcium 8.4 8.3 - 10.6 mg/dL    Total Protein 5.4 (L) 6.4 - 8.2 g/dL    Albumin 2.8 (L) 3.4 - 5.0 g/dL    Albumin/Globulin Ratio 1.1 1.1 - 2.2    Total Bilirubin 3.8 (H) 0.0 - 1.0 mg/dL    Alkaline Phosphatase 370 (H) 40 - 129 U/L     (H) 10 - 40 U/L     (H) 15 - 37 U/L   Hemoglobin A1c    Collection Time: 02/18/23  5:51 AM   Result Value Ref Range    Hemoglobin A1C 6.2 See comment %    eAG 131.2 mg/dL   POCT Glucose    Collection Time: 02/18/23  7:56 AM   Result Value Ref Range    POC Glucose 144 (H) 70 - 99 mg/dl    Performed on ACCU-CHEK    POCT Glucose    Collection Time: 02/18/23 11:29 AM   Result Value Ref Range    POC Glucose 126 (H) 70 - 99 mg/dl    Performed on ACCU-CHEK    POCT Glucose    Collection Time: 02/18/23  4:57 PM   Result Value Ref Range    POC Glucose 142 (H) 70 - 99 mg/dl    Performed on ACCU-CHEK    POCT Glucose    Collection Time: 02/18/23  8:41 PM   Result Value Ref Range    POC Glucose 184 (H) 70 - 99 mg/dl    Performed on ACCU-CHEK    CBC    Collection Time: 02/19/23  5:06 AM   Result Value Ref Range    WBC 3.8 (L) 4.0 - 11.0 K/uL    RBC 3.22 (L) 4.20 - 5.90 M/uL    Hemoglobin 10.1 (L) 13.5 - 17.5 g/dL Hematocrit 30.7 (L) 40.5 - 52.5 %    MCV 95.2 80.0 - 100.0 fL    MCH 31.3 26.0 - 34.0 pg    MCHC 32.9 31.0 - 36.0 g/dL    RDW 15.6 (H) 12.4 - 15.4 %    Platelets 053 (L) 300 - 450 K/uL    MPV 9.4 5.0 - 10.5 fL   Comprehensive Metabolic Panel w/ Reflex to MG    Collection Time: 02/19/23  5:06 AM   Result Value Ref Range    Sodium 138 136 - 145 mmol/L    Potassium reflex Magnesium 3.7 3.5 - 5.1 mmol/L    Chloride 105 99 - 110 mmol/L    CO2 22 21 - 32 mmol/L    Anion Gap 11 3 - 16    Glucose 150 (H) 70 - 99 mg/dL    BUN 16 7 - 20 mg/dL    Creatinine 1.2 0.8 - 1.3 mg/dL    Est, Glom Filt Rate 59 (A) >60    Calcium 8.6 8.3 - 10.6 mg/dL    Total Protein 5.7 (L) 6.4 - 8.2 g/dL    Albumin 2.5 (L) 3.4 - 5.0 g/dL    Albumin/Globulin Ratio 0.8 (L) 1.1 - 2.2    Total Bilirubin 1.5 (H) 0.0 - 1.0 mg/dL    Alkaline Phosphatase 342 (H) 40 - 129 U/L     (H) 10 - 40 U/L    AST 70 (H) 15 - 37 U/L   Lipase    Collection Time: 02/19/23  5:06 AM   Result Value Ref Range    Lipase 153.0 (H) 13.0 - 60.0 U/L   POCT Glucose    Collection Time: 02/19/23  7:16 AM   Result Value Ref Range    POC Glucose 161 (H) 70 - 99 mg/dl    Performed on ACCU-CHEK    POCT Glucose    Collection Time: 02/19/23 11:17 AM   Result Value Ref Range    POC Glucose 247 (H) 70 - 99 mg/dl    Performed on ACCU-CHEK    POCT Glucose    Collection Time: 02/19/23  4:00 PM   Result Value Ref Range    POC Glucose 216 (H) 70 - 99 mg/dl    Performed on ACCU-CHEK    POCT Glucose    Collection Time: 02/19/23  9:05 PM   Result Value Ref Range    POC Glucose 213 (H) 70 - 99 mg/dl    Performed on [address] Problems             Last Modified POA    * (Principal) Acute biliary pancreatitis without infection or necrosis 2/17/2023 Yes    Dementia ([address] 2/18/2023 Yes    Cholelithiasis 2/18/2023 Yes    Late effects of cerebral ischemic stroke 2/18/2023 Yes    Controlled type 2 diabetes mellitus, with long-term current use of insulin (HCC) 2/18/2023 Yes    Primary hypertension 2/18/2023 Yes      MRCP showed cholelithiasis but no cholecystitis , no CBND enalrgement , does show Pancreatic inflammation  Ct PPI , IV Zosyn

## 2023-02-20 NOTE — PROGRESS NOTES
Morning assessment completed. Pt comfortably resting in bed. VSS. The care plan and education has been reviewed and mutually agreed upon with the patient. Morning meds not given - pt went down for surgery and not available at the due time.

## 2023-02-20 NOTE — ANESTHESIA POSTPROCEDURE EVALUATION
Department of Anesthesiology  Postprocedure Note    Patient: London Bentley  MRN: [id number]  YOB: 1935  Date of evaluation: 2/20/2023      Procedure Summary     Date: 02/20/23 Room / Location: [address]    Anesthesia Start: 5654 Anesthesia Stop: 4486    Procedure: CHOLECYSTECTOMY LAPAROSCOPIC, CHOLANGIOGRAM (Abdomen) Diagnosis:       Cholecystitis      (CHOLECYSTITIS)    Surgeons: Richard Dalal MD Responsible Provider: Norma Liu MD    Anesthesia Type: general ASA Status: 3          Anesthesia Type: No value filed.     Hector Phase I: Hector Score: 10    Hector Phase II:        Anesthesia Post Evaluation    Patient location during evaluation: PACU  Patient participation: complete - patient participated  Level of consciousness: awake  Airway patency: patent  Nausea & Vomiting: no nausea and no vomiting  Complications: no  Cardiovascular status: blood pressure returned to baseline  Respiratory status: acceptable  Hydration status: stable

## 2023-02-20 NOTE — PROGRESS NOTES
Department of Internal Medicine  General Internal Medicine   Progress Note      SUBJECTIVE: looking  much better , no pain , no N/V    History obtained from chart review and the patient  General ROS: positive for  - fatigue, malaise and weight loss  negative for - chills, fever or night sweats  Psychological ROS: negative  Ophthalmic ROS: negative  Respiratory ROS: no cough, shortness of breath, or wheezing  Cardiovascular ROS: no chest pain or dyspnea on exertion  Gastrointestinal ROS: no abdominal pain, change in bowel habits, or black or bloody stools  Genito-Urinary ROS: no dysuria, trouble voiding, or hematuria  Musculoskeletal ROS: chronic pain   Neurological ROS: no TIA or stroke symptoms    OBJECTIVE      Medications      Current Facility-Administered Medications: dextrose bolus 10% 125 mL, 125 mL, IntraVENous, PRN **OR** dextrose bolus 10% 250 mL, 250 mL, IntraVENous, PRN  glucagon (rDNA) injection 1 mg, 1 mg, SubCUTAneous, PRN  dextrose 10 % infusion, , IntraVENous, Continuous PRN  insulin lispro (HUMALOG) injection vial 0-8 Units, 0-8 Units, SubCUTAneous, TID WC  insulin lispro (HUMALOG) injection vial 0-4 Units, 0-4 Units, SubCUTAneous, Nightly  potassium chloride (KLOR-CON M) extended release tablet 40 mEq, 40 mEq, Oral, PRN **OR** potassium bicarb-citric acid (EFFER-K) effervescent tablet 40 mEq, 40 mEq, Oral, PRN **OR** potassium chloride 10 mEq/100 mL IVPB (Peripheral Line), 10 mEq, IntraVENous, PRN  lactated ringers IV soln infusion, , IntraVENous, Continuous  mometasone-formoterol (DULERA) 200-5 MCG/ACT inhaler 2 puff, 2 puff, Inhalation, BID  albuterol sulfate HFA (PROVENTIL;VENTOLIN;PROAIR) 108 (90 Base) MCG/ACT inhaler 2 puff, 2 puff, Inhalation, Q6H PRN  amLODIPine (NORVASC) tablet 10 mg, 10 mg, Oral, Daily  aspirin EC tablet 81 mg, 81 mg, Oral, Daily  atorvastatin (LIPITOR) tablet 40 mg, 40 mg, Oral, Daily  escitalopram (LEXAPRO) tablet 20 mg, 20 mg, Oral, Daily  fluticasone (FLONASE) 50 MCG/ACT nasal spray 1 spray, 1 spray, Each Nostril, Daily  hydrALAZINE (APRESOLINE) tablet 50 mg, 50 mg, Oral, TID  lactulose (CHRONULAC) 10 GM/15ML solution 20 g, 20 g, Oral, TID PRN  levothyroxine (SYNTHROID) tablet 125 mcg, 125 mcg, Oral, Daily  cetirizine (ZYRTEC) tablet 5 mg, 5 mg, Oral, Daily  montelukast (SINGULAIR) tablet 10 mg, 10 mg, Oral, Nightly  oxybutynin (DITROPAN-XL) extended release tablet 10 mg, 10 mg, Oral, Daily  pantoprazole (PROTONIX) tablet 40 mg, 40 mg, Oral, Daily  tamsulosin (FLOMAX) capsule 0.4 mg, 0.4 mg, Oral, Daily  dextrose 5 % and 0.45 % sodium chloride infusion, , IntraVENous, Continuous  piperacillin-tazobactam (ZOSYN) 3,375 mg in sodium chloride 0.9 % 50 mL IVPB (mini-bag), 3,375 mg, IntraVENous, q8h  enoxaparin (LOVENOX) injection 40 mg, 40 mg, SubCUTAneous, Daily  HYDROmorphone HCl PF (DILAUDID) injection 0.5 mg, 0.5 mg, IntraVENous, Q4H PRN  ondansetron (ZOFRAN) injection 4 mg, 4 mg, IntraVENous, Q6H PRN  ipratropium-albuterol (DUONEB) nebulizer solution 1 ampule, 1 ampule, Inhalation, Q4H PRN  polyethylene glycol (GLYCOLAX) packet 17 g, 17 g, Oral, Daily    Physical      Vitals: BP (!) 98/57   Pulse 54   Temp 97.6 °F (36.4 °C) (Oral)   Resp 17   Ht 6' 2\" (1.88 m)   Wt 210 lb 3 oz (95.3 kg)   SpO2 97%   BMI 26.99 kg/m²   Temp: Temp: 97.6 °F (36.4 °C)  Max: Temp  Av.7 °F (36.5 °C)  Min: 97.5 °F (36.4 °C)  Max: 98.1 °F (36.7 °C)  Respiration range:  Resp  Avg: 15.9  Min: 14  Max: 17  Pulse Range:  Pulse  Av.8  Min: 53  Max: 54  Blood pressure range:  Systolic (55OER), TDT:418 , Min:98 , TPI:076   , Diastolic (55MHK), XKX:23, Min:57, Max:76    SpO2  Av.1 %  Min: 95 %  Max: 97 %    Intake/Output Summary (Last 24 hours) at 2023 5943  Last data filed at 2023 1614  Gross per 24 hour   Intake 809.48 ml   Output 1000 ml   Net -190.52 ml           Vent settings:  Pulse  Av.7  Min: 49  Max: 94  Resp  Av.6  Min: 10  Max: 28  SpO2  Av.3 %  Min: 93 %  Max: 98 %    CONSTITUTIONAL:  awake, alert, cooperative, no apparent distress, and appears stated age  EYES:  Unremarkable   NECK:  Mild JVD  and supple, symmetrical, trachea midline  BACK:  symmetric  LUNGS:  No increased work of breathing, good air exchange, clear to auscultation bilaterally, no crackles or wheezing  CARDIOVASCULAR:  normal apical pulses, regular rate and rhythm, normal S1 and S2 and no S3  ABDOMEN:  Soft BS + non tender   MUSCULOSKELETAL:  No distal edema   NEUROLOGIC:  left hemiparesis   SKIN:  Warm and dry  and no bruising or bleeding    Data      Recent Results (from the past 96 hour(s))   CBC with Auto Differential    Collection Time: 02/17/23  9:45 AM   Result Value Ref Range    WBC 10.2 4.0 - 11.0 K/uL    RBC 3.82 (L) 4.20 - 5.90 M/uL    Hemoglobin 12.1 (L) 13.5 - 17.5 g/dL    Hematocrit 36.7 (L) 40.5 - 52.5 %    MCV 96.0 80.0 - 100.0 fL    MCH 31.7 26.0 - 34.0 pg    MCHC 33.0 31.0 - 36.0 g/dL    RDW 15.5 (H) 12.4 - 15.4 %    Platelets 814 419 - 606 K/uL    MPV 10.0 5.0 - 10.5 fL    Neutrophils % 88.4 %    Lymphocytes % 4.9 %    Monocytes % 6.5 %    Eosinophils % 0.1 %    Basophils % 0.1 %    Neutrophils Absolute 9.1 (H) 1.7 - 7.7 K/uL    Lymphocytes Absolute 0.5 (L) 1.0 - 5.1 K/uL    Monocytes Absolute 0.7 0.0 - 1.3 K/uL    Eosinophils Absolute 0.0 0.0 - 0.6 K/uL    Basophils Absolute 0.0 0.0 - 0.2 K/uL   CMP w/ Reflex to MG    Collection Time: 02/17/23  9:45 AM   Result Value Ref Range    Sodium 133 (L) 136 - 145 mmol/L    Potassium reflex Magnesium 3.8 3.5 - 5.1 mmol/L    Chloride 97 (L) 99 - 110 mmol/L    CO2 25 21 - 32 mmol/L    Anion Gap 11 3 - 16    Glucose 211 (H) 70 - 99 mg/dL    BUN 16 7 - 20 mg/dL    Creatinine 1.2 0.8 - 1.3 mg/dL    Est, Glom Filt Rate 59 (A) >60    Calcium 9.6 8.3 - 10.6 mg/dL    Total Protein 6.9 6.4 - 8.2 g/dL    Albumin 3.6 3.4 - 5.0 g/dL    Albumin/Globulin Ratio 1.1 1.1 - 2.2    Total Bilirubin 4.4 (H) 0.0 - 1.0 mg/dL    Alkaline Phosphatase 585 (H) 40 - 129 U/L     (H) 10 - 40 U/L     (H) 15 - 37 U/L   Lipase    Collection Time: 02/17/23  9:45 AM   Result Value Ref Range    Lipase >3,000.0 (H) 13.0 - 60.0 U/L   Troponin    Collection Time: 02/17/23  9:45 AM   Result Value Ref Range    Troponin <0.01 <0.01 ng/mL   Hepatitis Panel, Acute    Collection Time: 02/17/23  9:45 AM   Result Value Ref Range    Hep A IgM Non-reactive Non-reactive    Hep B Core Ab, IgM Non-reactive Non-reactive    Hep B S Ag Interp Non-reactive Non-reactive    Hep C Ab Interp Non-reactive Non-reactive   EKG 12 Lead    Collection Time: 02/17/23  9:55 AM   Result Value Ref Range    Ventricular Rate 75 BPM    Atrial Rate 75 BPM    P-R Interval 184 ms    QRS Duration 110 ms    Q-T Interval 364 ms    QTc Calculation (Bazett) 406 ms    P Axis 43 degrees    R Axis -33 degrees    T Axis -4 degrees    Diagnosis       Sinus rhythm with Premature atrial complexesLeft axis deviationModerate voltage criteria for LVH, may be normal variantAbnormal ECGConfirmed by [facility] ACOSTA ([place]) on 2/17/2023 6:55:09 PM   Urinalysis with Reflex to Culture    Collection Time: 02/17/23 11:53 AM    Specimen: Urine   Result Value Ref Range    Color, UA DARK YELLOW (A) Straw/Yellow    Clarity, UA Clear Clear    Glucose, Ur Negative Negative mg/dL    Bilirubin Urine SMALL (A) Negative    Ketones, Urine Negative Negative mg/dL    Specific Gravity, UA 1.010 1.005 - 1.030    Blood, Urine SMALL (A) Negative    pH, UA 8.0 5.0 - 8.0    Protein,  (A) Negative mg/dL    Urobilinogen, Urine 2.0 (A) <2.0 E.U./dL    Nitrite, Urine Negative Negative    Leukocyte Esterase, Urine Negative Negative    Microscopic Examination YES     Urine Type NotGiven     Urine Reflex to Culture Not Indicated    Microscopic Urinalysis    Collection Time: 02/17/23 11:53 AM   Result Value Ref Range    Bacteria, UA None Seen None Seen /HPF    Hyaline Casts, UA 0 0 - 8 /LPF    WBC, UA 0 0 - 5 /HPF    RBC, UA 1 0 - 4 /HPF Epithelial Cells, UA 0 0 - 5 /HPF   CBC    Collection Time: 02/18/23  5:51 AM   Result Value Ref Range    WBC 5.5 4.0 - 11.0 K/uL    RBC 3.07 (L) 4.20 - 5.90 M/uL    Hemoglobin 9.7 (L) 13.5 - 17.5 g/dL    Hematocrit 29.1 (L) 40.5 - 52.5 %    MCV 94.9 80.0 - 100.0 fL    MCH 31.4 26.0 - 34.0 pg    MCHC 33.1 31.0 - 36.0 g/dL    RDW 15.8 (H) 12.4 - 15.4 %    Platelets 913 (L) 353 - 450 K/uL    MPV 9.6 5.0 - 10.5 fL   Comprehensive Metabolic Panel    Collection Time: 02/18/23  5:51 AM   Result Value Ref Range    Sodium 136 136 - 145 mmol/L    Potassium 3.2 (L) 3.5 - 5.1 mmol/L    Chloride 103 99 - 110 mmol/L    CO2 27 21 - 32 mmol/L    Anion Gap 6 3 - 16    Glucose 152 (H) 70 - 99 mg/dL    BUN 18 7 - 20 mg/dL    Creatinine 1.2 0.8 - 1.3 mg/dL    Est, Glom Filt Rate 59 (A) >60    Calcium 8.4 8.3 - 10.6 mg/dL    Total Protein 5.4 (L) 6.4 - 8.2 g/dL    Albumin 2.8 (L) 3.4 - 5.0 g/dL    Albumin/Globulin Ratio 1.1 1.1 - 2.2    Total Bilirubin 3.8 (H) 0.0 - 1.0 mg/dL    Alkaline Phosphatase 370 (H) 40 - 129 U/L     (H) 10 - 40 U/L     (H) 15 - 37 U/L   Hemoglobin A1c    Collection Time: 02/18/23  5:51 AM   Result Value Ref Range    Hemoglobin A1C 6.2 See comment %    eAG 131.2 mg/dL   POCT Glucose    Collection Time: 02/18/23  7:56 AM   Result Value Ref Range    POC Glucose 144 (H) 70 - 99 mg/dl    Performed on ACCU-CHEK    POCT Glucose    Collection Time: 02/18/23 11:29 AM   Result Value Ref Range    POC Glucose 126 (H) 70 - 99 mg/dl    Performed on ACCU-CHEK    POCT Glucose    Collection Time: 02/18/23  4:57 PM   Result Value Ref Range    POC Glucose 142 (H) 70 - 99 mg/dl    Performed on ACCU-CHEK    POCT Glucose    Collection Time: 02/18/23  8:41 PM   Result Value Ref Range    POC Glucose 184 (H) 70 - 99 mg/dl    Performed on ACCU-CHEK    CBC    Collection Time: 02/19/23  5:06 AM   Result Value Ref Range    WBC 3.8 (L) 4.0 - 11.0 K/uL    RBC 3.22 (L) 4.20 - 5.90 M/uL    Hemoglobin 10.1 (L) 13.5 - 17.5 g/dL Hematocrit 30.7 (L) 40.5 - 52.5 %    MCV 95.2 80.0 - 100.0 fL    MCH 31.3 26.0 - 34.0 pg    MCHC 32.9 31.0 - 36.0 g/dL    RDW 15.6 (H) 12.4 - 15.4 %    Platelets 486 (L) 003 - 450 K/uL    MPV 9.4 5.0 - 10.5 fL   Comprehensive Metabolic Panel w/ Reflex to MG    Collection Time: 02/19/23  5:06 AM   Result Value Ref Range    Sodium 138 136 - 145 mmol/L    Potassium reflex Magnesium 3.7 3.5 - 5.1 mmol/L    Chloride 105 99 - 110 mmol/L    CO2 22 21 - 32 mmol/L    Anion Gap 11 3 - 16    Glucose 150 (H) 70 - 99 mg/dL    BUN 16 7 - 20 mg/dL    Creatinine 1.2 0.8 - 1.3 mg/dL    Est, Glom Filt Rate 59 (A) >60    Calcium 8.6 8.3 - 10.6 mg/dL    Total Protein 5.7 (L) 6.4 - 8.2 g/dL    Albumin 2.5 (L) 3.4 - 5.0 g/dL    Albumin/Globulin Ratio 0.8 (L) 1.1 - 2.2    Total Bilirubin 1.5 (H) 0.0 - 1.0 mg/dL    Alkaline Phosphatase 342 (H) 40 - 129 U/L     (H) 10 - 40 U/L    AST 70 (H) 15 - 37 U/L   Lipase    Collection Time: 02/19/23  5:06 AM   Result Value Ref Range    Lipase 153.0 (H) 13.0 - 60.0 U/L   POCT Glucose    Collection Time: 02/19/23  7:16 AM   Result Value Ref Range    POC Glucose 161 (H) 70 - 99 mg/dl    Performed on ACCU-CHEK    POCT Glucose    Collection Time: 02/19/23 11:17 AM   Result Value Ref Range    POC Glucose 247 (H) 70 - 99 mg/dl    Performed on ACCU-CHEK    POCT Glucose    Collection Time: 02/19/23  4:00 PM   Result Value Ref Range    POC Glucose 216 (H) 70 - 99 mg/dl    Performed on ACCU-CHEK    POCT Glucose    Collection Time: 02/19/23  9:05 PM   Result Value Ref Range    POC Glucose 213 (H) 70 - 99 mg/dl    Performed on [address] Problems             Last Modified POA    * (Principal) Acute biliary pancreatitis without infection or necrosis 2/17/2023 Yes    Dementia ([address] 2/18/2023 Yes    Cholelithiasis 2/18/2023 Yes    Late effects of cerebral ischemic stroke 2/18/2023 Yes    Controlled type 2 diabetes mellitus, with long-term current use of insulin (HCC) 2/18/2023 Yes    Primary hypertension 2/18/2023 Yes     MRCP findings discussed with Family in detail , They are prepared for Lap Deepa  and IOC, Pancreatitis almost resolved

## 2023-02-21 LAB
A/G RATIO: 0.9 (ref 1.1–2.2)
ALBUMIN SERPL-MCNC: 2.9 G/DL (ref 3.4–5)
ALP BLD-CCNC: 279 U/L (ref 40–129)
ALT SERPL-CCNC: 100 U/L (ref 10–40)
ANION GAP SERPL CALCULATED.3IONS-SCNC: 10 MMOL/L (ref 3–16)
AST SERPL-CCNC: 50 U/L (ref 15–37)
BASOPHILS ABSOLUTE: 0 K/UL (ref 0–0.2)
BASOPHILS RELATIVE PERCENT: 0 %
BILIRUB SERPL-MCNC: 0.9 MG/DL (ref 0–1)
BUN BLDV-MCNC: 17 MG/DL (ref 7–20)
CALCIUM SERPL-MCNC: 8.8 MG/DL (ref 8.3–10.6)
CHLORIDE BLD-SCNC: 103 MMOL/L (ref 99–110)
CO2: 22 MMOL/L (ref 21–32)
CREAT SERPL-MCNC: 1 MG/DL (ref 0.8–1.3)
EOSINOPHILS ABSOLUTE: 0 K/UL (ref 0–0.6)
EOSINOPHILS RELATIVE PERCENT: 0 %
GFR SERPL CREATININE-BSD FRML MDRD: >60 ML/MIN/{1.73_M2}
GLUCOSE BLD-MCNC: 162 MG/DL (ref 70–99)
GLUCOSE BLD-MCNC: 164 MG/DL (ref 70–99)
GLUCOSE BLD-MCNC: 176 MG/DL (ref 70–99)
GLUCOSE BLD-MCNC: 178 MG/DL (ref 70–99)
GLUCOSE BLD-MCNC: 191 MG/DL (ref 70–99)
GLUCOSE BLD-MCNC: 200 MG/DL (ref 70–99)
HCT VFR BLD CALC: 30.2 % (ref 40.5–52.5)
HEMOGLOBIN: 10.2 G/DL (ref 13.5–17.5)
LIPASE: 29 U/L (ref 13–60)
LYMPHOCYTES ABSOLUTE: 0.6 K/UL (ref 1–5.1)
LYMPHOCYTES RELATIVE PERCENT: 8.9 %
MCH RBC QN AUTO: 32.4 PG (ref 26–34)
MCHC RBC AUTO-ENTMCNC: 33.8 G/DL (ref 31–36)
MCV RBC AUTO: 95.8 FL (ref 80–100)
MONOCYTES ABSOLUTE: 0.6 K/UL (ref 0–1.3)
MONOCYTES RELATIVE PERCENT: 8.6 %
NEUTROPHILS ABSOLUTE: 5.4 K/UL (ref 1.7–7.7)
NEUTROPHILS RELATIVE PERCENT: 82.5 %
PDW BLD-RTO: 15.3 % (ref 12.4–15.4)
PERFORMED ON: ABNORMAL
PLATELET # BLD: 166 K/UL (ref 135–450)
PMV BLD AUTO: 9.5 FL (ref 5–10.5)
POTASSIUM SERPL-SCNC: 4.1 MMOL/L (ref 3.5–5.1)
RBC # BLD: 3.15 M/UL (ref 4.2–5.9)
SODIUM BLD-SCNC: 135 MMOL/L (ref 136–145)
TOTAL PROTEIN: 6.2 G/DL (ref 6.4–8.2)
WBC # BLD: 6.5 K/UL (ref 4–11)

## 2023-02-21 PROCEDURE — 2580000003 HC RX 258: Performed by: INTERNAL MEDICINE

## 2023-02-21 PROCEDURE — 94761 N-INVAS EAR/PLS OXIMETRY MLT: CPT

## 2023-02-21 PROCEDURE — APPNB30 APP NON BILLABLE TIME 0-30 MINS: Performed by: NURSE PRACTITIONER

## 2023-02-21 PROCEDURE — 2580000003 HC RX 258: Performed by: ANESTHESIOLOGY

## 2023-02-21 PROCEDURE — 83690 ASSAY OF LIPASE: CPT

## 2023-02-21 PROCEDURE — 85025 COMPLETE CBC W/AUTO DIFF WBC: CPT

## 2023-02-21 PROCEDURE — 80053 COMPREHEN METABOLIC PANEL: CPT

## 2023-02-21 PROCEDURE — APPSS15 APP SPLIT SHARED TIME 0-15 MINUTES: Performed by: NURSE PRACTITIONER

## 2023-02-21 PROCEDURE — 6370000000 HC RX 637 (ALT 250 FOR IP): Performed by: INTERNAL MEDICINE

## 2023-02-21 PROCEDURE — 6370000000 HC RX 637 (ALT 250 FOR IP): Performed by: SURGERY

## 2023-02-21 PROCEDURE — 99024 POSTOP FOLLOW-UP VISIT: CPT | Performed by: SURGERY

## 2023-02-21 PROCEDURE — 94640 AIRWAY INHALATION TREATMENT: CPT

## 2023-02-21 PROCEDURE — 92526 ORAL FUNCTION THERAPY: CPT

## 2023-02-21 PROCEDURE — 2580000003 HC RX 258: Performed by: SURGERY

## 2023-02-21 PROCEDURE — 1200000000 HC SEMI PRIVATE

## 2023-02-21 PROCEDURE — 6360000002 HC RX W HCPCS: Performed by: SURGERY

## 2023-02-21 RX ORDER — AMOXICILLIN AND CLAVULANATE POTASSIUM 500; 125 MG/1; MG/1
1 TABLET, FILM COATED ORAL EVERY 8 HOURS SCHEDULED
Status: DISCONTINUED | OUTPATIENT
Start: 2023-02-21 | End: 2023-02-22 | Stop reason: HOSPADM

## 2023-02-21 RX ORDER — CEFUROXIME AXETIL 250 MG/1
500 TABLET ORAL EVERY 12 HOURS SCHEDULED
Status: DISCONTINUED | OUTPATIENT
Start: 2023-02-21 | End: 2023-02-21

## 2023-02-21 RX ADMIN — ESCITALOPRAM 20 MG: 10 TABLET, FILM COATED ORAL at 08:14

## 2023-02-21 RX ADMIN — Medication 10 ML: at 22:40

## 2023-02-21 RX ADMIN — ATORVASTATIN CALCIUM 40 MG: 40 TABLET, FILM COATED ORAL at 08:14

## 2023-02-21 RX ADMIN — SODIUM CHLORIDE, POTASSIUM CHLORIDE, SODIUM LACTATE AND CALCIUM CHLORIDE: 600; 310; 30; 20 INJECTION, SOLUTION INTRAVENOUS at 08:03

## 2023-02-21 RX ADMIN — HYDRALAZINE HYDROCHLORIDE 50 MG: 25 TABLET, FILM COATED ORAL at 08:14

## 2023-02-21 RX ADMIN — TAMSULOSIN HYDROCHLORIDE 0.4 MG: 0.4 CAPSULE ORAL at 08:14

## 2023-02-21 RX ADMIN — Medication 2 PUFF: at 09:48

## 2023-02-21 RX ADMIN — POLYETHYLENE GLYCOL 3350 17 G: 17 POWDER, FOR SOLUTION ORAL at 08:10

## 2023-02-21 RX ADMIN — AMOXICILLIN AND CLAVULANATE POTASSIUM 1 TABLET: 500; 125 TABLET, FILM COATED ORAL at 22:40

## 2023-02-21 RX ADMIN — OXYCODONE AND ACETAMINOPHEN 1 TABLET: 5; 325 TABLET ORAL at 01:13

## 2023-02-21 RX ADMIN — PANTOPRAZOLE SODIUM 40 MG: 40 TABLET, DELAYED RELEASE ORAL at 08:14

## 2023-02-21 RX ADMIN — AMLODIPINE BESYLATE 10 MG: 5 TABLET ORAL at 08:14

## 2023-02-21 RX ADMIN — Medication 10 ML: at 08:15

## 2023-02-21 RX ADMIN — HYDRALAZINE HYDROCHLORIDE 50 MG: 25 TABLET, FILM COATED ORAL at 14:17

## 2023-02-21 RX ADMIN — ASPIRIN 81 MG: 81 TABLET, COATED ORAL at 08:14

## 2023-02-21 RX ADMIN — LEVOTHYROXINE SODIUM 125 MCG: 0.12 TABLET ORAL at 06:09

## 2023-02-21 RX ADMIN — Medication 2 PUFF: at 21:56

## 2023-02-21 RX ADMIN — CETIRIZINE HYDROCHLORIDE 5 MG: 10 TABLET, FILM COATED ORAL at 08:15

## 2023-02-21 RX ADMIN — MONTELUKAST SODIUM 10 MG: 10 TABLET, FILM COATED ORAL at 22:40

## 2023-02-21 RX ADMIN — AMOXICILLIN AND CLAVULANATE POTASSIUM 1 TABLET: 500; 125 TABLET, FILM COATED ORAL at 14:17

## 2023-02-21 RX ADMIN — FLUTICASONE PROPIONATE 1 SPRAY: 50 SPRAY, METERED NASAL at 08:10

## 2023-02-21 RX ADMIN — OXYBUTYNIN CHLORIDE 10 MG: 5 TABLET, EXTENDED RELEASE ORAL at 08:15

## 2023-02-21 RX ADMIN — PIPERACILLIN AND TAZOBACTAM 3375 MG: 3; .375 INJECTION, POWDER, FOR SOLUTION INTRAVENOUS at 04:40

## 2023-02-21 RX ADMIN — HYDRALAZINE HYDROCHLORIDE 50 MG: 25 TABLET, FILM COATED ORAL at 22:40

## 2023-02-21 RX ADMIN — ENOXAPARIN SODIUM 40 MG: 100 INJECTION SUBCUTANEOUS at 08:10

## 2023-02-21 ASSESSMENT — PAIN SCALES - GENERAL: PAINLEVEL_OUTOF10: 5

## 2023-02-21 NOTE — PLAN OF CARE
Problem: ABCDS Injury Assessment  Goal: Absence of physical injury  Outcome: Progressing     Problem: Skin/Tissue Integrity  Goal: Absence of new skin breakdown  Description: 1. Monitor for areas of redness and/or skin breakdown  2. Assess vascular access sites hourly  3. Every 4-6 hours minimum:  Change oxygen saturation probe site  4. Every 4-6 hours:  If on nasal continuous positive airway pressure, respiratory therapy assess nares and determine need for appliance change or resting period.   Outcome: Progressing     Problem: Discharge Planning  Goal: Discharge to home or other facility with appropriate resources  Outcome: Progressing     Problem: Safety - Adult  Goal: Free from fall injury  Outcome: Progressing     Problem: Neurosensory - Adult  Goal: Achieves stable or improved neurological status  Outcome: Progressing     Problem: Musculoskeletal - Adult  Goal: Return mobility to safest level of function  Outcome: Progressing  Goal: Maintain proper alignment of affected body part  Outcome: Progressing

## 2023-02-21 NOTE — PROGRESS NOTES
Facility/Department: [address] ORTHO/NEURO NURSING  Speech Language Pathology   Dysphagia Treatment Note    Patient: Jovany Toney   : 1935   MRN: [id number]      Evaluation Date: 2023      Admitting Dx: Biliary calculus of other site with obstruction [K80.81]  Pneumonia of left lower lobe due to infectious organism [J18.9]  Acute biliary pancreatitis without infection or necrosis [K85.10]  Treatment Diagnosis: Oropharyngeal Dysphagia   Pain: Did not state                                              Diet and Treatment Recommendations 2023:  Diet Solids Recommendation:  Dysphagia III Soft and bite sized  Liquid Consistency Recommendation: Thin liquids  Recommended form of Meds: Meds in puree        Compensatory strategies: Alternate solids/liquids , Upright as possible with all PO intake , Small bites/sips , Eat/feed slowly, Remain upright 30-45 min     Assessment of Texture Tolerance:  Diet level prior to treatment: Dysphagia III Soft and bite sized , Thin liquids   Tolerance of Current Diet Level: Per chart, no noted difficulty with current diet level     Impressions: Pt was positioned Upright in bed , awake and alert. Currently on room air. Trials of thin liquids, soft solids, and regular solids  were provided to assess swallow function. Family present at bedside, reports minimal intake during breakfast. Pt benefited from feeding assistance including tactile support to bring cup to mouth. Pt used right hand for feeding. Pt's oral phase characterized by munching chewing pattern, prolonged mastication, anterior loss x 1 to the right with regular solid which pt independently cleared. Given extended time, pt with adequate AP transit and oral clearance. Pt with immediate coughing following sequential straw sips of thins. Also noted delayed cough x 1 with mixed consistencies. With cued small single sips of thin liquids, no overt signs of aspiration.  Pt required consistent cues to carry-over strategy of continued small sips. Pt demonstrates increased risk for aspiration due to cognitive state  and co morbidities . Based on today's assessment recommend continuing Dysphagia III Soft and bite sized  with Thin liquids , Meds in puree . Will continue to follow for diet tolerance, with instrumental swallow study as clinically indicated for ongoing overt signs of aspiration. Dysphagia Goals:   Pt will functionally tolerate recommended diet with no overt clinical s/s of aspiration (Ongoing 02/21/23)  Pt will functionally tolerate ongoing assessment of swallow function with diet to be determined as indicated (Ongoing 02/21/23)  Pt will demonstrate understanding of aspiration risk and precautions via education/demonstration with occasional prompting (Ongoing 02/21/23)  Pt will advance to least restrictive diet as indicated (Ongoing 02/21/23)    Plan:   3-5 times per week during acute care stay. Patient/Family Education:  Provided education regarding role of SLP, recommendations and general speech pathology plan of care. [] Pt verbalized understanding and agreement   [x] Pt requires ongoing learning   [x] No evidence of comprehension     Discharge Recommendations:    Discharge recommendations to be determined pending ongoing follow-up during acute care stay    Treatment time:  Timed Code Treatment Minutes: 0  Total Treatment time: 18    If patient discharges prior to next session this note will serve as a discharge summary. Signature:   Elida Soto, [facility]  Speech-Language Pathology Graduate Clinician    The speech-language pathologist was present, directed the patient's care, made skilled judgment and was responsible for assessment and treatment.     Nia Peralta, [id number] [facility]  Speech-Language Pathologist  [phone]

## 2023-02-21 NOTE — PROGRESS NOTES
Morning assessment completed. Pt comfortably resting in bed. VSS. Morning meds crushed in applesauce and given per MAR. Bedside table and food tray in reach, family member helping to feed the pt. The care plan and education has been reviewed and mutually agreed upon with the patient and family member.

## 2023-02-21 NOTE — PROGRESS NOTES
Department of Internal Medicine  General Internal Medicine   Progress Note      SUBJECTIVE: looking  fairly asymptomatic without distress    History obtained from chart review and the patient  General ROS: positive for  - fatigue, malaise and weight loss  negative for - chills, fever or night sweats  Psychological ROS: negative  Ophthalmic ROS: negative  Respiratory ROS: no cough, shortness of breath, or wheezing  Cardiovascular ROS: no chest pain or dyspnea on exertion  Gastrointestinal ROS: no abdominal pain, change in bowel habits, or black or bloody stools  Genito-Urinary ROS: no dysuria, trouble voiding, or hematuria  Musculoskeletal ROS: chronic pain   Neurological ROS: no TIA or stroke symptoms    OBJECTIVE      Medications      Current Facility-Administered Medications: sodium chloride flush 0.9 % injection 5-40 mL, 5-40 mL, IntraVENous, 2 times per day  sodium chloride flush 0.9 % injection 5-40 mL, 5-40 mL, IntraVENous, PRN  0.9 % sodium chloride infusion, , IntraVENous, PRN  ondansetron (ZOFRAN) injection 4 mg, 4 mg, IntraVENous, Once PRN  diphenhydrAMINE (BENADRYL) injection 12.5 mg, 12.5 mg, IntraVENous, Once PRN  morphine (PF) injection 2 mg, 2 mg, IntraVENous, Q2H PRN  oxyCODONE-acetaminophen (PERCOCET) 5-325 MG per tablet 1 tablet, 1 tablet, Oral, Q4H PRN  acetaminophen (TYLENOL) tablet 1,000 mg, 1,000 mg, Oral, Q6H PRN  dextrose bolus 10% 125 mL, 125 mL, IntraVENous, PRN **OR** dextrose bolus 10% 250 mL, 250 mL, IntraVENous, PRN  glucagon (rDNA) injection 1 mg, 1 mg, SubCUTAneous, PRN  dextrose 10 % infusion, , IntraVENous, Continuous PRN  insulin lispro (HUMALOG) injection vial 0-8 Units, 0-8 Units, SubCUTAneous, TID WC  insulin lispro (HUMALOG) injection vial 0-4 Units, 0-4 Units, SubCUTAneous, Nightly  potassium chloride (KLOR-CON M) extended release tablet 40 mEq, 40 mEq, Oral, PRN **OR** potassium bicarb-citric acid (EFFER-K) effervescent tablet 40 mEq, 40 mEq, Oral, PRN **OR** potassium chloride 10 mEq/100 mL IVPB (Peripheral Line), 10 mEq, IntraVENous, PRN  lactated ringers IV soln infusion, , IntraVENous, Continuous  mometasone-formoterol (DULERA) 200-5 MCG/ACT inhaler 2 puff, 2 puff, Inhalation, BID  albuterol sulfate HFA (PROVENTIL;VENTOLIN;PROAIR) 108 (90 Base) MCG/ACT inhaler 2 puff, 2 puff, Inhalation, Q6H PRN  amLODIPine (NORVASC) tablet 10 mg, 10 mg, Oral, Daily  aspirin EC tablet 81 mg, 81 mg, Oral, Daily  atorvastatin (LIPITOR) tablet 40 mg, 40 mg, Oral, Daily  escitalopram (LEXAPRO) tablet 20 mg, 20 mg, Oral, Daily  fluticasone (FLONASE) 50 MCG/ACT nasal spray 1 spray, 1 spray, Each Nostril, Daily  hydrALAZINE (APRESOLINE) tablet 50 mg, 50 mg, Oral, TID  lactulose (CHRONULAC) 10 GM/15ML solution 20 g, 20 g, Oral, TID PRN  levothyroxine (SYNTHROID) tablet 125 mcg, 125 mcg, Oral, Daily  cetirizine (ZYRTEC) tablet 5 mg, 5 mg, Oral, Daily  montelukast (SINGULAIR) tablet 10 mg, 10 mg, Oral, Nightly  oxybutynin (DITROPAN-XL) extended release tablet 10 mg, 10 mg, Oral, Daily  pantoprazole (PROTONIX) tablet 40 mg, 40 mg, Oral, Daily  tamsulosin (FLOMAX) capsule 0.4 mg, 0.4 mg, Oral, Daily  piperacillin-tazobactam (ZOSYN) 3,375 mg in sodium chloride 0.9 % 50 mL IVPB (mini-bag), 3,375 mg, IntraVENous, q8h  enoxaparin (LOVENOX) injection 40 mg, 40 mg, SubCUTAneous, Daily  HYDROmorphone HCl PF (DILAUDID) injection 0.5 mg, 0.5 mg, IntraVENous, Q4H PRN  ondansetron (ZOFRAN) injection 4 mg, 4 mg, IntraVENous, Q6H PRN  ipratropium-albuterol (DUONEB) nebulizer solution 1 ampule, 1 ampule, Inhalation, Q4H PRN  polyethylene glycol (GLYCOLAX) packet 17 g, 17 g, Oral, Daily    Physical      Vitals: BP (!) 170/75   Pulse 61   Temp 97.8 °F (36.6 °C) (Oral)   Resp 18   Ht 6' 2\" (1.88 m)   Wt 230 lb 3.2 oz (104.4 kg)   SpO2 93%   BMI 29.56 kg/m²   Temp: Temp: 97.8 °F (36.6 °C)  Max: Temp  Av.7 °F (36.5 °C)  Min: 97 °F (36.1 °C)  Max: 98.2 °F (36.8 °C)  Respiration range:  Resp  Av  Min: 14 Max: 18  Pulse Range:  Pulse  Av.8  Min: 59  Max: 75  Blood pressure range:  Systolic (39YMU), USA:941 , Min:133 , TRIP:677   , Diastolic (28TVK), AJR:18, Min:62, Max:81    SpO2  Av.1 %  Min: 93 %  Max: 100 %    Intake/Output Summary (Last 24 hours) at 2023 0919  Last data filed at 2023 1207  Gross per 24 hour   Intake 350 ml   Output 30 ml   Net 320 ml           Vent settings:  Pulse  Av.1  Min: 49  Max: 94  Resp  Av.3  Min: 10  Max: 28  SpO2  Av.6 %  Min: 93 %  Max: 100 %    CONSTITUTIONAL:  awake, alert, cooperative, no apparent distress, and appears stated age  EYES:  Unremarkable   NECK:  Mild JVD  and supple, symmetrical, trachea midline  BACK:  symmetric  LUNGS:  No increased work of breathing, good air exchange, clear to auscultation bilaterally, no crackles or wheezing  CARDIOVASCULAR:  normal apical pulses, regular rate and rhythm, normal S1 and S2 and no S3  ABDOMEN:  Soft BS + non tender   MUSCULOSKELETAL:  No distal edema   NEUROLOGIC:  left hemiparesis   SKIN:  Warm and dry  and no bruising or bleeding    Data      Recent Results (from the past 96 hour(s))   CBC with Auto Differential    Collection Time: 23  9:45 AM   Result Value Ref Range    WBC 10.2 4.0 - 11.0 K/uL    RBC 3.82 (L) 4.20 - 5.90 M/uL    Hemoglobin 12.1 (L) 13.5 - 17.5 g/dL    Hematocrit 36.7 (L) 40.5 - 52.5 %    MCV 96.0 80.0 - 100.0 fL    MCH 31.7 26.0 - 34.0 pg    MCHC 33.0 31.0 - 36.0 g/dL    RDW 15.5 (H) 12.4 - 15.4 %    Platelets 725 691 - 574 K/uL    MPV 10.0 5.0 - 10.5 fL    Neutrophils % 88.4 %    Lymphocytes % 4.9 %    Monocytes % 6.5 %    Eosinophils % 0.1 %    Basophils % 0.1 %    Neutrophils Absolute 9.1 (H) 1.7 - 7.7 K/uL    Lymphocytes Absolute 0.5 (L) 1.0 - 5.1 K/uL    Monocytes Absolute 0.7 0.0 - 1.3 K/uL    Eosinophils Absolute 0.0 0.0 - 0.6 K/uL    Basophils Absolute 0.0 0.0 - 0.2 K/uL   CMP w/ Reflex to MG    Collection Time: 23  9:45 AM   Result Value Ref Range    Sodium 133 (L) 136 - 145 mmol/L    Potassium reflex Magnesium 3.8 3.5 - 5.1 mmol/L    Chloride 97 (L) 99 - 110 mmol/L    CO2 25 21 - 32 mmol/L    Anion Gap 11 3 - 16    Glucose 211 (H) 70 - 99 mg/dL    BUN 16 7 - 20 mg/dL    Creatinine 1.2 0.8 - 1.3 mg/dL    Est, Glom Filt Rate 59 (A) >60    Calcium 9.6 8.3 - 10.6 mg/dL    Total Protein 6.9 6.4 - 8.2 g/dL    Albumin 3.6 3.4 - 5.0 g/dL    Albumin/Globulin Ratio 1.1 1.1 - 2.2    Total Bilirubin 4.4 (H) 0.0 - 1.0 mg/dL    Alkaline Phosphatase 585 (H) 40 - 129 U/L     (H) 10 - 40 U/L     (H) 15 - 37 U/L   Lipase    Collection Time: 02/17/23  9:45 AM   Result Value Ref Range    Lipase >3,000.0 (H) 13.0 - 60.0 U/L   Troponin    Collection Time: 02/17/23  9:45 AM   Result Value Ref Range    Troponin <0.01 <0.01 ng/mL   Hepatitis Panel, Acute    Collection Time: 02/17/23  9:45 AM   Result Value Ref Range    Hep A IgM Non-reactive Non-reactive    Hep B Core Ab, IgM Non-reactive Non-reactive    Hep B S Ag Interp Non-reactive Non-reactive    Hep C Ab Interp Non-reactive Non-reactive   EKG 12 Lead    Collection Time: 02/17/23  9:55 AM   Result Value Ref Range    Ventricular Rate 75 BPM    Atrial Rate 75 BPM    P-R Interval 184 ms    QRS Duration 110 ms    Q-T Interval 364 ms    QTc Calculation (Bazett) 406 ms    P Axis 43 degrees    R Axis -33 degrees    T Axis -4 degrees    Diagnosis       Sinus rhythm with Premature atrial complexesLeft axis deviationModerate voltage criteria for LVH, may be normal variantAbnormal ECGConfirmed by [facility] Ceci ACOSTA ([id number]) on 2/17/2023 6:55:09 PM   Urinalysis with Reflex to Culture    Collection Time: 02/17/23 11:53 AM    Specimen: Urine   Result Value Ref Range    Color, UA DARK YELLOW (A) Straw/Yellow    Clarity, UA Clear Clear    Glucose, Ur Negative Negative mg/dL    Bilirubin Urine SMALL (A) Negative    Ketones, Urine Negative Negative mg/dL    Specific Gravity, UA 1.010 1.005 - 1.030    Blood, Urine SMALL (A) Negative    pH, UA 8.0 5.0 - 8.0    Protein,  (A) Negative mg/dL    Urobilinogen, Urine 2.0 (A) <2.0 E.U./dL    Nitrite, Urine Negative Negative    Leukocyte Esterase, Urine Negative Negative    Microscopic Examination YES     Urine Type NotGiven     Urine Reflex to Culture Not Indicated    Microscopic Urinalysis    Collection Time: 02/17/23 11:53 AM   Result Value Ref Range    Bacteria, UA None Seen None Seen /HPF    Hyaline Casts, UA 0 0 - 8 /LPF    WBC, UA 0 0 - 5 /HPF    RBC, UA 1 0 - 4 /HPF    Epithelial Cells, UA 0 0 - 5 /HPF   CBC    Collection Time: 02/18/23  5:51 AM   Result Value Ref Range    WBC 5.5 4.0 - 11.0 K/uL    RBC 3.07 (L) 4.20 - 5.90 M/uL    Hemoglobin 9.7 (L) 13.5 - 17.5 g/dL    Hematocrit 29.1 (L) 40.5 - 52.5 %    MCV 94.9 80.0 - 100.0 fL    MCH 31.4 26.0 - 34.0 pg    MCHC 33.1 31.0 - 36.0 g/dL    RDW 15.8 (H) 12.4 - 15.4 %    Platelets 655 (L) 315 - 450 K/uL    MPV 9.6 5.0 - 10.5 fL   Comprehensive Metabolic Panel    Collection Time: 02/18/23  5:51 AM   Result Value Ref Range    Sodium 136 136 - 145 mmol/L    Potassium 3.2 (L) 3.5 - 5.1 mmol/L    Chloride 103 99 - 110 mmol/L    CO2 27 21 - 32 mmol/L    Anion Gap 6 3 - 16    Glucose 152 (H) 70 - 99 mg/dL    BUN 18 7 - 20 mg/dL    Creatinine 1.2 0.8 - 1.3 mg/dL    Est, Glom Filt Rate 59 (A) >60    Calcium 8.4 8.3 - 10.6 mg/dL    Total Protein 5.4 (L) 6.4 - 8.2 g/dL    Albumin 2.8 (L) 3.4 - 5.0 g/dL    Albumin/Globulin Ratio 1.1 1.1 - 2.2    Total Bilirubin 3.8 (H) 0.0 - 1.0 mg/dL    Alkaline Phosphatase 370 (H) 40 - 129 U/L     (H) 10 - 40 U/L     (H) 15 - 37 U/L   Hemoglobin A1c    Collection Time: 02/18/23  5:51 AM   Result Value Ref Range    Hemoglobin A1C 6.2 See comment %    eAG 131.2 mg/dL   POCT Glucose    Collection Time: 02/18/23  7:56 AM   Result Value Ref Range    POC Glucose 144 (H) 70 - 99 mg/dl    Performed on ACCU-CHEK    POCT Glucose    Collection Time: 02/18/23 11:29 AM   Result Value Ref Range    POC Glucose 126 (H) 70 - 99 mg/dl Performed on ACCU-CHEK    POCT Glucose    Collection Time: 02/18/23  4:57 PM   Result Value Ref Range    POC Glucose 142 (H) 70 - 99 mg/dl    Performed on ACCU-CHEK    POCT Glucose    Collection Time: 02/18/23  8:41 PM   Result Value Ref Range    POC Glucose 184 (H) 70 - 99 mg/dl    Performed on ACCU-CHEK    CBC    Collection Time: 02/19/23  5:06 AM   Result Value Ref Range    WBC 3.8 (L) 4.0 - 11.0 K/uL    RBC 3.22 (L) 4.20 - 5.90 M/uL    Hemoglobin 10.1 (L) 13.5 - 17.5 g/dL    Hematocrit 30.7 (L) 40.5 - 52.5 %    MCV 95.2 80.0 - 100.0 fL    MCH 31.3 26.0 - 34.0 pg    MCHC 32.9 31.0 - 36.0 g/dL    RDW 15.6 (H) 12.4 - 15.4 %    Platelets 969 (L) 304 - 450 K/uL    MPV 9.4 5.0 - 10.5 fL   Comprehensive Metabolic Panel w/ Reflex to MG    Collection Time: 02/19/23  5:06 AM   Result Value Ref Range    Sodium 138 136 - 145 mmol/L    Potassium reflex Magnesium 3.7 3.5 - 5.1 mmol/L    Chloride 105 99 - 110 mmol/L    CO2 22 21 - 32 mmol/L    Anion Gap 11 3 - 16    Glucose 150 (H) 70 - 99 mg/dL    BUN 16 7 - 20 mg/dL    Creatinine 1.2 0.8 - 1.3 mg/dL    Est, Glom Filt Rate 59 (A) >60    Calcium 8.6 8.3 - 10.6 mg/dL    Total Protein 5.7 (L) 6.4 - 8.2 g/dL    Albumin 2.5 (L) 3.4 - 5.0 g/dL    Albumin/Globulin Ratio 0.8 (L) 1.1 - 2.2    Total Bilirubin 1.5 (H) 0.0 - 1.0 mg/dL    Alkaline Phosphatase 342 (H) 40 - 129 U/L     (H) 10 - 40 U/L    AST 70 (H) 15 - 37 U/L   Lipase    Collection Time: 02/19/23  5:06 AM   Result Value Ref Range    Lipase 153.0 (H) 13.0 - 60.0 U/L   POCT Glucose    Collection Time: 02/19/23  7:16 AM   Result Value Ref Range    POC Glucose 161 (H) 70 - 99 mg/dl    Performed on ACCU-CHEK    POCT Glucose    Collection Time: 02/19/23 11:17 AM   Result Value Ref Range    POC Glucose 247 (H) 70 - 99 mg/dl    Performed on ACCU-CHEK    POCT Glucose    Collection Time: 02/19/23  4:00 PM   Result Value Ref Range    POC Glucose 216 (H) 70 - 99 mg/dl    Performed on ACCU-CHEK    POCT Glucose    Collection Time: 02/19/23  9:05 PM   Result Value Ref Range    POC Glucose 213 (H) 70 - 99 mg/dl    Performed on ACCU-CHEK    CBC    Collection Time: 02/20/23  5:21 AM   Result Value Ref Range    WBC 4.0 4.0 - 11.0 K/uL    RBC 3.25 (L) 4.20 - 5.90 M/uL    Hemoglobin 10.4 (L) 13.5 - 17.5 g/dL    Hematocrit 31.2 (L) 40.5 - 52.5 %    MCV 95.8 80.0 - 100.0 fL    MCH 31.9 26.0 - 34.0 pg    MCHC 33.3 31.0 - 36.0 g/dL    RDW 15.5 (H) 12.4 - 15.4 %    Platelets 515 416 - 434 K/uL    MPV 10.0 5.0 - 10.5 fL   Comprehensive Metabolic Panel    Collection Time: 02/20/23  5:21 AM   Result Value Ref Range    Sodium 138 136 - 145 mmol/L    Potassium 3.9 3.5 - 5.1 mmol/L    Chloride 104 99 - 110 mmol/L    CO2 22 21 - 32 mmol/L    Anion Gap 12 3 - 16    Glucose 178 (H) 70 - 99 mg/dL    BUN 13 7 - 20 mg/dL    Creatinine 1.1 0.8 - 1.3 mg/dL    Est, Glom Filt Rate >60 >60    Calcium 8.9 8.3 - 10.6 mg/dL    Total Protein 6.2 (L) 6.4 - 8.2 g/dL    Albumin 2.9 (L) 3.4 - 5.0 g/dL    Albumin/Globulin Ratio 0.9 (L) 1.1 - 2.2    Total Bilirubin 1.1 (H) 0.0 - 1.0 mg/dL    Alkaline Phosphatase 319 (H) 40 - 129 U/L     (H) 10 - 40 U/L    AST 38 (H) 15 - 37 U/L   POCT Glucose    Collection Time: 02/20/23  7:48 AM   Result Value Ref Range    POC Glucose 172 (H) 70 - 99 mg/dl    Performed on ACCU-CHEK    POCT Glucose    Collection Time: 02/20/23 11:36 AM   Result Value Ref Range    POC Glucose 198 (H) 70 - 99 mg/dl    Performed on ACCU-CHEK    POCT Glucose    Collection Time: 02/20/23  3:55 PM   Result Value Ref Range    POC Glucose 216 (H) 70 - 99 mg/dl    Performed on ACCU-CHEK    POCT Glucose    Collection Time: 02/20/23  8:54 PM   Result Value Ref Range    POC Glucose 222 (H) 70 - 99 mg/dl    Performed on ACCU-CHEK    Comprehensive Metabolic Panel    Collection Time: 02/21/23  4:58 AM   Result Value Ref Range    Sodium 135 (L) 136 - 145 mmol/L    Potassium 4.1 3.5 - 5.1 mmol/L    Chloride 103 99 - 110 mmol/L    CO2 22 21 - 32 mmol/L    Anion Gap 10 3 - 16    Glucose 200 (H) 70 - 99 mg/dL    BUN 17 7 - 20 mg/dL    Creatinine 1.0 0.8 - 1.3 mg/dL    Est, Glom Filt Rate >60 >60    Calcium 8.8 8.3 - 10.6 mg/dL    Total Protein 6.2 (L) 6.4 - 8.2 g/dL    Albumin 2.9 (L) 3.4 - 5.0 g/dL    Albumin/Globulin Ratio 0.9 (L) 1.1 - 2.2    Total Bilirubin 0.9 0.0 - 1.0 mg/dL    Alkaline Phosphatase 279 (H) 40 - 129 U/L     (H) 10 - 40 U/L    AST 50 (H) 15 - 37 U/L   CBC with Auto Differential    Collection Time: 02/21/23  4:58 AM   Result Value Ref Range    WBC 6.5 4.0 - 11.0 K/uL    RBC 3.15 (L) 4.20 - 5.90 M/uL    Hemoglobin 10.2 (L) 13.5 - 17.5 g/dL    Hematocrit 30.2 (L) 40.5 - 52.5 %    MCV 95.8 80.0 - 100.0 fL    MCH 32.4 26.0 - 34.0 pg    MCHC 33.8 31.0 - 36.0 g/dL    RDW 15.3 12.4 - 15.4 %    Platelets 277 374 - 731 K/uL    MPV 9.5 5.0 - 10.5 fL    Neutrophils % 82.5 %    Lymphocytes % 8.9 %    Monocytes % 8.6 %    Eosinophils % 0.0 %    Basophils % 0.0 %    Neutrophils Absolute 5.4 1.7 - 7.7 K/uL    Lymphocytes Absolute 0.6 (L) 1.0 - 5.1 K/uL    Monocytes Absolute 0.6 0.0 - 1.3 K/uL    Eosinophils Absolute 0.0 0.0 - 0.6 K/uL    Basophils Absolute 0.0 0.0 - 0.2 K/uL   Lipase    Collection Time: 02/21/23  4:58 AM   Result Value Ref Range    Lipase 29.0 13.0 - 60.0 U/L   POCT Glucose    Collection Time: 02/21/23  7:13 AM   Result Value Ref Range    POC Glucose 191 (H) 70 - 99 mg/dl    Performed on [facility] Problems             Last Modified POA    * (Principal) Acute biliary pancreatitis without infection or necrosis 2/17/2023 Yes    Dementia ([facility] [address] 2/18/2023 Yes    Cholelithiasis 2/18/2023 Yes    Late effects of cerebral ischemic stroke 2/18/2023 Yes    Controlled type 2 diabetes mellitus, with long-term current use of insulin ([facility] [address] 2/18/2023 Yes    Primary hypertension 2/18/2023 Yes      Clinically Pancreatitis is resolved , now awaiting lap Deepa  and IOC   Liver enzymes have come down

## 2023-02-21 NOTE — CARE COORDINATION
Discharge Planning Assessment  Risk of Readmission Score: 23%    RN discharge planner met with patient and Ruth Vo, daughter, to discuss reason for admission, current living situation, and potential needs at the time of discharge. Demographics/Insurance verified Yes    Current type of dwelling:    Patient from ECF/ confirmed with: N/A    Living arrangements: DaughterCharmaine    Level of function/Support: The patient uses a walker to ambulated. The daughters provided any needed assistance with ADL's    PCP: Raf Andrews MD    Last Visit to PCP: a few months ago    DME: walker, wheel chair, walk-in shower, shower seat, grab bar    Active with any community resources/agencies/skilled home care:     - Patient is ACTIVE with [facility]. Medication compliance issues: The daughter, Ruth Vo, oversees the patient's medications    Financial issues that could impact healthcare: none    Tentative discharge plan: current discharge plan is for the patient to return home with daughterSandra's home.       Transportation at the time of discharge: SYBIL Azevedo RN    [facility]  Phone: [phone]

## 2023-02-21 NOTE — OP NOTE
[place] [address]                     [facility], [address] [place] [address]                                OPERATIVE REPORT    PATIENT NAME: Mg Duarte                  :        1935  MED REC NO:   [id number]                          ROOM:       4316  ACCOUNT NO:   [de-identified]                           ADMIT DATE: 2023  PROVIDER:     Xavi Rowley MD    DATE OF PROCEDURE:  2023    PREOPERATIVE DIAGNOSES:  Acute-on-chronic cholecystitis and gallstone  pancreatitis. POSTOPERATIVE DIAGNOSES:  Acute-on-chronic cholecystitis and gallstone  pancreatitis. PROCEDURE:  Laparoscopic cholecystectomy with intraoperative  cholangiogram.    SURGEON:  Xavi Rowley MD    ANESTHESIA:  General plus local.    ESTIMATED BLOOD LOSS:  Minimal.    COMPLICATIONS:  None. INDICATIONS FOR SURGERY:  The patient is an 52-year-old gentleman  presenting with severe upper abdominal pain, gallstone pancreatitis and  cholecystitis. The patient's pancreatitis resolved and now presents for  definitive cholecystectomy at this time. ADDITIONAL DETAILS OF SURGERY:  The patient was brought to the operating  room, placed on the operative table in supine position. Compression  boots were placed. General anesthetic was administered. The abdomen  was prepped and draped sterilely and time-out was done. A  supraumbilical 5-mm direct entry view port was placed and the abdominal  cavity was insufflated to 15 mmHg pressure. An epigastric 11-mm port  and two right lateral abdominal 5-mm ports were placed under direct  vision. The patient's duodenum and transverse colon and omentum were  thickly and densely adherent to the liver and gallbladder. This was all  taken down carefully and the gallbladder was exposed. It was thickened,  indurated, and chronically inflamed.   We managed to grasp it and  elevated cephalad and take the inferior portion of the adhesions from  the duodenum down off the infundibulum area carefully at this time. The  infundibulum area was then able to be grasped and retracted  inferolaterally to the right. The triangle of Calot was then dissected. This was very thick and inflamed. The peritoneal leaflets on either  side of the gallbladder were opened. The critical angle view was  established. A wide open window was created between the cystic duct,  gallbladder and liver base with the common bile duct and jey hepatis  structures down below this area. The cystic artery was clearly  identified, clipped with two clips proximal and one clip distally and  was transected. We then had a wide open view of the cystic duct  gallbladder junction which was very thickened and inflamed as well. This was cut with the scissors and a small opening was made. There was  some purulent fluid emanating from the gallbladder in this area and this  was controlled with a grasper. The cholangiogram catheter was placed in  the cystic duct opening at this time, at this position and the  cholangiogram was obtained. I did not see any filling defect present  distally and we had good filling through the cyst duct into the common  bile duct and a small amount of filling up proximally into the liver. The cholangiogram catheter was removed and the cystic duct was cut and  closed with 0-PDS Endoloops x3. The gallbladder was then removed off  hepatic fossa with Bovie electrocautery little by little. It was  intrahepatic and densely adherent with chronic thickening and there was  significant oozing from the liver base. This was rendered hemostatic  with cautery and Surgicel gauze and all appeared fine at this time at  the end of the case once the gallbladder was removed. The gallbladder  was removed from the abdomen within the specimen retrieval sac in the  upper abdomen and the perihepatic area was irrigated and aspirated dry.    Again, the area of the clips and Endoloops were checked and all was  intact with no bleeding or bile leak. Surgicel gauze was used to pack  the gallbladder fossa and appeared hemostatic. The instrument and ports  were removed. The port sites appeared hemostatic. Fascia was closed at  the epigastric site with an 0 Vicryl figure-of-eight suture. Skin was  closed at all sites with 4-0 Monocryl suture. Dermabond glue was  placed. The patient was taken to recovery room in stable condition  postop.       Abby Rodriguez MD    D: 02/20/2023 11:59:27       T: 02/20/2023 12:03:13     [id number]  Job#: [id number]     Doc#: [id number]    CC:  MD Anupam Naik MD

## 2023-02-21 NOTE — PROGRESS NOTES
[facility]        Progress Note    Patient Name: Ton Cardona  MRN: [id number]  YOB: 1935  Date of Evaluation: 2023    Subjective:  No acute events overnight  Pain controlled  No nausea or vomiting, tolerating regular diet--appetite fair  Passing stool  Resting in bed at this time    Post-Operative Day #1      Vital Signs:  Patient Vitals for the past 24 hrs:   BP Temp Temp src Pulse Resp SpO2   23 1415 (!) 155/66 97.9 °F (36.6 °C) Oral 62 16 94 %   23 1213 139/65 98.1 °F (36.7 °C) Oral 56 16 92 %   23 0948 -- -- -- 54 16 97 %   23 0800 (!) 170/75 97.8 °F (36.6 °C) Oral 61 18 93 %   23 0430 (!) 174/75 98.2 °F (36.8 °C) Oral 65 16 95 %   23 0100 (!) 168/76 98.2 °F (36.8 °C) Oral 65 16 94 %   23 2058 (!) 175/79 98.1 °F (36.7 °C) Oral 71 18 95 %   23 1615 (!) 176/81 97.3 °F (36.3 °C) Axillary 75 18 96 %      TEMPERATURE HISTORY 24H: Temp (24hrs), Av.9 °F (36.6 °C), Min:97.3 °F (36.3 °C), Max:98.2 °F (36.8 °C)    BLOOD PRESSURE HISTORY: Systolic (05YGC), NFM:516 , Min:133 , PATTERSON:983    Diastolic (21YEJ), NCW:10, Min:62, Max:81      Intake/Output:  I/O last 3 completed shifts: In: 350 [I.V.:350]  Out: 730 [Urine:705; Blood:25]  No intake/output data recorded.   Drain/tube Output:       Physical Exam:  General: awake, alert, loosely oriented  Lungs: unlabored respirations  Abdomen: soft, non-distended, incisional tenderness only, bowel sounds present   Skin/Wound: healing well, no drainage, no erythema, well approximated    Labs:  CBC:    Recent Labs     23  0506 23  0521 23  0458   WBC 3.8* 4.0 6.5   HGB 10.1* 10.4* 10.2*   HCT 30.7* 31.2* 30.2*   * 153 166     BMP:    Recent Labs     23  0506 23  0521 23  0458    138 135*   K 3.7 3.9 4.1    104 103   CO2 22 22 22   BUN 16 13 17   CREATININE 1.2 1.1 1.0   GLUCOSE 150* 178* 200*     Hepatic:    Recent Labs     02/19/23  0506 02/20/23  0521 02/21/23  0458   AST 70* 38* 50*   * 118* 100*   BILITOT 1.5* 1.1* 0.9   ALKPHOS 342* 319* 279*     Amylase:  No results found for: AMYLASE  Lipase:    Lab Results   Component Value Date/Time    LIPASE 29.0 02/21/2023 04:58 AM    LIPASE 153.0 02/19/2023 05:06 AM    LIPASE >3,000.0 02/17/2023 09:45 AM      Mag:    Lab Results   Component Value Date/Time    MG 2.20 10/09/2022 05:52 AM    MG 2.00 04/23/2022 05:09 AM     Phos:   No results found for: PHOS   Coags:   Lab Results   Component Value Date/Time    PROTIME 14.4 10/07/2022 05:55 PM    INR 1.13 10/07/2022 05:55 PM       Cultures:  Anaerobic culture  No results found for: LABANAE  Fungus stain  No results found for requested labs within last 30 days. Gram stain  No results found for requested labs within last 30 days. Organism  No results found for: [facility]  Surgical culture  No results found for: CXSURG  Blood culture 1  No results found for requested labs within last 30 days. Blood culture 2  No results found for requested labs within last 30 days. Fecal occult  No results found for requested labs within last 30 days. GI bacterial pathogens by PCR  No results found for requested labs within last 30 days. C. difficile  No results found for requested labs within last 30 days. Urine culture  No results found for: Lacey Summers    Pathology:  Pathology results pending     Imaging:  I have personally reviewed the following films:    FL CHOLANGIOGRAM OR    Result Date: 2/20/2023  EXAMINATION: SPOT IMAGES FROM AN INTRAOPERATIVE CHOLANGIOGRAM 2/20/2023 9:56 am COMPARISON: None. HISTORY: ORDERING SYSTEM PROVIDED HISTORY: pain TECHNOLOGIST PROVIDED HISTORY: Reason for exam:->pain FLUOROSCOPY DOSE AND TYPE: 24 seconds fluoroscopy 7 spot films FINDINGS: Contrast is injected in bile ducts. Contrast flows into small bowel. No obstructing distal common duct filling defect.      No obstructing distal common duct filling defect. Scheduled Meds:   amoxicillin-clavulanate  1 tablet Oral 3 times per day    sodium chloride flush  5-40 mL IntraVENous 2 times per day    insulin lispro  0-8 Units SubCUTAneous TID WC    insulin lispro  0-4 Units SubCUTAneous Nightly    mometasone-formoterol  2 puff Inhalation BID    amLODIPine  10 mg Oral Daily    aspirin  81 mg Oral Daily    atorvastatin  40 mg Oral Daily    escitalopram  20 mg Oral Daily    fluticasone  1 spray Each Nostril Daily    hydrALAZINE  50 mg Oral TID    levothyroxine  125 mcg Oral Daily    cetirizine  5 mg Oral Daily    montelukast  10 mg Oral Nightly    oxybutynin  10 mg Oral Daily    pantoprazole  40 mg Oral Daily    tamsulosin  0.4 mg Oral Daily    enoxaparin  40 mg SubCUTAneous Daily    polyethylene glycol  17 g Oral Daily     Continuous Infusions:   sodium chloride      dextrose      lactated ringers IV soln 75 mL/hr at 02/21/23 0803     PRN Meds:.sodium chloride flush, sodium chloride, morphine, oxyCODONE-acetaminophen, acetaminophen, dextrose bolus **OR** dextrose bolus, glucagon (rDNA), dextrose, potassium chloride **OR** potassium alternative oral replacement **OR** potassium chloride, albuterol sulfate HFA, lactulose, HYDROmorphone, ondansetron, ipratropium-albuterol      Assessment:  81 y.o. male admitted with   1. Acute biliary pancreatitis without infection or necrosis    2. Biliary calculus of other site with obstruction    3. Pneumonia of left lower lobe due to infectious organism    4. Cholecystitis        OR Date 2/20/2023, laparoscopic cholecystectomy with intraoperative cholangiogram for acute-on-chronic cholecystitis and gallstone pancreatitis  Hypertension  Diabetes   Dementia      Plan:  1. Pain controlled, no nausea or vomiting--appetite fair, incisions healing well, vitals stable, labs stable/improved; continued supportive care, no further imaging or intervention planned  2. Regular diet as tolerated; monitor bowel function  3.  IV hydration until PO intake is adequate; monitor and correct electrolytes  4. Antibiotics  5. Activity as tolerated, ambulate TID, up to chair for all meals  6. Pulmonary toilet, incentive spirometry  7. PRN analgesics and antiemetics--minimizing narcotics as tolerated, transition to PO  8. DVT prophylaxis with  Lovenox and SCD's  9. Management of medical comorbid etiologies per primary team and consulting services  10. Disposition: Discharge planning    EDUCATION:  Educated patient on plan of care and disease process--all questions answered. Plans discussed with patient and nursing. Reviewed and discussed with Dr. Rene Avelar. Signed:  BEST Jimenez - CNP  2/21/2023 2:48 PM    Surgery Staff:     I have personally performed a face to face diagnostic evaluation on this patient. I have interviewed and examined the patient and I agree with the assessment above. Time was spent reviewing patient chart (including but not limited to notes, labs, imaging and other testing), interviewing and counseling patient and present family members, performing physical exam, documentation of my findings and subsequent follow up of ordered medication and testing, placing referrals and communication with patient care providers, coordinating future care as well as documentation in the EHR. This encompassed more than 50% of the total encounter time.  In summary, my findings and plan are the following:   Pt in good spirits, eating a little  Normal post op exam and LFT decreasing gradually  Will be ready for discharge tomorrow perhaps  Planning home with family at this point    Brenda Plasencia MD

## 2023-02-22 VITALS
TEMPERATURE: 97.4 F | BODY MASS INDEX: 30.57 KG/M2 | OXYGEN SATURATION: 95 % | SYSTOLIC BLOOD PRESSURE: 146 MMHG | HEART RATE: 70 BPM | WEIGHT: 238.2 LBS | HEIGHT: 74 IN | DIASTOLIC BLOOD PRESSURE: 61 MMHG | RESPIRATION RATE: 18 BRPM

## 2023-02-22 LAB
A/G RATIO: 0.9 (ref 1.1–2.2)
ALBUMIN SERPL-MCNC: 3 G/DL (ref 3.4–5)
ALP BLD-CCNC: 237 U/L (ref 40–129)
ALT SERPL-CCNC: 83 U/L (ref 10–40)
ANION GAP SERPL CALCULATED.3IONS-SCNC: 10 MMOL/L (ref 3–16)
AST SERPL-CCNC: 39 U/L (ref 15–37)
BILIRUB SERPL-MCNC: 0.9 MG/DL (ref 0–1)
BUN BLDV-MCNC: 23 MG/DL (ref 7–20)
CALCIUM SERPL-MCNC: 8.8 MG/DL (ref 8.3–10.6)
CHLORIDE BLD-SCNC: 104 MMOL/L (ref 99–110)
CO2: 23 MMOL/L (ref 21–32)
CREAT SERPL-MCNC: 0.9 MG/DL (ref 0.8–1.3)
GFR SERPL CREATININE-BSD FRML MDRD: >60 ML/MIN/{1.73_M2}
GLUCOSE BLD-MCNC: 150 MG/DL (ref 70–99)
GLUCOSE BLD-MCNC: 168 MG/DL (ref 70–99)
GLUCOSE BLD-MCNC: 181 MG/DL (ref 70–99)
GLUCOSE BLD-MCNC: 182 MG/DL (ref 70–99)
HCT VFR BLD CALC: 32.9 % (ref 40.5–52.5)
HEMOGLOBIN: 10.5 G/DL (ref 13.5–17.5)
MCH RBC QN AUTO: 31.6 PG (ref 26–34)
MCHC RBC AUTO-ENTMCNC: 31.8 G/DL (ref 31–36)
MCV RBC AUTO: 99.2 FL (ref 80–100)
PDW BLD-RTO: 16.3 % (ref 12.4–15.4)
PERFORMED ON: ABNORMAL
PLATELET # BLD: 146 K/UL (ref 135–450)
PMV BLD AUTO: 9.1 FL (ref 5–10.5)
POTASSIUM SERPL-SCNC: 3.6 MMOL/L (ref 3.5–5.1)
RBC # BLD: 3.32 M/UL (ref 4.2–5.9)
SODIUM BLD-SCNC: 137 MMOL/L (ref 136–145)
TOTAL PROTEIN: 6.2 G/DL (ref 6.4–8.2)
WBC # BLD: 6.9 K/UL (ref 4–11)

## 2023-02-22 PROCEDURE — 2580000003 HC RX 258: Performed by: ANESTHESIOLOGY

## 2023-02-22 PROCEDURE — 6360000002 HC RX W HCPCS: Performed by: INTERNAL MEDICINE

## 2023-02-22 PROCEDURE — 99024 POSTOP FOLLOW-UP VISIT: CPT | Performed by: SURGERY

## 2023-02-22 PROCEDURE — 94761 N-INVAS EAR/PLS OXIMETRY MLT: CPT

## 2023-02-22 PROCEDURE — 85027 COMPLETE CBC AUTOMATED: CPT

## 2023-02-22 PROCEDURE — 94640 AIRWAY INHALATION TREATMENT: CPT

## 2023-02-22 PROCEDURE — 6370000000 HC RX 637 (ALT 250 FOR IP): Performed by: INTERNAL MEDICINE

## 2023-02-22 PROCEDURE — APPSS15 APP SPLIT SHARED TIME 0-15 MINUTES: Performed by: NURSE PRACTITIONER

## 2023-02-22 PROCEDURE — 6360000002 HC RX W HCPCS: Performed by: SURGERY

## 2023-02-22 PROCEDURE — 92526 ORAL FUNCTION THERAPY: CPT

## 2023-02-22 PROCEDURE — APPNB30 APP NON BILLABLE TIME 0-30 MINS: Performed by: NURSE PRACTITIONER

## 2023-02-22 PROCEDURE — 6370000000 HC RX 637 (ALT 250 FOR IP): Performed by: SURGERY

## 2023-02-22 PROCEDURE — 6370000000 HC RX 637 (ALT 250 FOR IP): Performed by: NURSE PRACTITIONER

## 2023-02-22 PROCEDURE — 80053 COMPREHEN METABOLIC PANEL: CPT

## 2023-02-22 RX ORDER — HALOPERIDOL 5 MG/ML
5 INJECTION INTRAMUSCULAR EVERY 4 HOURS PRN
Status: DISCONTINUED | OUTPATIENT
Start: 2023-02-22 | End: 2023-02-22 | Stop reason: HOSPADM

## 2023-02-22 RX ORDER — LIDOCAINE 4 G/G
1 PATCH TOPICAL DAILY
Status: DISCONTINUED | OUTPATIENT
Start: 2023-02-22 | End: 2023-02-22 | Stop reason: HOSPADM

## 2023-02-22 RX ORDER — ACETAMINOPHEN 500 MG
1000 TABLET ORAL EVERY 6 HOURS PRN
Status: DISCONTINUED | OUTPATIENT
Start: 2023-02-22 | End: 2023-02-22 | Stop reason: HOSPADM

## 2023-02-22 RX ORDER — LIDOCAINE 4 G/G
1 PATCH TOPICAL DAILY
Qty: 30 EACH | Refills: 0 | Status: SHIPPED | OUTPATIENT
Start: 2023-02-23

## 2023-02-22 RX ORDER — HYDRALAZINE HYDROCHLORIDE 20 MG/ML
20 INJECTION INTRAMUSCULAR; INTRAVENOUS EVERY 6 HOURS PRN
Status: DISCONTINUED | OUTPATIENT
Start: 2023-02-22 | End: 2023-02-22 | Stop reason: HOSPADM

## 2023-02-22 RX ORDER — AMOXICILLIN AND CLAVULANATE POTASSIUM 500; 125 MG/1; MG/1
1 TABLET, FILM COATED ORAL EVERY 8 HOURS SCHEDULED
Qty: 9 TABLET | Refills: 0 | Status: SHIPPED | OUTPATIENT
Start: 2023-02-22 | End: 2023-02-25

## 2023-02-22 RX ADMIN — ATORVASTATIN CALCIUM 40 MG: 40 TABLET, FILM COATED ORAL at 09:11

## 2023-02-22 RX ADMIN — OXYBUTYNIN CHLORIDE 10 MG: 5 TABLET, EXTENDED RELEASE ORAL at 09:11

## 2023-02-22 RX ADMIN — PANTOPRAZOLE SODIUM 40 MG: 40 TABLET, DELAYED RELEASE ORAL at 09:12

## 2023-02-22 RX ADMIN — POLYETHYLENE GLYCOL 3350 17 G: 17 POWDER, FOR SOLUTION ORAL at 09:11

## 2023-02-22 RX ADMIN — HALOPERIDOL LACTATE 5 MG: 5 INJECTION, SOLUTION INTRAMUSCULAR at 01:46

## 2023-02-22 RX ADMIN — LEVOTHYROXINE SODIUM 125 MCG: 0.12 TABLET ORAL at 05:43

## 2023-02-22 RX ADMIN — Medication 2 PUFF: at 06:20

## 2023-02-22 RX ADMIN — ASPIRIN 81 MG: 81 TABLET, COATED ORAL at 09:12

## 2023-02-22 RX ADMIN — Medication 10 ML: at 09:25

## 2023-02-22 RX ADMIN — HYDRALAZINE HYDROCHLORIDE 50 MG: 25 TABLET, FILM COATED ORAL at 09:11

## 2023-02-22 RX ADMIN — HYDRALAZINE HYDROCHLORIDE 50 MG: 25 TABLET, FILM COATED ORAL at 14:36

## 2023-02-22 RX ADMIN — HYDRALAZINE HYDROCHLORIDE 20 MG: 20 INJECTION INTRAMUSCULAR; INTRAVENOUS at 04:22

## 2023-02-22 RX ADMIN — ESCITALOPRAM 20 MG: 10 TABLET, FILM COATED ORAL at 09:12

## 2023-02-22 RX ADMIN — ENOXAPARIN SODIUM 40 MG: 100 INJECTION SUBCUTANEOUS at 09:10

## 2023-02-22 RX ADMIN — AMOXICILLIN AND CLAVULANATE POTASSIUM 1 TABLET: 500; 125 TABLET, FILM COATED ORAL at 05:43

## 2023-02-22 RX ADMIN — CETIRIZINE HYDROCHLORIDE 5 MG: 10 TABLET, FILM COATED ORAL at 09:11

## 2023-02-22 RX ADMIN — AMOXICILLIN AND CLAVULANATE POTASSIUM 1 TABLET: 500; 125 TABLET, FILM COATED ORAL at 14:36

## 2023-02-22 RX ADMIN — AMLODIPINE BESYLATE 10 MG: 5 TABLET ORAL at 09:11

## 2023-02-22 RX ADMIN — FLUTICASONE PROPIONATE 1 SPRAY: 50 SPRAY, METERED NASAL at 09:11

## 2023-02-22 RX ADMIN — TAMSULOSIN HYDROCHLORIDE 0.4 MG: 0.4 CAPSULE ORAL at 09:11

## 2023-02-22 ASSESSMENT — PAIN SCALES - GENERAL: PAINLEVEL_OUTOF10: 9

## 2023-02-22 NOTE — PROGRESS NOTES
[facility]        Progress Note    Patient Name: Nicole Maxwell  MRN: [id number]  YOB: 1935  Date of Evaluation: 2023    Subjective: Increased confusion/agitation overnight  Pain controlled  No nausea or vomiting, tolerating regular diet--appetite remains fair  Passing stool  Resting in bed at this time, family at bedside    Post-Operative Day #2      Vital Signs:  Patient Vitals for the past 24 hrs:   BP Temp Temp src Pulse Resp SpO2 Weight   23 1100 (!) 143/66 97.4 °F (36.3 °C) Oral 67 18 95 % --   23 0900 126/69 97.5 °F (36.4 °C) Oral 73 17 94 % --   23 0730 -- -- -- -- -- -- 238 lb 3.2 oz (108 kg)   23 0609 -- -- -- 81 18 96 % --   23 0530 (!) 157/68 -- -- 81 -- -- --   23 0418 (!) 171/82 97.3 °F (36.3 °C) Temporal 81 20 96 % --   23 0010 (!) 174/86 97.4 °F (36.3 °C) Temporal 75 24 94 % --   23 2240 134/67 -- -- -- -- -- --   23 2157 -- -- -- 56 18 94 % --   23 2120 134/67 98.3 °F (36.8 °C) Oral 59 20 93 % --   23 1415 (!) 155/66 97.9 °F (36.6 °C) Oral 62 16 94 % --        TEMPERATURE HISTORY 24H: Temp (24hrs), Av.6 °F (36.4 °C), Min:97.3 °F (36.3 °C), Max:98.3 °F (36.8 °C)    BLOOD PRESSURE HISTORY: Systolic (92UKX), VSL:750 , Min:126 , EOM:451    Diastolic (03KDH), DWO:68, Min:65, Max:86      Intake/Output:  I/O last 3 completed shifts:   In: 180 [P.O.:180]  Out: -   I/O this shift:  In: 240 [P.O.:240]  Out: -   Drain/tube Output:       Physical Exam:  General: awake, alert, loosely oriented  Lungs: unlabored respirations  Abdomen: soft, non-distended, incisional tenderness only, bowel sounds present   Skin/Wound: healing well, no drainage, no erythema, well approximated    Labs:  CBC:    Recent Labs     23  0521 23  0458 23  0447   WBC 4.0 6.5 6.9   HGB 10.4* 10.2* 10.5*   HCT 31.2* 30.2* 32.9*    166 146       BMP:    Recent Labs     23  0521 02/21/23  0458 02/22/23  0447    135* 137   K 3.9 4.1 3.6    103 104   CO2 22 22 23   BUN 13 17 23*   CREATININE 1.1 1.0 0.9   GLUCOSE 178* 200* 181*       Hepatic:    Recent Labs     02/20/23  0521 02/21/23  0458 02/22/23 0447   AST 38* 50* 39*   * 100* 83*   BILITOT 1.1* 0.9 0.9   ALKPHOS 319* 279* 237*       Amylase:  No results found for: AMYLASE  Lipase:    Lab Results   Component Value Date/Time    LIPASE 29.0 02/21/2023 04:58 AM    LIPASE 153.0 02/19/2023 05:06 AM    LIPASE >3,000.0 02/17/2023 09:45 AM        Mag:    Lab Results   Component Value Date/Time    MG 2.20 10/09/2022 05:52 AM    MG 2.00 04/23/2022 05:09 AM     Phos:   No results found for: PHOS   Coags:   Lab Results   Component Value Date/Time    PROTIME 14.4 10/07/2022 05:55 PM    INR 1.13 10/07/2022 05:55 PM       Cultures:  Anaerobic culture  No results found for: LABANAE  Fungus stain  No results found for requested labs within last 30 days. Gram stain  No results found for requested labs within last 30 days. Organism  No results found for: [facility]  Surgical culture  No results found for: CXSURG  Blood culture 1  No results found for requested labs within last 30 days. Blood culture 2  No results found for requested labs within last 30 days. Fecal occult  No results found for requested labs within last 30 days. GI bacterial pathogens by PCR  No results found for requested labs within last 30 days. C. difficile  No results found for requested labs within last 30 days. Urine culture  No results found for: LABURIN    Pathology:  OR 2/20/2023--FINAL DIAGNOSIS:   Gallbladder, cholecystectomy:   - Acute cholecystitis, marked. - Cholelithiasis. Imaging:  I have personally reviewed the following films:    No results found.     Scheduled Meds:   lidocaine  1 patch TransDERmal Daily    amoxicillin-clavulanate  1 tablet Oral 3 times per day    sodium chloride flush  5-40 mL IntraVENous 2 times per day insulin lispro  0-8 Units SubCUTAneous TID     insulin lispro  0-4 Units SubCUTAneous Nightly    mometasone-formoterol  2 puff Inhalation BID    amLODIPine  10 mg Oral Daily    aspirin  81 mg Oral Daily    atorvastatin  40 mg Oral Daily    escitalopram  20 mg Oral Daily    fluticasone  1 spray Each Nostril Daily    hydrALAZINE  50 mg Oral TID    levothyroxine  125 mcg Oral Daily    cetirizine  5 mg Oral Daily    montelukast  10 mg Oral Nightly    oxybutynin  10 mg Oral Daily    pantoprazole  40 mg Oral Daily    tamsulosin  0.4 mg Oral Daily    enoxaparin  40 mg SubCUTAneous Daily    polyethylene glycol  17 g Oral Daily     Continuous Infusions:   sodium chloride      dextrose       PRN Meds:.haloperidol lactate, hydrALAZINE, acetaminophen, sodium chloride flush, sodium chloride, dextrose bolus **OR** dextrose bolus, glucagon (rDNA), dextrose, potassium chloride **OR** potassium alternative oral replacement **OR** potassium chloride, albuterol sulfate HFA, lactulose, ondansetron, ipratropium-albuterol      Assessment:  81 y.o. male admitted with   1. Acute biliary pancreatitis without infection or necrosis    2. Biliary calculus of other site with obstruction    3. Pneumonia of left lower lobe due to infectious organism    4. Cholecystitis        OR Date 2/20/2023, laparoscopic cholecystectomy with intraoperative cholangiogram for acute-on-chronic cholecystitis and gallstone pancreatitis  Hypertension  Diabetes   Dementia      Plan:  1. Pain controlled, no nausea or vomiting--appetite remains fair, incisions healing well, vitals/labs stable; continued supportive care, no further imaging or intervention planned  2. Regular diet as tolerated; monitor bowel function  3. IV hydration until PO intake is adequate; monitor and correct electrolytes  4. Antibiotics  5. Activity as tolerated, ambulate TID, up to chair for all meals  6. Pulmonary toilet, incentive spirometry  7.  PRN analgesics and antiemetics--stop narcotics, PRN Tylenol, apply Lidocaine patch and ice  8. DVT prophylaxis with  Lovenox and SCD's  9. Management of medical comorbid etiologies per primary team and consulting services  10. Disposition: Okay for discharge from a surgical perspective; follow up with Dr. Dorie Butterfield in 2 weeks    EDUCATION:  Educated patient on plan of care and disease process--all questions answered. Plans discussed with patient and nursing. Reviewed and discussed with Dr. Dorie Butterfield. Signed:  BEST Wood - CNP  2/22/2023 1:29 PM    Surgery Staff:     I have personally performed a face to face diagnostic evaluation on this patient. I have interviewed and examined the patient and I agree with the assessment above. Time was spent reviewing patient chart (including but not limited to notes, labs, imaging and other testing), interviewing and counseling patient and present family members, performing physical exam, documentation of my findings and subsequent follow up of ordered medication and testing, placing referrals and communication with patient care providers, coordinating future care as well as documentation in the EHR. This encompassed more than 50% of the total encounter time.  In summary, my findings and plan are the following:   Abd stable post op, healing well, no issues  Path with acute carolyne  Discharge planning per medical team    Jefe Kim MD

## 2023-02-22 NOTE — PROGRESS NOTES
Facility/Department: [address] ORTHO/NEURO NURSING  Speech Language Pathology   Dysphagia Treatment Note    Patient: London Bentley   : 1935   MRN: [id number]      Evaluation Date: 2023      Admitting Dx: Biliary calculus of other site with obstruction [K80.81]  Pneumonia of left lower lobe due to infectious organism [J18.9]  Acute biliary pancreatitis without infection or necrosis [K85.10]  Treatment Diagnosis: Oropharyngeal Dysphagia   Pain: Did not state                                              Diet and Treatment Recommendations 2023:  Diet Solids Recommendation:  Dysphagia III Soft and bite sized  Liquid Consistency Recommendation: Thin liquids  Recommended form of Meds: Meds in puree        Compensatory strategies: Alternate solids/liquids , Upright as possible with all PO intake , Small bites/sips , Eat/feed slowly, Remain upright 30-45 min     Assessment of Texture Tolerance:  Diet level prior to treatment: Dysphagia III Soft and bite sized , Thin liquids   Tolerance of Current Diet Level: Per chart, no noted difficulty with current diet level     Impressions: Pt was positioned Upright in bed , awake and alert. Currently on room air. Trials of thin liquids were provided to assess swallow function. Pt declined offered PO trials (applesauce, peaches, severiano cracker) besides water despite encouragement from SLP. Family present at bedside. Pt benefited from drinking assistance including tactile support to bring cup to mouth. Pt benefited from cued small single sips of thin liquids, with no overt clinical signs of aspiration in 3/3 trials. Pt required min cues to carry-over strategy of continued small sips. SLP educated pt on safe swallowing strategies (Alternate solids/liquids, Upright as possible with all PO intake, Small bites/sips, Eat/feed slowly, Remain upright 30-45 min). Pt's family reported he always has assistance at home during meals.  Pt demonstrates increased risk for aspiration due to cognitive state  and co morbidities . Based on today's assessment recommend continuing Dysphagia III Soft and bite sized  with Thin liquids , Meds in puree . Will continue to follow for diet tolerance, with instrumental swallow study as clinically indicated for ongoing overt signs of aspiration. Dysphagia Goals:   Pt will functionally tolerate recommended diet with no overt clinical s/s of aspiration (Ongoing 02/22/23)  Pt will functionally tolerate ongoing assessment of swallow function with diet to be determined as indicated (Ongoing 02/22/23)  Pt will demonstrate understanding of aspiration risk and precautions via education/demonstration with occasional prompting (Ongoing 02/22/23)  Pt will advance to least restrictive diet as indicated (Ongoing 02/22/23)    Plan:   3-5 times per week during acute care stay. Patient/Family Education:  Provided education regarding role of SLP, recommendations and general speech pathology plan of care. [] Pt verbalized understanding and agreement   [x] Pt requires ongoing learning   [] No evidence of comprehension     Discharge Recommendations:    Discharge recommendations to be determined pending ongoing follow-up during acute care stay    Treatment time:  Timed Code Treatment Minutes: 0  Total Treatment time: 10    If patient discharges prior to next session this note will serve as a discharge summary. Signature:   Marin Hewitt, [facility]  Speech-Language Pathology Graduate Clinician    The speech-language pathologist was present, directed the patient's care, made skilled judgment and was responsible for assessment and treatment.     Elizabeth Douglass, [id number] [facility]  Speech-Language Pathologist  [phone]

## 2023-02-22 NOTE — PROGRESS NOTES
Department of Internal Medicine  General Internal Medicine   Progress Note      SUBJECTIVE: remains symptom free no N/V or fever , intermittent agitation     History obtained from chart review and the patient  General ROS: positive for  - fatigue, malaise and weight loss  negative for - chills, fever or night sweats  Psychological ROS: negative  Ophthalmic ROS: negative  Respiratory ROS: no cough, shortness of breath, or wheezing  Cardiovascular ROS: no chest pain or dyspnea on exertion  Gastrointestinal ROS: no abdominal pain, change in bowel habits, or black or bloody stools  Genito-Urinary ROS: no dysuria, trouble voiding, or hematuria  Musculoskeletal ROS: chronic pain   Neurological ROS: no TIA or stroke symptoms    OBJECTIVE      Medications      Current Facility-Administered Medications: haloperidol lactate (HALDOL) injection 5 mg, 5 mg, IntraMUSCular, Q4H PRN  hydrALAZINE (APRESOLINE) injection 20 mg, 20 mg, IntraVENous, Q6H PRN  acetaminophen (TYLENOL) tablet 1,000 mg, 1,000 mg, Oral, Q6H PRN  lidocaine 4 % external patch 1 patch, 1 patch, TransDERmal, Daily  amoxicillin-clavulanate (AUGMENTIN) 500-125 MG per tablet 1 tablet, 1 tablet, Oral, 3 times per day  sodium chloride flush 0.9 % injection 5-40 mL, 5-40 mL, IntraVENous, 2 times per day  sodium chloride flush 0.9 % injection 5-40 mL, 5-40 mL, IntraVENous, PRN  0.9 % sodium chloride infusion, , IntraVENous, PRN  dextrose bolus 10% 125 mL, 125 mL, IntraVENous, PRN **OR** dextrose bolus 10% 250 mL, 250 mL, IntraVENous, PRN  glucagon (rDNA) injection 1 mg, 1 mg, SubCUTAneous, PRN  dextrose 10 % infusion, , IntraVENous, Continuous PRN  insulin lispro (HUMALOG) injection vial 0-8 Units, 0-8 Units, SubCUTAneous, TID WC  insulin lispro (HUMALOG) injection vial 0-4 Units, 0-4 Units, SubCUTAneous, Nightly  potassium chloride (KLOR-CON M) extended release tablet 40 mEq, 40 mEq, Oral, PRN **OR** potassium bicarb-citric acid (EFFER-K) effervescent tablet 40 mEq, 40 mEq, Oral, PRN **OR** potassium chloride 10 mEq/100 mL IVPB (Peripheral Line), 10 mEq, IntraVENous, PRN  mometasone-formoterol (DULERA) 200-5 MCG/ACT inhaler 2 puff, 2 puff, Inhalation, BID  albuterol sulfate HFA (PROVENTIL;VENTOLIN;PROAIR) 108 (90 Base) MCG/ACT inhaler 2 puff, 2 puff, Inhalation, Q6H PRN  amLODIPine (NORVASC) tablet 10 mg, 10 mg, Oral, Daily  aspirin EC tablet 81 mg, 81 mg, Oral, Daily  atorvastatin (LIPITOR) tablet 40 mg, 40 mg, Oral, Daily  escitalopram (LEXAPRO) tablet 20 mg, 20 mg, Oral, Daily  fluticasone (FLONASE) 50 MCG/ACT nasal spray 1 spray, 1 spray, Each Nostril, Daily  hydrALAZINE (APRESOLINE) tablet 50 mg, 50 mg, Oral, TID  lactulose (CHRONULAC) 10 GM/15ML solution 20 g, 20 g, Oral, TID PRN  levothyroxine (SYNTHROID) tablet 125 mcg, 125 mcg, Oral, Daily  cetirizine (ZYRTEC) tablet 5 mg, 5 mg, Oral, Daily  montelukast (SINGULAIR) tablet 10 mg, 10 mg, Oral, Nightly  oxybutynin (DITROPAN-XL) extended release tablet 10 mg, 10 mg, Oral, Daily  pantoprazole (PROTONIX) tablet 40 mg, 40 mg, Oral, Daily  tamsulosin (FLOMAX) capsule 0.4 mg, 0.4 mg, Oral, Daily  enoxaparin (LOVENOX) injection 40 mg, 40 mg, SubCUTAneous, Daily  ondansetron (ZOFRAN) injection 4 mg, 4 mg, IntraVENous, Q6H PRN  ipratropium-albuterol (DUONEB) nebulizer solution 1 ampule, 1 ampule, Inhalation, Q4H PRN  polyethylene glycol (GLYCOLAX) packet 17 g, 17 g, Oral, Daily    Physical      Vitals: BP (!) 143/66   Pulse 67   Temp 97.4 °F (36.3 °C) (Oral)   Resp 18   Ht 6' 2\" (1.88 m)   Wt 238 lb 3.2 oz (108 kg)   SpO2 95%   BMI 30.58 kg/m²   Temp: Temp: 97.4 °F (36.3 °C)  Max: Temp  Av.6 °F (36.4 °C)  Min: 97.3 °F (36.3 °C)  Max: 98.3 °F (36.8 °C)  Respiration range:  Resp  Av.9  Min: 16  Max: 24  Pulse Range:  Pulse  Av.6  Min: 56  Max: 81  Blood pressure range:  Systolic (93YLF), UJT:892 , Min:126 , KZS:975   , Diastolic (51VYO), JUAN:28, Min:66, Max:86    SpO2  Av.5 %  Min: 93 %  Max: 96 %    Intake/Output Summary (Last 24 hours) at 2023 1404  Last data filed at 2023 1128  Gross per 24 hour   Intake 420 ml   Output --   Net 420 ml           Vent settings:  Pulse  Av.2  Min: 52  Max: 94  Resp  Av.3  Min: 10  Max: 28  SpO2  Av.5 %  Min: 92 %  Max: 100 %    CONSTITUTIONAL:  awake, alert, cooperative, no apparent distress, and appears stated age  EYES:  Unremarkable   NECK:  Mild JVD  and supple, symmetrical, trachea midline  BACK:  symmetric  LUNGS:  No increased work of breathing, good air exchange, clear to auscultation bilaterally, no crackles or wheezing  CARDIOVASCULAR:  normal apical pulses, regular rate and rhythm, normal S1 and S2 and no S3  ABDOMEN:  Soft BS + non tender   MUSCULOSKELETAL:  No distal edema   NEUROLOGIC:  left hemiparesis   SKIN:  Warm and dry  and no bruising or bleeding    Data      Recent Results (from the past 96 hour(s))   POCT Glucose    Collection Time: 23  4:57 PM   Result Value Ref Range    POC Glucose 142 (H) 70 - 99 mg/dl    Performed on ACCU-CHEK    POCT Glucose    Collection Time: 23  8:41 PM   Result Value Ref Range    POC Glucose 184 (H) 70 - 99 mg/dl    Performed on ACCU-CHEK    CBC    Collection Time: 23  5:06 AM   Result Value Ref Range    WBC 3.8 (L) 4.0 - 11.0 K/uL    RBC 3.22 (L) 4.20 - 5.90 M/uL    Hemoglobin 10.1 (L) 13.5 - 17.5 g/dL    Hematocrit 30.7 (L) 40.5 - 52.5 %    MCV 95.2 80.0 - 100.0 fL    MCH 31.3 26.0 - 34.0 pg    MCHC 32.9 31.0 - 36.0 g/dL    RDW 15.6 (H) 12.4 - 15.4 %    Platelets 714 (L) 832 - 450 K/uL    MPV 9.4 5.0 - 10.5 fL   Comprehensive Metabolic Panel w/ Reflex to MG    Collection Time: 23  5:06 AM   Result Value Ref Range    Sodium 138 136 - 145 mmol/L    Potassium reflex Magnesium 3.7 3.5 - 5.1 mmol/L    Chloride 105 99 - 110 mmol/L    CO2 22 21 - 32 mmol/L    Anion Gap 11 3 - 16    Glucose 150 (H) 70 - 99 mg/dL    BUN 16 7 - 20 mg/dL    Creatinine 1.2 0.8 - 1.3 mg/dL    Est, Glom Filt Rate 59 (A) >60    Calcium 8.6 8.3 - 10.6 mg/dL    Total Protein 5.7 (L) 6.4 - 8.2 g/dL    Albumin 2.5 (L) 3.4 - 5.0 g/dL    Albumin/Globulin Ratio 0.8 (L) 1.1 - 2.2    Total Bilirubin 1.5 (H) 0.0 - 1.0 mg/dL    Alkaline Phosphatase 342 (H) 40 - 129 U/L     (H) 10 - 40 U/L    AST 70 (H) 15 - 37 U/L   Lipase    Collection Time: 02/19/23  5:06 AM   Result Value Ref Range    Lipase 153.0 (H) 13.0 - 60.0 U/L   POCT Glucose    Collection Time: 02/19/23  7:16 AM   Result Value Ref Range    POC Glucose 161 (H) 70 - 99 mg/dl    Performed on ACCU-CHEK    POCT Glucose    Collection Time: 02/19/23 11:17 AM   Result Value Ref Range    POC Glucose 247 (H) 70 - 99 mg/dl    Performed on ACCU-CHEK    POCT Glucose    Collection Time: 02/19/23  4:00 PM   Result Value Ref Range    POC Glucose 216 (H) 70 - 99 mg/dl    Performed on ACCU-CHEK    POCT Glucose    Collection Time: 02/19/23  9:05 PM   Result Value Ref Range    POC Glucose 213 (H) 70 - 99 mg/dl    Performed on ACCU-CHEK    CBC    Collection Time: 02/20/23  5:21 AM   Result Value Ref Range    WBC 4.0 4.0 - 11.0 K/uL    RBC 3.25 (L) 4.20 - 5.90 M/uL    Hemoglobin 10.4 (L) 13.5 - 17.5 g/dL    Hematocrit 31.2 (L) 40.5 - 52.5 %    MCV 95.8 80.0 - 100.0 fL    MCH 31.9 26.0 - 34.0 pg    MCHC 33.3 31.0 - 36.0 g/dL    RDW 15.5 (H) 12.4 - 15.4 %    Platelets 244 899 - 021 K/uL    MPV 10.0 5.0 - 10.5 fL   Comprehensive Metabolic Panel    Collection Time: 02/20/23  5:21 AM   Result Value Ref Range    Sodium 138 136 - 145 mmol/L    Potassium 3.9 3.5 - 5.1 mmol/L    Chloride 104 99 - 110 mmol/L    CO2 22 21 - 32 mmol/L    Anion Gap 12 3 - 16    Glucose 178 (H) 70 - 99 mg/dL    BUN 13 7 - 20 mg/dL    Creatinine 1.1 0.8 - 1.3 mg/dL    Est, Glom Filt Rate >60 >60    Calcium 8.9 8.3 - 10.6 mg/dL    Total Protein 6.2 (L) 6.4 - 8.2 g/dL    Albumin 2.9 (L) 3.4 - 5.0 g/dL    Albumin/Globulin Ratio 0.9 (L) 1.1 - 2.2    Total Bilirubin 1.1 (H) 0.0 - 1.0 mg/dL    Alkaline Phosphatase 319 (H) 40 - 129 U/L     (H) 10 - 40 U/L    AST 38 (H) 15 - 37 U/L   POCT Glucose    Collection Time: 02/20/23  7:48 AM   Result Value Ref Range    POC Glucose 172 (H) 70 - 99 mg/dl    Performed on ACCU-CHEK    POCT Glucose    Collection Time: 02/20/23 11:36 AM   Result Value Ref Range    POC Glucose 198 (H) 70 - 99 mg/dl    Performed on ACCU-CHEK    POCT Glucose    Collection Time: 02/20/23  3:55 PM   Result Value Ref Range    POC Glucose 216 (H) 70 - 99 mg/dl    Performed on ACCU-CHEK    POCT Glucose    Collection Time: 02/20/23  8:54 PM   Result Value Ref Range    POC Glucose 222 (H) 70 - 99 mg/dl    Performed on ACCU-CHEK    Comprehensive Metabolic Panel    Collection Time: 02/21/23  4:58 AM   Result Value Ref Range    Sodium 135 (L) 136 - 145 mmol/L    Potassium 4.1 3.5 - 5.1 mmol/L    Chloride 103 99 - 110 mmol/L    CO2 22 21 - 32 mmol/L    Anion Gap 10 3 - 16    Glucose 200 (H) 70 - 99 mg/dL    BUN 17 7 - 20 mg/dL    Creatinine 1.0 0.8 - 1.3 mg/dL    Est, Glom Filt Rate >60 >60    Calcium 8.8 8.3 - 10.6 mg/dL    Total Protein 6.2 (L) 6.4 - 8.2 g/dL    Albumin 2.9 (L) 3.4 - 5.0 g/dL    Albumin/Globulin Ratio 0.9 (L) 1.1 - 2.2    Total Bilirubin 0.9 0.0 - 1.0 mg/dL    Alkaline Phosphatase 279 (H) 40 - 129 U/L     (H) 10 - 40 U/L    AST 50 (H) 15 - 37 U/L   CBC with Auto Differential    Collection Time: 02/21/23  4:58 AM   Result Value Ref Range    WBC 6.5 4.0 - 11.0 K/uL    RBC 3.15 (L) 4.20 - 5.90 M/uL    Hemoglobin 10.2 (L) 13.5 - 17.5 g/dL    Hematocrit 30.2 (L) 40.5 - 52.5 %    MCV 95.8 80.0 - 100.0 fL    MCH 32.4 26.0 - 34.0 pg    MCHC 33.8 31.0 - 36.0 g/dL    RDW 15.3 12.4 - 15.4 %    Platelets 166 135 - 450 K/uL    MPV 9.5 5.0 - 10.5 fL    Neutrophils % 82.5 %    Lymphocytes % 8.9 %    Monocytes % 8.6 %    Eosinophils % 0.0 %    Basophils % 0.0 %    Neutrophils Absolute 5.4 1.7 - 7.7 K/uL    Lymphocytes Absolute 0.6 (L) 1.0 - 5.1 K/uL    Monocytes Absolute 0.6 0.0 - 1.3 K/uL    Eosinophils  Absolute 0.0 0.0 - 0.6 K/uL    Basophils Absolute 0.0 0.0 - 0.2 K/uL   Lipase    Collection Time: 02/21/23  4:58 AM   Result Value Ref Range    Lipase 29.0 13.0 - 60.0 U/L   POCT Glucose    Collection Time: 02/21/23  7:13 AM   Result Value Ref Range    POC Glucose 191 (H) 70 - 99 mg/dl    Performed on ACCU-CHEK    POCT Glucose    Collection Time: 02/21/23 11:31 AM   Result Value Ref Range    POC Glucose 178 (H) 70 - 99 mg/dl    Performed on ACCU-CHEK    POCT Glucose    Collection Time: 02/21/23  5:13 PM   Result Value Ref Range    POC Glucose 176 (H) 70 - 99 mg/dl    Performed on ACCU-CHEK    POCT Glucose    Collection Time: 02/21/23  9:08 PM   Result Value Ref Range    POC Glucose 164 (H) 70 - 99 mg/dl    Performed on ACCU-CHEK    POCT Glucose    Collection Time: 02/21/23 10:35 PM   Result Value Ref Range    POC Glucose 162 (H) 70 - 99 mg/dl    Performed on ACCU-CHEK    CBC    Collection Time: 02/22/23  4:47 AM   Result Value Ref Range    WBC 6.9 4.0 - 11.0 K/uL    RBC 3.32 (L) 4.20 - 5.90 M/uL    Hemoglobin 10.5 (L) 13.5 - 17.5 g/dL    Hematocrit 32.9 (L) 40.5 - 52.5 %    MCV 99.2 80.0 - 100.0 fL    MCH 31.6 26.0 - 34.0 pg    MCHC 31.8 31.0 - 36.0 g/dL    RDW 16.3 (H) 12.4 - 15.4 %    Platelets 146 135 - 450 K/uL    MPV 9.1 5.0 - 10.5 fL   Comprehensive Metabolic Panel    Collection Time: 02/22/23  4:47 AM   Result Value Ref Range    Sodium 137 136 - 145 mmol/L    Potassium 3.6 3.5 - 5.1 mmol/L    Chloride 104 99 - 110 mmol/L    CO2 23 21 - 32 mmol/L    Anion Gap 10 3 - 16    Glucose 181 (H) 70 - 99 mg/dL    BUN 23 (H) 7 - 20 mg/dL    Creatinine 0.9 0.8 - 1.3 mg/dL    Est, Glom Filt Rate >60 >60    Calcium 8.8 8.3 - 10.6 mg/dL    Total Protein 6.2 (L) 6.4 - 8.2 g/dL    Albumin 3.0 (L) 3.4 - 5.0 g/dL    Albumin/Globulin Ratio 0.9 (L) 1.1 - 2.2    Total Bilirubin 0.9 0.0 - 1.0 mg/dL    Alkaline Phosphatase 237 (H) 40 - 129 U/L    ALT 83 (H) 10 - 40 U/L    AST 39 (H) 15 - 37 U/L   POCT Glucose    Collection Time:  02/22/23  7:36 AM   Result Value Ref Range    POC Glucose 168 (H) 70 - 99 mg/dl    Performed on ACCU-CHEK    POCT Glucose    Collection Time: 02/22/23 11:28 AM   Result Value Ref Range    POC Glucose 182 (H) 70 - 99 mg/dl    Performed on [address] [facility]             Last Modified POA    * (Principal) Acute biliary pancreatitis without infection or necrosis 2/17/2023 Yes    Dementia ([address] 2/18/2023 Yes    Cholelithiasis 2/18/2023 Yes    Late effects of cerebral ischemic stroke 2/18/2023 Yes    Controlled type 2 diabetes mellitus, with long-term current use of insulin ([address] 2/18/2023 Yes    Primary hypertension 2/18/2023 Yes      S/p Lap Deepa    Advance diet    Post op pain control   Can be dismissal ready by AM    Already on PO antibiotics    HHC orders Placed    Discussed with case management

## 2023-02-22 NOTE — PROGRESS NOTES
Pt very agitated. Pulled cord off SCDs and swinging it in the air threatening staff and attempting to kick staff. Unable to deescalate pt. IM haldol given.

## 2023-02-22 NOTE — PROGRESS NOTES
Pt relaxed, occasionally yells out to staff in hallway but otherwise calm. Lights dimmed and stimuli decreased.  Door open for observation with bed alarm on

## 2023-02-22 NOTE — PLAN OF CARE
Problem: ABCDS Injury Assessment  Goal: Absence of physical injury  Outcome: Progressing     Problem: Skin/Tissue Integrity  Goal: Absence of new skin breakdown  Description: 1. Monitor for areas of redness and/or skin breakdown  2. Assess vascular access sites hourly  3. Every 4-6 hours minimum:  Change oxygen saturation probe site  4. Every 4-6 hours:  If on nasal continuous positive airway pressure, respiratory therapy assess nares and determine need for appliance change or resting period.   Outcome: Progressing     Problem: Discharge Planning  Goal: Discharge to home or other facility with appropriate resources  Outcome: Progressing     Problem: Safety - Adult  Goal: Free from fall injury  Outcome: Progressing     Problem: Neurosensory - Adult  Goal: Achieves stable or improved neurological status  Outcome: Progressing     Problem: Musculoskeletal - Adult  Goal: Return mobility to safest level of function  Outcome: Progressing  Goal: Maintain proper alignment of affected body part  Outcome: Progressing     Problem: Gastrointestinal - Adult  Goal: Minimal or absence of nausea and vomiting  Outcome: Progressing  Goal: Maintains or returns to baseline bowel function  Outcome: Progressing     Problem: Genitourinary - Adult  Goal: Absence of urinary retention  Outcome: Progressing     Problem: Metabolic/Fluid and Electrolytes - Adult  Goal: Electrolytes maintained within normal limits  Outcome: Progressing  Goal: Hemodynamic stability and optimal renal function maintained  Outcome: Progressing  Goal: Glucose maintained within prescribed range  Outcome: Progressing     Problem: Chronic Conditions and Co-morbidities  Goal: Patient's chronic conditions and co-morbidity symptoms are monitored and maintained or improved  Outcome: Progressing     Problem: Pain  Goal: Verbalizes/displays adequate comfort level or baseline comfort level  Outcome: Progressing

## 2023-02-22 NOTE — DISCHARGE INSTRUCTIONS
[facility]    Discharge Instructions      Activity  - activity as tolerated, no driving while on analgesics, and no heavy lifting for 6 weeks; it is OK to be up walking around--walking up and down stairs is also OK. Do what is comfortable: stop and rest if you feel tired.    Diet  - regular diet  - Drink plenty of fluids     Pain  - You may use narcotic pain medication as prescribed for breakthrough pain  - You can use OTC Tylenol or Ibuprofen for 1-2 weeks (may need to adjust the dose as directed on the bottle if enteric coated ibuprofen chosen)  - Avoid taking narcotic pain medication on an empty stomach which may result in nausea, if pain medication is causing nausea try decreasing the dose  - Narcotic pain medication is not necessary, please contact the office if pain is not controlled  - Narcotic pain medication will not be refilled on weekends and after hours  - Please contact the office Monday-Friday 8a-4p if a refill is requested    Nausea  - Avoid taking narcotic pain medication on an empty stomach which may result in nausea  - If pain medication is causing nausea you may try decreasing the dose  - Nausea can be common for 24 hours after surgery--if nausea uncontrolled or worsening, contact the office or go to the ED    Constipation  - Pain medication can be constipating  - Often, it helps to take a stool softener with the goal of at least one soft bowel movement daily with minimal straining  - You may also need to increase water and fiber intake--may supplement with Benefiber and increasing your activity will also be helpful  - If a stool softener is needed, the following OTC medications are recommend: Colace 100 mg by mouth twice daily, Miralax 17 gm by mouth 1-3 times daily with glass of water, dulcolax suppositories, and Fleets enemas    Wound Care  - keep wound clean and dry  - If incisional bleeding is noted, hold firm pressure for 15-20 minutes. If remains  uncontrolled, either contact the office or present to nearest ED  - It is common to have bruising or mild redness around the wounds within the first few days after surgery. If it worsens, or starts draining, do not hesitate to contact the office  - May shower but no tub baths or swimming pools--do not submerge incisions under water    Cautions  - Watch for signs of infection, such as: fever over 100.5, excessive warmth or redness around incisions, bloody or cloudy drainage from incisions  - Watch for signs of complication: uncontrolled pain, nausea, bloating, sudden lightheadedness  - Serious problems can arise after surgery that need urgent or immediate care  - Do not hesitate to contact the office with any questions    Follow-up with your surgeon in  2 weeks. Call [phone] to schedule follow-up visit.

## 2023-02-22 NOTE — PROGRESS NOTES
PT/OT encouraged prior to discharge, notified MD,declined. Discharge instructions and medications reviewed with patient. Patient voices understanding and denies further questions/ concerns at this time. Patient transported home by Lizbet Cervantes.

## 2023-02-22 NOTE — PROGRESS NOTES
All of a sudden patient was at work sitting at his desk and felt really cold, but he was sweating. The change in the nortriptyline dose was 1 week ago. He went from 2 tabs 10 mg nortriptyline to 1 tab two times per day. He has been on nortriptyline for over a year or more. He did also endorse being dizzy, shaky, few muscle tremors, nausea (little bit - no appetite). He states this is the first time he has felt this way. He broke up with girlfriend last week but does not feel stressed about that.    Spoke to Neo Dockery. Her verbal orders:    1. If he continues these episodes he can just stop nortriptyline cold turkey  2. He should contact his epilepsy provider as this could be post ictal or related to his seizures  3. Neo will call him tomorrow morning  4. Any chest pain go to emergency department   5. Call to schedule a return with Neo    Called him back and gave him the above information. He repeated it back to me, verbalized understanding and agreement with this information.   Pt more calm however still very disoriented. Attempted to reorient. Emotional support provided.

## 2023-02-22 NOTE — CARE COORDINATION
Discharge Planning Note:    - [facility] have been informed of the patient's discharge order. Will continue to follow.     SYBIL Espinosa RN    [facility]  Phone: [phone]

## 2023-02-22 NOTE — PROGRESS NOTES
Pt very agitated, thinks he is 'tied up like a dog\" and that the nursing staff is trying to take him to assisted. Unable to reorient or redirect pt. Pt pulling on the lines from the VS machine on the wall and began yelling and swinging at this nurse when attempted to redirect and remove the lines. Dr. Aries Kyle notified of pt agitation. New order on chart for Haldol    Pt BP is also elevated. He spit his medication out at this nurse when given to him this evening and has refused since. Dr Aries Kyle notified of this also. New order for IV hydralazine prn.

## 2023-02-22 NOTE — DISCHARGE INSTR - COC
Continuity of Care Form    Patient Name: Stacy Garza   :  1935  MRN:  [id number]    Admit date:  2023  Discharge date:  2023    Code Status Order: Full Code   Advance Directives:   [facility] Documentation       Date/Time Healthcare Directive Type of Healthcare Directive Copy in [facility] [address] Agent's Name Healthcare Agent's Phone Number    23 0895 No, patient does not have an advance directive for healthcare treatment -- -- -- -- --            Admitting Physician:  Gustavo Steel MD  PCP: Flora Angulo MD    Discharging Nurse: [facility] Unit/Room#: 0OR-0833/5046-43  Discharging Unit Phone Number: [phone]    Emergency Contact:   Extended Emergency Contact Information  Primary Emergency Contact: Kourtney Villa)  Home Phone: [phone]  Relation: Child  Secondary Emergency Contact: [employer] [place] Phone: [phone]  Relation: Child    Past Surgical History:  Past Surgical History:   Procedure Laterality Date    CARDIAC SURGERY      2008open heart surgery    CHOLECYSTECTOMY, LAPAROSCOPIC N/A 2023    CHOLECYSTECTOMY LAPAROSCOPIC, CHOLANGIOGRAM performed by Latonia Majano MD at [facility] 40 GRAFT         Immunization History:   Immunization History   Administered Date(s) Administered    COVID-19, PFIZER PURPLE top, DILUTE for use, (age 15 y+), 30mcg/0.3mL 2021, 2021, 2022    Influenza Virus Vaccine 10/18/2012    Pneumococcal Conjugate 7-valent (Christine Staff) 2002       Active Problems:  Patient Active Problem List   Diagnosis Code    ASHD (arteriosclerotic heart disease) I25.10    Bilateral carotid artery stenosis I65.23    Stage 2 chronic kidney disease N18.2    Primary hypertension I10    Dyslipidemia E78.5    Dementia ([facility] [address] F03.90    Unable to care for self Z78.9    Unable to ambulate R26.2    Slurred speech R47.81    Acute cerebrovascular accident (CVA) ([facility] [address] I63.9    CVA (cerebrovascular accident) ([facility] [address] I63.9    TIA (transient ischemic attack) G45.9    Hemiparesis as late effect of cerebrovascular disease ([facility] [address] I69.959    Chronic diastolic heart failure (HCC) I50.32    Acute lacunar infarction [facility]) I63.81    Acute biliary pancreatitis without infection or necrosis K85.10    Cholelithiasis K80.20    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    Controlled type 2 diabetes mellitus, with long-term current use of insulin (HCC) E11.9, Z79.4       Isolation/Infection:   Isolation            No Isolation          Patient Infection Status       Infection Onset Added Last Indicated Last Indicated By Review Planned Expiration Resolved Resolved By    None active    Resolved    C-diff Rule Out 10/08/22 10/08/22 10/08/22 Clostridium difficile toxin/antigen (Ordered)   10/10/22 Regina Hammond RN    Order     COVID-19 (Rule Out) 10/07/22 10/07/22 10/07/22 COVID-19 (Ordered)   10/08/22 Rule-Out Test Resulted    COVID-19 (Rule Out) 08/15/22 08/15/22 08/15/22 COVID-19, Rapid (Ordered)   08/15/22 Rule-Out Test Resulted            Nurse Assessment:  Last Vital Signs: BP (!) 143/66   Pulse 67   Temp 97.4 °F (36.3 °C) (Oral)   Resp 18   Ht 6' 2\" (1.88 m)   Wt 238 lb 3.2 oz (108 kg)   SpO2 95%   BMI 30.58 kg/m²     Last documented pain score (0-10 scale): Pain Level: 9  Last Weight:   Wt Readings from Last 1 Encounters:   23 238 lb 3.2 oz (108 kg)     Mental Status:  disoriented    IV Access:  - None    Nursing Mobility/ADLs:  Walking   Assisted  Transfer  Assisted  Bathing  Assisted  Dressing  Assisted  Toileting  Assisted  Feeding  Assisted  Med Admin  Assisted  Med Delivery   crushed    Wound Care Documentation and Therapy:  Incision 23 Abdomen Anterior (Active)   Dressing Status Clean;Dry; Intact 23 09   Dressing/Treatment Surgical glue 23 09   Closure Surgical glue 23 09   Margins Approximated 23 09   Incision Assessment Dry 02/21/23 0806   Drainage Amount None 02/21/23 0806   Odor None 02/21/23 0806   Annel-incision Assessment Intact 02/21/23 0806   Number of days: 2        Elimination:  Continence: Bowel: No  Bladder: No  Urinary Catheter: None   Colostomy/Ileostomy/Ileal Conduit: {YES / [id number]}       Date of Last BM: ***    Intake/Output Summary (Last 24 hours) at 2/22/2023 1403  Last data filed at 2/22/2023 1128  Gross per 24 hour   Intake 420 ml   Output --   Net 420 ml     I/O last 3 completed shifts: In: 180 [P.O.:180]  Out: -     Safety Concerns:     History of Falls (last 30 days) and At Risk for Falls    Impairments/Disabilities:      None    Nutrition Therapy:  Current Nutrition Therapy:   - Oral Diet:  dysphagia sift and bite sized    Routes of Feeding: Oral  Liquids: No Restrictions  Daily Fluid Restriction: no  Last Modified Barium Swallow with Video (Video Swallowing Test): not done    Treatments at the Time of Hospital Discharge:   Respiratory Treatments: ***  Oxygen Therapy:  is not on home oxygen therapy.   Ventilator:    - No ventilator support    Rehab Therapies: Physical Therapy and Occupational Therapy  Weight Bearing Status/Restrictions: No weight bearing restrictions  Other Medical Equipment (for information only, NOT a DME order):  walker  Other Treatments: ***    Patient's personal belongings (please select all that are sent with patient):      RN SIGNATURE:  Electronically signed by Castillo Gant RN on 2/22/23 at 3:56 PM EST    CASE MANAGEMENT/SOCIAL WORK SECTION    Inpatient Status Date: 02/17/2023    Readmission Risk Assessment Score:  Readmission Risk              Risk of Unplanned Readmission:  35           Discharging to Facility/ Agency   Name: [facility]  Address: [facility], [address] [place] [address]  Phone: [phone]  Fax:      / signature: Electronically signed by Monik Hopkins RN on 2/22/23 at 3:04 PM EST    PHYSICIAN SECTION    Prognosis: Fair    Condition at Discharge: Stable    Rehab Potential (if transferring to Rehab): Fair    Recommended Labs or Other Treatments After Discharge: PT OT Long term Care     Physician Certification: I certify the above information and transfer of Amador Garcia  is necessary for the continuing treatment of the diagnosis listed and that he requires [facility] for greater 30 days.      Update Admission H&P: No change in H&P    PHYSICIAN SIGNATURE:  Electronically signed by Johny Obrien MD on 2/22/23 at 2:03 PM EST

## 2023-03-02 NOTE — PROGRESS NOTES
Physician Progress Note      PATIENT:               Valentino Gonsales  [facility] #:                  [id number]  :                       1935  ADMIT DATE:       2023 9:32 AM  DISCH DATE:        2023 7:00 PM  RESPONDING  PROVIDER #:        Ethel Colon MD          QUERY TEXT:    Pt admitted with Acute biliary pancreatitis. Noted documentation of Pneumonia   on PN  by ordered General Surgery and GI consultant. If possible,   please document in progress notes and discharge summary:    The medical record reflects the following:  Risk Factors: 80 yo from NH w/ emesis, dementia, Acute biliary pancreatitis. Clinical Indicators: PN  noted as Pneumonia of left lower lobe due to   infectious organism. CT of the abdomen and pelvis was performed and noted as   left basilar segmental atelectasis versus pneumonia with small parapneumonic   effusion. Treatment: IV piperacillin-tazobactam (ZOSYN), CT of the abdomen and pelvis. Options provided:  -- Pneumonia of left lower lobe due to infectious organism confirmed present   on admission  -- Pneumonia due to Gram negative confirmed present on admission  -- Pneumonia of left lower lobe due to infectious organism ruled out  -- Other - I will add my own diagnosis  -- Disagree - Not applicable / Not valid  -- Disagree - Clinically unable to determine / Unknown  -- Refer to Clinical Documentation Reviewer    PROVIDER RESPONSE TEXT:    The diagnosis of Pneumonia of left lower lobe due to infectious organism was   ruled out. Query created by: Arron Costa on 2023 6:32 AM      Electronically signed by:   Ethel Colon MD 3/1/2023 10:25 PM

## 2023-03-07 ENCOUNTER — OFFICE VISIT (OUTPATIENT)
Dept: SURGERY | Age: 88
End: 2023-03-07

## 2023-03-07 VITALS — SYSTOLIC BLOOD PRESSURE: 118 MMHG | BODY MASS INDEX: 27.99 KG/M2 | DIASTOLIC BLOOD PRESSURE: 52 MMHG | WEIGHT: 218 LBS

## 2023-03-07 DIAGNOSIS — K80.01 CALCULUS OF GALLBLADDER WITH ACUTE CHOLECYSTITIS AND OBSTRUCTION: Primary | ICD-10-CM

## 2023-03-07 PROCEDURE — 99024 POSTOP FOLLOW-UP VISIT: CPT | Performed by: SURGERY

## 2023-03-07 NOTE — PROGRESS NOTES
[facility]                      PATIENT NAME: Vonnie Nguyen     TODAY'S DATE: 3/7/2023    SUBJECTIVE:      Pt. returns to the office today following a laparoscopic cholecystectomy. He had surgery on 2/20/23 at [facility]. He has been recovering well to date, with progression towards normal activity and diet. He has no complaints today. Back home, eating well. Traveling to office in wheelchair. OBJECTIVE:     VITALS:  BP (!) 118/52   Wt 218 lb (98.9 kg)   BMI 27.99 kg/m²                                  CONSTITUTIONAL:  awake and alert  LUNGS:  clear to auscultation  ABDOMEN:   normal bowel sounds, soft, non-distended, non-tender   INCISIONS: clean, dry, no drainage      Data:      Radiology Review:  Intraoperative cholangiogram was normal.      ASSESSMENT AND PLAN:    81 y.o. male status post LC, IOC. Pathology shows acute cholecystitis and cholelithiasis. This was reviewed with the patient and family today. His recovery is progressing uneventfully, and he is released to normal activity. He will call or return for any additional problems or questions.       Lacho Brito MD

## 2023-10-21 ENCOUNTER — HOSPITAL ENCOUNTER (INPATIENT)
Age: 88
LOS: 4 days | Discharge: NURSING, REHAB OR LONG-TERM CARE | DRG: 684 | End: 2023-10-25
Attending: INTERNAL MEDICINE | Admitting: INTERNAL MEDICINE
Payer: MEDICARE

## 2023-10-21 ENCOUNTER — APPOINTMENT (OUTPATIENT)
Dept: CT IMAGING | Age: 88
DRG: 684 | End: 2023-10-21
Payer: MEDICARE

## 2023-10-21 ENCOUNTER — APPOINTMENT (OUTPATIENT)
Dept: GENERAL RADIOLOGY | Age: 88
DRG: 684 | End: 2023-10-21
Payer: MEDICARE

## 2023-10-21 DIAGNOSIS — R50.9 FEBRILE ILLNESS: ICD-10-CM

## 2023-10-21 DIAGNOSIS — R63.8 DECREASED ORAL INTAKE: ICD-10-CM

## 2023-10-21 DIAGNOSIS — N17.9 AKI (ACUTE KIDNEY INJURY) (HCC): ICD-10-CM

## 2023-10-21 DIAGNOSIS — R26.2 AMBULATORY DYSFUNCTION: ICD-10-CM

## 2023-10-21 DIAGNOSIS — R53.1 GENERAL WEAKNESS: Primary | ICD-10-CM

## 2023-10-21 LAB
ALBUMIN SERPL-MCNC: 3.9 G/DL (ref 3.4–5)
ALBUMIN/GLOB SERPL: 1.3 {RATIO} (ref 1.1–2.2)
ALP SERPL-CCNC: 94 U/L (ref 40–129)
ALT SERPL-CCNC: 14 U/L (ref 10–40)
ANION GAP SERPL CALCULATED.3IONS-SCNC: 12 MMOL/L (ref 3–16)
AST SERPL-CCNC: 15 U/L (ref 15–37)
BACTERIA URNS QL MICRO: NORMAL /HPF
BASOPHILS # BLD: 0 K/UL (ref 0–0.2)
BASOPHILS NFR BLD: 0.4 %
BILIRUB SERPL-MCNC: 0.6 MG/DL (ref 0–1)
BILIRUB UR QL STRIP.AUTO: NEGATIVE
BUN SERPL-MCNC: 27 MG/DL (ref 7–20)
CALCIUM SERPL-MCNC: 8.9 MG/DL (ref 8.3–10.6)
CHLORIDE SERPL-SCNC: 100 MMOL/L (ref 99–110)
CLARITY UR: CLEAR
CO2 SERPL-SCNC: 23 MMOL/L (ref 21–32)
COLOR UR: YELLOW
CREAT SERPL-MCNC: 1.9 MG/DL (ref 0.8–1.3)
DEPRECATED RDW RBC AUTO: 16.8 % (ref 12.4–15.4)
EKG ATRIAL RATE: 64 BPM
EKG DIAGNOSIS: NORMAL
EKG P AXIS: 4 DEGREES
EKG P-R INTERVAL: 158 MS
EKG Q-T INTERVAL: 438 MS
EKG QRS DURATION: 96 MS
EKG QTC CALCULATION (BAZETT): 451 MS
EKG R AXIS: -23 DEGREES
EKG T AXIS: -19 DEGREES
EKG VENTRICULAR RATE: 64 BPM
EOSINOPHIL # BLD: 0.1 K/UL (ref 0–0.6)
EOSINOPHIL NFR BLD: 3.1 %
EPI CELLS #/AREA URNS AUTO: 0 /HPF (ref 0–5)
FLUAV RNA UPPER RESP QL NAA+PROBE: NEGATIVE
FLUBV AG NPH QL: NEGATIVE
GFR SERPLBLD CREATININE-BSD FMLA CKD-EPI: 34 ML/MIN/{1.73_M2}
GLUCOSE BLD-MCNC: 158 MG/DL (ref 70–99)
GLUCOSE SERPL-MCNC: 127 MG/DL (ref 70–99)
GLUCOSE UR STRIP.AUTO-MCNC: NEGATIVE MG/DL
HCT VFR BLD AUTO: 29.4 % (ref 40.5–52.5)
HGB BLD-MCNC: 9.6 G/DL (ref 13.5–17.5)
HGB UR QL STRIP.AUTO: NEGATIVE
HYALINE CASTS #/AREA URNS AUTO: 2 /LPF (ref 0–8)
KETONES UR STRIP.AUTO-MCNC: NEGATIVE MG/DL
LACTATE BLDV-SCNC: 0.7 MMOL/L (ref 0.4–1.9)
LACTATE BLDV-SCNC: 1.2 MMOL/L (ref 0.4–1.9)
LEUKOCYTE ESTERASE UR QL STRIP.AUTO: ABNORMAL
LYMPHOCYTES # BLD: 1.1 K/UL (ref 1–5.1)
LYMPHOCYTES NFR BLD: 29.8 %
MCH RBC QN AUTO: 28.2 PG (ref 26–34)
MCHC RBC AUTO-ENTMCNC: 32.5 G/DL (ref 31–36)
MCV RBC AUTO: 87 FL (ref 80–100)
MONOCYTES # BLD: 0.6 K/UL (ref 0–1.3)
MONOCYTES NFR BLD: 15.8 %
NEUTROPHILS # BLD: 1.8 K/UL (ref 1.7–7.7)
NEUTROPHILS NFR BLD: 50.9 %
NITRITE UR QL STRIP.AUTO: NEGATIVE
PERFORMED ON: ABNORMAL
PH UR STRIP.AUTO: 6 [PH] (ref 5–8)
PLATELET # BLD AUTO: 205 K/UL (ref 135–450)
PMV BLD AUTO: 8.6 FL (ref 5–10.5)
POTASSIUM SERPL-SCNC: 4.3 MMOL/L (ref 3.5–5.1)
PROCALCITONIN SERPL IA-MCNC: 0.11 NG/ML (ref 0–0.15)
PROT SERPL-MCNC: 6.9 G/DL (ref 6.4–8.2)
PROT UR STRIP.AUTO-MCNC: NEGATIVE MG/DL
RBC # BLD AUTO: 3.39 M/UL (ref 4.2–5.9)
RBC CLUMPS #/AREA URNS AUTO: 1 /HPF (ref 0–4)
SARS-COV-2 RDRP RESP QL NAA+PROBE: NOT DETECTED
SODIUM SERPL-SCNC: 135 MMOL/L (ref 136–145)
SP GR UR STRIP.AUTO: 1.01 (ref 1–1.03)
TROPONIN, HIGH SENSITIVITY: 30 NG/L (ref 0–22)
UA COMPLETE W REFLEX CULTURE PNL UR: ABNORMAL
UA DIPSTICK W REFLEX MICRO PNL UR: YES
URN SPEC COLLECT METH UR: ABNORMAL
UROBILINOGEN UR STRIP-ACNC: 1 E.U./DL
WBC # BLD AUTO: 3.5 K/UL (ref 4–11)
WBC #/AREA URNS AUTO: 2 /HPF (ref 0–5)

## 2023-10-21 PROCEDURE — 84145 PROCALCITONIN (PCT): CPT

## 2023-10-21 PROCEDURE — 81001 URINALYSIS AUTO W/SCOPE: CPT

## 2023-10-21 PROCEDURE — 93010 ELECTROCARDIOGRAM REPORT: CPT | Performed by: INTERNAL MEDICINE

## 2023-10-21 PROCEDURE — 36415 COLL VENOUS BLD VENIPUNCTURE: CPT

## 2023-10-21 PROCEDURE — 2580000003 HC RX 258: Performed by: INTERNAL MEDICINE

## 2023-10-21 PROCEDURE — 84484 ASSAY OF TROPONIN QUANT: CPT

## 2023-10-21 PROCEDURE — 71045 X-RAY EXAM CHEST 1 VIEW: CPT

## 2023-10-21 PROCEDURE — 96360 HYDRATION IV INFUSION INIT: CPT

## 2023-10-21 PROCEDURE — 87040 BLOOD CULTURE FOR BACTERIA: CPT

## 2023-10-21 PROCEDURE — 87635 SARS-COV-2 COVID-19 AMP PRB: CPT

## 2023-10-21 PROCEDURE — 70450 CT HEAD/BRAIN W/O DYE: CPT

## 2023-10-21 PROCEDURE — 93005 ELECTROCARDIOGRAM TRACING: CPT | Performed by: PHYSICIAN ASSISTANT

## 2023-10-21 PROCEDURE — 1200000000 HC SEMI PRIVATE

## 2023-10-21 PROCEDURE — 87804 INFLUENZA ASSAY W/OPTIC: CPT

## 2023-10-21 PROCEDURE — P9612 CATHETERIZE FOR URINE SPEC: HCPCS

## 2023-10-21 PROCEDURE — 2580000003 HC RX 258: Performed by: PHYSICIAN ASSISTANT

## 2023-10-21 PROCEDURE — 99285 EMERGENCY DEPT VISIT HI MDM: CPT

## 2023-10-21 PROCEDURE — 83605 ASSAY OF LACTIC ACID: CPT

## 2023-10-21 PROCEDURE — 85025 COMPLETE CBC W/AUTO DIFF WBC: CPT

## 2023-10-21 PROCEDURE — 6370000000 HC RX 637 (ALT 250 FOR IP): Performed by: INTERNAL MEDICINE

## 2023-10-21 PROCEDURE — 80053 COMPREHEN METABOLIC PANEL: CPT

## 2023-10-21 RX ORDER — HYDRALAZINE HYDROCHLORIDE 20 MG/ML
10 INJECTION INTRAMUSCULAR; INTRAVENOUS EVERY 6 HOURS PRN
Status: DISCONTINUED | OUTPATIENT
Start: 2023-10-21 | End: 2023-10-25 | Stop reason: HOSPADM

## 2023-10-21 RX ORDER — ONDANSETRON 2 MG/ML
4 INJECTION INTRAMUSCULAR; INTRAVENOUS EVERY 6 HOURS PRN
Status: DISCONTINUED | OUTPATIENT
Start: 2023-10-21 | End: 2023-10-25 | Stop reason: HOSPADM

## 2023-10-21 RX ORDER — ATORVASTATIN CALCIUM 40 MG/1
40 TABLET, FILM COATED ORAL DAILY
Status: DISCONTINUED | OUTPATIENT
Start: 2023-10-22 | End: 2023-10-25 | Stop reason: HOSPADM

## 2023-10-21 RX ORDER — POTASSIUM CHLORIDE 7.45 MG/ML
10 INJECTION INTRAVENOUS PRN
Status: DISCONTINUED | OUTPATIENT
Start: 2023-10-21 | End: 2023-10-25 | Stop reason: HOSPADM

## 2023-10-21 RX ORDER — ACETAMINOPHEN 650 MG/1
650 SUPPOSITORY RECTAL EVERY 6 HOURS PRN
Status: DISCONTINUED | OUTPATIENT
Start: 2023-10-21 | End: 2023-10-25 | Stop reason: HOSPADM

## 2023-10-21 RX ORDER — POLYETHYLENE GLYCOL 3350 17 G/17G
17 POWDER, FOR SOLUTION ORAL DAILY
Status: DISCONTINUED | OUTPATIENT
Start: 2023-10-22 | End: 2023-10-25 | Stop reason: HOSPADM

## 2023-10-21 RX ORDER — SODIUM CHLORIDE 0.9 % (FLUSH) 0.9 %
5-40 SYRINGE (ML) INJECTION PRN
Status: DISCONTINUED | OUTPATIENT
Start: 2023-10-21 | End: 2023-10-25 | Stop reason: HOSPADM

## 2023-10-21 RX ORDER — MIDODRINE HYDROCHLORIDE 5 MG/1
5 TABLET ORAL
Status: DISCONTINUED | OUTPATIENT
Start: 2023-10-21 | End: 2023-10-25 | Stop reason: HOSPADM

## 2023-10-21 RX ORDER — FUROSEMIDE 20 MG/1
20 TABLET ORAL SEE ADMIN INSTRUCTIONS
COMMUNITY

## 2023-10-21 RX ORDER — POLYETHYLENE GLYCOL 3350 17 G/17G
17 POWDER, FOR SOLUTION ORAL DAILY
Status: DISCONTINUED | OUTPATIENT
Start: 2023-10-22 | End: 2023-10-21 | Stop reason: CLARIF

## 2023-10-21 RX ORDER — ALBUTEROL SULFATE 90 UG/1
2 AEROSOL, METERED RESPIRATORY (INHALATION) EVERY 6 HOURS PRN
Status: DISCONTINUED | OUTPATIENT
Start: 2023-10-21 | End: 2023-10-25 | Stop reason: HOSPADM

## 2023-10-21 RX ORDER — SODIUM CHLORIDE 0.9 % (FLUSH) 0.9 %
5-40 SYRINGE (ML) INJECTION EVERY 12 HOURS SCHEDULED
Status: DISCONTINUED | OUTPATIENT
Start: 2023-10-21 | End: 2023-10-25 | Stop reason: HOSPADM

## 2023-10-21 RX ORDER — 0.9 % SODIUM CHLORIDE 0.9 %
500 INTRAVENOUS SOLUTION INTRAVENOUS PRN
Status: DISCONTINUED | OUTPATIENT
Start: 2023-10-21 | End: 2023-10-25 | Stop reason: HOSPADM

## 2023-10-21 RX ORDER — MIDODRINE HYDROCHLORIDE 5 MG/1
5 TABLET ORAL 3 TIMES DAILY
COMMUNITY

## 2023-10-21 RX ORDER — ENOXAPARIN SODIUM 100 MG/ML
40 INJECTION SUBCUTANEOUS DAILY
Status: DISCONTINUED | OUTPATIENT
Start: 2023-10-22 | End: 2023-10-25 | Stop reason: HOSPADM

## 2023-10-21 RX ORDER — SODIUM CHLORIDE 9 MG/ML
INJECTION, SOLUTION INTRAVENOUS PRN
Status: DISCONTINUED | OUTPATIENT
Start: 2023-10-21 | End: 2023-10-25 | Stop reason: HOSPADM

## 2023-10-21 RX ORDER — LACTULOSE 10 G/15ML
20 SOLUTION ORAL 3 TIMES DAILY PRN
Status: DISCONTINUED | OUTPATIENT
Start: 2023-10-21 | End: 2023-10-21

## 2023-10-21 RX ORDER — PANTOPRAZOLE SODIUM 40 MG/1
40 TABLET, DELAYED RELEASE ORAL DAILY
Status: DISCONTINUED | OUTPATIENT
Start: 2023-10-22 | End: 2023-10-25 | Stop reason: HOSPADM

## 2023-10-21 RX ORDER — QUETIAPINE FUMARATE 25 MG/1
25 TABLET, FILM COATED ORAL NIGHTLY
COMMUNITY

## 2023-10-21 RX ORDER — HYDRALAZINE HYDROCHLORIDE 25 MG/1
50 TABLET, FILM COATED ORAL 3 TIMES DAILY
Status: DISCONTINUED | OUTPATIENT
Start: 2023-10-21 | End: 2023-10-25 | Stop reason: HOSPADM

## 2023-10-21 RX ORDER — ACETAMINOPHEN 325 MG/1
650 TABLET ORAL EVERY 6 HOURS PRN
Status: DISCONTINUED | OUTPATIENT
Start: 2023-10-21 | End: 2023-10-25 | Stop reason: HOSPADM

## 2023-10-21 RX ORDER — AMLODIPINE BESYLATE 5 MG/1
10 TABLET ORAL DAILY
Status: DISCONTINUED | OUTPATIENT
Start: 2023-10-22 | End: 2023-10-25 | Stop reason: HOSPADM

## 2023-10-21 RX ORDER — OXYBUTYNIN CHLORIDE 5 MG/1
10 TABLET, EXTENDED RELEASE ORAL DAILY
Status: DISCONTINUED | OUTPATIENT
Start: 2023-10-22 | End: 2023-10-25 | Stop reason: HOSPADM

## 2023-10-21 RX ORDER — 0.9 % SODIUM CHLORIDE 0.9 %
1000 INTRAVENOUS SOLUTION INTRAVENOUS ONCE
Status: COMPLETED | OUTPATIENT
Start: 2023-10-21 | End: 2023-10-21

## 2023-10-21 RX ORDER — ONDANSETRON 4 MG/1
4 TABLET, ORALLY DISINTEGRATING ORAL EVERY 8 HOURS PRN
Status: DISCONTINUED | OUTPATIENT
Start: 2023-10-21 | End: 2023-10-25 | Stop reason: HOSPADM

## 2023-10-21 RX ORDER — ESCITALOPRAM OXALATE 10 MG/1
20 TABLET ORAL DAILY
Status: DISCONTINUED | OUTPATIENT
Start: 2023-10-22 | End: 2023-10-25 | Stop reason: HOSPADM

## 2023-10-21 RX ORDER — TAMSULOSIN HYDROCHLORIDE 0.4 MG/1
0.4 CAPSULE ORAL DAILY
Status: DISCONTINUED | OUTPATIENT
Start: 2023-10-22 | End: 2023-10-25 | Stop reason: HOSPADM

## 2023-10-21 RX ORDER — FLUTICASONE PROPIONATE 50 MCG
1 SPRAY, SUSPENSION (ML) NASAL DAILY
Status: DISCONTINUED | OUTPATIENT
Start: 2023-10-22 | End: 2023-10-25 | Stop reason: HOSPADM

## 2023-10-21 RX ORDER — LEVOTHYROXINE SODIUM 0.12 MG/1
125 TABLET ORAL DAILY
Status: DISCONTINUED | OUTPATIENT
Start: 2023-10-22 | End: 2023-10-25 | Stop reason: HOSPADM

## 2023-10-21 RX ORDER — POTASSIUM CHLORIDE 20 MEQ/1
40 TABLET, EXTENDED RELEASE ORAL PRN
Status: DISCONTINUED | OUTPATIENT
Start: 2023-10-21 | End: 2023-10-25 | Stop reason: HOSPADM

## 2023-10-21 RX ORDER — CETIRIZINE HYDROCHLORIDE 10 MG/1
5 TABLET ORAL DAILY
Status: DISCONTINUED | OUTPATIENT
Start: 2023-10-22 | End: 2023-10-25 | Stop reason: HOSPADM

## 2023-10-21 RX ORDER — SODIUM CHLORIDE 9 MG/ML
INJECTION, SOLUTION INTRAVENOUS CONTINUOUS
Status: DISCONTINUED | OUTPATIENT
Start: 2023-10-21 | End: 2023-10-25 | Stop reason: HOSPADM

## 2023-10-21 RX ORDER — QUETIAPINE FUMARATE 25 MG/1
25 TABLET, FILM COATED ORAL 2 TIMES DAILY
Status: DISCONTINUED | OUTPATIENT
Start: 2023-10-21 | End: 2023-10-25 | Stop reason: HOSPADM

## 2023-10-21 RX ORDER — ASPIRIN 81 MG/1
81 TABLET ORAL DAILY
Status: DISCONTINUED | OUTPATIENT
Start: 2023-10-22 | End: 2023-10-25 | Stop reason: HOSPADM

## 2023-10-21 RX ORDER — MONTELUKAST SODIUM 10 MG/1
10 TABLET ORAL NIGHTLY
Status: DISCONTINUED | OUTPATIENT
Start: 2023-10-22 | End: 2023-10-25 | Stop reason: HOSPADM

## 2023-10-21 RX ADMIN — SODIUM CHLORIDE 1000 ML: 9 INJECTION, SOLUTION INTRAVENOUS at 15:15

## 2023-10-21 RX ADMIN — QUETIAPINE FUMARATE 25 MG: 25 TABLET ORAL at 20:54

## 2023-10-21 RX ADMIN — SODIUM CHLORIDE: 9 INJECTION, SOLUTION INTRAVENOUS at 19:09

## 2023-10-21 ASSESSMENT — PATIENT HEALTH QUESTIONNAIRE - PHQ9
SUM OF ALL RESPONSES TO PHQ QUESTIONS 1-9: 0
2. FEELING DOWN, DEPRESSED OR HOPELESS: 0
SUM OF ALL RESPONSES TO PHQ QUESTIONS 1-9: 0
SUM OF ALL RESPONSES TO PHQ9 QUESTIONS 1 & 2: 0
1. LITTLE INTEREST OR PLEASURE IN DOING THINGS: 0

## 2023-10-21 ASSESSMENT — PAIN - FUNCTIONAL ASSESSMENT: PAIN_FUNCTIONAL_ASSESSMENT: NONE - DENIES PAIN

## 2023-10-21 ASSESSMENT — LIFESTYLE VARIABLES: HOW OFTEN DO YOU HAVE A DRINK CONTAINING ALCOHOL: NEVER

## 2023-10-21 NOTE — ED PROVIDER NOTES
[facility]        Pt Name: Sam Carrion  MRN: [id number]  [address] [facility] 1935  Date of evaluation: 10/21/2023  Provider: Malia Azar PA-C  PCP: Diego Bay MD  Note Started: 2:30 PM EDT 10/21/23      YAMIL. I have evaluated this patient. CHIEF COMPLAINT       Chief Complaint   Patient presents with    Fever     Pt began with a fever on Monday through Thursday with highest temp being 101.5. Pt began having numbness in the left arm Tuesday with no relief. Pt is in Palliative Care for alzheimers, parkinson, dementia. Hx of TIA which impacted speech. Facial deficits and weakness to L side from prior TIA. Numbness       HISTORY OF PRESENT ILLNESS: 1 or more Elements     History From: patient  Limitations to history : None    Sam Carrion is a 81 y.o. male who presents to the emergency department with son. Began having fever 6 days ago as high as 101.5 yesterday. There is been some intermittent left-sided numbness. Patient denies any numbness or weakness at this time. Denies pain. Denies dysuria, hematuria, diarrhea, cough, shortness of breath or any other symptoms. Son states that he did fall a couple days ago but is unsure of a head injury. Nursing Notes were all reviewed and agreed with or any disagreements were addressed in the HPI. REVIEW OF SYSTEMS :      Review of Systems    Positives and Pertinent negatives as per HPI.      SURGICAL HISTORY     Past Surgical History:   Procedure Laterality Date    CARDIAC SURGERY      2008open heart surgery    CHOLECYSTECTOMY, LAPAROSCOPIC N/A 2/20/2023    CHOLECYSTECTOMY LAPAROSCOPIC, CHOLANGIOGRAM performed by Olvin Lara MD at [facility]       Previous Medications    ADVAIR DISKUS 250-50 MCG/ACT AEPB DISKUS INHALER    Inhale 2 puffs into the lungs in the morning and at bedtime    ALBUTEROL

## 2023-10-21 NOTE — H&P
Multiple calls made to patient's wife over the last 3 days, trying to reach her about an MD appt yesterday and then about scheduling a SN visit for today. Messages left, texts sent, no return call back. Today's SN visit is a missed visit. Case communication with PT and OT and Dr Chayo Griffin office. Patient has a history of non-compliance and issues reaching anyone to schedule visits.
Pt admitted for darian, ftt    Full h+p to follow    Active Hospital Problems    Diagnosis Date Noted    Controlled type 2 diabetes mellitus, with long-term current use of insulin ([address]) [E11.9, Z79.4] 02/18/2023     Priority: Medium    Dementia ([address]) [F03.90]      Priority: Medium    DARIAN (acute kidney injury) ([address]) [N17.9] 10/21/2023    Febrile illness [R50.9] 10/21/2023    Primary hypertension [I10] 01/29/2022       Please use [employer] to contact me with any questions during the day. The hospitalist service will provide cross-coverage for this patient from 7pm to 7am.    During those hours, contact the on-call hospitalist MD/YAMIL for questions.
(*)     All other components within normal limits   COMPREHENSIVE METABOLIC PANEL W/ REFLEX TO MG FOR LOW K - Abnormal; Notable for the following components:    Sodium 135 (*)     Glucose 127 (*)     BUN 27 (*)     Creatinine 1.9 (*)     Est, Glom Filt Rate 34 (*)     All other components within normal limits   URINALYSIS WITH REFLEX TO CULTURE - Abnormal; Notable for the following components:    Leukocyte Esterase, Urine TRACE (*)     All other components within normal limits   TROPONIN - Abnormal; Notable for the following components:    Troponin, High Sensitivity 30 (*)     All other components within normal limits   RAPID INFLUENZA A/B ANTIGENS   COVID-19, RAPID   CULTURE, BLOOD 1   CULTURE, BLOOD 2   LACTATE, SEPSIS   PROCALCITONIN   MICROSCOPIC URINALYSIS   LACTATE, SEPSIS   TROPONIN         IMAGING:  Imaging results from computerized chart. Any imaging that has been independently reviewed as noted elsewhere in chart. CT Head W/O Contrast    Result Date: 10/21/2023  EXAMINATION: CT OF THE HEAD WITHOUT CONTRAST  10/21/2023 1:59 pm TECHNIQUE: CT of the head was performed without the administration of intravenous contrast. Automated exposure control, iterative reconstruction, and/or weight based adjustment of the mA/kV was utilized to reduce the radiation dose to as low as reasonably achievable. COMPARISON: 01/14/2023. HISTORY: ORDERING SYSTEM PROVIDED HISTORY: fall, possible head injury TECHNOLOGIST PROVIDED HISTORY: Reason for exam:->fall, possible head injury Has a \"code stroke\" or \"stroke alert\" been called? ->No Decision Support Exception - unselect if not a suspected or confirmed emergency medical condition->Emergency Medical Condition (MA) Reason for Exam: fall, possible head injury FINDINGS: BRAIN/VENTRICLES: There is no acute intracranial hemorrhage, mass effect or midline shift. No abnormal extra-axial fluid collection. The gray-white differentiation is maintained without evidence of an acute infarct.

## 2023-10-22 LAB
ALBUMIN SERPL-MCNC: 3.3 G/DL (ref 3.4–5)
ANION GAP SERPL CALCULATED.3IONS-SCNC: 12 MMOL/L (ref 3–16)
BACTERIA URNS QL MICRO: ABNORMAL /HPF
BASOPHILS # BLD: 0 K/UL (ref 0–0.2)
BASOPHILS NFR BLD: 0.5 %
BILIRUB UR QL STRIP.AUTO: NEGATIVE
BUN SERPL-MCNC: 22 MG/DL (ref 7–20)
CALCIUM SERPL-MCNC: 8.6 MG/DL (ref 8.3–10.6)
CHLORIDE SERPL-SCNC: 105 MMOL/L (ref 99–110)
CLARITY UR: CLEAR
CO2 SERPL-SCNC: 20 MMOL/L (ref 21–32)
COLOR UR: YELLOW
CREAT SERPL-MCNC: 1.6 MG/DL (ref 0.8–1.3)
DEPRECATED RDW RBC AUTO: 16.7 % (ref 12.4–15.4)
EOSINOPHIL # BLD: 0.2 K/UL (ref 0–0.6)
EOSINOPHIL NFR BLD: 4.9 %
EPI CELLS #/AREA URNS AUTO: 0 /HPF (ref 0–5)
GFR SERPLBLD CREATININE-BSD FMLA CKD-EPI: 41 ML/MIN/{1.73_M2}
GLUCOSE BLD-MCNC: 119 MG/DL (ref 70–99)
GLUCOSE SERPL-MCNC: 121 MG/DL (ref 70–99)
GLUCOSE UR STRIP.AUTO-MCNC: NEGATIVE MG/DL
HCT VFR BLD AUTO: 28.4 % (ref 40.5–52.5)
HGB BLD-MCNC: 9.1 G/DL (ref 13.5–17.5)
HGB UR QL STRIP.AUTO: NEGATIVE
HYALINE CASTS #/AREA URNS AUTO: 0 /LPF (ref 0–8)
KETONES UR STRIP.AUTO-MCNC: NEGATIVE MG/DL
LEUKOCYTE ESTERASE UR QL STRIP.AUTO: ABNORMAL
LYMPHOCYTES # BLD: 0.9 K/UL (ref 1–5.1)
LYMPHOCYTES NFR BLD: 26.3 %
MCH RBC QN AUTO: 27.9 PG (ref 26–34)
MCHC RBC AUTO-ENTMCNC: 32 G/DL (ref 31–36)
MCV RBC AUTO: 87.3 FL (ref 80–100)
MONOCYTES # BLD: 0.4 K/UL (ref 0–1.3)
MONOCYTES NFR BLD: 13.3 %
NEUTROPHILS # BLD: 1.8 K/UL (ref 1.7–7.7)
NEUTROPHILS NFR BLD: 55 %
NITRITE UR QL STRIP.AUTO: NEGATIVE
PERFORMED ON: ABNORMAL
PH UR STRIP.AUTO: 7 [PH] (ref 5–8)
PHOSPHATE SERPL-MCNC: 4.2 MG/DL (ref 2.5–4.9)
PLATELET # BLD AUTO: 179 K/UL (ref 135–450)
PMV BLD AUTO: 9.1 FL (ref 5–10.5)
POTASSIUM SERPL-SCNC: 3.8 MMOL/L (ref 3.5–5.1)
PROT UR STRIP.AUTO-MCNC: NEGATIVE MG/DL
RBC # BLD AUTO: 3.26 M/UL (ref 4.2–5.9)
RBC CLUMPS #/AREA URNS AUTO: 0 /HPF (ref 0–4)
SODIUM SERPL-SCNC: 137 MMOL/L (ref 136–145)
SP GR UR STRIP.AUTO: 1.01 (ref 1–1.03)
TROPONIN, HIGH SENSITIVITY: 29 NG/L (ref 0–22)
UA DIPSTICK W REFLEX MICRO PNL UR: YES
URN SPEC COLLECT METH UR: ABNORMAL
UROBILINOGEN UR STRIP-ACNC: 2 E.U./DL
WBC # BLD AUTO: 3.3 K/UL (ref 4–11)
WBC #/AREA URNS AUTO: 1 /HPF (ref 0–5)

## 2023-10-22 PROCEDURE — 82436 ASSAY OF URINE CHLORIDE: CPT

## 2023-10-22 PROCEDURE — 97530 THERAPEUTIC ACTIVITIES: CPT

## 2023-10-22 PROCEDURE — 84156 ASSAY OF PROTEIN URINE: CPT

## 2023-10-22 PROCEDURE — 85025 COMPLETE CBC W/AUTO DIFF WBC: CPT

## 2023-10-22 PROCEDURE — 84300 ASSAY OF URINE SODIUM: CPT

## 2023-10-22 PROCEDURE — 81001 URINALYSIS AUTO W/SCOPE: CPT

## 2023-10-22 PROCEDURE — 2580000003 HC RX 258: Performed by: INTERNAL MEDICINE

## 2023-10-22 PROCEDURE — 82570 ASSAY OF URINE CREATININE: CPT

## 2023-10-22 PROCEDURE — 80069 RENAL FUNCTION PANEL: CPT

## 2023-10-22 PROCEDURE — 6360000002 HC RX W HCPCS: Performed by: NURSE PRACTITIONER

## 2023-10-22 PROCEDURE — 6360000002 HC RX W HCPCS: Performed by: INTERNAL MEDICINE

## 2023-10-22 PROCEDURE — 83935 ASSAY OF URINE OSMOLALITY: CPT

## 2023-10-22 PROCEDURE — 6370000000 HC RX 637 (ALT 250 FOR IP): Performed by: INTERNAL MEDICINE

## 2023-10-22 PROCEDURE — 97535 SELF CARE MNGMENT TRAINING: CPT

## 2023-10-22 PROCEDURE — 1200000000 HC SEMI PRIVATE

## 2023-10-22 PROCEDURE — 36415 COLL VENOUS BLD VENIPUNCTURE: CPT

## 2023-10-22 PROCEDURE — 84133 ASSAY OF URINE POTASSIUM: CPT

## 2023-10-22 PROCEDURE — 97162 PT EVAL MOD COMPLEX 30 MIN: CPT

## 2023-10-22 PROCEDURE — 84484 ASSAY OF TROPONIN QUANT: CPT

## 2023-10-22 PROCEDURE — 97166 OT EVAL MOD COMPLEX 45 MIN: CPT

## 2023-10-22 RX ORDER — ZIPRASIDONE MESYLATE 20 MG/ML
10 INJECTION, POWDER, LYOPHILIZED, FOR SOLUTION INTRAMUSCULAR ONCE
Status: COMPLETED | OUTPATIENT
Start: 2023-10-22 | End: 2023-10-22

## 2023-10-22 RX ADMIN — ATORVASTATIN CALCIUM 40 MG: 40 TABLET, FILM COATED ORAL at 09:55

## 2023-10-22 RX ADMIN — OXYBUTYNIN CHLORIDE 10 MG: 5 TABLET, EXTENDED RELEASE ORAL at 09:56

## 2023-10-22 RX ADMIN — TAMSULOSIN HYDROCHLORIDE 0.4 MG: 0.4 CAPSULE ORAL at 09:56

## 2023-10-22 RX ADMIN — CETIRIZINE HYDROCHLORIDE 5 MG: 10 TABLET, FILM COATED ORAL at 09:56

## 2023-10-22 RX ADMIN — ESCITALOPRAM OXALATE 20 MG: 10 TABLET ORAL at 09:55

## 2023-10-22 RX ADMIN — SODIUM CHLORIDE: 9 INJECTION, SOLUTION INTRAVENOUS at 17:08

## 2023-10-22 RX ADMIN — HYDRALAZINE HYDROCHLORIDE 50 MG: 25 TABLET, FILM COATED ORAL at 20:29

## 2023-10-22 RX ADMIN — QUETIAPINE FUMARATE 25 MG: 25 TABLET ORAL at 09:56

## 2023-10-22 RX ADMIN — QUETIAPINE FUMARATE 25 MG: 25 TABLET ORAL at 20:29

## 2023-10-22 RX ADMIN — MIDODRINE HYDROCHLORIDE 5 MG: 5 TABLET ORAL at 12:33

## 2023-10-22 RX ADMIN — ASPIRIN 81 MG: 81 TABLET, COATED ORAL at 09:55

## 2023-10-22 RX ADMIN — MIDODRINE HYDROCHLORIDE 5 MG: 5 TABLET ORAL at 17:09

## 2023-10-22 RX ADMIN — Medication 10 ML: at 20:29

## 2023-10-22 RX ADMIN — HYDRALAZINE HYDROCHLORIDE 50 MG: 25 TABLET, FILM COATED ORAL at 15:10

## 2023-10-22 RX ADMIN — ENOXAPARIN SODIUM 40 MG: 100 INJECTION SUBCUTANEOUS at 09:57

## 2023-10-22 RX ADMIN — HYDRALAZINE HYDROCHLORIDE 50 MG: 25 TABLET, FILM COATED ORAL at 09:55

## 2023-10-22 RX ADMIN — MONTELUKAST SODIUM 10 MG: 10 TABLET, FILM COATED ORAL at 20:29

## 2023-10-22 RX ADMIN — FLUTICASONE PROPIONATE 1 SPRAY: 50 SPRAY, METERED NASAL at 10:00

## 2023-10-22 RX ADMIN — MIDODRINE HYDROCHLORIDE 5 MG: 5 TABLET ORAL at 09:56

## 2023-10-22 RX ADMIN — POLYETHYLENE GLYCOL 3350 17 G: 17 POWDER, FOR SOLUTION ORAL at 09:54

## 2023-10-22 RX ADMIN — Medication 10 ML: at 09:57

## 2023-10-22 RX ADMIN — AMLODIPINE BESYLATE 10 MG: 5 TABLET ORAL at 09:56

## 2023-10-22 RX ADMIN — ZIPRASIDONE MESYLATE 10 MG: 20 INJECTION, POWDER, LYOPHILIZED, FOR SOLUTION INTRAMUSCULAR at 00:25

## 2023-10-23 ENCOUNTER — APPOINTMENT (OUTPATIENT)
Dept: MRI IMAGING | Age: 88
DRG: 684 | End: 2023-10-23
Payer: MEDICARE

## 2023-10-23 LAB
ANION GAP SERPL CALCULATED.3IONS-SCNC: 10 MMOL/L (ref 3–16)
BASOPHILS # BLD: 0 K/UL (ref 0–0.2)
BASOPHILS NFR BLD: 0.3 %
BUN SERPL-MCNC: 17 MG/DL (ref 7–20)
CALCIUM SERPL-MCNC: 8.4 MG/DL (ref 8.3–10.6)
CHLORIDE SERPL-SCNC: 107 MMOL/L (ref 99–110)
CHLORIDE UR-SCNC: 96 MMOL/L
CO2 SERPL-SCNC: 21 MMOL/L (ref 21–32)
CREAT SERPL-MCNC: 1.4 MG/DL (ref 0.8–1.3)
CREAT UR-MCNC: 96.8 MG/DL (ref 39–259)
DEPRECATED RDW RBC AUTO: 16.4 % (ref 12.4–15.4)
EOSINOPHIL # BLD: 0.1 K/UL (ref 0–0.6)
EOSINOPHIL NFR BLD: 2.2 %
GFR SERPLBLD CREATININE-BSD FMLA CKD-EPI: 49 ML/MIN/{1.73_M2}
GLUCOSE BLD-MCNC: 115 MG/DL (ref 70–99)
GLUCOSE BLD-MCNC: 118 MG/DL (ref 70–99)
GLUCOSE BLD-MCNC: 120 MG/DL (ref 70–99)
GLUCOSE BLD-MCNC: 123 MG/DL (ref 70–99)
GLUCOSE SERPL-MCNC: 115 MG/DL (ref 70–99)
HCT VFR BLD AUTO: 24.6 % (ref 40.5–52.5)
HGB BLD-MCNC: 8 G/DL (ref 13.5–17.5)
LYMPHOCYTES # BLD: 0.7 K/UL (ref 1–5.1)
LYMPHOCYTES NFR BLD: 17.1 %
MCH RBC QN AUTO: 28.4 PG (ref 26–34)
MCHC RBC AUTO-ENTMCNC: 32.5 G/DL (ref 31–36)
MCV RBC AUTO: 87.4 FL (ref 80–100)
MONOCYTES # BLD: 0.4 K/UL (ref 0–1.3)
MONOCYTES NFR BLD: 11 %
NEUTROPHILS # BLD: 2.8 K/UL (ref 1.7–7.7)
NEUTROPHILS NFR BLD: 69.4 %
OSMOLALITY UR: 504 MOSM/KG (ref 390–1070)
PERFORMED ON: ABNORMAL
PLATELET # BLD AUTO: 192 K/UL (ref 135–450)
PMV BLD AUTO: 9 FL (ref 5–10.5)
POTASSIUM SERPL-SCNC: 4.2 MMOL/L (ref 3.5–5.1)
POTASSIUM UR-SCNC: 38.4 MMOL/L
PROT UR-MCNC: 15 MG/DL
RBC # BLD AUTO: 2.82 M/UL (ref 4.2–5.9)
SODIUM SERPL-SCNC: 138 MMOL/L (ref 136–145)
SODIUM UR-SCNC: 128 MMOL/L
WBC # BLD AUTO: 4 K/UL (ref 4–11)

## 2023-10-23 PROCEDURE — 6360000002 HC RX W HCPCS: Performed by: INTERNAL MEDICINE

## 2023-10-23 PROCEDURE — 36415 COLL VENOUS BLD VENIPUNCTURE: CPT

## 2023-10-23 PROCEDURE — 1200000000 HC SEMI PRIVATE

## 2023-10-23 PROCEDURE — 2580000003 HC RX 258: Performed by: INTERNAL MEDICINE

## 2023-10-23 PROCEDURE — 80048 BASIC METABOLIC PNL TOTAL CA: CPT

## 2023-10-23 PROCEDURE — 85025 COMPLETE CBC W/AUTO DIFF WBC: CPT

## 2023-10-23 PROCEDURE — 6370000000 HC RX 637 (ALT 250 FOR IP): Performed by: INTERNAL MEDICINE

## 2023-10-23 RX ADMIN — Medication 10 ML: at 21:00

## 2023-10-23 RX ADMIN — QUETIAPINE FUMARATE 25 MG: 25 TABLET ORAL at 23:30

## 2023-10-23 RX ADMIN — MONTELUKAST SODIUM 10 MG: 10 TABLET, FILM COATED ORAL at 23:29

## 2023-10-23 RX ADMIN — Medication 10 ML: at 08:54

## 2023-10-23 RX ADMIN — SODIUM CHLORIDE: 9 INJECTION, SOLUTION INTRAVENOUS at 03:50

## 2023-10-23 RX ADMIN — HYDRALAZINE HYDROCHLORIDE 50 MG: 25 TABLET, FILM COATED ORAL at 23:29

## 2023-10-24 ENCOUNTER — APPOINTMENT (OUTPATIENT)
Dept: MRI IMAGING | Age: 88
DRG: 684 | End: 2023-10-24
Payer: MEDICARE

## 2023-10-24 LAB
ANION GAP SERPL CALCULATED.3IONS-SCNC: 10 MMOL/L (ref 3–16)
BASOPHILS # BLD: 0 K/UL (ref 0–0.2)
BASOPHILS NFR BLD: 0.5 %
BUN SERPL-MCNC: 17 MG/DL (ref 7–20)
CALCIUM SERPL-MCNC: 8.7 MG/DL (ref 8.3–10.6)
CHLORIDE SERPL-SCNC: 107 MMOL/L (ref 99–110)
CO2 SERPL-SCNC: 21 MMOL/L (ref 21–32)
CREAT SERPL-MCNC: 1.2 MG/DL (ref 0.8–1.3)
DEPRECATED RDW RBC AUTO: 16.7 % (ref 12.4–15.4)
EOSINOPHIL # BLD: 0.1 K/UL (ref 0–0.6)
EOSINOPHIL NFR BLD: 3.7 %
GFR SERPLBLD CREATININE-BSD FMLA CKD-EPI: 58 ML/MIN/{1.73_M2}
GLUCOSE BLD-MCNC: 118 MG/DL (ref 70–99)
GLUCOSE BLD-MCNC: 120 MG/DL (ref 70–99)
GLUCOSE BLD-MCNC: 174 MG/DL (ref 70–99)
GLUCOSE SERPL-MCNC: 107 MG/DL (ref 70–99)
HCT VFR BLD AUTO: 29.8 % (ref 40.5–52.5)
HGB BLD-MCNC: 9.5 G/DL (ref 13.5–17.5)
LYMPHOCYTES # BLD: 0.9 K/UL (ref 1–5.1)
LYMPHOCYTES NFR BLD: 23.5 %
MCH RBC QN AUTO: 28 PG (ref 26–34)
MCHC RBC AUTO-ENTMCNC: 31.7 G/DL (ref 31–36)
MCV RBC AUTO: 88.1 FL (ref 80–100)
MONOCYTES # BLD: 0.4 K/UL (ref 0–1.3)
MONOCYTES NFR BLD: 9.4 %
NEUTROPHILS # BLD: 2.4 K/UL (ref 1.7–7.7)
NEUTROPHILS NFR BLD: 62.9 %
PERFORMED ON: ABNORMAL
PLATELET # BLD AUTO: 202 K/UL (ref 135–450)
PMV BLD AUTO: 8.5 FL (ref 5–10.5)
POTASSIUM SERPL-SCNC: 4 MMOL/L (ref 3.5–5.1)
RBC # BLD AUTO: 3.38 M/UL (ref 4.2–5.9)
SODIUM SERPL-SCNC: 138 MMOL/L (ref 136–145)
WBC # BLD AUTO: 3.9 K/UL (ref 4–11)

## 2023-10-24 PROCEDURE — 6360000002 HC RX W HCPCS: Performed by: INTERNAL MEDICINE

## 2023-10-24 PROCEDURE — 80048 BASIC METABOLIC PNL TOTAL CA: CPT

## 2023-10-24 PROCEDURE — 36415 COLL VENOUS BLD VENIPUNCTURE: CPT

## 2023-10-24 PROCEDURE — 97530 THERAPEUTIC ACTIVITIES: CPT

## 2023-10-24 PROCEDURE — 6370000000 HC RX 637 (ALT 250 FOR IP): Performed by: INTERNAL MEDICINE

## 2023-10-24 PROCEDURE — 2580000003 HC RX 258: Performed by: INTERNAL MEDICINE

## 2023-10-24 PROCEDURE — 85025 COMPLETE CBC W/AUTO DIFF WBC: CPT

## 2023-10-24 PROCEDURE — 97110 THERAPEUTIC EXERCISES: CPT

## 2023-10-24 PROCEDURE — 1200000000 HC SEMI PRIVATE

## 2023-10-24 RX ORDER — LORAZEPAM 2 MG/ML
1 INJECTION INTRAMUSCULAR EVERY 6 HOURS PRN
Status: DISCONTINUED | OUTPATIENT
Start: 2023-10-24 | End: 2023-10-25 | Stop reason: HOSPADM

## 2023-10-24 RX ADMIN — Medication 10 ML: at 21:52

## 2023-10-24 RX ADMIN — ENOXAPARIN SODIUM 40 MG: 100 INJECTION SUBCUTANEOUS at 09:37

## 2023-10-24 RX ADMIN — OXYBUTYNIN CHLORIDE 10 MG: 5 TABLET, EXTENDED RELEASE ORAL at 09:37

## 2023-10-24 RX ADMIN — MONTELUKAST SODIUM 10 MG: 10 TABLET, FILM COATED ORAL at 21:48

## 2023-10-24 RX ADMIN — MIDODRINE HYDROCHLORIDE 5 MG: 5 TABLET ORAL at 11:38

## 2023-10-24 RX ADMIN — MIDODRINE HYDROCHLORIDE 5 MG: 5 TABLET ORAL at 17:45

## 2023-10-24 RX ADMIN — LEVOTHYROXINE SODIUM 125 MCG: 0.12 TABLET ORAL at 06:28

## 2023-10-24 RX ADMIN — POLYETHYLENE GLYCOL 3350 17 G: 17 POWDER, FOR SOLUTION ORAL at 09:35

## 2023-10-24 RX ADMIN — SODIUM CHLORIDE: 9 INJECTION, SOLUTION INTRAVENOUS at 11:34

## 2023-10-24 RX ADMIN — ESCITALOPRAM OXALATE 20 MG: 10 TABLET ORAL at 09:38

## 2023-10-24 RX ADMIN — HYDRALAZINE HYDROCHLORIDE 50 MG: 25 TABLET, FILM COATED ORAL at 21:48

## 2023-10-24 RX ADMIN — FLUTICASONE PROPIONATE 1 SPRAY: 50 SPRAY, METERED NASAL at 09:39

## 2023-10-24 RX ADMIN — AMLODIPINE BESYLATE 10 MG: 5 TABLET ORAL at 09:37

## 2023-10-24 RX ADMIN — CETIRIZINE HYDROCHLORIDE 5 MG: 10 TABLET, FILM COATED ORAL at 09:37

## 2023-10-24 RX ADMIN — QUETIAPINE FUMARATE 25 MG: 25 TABLET ORAL at 09:38

## 2023-10-24 RX ADMIN — MIDODRINE HYDROCHLORIDE 5 MG: 5 TABLET ORAL at 09:37

## 2023-10-24 RX ADMIN — TAMSULOSIN HYDROCHLORIDE 0.4 MG: 0.4 CAPSULE ORAL at 09:37

## 2023-10-24 RX ADMIN — PANTOPRAZOLE SODIUM 40 MG: 40 TABLET, DELAYED RELEASE ORAL at 06:28

## 2023-10-24 RX ADMIN — ASPIRIN 81 MG: 81 TABLET, COATED ORAL at 09:39

## 2023-10-24 RX ADMIN — ATORVASTATIN CALCIUM 40 MG: 40 TABLET, FILM COATED ORAL at 09:35

## 2023-10-24 RX ADMIN — Medication 10 ML: at 09:38

## 2023-10-24 RX ADMIN — HYDRALAZINE HYDROCHLORIDE 50 MG: 25 TABLET, FILM COATED ORAL at 09:38

## 2023-10-24 RX ADMIN — LORAZEPAM 1 MG: 2 INJECTION INTRAMUSCULAR; INTRAVENOUS at 17:45

## 2023-10-24 RX ADMIN — QUETIAPINE FUMARATE 25 MG: 25 TABLET ORAL at 21:48

## 2023-10-25 VITALS
BODY MASS INDEX: 24.9 KG/M2 | HEIGHT: 73 IN | RESPIRATION RATE: 17 BRPM | HEART RATE: 59 BPM | TEMPERATURE: 98.2 F | OXYGEN SATURATION: 96 % | SYSTOLIC BLOOD PRESSURE: 162 MMHG | DIASTOLIC BLOOD PRESSURE: 68 MMHG | WEIGHT: 187.9 LBS

## 2023-10-25 LAB
ANION GAP SERPL CALCULATED.3IONS-SCNC: 11 MMOL/L (ref 3–16)
BACTERIA BLD CULT ORG #2: NORMAL
BACTERIA BLD CULT: NORMAL
BASOPHILS # BLD: 0 K/UL (ref 0–0.2)
BASOPHILS NFR BLD: 0.6 %
BUN SERPL-MCNC: 25 MG/DL (ref 7–20)
CALCIUM SERPL-MCNC: 8.7 MG/DL (ref 8.3–10.6)
CHLORIDE SERPL-SCNC: 105 MMOL/L (ref 99–110)
CO2 SERPL-SCNC: 20 MMOL/L (ref 21–32)
CREAT SERPL-MCNC: 1.6 MG/DL (ref 0.8–1.3)
DEPRECATED RDW RBC AUTO: 16.6 % (ref 12.4–15.4)
EOSINOPHIL # BLD: 0.1 K/UL (ref 0–0.6)
EOSINOPHIL NFR BLD: 3.3 %
GFR SERPLBLD CREATININE-BSD FMLA CKD-EPI: 41 ML/MIN/{1.73_M2}
GLUCOSE SERPL-MCNC: 98 MG/DL (ref 70–99)
HCT VFR BLD AUTO: 28.6 % (ref 40.5–52.5)
HGB BLD-MCNC: 9.1 G/DL (ref 13.5–17.5)
LYMPHOCYTES # BLD: 0.9 K/UL (ref 1–5.1)
LYMPHOCYTES NFR BLD: 23 %
MCH RBC QN AUTO: 27.7 PG (ref 26–34)
MCHC RBC AUTO-ENTMCNC: 31.7 G/DL (ref 31–36)
MCV RBC AUTO: 87.4 FL (ref 80–100)
MONOCYTES # BLD: 0.4 K/UL (ref 0–1.3)
MONOCYTES NFR BLD: 9.3 %
NEUTROPHILS # BLD: 2.6 K/UL (ref 1.7–7.7)
NEUTROPHILS NFR BLD: 63.8 %
PLATELET # BLD AUTO: 213 K/UL (ref 135–450)
PMV BLD AUTO: 8.7 FL (ref 5–10.5)
POTASSIUM SERPL-SCNC: 3.9 MMOL/L (ref 3.5–5.1)
RBC # BLD AUTO: 3.27 M/UL (ref 4.2–5.9)
SARS-COV-2 RDRP RESP QL NAA+PROBE: NOT DETECTED
SODIUM SERPL-SCNC: 136 MMOL/L (ref 136–145)
WBC # BLD AUTO: 4 K/UL (ref 4–11)

## 2023-10-25 PROCEDURE — 2580000003 HC RX 258: Performed by: INTERNAL MEDICINE

## 2023-10-25 PROCEDURE — 85025 COMPLETE CBC W/AUTO DIFF WBC: CPT

## 2023-10-25 PROCEDURE — 36415 COLL VENOUS BLD VENIPUNCTURE: CPT

## 2023-10-25 PROCEDURE — 87635 SARS-COV-2 COVID-19 AMP PRB: CPT

## 2023-10-25 PROCEDURE — 6360000002 HC RX W HCPCS: Performed by: INTERNAL MEDICINE

## 2023-10-25 PROCEDURE — 6370000000 HC RX 637 (ALT 250 FOR IP): Performed by: INTERNAL MEDICINE

## 2023-10-25 PROCEDURE — 80048 BASIC METABOLIC PNL TOTAL CA: CPT

## 2023-10-25 RX ADMIN — MIDODRINE HYDROCHLORIDE 5 MG: 5 TABLET ORAL at 11:15

## 2023-10-25 RX ADMIN — Medication 10 ML: at 11:25

## 2023-10-25 RX ADMIN — LEVOTHYROXINE SODIUM 125 MCG: 0.12 TABLET ORAL at 06:20

## 2023-10-25 RX ADMIN — PANTOPRAZOLE SODIUM 40 MG: 40 TABLET, DELAYED RELEASE ORAL at 06:20

## 2023-10-25 RX ADMIN — QUETIAPINE FUMARATE 25 MG: 25 TABLET ORAL at 11:15

## 2023-10-25 RX ADMIN — ASPIRIN 81 MG: 81 TABLET, COATED ORAL at 11:25

## 2023-10-25 RX ADMIN — ATORVASTATIN CALCIUM 40 MG: 40 TABLET, FILM COATED ORAL at 11:16

## 2023-10-25 RX ADMIN — ESCITALOPRAM OXALATE 20 MG: 10 TABLET ORAL at 11:16

## 2023-10-25 RX ADMIN — TAMSULOSIN HYDROCHLORIDE 0.4 MG: 0.4 CAPSULE ORAL at 11:24

## 2023-10-25 RX ADMIN — CETIRIZINE HYDROCHLORIDE 5 MG: 10 TABLET, FILM COATED ORAL at 11:14

## 2023-10-25 RX ADMIN — AMLODIPINE BESYLATE 10 MG: 5 TABLET ORAL at 11:14

## 2023-10-25 RX ADMIN — HYDRALAZINE HYDROCHLORIDE 50 MG: 25 TABLET, FILM COATED ORAL at 11:15

## 2023-10-25 RX ADMIN — ENOXAPARIN SODIUM 40 MG: 100 INJECTION SUBCUTANEOUS at 11:25

## 2023-10-25 RX ADMIN — OXYBUTYNIN CHLORIDE 10 MG: 5 TABLET, EXTENDED RELEASE ORAL at 11:14

## 2023-10-25 RX ADMIN — FLUTICASONE PROPIONATE 1 SPRAY: 50 SPRAY, METERED NASAL at 11:26

## 2023-10-25 NOTE — CARE COORDINATION
[facility] authorization received via [facility] Access Portal    Plan Auth ID:  [id number]  Audra Benitez ID:  [id number]  Service:  SNF  Approval Dates:  10/24/23-10/26/23  Next Review Date:  10/26/23     Kasandra Arenas  [address] [facility]  [place], [address] [facility]  Report: [phone]  Fax: [phone]      Electronically signed by Juan Melgar on 10/25/23 at 8:31 AM EDT
[facility] pre-cert request submitted via [facility] Access [id number]:  [address] [facility]  Anticipated Admit Date to [facility]:  10/24/23  [facility] #:  [id number]     Electronically signed by Spike Sparks on 10/24/23 at 11:50 AM EDT
Case Management -  Discharge Note      Patient Name: Brandon Armando                   YOB: 1935            Readmission Risk (Low < 19, Mod (19-27), High > 27): Readmission Risk Score: 18.5    Current PCP: Larry Thapa MD    (IMM) Important Message from Medicare:    Date: 10/25/23     PT AM-PAC: 8 /24  OT AM-PAC: 10 /24      Discharge Plan:  Patient discharge to:  Lien Winchester  [address]  [place], [address]  Report: [phone]  Fax: [phone]     SW faxed [address] and [address] to [phone]    MICHAELLE Ozuna  to call report to [phone] transport with [address] [facility], 2:15 PM  time     Family advised of discharge and in agreement. HENS completed. SW intervention complete. Financial    Payor: [facility] MEDICARE / Plan: [facility] MEDICARE COMPLETE / Product Type: *No Product type* /     Pharmacy:  Potential assistance Purchasing Medications:    Meds-to-Beds request:        [id number], [address] [phone]  [place] [address]  Phone: [phone] Fax: [phone]    [id number] [address] [facility] [place], [address] [facility] [phone] - F [phone]  [place] [address]  Phone: [phone] Fax: [phone]      Notes: Additional Case Management Notes: Patient will discharge to SNF today. Family, nurse and facility aware. Daughter-Nubia will bring the patients Advair inhaler and Lidocaine patches. No further needs at this time.     Electronically signed by Cory Steele on 10/25/23 at 1:17 PM EDT
Discharge Planning:     (CM) spoke with the patients daughter- Cali Omer who is in agreement with SNF for short term rehab. Per family request referrals were sent to the following:    Kam Hart  [address] [facility], [address] [facility]  Report: [phone]  Fax: [phone]   [address] [place] [address]  Phone: [phone]  Fax: [phone]      CM will start the pre-cert as soon as a facility can accept.     Electronically signed by Irina Leos on 10/23/23 at 3:02 PM EDT
Discharge Planning:     (SHARON) received a call from Gabriele at [facility] and they can accept. The facility is requesting the family bring the patients Advair inhaler and Lidocaine patches. CM spoke with the patients daughter-Sandra who states it wouldn't be a problem to provide the medications requested. CM will start the pre-cert as soon as therapy updates the notes.     Electronically signed by Eugenio Humphrey on 10/24/23 at 9:06 AM EDT
Mobility    PT AM-PAC: 7 /24  OT AM-PAC: 10 /24    Family can provide assistance at DC: Yes  Would you like Case Management to discuss the discharge plan with any other family members/significant others, and if so, who? Yes (Daughter-sandra)  Plans to Return to Present Housing: Unknown at present  Other Identified Issues/Barriers to RETURNING to current housing: Yes  Potential Assistance needed at discharge: Durable Medical Equipment            Potential DME: Other (Comment) (N/A)  Patient expects to discharge to: [address] [place] [address] for transportation at discharge:      Financial    Payor: [id number]  / Plan: [facility] / Product Type: *No Product type* /     Does insurance require precert for SNF: Yes    Potential assistance Purchasing Medications:    Meds-to-Beds request:        [id number], [address] [facility],[address]  [facility] [address]  Phone: [phone] Fax: [phone]    [id number] [address] [facility] [place], [address] [facility] [phone] - F [phone]  [facility] [address]  Phone: [phone] Fax: [phone]      Notes:    Factors facilitating achievement of predicted outcomes: Caregiver support and Pleasant    Barriers to discharge: Medical complications, cognitive deficit    Additional Case Management Notes: Patient currently lives at home with his daughter-Sandra. Daughter reports that she has 2 siblings with help with care when needed. Prior to this hospital stay the patient was active with [facility] for Palliative care. Therapy has recommended SNF for therapy. Family agrees and would like referrals sent out today to [address] [place] [address],[address] and [facility]. Care team to follow.     The Plan for Transition of Care is related to the following treatment goals of ARF (acute renal failure) (HCC) [N17.9]  General weakness [R53.1]  DARIAN (acute kidney injury) ([address]) [N17.9]  Febrile illness

## 2023-10-25 NOTE — PLAN OF CARE
Problem: Safety - Adult  Goal: Free from fall injury  10/21/2023 2220 by Sumi Hinkle RN  Outcome: Progressing  Flowsheets (Taken 10/21/2023 1957)  Free From Fall Injury:   Instruct family/caregiver on patient safety   Based on caregiver fall risk screen, instruct family/caregiver to ask for assistance with transferring infant if caregiver noted to have fall risk factors  10/21/2023 1932 by Sumi Hinkle RN  Outcome: Progressing     Problem: Nutrition Deficit:  Goal: Optimize nutritional status  10/21/2023 2220 by Sumi Hinkle RN  Outcome: Progressing  10/21/2023 1932 by Sumi Hinkle RN  Outcome: Progressing     Problem: Skin/Tissue Integrity  Goal: Absence of new skin breakdown  Description: 1. Monitor for areas of redness and/or skin breakdown  2. Assess vascular access sites hourly  3. Every 4-6 hours minimum:  Change oxygen saturation probe site  4. Every 4-6 hours:  If on nasal continuous positive airway pressure, respiratory therapy assess nares and determine need for appliance change or resting period.   Outcome: Progressing     Problem: ABCDS Injury Assessment  Goal: Absence of physical injury  Outcome: Progressing
Problem: Safety - Adult  Goal: Free from fall injury  10/24/2023 2219 by Omi Newton RN  Outcome: Progressing  10/24/2023 1054 by Gurjit Patel RN  Outcome: Progressing     Problem: Nutrition Deficit:  Goal: Optimize nutritional status  10/24/2023 2219 by Omi Newton RN  Outcome: Progressing  10/24/2023 1054 by Gurjit Patel RN  Outcome: Progressing     Problem: Skin/Tissue Integrity  Goal: Absence of new skin breakdown  Description: 1. Monitor for areas of redness and/or skin breakdown  2. Assess vascular access sites hourly  3. Every 4-6 hours minimum:  Change oxygen saturation probe site  4. Every 4-6 hours:  If on nasal continuous positive airway pressure, respiratory therapy assess nares and determine need for appliance change or resting period. 10/24/2023 2219 by Omi Newton RN  Outcome: Progressing  10/24/2023 1054 by Gurjit Patel RN  Outcome: Progressing     Problem: ABCDS Injury Assessment  Goal: Absence of physical injury  10/24/2023 2219 by Omi Newton RN  Outcome: Progressing  10/24/2023 1054 by Gurjit Patel RN  Outcome: Progressing     Problem: Neurosensory - Adult  Goal: Achieves stable or improved neurological status  10/24/2023 1054 by Gurjit Patel RN  Outcome: Progressing     Problem: Musculoskeletal - Adult  Goal: Return ADL status to a safe level of function  10/24/2023 2219 by Omi Newton RN  Outcome: Progressing  10/24/2023 1054 by Gurjit Patel RN  Outcome: Progressing     Problem: Confusion  Goal: Confusion, delirium, dementia, or psychosis is improved or at baseline  Description: INTERVENTIONS:  1. Assess for possible contributors to thought disturbance, including medications, impaired vision or hearing, underlying metabolic abnormalities, dehydration, psychiatric diagnoses, and notify attending LIP  2. [place] high risk fall precautions, as indicated  3.  Provide frequent short contacts to provide reality reorientation, refocusing and
Problem: Safety - Adult  Goal: Free from fall injury  Outcome: Adequate for Discharge     Problem: Nutrition Deficit:  Goal: Optimize nutritional status  Outcome: Adequate for Discharge     Problem: Skin/Tissue Integrity  Goal: Absence of new skin breakdown  Description: 1. Monitor for areas of redness and/or skin breakdown  2. Assess vascular access sites hourly  3. Every 4-6 hours minimum:  Change oxygen saturation probe site  4. Every 4-6 hours:  If on nasal continuous positive airway pressure, respiratory therapy assess nares and determine need for appliance change or resting period. Outcome: Adequate for Discharge     Problem: ABCDS Injury Assessment  Goal: Absence of physical injury  Outcome: Adequate for Discharge     Problem: Neurosensory - Adult  Goal: Achieves stable or improved neurological status  Outcome: Adequate for Discharge     Problem: Musculoskeletal - Adult  Goal: Return ADL status to a safe level of function  Outcome: Adequate for Discharge     Problem: Confusion  Goal: Confusion, delirium, dementia, or psychosis is improved or at baseline  Description: INTERVENTIONS:  1. Assess for possible contributors to thought disturbance, including medications, impaired vision or hearing, underlying metabolic abnormalities, dehydration, psychiatric diagnoses, and notify attending LIP  2. [place] high risk fall precautions, as indicated  3. Provide frequent short contacts to provide reality reorientation, refocusing and direction  4. Decrease environmental stimuli, including noise as appropriate  5. Monitor and intervene to maintain adequate nutrition, hydration, elimination, sleep and activity  6. If unable to ensure safety without constant attention obtain sitter and review sitter guidelines with assigned personnel  7.  Initiate Psychosocial CNS and Spiritual Care consult, as indicated  Outcome: Adequate for Discharge     Problem: Chronic Conditions and Co-morbidities  Goal: Patient's chronic
Problem: Safety - Adult  Goal: Free from fall injury  Outcome: Progressing     Problem: Nutrition Deficit:  Goal: Optimize nutritional status  Outcome: Progressing
Problem: Safety - Adult  Goal: Free from fall injury  Outcome: Progressing     Problem: Nutrition Deficit:  Goal: Optimize nutritional status  Outcome: Progressing     Problem: Skin/Tissue Integrity  Goal: Absence of new skin breakdown  Description: 1. Monitor for areas of redness and/or skin breakdown  2. Assess vascular access sites hourly  3. Every 4-6 hours minimum:  Change oxygen saturation probe site  4. Every 4-6 hours:  If on nasal continuous positive airway pressure, respiratory therapy assess nares and determine need for appliance change or resting period. Outcome: Progressing     Problem: ABCDS Injury Assessment  Goal: Absence of physical injury  Outcome: Progressing     Problem: Neurosensory - Adult  Goal: Achieves stable or improved neurological status  Outcome: Progressing     Problem: Musculoskeletal - Adult  Goal: Return ADL status to a safe level of function  Outcome: Progressing     Problem: Confusion  Goal: Confusion, delirium, dementia, or psychosis is improved or at baseline  Description: INTERVENTIONS:  1. Assess for possible contributors to thought disturbance, including medications, impaired vision or hearing, underlying metabolic abnormalities, dehydration, psychiatric diagnoses, and notify attending LIP  2. [place] high risk fall precautions, as indicated  3. Provide frequent short contacts to provide reality reorientation, refocusing and direction  4. Decrease environmental stimuli, including noise as appropriate  5. Monitor and intervene to maintain adequate nutrition, hydration, elimination, sleep and activity  6. If unable to ensure safety without constant attention obtain sitter and review sitter guidelines with assigned personnel  7.  Initiate Psychosocial CNS and Spiritual Care consult, as indicated  Outcome: Progressing     Problem: Chronic Conditions and Co-morbidities  Goal: Patient's chronic conditions and co-morbidity symptoms are monitored and maintained or
Problem: Safety - Adult  Goal: Free from fall injury  Outcome: Progressing     Problem: Nutrition Deficit:  Goal: Optimize nutritional status  Outcome: Progressing     Problem: Skin/Tissue Integrity  Goal: Absence of new skin breakdown  Description: 1. Monitor for areas of redness and/or skin breakdown  2. Assess vascular access sites hourly  3. Every 4-6 hours minimum:  Change oxygen saturation probe site  4. Every 4-6 hours:  If on nasal continuous positive airway pressure, respiratory therapy assess nares and determine need for appliance change or resting period. Outcome: Progressing     Problem: ABCDS Injury Assessment  Goal: Absence of physical injury  Outcome: Progressing     Problem: Neurosensory - Adult  Goal: Achieves stable or improved neurological status  Outcome: Progressing     Problem: Musculoskeletal - Adult  Goal: Return ADL status to a safe level of function  Outcome: Progressing     Problem: Confusion  Goal: Confusion, delirium, dementia, or psychosis is improved or at baseline  Description: INTERVENTIONS:  1. Assess for possible contributors to thought disturbance, including medications, impaired vision or hearing, underlying metabolic abnormalities, dehydration, psychiatric diagnoses, and notify attending LIP  2. [place] high risk fall precautions, as indicated  3. Provide frequent short contacts to provide reality reorientation, refocusing and direction  4. Decrease environmental stimuli, including noise as appropriate  5. Monitor and intervene to maintain adequate nutrition, hydration, elimination, sleep and activity  6. If unable to ensure safety without constant attention obtain sitter and review sitter guidelines with assigned personnel  7.  Initiate Psychosocial CNS and Spiritual Care consult, as indicated  Outcome: Progressing
Problem: Safety - Adult  Goal: Free from fall injury  Outcome: Progressing     Problem: Nutrition Deficit:  Goal: Optimize nutritional status  Outcome: Progressing     Problem: Skin/Tissue Integrity  Goal: Absence of new skin breakdown  Description: 1. Monitor for areas of redness and/or skin breakdown  2. Assess vascular access sites hourly  3. Every 4-6 hours minimum:  Change oxygen saturation probe site  4. Every 4-6 hours:  If on nasal continuous positive airway pressure, respiratory therapy assess nares and determine need for appliance change or resting period. Outcome: Progressing     Problem: ABCDS Injury Assessment  Goal: Absence of physical injury  Outcome: Progressing     Problem: Neurosensory - Adult  Goal: Achieves stable or improved neurological status  Outcome: Progressing     Problem: Musculoskeletal - Adult  Goal: Return ADL status to a safe level of function  Outcome: Progressing     Problem: Confusion  Goal: Confusion, delirium, dementia, or psychosis is improved or at baseline  Description: INTERVENTIONS:  1. Assess for possible contributors to thought disturbance, including medications, impaired vision or hearing, underlying metabolic abnormalities, dehydration, psychiatric diagnoses, and notify attending LIP  2. [place] high risk fall precautions, as indicated  3. Provide frequent short contacts to provide reality reorientation, refocusing and direction  4. Decrease environmental stimuli, including noise as appropriate  5. Monitor and intervene to maintain adequate nutrition, hydration, elimination, sleep and activity  6. If unable to ensure safety without constant attention obtain sitter and review sitter guidelines with assigned personnel  7.  Initiate Psychosocial CNS and Spiritual Care consult, as indicated  Outcome: Progressing     Problem: Chronic Conditions and Co-morbidities  Goal: Patient's chronic conditions and co-morbidity symptoms are monitored and maintained or
Psychosocial CNS and Spiritual Care consult, as indicated  10/24/2023 1054 by Angela Larios RN  Outcome: Progressing     Problem: Chronic Conditions and Co-morbidities  Goal: Patient's chronic conditions and co-morbidity symptoms are monitored and maintained or improved  10/24/2023 1054 by Angela Larios RN  Outcome: Progressing

## 2023-10-25 NOTE — DISCHARGE SUMMARY
injection 10 mg, 10 mg, IntraVENous, Q6H PRN  polyethylene glycol (GLYCOLAX) packet 17 g, 17 g, Oral, Daily         Objective (exam): see above    Labs at time of d/c:  Lab Results   Component Value Date    WBC 4.0 10/25/2023    HGB 9.1 (L) 10/25/2023    HCT 28.6 (L) 10/25/2023     10/25/2023    CHOL 78 10/08/2022    TRIG 71 10/08/2022    HDL 31 (L) 10/08/2022    ALT 14 10/21/2023    AST 15 10/21/2023     10/25/2023    K 3.9 10/25/2023     10/25/2023    CREATININE 1.6 (H) 10/25/2023    BUN 25 (H) 10/25/2023    CO2 20 (L) 10/25/2023    TSH 3.11 08/05/2011    INR 1.13 10/07/2022    LABA1C 6.2 02/18/2023     Lab Results   Component Value Date    CKTOTAL 52 01/29/2022    TROPONINI <0.01 04/09/2023       (Please note that portions of this note were completed with a voice recognition program.  Efforts were made to edit the dictations but occasionally words are mis-transcribed.)      Signed:  Too Estrella MD  10/25/2023    Please use [employer] to contact me with any questions during the day. The hospitalist service will provide cross-coverage for this patient from 7pm to 7am.    During those hours, contact the on-call hospitalist MD/YAMIL for questions.
